# Patient Record
Sex: FEMALE | Race: WHITE | NOT HISPANIC OR LATINO | Employment: UNEMPLOYED | URBAN - NONMETROPOLITAN AREA
[De-identification: names, ages, dates, MRNs, and addresses within clinical notes are randomized per-mention and may not be internally consistent; named-entity substitution may affect disease eponyms.]

---
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
## 2019-01-02 ENCOUNTER — READMISSION MANAGEMENT (OUTPATIENT)
Dept: CALL CENTER | Facility: HOSPITAL | Age: 19
End: 2019-01-02

## 2019-01-02 NOTE — OUTREACH NOTE
Medical Week 2 Survey      Responses   Facility patient discharged from?  [facility]   Does the patient have one of the following disease processes/diagnoses(primary or secondary)?  Other   Week 2 attempt successful?  No   Unsuccessful attempts  Attempt 1          Annie Sauer RN

## 2019-01-04 ENCOUNTER — READMISSION MANAGEMENT (OUTPATIENT)
Dept: CALL CENTER | Facility: HOSPITAL | Age: 19
End: 2019-01-04

## 2019-01-10 ENCOUNTER — TELEPHONE (OUTPATIENT)
Dept: FAMILY MEDICINE CLINIC | Facility: CLINIC | Age: 19
End: 2019-01-10

## 2019-01-10 RX ORDER — ARIPIPRAZOLE 5 MG/1
TABLET ORAL
Qty: 30 TABLET | Refills: 0 | OUTPATIENT
Start: 2019-01-10

## 2019-01-10 NOTE — TELEPHONE ENCOUNTER
ANDREA IWLKINSONHILARY IS NEEDING A FORM 504 ABOUT HER MEDICAL CONDITION OF DIABETES FOR HER WORK..PLEASE CALL HER BACK  [phone]

## 2019-01-10 NOTE — TELEPHONE ENCOUNTER
ANDREA WILKINSONHLIARY IS NEEDING A FORM 504 ABOUT HER MEDICAL CONDITION OF DIABETES FOR HER WORK..PLEASE CALL HER BACK  [phone]

## 2019-01-11 NOTE — TELEPHONE ENCOUNTER
ANDREA ZELAYA IS NEEDING A FORM 504 ABOUT HER MEDICAL CONDITION OF DIABETES FOR HER WORK..PLEASE CALL HER BACK  [phone]    THEY ARE WANTING A LETTER STATING HER MEDICAL CONDITION .

## 2019-02-06 ENCOUNTER — HOSPITAL ENCOUNTER (INPATIENT)
Facility: HOSPITAL | Age: 19
LOS: 1 days | Discharge: HOME | End: 2019-02-07
Attending: FAMILY MEDICINE | Admitting: INTERNAL MEDICINE

## 2019-02-06 ENCOUNTER — APPOINTMENT (OUTPATIENT)
Dept: GENERAL RADIOLOGY | Facility: HOSPITAL | Age: 19
End: 2019-02-06

## 2019-02-06 DIAGNOSIS — E10.10 DIABETIC KETOACIDOSIS WITHOUT COMA ASSOCIATED WITH TYPE 1 DIABETES MELLITUS (HCC): Primary | ICD-10-CM

## 2019-02-06 PROBLEM — E11.10 DKA (DIABETIC KETOACIDOSES): Status: ACTIVE | Noted: 2019-02-06

## 2019-02-06 LAB
ACETONE BLD QL: ABNORMAL
ALBUMIN SERPL-MCNC: 5.1 G/DL (ref 3.4–4.8)
ALBUMIN/GLOB SERPL: 1.6 G/DL (ref 1.1–1.8)
ALP SERPL-CCNC: 102 U/L (ref 50–130)
ALT SERPL W P-5'-P-CCNC: 17 U/L (ref 9–52)
AMPHET+METHAMPHET UR QL: NEGATIVE
ANION GAP SERPL CALCULATED.3IONS-SCNC: 11 MMOL/L (ref 5–15)
ANION GAP SERPL CALCULATED.3IONS-SCNC: 27 MMOL/L (ref 5–15)
ARTERIAL PATENCY WRIST A: ABNORMAL
AST SERPL-CCNC: 23 U/L (ref 14–36)
ATMOSPHERIC PRESS: 746 MMHG
B-HCG UR QL: NEGATIVE
BACTERIA UR QL AUTO: ABNORMAL /HPF
BARBITURATES UR QL SCN: NEGATIVE
BASE EXCESS BLDA CALC-SCNC: -11.7 MMOL/L (ref 0–2)
BASOPHILS # BLD AUTO: 0.08 10*3/MM3 (ref 0–0.2)
BASOPHILS NFR BLD AUTO: 0.5 % (ref 0–2)
BDY SITE: ABNORMAL
BENZODIAZ UR QL SCN: NEGATIVE
BILIRUB SERPL-MCNC: 0.7 MG/DL (ref 0.2–1.3)
BILIRUB UR QL STRIP: NEGATIVE
BUN BLD-MCNC: 13 MG/DL (ref 7–21)
BUN BLD-MCNC: 14 MG/DL (ref 7–21)
BUN/CREAT SERPL: 16.7 (ref 7–25)
BUN/CREAT SERPL: 20.9 (ref 7–25)
CA-I BLD-MCNC: 4.57 MG/DL (ref 4.6–5.6)
CALCIUM SPEC-SCNC: 10 MG/DL (ref 8.4–10.2)
CALCIUM SPEC-SCNC: 8.6 MG/DL (ref 8.4–10.2)
CANNABINOIDS SERPL QL: POSITIVE
CHLORIDE SERPL-SCNC: 106 MMOL/L (ref 95–110)
CHLORIDE SERPL-SCNC: 92 MMOL/L (ref 95–110)
CK SERPL-CCNC: 49 U/L (ref 30–135)
CLARITY UR: CLEAR
CO2 SERPL-SCNC: 15 MMOL/L (ref 22–31)
CO2 SERPL-SCNC: 18 MMOL/L (ref 22–31)
COCAINE UR QL: NEGATIVE
COLOR UR: YELLOW
CREAT BLD-MCNC: 0.67 MG/DL (ref 0.5–1)
CREAT BLD-MCNC: 0.78 MG/DL (ref 0.5–1)
D-LACTATE SERPL-SCNC: 0.7 MMOL/L (ref 0.5–2)
D-LACTATE SERPL-SCNC: 3.4 MMOL/L (ref 0.5–2)
DEPRECATED RDW RBC AUTO: 42.1 FL (ref 36.4–46.3)
EOSINOPHIL # BLD AUTO: 0.1 10*3/MM3 (ref 0–0.7)
EOSINOPHIL NFR BLD AUTO: 0.6 % (ref 0–7)
ERYTHROCYTE [DISTWIDTH] IN BLOOD BY AUTOMATED COUNT: 13.2 % (ref 11.5–14.5)
GFR SERPL CREATININE-BSD FRML MDRD: 113 ML/MIN/1.73 (ref 71–165)
GFR SERPL CREATININE-BSD FRML MDRD: 95 ML/MIN/1.73 (ref 71–165)
GLOBULIN UR ELPH-MCNC: 3.2 GM/DL (ref 2.3–3.5)
GLUCOSE BLD-MCNC: 302 MG/DL (ref 60–100)
GLUCOSE BLD-MCNC: 614 MG/DL (ref 60–100)
GLUCOSE BLDC GLUCOMTR-MCNC: 114 MG/DL (ref 70–130)
GLUCOSE BLDC GLUCOMTR-MCNC: 200 MG/DL (ref 70–130)
GLUCOSE BLDC GLUCOMTR-MCNC: 254 MG/DL (ref 70–130)
GLUCOSE BLDC GLUCOMTR-MCNC: 372 MG/DL (ref 70–130)
GLUCOSE BLDC GLUCOMTR-MCNC: 60 MG/DL (ref 70–130)
GLUCOSE BLDC GLUCOMTR-MCNC: 73 MG/DL (ref 70–130)
GLUCOSE UR STRIP-MCNC: ABNORMAL MG/DL
HBA1C MFR BLD: 12.2 % (ref 4–5.6)
HCG SERPL QL: NEGATIVE
HCO3 BLDA-SCNC: 13.2 MMOL/L (ref 20–26)
HCT VFR BLD AUTO: 40.6 % (ref 35–45)
HGB BLD-MCNC: 14.3 G/DL (ref 12–15.5)
HGB UR QL STRIP.AUTO: ABNORMAL
HOLD SPECIMEN: NORMAL
HYALINE CASTS UR QL AUTO: ABNORMAL /LPF
IMM GRANULOCYTES # BLD AUTO: 0.23 10*3/MM3 (ref 0–0.02)
IMM GRANULOCYTES NFR BLD AUTO: 1.5 % (ref 0–0.5)
KETONES UR QL STRIP: ABNORMAL
LEUKOCYTE ESTERASE UR QL STRIP.AUTO: ABNORMAL
LIPASE SERPL-CCNC: 36 U/L (ref 25–110)
LYMPHOCYTES # BLD AUTO: 2.31 10*3/MM3 (ref 0.6–4.2)
LYMPHOCYTES NFR BLD AUTO: 15 % (ref 10–50)
Lab: ABNORMAL
MAGNESIUM SERPL-MCNC: 1.7 MG/DL (ref 1.6–2.3)
MAGNESIUM SERPL-MCNC: 1.7 MG/DL (ref 1.6–2.3)
MCH RBC QN AUTO: 30.8 PG (ref 26.5–34)
MCHC RBC AUTO-ENTMCNC: 35.2 G/DL (ref 31.4–36)
MCV RBC AUTO: 87.5 FL (ref 80–98)
METHADONE UR QL SCN: NEGATIVE
MODALITY: ABNORMAL
MONOCYTES # BLD AUTO: 0.46 10*3/MM3 (ref 0–0.9)
MONOCYTES NFR BLD AUTO: 3 % (ref 0–12)
NEUTROPHILS # BLD AUTO: 12.21 10*3/MM3 (ref 2–8.6)
NEUTROPHILS NFR BLD AUTO: 79.4 % (ref 37–80)
NITRITE UR QL STRIP: NEGATIVE
NRBC BLD AUTO-RTO: 0 /100 WBC (ref 0–0)
OPIATES UR QL: NEGATIVE
OXYCODONE UR QL SCN: NEGATIVE
PCO2 BLDA: 27.5 MM HG (ref 35–45)
PH BLDA: 7.29 PH UNITS (ref 7.35–7.45)
PH UR STRIP.AUTO: 5.5 [PH] (ref 5–9)
PHOSPHATE SERPL-MCNC: 3.7 MG/DL (ref 2.7–4.7)
PLATELET # BLD AUTO: 428 10*3/MM3 (ref 150–450)
PMV BLD AUTO: 10.6 FL (ref 8–12)
PO2 BLDA: 114 MM HG (ref 83–108)
POTASSIUM BLD-SCNC: 4 MMOL/L (ref 3.5–5.1)
POTASSIUM BLD-SCNC: 4.3 MMOL/L (ref 3.5–5.1)
PROT SERPL-MCNC: 8.3 G/DL (ref 6.3–8.6)
PROT UR QL STRIP: NEGATIVE
RBC # BLD AUTO: 4.64 10*6/MM3 (ref 3.77–5.16)
RBC # UR: ABNORMAL /HPF
REF LAB TEST METHOD: ABNORMAL
SAO2 % BLDCOA: 98.8 % (ref 94–99)
SODIUM BLD-SCNC: 134 MMOL/L (ref 137–145)
SODIUM BLD-SCNC: 135 MMOL/L (ref 137–145)
SP GR UR STRIP: 1.01 (ref 1–1.03)
SQUAMOUS #/AREA URNS HPF: ABNORMAL /HPF
UROBILINOGEN UR QL STRIP: ABNORMAL
VENTILATOR MODE: ABNORMAL
WBC NRBC COR # BLD: 15.39 10*3/MM3 (ref 3.2–9.8)
WBC UR QL AUTO: ABNORMAL /HPF
WHOLE BLOOD HOLD SPECIMEN: NORMAL
WHOLE BLOOD HOLD SPECIMEN: NORMAL

## 2019-02-06 PROCEDURE — P9047 ALBUMIN (HUMAN), 25%, 50ML: HCPCS | Performed by: INTERNAL MEDICINE

## 2019-02-06 PROCEDURE — 71045 X-RAY EXAM CHEST 1 VIEW: CPT

## 2019-02-06 PROCEDURE — 80307 DRUG TEST PRSMV CHEM ANLYZR: CPT | Performed by: FAMILY MEDICINE

## 2019-02-06 PROCEDURE — 84703 CHORIONIC GONADOTROPIN ASSAY: CPT | Performed by: INTERNAL MEDICINE

## 2019-02-06 PROCEDURE — 82803 BLOOD GASES ANY COMBINATION: CPT

## 2019-02-06 PROCEDURE — 84100 ASSAY OF PHOSPHORUS: CPT | Performed by: INTERNAL MEDICINE

## 2019-02-06 PROCEDURE — 82009 KETONE BODYS QUAL: CPT | Performed by: PHYSICIAN ASSISTANT

## 2019-02-06 PROCEDURE — 80053 COMPREHEN METABOLIC PANEL: CPT | Performed by: PHYSICIAN ASSISTANT

## 2019-02-06 PROCEDURE — 36415 COLL VENOUS BLD VENIPUNCTURE: CPT | Performed by: INTERNAL MEDICINE

## 2019-02-06 PROCEDURE — 83605 ASSAY OF LACTIC ACID: CPT | Performed by: PHYSICIAN ASSISTANT

## 2019-02-06 PROCEDURE — 63710000001 INSULIN REGULAR HUMAN PER 5 UNITS: Performed by: INTERNAL MEDICINE

## 2019-02-06 PROCEDURE — 83735 ASSAY OF MAGNESIUM: CPT | Performed by: INTERNAL MEDICINE

## 2019-02-06 PROCEDURE — 82962 GLUCOSE BLOOD TEST: CPT

## 2019-02-06 PROCEDURE — 83690 ASSAY OF LIPASE: CPT | Performed by: PHYSICIAN ASSISTANT

## 2019-02-06 PROCEDURE — 81025 URINE PREGNANCY TEST: CPT | Performed by: PHYSICIAN ASSISTANT

## 2019-02-06 PROCEDURE — 85025 COMPLETE CBC W/AUTO DIFF WBC: CPT | Performed by: PHYSICIAN ASSISTANT

## 2019-02-06 PROCEDURE — 83735 ASSAY OF MAGNESIUM: CPT | Performed by: PHYSICIAN ASSISTANT

## 2019-02-06 PROCEDURE — 36600 WITHDRAWAL OF ARTERIAL BLOOD: CPT

## 2019-02-06 PROCEDURE — 80048 BASIC METABOLIC PNL TOTAL CA: CPT | Performed by: INTERNAL MEDICINE

## 2019-02-06 PROCEDURE — 87040 BLOOD CULTURE FOR BACTERIA: CPT | Performed by: INTERNAL MEDICINE

## 2019-02-06 PROCEDURE — 99285 EMERGENCY DEPT VISIT HI MDM: CPT

## 2019-02-06 PROCEDURE — 25010000002 MORPHINE PER 10 MG: Performed by: FAMILY MEDICINE

## 2019-02-06 PROCEDURE — 81001 URINALYSIS AUTO W/SCOPE: CPT | Performed by: PHYSICIAN ASSISTANT

## 2019-02-06 PROCEDURE — 83036 HEMOGLOBIN GLYCOSYLATED A1C: CPT | Performed by: INTERNAL MEDICINE

## 2019-02-06 PROCEDURE — 25010000002 ONDANSETRON PER 1 MG: Performed by: FAMILY MEDICINE

## 2019-02-06 PROCEDURE — 82550 ASSAY OF CK (CPK): CPT | Performed by: PHYSICIAN ASSISTANT

## 2019-02-06 PROCEDURE — 25010000002 ALBUMIN HUMAN 25% PER 50 ML: Performed by: INTERNAL MEDICINE

## 2019-02-06 PROCEDURE — 82330 ASSAY OF CALCIUM: CPT | Performed by: INTERNAL MEDICINE

## 2019-02-06 RX ORDER — POTASSIUM CHLORIDE 750 MG/1
20 CAPSULE, EXTENDED RELEASE ORAL AS NEEDED
Status: DISCONTINUED | OUTPATIENT
Start: 2019-02-06 | End: 2019-02-07 | Stop reason: HOSPADM

## 2019-02-06 RX ORDER — ONDANSETRON 2 MG/ML
4 INJECTION INTRAMUSCULAR; INTRAVENOUS EVERY 6 HOURS PRN
Status: DISCONTINUED | OUTPATIENT
Start: 2019-02-06 | End: 2019-02-07 | Stop reason: HOSPADM

## 2019-02-06 RX ORDER — POTASSIUM CHLORIDE 1.5 G/1.77G
20 POWDER, FOR SOLUTION ORAL AS NEEDED
Status: DISCONTINUED | OUTPATIENT
Start: 2019-02-06 | End: 2019-02-07 | Stop reason: HOSPADM

## 2019-02-06 RX ORDER — LORAZEPAM 2 MG/ML
1 INJECTION INTRAMUSCULAR EVERY 6 HOURS PRN
Status: DISCONTINUED | OUTPATIENT
Start: 2019-02-06 | End: 2019-02-06

## 2019-02-06 RX ORDER — SODIUM CHLORIDE AND POTASSIUM CHLORIDE 150; 450 MG/100ML; MG/100ML
250 INJECTION, SOLUTION INTRAVENOUS CONTINUOUS PRN
Status: DISCONTINUED | OUTPATIENT
Start: 2019-02-06 | End: 2019-02-07 | Stop reason: HOSPADM

## 2019-02-06 RX ORDER — SODIUM CHLORIDE 9 MG/ML
INJECTION, SOLUTION INTRAVENOUS
Status: COMPLETED
Start: 2019-02-06 | End: 2019-02-06

## 2019-02-06 RX ORDER — SODIUM CHLORIDE 0.9 % (FLUSH) 0.9 %
10 SYRINGE (ML) INJECTION AS NEEDED
Status: DISCONTINUED | OUTPATIENT
Start: 2019-02-06 | End: 2019-02-07 | Stop reason: HOSPADM

## 2019-02-06 RX ORDER — DEXTROSE MONOHYDRATE 25 G/50ML
12.5 INJECTION, SOLUTION INTRAVENOUS
Status: DISCONTINUED | OUTPATIENT
Start: 2019-02-06 | End: 2019-02-07 | Stop reason: HOSPADM

## 2019-02-06 RX ORDER — SODIUM CHLORIDE 0.9 % (FLUSH) 0.9 %
3 SYRINGE (ML) INJECTION EVERY 12 HOURS SCHEDULED
Status: DISCONTINUED | OUTPATIENT
Start: 2019-02-06 | End: 2019-02-07

## 2019-02-06 RX ORDER — ONDANSETRON 2 MG/ML
4 INJECTION INTRAMUSCULAR; INTRAVENOUS ONCE
Status: COMPLETED | OUTPATIENT
Start: 2019-02-06 | End: 2019-02-06

## 2019-02-06 RX ORDER — ARIPIPRAZOLE 5 MG/1
5 TABLET ORAL DAILY
Status: DISCONTINUED | OUTPATIENT
Start: 2019-02-07 | End: 2019-02-07 | Stop reason: HOSPADM

## 2019-02-06 RX ORDER — IPRATROPIUM BROMIDE AND ALBUTEROL SULFATE 2.5; .5 MG/3ML; MG/3ML
3 SOLUTION RESPIRATORY (INHALATION) EVERY 6 HOURS PRN
Status: DISCONTINUED | OUTPATIENT
Start: 2019-02-06 | End: 2019-02-07 | Stop reason: HOSPADM

## 2019-02-06 RX ORDER — POTASSIUM CHLORIDE 7.46 G/1000ML
10 INJECTION, SOLUTION INTRAVENOUS AS NEEDED
Status: DISCONTINUED | OUTPATIENT
Start: 2019-02-06 | End: 2019-02-07 | Stop reason: HOSPADM

## 2019-02-06 RX ORDER — ALBUMIN (HUMAN) 12.5 G/50ML
12.5 SOLUTION INTRAVENOUS ONCE
Status: COMPLETED | OUTPATIENT
Start: 2019-02-06 | End: 2019-02-06

## 2019-02-06 RX ORDER — LAMOTRIGINE 100 MG/1
100 TABLET ORAL DAILY
Status: DISCONTINUED | OUTPATIENT
Start: 2019-02-07 | End: 2019-02-07 | Stop reason: HOSPADM

## 2019-02-06 RX ORDER — SODIUM CHLORIDE 0.9 % (FLUSH) 0.9 %
3-10 SYRINGE (ML) INJECTION AS NEEDED
Status: DISCONTINUED | OUTPATIENT
Start: 2019-02-06 | End: 2019-02-07

## 2019-02-06 RX ORDER — SODIUM CHLORIDE 9 MG/ML
150 INJECTION, SOLUTION INTRAVENOUS CONTINUOUS
Status: DISCONTINUED | OUTPATIENT
Start: 2019-02-06 | End: 2019-02-06

## 2019-02-06 RX ORDER — ACETAMINOPHEN 325 MG/1
650 TABLET ORAL EVERY 6 HOURS PRN
Status: DISCONTINUED | OUTPATIENT
Start: 2019-02-06 | End: 2019-02-07 | Stop reason: HOSPADM

## 2019-02-06 RX ORDER — DEXTROSE, SODIUM CHLORIDE, AND POTASSIUM CHLORIDE 5; .45; .15 G/100ML; G/100ML; G/100ML
150 INJECTION INTRAVENOUS CONTINUOUS PRN
Status: DISCONTINUED | OUTPATIENT
Start: 2019-02-06 | End: 2019-02-07 | Stop reason: HOSPADM

## 2019-02-06 RX ORDER — SERTRALINE HYDROCHLORIDE 25 MG/1
25 TABLET, FILM COATED ORAL DAILY
Status: DISCONTINUED | OUTPATIENT
Start: 2019-02-07 | End: 2019-02-07 | Stop reason: HOSPADM

## 2019-02-06 RX ORDER — SODIUM CHLORIDE 9 MG/ML
125 INJECTION, SOLUTION INTRAVENOUS CONTINUOUS
Status: DISCONTINUED | OUTPATIENT
Start: 2019-02-06 | End: 2019-02-07 | Stop reason: HOSPADM

## 2019-02-06 RX ORDER — DEXTROSE AND SODIUM CHLORIDE 5; .45 G/100ML; G/100ML
150 INJECTION, SOLUTION INTRAVENOUS CONTINUOUS PRN
Status: DISCONTINUED | OUTPATIENT
Start: 2019-02-06 | End: 2019-02-07 | Stop reason: HOSPADM

## 2019-02-06 RX ORDER — CLONAZEPAM 0.5 MG/1
1 TABLET ORAL 2 TIMES DAILY PRN
Status: DISCONTINUED | OUTPATIENT
Start: 2019-02-06 | End: 2019-02-07 | Stop reason: HOSPADM

## 2019-02-06 RX ORDER — MORPHINE SULFATE 2 MG/ML
2 INJECTION, SOLUTION INTRAMUSCULAR; INTRAVENOUS ONCE
Status: COMPLETED | OUTPATIENT
Start: 2019-02-06 | End: 2019-02-06

## 2019-02-06 RX ORDER — FAMOTIDINE 10 MG/ML
20 INJECTION, SOLUTION INTRAVENOUS EVERY 12 HOURS SCHEDULED
Status: DISCONTINUED | OUTPATIENT
Start: 2019-02-06 | End: 2019-02-07 | Stop reason: HOSPADM

## 2019-02-06 RX ORDER — DEXTROSE MONOHYDRATE 25 G/50ML
25-50 INJECTION, SOLUTION INTRAVENOUS
Status: DISCONTINUED | OUTPATIENT
Start: 2019-02-06 | End: 2019-02-07 | Stop reason: HOSPADM

## 2019-02-06 RX ADMIN — FAMOTIDINE 20 MG: 10 INJECTION INTRAVENOUS at 21:59

## 2019-02-06 RX ADMIN — SODIUM CHLORIDE 150 ML/HR: 900 INJECTION, SOLUTION INTRAVENOUS at 13:35

## 2019-02-06 RX ADMIN — Medication: at 21:55

## 2019-02-06 RX ADMIN — CLONAZEPAM 1 MG: 0.5 TABLET ORAL at 22:00

## 2019-02-06 RX ADMIN — MORPHINE SULFATE 2 MG: 2 INJECTION, SOLUTION INTRAMUSCULAR; INTRAVENOUS at 11:35

## 2019-02-06 RX ADMIN — ALBUMIN HUMAN 12.5 G: 0.25 SOLUTION INTRAVENOUS at 14:35

## 2019-02-06 RX ADMIN — ONDANSETRON 4 MG: 2 INJECTION INTRAMUSCULAR; INTRAVENOUS at 11:32

## 2019-02-06 RX ADMIN — HUMAN INSULIN 7 UNITS: 100 INJECTION, SOLUTION SUBCUTANEOUS at 13:01

## 2019-02-06 RX ADMIN — SODIUM CHLORIDE 1000 ML: 9 INJECTION, SOLUTION INTRAVENOUS at 11:36

## 2019-02-06 RX ADMIN — POTASSIUM CHLORIDE AND SODIUM CHLORIDE 250 ML/HR: 450; 150 INJECTION, SOLUTION INTRAVENOUS at 15:58

## 2019-02-06 RX ADMIN — SODIUM CHLORIDE 125 ML/HR: 9 INJECTION, SOLUTION INTRAVENOUS at 18:59

## 2019-02-06 RX ADMIN — SODIUM CHLORIDE 150 ML/HR: 9 INJECTION, SOLUTION INTRAVENOUS at 13:35

## 2019-02-06 RX ADMIN — SODIUM CHLORIDE 4 UNITS/HR: 9 INJECTION, SOLUTION INTRAVENOUS at 14:47

## 2019-02-06 RX ADMIN — POTASSIUM CHLORIDE, DEXTROSE MONOHYDRATE AND SODIUM CHLORIDE 150 ML/HR: 150; 5; 450 INJECTION, SOLUTION INTRAVENOUS at 17:09

## 2019-02-06 NOTE — PLAN OF CARE
Problem: Patient Care Overview  Goal: Plan of Care Review  Outcome: Ongoing (interventions implemented as appropriate)   02/06/19 9480   Coping/Psychosocial   Plan of Care Reviewed With patient   Plan of Care Review   Progress improving   OTHER   Outcome Summary New ER admission     Goal: Individualization and Mutuality  Outcome: Ongoing (interventions implemented as appropriate)    Goal: Discharge Needs Assessment  Outcome: Ongoing (interventions implemented as appropriate)    Goal: Interprofessional Rounds/Family Conf  Outcome: Ongoing (interventions implemented as appropriate)      Problem: Diabetes, Type 1 (Adult)  Goal: Signs and Symptoms of Listed Potential Problems Will be Absent, Minimized or Managed (Diabetes, Type 1)  Outcome: Ongoing (interventions implemented as appropriate)
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
## 2019-02-06 NOTE — ED NOTES
Provider made aware of patients drop in blood pressure.  Continue with IV fluid bolus.     Charo Navarrete RN  02/06/19 3216

## 2019-02-07 ENCOUNTER — APPOINTMENT (OUTPATIENT)
Dept: ULTRASOUND IMAGING | Facility: HOSPITAL | Age: 19
End: 2019-02-07

## 2019-02-07 VITALS
HEIGHT: 67 IN | TEMPERATURE: 97.6 F | OXYGEN SATURATION: 98 % | WEIGHT: 107.2 LBS | BODY MASS INDEX: 16.83 KG/M2 | DIASTOLIC BLOOD PRESSURE: 63 MMHG | HEART RATE: 64 BPM | SYSTOLIC BLOOD PRESSURE: 108 MMHG | RESPIRATION RATE: 18 BRPM

## 2019-02-07 LAB
ANION GAP SERPL CALCULATED.3IONS-SCNC: 9 MMOL/L (ref 5–15)
BACTERIA UR QL AUTO: ABNORMAL /HPF
BASOPHILS # BLD AUTO: 0.04 10*3/MM3 (ref 0–0.2)
BASOPHILS NFR BLD AUTO: 0.5 % (ref 0–2)
BILIRUB UR QL STRIP: NEGATIVE
BUN BLD-MCNC: 14 MG/DL (ref 7–21)
BUN/CREAT SERPL: 20 (ref 7–25)
CALCIUM SPEC-SCNC: 8.6 MG/DL (ref 8.4–10.2)
CHLORIDE SERPL-SCNC: 106 MMOL/L (ref 95–110)
CLARITY UR: CLEAR
CO2 SERPL-SCNC: 20 MMOL/L (ref 22–31)
COLOR UR: YELLOW
CREAT BLD-MCNC: 0.7 MG/DL (ref 0.5–1)
DEPRECATED RDW RBC AUTO: 42.7 FL (ref 36.4–46.3)
EOSINOPHIL # BLD AUTO: 0.16 10*3/MM3 (ref 0–0.7)
EOSINOPHIL NFR BLD AUTO: 1.9 % (ref 0–7)
ERYTHROCYTE [DISTWIDTH] IN BLOOD BY AUTOMATED COUNT: 13.2 % (ref 11.5–14.5)
GFR SERPL CREATININE-BSD FRML MDRD: 108 ML/MIN/1.73 (ref 71–165)
GLUCOSE BLD-MCNC: 237 MG/DL (ref 60–100)
GLUCOSE BLDC GLUCOMTR-MCNC: 247 MG/DL (ref 70–130)
GLUCOSE BLDC GLUCOMTR-MCNC: 256 MG/DL (ref 70–130)
GLUCOSE BLDC GLUCOMTR-MCNC: 280 MG/DL (ref 70–130)
GLUCOSE BLDC GLUCOMTR-MCNC: 284 MG/DL (ref 70–130)
GLUCOSE BLDC GLUCOMTR-MCNC: 381 MG/DL (ref 70–130)
GLUCOSE UR STRIP-MCNC: ABNORMAL MG/DL
HCT VFR BLD AUTO: 34.1 % (ref 35–45)
HGB BLD-MCNC: 12 G/DL (ref 12–15.5)
HGB UR QL STRIP.AUTO: ABNORMAL
HOLD SPECIMEN: NORMAL
HYALINE CASTS UR QL AUTO: ABNORMAL /LPF
IMM GRANULOCYTES # BLD AUTO: 0.07 10*3/MM3 (ref 0–0.02)
IMM GRANULOCYTES NFR BLD AUTO: 0.8 % (ref 0–0.5)
KETONES UR QL STRIP: ABNORMAL
LEUKOCYTE ESTERASE UR QL STRIP.AUTO: ABNORMAL
LYMPHOCYTES # BLD AUTO: 2.81 10*3/MM3 (ref 0.6–4.2)
LYMPHOCYTES NFR BLD AUTO: 33.8 % (ref 10–50)
MCH RBC QN AUTO: 30.7 PG (ref 26.5–34)
MCHC RBC AUTO-ENTMCNC: 35.2 G/DL (ref 31.4–36)
MCV RBC AUTO: 87.2 FL (ref 80–98)
MONOCYTES # BLD AUTO: 0.33 10*3/MM3 (ref 0–0.9)
MONOCYTES NFR BLD AUTO: 4 % (ref 0–12)
NEUTROPHILS # BLD AUTO: 4.9 10*3/MM3 (ref 2–8.6)
NEUTROPHILS NFR BLD AUTO: 59 % (ref 37–80)
NITRITE UR QL STRIP: NEGATIVE
PH UR STRIP.AUTO: 5.5 [PH] (ref 5–9)
PLATELET # BLD AUTO: 257 10*3/MM3 (ref 150–450)
PMV BLD AUTO: 10.1 FL (ref 8–12)
POTASSIUM BLD-SCNC: 3.9 MMOL/L (ref 3.5–5.1)
PROT UR QL STRIP: NEGATIVE
RBC # BLD AUTO: 3.91 10*6/MM3 (ref 3.77–5.16)
RBC # UR: ABNORMAL /HPF
REF LAB TEST METHOD: ABNORMAL
SODIUM BLD-SCNC: 135 MMOL/L (ref 137–145)
SP GR UR STRIP: 1.01 (ref 1–1.03)
SQUAMOUS #/AREA URNS HPF: ABNORMAL /HPF
UROBILINOGEN UR QL STRIP: ABNORMAL
WBC NRBC COR # BLD: 8.31 10*3/MM3 (ref 3.2–9.8)
WBC UR QL AUTO: ABNORMAL /HPF

## 2019-02-07 PROCEDURE — 76775 US EXAM ABDO BACK WALL LIM: CPT

## 2019-02-07 PROCEDURE — 81001 URINALYSIS AUTO W/SCOPE: CPT | Performed by: INTERNAL MEDICINE

## 2019-02-07 PROCEDURE — 85025 COMPLETE CBC W/AUTO DIFF WBC: CPT | Performed by: INTERNAL MEDICINE

## 2019-02-07 PROCEDURE — 80048 BASIC METABOLIC PNL TOTAL CA: CPT | Performed by: INTERNAL MEDICINE

## 2019-02-07 PROCEDURE — 82962 GLUCOSE BLOOD TEST: CPT

## 2019-02-07 RX ORDER — SODIUM CHLORIDE 0.9 % (FLUSH) 0.9 %
10 SYRINGE (ML) INJECTION EVERY 12 HOURS SCHEDULED
Status: DISCONTINUED | OUTPATIENT
Start: 2019-02-07 | End: 2019-02-07 | Stop reason: HOSPADM

## 2019-02-07 RX ADMIN — SODIUM CHLORIDE, PRESERVATIVE FREE 10 ML: 5 INJECTION INTRAVENOUS at 08:56

## 2019-02-07 RX ADMIN — CLONAZEPAM 1 MG: 0.5 TABLET ORAL at 06:15

## 2019-02-07 RX ADMIN — SERTRALINE HYDROCHLORIDE 25 MG: 25 TABLET ORAL at 08:54

## 2019-02-07 RX ADMIN — SODIUM CHLORIDE 125 ML/HR: 9 INJECTION, SOLUTION INTRAVENOUS at 03:01

## 2019-02-07 RX ADMIN — ARIPIPRAZOLE 5 MG: 5 TABLET ORAL at 08:54

## 2019-02-07 RX ADMIN — FAMOTIDINE 20 MG: 10 INJECTION INTRAVENOUS at 08:55

## 2019-02-07 RX ADMIN — LAMOTRIGINE 100 MG: 100 TABLET ORAL at 08:54

## 2019-02-07 NOTE — PAYOR COMM NOTE
"Fernanda Patterson (18 y.o. Female)     Date of Birth Social Security Number Address Home Phone MRN    2000  PO   [facility] [phone] [id number]    Oriental orthodox Marital Status          Pentecostal Single       Admission Date Admission Type Admitting Provider Attending Provider Department, Room/Bed    2/6/19 Emergency Echendu, Eliud UNDERWOOD MD  [facility] STEPDOWN UNIT, 320/1    Discharge Date Discharge Disposition Discharge Destination        2/7/2019 Home or Self Care              Attending Provider:  (none)   Allergies:  Pineapple, Benzoyl Peroxide    Isolation:  None   Infection:  None   Code Status:  Prior    Ht:  170.2 cm (67\")   Wt:  48.6 kg (107 lb 3.2 oz)    Admission Cmt:  None   Principal Problem:  None                Active Insurance as of 2/6/2019     Primary Coverage     Payor Plan Insurance Group Employer/Plan Group    [employer]HEM BLUE CROSS AN[employer]M BLUE CROSS BLUE SHIELD PPO [id number]     Payor Plan Address Payor Plan Phone Number Payor Plan Fax Number Effective Dates    [address] [phone]  2/19/2018 - None Entered    [facility] [id number]       Subscriber Name Subscriber Birth Date Member ID       DEBORAH SALOMON [birth year]1971 [id number]                 Emergency Contacts      (Rel.) Home Phone Work Phone Mobile Phone    Juana Salomon (Mother) [phone] -- [phone]            ICU Vital Signs     Row Name 02/07/19 0851 02/07/19 0829 02/07/19 0610 02/07/19 0420 02/07/19 0113       Height and Weight    Weight  --  --  48.6 kg (107 lb 3.2 oz)  --  --       Vitals    Temp  97.6 °F (36.4 °C)  --  98.4 °F (36.9 °C)  --  --    Temp src  Oral  --  Oral  --  --    Pulse  64  58  66  55  64    Heart Rate Source  Monitor  Monitor  Monitor  Monitor  Monitor    Resp  18  --  18  --  --    Resp Rate Source  Visual  --  Visual  --  --    BP  108/63  --  107/75  --  --    Noninvasive MAP (mmHg)  79  --  86  --  --    BP Location  Left arm  --  " Left arm  --  --    BP Method  Automatic  --  Automatic  --  --    Patient Position  Lying  --  Lying  --  --       Oxygen Therapy    SpO2  98 %  --  98 %  --  --    Pulse Oximetry Type  Intermittent  --  Intermittent  --  --    Device (Oxygen Therapy)  room air  --  room air  --  --    Row Name 02/06/19 2300 02/06/19 1926 02/06/19 1754 02/06/19 1629 02/06/19 1556       Vitals    Temp  98.6 °F (37 °C)  98.5 °F (36.9 °C)  98.5 °F (36.9 °C)  --  --    Temp src  Oral  Oral  Oral  --  --    Pulse  74  71  72  69  --    Heart Rate Source  Monitor  Monitor  Monitor  Monitor  --    Resp  18  20  20  --  --    Resp Rate Source  Visual  Visual  Visual  --  --    BP  96/54  91/44  90/60  --  84/62  (Abnormal)     Noninvasive MAP (mmHg)  70  63  --  --  --    BP Location  Left arm  Left arm  Left arm  --  Left arm    BP Method  Automatic  Automatic  Manual  --  Manual    Patient Position  Lying  Lying  Lying  --  Lying       Oxygen Therapy    SpO2  98 %  98 %  98 %  --  --    Pulse Oximetry Type  Intermittent  Intermittent  Intermittent  --  --    Device (Oxygen Therapy)  room air  room air  room air  --  --    Row Name 02/06/19 1454                   Vitals    Pulse  65        Heart Rate Source  Monitor            Hospital Medications (active)       Dose Frequency Start End    acetaminophen (TYLENOL) tablet 650 mg (Discontinued) 650 mg Every 6 Hours PRN 2/6/2019 2/7/2019    Sig - Route: Take 2 tablets by mouth Every 6 (Six) Hours As Needed for Mild Pain . - Oral    Reason for Discontinue: Patient Discharge    ARIPiprazole (ABILIFY) tablet 5 mg (Discontinued) 5 mg Daily 2/7/2019 2/7/2019    Sig - Route: Take 1 tablet by mouth Daily. - Oral    Reason for Discontinue: Patient Discharge    clonazePAM (KlonoPIN) tablet 1 mg (Discontinued) 1 mg 2 Times Daily PRN 2/6/2019 2/7/2019    Sig - Route: Take 2 tablets by mouth 2 (Two) Times a Day As Needed for Seizures. - Oral    Reason for Discontinue: Patient Discharge    dextrose (D50W)  25 g/ 50mL Intravenous Solution 12.5 g (Discontinued) 12.5 g Every 15 Minutes PRN 2/6/2019 2/7/2019    Sig - Route: Infuse 25 mL into a venous catheter Every 15 (Fifteen) Minutes As Needed for Low Blood Sugar (for blood glucose < 100 mg/dL). - Intravenous    Reason for Discontinue: Patient Discharge    dextrose (D50W) 25 g/ 50mL Intravenous Solution 25-50 mL (Discontinued) 25-50 mL Every 30 Minutes PRN 2/6/2019 2/7/2019    Sig - Route: Infuse 25-50 mL into a venous catheter Every 30 (Thirty) Minutes As Needed for Low Blood Sugar (Blood Glucose <70 mg/dL; Per Insulin Infusion Protocol). - Intravenous    Reason for Discontinue: Patient Discharge    dextrose 5 % and sodium chloride 0.45 % infusion (Discontinued) 150 mL/hr Continuous PRN 2/6/2019 2/7/2019    Sig - Route: Infuse 150 mL/hr into a venous catheter Continuous As Needed (Once Glucose Less Than or Equal to 200 mg/dL and Serum Potassium Greater Than 5). - Intravenous    Reason for Discontinue: Patient Discharge    dextrose 5 % and sodium chloride 0.45 % with KCl 20 mEq/L infusion (Discontinued) 150 mL/hr Continuous PRN 2/6/2019 2/7/2019    Sig - Route: Infuse 150 mL/hr into a venous catheter Continuous As Needed (Once Glucose Less Than or Equal to 200 mg/dL and Serum Potassium Less Than or Equal to 5). - Intravenous    Reason for Discontinue: Patient Discharge    famotidine (PEPCID) injection 20 mg (Discontinued) 20 mg Every 12 Hours Scheduled 2/6/2019 2/7/2019    Sig - Route: Infuse 2 mL into a venous catheter Every 12 (Twelve) Hours. - Intravenous    Reason for Discontinue: Patient Discharge    insulin patient supplied pump (Discontinued)  Continuous 2/6/2019 2/6/2019    Sig - Route: Inject  under the skin into the appropriate area as directed Continuous. - Subcutaneous    insulin patient supplied pump (Discontinued)  Continuous 2/6/2019 2/7/2019    Sig - Route: Inject  under the skin into the appropriate area as directed Continuous. - Subcutaneous    Notes  "to Pharmacy: Basal rate different based on patient censor    Reason for Discontinue: Patient Discharge    insulin regular (HumuLIN R,NovoLIN R) 100 Units in sodium chloride 0.9 % 100 mL (1 Units/mL) infusion (Discontinued) 5 Units/hr Titrated 2/6/2019 2/6/2019    Sig - Route: Infuse 5 Units/hr into a venous catheter Dose Adjusted By Provider As Needed. - Intravenous    ipratropium-albuterol (DUO-NEB) nebulizer solution 3 mL (Discontinued) 3 mL Every 6 Hours PRN 2/6/2019 2/7/2019    Sig - Route: Take 3 mL by nebulization Every 6 (Six) Hours As Needed for Shortness of Air. - Nebulization    Reason for Discontinue: Patient Discharge    lamoTRIgine (LaMICtal) tablet 100 mg (Discontinued) 100 mg Daily 2/7/2019 2/7/2019    Sig - Route: Take 1 tablet by mouth Daily. - Oral    Reason for Discontinue: Patient Discharge    LORazepam (ATIVAN) injection 1 mg (Discontinued) 1 mg Every 6 Hours PRN 2/6/2019 2/6/2019    Sig - Route: Infuse 0.5 mL into a venous catheter Every 6 (Six) Hours As Needed for Anxiety. - Intravenous    ondansetron (ZOFRAN) injection 4 mg (Discontinued) 4 mg Every 6 Hours PRN 2/6/2019 2/7/2019    Sig - Route: Infuse 2 mL into a venous catheter Every 6 (Six) Hours As Needed for Nausea or Vomiting. - Intravenous    Reason for Discontinue: Patient Discharge    potassium chloride (KLOR-CON) packet 20 mEq (Discontinued) 20 mEq As Needed 2/6/2019 2/7/2019    Sig - Route: Take 20 mEq by mouth As Needed (Potassium replacement per admin instructions). - Oral    Reason for Discontinue: Patient Discharge    Linked Group 1:  \"Or\" Linked Group Details        potassium chloride (MICRO-K) CR capsule 20 mEq (Discontinued) 20 mEq As Needed 2/6/2019 2/7/2019    Sig - Route: Take 2 capsules by mouth As Needed (Potassium replacement per admin instructions). - Oral    Reason for Discontinue: Patient Discharge    Linked Group 1:  \"Or\" Linked Group Details        potassium chloride 10 mEq in 100 mL IVPB (Discontinued) 10 mEq As " "Needed 2/6/2019 2/7/2019    Sig - Route: Infuse 100 mL into a venous catheter As Needed (Potassium replacement per admin instructions). - Intravenous    Reason for Discontinue: Patient Discharge    Linked Group 1:  \"Or\" Linked Group Details        sertraline (ZOLOFT) tablet 25 mg (Discontinued) 25 mg Daily 2/7/2019 2/7/2019    Sig - Route: Take 1 tablet by mouth Daily. - Oral    Reason for Discontinue: Patient Discharge    sodium chloride 0.45 % with KCl 20 mEq/L infusion (Discontinued) 250 mL/hr Continuous PRN 2/6/2019 2/7/2019    Sig - Route: Infuse 250 mL/hr into a venous catheter Continuous As Needed (After Initial 2 Hours - If Potassium Level is Less Than or Equal to 5 (If Greater Than 5 use 0.45%)). - Intravenous    Reason for Discontinue: Patient Discharge    sodium chloride 0.9 % flush 10 mL (Discontinued) 10 mL As Needed 2/6/2019 2/7/2019    Sig - Route: Infuse 10 mL into a venous catheter As Needed for Line Care. - Intravenous    Reason for Discontinue: Patient Discharge    Cosign for Ordering: Accepted by Enrike Hu MD on 2/6/2019 12:04 PM    Linked Group 2:  \"And\" Linked Group Details        sodium chloride 0.9 % flush 10 mL (Discontinued) 10 mL Every 12 Hours Scheduled 2/7/2019 2/7/2019    Sig - Route: Infuse 10 mL into a venous catheter Every 12 (Twelve) Hours. - Intravenous    Reason for Discontinue: Patient Discharge    sodium chloride 0.9 % flush 3 mL (Discontinued) 3 mL Every 12 Hours Scheduled 2/6/2019 2/7/2019    Sig - Route: Infuse 3 mL into a venous catheter Every 12 (Twelve) Hours. - Intravenous    sodium chloride 0.9 % flush 3-10 mL (Discontinued) 3-10 mL As Needed 2/6/2019 2/7/2019    Sig - Route: Infuse 3-10 mL into a venous catheter As Needed for Line Care. - Intravenous    sodium chloride 0.9 % infusion 150 mL (Discontinued) 150 mL Continuous 2/6/2019 2/6/2019    Sig - Route: Infuse 150 mL into a venous catheter Continuous. - Intravenous    sodium chloride 0.9 % infusion " (Discontinued) 125 mL/hr Continuous 2/6/2019 2/7/2019    Sig - Route: Infuse 125 mL/hr into a venous catheter Continuous. - Intravenous    Reason for Discontinue: Patient Discharge          Lab Results (last 24 hours)     Procedure Component Value Units Date/Time    Urinalysis, Microscopic Only - Urine, Clean Catch [[id number]]  (Abnormal) Collected:  02/07/19 0843    Specimen:  Urine, Clean Catch Updated:  02/07/19 1106     RBC, UA 3-5 /HPF      WBC, UA 3-5 /HPF      Bacteria, UA Trace /HPF      Squamous Epithelial Cells, UA 3-5 /HPF      Hyaline Casts, UA None Seen /LPF      Methodology Automated Microscopy    Urinalysis With Culture If Indicated - Urine, Clean Catch [[id number]]  (Abnormal) Collected:  02/07/19 0843    Specimen:  Urine, Clean Catch Updated:  02/07/19 0858     Color, UA Yellow     Appearance, UA Clear     pH, UA 5.5     Specific Gravity, UA 1.010     Glucose, UA >=1000 mg/dL (3+)     Ketones, UA 15 mg/dL (1+)     Bilirubin, UA Negative     Blood, UA Moderate (2+)     Protein, UA Negative     Leuk Esterase, UA Trace     Nitrite, UA Negative     Urobilinogen, UA 0.2 E.U./dL    Basic Metabolic Panel [[id number]]  (Abnormal) Collected:  02/07/19 0657    Specimen:  Blood Updated:  02/07/19 0801     Glucose 237 mg/dL      BUN 14 mg/dL      Creatinine 0.70 mg/dL      Sodium 135 mmol/L      Potassium 3.9 mmol/L      Chloride 106 mmol/L      CO2 20.0 mmol/L      Calcium 8.6 mg/dL      eGFR Non African Amer 108 mL/min/1.73      BUN/Creatinine Ratio 20.0     Anion Gap 9.0 mmol/L     Extra Tubes [[id number]] Collected:  02/07/19 0657    Specimen:  Blood, Venous Line Updated:  02/07/19 0801    Narrative:       The following orders were created for panel order Extra Tubes.  Procedure                               Abnormality         Status                     ---------                               -----------         ------                     Green Top (Gel)[[id number]]                                  Final  result                 Please view results for these tests on the individual orders.    Green Top (Gel) [[id number]] Collected:  02/07/19 0657    Specimen:  Blood Updated:  02/07/19 0801     Extra Tube Hold for add-ons.     Comment: Auto resulted.       CBC & Differential [[id number]] Collected:  02/07/19 0635    Specimen:  Blood Updated:  02/07/19 0705    Narrative:       The following orders were created for panel order CBC & Differential.  Procedure                               Abnormality         Status                     ---------                               -----------         ------                     CBC Auto Differential[[id number]]        Abnormal            Final result                 Please view results for these tests on the individual orders.    CBC Auto Differential [[id number]]  (Abnormal) Collected:  02/07/19 0635    Specimen:  Blood Updated:  02/07/19 0705     WBC 8.31 10*3/mm3      RBC 3.91 10*6/mm3      Hemoglobin 12.0 g/dL      Hematocrit 34.1 %      MCV 87.2 fL      MCH 30.7 pg      MCHC 35.2 g/dL      RDW 13.2 %      RDW-SD 42.7 fl      MPV 10.1 fL      Platelets 257 10*3/mm3      Neutrophil % 59.0 %      Lymphocyte % 33.8 %      Monocyte % 4.0 %      Eosinophil % 1.9 %      Basophil % 0.5 %      Immature Grans % 0.8 %      Neutrophils, Absolute 4.90 10*3/mm3      Lymphocytes, Absolute 2.81 10*3/mm3      Monocytes, Absolute 0.33 10*3/mm3      Eosinophils, Absolute 0.16 10*3/mm3      Basophils, Absolute 0.04 10*3/mm3      Immature Grans, Absolute 0.07 10*3/mm3     POC Glucose Once [[id number]]  (Abnormal) Collected:  02/07/19 0617    Specimen:  Blood Updated:  02/07/19 0656     Glucose 247 mg/dL      Comment: INSULIN PUMPOperator: [id number] LI AMYMeter ID: [id number]       Blood Culture - Blood, Hand, Left [[id number]] Collected:  02/06/19 1511    Specimen:  Blood from Hand, Left Updated:  02/07/19 0346     Blood Culture No growth at less than 24 hours    Blood Culture - Blood, Hand, Right  [[id number]] Collected:  02/06/19 1511    Specimen:  Blood from Hand, Right Updated:  02/07/19 0346     Blood Culture No growth at less than 24 hours    POC Glucose Once [[id number]]  (Abnormal) Collected:  02/07/19 0300    Specimen:  Blood Updated:  02/07/19 0322     Glucose 256 mg/dL      Comment: INSULIN PUMPOperator: [id number] [employer] AMYMeter ID: [id number]       POC Glucose Once [[id number]]  (Abnormal) Collected:  02/07/19 0138    Specimen:  Blood Updated:  02/07/19 0322     Glucose 284 mg/dL      Comment: INSULIN PUMPOperator: [id number] COOK AMYMeter ID: [id number]       POC Glucose Once [[id number]]  (Abnormal) Collected:  02/07/19 0016    Specimen:  Blood Updated:  02/07/19 0322     Glucose 280 mg/dL      Comment: INSULIN PUMPOperator: [id number] [employer] AMYMeter ID: [id number]       POC Glucose Once [[id number]]  (Abnormal) Collected:  02/06/19 2304    Specimen:  Blood Updated:  02/07/19 0322     Glucose 381 mg/dL      Comment: INSULIN PUMPOperator: [id number] [employer] AMYMeter ID: [id number]       POC Glucose Once [[id number]]  (Abnormal) Collected:  02/06/19 1850    Specimen:  Blood Updated:  02/06/19 1942     Glucose 200 mg/dL      Comment: : [id number] [employer] MicroStructuresLEYMeter ID: [id number]       POC Glucose Once [[id number]]  (Abnormal) Collected:  02/06/19 1821    Specimen:  Blood Updated:  02/06/19 1941     Glucose 60 mg/dL      Comment: : [id number] Granicus ASHLEYMeter ID: [id number]       POC Glucose Once [[id number]]  (Normal) Collected:  02/06/19 1750    Specimen:  Blood Updated:  02/06/19 1809     Glucose 73 mg/dL      Comment: : [id number] Granicus ASHLEYMeter ID: [id number]       POC Glucose Once [[id number]]  (Normal) Collected:  02/06/19 1706    Specimen:  Blood Updated:  02/06/19 1809     Glucose 114 mg/dL      Comment: : [id number] [facility] Jackson StreetMELVINA PETERSENKaiser Foundation HospitalMeter ID: [id number]       hCG, Serum, Qualitative [[id number]]  (Normal) Collected:  02/06/19 1511    Specimen:   Blood Updated:  02/06/19 1724     HCG Qualitative Negative    Basic Metabolic Panel [[id number]]  (Abnormal) Collected:  02/06/19 1511    Specimen:  Blood Updated:  02/06/19 1625     Glucose 302 mg/dL      BUN 14 mg/dL      Creatinine 0.67 mg/dL      Sodium 135 mmol/L      Potassium 4.3 mmol/L      Chloride 106 mmol/L      CO2 18.0 mmol/L      Calcium 8.6 mg/dL      eGFR Non African Amer 113 mL/min/1.73      BUN/Creatinine Ratio 20.9     Anion Gap 11.0 mmol/L     POC Glucose Once [[id number]]  (Abnormal) Collected:  02/06/19 1553    Specimen:  Blood Updated:  02/06/19 1608     Glucose 254 mg/dL      Comment: : [id number] No ChainsMeter ID: [id number]       POC Glucose Once [[id number]]  (Abnormal) Collected:  02/06/19 1417    Specimen:  Blood Updated:  02/06/19 1608     Glucose 372 mg/dL      Comment: : [id number] No ChainsMeter ID: [id number]       Lactic Acid, Reflex [[id number]]  (Normal) Collected:  02/06/19 1511    Specimen:  Blood Updated:  02/06/19 1607     Lactate 0.7 mmol/L     Phosphorus [[id number]]  (Normal) Collected:  02/06/19 1511    Specimen:  Blood Updated:  02/06/19 1602     Phosphorus 3.7 mg/dL     Magnesium [[id number]]  (Normal) Collected:  02/06/19 1511    Specimen:  Blood Updated:  02/06/19 1602     Magnesium 1.7 mg/dL     Calcium, Ionized [[id number]]  (Abnormal) Collected:  02/06/19 1511    Specimen:  Blood Updated:  02/06/19 1549     Ionized Calcium 4.57 mg/dL     Lactic Acid, Reflex Timer (This will reflex a repeat order 3-3:15 hours after ordered.) [[id number]] Collected:  02/06/19 1128    Specimen:  Blood Updated:  02/06/19 1500     Extra Tube Hold for add-ons.     Comment: Auto resulted.       Hemoglobin A1c [[id number]]  (Abnormal) Collected:  02/06/19 1128    Specimen:  Blood Updated:  02/06/19 1425     Hemoglobin A1C 12.2 %     Comprehensive Metabolic Panel [[id number]]  (Abnormal) Collected:  02/06/19 1128    Specimen:  Blood Updated:  02/06/19 1234     Glucose  614 mg/dL      BUN 13 mg/dL      Creatinine 0.78 mg/dL      Sodium 134 mmol/L      Potassium 4.0 mmol/L      Chloride 92 mmol/L      CO2 15.0 mmol/L      Calcium 10.0 mg/dL      Total Protein 8.3 g/dL      Albumin 5.10 g/dL      ALT (SGPT) 17 U/L      AST (SGOT) 23 U/L      Alkaline Phosphatase 102 U/L      Total Bilirubin 0.7 mg/dL      eGFR Non African Amer 95 mL/min/1.73      Globulin 3.2 gm/dL      A/G Ratio 1.6 g/dL      BUN/Creatinine Ratio 16.7     Anion Gap 27.0 mmol/L     [place] Draw [[id number]] Collected:  02/06/19 1128    Specimen:  Blood Updated:  02/06/19 1231    Narrative:       The following orders were created for panel order [place] Draw.  Procedure                               Abnormality         Status                     ---------                               -----------         ------                     Light Blue Top[[id number]]                                   Final result               Green Top (Gel)[[id number]]                                  Final result               Lavender Top[[id number]]                                     Final result               Gold Top - SST[[id number]]                                   Final result                 Please view results for these tests on the individual orders.    Green Top (Gel) [[id number]] Collected:  02/06/19 1128    Specimen:  Blood Updated:  02/06/19 1231     Extra Tube Hold for add-ons.     Comment: Auto resulted.       Light Blue Top [[id number]] Collected:  02/06/19 1128    Specimen:  Blood Updated:  02/06/19 1231     Extra Tube hold for add-on     Comment: Auto resulted       Lavender Top [[id number]] Collected:  02/06/19 1128    Specimen:  Blood Updated:  02/06/19 1231     Extra Tube hold for add-on     Comment: Auto resulted       Gold Top - SST [[id number]] Collected:  02/06/19 1128    Specimen:  Blood Updated:  02/06/19 1231     Extra Tube Hold for add-ons.     Comment: Auto resulted.       Urine Drug Screen - Urine, Clean Catch  [[id number]]  (Abnormal) Collected:  02/06/19 1129    Specimen:  Urine, Clean Catch Updated:  02/06/19 1230     Amphetamine Screen, Urine Negative     Barbiturates Screen, Urine Negative     Benzodiazepine Screen, Urine Negative     Cocaine Screen, Urine Negative     Methadone Screen, Urine Negative     Opiate Screen Negative     Oxycodone Screen, Urine Negative     THC, Screen, Urine Positive    Narrative:       Negative Thresholds For Drugs Screened in Urine:     Amphetamines          500 ng/ml  Barbiturates          200 ng/ml  Benzodiazepines       200 ng/ml  Cocaine               150 ng/ml  Methadone             300 ng/mL  Opiates               300 ng/mL  Oxycodone             100 ng/mL  THC                   20 ng/mL    The normal value for all drugs tested is negative. This report includes final unconfirmed screening results.  A positive result by this assay can be, at your request, sent to the Reference Lab for confirmation by gas chromatography. Unconfirmed results must not be used for non-medical purposes, such as employment or legal testing. Clinical consideration should be applied to any drug of abuse test result, particularly when unconfirmed results are used.    Urinalysis, Microscopic Only - Urine, Clean Catch [[id number]]  (Abnormal) Collected:  02/06/19 1129    Specimen:  Urine, Clean Catch Updated:  02/06/19 1212     RBC, UA 3-5 /HPF      WBC, UA 21-30 /HPF      Bacteria, UA 1+ /HPF      Squamous Epithelial Cells, UA 6-12 /HPF      Hyaline Casts, UA 3-6 /LPF      Methodology Automated Microscopy    CBC & Differential [[id number]] Collected:  02/06/19 1128    Specimen:  Blood Updated:  02/06/19 1212    Narrative:       The following orders were created for panel order CBC & Differential.  Procedure                               Abnormality         Status                     ---------                               -----------         ------                     CBC Auto Differential[[id number]]         Abnormal            Final result                 Please view results for these tests on the individual orders.    CBC Auto Differential [[id number]]  (Abnormal) Collected:  02/06/19 1128    Specimen:  Blood Updated:  02/06/19 1212     WBC 15.39 10*3/mm3      RBC 4.64 10*6/mm3      Hemoglobin 14.3 g/dL      Hematocrit 40.6 %      MCV 87.5 fL      MCH 30.8 pg      MCHC 35.2 g/dL      RDW 13.2 %      RDW-SD 42.1 fl      MPV 10.6 fL      Platelets 428 10*3/mm3      Neutrophil % 79.4 %      Lymphocyte % 15.0 %      Monocyte % 3.0 %      Eosinophil % 0.6 %      Basophil % 0.5 %      Immature Grans % 1.5 %      Neutrophils, Absolute 12.21 10*3/mm3      Lymphocytes, Absolute 2.31 10*3/mm3      Monocytes, Absolute 0.46 10*3/mm3      Eosinophils, Absolute 0.10 10*3/mm3      Basophils, Absolute 0.08 10*3/mm3      Immature Grans, Absolute 0.23 10*3/mm3      nRBC 0.0 /100 WBC     Magnesium [[id number]]  (Normal) Collected:  02/06/19 1128    Specimen:  Blood Updated:  02/06/19 1209     Magnesium 1.7 mg/dL     CK [[id number]]  (Normal) Collected:  02/06/19 1128    Specimen:  Blood Updated:  02/06/19 1209     Creatine Kinase 49 U/L     Lipase [[id number]]  (Normal) Collected:  02/06/19 1128    Specimen:  Blood Updated:  02/06/19 1209     Lipase 36 U/L     Urinalysis With Culture If Indicated - Urine, Clean Catch [[id number]]  (Abnormal) Collected:  02/06/19 1129    Specimen:  Urine, Clean Catch Updated:  02/06/19 1202     Color, UA Yellow     Appearance, UA Clear     pH, UA 5.5     Specific Gravity, UA 1.010     Glucose, UA >=1000 mg/dL (3+)     Ketones, UA 80 mg/dL (3+)     Bilirubin, UA Negative     Blood, UA Large (3+)     Protein, UA Negative     Leuk Esterase, UA Trace     Nitrite, UA Negative     Urobilinogen, UA 0.2 E.U./dL    Lactic Acid, Plasma [[id number]]  (Abnormal) Collected:  02/06/19 1128    Specimen:  Blood Updated:  02/06/19 1159     Lactate 3.4 mmol/L     Acetone [[id number]]  (Abnormal) Collected:  02/06/19 1128     Specimen:  Blood Updated:  02/06/19 1155     Acetone Moderate    Pregnancy, Urine - Urine, Clean Catch [[id number]]  (Normal) Collected:  02/06/19 1129    Specimen:  Urine, Clean Catch Updated:  02/06/19 1154     HCG, Urine QL Negative    Blood Gas, Arterial [[id number]]  (Abnormal) Collected:  02/06/19 1127    Specimen:  Arterial Blood Updated:  02/06/19 1141     Site Left Radial     Drew's Test N/A     pH, Arterial 7.290 pH units      Comment: 84 Value below reference range        pCO2, Arterial 27.5 mm Hg      Comment: 84 Value below reference range        pO2, Arterial 114.0 mm Hg      Comment: 83 Value above reference range        HCO3, Arterial 13.2 mmol/L      Comment: 84 Value below reference range        Base Excess, Arterial -11.7 mmol/L      Comment: 84 Value below reference range        O2 Saturation, Arterial 98.8 %      Barometric Pressure for Blood Gas 746 mmHg      Modality Room Air     Ventilator Mode NA     Collected by vc     Comment: Meter: [id number]     :  [id number]           Imaging Results (last 24 hours)     Procedure Component Value Units Date/Time    XR Chest 1 View [[id number]] Collected:  02/06/19 1210     Updated:  02/06/19 1236    Narrative:         PORTABLE CHEST    HISTORY: DKA. Patient has a new admission.    Portable AP upright film of the chest was obtained at 12:11 PM  with the lower abdomen and pelvis shielded.  COMPARISON: December 19, 2018    EKG leads.  The lungs are clear of an acute process.  The heart is not enlarged.  The pulmonary vasculature is not increased.  No pleural effusion.  No pneumothorax.  No acute osseous abnormality.      Impression:       CONCLUSION:  No Acute Disease    [id number]    Electronically signed by:  Elie Presley MD  2/6/2019 12:35 PM CST  Workstation: [email or web address]        Orders (last 24 hrs)     Start     Ordered    02/07/19 1052  Discharge patient  Once      02/07/19 1052    02/07/19 0900  ARIPiprazole (ABILIFY) tablet 5 mg  Daily,    Status:  Discontinued      02/06/19 1800    02/07/19 0900  lamoTRIgine (LaMICtal) tablet 100 mg  Daily,   Status:  Discontinued      02/06/19 1800    02/07/19 0900  sertraline (ZOLOFT) tablet 25 mg  Daily,   Status:  Discontinued      02/06/19 1800    02/07/19 0900  sodium chloride 0.9 % flush 10 mL  Every 12 Hours Scheduled,   Status:  Discontinued      02/07/19 0755    02/07/19 0855  Urinalysis, Microscopic Only - Urine, Clean Catch  Once      02/07/19 0855    02/07/19 0848  US Renal Bilateral  1 Time Imaging      02/07/19 0818    02/07/19 0819  Urinalysis With Culture If Indicated - Urine, Clean Catch  Once      02/07/19 0818    02/07/19 0819  US Renal Limited  1 Time Imaging,   Status:  Canceled      02/07/19 0818    02/07/19 0743  Basic Metabolic Panel  STAT      02/07/19 0742    02/07/19 0657  POC Glucose Once  Once      02/07/19 0617    02/07/19 0657  Extra Tubes  Once      02/07/19 0657    02/07/19 0657  Green Top (Gel)  PROCEDURE ONCE      02/07/19 0657    02/07/19 0600  CBC & Differential  Daily,   Status:  Canceled      02/06/19 1412    02/07/19 0600  CBC Auto Differential  PROCEDURE ONCE      02/07/19 0001    02/07/19 0323  POC Glucose Once  Once      02/06/19 2304    02/07/19 0323  POC Glucose Once  Once      02/07/19 0016    02/07/19 0323  POC Glucose Once  Once      02/07/19 0138    02/07/19 0323  POC Glucose Once  Once      02/07/19 0300    02/06/19 2200  POC Glucose Finger 4x Daily AC & at Bedtime  4 Times Daily Before Meals & at Bedtime,   Status:  Canceled      02/06/19 1847    02/06/19 2100  sodium chloride 0.9 % flush 3 mL  Every 12 Hours Scheduled,   Status:  Discontinued      02/06/19 1412    02/06/19 2100  famotidine (PEPCID) injection 20 mg  Every 12 Hours Scheduled,   Status:  Discontinued      02/06/19 1412    02/06/19 2015  insulin patient supplied pump  Continuous,   Status:  Discontinued     Comments:  Basal rate different based on patient censor    02/06/19 1924 02/06/19 1945   insulin patient supplied pump  Continuous,   Status:  Discontinued      02/06/19 1846    02/06/19 1943  POC Glucose Once  Once      02/06/19 1850    02/06/19 1942  POC Glucose Once  Once      02/06/19 1821    02/06/19 1940  acetaminophen (TYLENOL) tablet 650 mg  Every 6 Hours PRN,   Status:  Discontinued      02/06/19 1940    02/06/19 1845  sodium chloride 0.9 % infusion  Continuous,   Status:  Discontinued      02/06/19 1759    02/06/19 1810  POC Glucose Once  Once      02/06/19 1706    02/06/19 1810  POC Glucose Once  Once      02/06/19 1750    02/06/19 1802  Diet Regular; Consistent Carbohydrate  Diet Effective Now,   Status:  Canceled      02/06/19 1801    02/06/19 1759  clonazePAM (KlonoPIN) tablet 1 mg  2 Times Daily PRN,   Status:  Discontinued      02/06/19 1800    02/06/19 1707  hCG, Serum, Qualitative  Once      02/06/19 1706    02/06/19 1626  HCG, B-subunit, Quantitative  Once,   Status:  Canceled      02/06/19 1625    02/06/19 1609  POC Glucose Once  Once      02/06/19 1417    02/06/19 1609  POC Glucose Once  Once      02/06/19 1553    02/06/19 1600  Strict Intake & Output  Every 4 Hours     Comments:  While on insulin infusion.    02/06/19 1237    02/06/19 1600  Phosphorus  Every 4 Hours,   Status:  Canceled     Comments:  After 24 hours, change frequency to q 6 hours until DKA resolved.       02/06/19 1237    02/06/19 1600  Basic Metabolic Panel  Every 4 Hours,   Status:  Canceled     Comments:  After 24 hours, change frequency to q 6 hours until DKA resolved. If Potassium is greater than 5mEq/L, obtain BMP every 2 hours until less than 5 mEq/L.      02/06/19 1238    02/06/19 1600  Magnesium  Every 4 Hours,   Status:  Canceled     Comments:  After 24 hours, change frequency to q 6 hours until DKA resolved.       02/06/19 1238    02/06/19 1600  Calcium, Ionized  Every 4 Hours,   Status:  Canceled     Comments:  After 24 hours, change frequency to q 6 hours until DKA resolved.      02/06/19 1238     02/06/19 1600  Vital Signs  Every 4 Hours,   Status:  Canceled      02/06/19 1412    02/06/19 1600  Lactic Acid, Plasma  Every 8 Hours,   Status:  Canceled      02/06/19 1412    02/06/19 1501  Lactic Acid, Reflex  STAT      02/06/19 1500    02/06/19 1500  Strict Intake & Output  Every Hour,   Status:  Canceled      02/06/19 1412    02/06/19 1500  insulin regular (HumuLIN R,NovoLIN R) 100 Units in sodium chloride 0.9 % 100 mL (1 Units/mL) infusion  Titrated,   Status:  Discontinued      02/06/19 1412    02/06/19 1500  albumin human 25 % IV SOLN 12.5 g  Once      02/06/19 1414    02/06/19 1412  LORazepam (ATIVAN) injection 1 mg  Every 6 Hours PRN,   Status:  Discontinued      02/06/19 1412    02/06/19 1412  ipratropium-albuterol (DUO-NEB) nebulizer solution 3 mL  Every 6 Hours PRN,   Status:  Discontinued      02/06/19 1412    02/06/19 1412  sodium chloride 0.9 % flush 3-10 mL  As Needed,   Status:  Discontinued      02/06/19 1412    02/06/19 1412  ondansetron (ZOFRAN) injection 4 mg  Every 6 Hours PRN,   Status:  Discontinued      02/06/19 1412    02/06/19 1412  dextrose (D50W) 25 g/ 50mL Intravenous Solution 25-50 mL  Every 30 Minutes PRN,   Status:  Discontinued      02/06/19 1412    02/06/19 1311  sodium chloride 0.9 % infusion 150 mL  Continuous,   Status:  Discontinued      02/06/19 1309    02/06/19 1239  POC Glucose Q1H  Every Hour,   Status:  Canceled      02/06/19 1237    02/06/19 1234  dextrose (D50W) 25 g/ 50mL Intravenous Solution 12.5 g  Every 15 Minutes PRN,   Status:  Discontinued      02/06/19 1238    02/06/19 1234  sodium chloride 0.45 % with KCl 20 mEq/L infusion  Continuous PRN,   Status:  Discontinued      02/06/19 1238    02/06/19 1234  dextrose 5 % and sodium chloride 0.45 % infusion  Continuous PRN,   Status:  Discontinued      02/06/19 1238    02/06/19 1234  dextrose 5 % and sodium chloride 0.45 % with KCl 20 mEq/L infusion  Continuous PRN,   Status:  Discontinued      02/06/19 1238    02/06/19  1234  potassium chloride (MICRO-K) CR capsule 20 mEq  As Needed,   Status:  Discontinued      02/06/19 1238    02/06/19 1234  potassium chloride (KLOR-CON) packet 20 mEq  As Needed,   Status:  Discontinued      02/06/19 1238    02/06/19 1234  potassium chloride 10 mEq in 100 mL IVPB  As Needed,   Status:  Discontinued      02/06/19 1238    02/06/19 1104  sodium chloride 0.9 % flush 10 mL  As Needed,   Status:  Discontinued      02/06/19 1105    --  SCANNED - TELEMETRY        02/06/19 0000    --  SCANNED - TELEMETRY        02/06/19 0000    --  SCANNED - TELEMETRY        02/06/19 0000    --  SCANNED - TELEMETRY        02/06/19 0000

## 2019-02-07 NOTE — DISCHARGE SUMMARY
[facility] Medicine Services  DISCHARGE SUMMARY       Date of Admission: 2/6/2019  Date of Discharge:  2/7/2019  Primary Care Physician: Rufus Marshall APRN    Presenting Problem/History of Present Illness:  DKA (diabetic ketoacidoses) (CMS/Formerly [facility]) [E13.10]   Patient is a 18 y.o. female with history of type 1 diabetes mellitus complicated by diabetic neuropathy and gastroparesis (on insulin pump), depressive disorder, migraine headaches, nicotine dependence presents with 1 day history of abdominal discomfort, nausea, vomiting and decreased oral intake.  She denies fever, chills, hematemesis, melena, hematochezia, headaches, blurry vision, palpitation, syncope or presyncope.     On triage her blood pressure was 80/45 with a heart rate ranging from .  She was given IV bolus of normal saline with some improvement in her blood pressure.  She had blood work and noted to have glucose of 614, anion gap of 27, low CO2 of 15, moderate acetone and lactic acid of 3.4.  ABG showed pH of 7.290, PCO2 27, PO2 114 and saturation of 98% on room air.  Chest x-ray was clear and CBC showed a leukocytosis of 15.39.  Urine drug screen was positive for THC and pregnancy test was negative     Patient was started on IV hydration and insulin drip and referred for patient.           Final Discharge Diagnoses:  Active Hospital Problems    Diagnosis   • DKA (diabetic ketoacidoses) (CMS/Formerly [facility])       Consults:   Consults     Date and Time Order Name Status Description    2/6/2019 1247 Hospitalist (on-call MD unless specified) Completed           Procedures Performed: None.                Pertinent Test Results:   Lab Results (last 72 hours)     Procedure Component Value Units Date/Time    Urinalysis With Culture If Indicated - Urine, Clean Catch [[id number]]  (Abnormal) Collected:  02/07/19 0843    Specimen:  Urine, Clean Catch Updated:  02/07/19 0858     Color, UA Yellow     Appearance, UA Clear     pH,  UA 5.5     Specific Gravity, UA 1.010     Glucose, UA >=1000 mg/dL (3+)     Ketones, UA 15 mg/dL (1+)     Bilirubin, UA Negative     Blood, UA Moderate (2+)     Protein, UA Negative     Leuk Esterase, UA Trace     Nitrite, UA Negative     Urobilinogen, UA 0.2 E.U./dL    Urinalysis, Microscopic Only - Urine, Clean Catch [[id number]] Collected:  02/07/19 0843    Specimen:  Urine, Clean Catch Updated:  02/07/19 0855    Basic Metabolic Panel [[id number]]  (Abnormal) Collected:  02/07/19 0657    Specimen:  Blood Updated:  02/07/19 0801     Glucose 237 mg/dL      BUN 14 mg/dL      Creatinine 0.70 mg/dL      Sodium 135 mmol/L      Potassium 3.9 mmol/L      Chloride 106 mmol/L      CO2 20.0 mmol/L      Calcium 8.6 mg/dL      eGFR Non African Amer 108 mL/min/1.73      BUN/Creatinine Ratio 20.0     Anion Gap 9.0 mmol/L     Extra Tubes [[id number]] Collected:  02/07/19 0657    Specimen:  Blood, Venous Line Updated:  02/07/19 0801    Narrative:       The following orders were created for panel order Extra Tubes.  Procedure                               Abnormality         Status                     ---------                               -----------         ------                     Green Top (Gel)[[id number]]                                  Final result                 Please view results for these tests on the individual orders.    Green Top (Gel) [[id number]] Collected:  02/07/19 0657    Specimen:  Blood Updated:  02/07/19 0801     Extra Tube Hold for add-ons.     Comment: Auto resulted.       CBC & Differential [[id number]] Collected:  02/07/19 0635    Specimen:  Blood Updated:  02/07/19 0705    Narrative:       The following orders were created for panel order CBC & Differential.  Procedure                               Abnormality         Status                     ---------                               -----------         ------                     CBC Auto Differential[[id number]]        Abnormal            Final result                  Please view results for these tests on the individual orders.    CBC Auto Differential [[id number]]  (Abnormal) Collected:  02/07/19 0635    Specimen:  Blood Updated:  02/07/19 0705     WBC 8.31 10*3/mm3      RBC 3.91 10*6/mm3      Hemoglobin 12.0 g/dL      Hematocrit 34.1 %      MCV 87.2 fL      MCH 30.7 pg      MCHC 35.2 g/dL      RDW 13.2 %      RDW-SD 42.7 fl      MPV 10.1 fL      Platelets 257 10*3/mm3      Neutrophil % 59.0 %      Lymphocyte % 33.8 %      Monocyte % 4.0 %      Eosinophil % 1.9 %      Basophil % 0.5 %      Immature Grans % 0.8 %      Neutrophils, Absolute 4.90 10*3/mm3      Lymphocytes, Absolute 2.81 10*3/mm3      Monocytes, Absolute 0.33 10*3/mm3      Eosinophils, Absolute 0.16 10*3/mm3      Basophils, Absolute 0.04 10*3/mm3      Immature Grans, Absolute 0.07 10*3/mm3     POC Glucose Once [[id number]]  (Abnormal) Collected:  02/07/19 0617    Specimen:  Blood Updated:  02/07/19 0656     Glucose 247 mg/dL      Comment: INSULIN PUMPOperator: [id number] [employer] AMYMeter ID: [id number]       Blood Culture - Blood, Hand, Left [[id number]] Collected:  02/06/19 1511    Specimen:  Blood from Hand, Left Updated:  02/07/19 0346     Blood Culture No growth at less than 24 hours    Blood Culture - Blood, Hand, Right [[id number]] Collected:  02/06/19 1511    Specimen:  Blood from Hand, Right Updated:  02/07/19 0346     Blood Culture No growth at less than 24 hours    POC Glucose Once [[id number]]  (Abnormal) Collected:  02/07/19 0300    Specimen:  Blood Updated:  02/07/19 0322     Glucose 256 mg/dL      Comment: INSULIN PUMPOperator: [id number] [employer] AMYMeter ID: [id number]       POC Glucose Once [[id number]]  (Abnormal) Collected:  02/07/19 0138    Specimen:  Blood Updated:  02/07/19 0322     Glucose 284 mg/dL      Comment: INSULIN PUMPOperator: [id number] [employer] AMYMeter ID: [id number]       POC Glucose Once [[id number]]  (Abnormal) Collected:  02/07/19 0016    Specimen:  Blood Updated:  02/07/19  0322     Glucose 280 mg/dL      Comment: INSULIN PUMPOperator: [id number] [employer] AMYMeter ID: [id number]       POC Glucose Once [[id number]]  (Abnormal) Collected:  02/06/19 2304    Specimen:  Blood Updated:  02/07/19 0322     Glucose 381 mg/dL      Comment: INSULIN PUMPOperator: [id number] [employer] AMYMeter ID: [id number]       POC Glucose Once [[id number]]  (Abnormal) Collected:  02/06/19 1850    Specimen:  Blood Updated:  02/06/19 1942     Glucose 200 mg/dL      Comment: : [id number] [employer] ASHLEYMeter ID: [id number]       POC Glucose Once [[id number]]  (Abnormal) Collected:  02/06/19 1821    Specimen:  Blood Updated:  02/06/19 1941     Glucose 60 mg/dL      Comment: : [id number] [employer] ASHLEY[employer]eter ID: [id number]       POC Glucose Once [[id number]]  (Normal) Collected:  02/06/19 1750    Specimen:  Blood Updated:  02/06/19 1809     Glucose 73 mg/dL      Comment: : [id number] [employer] ASHLEY[employer]eter ID: [id number]       POC Glucose Once [[id number]]  (Normal) Collected:  02/06/19 1706    Specimen:  Blood Updated:  02/06/19 1809     Glucose 114 mg/dL      Comment: : [id number] [employer] ID: [id number]       hCG, Serum, Qualitative [[id number]]  (Normal) Collected:  02/06/19 1511    Specimen:  Blood Updated:  02/06/19 1724     HCG Qualitative Negative    Basic Metabolic Panel [[id number]]  (Abnormal) Collected:  02/06/19 1511    Specimen:  Blood Updated:  02/06/19 1625     Glucose 302 mg/dL      BUN 14 mg/dL      Creatinine 0.67 mg/dL      Sodium 135 mmol/L      Potassium 4.3 mmol/L      Chloride 106 mmol/L      CO2 18.0 mmol/L      Calcium 8.6 mg/dL      eGFR Non African Amer 113 mL/min/1.73      BUN/Creatinine Ratio 20.9     Anion Gap 11.0 mmol/L     POC Glucose Once [[id number]]  (Abnormal) Collected:  02/06/19 1553    Specimen:  Blood Updated:  02/06/19 1608     Glucose 254 mg/dL      Comment: : [id number] [facility]: [id number]       POC Glucose  Once [[id number]]  (Abnormal) Collected:  02/06/19 1417    Specimen:  Blood Updated:  02/06/19 1608     Glucose 372 mg/dL      Comment: : [id number] DOMINIK Russeller ID: [id number]       Lactic Acid, Reflex [[id number]]  (Normal) Collected:  02/06/19 1511    Specimen:  Blood Updated:  02/06/19 1607     Lactate 0.7 mmol/L     Phosphorus [[id number]]  (Normal) Collected:  02/06/19 1511    Specimen:  Blood Updated:  02/06/19 1602     Phosphorus 3.7 mg/dL     Magnesium [[id number]]  (Normal) Collected:  02/06/19 1511    Specimen:  Blood Updated:  02/06/19 1602     Magnesium 1.7 mg/dL     Calcium, Ionized [[id number]]  (Abnormal) Collected:  02/06/19 1511    Specimen:  Blood Updated:  02/06/19 1549     Ionized Calcium 4.57 mg/dL     Lactic Acid, Reflex Timer (This will reflex a repeat order 3-3:15 hours after ordered.) [[id number]] Collected:  02/06/19 1128    Specimen:  Blood Updated:  02/06/19 1500     Extra Tube Hold for add-ons.     Comment: Auto resulted.       Hemoglobin A1c [[id number]]  (Abnormal) Collected:  02/06/19 1128    Specimen:  Blood Updated:  02/06/19 1425     Hemoglobin A1C 12.2 %     Comprehensive Metabolic Panel [[id number]]  (Abnormal) Collected:  02/06/19 1128    Specimen:  Blood Updated:  02/06/19 1234     Glucose 614 mg/dL      BUN 13 mg/dL      Creatinine 0.78 mg/dL      Sodium 134 mmol/L      Potassium 4.0 mmol/L      Chloride 92 mmol/L      CO2 15.0 mmol/L      Calcium 10.0 mg/dL      Total Protein 8.3 g/dL      Albumin 5.10 g/dL      ALT (SGPT) 17 U/L      AST (SGOT) 23 U/L      Alkaline Phosphatase 102 U/L      Total Bilirubin 0.7 mg/dL      eGFR Non African Amer 95 mL/min/1.73      Globulin 3.2 gm/dL      A/G Ratio 1.6 g/dL      BUN/Creatinine Ratio 16.7     Anion Gap 27.0 mmol/L     [place] Draw [[id number]] Collected:  02/06/19 1128    Specimen:  Blood Updated:  02/06/19 1231    Narrative:       The following orders were created for panel order [place] Draw.  Procedure                                Abnormality         Status                     ---------                               -----------         ------                     Light Blue Top[[id number]]                                   Final result               Green Top (Gel)[[id number]]                                  Final result               Lavender Top[[id number]]                                     Final result               Gold Top - SST[[id number]]                                   Final result                 Please view results for these tests on the individual orders.    Green Top (Gel) [[id number]] Collected:  02/06/19 1128    Specimen:  Blood Updated:  02/06/19 1231     Extra Tube Hold for add-ons.     Comment: Auto resulted.       Light Blue Top [[id number]] Collected:  02/06/19 1128    Specimen:  Blood Updated:  02/06/19 1231     Extra Tube hold for add-on     Comment: Auto resulted       Lavender Top [[id number]] Collected:  02/06/19 1128    Specimen:  Blood Updated:  02/06/19 1231     Extra Tube hold for add-on     Comment: Auto resulted       Gold Top - SST [[id number]] Collected:  02/06/19 1128    Specimen:  Blood Updated:  02/06/19 1231     Extra Tube Hold for add-ons.     Comment: Auto resulted.       Urine Drug Screen - Urine, Clean Catch [[id number]]  (Abnormal) Collected:  02/06/19 1129    Specimen:  Urine, Clean Catch Updated:  02/06/19 1230     Amphetamine Screen, Urine Negative     Barbiturates Screen, Urine Negative     Benzodiazepine Screen, Urine Negative     Cocaine Screen, Urine Negative     Methadone Screen, Urine Negative     Opiate Screen Negative     Oxycodone Screen, Urine Negative     THC, Screen, Urine Positive    Narrative:       Negative Thresholds For Drugs Screened in Urine:     Amphetamines          500 ng/ml  Barbiturates          200 ng/ml  Benzodiazepines       200 ng/ml  Cocaine               150 ng/ml  Methadone             300 ng/mL  Opiates               300 ng/mL  Oxycodone             100  ng/mL  THC                   20 ng/mL    The normal value for all drugs tested is negative. This report includes final unconfirmed screening results.  A positive result by this assay can be, at your request, sent to the Reference Lab for confirmation by gas chromatography. Unconfirmed results must not be used for non-medical purposes, such as employment or legal testing. Clinical consideration should be applied to any drug of abuse test result, particularly when unconfirmed results are used.    Urinalysis, Microscopic Only - Urine, Clean Catch [[id number]]  (Abnormal) Collected:  02/06/19 1129    Specimen:  Urine, Clean Catch Updated:  02/06/19 1212     RBC, UA 3-5 /HPF      WBC, UA 21-30 /HPF      Bacteria, UA 1+ /HPF      Squamous Epithelial Cells, UA 6-12 /HPF      Hyaline Casts, UA 3-6 /LPF      Methodology Automated Microscopy    CBC & Differential [[id number]] Collected:  02/06/19 1128    Specimen:  Blood Updated:  02/06/19 1212    Narrative:       The following orders were created for panel order CBC & Differential.  Procedure                               Abnormality         Status                     ---------                               -----------         ------                     CBC Auto Differential[[id number]]        Abnormal            Final result                 Please view results for these tests on the individual orders.    CBC Auto Differential [[id number]]  (Abnormal) Collected:  02/06/19 1128    Specimen:  Blood Updated:  02/06/19 1212     WBC 15.39 10*3/mm3      RBC 4.64 10*6/mm3      Hemoglobin 14.3 g/dL      Hematocrit 40.6 %      MCV 87.5 fL      MCH 30.8 pg      MCHC 35.2 g/dL      RDW 13.2 %      RDW-SD 42.1 fl      MPV 10.6 fL      Platelets 428 10*3/mm3      Neutrophil % 79.4 %      Lymphocyte % 15.0 %      Monocyte % 3.0 %      Eosinophil % 0.6 %      Basophil % 0.5 %      Immature Grans % 1.5 %      Neutrophils, Absolute 12.21 10*3/mm3      Lymphocytes, Absolute 2.31 10*3/mm3       Monocytes, Absolute 0.46 10*3/mm3      Eosinophils, Absolute 0.10 10*3/mm3      Basophils, Absolute 0.08 10*3/mm3      Immature Grans, Absolute 0.23 10*3/mm3      nRBC 0.0 /100 WBC     Magnesium [[id number]]  (Normal) Collected:  02/06/19 1128    Specimen:  Blood Updated:  02/06/19 1209     Magnesium 1.7 mg/dL     CK [[id number]]  (Normal) Collected:  02/06/19 1128    Specimen:  Blood Updated:  02/06/19 1209     Creatine Kinase 49 U/L     Lipase [[id number]]  (Normal) Collected:  02/06/19 1128    Specimen:  Blood Updated:  02/06/19 1209     Lipase 36 U/L     Urinalysis With Culture If Indicated - Urine, Clean Catch [[id number]]  (Abnormal) Collected:  02/06/19 1129    Specimen:  Urine, Clean Catch Updated:  02/06/19 1202     Color, UA Yellow     Appearance, UA Clear     pH, UA 5.5     Specific Gravity, UA 1.010     Glucose, UA >=1000 mg/dL (3+)     Ketones, UA 80 mg/dL (3+)     Bilirubin, UA Negative     Blood, UA Large (3+)     Protein, UA Negative     Leuk Esterase, UA Trace     Nitrite, UA Negative     Urobilinogen, UA 0.2 E.U./dL    Lactic Acid, Plasma [[id number]]  (Abnormal) Collected:  02/06/19 1128    Specimen:  Blood Updated:  02/06/19 1159     Lactate 3.4 mmol/L     Acetone [[id number]]  (Abnormal) Collected:  02/06/19 1128    Specimen:  Blood Updated:  02/06/19 1155     Acetone Moderate    Pregnancy, Urine - Urine, Clean Catch [[id number]]  (Normal) Collected:  02/06/19 1129    Specimen:  Urine, Clean Catch Updated:  02/06/19 1154     HCG, Urine QL Negative    Blood Gas, Arterial [[id number]]  (Abnormal) Collected:  02/06/19 1127    Specimen:  Arterial Blood Updated:  02/06/19 1141     Site Left Radial     Drew's Test N/A     pH, Arterial 7.290 pH units      Comment: 84 Value below reference range        pCO2, Arterial 27.5 mm Hg      Comment: 84 Value below reference range        pO2, Arterial 114.0 mm Hg      Comment: 83 Value above reference range        HCO3, Arterial 13.2 mmol/L      Comment: 84 Value below  reference range        Base Excess, Arterial -11.7 mmol/L      Comment: 84 Value below reference range        O2 Saturation, Arterial 98.8 %      Barometric Pressure for Blood Gas 746 mmHg      Modality Room Air     Ventilator Mode NA     Collected by vc     Comment: Meter: [id number]     :  [id number]           Imaging Results (last 72 hours)     Procedure Component Value Units Date/Time    US Renal Bilateral [[id number]] Collected:  02/07/19 0947     Updated:  02/07/19 1044    Narrative:       PROCEDURE: US RENAL BILATERAL    Clinical History: gross hematuria, E10.10 Type 1 diabetes  mellitus with ketoacidosis without coma    Indication: Same as above    Comparison: CT of the abdomen and pelvis done on 5/25/2017 .    Technique:     Grayscale and color Doppler  evaluation of the bilateral kidneys  and the urinary bladder was done    Findings:     The right kidney measures 12.8 x 5.0 x 5.5  centimeters  and the  left kidney measures 11.7 x 4.7 x 5.7  centimeters.     The bilateral kidneys do not  show any evidence of hydronephrosis  or nephrolithiasis.    Survey evaluation of the urinary bladder does not show any gross  abnormalities.      Impression:       Impression:     Unremarkable ultrasound of the bilateral kidneys and urinary  bladder     Electronically signed by:  Durga Butler MD  2/7/2019 10:43 AM CST  Workstation: [id number] Chest 1 View [[id number]] Collected:  02/06/19 1210     Updated:  02/06/19 1236    Narrative:         PORTABLE CHEST    HISTORY: DKA. Patient has a new admission.    Portable AP upright film of the chest was obtained at 12:11 PM  with the lower abdomen and pelvis shielded.  COMPARISON: December 19, 2018    EKG leads.  The lungs are clear of an acute process.  The heart is not enlarged.  The pulmonary vasculature is not increased.  No pleural effusion.  No pneumothorax.  No acute osseous abnormality.      Impression:       CONCLUSION:  No Acute  "Disease    [id number]    Electronically signed by:  Elie Presley MD  2/6/2019 12:35 PM CST  Workstation: [email or web address]            Chief Complaint on Day of Discharge: None.    Hospital Course:  The patient was admitted to stepdown unit for DKA and started on treatment protocol accordingly.  Anion gap closed within a few hours, insulin infusion was discontinued and she was transitioned to her insulin pump.  She was also started on diet as tolerated, became less symptomatic and glycemic control remained adequate until discharge.  She had reactive leukocytosis which resolved on discharge blood culture showed no growth.    -She had moderate to large hematuria on urinalysis.  Renal ultrasound was done and was unremarkable.  -She was continued on her outpatient medications for depressive disorder prior to discharge.    Condition on Discharge: Stable and Improved.    Physical Exam on Discharge:  /63 (BP Location: Left arm, Patient Position: Lying)   Pulse 64   Temp 97.6 °F (36.4 °C) (Oral)   Resp 18   Ht 170.2 cm (67\")   Wt 48.6 kg (107 lb 3.2 oz)   SpO2 98%   BMI 16.79 kg/m²   Physical Exam   Constitutional: She is oriented to person, place, and time. She appears well-developed and well-nourished. She is cooperative. No distress.   HENT:   Head: Normocephalic and atraumatic.   Right Ear: External ear normal.   Left Ear: External ear normal.   Nose: Nose normal.   Mouth/Throat: Oropharynx is clear and moist.   Eyes: Conjunctivae and EOM are normal. Pupils are equal, round, and reactive to light.   Neck: Normal range of motion. Neck supple. No JVD present. No thyromegaly present.   Cardiovascular: Normal rate, regular rhythm, normal heart sounds and intact distal pulses. Exam reveals no gallop and no friction rub.   No murmur heard.  Pulmonary/Chest: Effort normal and breath sounds normal. No stridor. No respiratory distress. She has no wheezes. She has no rales. She exhibits no tenderness.   Abdominal: Soft. " Bowel sounds are normal. She exhibits no distension and no mass. There is no tenderness. There is no rebound and no guarding. No hernia.   Musculoskeletal: Normal range of motion. She exhibits no edema, tenderness or deformity.   Neurological: She is alert and oriented to person, place, and time. She has normal reflexes. No cranial nerve deficit or sensory deficit. She exhibits normal muscle tone. Coordination normal.   Skin: Skin is warm and dry. No rash noted. She is not diaphoretic. No erythema. No pallor.   Psychiatric: She has a normal mood and affect. Her behavior is normal. Judgment and thought content normal.   Nursing note and vitals reviewed.        Discharge Disposition:  Home or Self Care    Discharge Medications:     Discharge Medications      Continue These Medications      Instructions Start Date   albuterol sulfate  (90 Base) MCG/ACT inhaler  Commonly known as:  PROVENTIL HFA;VENTOLIN HFA;PROAIR HFA   2 puffs, Inhalation, Every 4 Hours PRN      ARIPiprazole 5 MG tablet  Commonly known as:  ABILIFY   5 mg, Oral, Daily      clonazePAM 1 MG tablet  Commonly known as:  KlonoPIN   1 mg, Oral, 2 Times Daily PRN      glucagon 1 MG injection  Commonly known as:  GLUCAGEN   1 mg, Subcutaneous, See Admin Instructions, Follow package directions for low blood sugar.      insulin aspart 100 UNIT/ML injection  Commonly known as:  novoLOG   70 units daily through insulin pump      lamoTRIgine 100 MG tablet  Commonly known as:  LaMICtal   100 mg, Oral, Daily      sertraline 25 MG tablet  Commonly known as:  ZOLOFT   25 mg, Oral, Daily             Discharge Diet: Diabetic    Activity at Discharge: As tolerated     Discharge Care Plan/Instructions: Patient has been advised to take her medications as prescribed and to return to the emergency room in the event of any worsening symptoms    Follow-up Appointments:   No future appointments.  She is to follow-up with her primary care provider in 1 week and with her  endocrinologist as outpatient.  Test Results Pending at Discharge:    Order Current Status    Urinalysis, Microscopic Only - Urine, Clean Catch In process    Blood Culture - Blood, Hand, Left Preliminary result    Blood Culture - Blood, Hand, Right Preliminary result          Eliud Martinez MD  02/07/19  10:52 AM    Time: 35 minutes

## 2019-02-08 ENCOUNTER — READMISSION MANAGEMENT (OUTPATIENT)
Dept: CALL CENTER | Facility: HOSPITAL | Age: 19
End: 2019-02-08

## 2019-02-08 LAB — GLUCOSE BLDC GLUCOMTR-MCNC: 476 MG/DL (ref 70–130)

## 2019-02-08 NOTE — PAYOR COMM NOTE
"Fernanda Patterson (18 y.o. Female)     Date of Birth Social Security Number Address Home Phone MRN    2000  [address]   [facility] [address] [phone] [id number]    Gnosticism Marital Status          [place] Single       Admission Date Admission Type Admitting Provider Attending Provider Department, Room/Bed    2/6/19 Emergency AldenuEliud MD  [facility] STEPDOWN UNIT, 320/1    Discharge Date Discharge Disposition Discharge Destination        2/7/2019 Home or Self Care              Attending Provider:  (none)   Allergies:  Pineapple, Benzoyl Peroxide    Isolation:  None   Infection:  None   Code Status:  Prior    Ht:  170.2 cm (67\")   Wt:  48.6 kg (107 lb 3.2 oz)    Admission Cmt:  None   Principal Problem:  None                Active Insurance as of 2/6/2019     Primary Coverage     Payor Plan Insurance Group Employer/Plan Group    [employer]HE[employer] BLUE CROSS AN[employer]M BLUE CROSS BLUE SHIELD PPO [id number]     Payor Plan Address Payor Plan Phone Number Payor Plan Fax Number Effective Dates    [address] [phone]  2/19/2018 - None Entered    [facility] [id number]       Subscriber Name Subscriber Birth Date Member ID       DEBORAH DON 11/7/1971 [id number]                 Emergency Contacts      (Rel.) Home Phone Work Phone Mobile Phone    Juana Don (Mother) [phone] -- [phone]               Discharge Summary      Eliud Martinez MD at 2/7/2019 10:52 AM              [facility]  DISCHARGE SUMMARY       Date of Admission: 2/6/2019  Date of Discharge:  2/7/2019  Primary Care Physician: Rufus Marshall APRN    Presenting Problem/History of Present Illness:  DKA (diabetic ketoacidoses) (CMS/HCC) [E13.10]   Patient is a 18 y.o. female with history of type 1 diabetes mellitus complicated by diabetic neuropathy and gastroparesis (on insulin pump), depressive disorder, migraine headaches, " nicotine dependence presents with 1 day history of abdominal discomfort, nausea, vomiting and decreased oral intake.  She denies fever, chills, hematemesis, melena, hematochezia, headaches, blurry vision, palpitation, syncope or presyncope.     On triage her blood pressure was 80/45 with a heart rate ranging from .  She was given IV bolus of normal saline with some improvement in her blood pressure.  She had blood work and noted to have glucose of 614, anion gap of 27, low CO2 of 15, moderate acetone and lactic acid of 3.4.  ABG showed pH of 7.290, PCO2 27, PO2 114 and saturation of 98% on room air.  Chest x-ray was clear and CBC showed a leukocytosis of 15.39.  Urine drug screen was positive for THC and pregnancy test was negative     Patient was started on IV hydration and insulin drip and referred for patient.           Final Discharge Diagnoses:  Active Hospital Problems    Diagnosis   • DKA (diabetic ketoacidoses) (CMS/[facility])       Consults:   Consults     Date and Time Order Name Status Description    2/6/2019 1247 Hospitalist (on-call MD unless specified) Completed           Procedures Performed: None.                Pertinent Test Results:   Lab Results (last 72 hours)     Procedure Component Value Units Date/Time    Urinalysis With Culture If Indicated - Urine, Clean Catch [[id number]]  (Abnormal) Collected:  02/07/19 0843    Specimen:  Urine, Clean Catch Updated:  02/07/19 0858     Color, UA Yellow     Appearance, UA Clear     pH, UA 5.5     Specific Gravity, UA 1.010     Glucose, UA >=1000 mg/dL (3+)     Ketones, UA 15 mg/dL (1+)     Bilirubin, UA Negative     Blood, UA Moderate (2+)     Protein, UA Negative     Leuk Esterase, UA Trace     Nitrite, UA Negative     Urobilinogen, UA 0.2 E.U./dL    Urinalysis, Microscopic Only - Urine, Clean Catch [[id number]] Collected:  02/07/19 0843    Specimen:  Urine, Clean Catch Updated:  02/07/19 0855    Basic Metabolic Panel [[id number]]  (Abnormal) Collected:   02/07/19 0657    Specimen:  Blood Updated:  02/07/19 0801     Glucose 237 mg/dL      BUN 14 mg/dL      Creatinine 0.70 mg/dL      Sodium 135 mmol/L      Potassium 3.9 mmol/L      Chloride 106 mmol/L      CO2 20.0 mmol/L      Calcium 8.6 mg/dL      eGFR Non African Amer 108 mL/min/1.73      BUN/Creatinine Ratio 20.0     Anion Gap 9.0 mmol/L     Extra Tubes [[id number]] Collected:  02/07/19 0657    Specimen:  Blood, Venous Line Updated:  02/07/19 0801    Narrative:       The following orders were created for panel order Extra Tubes.  Procedure                               Abnormality         Status                     ---------                               -----------         ------                     Green Top (Gel)[[id number]]                                  Final result                 Please view results for these tests on the individual orders.    Green Top (Gel) [[id number]] Collected:  02/07/19 0657    Specimen:  Blood Updated:  02/07/19 0801     Extra Tube Hold for add-ons.     Comment: Auto resulted.       CBC & Differential [[id number]] Collected:  02/07/19 0635    Specimen:  Blood Updated:  02/07/19 0705    Narrative:       The following orders were created for panel order CBC & Differential.  Procedure                               Abnormality         Status                     ---------                               -----------         ------                     CBC Auto Differential[[id number]]        Abnormal            Final result                 Please view results for these tests on the individual orders.    CBC Auto Differential [[id number]]  (Abnormal) Collected:  02/07/19 0635    Specimen:  Blood Updated:  02/07/19 0705     WBC 8.31 10*3/mm3      RBC 3.91 10*6/mm3      Hemoglobin 12.0 g/dL      Hematocrit 34.1 %      MCV 87.2 fL      MCH 30.7 pg      MCHC 35.2 g/dL      RDW 13.2 %      RDW-SD 42.7 fl      MPV 10.1 fL      Platelets 257 10*3/mm3      Neutrophil % 59.0 %      Lymphocyte % 33.8 %       Monocyte % 4.0 %      Eosinophil % 1.9 %      Basophil % 0.5 %      Immature Grans % 0.8 %      Neutrophils, Absolute 4.90 10*3/mm3      Lymphocytes, Absolute 2.81 10*3/mm3      Monocytes, Absolute 0.33 10*3/mm3      Eosinophils, Absolute 0.16 10*3/mm3      Basophils, Absolute 0.04 10*3/mm3      Immature Grans, Absolute 0.07 10*3/mm3     POC Glucose Once [[id number]]  (Abnormal) Collected:  02/07/19 0617    Specimen:  Blood Updated:  02/07/19 0656     Glucose 247 mg/dL      Comment: INSULIN PUMPOperator: [id number] [employer] AMYMeter ID: [id number]       Blood Culture - Blood, Hand, Left [[id number]] Collected:  02/06/19 1511    Specimen:  Blood from Hand, Left Updated:  02/07/19 0346     Blood Culture No growth at less than 24 hours    Blood Culture - Blood, Hand, Right [[id number]] Collected:  02/06/19 1511    Specimen:  Blood from Hand, Right Updated:  02/07/19 0346     Blood Culture No growth at less than 24 hours    POC Glucose Once [[id number]]  (Abnormal) Collected:  02/07/19 0300    Specimen:  Blood Updated:  02/07/19 0322     Glucose 256 mg/dL      Comment: INSULIN PUMPOperator: [id number] COOK AMYMeter ID: [id number]       POC Glucose Once [[id number]]  (Abnormal) Collected:  02/07/19 0138    Specimen:  Blood Updated:  02/07/19 0322     Glucose 284 mg/dL      Comment: INSULIN PUMPOperator: [id number] COOK AMYMeter ID: [id number]       POC Glucose Once [[id number]]  (Abnormal) Collected:  02/07/19 0016    Specimen:  Blood Updated:  02/07/19 0322     Glucose 280 mg/dL      Comment: INSULIN PUMPOperator: [id number] COOK AMYMeter ID: [id number]       POC Glucose Once [[id number]]  (Abnormal) Collected:  02/06/19 2304    Specimen:  Blood Updated:  02/07/19 0322     Glucose 381 mg/dL      Comment: INSULIN PUMPOperator: [id number] COOK AMYMeter ID: [id number]       POC Glucose Once [[id number]]  (Abnormal) Collected:  02/06/19 1850    Specimen:  Blood Updated:  02/06/19 1942     Glucose 200 mg/dL      Comment:  : [id number] QuidMeter ID: [id number]       POC Glucose Once [[id number]]  (Abnormal) Collected:  02/06/19 1821    Specimen:  Blood Updated:  02/06/19 1941     Glucose 60 mg/dL      Comment: : [id number] Ep[employer]ell ASHLEYMeter ID: [id number]       POC Glucose Once [[id number]]  (Normal) Collected:  02/06/19 1750    Specimen:  Blood Updated:  02/06/19 1809     Glucose 73 mg/dL      Comment: : [id number] SmartFocusLEYMeter ID: [id number]       POC Glucose Once [[id number]]  (Normal) Collected:  02/06/19 1706    Specimen:  Blood Updated:  02/06/19 1809     Glucose 114 mg/dL      Comment: : [id number] QuidMeter ID: [id number]       hCG, Serum, Qualitative [[id number]]  (Normal) Collected:  02/06/19 1511    Specimen:  Blood Updated:  02/06/19 1724     HCG Qualitative Negative    Basic Metabolic Panel [[id number]]  (Abnormal) Collected:  02/06/19 1511    Specimen:  Blood Updated:  02/06/19 1625     Glucose 302 mg/dL      BUN 14 mg/dL      Creatinine 0.67 mg/dL      Sodium 135 mmol/L      Potassium 4.3 mmol/L      Chloride 106 mmol/L      CO2 18.0 mmol/L      Calcium 8.6 mg/dL      eGFR Non African Amer 113 mL/min/1.73      BUN/Creatinine Ratio 20.9     Anion Gap 11.0 mmol/L     POC Glucose Once [[id number]]  (Abnormal) Collected:  02/06/19 1553    Specimen:  Blood Updated:  02/06/19 1608     Glucose 254 mg/dL      Comment: : [id number] SmartFocusLEYMeter ID: [id number]       POC Glucose Once [[id number]]  (Abnormal) Collected:  02/06/19 1417    Specimen:  Blood Updated:  02/06/19 1608     Glucose 372 mg/dL      Comment: : [id number] SmartFocusLEYMeter ID: [id number]       Lactic Acid, Reflex [[id number]]  (Normal) Collected:  02/06/19 1511    Specimen:  Blood Updated:  02/06/19 1607     Lactate 0.7 mmol/L     Phosphorus [[id number]]  (Normal) Collected:  02/06/19 1511    Specimen:  Blood Updated:  02/06/19 1602     Phosphorus 3.7 mg/dL      Magnesium [[id number]]  (Normal) Collected:  02/06/19 1511    Specimen:  Blood Updated:  02/06/19 1602     Magnesium 1.7 mg/dL     Calcium, Ionized [[id number]]  (Abnormal) Collected:  02/06/19 1511    Specimen:  Blood Updated:  02/06/19 1549     Ionized Calcium 4.57 mg/dL     Lactic Acid, Reflex Timer (This will reflex a repeat order 3-3:15 hours after ordered.) [[id number]] Collected:  02/06/19 1128    Specimen:  Blood Updated:  02/06/19 1500     Extra Tube Hold for add-ons.     Comment: Auto resulted.       Hemoglobin A1c [[id number]]  (Abnormal) Collected:  02/06/19 1128    Specimen:  Blood Updated:  02/06/19 1425     Hemoglobin A1C 12.2 %     Comprehensive Metabolic Panel [[id number]]  (Abnormal) Collected:  02/06/19 1128    Specimen:  Blood Updated:  02/06/19 1234     Glucose 614 mg/dL      BUN 13 mg/dL      Creatinine 0.78 mg/dL      Sodium 134 mmol/L      Potassium 4.0 mmol/L      Chloride 92 mmol/L      CO2 15.0 mmol/L      Calcium 10.0 mg/dL      Total Protein 8.3 g/dL      Albumin 5.10 g/dL      ALT (SGPT) 17 U/L      AST (SGOT) 23 U/L      Alkaline Phosphatase 102 U/L      Total Bilirubin 0.7 mg/dL      eGFR Non African Amer 95 mL/min/1.73      Globulin 3.2 gm/dL      A/G Ratio 1.6 g/dL      BUN/Creatinine Ratio 16.7     Anion Gap 27.0 mmol/L     [place] Draw [[id number]] Collected:  02/06/19 1128    Specimen:  Blood Updated:  02/06/19 1231    Narrative:       The following orders were created for panel order [place] Draw.  Procedure                               Abnormality         Status                     ---------                               -----------         ------                     Light Blue Top[[id number]]                                   Final result               Green Top (Gel)[[id number]]                                  Final result               Lavender Top[[id number]]                                     Final result               Gold Top - SST[[id number]]                                    Final result                 Please view results for these tests on the individual orders.    Green Top (Gel) [[id number]] Collected:  02/06/19 1128    Specimen:  Blood Updated:  02/06/19 1231     Extra Tube Hold for add-ons.     Comment: Auto resulted.       Light Blue Top [[id number]] Collected:  02/06/19 1128    Specimen:  Blood Updated:  02/06/19 1231     Extra Tube hold for add-on     Comment: Auto resulted       Lavender Top [[id number]] Collected:  02/06/19 1128    Specimen:  Blood Updated:  02/06/19 1231     Extra Tube hold for add-on     Comment: Auto resulted       Gold Top - SST [[id number]] Collected:  02/06/19 1128    Specimen:  Blood Updated:  02/06/19 1231     Extra Tube Hold for add-ons.     Comment: Auto resulted.       Urine Drug Screen - Urine, Clean Catch [[id number]]  (Abnormal) Collected:  02/06/19 1129    Specimen:  Urine, Clean Catch Updated:  02/06/19 1230     Amphetamine Screen, Urine Negative     Barbiturates Screen, Urine Negative     Benzodiazepine Screen, Urine Negative     Cocaine Screen, Urine Negative     Methadone Screen, Urine Negative     Opiate Screen Negative     Oxycodone Screen, Urine Negative     THC, Screen, Urine Positive    Narrative:       Negative Thresholds For Drugs Screened in Urine:     Amphetamines          500 ng/ml  Barbiturates          200 ng/ml  Benzodiazepines       200 ng/ml  Cocaine               150 ng/ml  Methadone             300 ng/mL  Opiates               300 ng/mL  Oxycodone             100 ng/mL  THC                   20 ng/mL    The normal value for all drugs tested is negative. This report includes final unconfirmed screening results.  A positive result by this assay can be, at your request, sent to the Reference Lab for confirmation by gas chromatography. Unconfirmed results must not be used for non-medical purposes, such as employment or legal testing. Clinical consideration should be applied to any drug of abuse test result, particularly when  unconfirmed results are used.    Urinalysis, Microscopic Only - Urine, Clean Catch [[id number]]  (Abnormal) Collected:  02/06/19 1129    Specimen:  Urine, Clean Catch Updated:  02/06/19 1212     RBC, UA 3-5 /HPF      WBC, UA 21-30 /HPF      Bacteria, UA 1+ /HPF      Squamous Epithelial Cells, UA 6-12 /HPF      Hyaline Casts, UA 3-6 /LPF      Methodology Automated Microscopy    CBC & Differential [[id number]] Collected:  02/06/19 1128    Specimen:  Blood Updated:  02/06/19 1212    Narrative:       The following orders were created for panel order CBC & Differential.  Procedure                               Abnormality         Status                     ---------                               -----------         ------                     CBC Auto Differential[[id number]]        Abnormal            Final result                 Please view results for these tests on the individual orders.    CBC Auto Differential [[id number]]  (Abnormal) Collected:  02/06/19 1128    Specimen:  Blood Updated:  02/06/19 1212     WBC 15.39 10*3/mm3      RBC 4.64 10*6/mm3      Hemoglobin 14.3 g/dL      Hematocrit 40.6 %      MCV 87.5 fL      MCH 30.8 pg      MCHC 35.2 g/dL      RDW 13.2 %      RDW-SD 42.1 fl      MPV 10.6 fL      Platelets 428 10*3/mm3      Neutrophil % 79.4 %      Lymphocyte % 15.0 %      Monocyte % 3.0 %      Eosinophil % 0.6 %      Basophil % 0.5 %      Immature Grans % 1.5 %      Neutrophils, Absolute 12.21 10*3/mm3      Lymphocytes, Absolute 2.31 10*3/mm3      Monocytes, Absolute 0.46 10*3/mm3      Eosinophils, Absolute 0.10 10*3/mm3      Basophils, Absolute 0.08 10*3/mm3      Immature Grans, Absolute 0.23 10*3/mm3      nRBC 0.0 /100 WBC     Magnesium [[id number]]  (Normal) Collected:  02/06/19 1128    Specimen:  Blood Updated:  02/06/19 1209     Magnesium 1.7 mg/dL     CK [[id number]]  (Normal) Collected:  02/06/19 1128    Specimen:  Blood Updated:  02/06/19 1209     Creatine Kinase 49 U/L     Lipase [[id number]]  (Normal)  Collected:  02/06/19 1128    Specimen:  Blood Updated:  02/06/19 1209     Lipase 36 U/L     Urinalysis With Culture If Indicated - Urine, Clean Catch [[id number]]  (Abnormal) Collected:  02/06/19 1129    Specimen:  Urine, Clean Catch Updated:  02/06/19 1202     Color, UA Yellow     Appearance, UA Clear     pH, UA 5.5     Specific Gravity, UA 1.010     Glucose, UA >=1000 mg/dL (3+)     Ketones, UA 80 mg/dL (3+)     Bilirubin, UA Negative     Blood, UA Large (3+)     Protein, UA Negative     Leuk Esterase, UA Trace     Nitrite, UA Negative     Urobilinogen, UA 0.2 E.U./dL    Lactic Acid, Plasma [[id number]]  (Abnormal) Collected:  02/06/19 1128    Specimen:  Blood Updated:  02/06/19 1159     Lactate 3.4 mmol/L     Acetone [[id number]]  (Abnormal) Collected:  02/06/19 1128    Specimen:  Blood Updated:  02/06/19 1155     Acetone Moderate    Pregnancy, Urine - Urine, Clean Catch [[id number]]  (Normal) Collected:  02/06/19 1129    Specimen:  Urine, Clean Catch Updated:  02/06/19 1154     HCG, Urine QL Negative    Blood Gas, Arterial [[id number]]  (Abnormal) Collected:  02/06/19 1127    Specimen:  Arterial Blood Updated:  02/06/19 1141     Site Left Radial     Drew's Test N/A     pH, Arterial 7.290 pH units      Comment: 84 Value below reference range        pCO2, Arterial 27.5 mm Hg      Comment: 84 Value below reference range        pO2, Arterial 114.0 mm Hg      Comment: 83 Value above reference range        HCO3, Arterial 13.2 mmol/L      Comment: 84 Value below reference range        Base Excess, Arterial -11.7 mmol/L      Comment: 84 Value below reference range        O2 Saturation, Arterial 98.8 %      Barometric Pressure for Blood Gas 746 mmHg      Modality Room Air     Ventilator Mode NA     Collected by vc     Comment: Meter: [id number]     :  [id number]           Imaging Results (last 72 hours)     Procedure Component Value Units Date/Time    US Renal Bilateral [[id number]] Collected:  02/07/19 0985      Updated:  02/07/19 1044    Narrative:       PROCEDURE: US RENAL BILATERAL    Clinical History: gross hematuria, E10.10 Type 1 diabetes  mellitus with ketoacidosis without coma    Indication: Same as above    Comparison: CT of the abdomen and pelvis done on 5/25/2017 .    Technique:     Grayscale and color Doppler  evaluation of the bilateral kidneys  and the urinary bladder was done    Findings:     The right kidney measures 12.8 x 5.0 x 5.5  centimeters  and the  left kidney measures 11.7 x 4.7 x 5.7  centimeters.     The bilateral kidneys do not  show any evidence of hydronephrosis  or nephrolithiasis.    Survey evaluation of the urinary bladder does not show any gross  abnormalities.      Impression:       Impression:     Unremarkable ultrasound of the bilateral kidneys and urinary  bladder     Electronically signed by:  Durga Butler MD  2/7/2019 10:43 AM CST  Workstation: [employer] Chest 1 View [[id number]] Collected:  02/06/19 1210     Updated:  02/06/19 1236    Narrative:         PORTABLE CHEST    HISTORY: DKA. Patient has a new admission.    Portable AP upright film of the chest was obtained at 12:11 PM  with the lower abdomen and pelvis shielded.  COMPARISON: December 19, 2018    EKG leads.  The lungs are clear of an acute process.  The heart is not enlarged.  The pulmonary vasculature is not increased.  No pleural effusion.  No pneumothorax.  No acute osseous abnormality.      Impression:       CONCLUSION:  No Acute Disease    [id number]    Electronically signed by:  Elie Presley MD  2/6/2019 12:35 PM CST  Workstation: [email or web address]            Chief Complaint on Day of Discharge: None.    Hospital Course:  The patient was admitted to stepdown unit for DKA and started on treatment protocol accordingly.  Anion gap closed within a few hours, insulin infusion was discontinued and she was transitioned to her insulin pump.  She was also started on diet as tolerated, became less symptomatic and glycemic  "control remained adequate until discharge.  She had reactive leukocytosis which resolved on discharge blood culture showed no growth.    -She had moderate to large hematuria on urinalysis.  Renal ultrasound was done and was unremarkable.  -She was continued on her outpatient medications for depressive disorder prior to discharge.    Condition on Discharge: Stable and Improved.    Physical Exam on Discharge:  /63 (BP Location: Left arm, Patient Position: Lying)   Pulse 64   Temp 97.6 °F (36.4 °C) (Oral)   Resp 18   Ht 170.2 cm (67\")   Wt 48.6 kg (107 lb 3.2 oz)   SpO2 98%   BMI 16.79 kg/m²    Physical Exam   Constitutional: She is oriented to person, place, and time. She appears well-developed and well-nourished. She is cooperative. No distress.   HENT:   Head: Normocephalic and atraumatic.   Right Ear: External ear normal.   Left Ear: External ear normal.   Nose: Nose normal.   Mouth/Throat: Oropharynx is clear and moist.   Eyes: Conjunctivae and EOM are normal. Pupils are equal, round, and reactive to light.   Neck: Normal range of motion. Neck supple. No JVD present. No thyromegaly present.   Cardiovascular: Normal rate, regular rhythm, normal heart sounds and intact distal pulses. Exam reveals no gallop and no friction rub.   No murmur heard.  Pulmonary/Chest: Effort normal and breath sounds normal. No stridor. No respiratory distress. She has no wheezes. She has no rales. She exhibits no tenderness.   Abdominal: Soft. Bowel sounds are normal. She exhibits no distension and no mass. There is no tenderness. There is no rebound and no guarding. No hernia.   Musculoskeletal: Normal range of motion. She exhibits no edema, tenderness or deformity.   Neurological: She is alert and oriented to person, place, and time. She has normal reflexes. No cranial nerve deficit or sensory deficit. She exhibits normal muscle tone. Coordination normal.   Skin: Skin is warm and dry. No rash noted. She is not diaphoretic. " No erythema. No pallor.   Psychiatric: She has a normal mood and affect. Her behavior is normal. Judgment and thought content normal.   Nursing note and vitals reviewed.        Discharge Disposition:  Home or Self Care    Discharge Medications:     Discharge Medications      Continue These Medications      Instructions Start Date   albuterol sulfate  (90 Base) MCG/ACT inhaler  Commonly known as:  PROVENTIL HFA;VENTOLIN HFA;PROAIR HFA   2 puffs, Inhalation, Every 4 Hours PRN      ARIPiprazole 5 MG tablet  Commonly known as:  ABILIFY   5 mg, Oral, Daily      clonazePAM 1 MG tablet  Commonly known as:  KlonoPIN   1 mg, Oral, 2 Times Daily PRN      glucagon 1 MG injection  Commonly known as:  GLUCAGEN   1 mg, Subcutaneous, See Admin Instructions, Follow package directions for low blood sugar.      insulin aspart 100 UNIT/ML injection  Commonly known as:  novoLOG   70 units daily through insulin pump      lamoTRIgine 100 MG tablet  Commonly known as:  LaMICtal   100 mg, Oral, Daily      sertraline 25 MG tablet  Commonly known as:  ZOLOFT   25 mg, Oral, Daily             Discharge Diet: Diabetic    Activity at Discharge: As tolerated     Discharge Care Plan/Instructions: Patient has been advised to take her medications as prescribed and to return to the emergency room in the event of any worsening symptoms    Follow-up Appointments:   No future appointments.  She is to follow-up with her primary care provider in 1 week and with her endocrinologist as outpatient.  Test Results Pending at Discharge:    Order Current Status    Urinalysis, Microscopic Only - Urine, Clean Catch In process    Blood Culture - Blood, Hand, Left Preliminary result    Blood Culture - Blood, Hand, Right Preliminary result          Eliud Martinez MD  02/07/19  10:52 AM    Time: 35 minutes                Electronically signed by Eliud Martinez MD at 2/7/2019 10:59 AM

## 2019-02-08 NOTE — OUTREACH NOTE
Prep Survey      Responses   Facility patient discharged from?  [place]   Is patient eligible?  Yes   Discharge diagnosis  DKA,    Does the patient have one of the following disease processes/diagnoses(primary or secondary)?  Other   Does the patient have Home health ordered?  No   Is there a DME ordered?  No   Comments regarding appointments  See AVS   Prep survey completed?  Yes          Emma Cooney RN

## 2019-02-11 ENCOUNTER — READMISSION MANAGEMENT (OUTPATIENT)
Dept: CALL CENTER | Facility: HOSPITAL | Age: 19
End: 2019-02-11

## 2019-02-11 LAB
BACTERIA SPEC AEROBE CULT: NORMAL
BACTERIA SPEC AEROBE CULT: NORMAL

## 2019-02-11 NOTE — OUTREACH NOTE
Medical Week 1 Survey      Responses   Facility patient discharged from?  [facility]   Does the patient have one of the following disease processes/diagnoses(primary or secondary)?  Other   Is there a successful TCM telephone encounter documented?  No   Week 1 attempt successful?  Yes   Call start time  1845   Call end time  1849   Discharge diagnosis  DKA,    Meds reviewed with patient/caregiver?  Yes   Is the patient having any side effects they believe may be caused by any medication additions or changes?  No   Does the patient have all medications ordered at discharge?  N/A   Is the patient taking all medications as directed (includes completed medication regime)?  Yes   Does the patient have a primary care provider?   Yes   Does the patient have an appointment with their PCP within 7 days of discharge?  Yes   Has the patient kept scheduled appointments due by today?  N/A   Comments  reveiwed upcoming appt   Psychosocial issues?  No   Comments  Pt states she is feeling much better.  BG readings 80 -192    Did the patient receive a copy of their discharge instructions?  Yes   Nursing interventions  Reviewed instructions with patient   What is the patient's perception of their health status since discharge?  Improving   Is the patient/caregiver able to teach back signs and symptoms related to disease process for when to call PCP?  Yes   Is the patient/caregiver able to teach back signs and symptoms related to disease process for when to call 911?  Yes   Is the patient/caregiver able to teach back the hierarchy of who to call/visit for symptoms/problems? PCP, Specialist, Home health nurse, Urgent Care, ED, 911  Yes   Week 1 call completed?  Yes          Jaimee Barajas RN

## 2019-02-19 ENCOUNTER — READMISSION MANAGEMENT (OUTPATIENT)
Dept: CALL CENTER | Facility: HOSPITAL | Age: 19
End: 2019-02-19

## 2019-02-19 NOTE — OUTREACH NOTE
Medical Week 2 Survey      Responses   Facility patient discharged from?  [facility]   Does the patient have one of the following disease processes/diagnoses(primary or secondary)?  Other   Week 2 attempt successful?  Yes [ The patient hung up the phone.]   Call start time  0918   Revoke  Decline to participate [The patient hung up the phone. The patient was disenrolled..]   Call end time  0919          Sarah Lyons RN

## 2019-03-06 ENCOUNTER — HOSPITAL ENCOUNTER (OUTPATIENT)
Facility: HOSPITAL | Age: 19
Setting detail: OBSERVATION
Discharge: HOME | End: 2019-03-09
Attending: EMERGENCY MEDICINE | Admitting: PHYSICIAN ASSISTANT

## 2019-03-06 DIAGNOSIS — R73.9 HYPERGLYCEMIA: Primary | ICD-10-CM

## 2019-03-06 PROBLEM — G40.909 SEIZURE DISORDER (HCC): Status: ACTIVE | Noted: 2019-03-06

## 2019-03-06 LAB
ACETONE BLD QL: ABNORMAL
ALBUMIN SERPL-MCNC: 4.3 G/DL (ref 3.4–4.8)
ALBUMIN/GLOB SERPL: 1.4 G/DL (ref 1.1–1.8)
ALP SERPL-CCNC: 74 U/L (ref 50–130)
ALT SERPL W P-5'-P-CCNC: 18 U/L (ref 9–52)
ANION GAP SERPL CALCULATED.3IONS-SCNC: 14 MMOL/L (ref 5–15)
ANION GAP SERPL CALCULATED.3IONS-SCNC: 8 MMOL/L (ref 5–15)
ARTERIAL PATENCY WRIST A: POSITIVE
AST SERPL-CCNC: 21 U/L (ref 14–36)
ATMOSPHERIC PRESS: 758 MMHG
BASE EXCESS BLDA CALC-SCNC: -4.6 MMOL/L (ref 0–2)
BASOPHILS # BLD AUTO: 0.04 10*3/MM3 (ref 0–0.2)
BASOPHILS NFR BLD AUTO: 1 % (ref 0–1.5)
BDY SITE: ABNORMAL
BILIRUB SERPL-MCNC: 0.3 MG/DL (ref 0.2–1.3)
BILIRUB UR QL STRIP: NEGATIVE
BUN BLD-MCNC: 13 MG/DL (ref 7–21)
BUN BLD-MCNC: 13 MG/DL (ref 7–21)
BUN/CREAT SERPL: 20.6 (ref 7–25)
BUN/CREAT SERPL: 21.3 (ref 7–25)
CA-I BLD-MCNC: 4.14 MG/DL (ref 4.6–5.6)
CALCIUM SPEC-SCNC: 7.9 MG/DL (ref 8.4–10.2)
CALCIUM SPEC-SCNC: 8.7 MG/DL (ref 8.4–10.2)
CHLORIDE SERPL-SCNC: 88 MMOL/L (ref 95–110)
CHLORIDE SERPL-SCNC: 95 MMOL/L (ref 95–110)
CLARITY UR: CLEAR
CO2 SERPL-SCNC: 22 MMOL/L (ref 22–31)
CO2 SERPL-SCNC: 23 MMOL/L (ref 22–31)
COLOR UR: YELLOW
CREAT BLD-MCNC: 0.61 MG/DL (ref 0.5–1)
CREAT BLD-MCNC: 0.63 MG/DL (ref 0.5–1)
DEPRECATED RDW RBC AUTO: 42.2 FL (ref 37–54)
EOSINOPHIL # BLD AUTO: 0.01 10*3/MM3 (ref 0–0.4)
EOSINOPHIL NFR BLD AUTO: 0.2 % (ref 0.3–6.2)
ERYTHROCYTE [DISTWIDTH] IN BLOOD BY AUTOMATED COUNT: 13.2 % (ref 12.3–15.4)
FLUAV AG NPH QL: NEGATIVE
FLUBV AG NPH QL IA: NEGATIVE
GFR SERPL CREATININE-BSD FRML MDRD: 122 ML/MIN/1.73 (ref 71–165)
GFR SERPL CREATININE-BSD FRML MDRD: 126 ML/MIN/1.73 (ref 71–165)
GLOBULIN UR ELPH-MCNC: 3 GM/DL (ref 2.3–3.5)
GLUCOSE BLD-MCNC: 557 MG/DL (ref 60–100)
GLUCOSE BLD-MCNC: 719 MG/DL (ref 60–100)
GLUCOSE BLDC GLUCOMTR-MCNC: 310 MG/DL (ref 70–130)
GLUCOSE UR STRIP-MCNC: ABNORMAL MG/DL
HCO3 BLDA-SCNC: 20 MMOL/L (ref 20–26)
HCT VFR BLD AUTO: 38.4 % (ref 34–46.6)
HGB BLD-MCNC: 13.1 G/DL (ref 12–15.9)
HGB UR QL STRIP.AUTO: NEGATIVE
HOLD SPECIMEN: NORMAL
IMM GRANULOCYTES # BLD AUTO: 0.09 10*3/MM3 (ref 0–0.05)
IMM GRANULOCYTES NFR BLD AUTO: 2.2 % (ref 0–0.5)
KETONES UR QL STRIP: ABNORMAL
LEUKOCYTE ESTERASE UR QL STRIP.AUTO: NEGATIVE
LIPASE SERPL-CCNC: 67 U/L (ref 25–110)
LYMPHOCYTES # BLD AUTO: 1.3 10*3/MM3 (ref 0.7–3.1)
LYMPHOCYTES NFR BLD AUTO: 32.2 % (ref 19.6–45.3)
Lab: ABNORMAL
MAGNESIUM SERPL-MCNC: 1.7 MG/DL (ref 1.6–2.3)
MCH RBC QN AUTO: 30 PG (ref 26.6–33)
MCHC RBC AUTO-ENTMCNC: 34.1 G/DL (ref 31.5–35.7)
MCV RBC AUTO: 87.9 FL (ref 79–97)
MODALITY: ABNORMAL
MONOCYTES # BLD AUTO: 0.29 10*3/MM3 (ref 0.1–0.9)
MONOCYTES NFR BLD AUTO: 7.2 % (ref 5–12)
NEUTROPHILS # BLD AUTO: 2.31 10*3/MM3 (ref 1.4–7)
NEUTROPHILS NFR BLD AUTO: 57.2 % (ref 42.7–76)
NITRITE UR QL STRIP: NEGATIVE
NRBC BLD AUTO-RTO: 0 /100 WBC (ref 0–0)
OSMOLALITY SERPL: 319 MOSM/KG (ref 280–290)
PCO2 BLDA: 34.3 MM HG (ref 35–45)
PH BLDA: 7.37 PH UNITS (ref 7.35–7.45)
PH UR STRIP.AUTO: 5.5 [PH] (ref 5–9)
PHOSPHATE SERPL-MCNC: 3.2 MG/DL (ref 2.7–4.7)
PLATELET # BLD AUTO: 244 10*3/MM3 (ref 140–450)
PMV BLD AUTO: 10.3 FL (ref 6–12)
PO2 BLDA: 132 MM HG (ref 83–108)
POTASSIUM BLD-SCNC: 3.5 MMOL/L (ref 3.5–5.1)
POTASSIUM BLD-SCNC: 4.1 MMOL/L (ref 3.5–5.1)
PROT SERPL-MCNC: 7.3 G/DL (ref 6.3–8.6)
PROT UR QL STRIP: NEGATIVE
RBC # BLD AUTO: 4.37 10*6/MM3 (ref 3.77–5.28)
S PYO AG THROAT QL: NEGATIVE
SAO2 % BLDCOA: 99.8 % (ref 94–99)
SODIUM BLD-SCNC: 125 MMOL/L (ref 137–145)
SODIUM BLD-SCNC: 125 MMOL/L (ref 137–145)
SP GR UR STRIP: 1.03 (ref 1–1.03)
UROBILINOGEN UR QL STRIP: ABNORMAL
VENTILATOR MODE: ABNORMAL
WBC NRBC COR # BLD: 4.04 10*3/MM3 (ref 3.4–10.8)
WHOLE BLOOD HOLD SPECIMEN: NORMAL

## 2019-03-06 PROCEDURE — 83690 ASSAY OF LIPASE: CPT | Performed by: PHYSICIAN ASSISTANT

## 2019-03-06 PROCEDURE — 96366 THER/PROPH/DIAG IV INF ADDON: CPT

## 2019-03-06 PROCEDURE — 94760 N-INVAS EAR/PLS OXIMETRY 1: CPT

## 2019-03-06 PROCEDURE — 96361 HYDRATE IV INFUSION ADD-ON: CPT

## 2019-03-06 PROCEDURE — 99284 EMERGENCY DEPT VISIT MOD MDM: CPT

## 2019-03-06 PROCEDURE — 80053 COMPREHEN METABOLIC PANEL: CPT | Performed by: PHYSICIAN ASSISTANT

## 2019-03-06 PROCEDURE — 87581 M.PNEUMON DNA AMP PROBE: CPT | Performed by: STUDENT IN AN ORGANIZED HEALTH CARE EDUCATION/TRAINING PROGRAM

## 2019-03-06 PROCEDURE — 82330 ASSAY OF CALCIUM: CPT | Performed by: STUDENT IN AN ORGANIZED HEALTH CARE EDUCATION/TRAINING PROGRAM

## 2019-03-06 PROCEDURE — 84100 ASSAY OF PHOSPHORUS: CPT | Performed by: STUDENT IN AN ORGANIZED HEALTH CARE EDUCATION/TRAINING PROGRAM

## 2019-03-06 PROCEDURE — G0378 HOSPITAL OBSERVATION PER HR: HCPCS

## 2019-03-06 PROCEDURE — 87804 INFLUENZA ASSAY W/OPTIC: CPT | Performed by: PHYSICIAN ASSISTANT

## 2019-03-06 PROCEDURE — 36600 WITHDRAWAL OF ARTERIAL BLOOD: CPT

## 2019-03-06 PROCEDURE — 83735 ASSAY OF MAGNESIUM: CPT | Performed by: STUDENT IN AN ORGANIZED HEALTH CARE EDUCATION/TRAINING PROGRAM

## 2019-03-06 PROCEDURE — 87798 DETECT AGENT NOS DNA AMP: CPT | Performed by: STUDENT IN AN ORGANIZED HEALTH CARE EDUCATION/TRAINING PROGRAM

## 2019-03-06 PROCEDURE — 81003 URINALYSIS AUTO W/O SCOPE: CPT | Performed by: EMERGENCY MEDICINE

## 2019-03-06 PROCEDURE — 87081 CULTURE SCREEN ONLY: CPT | Performed by: PHYSICIAN ASSISTANT

## 2019-03-06 PROCEDURE — 96375 TX/PRO/DX INJ NEW DRUG ADDON: CPT

## 2019-03-06 PROCEDURE — 82962 GLUCOSE BLOOD TEST: CPT

## 2019-03-06 PROCEDURE — 85025 COMPLETE CBC W/AUTO DIFF WBC: CPT | Performed by: PHYSICIAN ASSISTANT

## 2019-03-06 PROCEDURE — 87631 RESP VIRUS 3-5 TARGETS: CPT | Performed by: STUDENT IN AN ORGANIZED HEALTH CARE EDUCATION/TRAINING PROGRAM

## 2019-03-06 PROCEDURE — 63710000001 INSULIN ASPART PER 5 UNITS: Performed by: STUDENT IN AN ORGANIZED HEALTH CARE EDUCATION/TRAINING PROGRAM

## 2019-03-06 PROCEDURE — 25810000003 SODIUM CHLORIDE 0.9 % WITH KCL 20 MEQ 20-0.9 MEQ/L-% SOLUTION: Performed by: STUDENT IN AN ORGANIZED HEALTH CARE EDUCATION/TRAINING PROGRAM

## 2019-03-06 PROCEDURE — 83930 ASSAY OF BLOOD OSMOLALITY: CPT | Performed by: PHYSICIAN ASSISTANT

## 2019-03-06 PROCEDURE — 96374 THER/PROPH/DIAG INJ IV PUSH: CPT

## 2019-03-06 PROCEDURE — 94799 UNLISTED PULMONARY SVC/PX: CPT

## 2019-03-06 PROCEDURE — 80048 BASIC METABOLIC PNL TOTAL CA: CPT | Performed by: STUDENT IN AN ORGANIZED HEALTH CARE EDUCATION/TRAINING PROGRAM

## 2019-03-06 PROCEDURE — 87880 STREP A ASSAY W/OPTIC: CPT | Performed by: PHYSICIAN ASSISTANT

## 2019-03-06 PROCEDURE — 63710000001 INSULIN REGULAR HUMAN PER 5 UNITS: Performed by: PHYSICIAN ASSISTANT

## 2019-03-06 PROCEDURE — 87486 CHLMYD PNEUM DNA AMP PROBE: CPT | Performed by: STUDENT IN AN ORGANIZED HEALTH CARE EDUCATION/TRAINING PROGRAM

## 2019-03-06 PROCEDURE — 82009 KETONE BODYS QUAL: CPT | Performed by: EMERGENCY MEDICINE

## 2019-03-06 PROCEDURE — 82803 BLOOD GASES ANY COMBINATION: CPT

## 2019-03-06 PROCEDURE — 25010000002 KETOROLAC TROMETHAMINE PER 15 MG: Performed by: PHYSICIAN ASSISTANT

## 2019-03-06 PROCEDURE — 96365 THER/PROPH/DIAG IV INF INIT: CPT

## 2019-03-06 RX ORDER — POTASSIUM CHLORIDE 750 MG/1
40 CAPSULE, EXTENDED RELEASE ORAL AS NEEDED
Status: DISCONTINUED | OUTPATIENT
Start: 2019-03-06 | End: 2019-03-07

## 2019-03-06 RX ORDER — POTASSIUM CHLORIDE 1.5 G/1.77G
10 POWDER, FOR SOLUTION ORAL AS NEEDED
Status: DISCONTINUED | OUTPATIENT
Start: 2019-03-06 | End: 2019-03-07

## 2019-03-06 RX ORDER — LIDOCAINE 50 MG/G
1 PATCH TOPICAL EVERY 24 HOURS
Status: DISCONTINUED | OUTPATIENT
Start: 2019-03-06 | End: 2019-03-09 | Stop reason: HOSPADM

## 2019-03-06 RX ORDER — DEXTROSE MONOHYDRATE 25 G/50ML
25-50 INJECTION, SOLUTION INTRAVENOUS
Status: DISCONTINUED | OUTPATIENT
Start: 2019-03-06 | End: 2019-03-09 | Stop reason: HOSPADM

## 2019-03-06 RX ORDER — DEXTROSE MONOHYDRATE 25 G/50ML
25 INJECTION, SOLUTION INTRAVENOUS
Status: DISCONTINUED | OUTPATIENT
Start: 2019-03-06 | End: 2019-03-09 | Stop reason: HOSPADM

## 2019-03-06 RX ORDER — LAMOTRIGINE 100 MG/1
100 TABLET ORAL DAILY
Status: DISCONTINUED | OUTPATIENT
Start: 2019-03-06 | End: 2019-03-09

## 2019-03-06 RX ORDER — SODIUM CHLORIDE 450 MG/100ML
250 INJECTION, SOLUTION INTRAVENOUS CONTINUOUS
Status: DISCONTINUED | OUTPATIENT
Start: 2019-03-06 | End: 2019-03-07

## 2019-03-06 RX ORDER — POTASSIUM CHLORIDE 750 MG/1
20 CAPSULE, EXTENDED RELEASE ORAL AS NEEDED
Status: DISCONTINUED | OUTPATIENT
Start: 2019-03-06 | End: 2019-03-07

## 2019-03-06 RX ORDER — DEXTROSE MONOHYDRATE 25 G/50ML
12.5 INJECTION, SOLUTION INTRAVENOUS
Status: DISCONTINUED | OUTPATIENT
Start: 2019-03-06 | End: 2019-03-09 | Stop reason: HOSPADM

## 2019-03-06 RX ORDER — SODIUM CHLORIDE AND POTASSIUM CHLORIDE 150; 900 MG/100ML; MG/100ML
100 INJECTION, SOLUTION INTRAVENOUS CONTINUOUS
Status: DISCONTINUED | OUTPATIENT
Start: 2019-03-06 | End: 2019-03-07

## 2019-03-06 RX ORDER — KETOROLAC TROMETHAMINE 15 MG/ML
15 INJECTION, SOLUTION INTRAMUSCULAR; INTRAVENOUS ONCE
Status: COMPLETED | OUTPATIENT
Start: 2019-03-06 | End: 2019-03-06

## 2019-03-06 RX ORDER — POTASSIUM CHLORIDE 7.46 G/1000ML
10 INJECTION, SOLUTION INTRAVENOUS AS NEEDED
Status: DISCONTINUED | OUTPATIENT
Start: 2019-03-06 | End: 2019-03-07

## 2019-03-06 RX ORDER — DEXTROSE AND SODIUM CHLORIDE 5; .45 G/100ML; G/100ML
150 INJECTION, SOLUTION INTRAVENOUS CONTINUOUS PRN
Status: DISCONTINUED | OUTPATIENT
Start: 2019-03-06 | End: 2019-03-07

## 2019-03-06 RX ORDER — CLONAZEPAM 0.5 MG/1
1 TABLET ORAL 2 TIMES DAILY PRN
Status: DISCONTINUED | OUTPATIENT
Start: 2019-03-06 | End: 2019-03-09 | Stop reason: HOSPADM

## 2019-03-06 RX ORDER — ALBUTEROL SULFATE 2.5 MG/3ML
2.5 SOLUTION RESPIRATORY (INHALATION) EVERY 4 HOURS PRN
Status: DISCONTINUED | OUTPATIENT
Start: 2019-03-06 | End: 2019-03-07 | Stop reason: SDUPTHER

## 2019-03-06 RX ORDER — NICOTINE POLACRILEX 4 MG
15 LOZENGE BUCCAL
Status: DISCONTINUED | OUTPATIENT
Start: 2019-03-06 | End: 2019-03-09 | Stop reason: HOSPADM

## 2019-03-06 RX ORDER — ALBUTEROL SULFATE 90 UG/1
2 AEROSOL, METERED RESPIRATORY (INHALATION) EVERY 4 HOURS PRN
Status: DISCONTINUED | OUTPATIENT
Start: 2019-03-06 | End: 2019-03-06 | Stop reason: CLARIF

## 2019-03-06 RX ORDER — SERTRALINE HYDROCHLORIDE 25 MG/1
25 TABLET, FILM COATED ORAL DAILY
Status: DISCONTINUED | OUTPATIENT
Start: 2019-03-06 | End: 2019-03-09

## 2019-03-06 RX ORDER — DEXTROSE MONOHYDRATE 25 G/50ML
12.5 INJECTION, SOLUTION INTRAVENOUS
Status: DISCONTINUED | OUTPATIENT
Start: 2019-03-06 | End: 2019-03-07

## 2019-03-06 RX ORDER — POTASSIUM CHLORIDE 750 MG/1
10 CAPSULE, EXTENDED RELEASE ORAL AS NEEDED
Status: DISCONTINUED | OUTPATIENT
Start: 2019-03-06 | End: 2019-03-07

## 2019-03-06 RX ORDER — SODIUM CHLORIDE 9 MG/ML
INJECTION, SOLUTION INTRAVENOUS
Status: DISCONTINUED
Start: 2019-03-06 | End: 2019-03-09 | Stop reason: HOSPADM

## 2019-03-06 RX ORDER — ARIPIPRAZOLE 5 MG/1
5 TABLET ORAL DAILY
Status: DISCONTINUED | OUTPATIENT
Start: 2019-03-06 | End: 2019-03-09

## 2019-03-06 RX ORDER — POTASSIUM CHLORIDE 1.5 G/1.77G
20 POWDER, FOR SOLUTION ORAL AS NEEDED
Status: DISCONTINUED | OUTPATIENT
Start: 2019-03-06 | End: 2019-03-07

## 2019-03-06 RX ORDER — POTASSIUM CHLORIDE 1.5 G/1.77G
40 POWDER, FOR SOLUTION ORAL AS NEEDED
Status: DISCONTINUED | OUTPATIENT
Start: 2019-03-06 | End: 2019-03-07

## 2019-03-06 RX ADMIN — SODIUM CHLORIDE 10.3 UNITS/HR: 9 INJECTION, SOLUTION INTRAVENOUS at 17:44

## 2019-03-06 RX ADMIN — ARIPIPRAZOLE 5 MG: 5 TABLET ORAL at 20:11

## 2019-03-06 RX ADMIN — SERTRALINE HYDROCHLORIDE 25 MG: 25 TABLET ORAL at 20:11

## 2019-03-06 RX ADMIN — SODIUM CHLORIDE 500 ML: 9 INJECTION, SOLUTION INTRAVENOUS at 15:16

## 2019-03-06 RX ADMIN — CLONAZEPAM 1 MG: 0.5 TABLET ORAL at 20:16

## 2019-03-06 RX ADMIN — SODIUM CHLORIDE 1000 ML: 900 INJECTION, SOLUTION INTRAVENOUS at 13:07

## 2019-03-06 RX ADMIN — SODIUM CHLORIDE 1000 ML: 9 INJECTION, SOLUTION INTRAVENOUS at 18:17

## 2019-03-06 RX ADMIN — POTASSIUM CHLORIDE AND SODIUM CHLORIDE 100 ML/HR: 900; 150 INJECTION, SOLUTION INTRAVENOUS at 19:29

## 2019-03-06 RX ADMIN — HUMAN INSULIN 5 UNITS: 100 INJECTION, SOLUTION SUBCUTANEOUS at 13:39

## 2019-03-06 RX ADMIN — LAMOTRIGINE 100 MG: 100 TABLET ORAL at 20:12

## 2019-03-06 RX ADMIN — HUMAN INSULIN 4 UNITS: 100 INJECTION, SOLUTION SUBCUTANEOUS at 15:06

## 2019-03-06 RX ADMIN — SODIUM CHLORIDE 0.1 UNITS/KG/HR: 9 INJECTION, SOLUTION INTRAVENOUS at 16:45

## 2019-03-06 RX ADMIN — LIDOCAINE 1 PATCH: 50 PATCH CUTANEOUS at 23:12

## 2019-03-06 RX ADMIN — KETOROLAC TROMETHAMINE 15 MG: 15 INJECTION, SOLUTION INTRAMUSCULAR; INTRAVENOUS at 13:08

## 2019-03-06 RX ADMIN — INSULIN ASPART 6 UNITS: 100 INJECTION, SOLUTION INTRAVENOUS; SUBCUTANEOUS at 19:26

## 2019-03-06 NOTE — ED NOTES
"FSBS at this time read \"hi\". Patient informed this RN that she had previously eaten a chicken sandwich from [employer] even though patient could only have water to drink per MD's orders.      Maine Palm RN  03/06/19 8229    Provider informed of patient eating.      Maine Palm RN  03/06/19 1602    "
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
## 2019-03-06 NOTE — ED NOTES
This RN spoke with Lab, [facility] stated they could run labs off of blood already in lab.     Maine Palm, RN  03/06/19 2242
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
## 2019-03-06 NOTE — ED NOTES
Provider notified of patient's elevated blood glucose.      Maine Palm, TIMOTHY  03/06/19 3179       Maine Palm, TIMOTHY  03/06/19 6857

## 2019-03-07 LAB
ALBUMIN SERPL-MCNC: 2.8 G/DL (ref 3.4–4.8)
ALBUMIN/GLOB SERPL: 1.1 G/DL (ref 1.1–1.8)
ALP SERPL-CCNC: 55 U/L (ref 50–130)
ALT SERPL W P-5'-P-CCNC: 16 U/L (ref 9–52)
ANION GAP SERPL CALCULATED.3IONS-SCNC: 5 MMOL/L (ref 5–15)
ANION GAP SERPL CALCULATED.3IONS-SCNC: 7 MMOL/L (ref 5–15)
ANION GAP SERPL CALCULATED.3IONS-SCNC: 7 MMOL/L (ref 5–15)
AST SERPL-CCNC: 18 U/L (ref 14–36)
B PERT DNA SPEC QL NAA+PROBE: NOT DETECTED
BASOPHILS # BLD AUTO: 0.04 10*3/MM3 (ref 0–0.2)
BASOPHILS NFR BLD AUTO: 0.7 % (ref 0–1.5)
BILIRUB SERPL-MCNC: <0.1 MG/DL (ref 0.2–1.3)
BUN BLD-MCNC: 11 MG/DL (ref 7–21)
BUN BLD-MCNC: 12 MG/DL (ref 7–21)
BUN BLD-MCNC: 12 MG/DL (ref 7–21)
BUN/CREAT SERPL: 23.4 (ref 7–25)
BUN/CREAT SERPL: 26.1 (ref 7–25)
BUN/CREAT SERPL: 26.1 (ref 7–25)
C PNEUM DNA NPH QL NAA+NON-PROBE: NOT DETECTED
CA-I BLD-MCNC: 4.29 MG/DL (ref 4.6–5.6)
CA-I BLD-MCNC: 4.33 MG/DL (ref 4.6–5.6)
CA-I BLD-MCNC: 4.35 MG/DL (ref 4.6–5.6)
CA-I BLD-MCNC: 4.58 MG/DL (ref 4.6–5.6)
CALCIUM SPEC-SCNC: 7.4 MG/DL (ref 8.4–10.2)
CALCIUM SPEC-SCNC: 7.5 MG/DL (ref 8.4–10.2)
CALCIUM SPEC-SCNC: 7.8 MG/DL (ref 8.4–10.2)
CHLORIDE SERPL-SCNC: 105 MMOL/L (ref 95–110)
CHLORIDE SERPL-SCNC: 106 MMOL/L (ref 95–110)
CHLORIDE SERPL-SCNC: 107 MMOL/L (ref 95–110)
CO2 SERPL-SCNC: 21 MMOL/L (ref 22–31)
CO2 SERPL-SCNC: 22 MMOL/L (ref 22–31)
CO2 SERPL-SCNC: 23 MMOL/L (ref 22–31)
CREAT BLD-MCNC: 0.46 MG/DL (ref 0.5–1)
CREAT BLD-MCNC: 0.46 MG/DL (ref 0.5–1)
CREAT BLD-MCNC: 0.47 MG/DL (ref 0.5–1)
DEPRECATED RDW RBC AUTO: 42.1 FL (ref 37–54)
EOSINOPHIL # BLD AUTO: 0.14 10*3/MM3 (ref 0–0.4)
EOSINOPHIL NFR BLD AUTO: 2.5 % (ref 0.3–6.2)
ERYTHROCYTE [DISTWIDTH] IN BLOOD BY AUTOMATED COUNT: 13.3 % (ref 12.3–15.4)
FLUAV H1 2009 PAND RNA NPH QL NAA+PROBE: NOT DETECTED
FLUAV H1 HA GENE NPH QL NAA+PROBE: NOT DETECTED
FLUAV H3 RNA NPH QL NAA+PROBE: NOT DETECTED
FLUAV SUBTYP SPEC NAA+PROBE: NOT DETECTED
FLUBV RNA ISLT QL NAA+PROBE: NOT DETECTED
GFR SERPL CREATININE-BSD FRML MDRD: 171 ML/MIN/1.73 (ref 71–165)
GFR SERPL CREATININE-BSD FRML MDRD: 175 ML/MIN/1.73 (ref 71–165)
GFR SERPL CREATININE-BSD FRML MDRD: 175 ML/MIN/1.73 (ref 71–165)
GLOBULIN UR ELPH-MCNC: 2.6 GM/DL (ref 2.3–3.5)
GLUCOSE BLD-MCNC: 146 MG/DL (ref 60–100)
GLUCOSE BLD-MCNC: 148 MG/DL (ref 60–100)
GLUCOSE BLD-MCNC: 164 MG/DL (ref 60–100)
GLUCOSE BLDC GLUCOMTR-MCNC: 107 MG/DL (ref 70–130)
GLUCOSE BLDC GLUCOMTR-MCNC: 121 MG/DL (ref 70–130)
GLUCOSE BLDC GLUCOMTR-MCNC: 133 MG/DL (ref 70–130)
GLUCOSE BLDC GLUCOMTR-MCNC: 141 MG/DL (ref 70–130)
GLUCOSE BLDC GLUCOMTR-MCNC: 145 MG/DL (ref 70–130)
GLUCOSE BLDC GLUCOMTR-MCNC: 160 MG/DL (ref 70–130)
GLUCOSE BLDC GLUCOMTR-MCNC: 189 MG/DL (ref 70–130)
GLUCOSE BLDC GLUCOMTR-MCNC: 205 MG/DL (ref 70–130)
GLUCOSE BLDC GLUCOMTR-MCNC: 208 MG/DL (ref 70–130)
GLUCOSE BLDC GLUCOMTR-MCNC: 214 MG/DL (ref 70–130)
GLUCOSE BLDC GLUCOMTR-MCNC: 244 MG/DL (ref 70–130)
GLUCOSE BLDC GLUCOMTR-MCNC: 280 MG/DL (ref 70–130)
GLUCOSE BLDC GLUCOMTR-MCNC: 283 MG/DL (ref 70–130)
GLUCOSE BLDC GLUCOMTR-MCNC: 343 MG/DL (ref 70–130)
GLUCOSE BLDC GLUCOMTR-MCNC: 507 MG/DL (ref 70–130)
GLUCOSE BLDC GLUCOMTR-MCNC: 586 MG/DL (ref 70–130)
GLUCOSE BLDC GLUCOMTR-MCNC: 594 MG/DL (ref 70–130)
GLUCOSE BLDC GLUCOMTR-MCNC: 71 MG/DL (ref 70–130)
GLUCOSE BLDC GLUCOMTR-MCNC: 84 MG/DL (ref 70–130)
GLUCOSE BLDC GLUCOMTR-MCNC: 86 MG/DL (ref 70–130)
HADV DNA SPEC NAA+PROBE: NOT DETECTED
HCOV 229E RNA SPEC QL NAA+PROBE: NOT DETECTED
HCOV HKU1 RNA SPEC QL NAA+PROBE: NOT DETECTED
HCOV NL63 RNA SPEC QL NAA+PROBE: NOT DETECTED
HCOV OC43 RNA SPEC QL NAA+PROBE: NOT DETECTED
HCT VFR BLD AUTO: 31.5 % (ref 34–46.6)
HGB BLD-MCNC: 11.1 G/DL (ref 12–15.9)
HMPV RNA NPH QL NAA+NON-PROBE: NOT DETECTED
HPIV1 RNA SPEC QL NAA+PROBE: NOT DETECTED
HPIV2 RNA SPEC QL NAA+PROBE: NOT DETECTED
HPIV3 RNA NPH QL NAA+PROBE: NOT DETECTED
HPIV4 P GENE NPH QL NAA+PROBE: NOT DETECTED
IMM GRANULOCYTES # BLD AUTO: 0.07 10*3/MM3 (ref 0–0.05)
IMM GRANULOCYTES NFR BLD AUTO: 1.3 % (ref 0–0.5)
LYMPHOCYTES # BLD AUTO: 1.99 10*3/MM3 (ref 0.7–3.1)
LYMPHOCYTES NFR BLD AUTO: 35.9 % (ref 19.6–45.3)
M PNEUMO IGG SER IA-ACNC: NOT DETECTED
MAGNESIUM SERPL-MCNC: 1.5 MG/DL (ref 1.6–2.3)
MAGNESIUM SERPL-MCNC: 1.6 MG/DL (ref 1.6–2.3)
MCH RBC QN AUTO: 30.8 PG (ref 26.6–33)
MCHC RBC AUTO-ENTMCNC: 35.2 G/DL (ref 31.5–35.7)
MCV RBC AUTO: 87.5 FL (ref 79–97)
MONOCYTES # BLD AUTO: 0.32 10*3/MM3 (ref 0.1–0.9)
MONOCYTES NFR BLD AUTO: 5.8 % (ref 5–12)
NEUTROPHILS # BLD AUTO: 2.99 10*3/MM3 (ref 1.4–7)
NEUTROPHILS NFR BLD AUTO: 53.8 % (ref 42.7–76)
NRBC BLD AUTO-RTO: 0 /100 WBC (ref 0–0)
PHOSPHATE SERPL-MCNC: 3 MG/DL (ref 2.7–4.7)
PHOSPHATE SERPL-MCNC: 3.2 MG/DL (ref 2.7–4.7)
PLATELET # BLD AUTO: 222 10*3/MM3 (ref 140–450)
PMV BLD AUTO: 10.1 FL (ref 6–12)
POTASSIUM BLD-SCNC: 3.5 MMOL/L (ref 3.5–5.1)
POTASSIUM BLD-SCNC: 3.5 MMOL/L (ref 3.5–5.1)
POTASSIUM BLD-SCNC: 3.7 MMOL/L (ref 3.5–5.1)
POTASSIUM BLD-SCNC: 4.3 MMOL/L (ref 3.5–5.1)
PROT SERPL-MCNC: 5.4 G/DL (ref 6.3–8.6)
RBC # BLD AUTO: 3.6 10*6/MM3 (ref 3.77–5.28)
RHINOVIRUS RNA SPEC NAA+PROBE: NOT DETECTED
RSV RNA NPH QL NAA+NON-PROBE: NOT DETECTED
SODIUM BLD-SCNC: 134 MMOL/L (ref 137–145)
SODIUM BLD-SCNC: 134 MMOL/L (ref 137–145)
SODIUM BLD-SCNC: 135 MMOL/L (ref 137–145)
WBC NRBC COR # BLD: 5.55 10*3/MM3 (ref 3.4–10.8)

## 2019-03-07 PROCEDURE — G0378 HOSPITAL OBSERVATION PER HR: HCPCS

## 2019-03-07 PROCEDURE — 84132 ASSAY OF SERUM POTASSIUM: CPT | Performed by: STUDENT IN AN ORGANIZED HEALTH CARE EDUCATION/TRAINING PROGRAM

## 2019-03-07 PROCEDURE — 96368 THER/DIAG CONCURRENT INF: CPT

## 2019-03-07 PROCEDURE — 96361 HYDRATE IV INFUSION ADD-ON: CPT

## 2019-03-07 PROCEDURE — 94760 N-INVAS EAR/PLS OXIMETRY 1: CPT

## 2019-03-07 PROCEDURE — 82962 GLUCOSE BLOOD TEST: CPT

## 2019-03-07 PROCEDURE — 82330 ASSAY OF CALCIUM: CPT | Performed by: STUDENT IN AN ORGANIZED HEALTH CARE EDUCATION/TRAINING PROGRAM

## 2019-03-07 PROCEDURE — 99219 PR INITIAL OBSERVATION CARE/DAY 50 MINUTES: CPT | Performed by: STUDENT IN AN ORGANIZED HEALTH CARE EDUCATION/TRAINING PROGRAM

## 2019-03-07 PROCEDURE — 25010000002 CALCIUM GLUCONATE PER 10 ML: Performed by: STUDENT IN AN ORGANIZED HEALTH CARE EDUCATION/TRAINING PROGRAM

## 2019-03-07 PROCEDURE — 94640 AIRWAY INHALATION TREATMENT: CPT

## 2019-03-07 PROCEDURE — 80048 BASIC METABOLIC PNL TOTAL CA: CPT | Performed by: STUDENT IN AN ORGANIZED HEALTH CARE EDUCATION/TRAINING PROGRAM

## 2019-03-07 PROCEDURE — 80053 COMPREHEN METABOLIC PANEL: CPT | Performed by: STUDENT IN AN ORGANIZED HEALTH CARE EDUCATION/TRAINING PROGRAM

## 2019-03-07 PROCEDURE — 84100 ASSAY OF PHOSPHORUS: CPT | Performed by: STUDENT IN AN ORGANIZED HEALTH CARE EDUCATION/TRAINING PROGRAM

## 2019-03-07 PROCEDURE — 99225 PR SBSQ OBSERVATION CARE/DAY 25 MINUTES: CPT | Performed by: PSYCHIATRY & NEUROLOGY

## 2019-03-07 PROCEDURE — 85025 COMPLETE CBC W/AUTO DIFF WBC: CPT | Performed by: STUDENT IN AN ORGANIZED HEALTH CARE EDUCATION/TRAINING PROGRAM

## 2019-03-07 PROCEDURE — 96366 THER/PROPH/DIAG IV INF ADDON: CPT

## 2019-03-07 PROCEDURE — 96375 TX/PRO/DX INJ NEW DRUG ADDON: CPT

## 2019-03-07 PROCEDURE — 25010000002 MAGNESIUM SULFATE 2 GM/50ML SOLUTION: Performed by: STUDENT IN AN ORGANIZED HEALTH CARE EDUCATION/TRAINING PROGRAM

## 2019-03-07 PROCEDURE — 94799 UNLISTED PULMONARY SVC/PX: CPT

## 2019-03-07 PROCEDURE — 83735 ASSAY OF MAGNESIUM: CPT | Performed by: STUDENT IN AN ORGANIZED HEALTH CARE EDUCATION/TRAINING PROGRAM

## 2019-03-07 PROCEDURE — 63710000001 INSULIN ASPART PER 5 UNITS: Performed by: STUDENT IN AN ORGANIZED HEALTH CARE EDUCATION/TRAINING PROGRAM

## 2019-03-07 RX ORDER — AZITHROMYCIN 250 MG/1
250 TABLET, FILM COATED ORAL DAILY
Status: DISCONTINUED | OUTPATIENT
Start: 2019-03-08 | End: 2019-03-07

## 2019-03-07 RX ORDER — ALBUTEROL SULFATE 2.5 MG/3ML
2.5 SOLUTION RESPIRATORY (INHALATION) EVERY 6 HOURS PRN
Status: DISCONTINUED | OUTPATIENT
Start: 2019-03-07 | End: 2019-03-09 | Stop reason: HOSPADM

## 2019-03-07 RX ORDER — TRAMADOL HYDROCHLORIDE 50 MG/1
50 TABLET ORAL ONCE
Status: COMPLETED | OUTPATIENT
Start: 2019-03-07 | End: 2019-03-07

## 2019-03-07 RX ORDER — ACETAMINOPHEN 325 MG/1
650 TABLET ORAL EVERY 6 HOURS PRN
Status: DISCONTINUED | OUTPATIENT
Start: 2019-03-07 | End: 2019-03-09 | Stop reason: HOSPADM

## 2019-03-07 RX ORDER — MAGNESIUM SULFATE HEPTAHYDRATE 40 MG/ML
2 INJECTION, SOLUTION INTRAVENOUS ONCE
Status: COMPLETED | OUTPATIENT
Start: 2019-03-07 | End: 2019-03-07

## 2019-03-07 RX ORDER — SODIUM CHLORIDE 9 MG/ML
INJECTION, SOLUTION INTRAVENOUS
Status: DISPENSED
Start: 2019-03-07 | End: 2019-03-07

## 2019-03-07 RX ORDER — AZITHROMYCIN 250 MG/1
500 TABLET, FILM COATED ORAL
Status: COMPLETED | OUTPATIENT
Start: 2019-03-08 | End: 2019-03-09

## 2019-03-07 RX ORDER — SODIUM CHLORIDE 9 MG/ML
100 INJECTION, SOLUTION INTRAVENOUS CONTINUOUS
Status: DISCONTINUED | OUTPATIENT
Start: 2019-03-07 | End: 2019-03-09 | Stop reason: HOSPADM

## 2019-03-07 RX ORDER — AZITHROMYCIN 250 MG/1
500 TABLET, FILM COATED ORAL DAILY
Status: COMPLETED | OUTPATIENT
Start: 2019-03-07 | End: 2019-03-07

## 2019-03-07 RX ADMIN — CLONAZEPAM 1 MG: 0.5 TABLET ORAL at 23:01

## 2019-03-07 RX ADMIN — AZITHROMYCIN 500 MG: 250 TABLET, FILM COATED ORAL at 11:32

## 2019-03-07 RX ADMIN — TRAMADOL HYDROCHLORIDE 50 MG: 50 TABLET, FILM COATED ORAL at 20:23

## 2019-03-07 RX ADMIN — DEXTROSE MONOHYDRATE 12.5 G: 25 INJECTION, SOLUTION INTRAVENOUS at 09:10

## 2019-03-07 RX ADMIN — MAGNESIUM SULFATE HEPTAHYDRATE 2 G: 40 INJECTION, SOLUTION INTRAVENOUS at 06:17

## 2019-03-07 RX ADMIN — INSULIN ASPART 8.86 UNITS: 100 INJECTION, SOLUTION INTRAVENOUS; SUBCUTANEOUS at 18:36

## 2019-03-07 RX ADMIN — SODIUM CHLORIDE 100 ML/HR: 900 INJECTION, SOLUTION INTRAVENOUS at 13:19

## 2019-03-07 RX ADMIN — DEXTROSE AND SODIUM CHLORIDE 150 ML/HR: 5; 450 INJECTION, SOLUTION INTRAVENOUS at 11:10

## 2019-03-07 RX ADMIN — INSULIN ASPART 3 UNITS: 100 INJECTION, SOLUTION INTRAVENOUS; SUBCUTANEOUS at 10:17

## 2019-03-07 RX ADMIN — SERTRALINE HYDROCHLORIDE 25 MG: 25 TABLET ORAL at 21:37

## 2019-03-07 RX ADMIN — ALBUTEROL SULFATE 2.5 MG: 2.5 SOLUTION RESPIRATORY (INHALATION) at 21:52

## 2019-03-07 RX ADMIN — INSULIN ASPART 3 UNITS: 100 INJECTION, SOLUTION INTRAVENOUS; SUBCUTANEOUS at 12:11

## 2019-03-07 RX ADMIN — DEXTROSE AND SODIUM CHLORIDE 150 ML/HR: 5; 450 INJECTION, SOLUTION INTRAVENOUS at 02:15

## 2019-03-07 RX ADMIN — SODIUM CHLORIDE 100 ML/HR: 900 INJECTION, SOLUTION INTRAVENOUS at 21:37

## 2019-03-07 RX ADMIN — ARIPIPRAZOLE 5 MG: 5 TABLET ORAL at 21:37

## 2019-03-07 RX ADMIN — SODIUM CHLORIDE 0.1 UNITS/KG/HR: 9 INJECTION, SOLUTION INTRAVENOUS at 02:16

## 2019-03-07 RX ADMIN — LIDOCAINE 1 PATCH: 50 PATCH CUTANEOUS at 23:01

## 2019-03-07 RX ADMIN — TRAMADOL HYDROCHLORIDE 50 MG: 50 TABLET, FILM COATED ORAL at 12:14

## 2019-03-07 RX ADMIN — ALBUTEROL SULFATE 2.5 MG: 2.5 SOLUTION RESPIRATORY (INHALATION) at 08:48

## 2019-03-07 RX ADMIN — CALCIUM GLUCONATE 1 G: 98 INJECTION, SOLUTION INTRAVENOUS at 10:08

## 2019-03-07 RX ADMIN — ACETAMINOPHEN 650 MG: 325 TABLET, FILM COATED ORAL at 09:29

## 2019-03-07 RX ADMIN — LAMOTRIGINE 100 MG: 100 TABLET ORAL at 21:37

## 2019-03-07 RX ADMIN — CLONAZEPAM 1 MG: 0.5 TABLET ORAL at 08:30

## 2019-03-07 RX ADMIN — POTASSIUM CHLORIDE 20 MEQ: 750 CAPSULE, EXTENDED RELEASE ORAL at 07:10

## 2019-03-07 NOTE — NURSING NOTE
11:15:Dr. Quiroz paged about patients back pain. Lidocaine patch and tylenol has not helped. Awaiting further orders at this time.     11:22 Dr. Quiroz paged about insulin gtt and IV fluids. Orders to restart pump and change fluids to NS.

## 2019-03-07 NOTE — CONSULTS
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  What is  seen of the upper cervical vertebral bodies have normal height and  alignment.  Atlanto-axial and atlanto-occipital relationships are  within normal limits. Visualized cervical spinal cord has normal  morphology and signal characteristics.     Major flow voids from the [place] are present.     There is no restricted diffusion. The nasopharynx has a normal  appearance. What is seen of both parotid glands are within normal  limits. Internal auditory canals and cerebellopontine angles are  within normal limits. Both orbits have a normal MR appearance.      There is no obvious mass or mass effect. There is normal gray-white  differentiation. There are no abnormal intra-axial or extra-axial  fluid collections. The ventricles have normal size and position.  The  basal ganglia, thalami, brainstem and cerebellum have a normal MR  appearance.     Imaged paranasal sinuses have a normal MR appearance.     There is no evidence for abnormal enhancement after intravenous  administration of contrast.  There  is normal enhancement of major  venous structures.     CONCLUSION-     1. No MR evidence for acute infarct.     2. Normal MRI of the brain.     Electronically Signed By- Trish Presley MD On: 2013-11-12 12:01:33    Assessment/Plan     --> Diagnostic  Impression:       Assessment:    Hyperglycemia    Severe depressed bipolar II disorder without psychotic features (CMS/HCC)    Seizure disorder (CMS/HCC)    --Bipolar II D/O, per chart  --Concern for unspecified personality pathology      Recommendations:  --Had a detailed discussion with pt re: possibility of inpt BH admission.  She declines at this time.  This was revisited at several points in the interview.  Benefits discussed.  She continues to decline.     --Family session with Mother & grandmother also occurred.  Family discord noted w/ difficult communication dynamics.  Have recommended outpt family therapy.      --Will f/u with pt in AM re: decision for [facility].       All questions answered for the patient, her mother and grandmother.     Impression and recommendations discussed with nursing staff and primary team, Dr Poole.    Psychiatry will continue to follow.      Thank you for this consult.  Please contact me with any further questions or concerns.           This document has been electronically signed by Jerod Lopez MD on March 7, 2019 1:55 PM    Dictated using Dragon.      I saw and examined the patient, as well as being present during the key portions of the E/M service.      I agree with the history and exam as documented by Dr Lopez:   --Pt denies SI/HI/AVH  --Significant family stress w/ family at home; sub optimal family communication and dynamics  --Difficulty with decision making for pt, external locus of control      I agree with the above exam: Denies SI/HI; TP coherent; theme of external locus of control, irritability; not expansive; not pressured nor rapid    I agree with assessment and plan as outlined above:    --Cont current outpt BH meds  --Encourage BH admission, currently decline  --If declines, outpt BH f/u for individual and family therapy  --Encourage internal lcus of control  --80 minutes spent with pt & family, discussing and processing discord, relationship dynamics,  affective symptoms, tx and d/c planning.      Psychiatry will continue to follow.Thank you for this consult.  Please contact me with any questions or concerns.        Kalia Bowers II, MD  Psychiatry & Behavioral Sciences  03/07/19  @ 11:29 PM

## 2019-03-07 NOTE — PROGRESS NOTES
FAMILY MEDICINE DAILY PROGRESS NOTE  NAME: Fernanda Patterson  : 2000  MRN: [id number]     LOS: 0 days     PROVIDER OF SERVICE: Padmini Quiroz MD    Chief Complaint: Hyperglycemia    Subjective:     Interval History:  History taken from: patient chart  Pt is a 18 y.o female who presented to the ED yesterday due to elevated glucose level of >700 diagnosed at an urgent care center. She originally went to urgent care to rule out flu due to symptoms of body aches, and fatigue. Pt placed on DKA protocol with insulin drip and fluid replacement last night. Overnight pt doing better, glucose is now 148. She does complain of some difficulty breathing overnigth and back pain that has not been alleviated with a lidocaine patch. She states improvement in her abdominal pain. She does not endorse any chest pain, palpitations. No other concerns at this time.     Review of Systems:   Review of Systems   Constitutional: Negative for activity change, appetite change, chills, diaphoresis and fever.   HENT: Negative for dental problem, drooling, ear discharge, ear pain, facial swelling, rhinorrhea, sinus pressure, sinus pain, sneezing and sore throat.    Eyes: Negative for discharge and redness.   Respiratory: Positive for shortness of breath. Negative for apnea, cough, chest tightness, wheezing and stridor.    Cardiovascular: Negative for chest pain, palpitations and leg swelling.   Gastrointestinal: Positive for abdominal pain. Negative for abdominal distention, constipation, diarrhea, nausea and vomiting.   Genitourinary: Negative for dysuria, frequency and urgency.   Musculoskeletal: Negative for arthralgias and back pain.   Skin: Negative for color change, pallor, rash and wound.   Neurological: Negative for dizziness, facial asymmetry, speech difficulty, weakness, light-headedness and headaches.   Psychiatric/Behavioral: Negative for agitation and decreased concentration. The patient is not nervous/anxious.         Objective:     Vital Signs  Temp:  [96.9 °F (36.1 °C)-98.2 °F (36.8 °C)] 97.4 °F (36.3 °C)  Heart Rate:  [] 92  Resp:  [16-20] 16  BP: (101-136)/(51-89) 108/68    Physical Exam  Physical Exam   Constitutional: She is oriented to person, place, and time. She appears well-developed and well-nourished. No distress.   HENT:   Head: Normocephalic and atraumatic.   Right Ear: External ear normal.   Left Ear: External ear normal.   Eyes: Conjunctivae and EOM are normal. Right eye exhibits no discharge. Left eye exhibits no discharge. No scleral icterus.   Neck: Normal range of motion. Neck supple. No JVD present. No thyromegaly present.   Cardiovascular: Normal rate, regular rhythm, normal heart sounds and intact distal pulses. Exam reveals no gallop and no friction rub.   No murmur heard.  Pulmonary/Chest: Effort normal and breath sounds normal. No stridor. No respiratory distress. She has no wheezes. She has no rales.   Abdominal: Soft. Bowel sounds are normal. She exhibits no distension. There is tenderness. There is no guarding.   Musculoskeletal: Normal range of motion. She exhibits no edema, tenderness or deformity.   Neurological: She is alert and oriented to person, place, and time. No sensory deficit. She exhibits normal muscle tone.   Skin: Skin is warm and dry. Capillary refill takes less than 2 seconds. No rash noted. She is not diaphoretic. No erythema. No pallor.   Psychiatric: She has a normal mood and affect. Her behavior is normal.       Medication Review    Current Facility-Administered Medications:   •  albuterol (PROVENTIL) nebulizer solution 0.083% 2.5 mg/3mL, 2.5 mg, Nebulization, Q4H PRN, Saige Casper MD  •  albuterol (PROVENTIL) nebulizer solution 0.083% 2.5 mg/3mL, 2.5 mg, Nebulization, Q6H PRN, Padmini Quiroz MD  •  ARIPiprazole (ABILIFY) tablet 5 mg, 5 mg, Oral, Daily, Padmini Quiroz MD, 5 mg at 03/06/19 2011  •  clonazePAM (KlonoPIN) tablet 1 mg, 1 mg, Oral, BID PRN,  Padmini Quiroz MD, 1 mg at 03/06/19 2016  •  dextrose (D50W) 25 g/ 50mL Intravenous Solution 12.5 g, 12.5 g, Intravenous, Q15 Min PRN, Padmini Quiroz MD  •  dextrose (D50W) 25 g/ 50mL Intravenous Solution 12.5 g, 12.5 g, Intravenous, Q15 Min PRN, Maritza Glez MD  •  dextrose (D50W) 25 g/ 50mL Intravenous Solution 25 g, 25 g, Intravenous, Q15 Min PRN, Alesha Hughes MD  •  dextrose (D50W) 25 g/ 50mL Intravenous Solution 25-50 mL, 25-50 mL, Intravenous, Q30 Min PRN, Evelyne Mcnamara PA-C  •  dextrose (GLUTOSE) oral gel 15 g, 15 g, Oral, Q15 Min PRN, Alseha Hughes MD  •  dextrose 5 % and sodium chloride 0.45 % infusion, 150 mL/hr, Intravenous, Continuous PRN, Maritza Glez MD, Last Rate: 150 mL/hr at 03/07/19 0638, 150 mL/hr at 03/07/19 0638  •  glucagon (human recombinant) (GLUCAGEN DIAGNOSTIC) injection 1 mg, 1 mg, Subcutaneous, PRN, Alesha Hughes MD  •  insulin aspart (novoLOG) injection 1-20 Units, 1-20 Units, Subcutaneous, TID With Meals, Alesha Hughes MD, 6 Units at 03/06/19 1926  •  insulin bolus from bag 4.7 Units, 0.1 Units/kg, Intravenous, Once PRN, Maritza Glez MD  •  insulin bolus from bag 5 Units, 5 Units, Intravenous, Once PRN, Maritza Glez MD  •  insulin regular (HumuLIN R,NovoLIN R) 100 Units in sodium chloride 0.9 % 100 mL (1 Units/mL) infusion, 0.1 Units/kg/hr (Ideal), Intravenous, Titrated, Evelyne Mcnamara PA-C, Last Rate: 6.7 mL/hr at 03/07/19 0711, 6.7 Units/hr at 03/07/19 0711  •  lamoTRIgine (LaMICtal) tablet 100 mg, 100 mg, Oral, Daily, Padmini Quiroz MD, 100 mg at 03/06/19 2012  •  lidocaine (LIDODERM) 5 % 1 patch, 1 patch, Transdermal, Q24H, Alesha Hughes MD, 1 patch at 03/06/19 2312  •  potassium chloride (MICRO-K) CR capsule 10 mEq, 10 mEq, Oral, PRN **OR** potassium chloride (KLOR-CON) packet 10 mEq, 10 mEq, Oral, PRN **OR** potassium chloride 10 mEq in 100 mL IVPB, 10 mEq, Intravenous, PRN, Padmini Quiroz MD  •   potassium chloride (MICRO-K) CR capsule 20 mEq, 20 mEq, Oral, PRN, 20 mEq at 03/07/19 0710 **OR** potassium chloride (KLOR-CON) packet 20 mEq, 20 mEq, Oral, PRN **OR** potassium chloride 10 mEq in 100 mL IVPB, 10 mEq, Intravenous, PRN, Padmini Quiroz MD  •  potassium chloride (MICRO-K) CR capsule 40 mEq, 40 mEq, Oral, PRN **OR** potassium chloride (KLOR-CON) packet 40 mEq, 40 mEq, Oral, PRN **OR** potassium chloride 10 mEq in 100 mL IVPB, 10 mEq, Intravenous, PRN, Padmini Quiroz MD  •  sertraline (ZOLOFT) tablet 25 mg, 25 mg, Oral, Daily, Padmini Quiroz MD, 25 mg at 03/06/19 2011  •  sodium chloride 0.45 % infusion, 250 mL/hr, Intravenous, Continuous, Maritza Glez MD     Diagnostic Data    Lab Results (last 24 hours)     Procedure Component Value Units Date/Time    POC Glucose Once [[id number]]  (Abnormal) Collected:  03/07/19 0709    Specimen:  Blood Updated:  03/07/19 0737     Glucose 214 mg/dL      Comment: : [id number] [employer] ID: [id number]       POC Glucose Once [[id number]]  (Abnormal) Collected:  03/06/19 1901    Specimen:  Blood Updated:  03/07/19 0710     Glucose 507 mg/dL      Comment: Sliding Scale AdminOperator: [id number] [employer] ID: [id number]       POC Glucose Once [[id number]]  (Abnormal) Collected:  03/06/19 1742    Specimen:  Blood Updated:  03/07/19 0710     Glucose 594 mg/dL      Comment: Sliding Scale AdminOperator: [id number] [employer] ID: [id number]       POC Glucose Once [[id number]]  (Abnormal) Collected:  03/06/19 1438    Specimen:  Blood Updated:  03/07/19 0709     Glucose 586 mg/dL      Comment: Notify DoctorOperator: [id number] AIDEE RUELASMeter ID: [id number]       Beta Strep Culture, Throat - Swab, Throat [[id number]]  (Normal) Collected:  03/06/19 1242    Specimen:  Swab from Throat Updated:  03/07/19 0708     Throat Culture, Beta Strep Culture in progress    POC Glucose Once [[id number]]  (Abnormal)  Collected:  03/07/19 0615    Specimen:  Blood Updated:  03/07/19 0645     Glucose 208 mg/dL      Comment: RN NotifiedOperator: [id number] [facility] APRILMeter ID: [id number]       POC Glucose Once [[id number]]  (Abnormal) Collected:  03/07/19 0503    Specimen:  Blood Updated:  03/07/19 0558     Glucose 141 mg/dL      Comment: Result Not ConfirmedOperator: [id number] [facility] APRILMeter ID: [id number]       Basic Metabolic Panel [[id number]]  (Abnormal) Collected:  03/07/19 0442    Specimen:  Blood Updated:  03/07/19 0550     Glucose 148 mg/dL      BUN 12 mg/dL      Creatinine 0.46 mg/dL      Sodium 134 mmol/L      Potassium 3.5 mmol/L      Chloride 106 mmol/L      CO2 21.0 mmol/L      Calcium 7.4 mg/dL      eGFR Non African Amer 175 mL/min/1.73      BUN/Creatinine Ratio 26.1     Anion Gap 7.0 mmol/L     CBC & Differential [[id number]] Collected:  03/07/19 0442    Specimen:  Blood Updated:  03/07/19 0529    Narrative:       The following orders were created for panel order CBC & Differential.  Procedure                               Abnormality         Status                     ---------                               -----------         ------                     CBC Auto Differential[[id number]]        Abnormal            Final result                 Please view results for these tests on the individual orders.    CBC Auto Differential [[id number]]  (Abnormal) Collected:  03/07/19 0442    Specimen:  Blood Updated:  03/07/19 0529     WBC 5.55 10*3/mm3      RBC 3.60 10*6/mm3      Hemoglobin 11.1 g/dL      Hematocrit 31.5 %      MCV 87.5 fL      MCH 30.8 pg      MCHC 35.2 g/dL      RDW 13.3 %      RDW-SD 42.1 fl      MPV 10.1 fL      Platelets 222 10*3/mm3      Neutrophil % 53.8 %      Lymphocyte % 35.9 %      Monocyte % 5.8 %      Eosinophil % 2.5 %      Basophil % 0.7 %      Immature Grans % 1.3 %      Neutrophils, Absolute 2.99 10*3/mm3      Lymphocytes, Absolute 1.99 10*3/mm3      Monocytes, Absolute 0.32 10*3/mm3       Eosinophils, Absolute 0.14 10*3/mm3      Basophils, Absolute 0.04 10*3/mm3      Immature Grans, Absolute 0.07 10*3/mm3      nRBC 0.0 /100 WBC     Comprehensive Metabolic Panel [[id number]]  (Abnormal) Collected:  03/07/19 0442    Specimen:  Blood Updated:  03/07/19 0513     Glucose 146 mg/dL      BUN 12 mg/dL      Creatinine 0.46 mg/dL      Sodium 134 mmol/L      Potassium 3.5 mmol/L      Chloride 107 mmol/L      CO2 22.0 mmol/L      Calcium 7.5 mg/dL      Total Protein 5.4 g/dL      Albumin 2.80 g/dL      ALT (SGPT) 16 U/L      AST (SGOT) 18 U/L      Alkaline Phosphatase 55 U/L      Total Bilirubin <0.1 mg/dL      eGFR Non African Amer 175 mL/min/1.73      Globulin 2.6 gm/dL      A/G Ratio 1.1 g/dL      BUN/Creatinine Ratio 26.1     Anion Gap 5.0 mmol/L     Phosphorus [[id number]]  (Normal) Collected:  03/07/19 0442    Specimen:  Blood Updated:  03/07/19 0512     Phosphorus 3.2 mg/dL     Magnesium [[id number]]  (Abnormal) Collected:  03/07/19 0442    Specimen:  Blood Updated:  03/07/19 0512     Magnesium 1.5 mg/dL     Calcium, Ionized [[id number]]  (Abnormal) Collected:  03/07/19 0442    Specimen:  Blood Updated:  03/07/19 0503     Ionized Calcium 4.33 mg/dL     POC Glucose Once [[id number]]  (Abnormal) Collected:  03/07/19 0405    Specimen:  Blood Updated:  03/07/19 0431     Glucose 160 mg/dL      Comment: RN NotifiedOperator: [id number] [employer] APRILMeter ID: [id number]       POC Glucose Once [[id number]]  (Normal) Collected:  03/07/19 0303    Specimen:  Blood Updated:  03/07/19 0431     Glucose 86 mg/dL      Comment: : [id number] [employer] APRILM[employer] ID: [id number]       POC Glucose Once [[id number]]  (Normal) Collected:  03/07/19 0218    Specimen:  Blood Updated:  03/07/19 0431     Glucose 84 mg/dL      Comment: : [id number] [employer] APRILMeter ID: [id number]       Respiratory Panel, PCR - Swab, Nasopharynx [[id number]]  (Normal) Collected:  03/06/19 2216    Specimen:  Swab from Nasopharynx Updated:   03/07/19 0137     ADENOVIRUS, PCR Not Detected     Coronavirus 229E Not Detected     Coronavirus HKU1 Not Detected     Coronavirus NL63 Not Detected     Coronavirus OC43 Not Detected     Human Metapneumovirus Not Detected     Human Rhinovirus/Enterovirus Not Detected     Influenza B PCR Not Detected     Parainfluenza Virus 1 Not Detected     Parainfluenza Virus 2 Not Detected     Parainfluenza Virus 3 Not Detected     Parainfluenza Virus 4 Not Detected     Bordetella pertussis pcr Not Detected     Influenza A H1 2009 PCR Not Detected     Chlamydophila pneumoniae PCR Not Detected     Mycoplasma pneumo by PCR Not Detected     Influenza A PCR Not Detected     Influenza A H3 Not Detected     Influenza A H1 Not Detected     RSV, PCR Not Detected    POC Glucose Once [[id number]]  (Normal) Collected:  03/07/19 0112    Specimen:  Blood Updated:  03/07/19 0130     Glucose 121 mg/dL      Comment: RN NotifiedOperator: [id number] [employer] ID: [id number]       POC Glucose Once [[id number]]  (Abnormal) Collected:  03/07/19 0017    Specimen:  Blood Updated:  03/07/19 0130     Glucose 145 mg/dL      Comment: RN NotifiedOperator: [id number] [employer] ID: [id number]       POC Glucose Once [[id number]]  (Abnormal) Collected:  03/06/19 2309    Specimen:  Blood Updated:  03/07/19 0129     Glucose 189 mg/dL      Comment: Result Not ConfirmedOperator: [id number] [employer] ID: [id number]       POC Glucose Once [[id number]]  (Abnormal) Collected:  03/06/19 2215    Specimen:  Blood Updated:  03/07/19 0129     Glucose 283 mg/dL      Comment: RN NotifiedOperator: [id number] [employer] ID: [id number]       POC Glucose Once [[id number]]  (Abnormal) Collected:  03/06/19 2120    Specimen:  Blood Updated:  03/07/19 0129     Glucose 244 mg/dL      Comment: Result Not ConfirmedOperator: [id number] [employer] ID: [id number]       Basic Metabolic Panel [[id number]]  (Abnormal) Collected:  03/06/19 2345    Specimen:   Blood Updated:  03/07/19 0007     Glucose 164 mg/dL      BUN 11 mg/dL      Creatinine 0.47 mg/dL      Sodium 135 mmol/L      Potassium 3.7 mmol/L      Chloride 105 mmol/L      CO2 23.0 mmol/L      Calcium 7.8 mg/dL      eGFR Non African Amer 171 mL/min/1.73      BUN/Creatinine Ratio 23.4     Anion Gap 7.0 mmol/L     Phosphorus [[id number]]  (Normal) Collected:  03/06/19 2345    Specimen:  Blood Updated:  03/07/19 0004     Phosphorus 3.0 mg/dL     Magnesium [[id number]]  (Normal) Collected:  03/06/19 2345    Specimen:  Blood Updated:  03/07/19 0004     Magnesium 1.6 mg/dL     Calcium, Ionized [[id number]]  (Abnormal) Collected:  03/06/19 2345    Specimen:  Blood Updated:  03/07/19 0000     Ionized Calcium 4.29 mg/dL     POC Glucose Once [[id number]]  (Abnormal) Collected:  03/06/19 2008    Specimen:  Blood Updated:  03/06/19 2042     Glucose 310 mg/dL      Comment: Result Not ConfirmedOperator: [id number] [facility] ID: [id number]       Basic Metabolic Panel [[id number]]  (Abnormal) Collected:  03/06/19 1824    Specimen:  Blood Updated:  03/06/19 1920     Glucose 557 mg/dL      BUN 13 mg/dL      Creatinine 0.63 mg/dL      Sodium 125 mmol/L      Potassium 3.5 mmol/L      Chloride 95 mmol/L      CO2 22.0 mmol/L      Calcium 7.9 mg/dL      eGFR Non African Amer 122 mL/min/1.73      BUN/Creatinine Ratio 20.6     Anion Gap 8.0 mmol/L     Phosphorus [[id number]]  (Normal) Collected:  03/06/19 1824    Specimen:  Blood Updated:  03/06/19 1905     Phosphorus 3.2 mg/dL     Magnesium [[id number]]  (Normal) Collected:  03/06/19 1824    Specimen:  Blood Updated:  03/06/19 1905     Magnesium 1.7 mg/dL     Calcium, Ionized [[id number]]  (Abnormal) Collected:  03/06/19 1832    Specimen:  Blood Updated:  03/06/19 1850     Ionized Calcium 4.14 mg/dL     Osmolality, Serum [[id number]]  (Abnormal) Collected:  03/06/19 1239    Specimen:  Blood Updated:  03/06/19 1601     Osmolality 319 mOsm/kg     Acetone [[id number]]  (Abnormal)  Collected:  03/06/19 1239    Specimen:  Blood Updated:  03/06/19 1504     Acetone Moderate    Blood Gas, Arterial [[id number]]  (Abnormal) Collected:  03/06/19 1415    Specimen:  Arterial Blood Updated:  03/06/19 1424     Site Left Brachial     Drew's Test Positive     pH, Arterial 7.373 pH units      pCO2, Arterial 34.3 mm Hg      Comment: 84 Value below reference range        pO2, Arterial 132.0 mm Hg      Comment: 83 Value above reference range        HCO3, Arterial 20.0 mmol/L      Comment: 84 Value below reference range        Base Excess, Arterial -4.6 mmol/L      Comment: 84 Value below reference range        O2 Saturation, Arterial 99.8 %      Comment: 83 Value above reference range        Barometric Pressure for Blood Gas 758 mmHg      Modality Room Air     Ventilator Mode NA     Collected by KIERA RODRIGUEZ RRT     Comment: Meter: [id number]     :  [id number]       Influenza Antigen, Rapid - Swab, Nasopharynx [[id number]]  (Normal) Collected:  03/06/19 1242    Specimen:  Swab from Nasopharynx Updated:  03/06/19 1359     Influenza A Ag, EIA Negative     Influenza B Ag, EIA Negative    Extra Tubes [[id number]] Collected:  03/06/19 1239    Specimen:  Blood, Venous Line Updated:  03/06/19 1345    Narrative:       The following orders were created for panel order Extra Tubes.  Procedure                               Abnormality         Status                     ---------                               -----------         ------                     Light Blue Top[[id number]]                                   Final result               Gold Top - SST[[id number]]                                   Final result                 Please view results for these tests on the individual orders.    Light Blue Top [[id number]] Collected:  03/06/19 1239    Specimen:  Blood Updated:  03/06/19 1345     Extra Tube hold for add-on     Comment: Auto resulted       Gold Top - SST [[id number]] Collected:  03/06/19 1239     Specimen:  Blood Updated:  03/06/19 1345     Extra Tube Hold for add-ons.     Comment: Auto resulted.       Lipase [[id number]]  (Normal) Collected:  03/06/19 1239    Specimen:  Blood Updated:  03/06/19 1319     Lipase 67 U/L     Comprehensive Metabolic Panel [[id number]]  (Abnormal) Collected:  03/06/19 1239    Specimen:  Blood Updated:  03/06/19 1319     Glucose 719 mg/dL      BUN 13 mg/dL      Creatinine 0.61 mg/dL      Sodium 125 mmol/L      Potassium 4.1 mmol/L      Chloride 88 mmol/L      CO2 23.0 mmol/L      Calcium 8.7 mg/dL      Total Protein 7.3 g/dL      Albumin 4.30 g/dL      ALT (SGPT) 18 U/L      AST (SGOT) 21 U/L      Alkaline Phosphatase 74 U/L      Total Bilirubin 0.3 mg/dL      eGFR Non African Amer 126 mL/min/1.73      Globulin 3.0 gm/dL      A/G Ratio 1.4 g/dL      BUN/Creatinine Ratio 21.3     Anion Gap 14.0 mmol/L     Urinalysis With Culture If Indicated - Urine, Clean Catch [[id number]]  (Abnormal) Collected:  03/06/19 1243    Specimen:  Urine, Clean Catch Updated:  03/06/19 1306     Color, UA Yellow     Appearance, UA Clear     pH, UA 5.5     Specific Gravity, UA 1.034     Comment: Result obtained by Refractometer        Glucose, UA >=1000 mg/dL (3+)     Ketones, UA 80 mg/dL (3+)     Bilirubin, UA Negative     Blood, UA Negative     Protein, UA Negative     Leuk Esterase, UA Negative     Nitrite, UA Negative     Urobilinogen, UA 0.2 E.U./dL    Narrative:       Urine microscopic not indicated.    Rapid Strep A Screen - Swab, Throat [[id number]]  (Normal) Collected:  03/06/19 1242    Specimen:  Swab from Throat Updated:  03/06/19 1259     Strep A Ag Negative    CBC & Differential [[id number]] Collected:  03/06/19 1239    Specimen:  Blood Updated:  03/06/19 1249    Narrative:       The following orders were created for panel order CBC & Differential.  Procedure                               Abnormality         Status                     ---------                               -----------          ------                     CBC Auto Differential[[id number]]        Abnormal            Final result                 Please view results for these tests on the individual orders.    CBC Auto Differential [[id number]]  (Abnormal) Collected:  03/06/19 1239    Specimen:  Blood Updated:  03/06/19 1249     WBC 4.04 10*3/mm3      RBC 4.37 10*6/mm3      Hemoglobin 13.1 g/dL      Hematocrit 38.4 %      MCV 87.9 fL      MCH 30.0 pg      MCHC 34.1 g/dL      RDW 13.2 %      RDW-SD 42.2 fl      MPV 10.3 fL      Platelets 244 10*3/mm3      Neutrophil % 57.2 %      Lymphocyte % 32.2 %      Monocyte % 7.2 %      Eosinophil % 0.2 %      Basophil % 1.0 %      Immature Grans % 2.2 %      Neutrophils, Absolute 2.31 10*3/mm3      Lymphocytes, Absolute 1.30 10*3/mm3      Monocytes, Absolute 0.29 10*3/mm3      Eosinophils, Absolute 0.01 10*3/mm3      Basophils, Absolute 0.04 10*3/mm3      Immature Grans, Absolute 0.09 10*3/mm3      nRBC 0.0 /100 WBC            Imaging Results (last 24 hours)     ** No results found for the last 24 hours. **          I reviewed the patient's new clinical results.    Assessment/Plan:     Active Hospital Problems    Diagnosis   • **Hyperglycemia     -Regular Insulin drip 100 units in NaCl 100 mL infusion  -Glucose checks  -Per DKA  protocol  -Monitor Anion gap      • Seizure disorder (CMS/HCC)     -Continue home medication Lamictal and Klonopin     • Severe depressed bipolar II disorder without psychotic features (CMS/[facility])     -Continue home medication Abilify           DVT prophylaxis: NO DVT prophylaxis, low PADUA score  Code Status and Medical Interventions:   Ordered at: 03/07/19 0755     Level Of Support Discussed With:    Patient     Code Status:    No CPR     Medical Interventions (Level of Support Prior to Arrest):    Comfort Measures       Plan for disposition:Where: home and When:  1-2days      Time: 30 minutes        This document has been electronically signed by Padmini Quiroz MD on March 7,  2019 7:56 AM

## 2019-03-07 NOTE — SIGNIFICANT NOTE
"Pt was seen and evaluated this AM with myself, Dr. Glez, and Dr. Casper. Pt asked us about our plan for her going forward. She was informed that we will transition her off her insulin drip and back onto her pump that she uses at home. We would monitor her through the day for any complications that may arise, and with an eye out on her labs with special attention paid to her glucose levels and anion gap. She was informed that if everything progressed well and without complications she would be cleared and could return home later this evening. PT became very upset upon hearing this and stated that \"no one listens to her\" and that she has had admissions for similar symptoms before and that she wants to be \"stable\" before discharge. Pt was once again informed that we had informed her of our plans for her if everything were to progress as expected, and that if additional time is needed for stabilization she would be able to stay until this stabilization occurred. Pt was also informed that her endocrinologist, Dr Elizabeth, who she asked for upon admission is not in the hospital today but if she requires another stay overnight we would contact him and have him see her tomorrow morning. Pt eventually expressed agreement with this.  "

## 2019-03-07 NOTE — CONSULTS
"Adult Nutrition  Assessment    Patient Name:  Fernanda Patterson  YOB: 2000  MRN: [id number]  Admit Date:  3/6/2019    Assessment Date:  3/7/2019    Comments:  Pt was admitted for hyperglycemia.  She was referred by provider for nutrition assessment. Pt said she never has seen a dietitian before. \"I\"m a picky eater so they don't usually ask a dietitian to see me\" she has seen Maine Young RDN, LD re her insulin pump but not a dietitian. Pt said she is interested in gaining weight. \"how can I eat if I am not hungry?\" pt said she is depressed\" I had a lot going on this past year and i'm just not hungry. Performed a nutrition focused physical assessment on patient due to bmi is 16.82.  Pt denied weight loss but according to previous weight records pt has lost weight since last admit. Provided information on ways to increase calories and protein using foods that are not carbohydrates, such as adding extra butter, adding cheese or peanut butter to foods. Encouraged pt to drink milk(lactose intolerance-offered lactose free milk or soy or almond milk) or supplements but pt said \"all I drink is water!!  Pt seemed to appreciate the info since she wants to gain weight. Pt was encouraged to fuel her body at least 3 times per day(more is good as well) to be physically, emotionally and mentally fit. Pt has this RDN's phone number if questions arise. RDN staff will continue to monitor.  Reason for Assessment     Row Name 03/07/19 1423          Reason for Assessment    Reason For Assessment  physician consult     Diagnosis  endocrine conditions     Identified At Risk by Screening Criteria  need for education;reduced oral intake over the last month         Nutrition/Diet History     Row Name 03/07/19 1428          Nutrition/Diet History    Typical Food/Fluid Intake  nursing said pt has a good understanding of carb counting. pt gives herself 1 unit of insulin for every 10 grams of carbohydrate. pt said she would like " "to gain weight. anything above 112lb.     Food Preferences  pt said she is a picky eater. \"how can i eat when i am not hungry?\" pt was interested in info on how to gain weight.           Labs/Tests/Procedures/Meds     Row Name 03/07/19 1429          Labs/Procedures/Meds    Lab Results Reviewed  reviewed        Medications    Pertinent Medications Reviewed  reviewed         Physical Findings     Row Name 03/07/19 1429          Physical Findings    Overall Physical Appearance  underweight         Estimated/Assessed Needs     Row Name 03/07/19 1429          Calculation Measurements    Weight Used For Calculations  61.2 kg (135 lb)        Estimated/Assessed Needs    Additional Documentation  Calorie Requirements (Group);Fluid Requirements (Group);Gibson-St. Jeor Equation (Group);Protein Requirements (Group)        Calorie Requirements    Weight Used For Calorie Calculations  61.2 kg (135 lb)     Estimated Calorie Requirement (kcal/day)  1850        KCAL/KG    14 Kcal/Kg (kcal)  857.3     15 Kcal/Kg (kcal)  918.54     18 Kcal/Kg (kcal)  1102.25     20 Kcal/Kg (kcal)  1224.72     25 Kcal/Kg (kcal)  1530.9     30 Kcal/Kg (kcal)  1837.08     35 Kcal/Kg (kcal)  2143.26     40 Kcal/Kg (kcal)  2449.44     45 Kcal/Kg (kcal)  2755.62     50 Kcal/Kg (kcal)  3061.8        Gibson-St. Jeor Equation    RMR (Gibson-St. Jeor Equation)  1419.99        Protein Requirements    Weight Used For Protein Calculations  61.2 kg (135 lb)     Est Protein Requirement Amount (gms/kg)  1.0 gm protein     Estimated Protein Requirements (gms/day)  61.24        Fluid Requirements    Estimated Fluid Requirements (mL/day)  1850     RDA Method (mL)  1850     Lavalette-Segar Method (over 20 kg)  2724.72         Nutrition Prescription Ordered     Row Name 03/07/19 1431          Nutrition Prescription PO    Current PO Diet  Regular         Evaluation of Received Nutrient/Fluid Intake     Row Name 03/07/19 1432 03/07/19 1429       Calculation Measurements "    Weight Used For Calculations  --  61.2 kg (135 lb)       PO Evaluation    Number of Meals  4  --    % PO Intake  75  --        Evaluation of Prescribed Nutrient/Fluid Intake     Row Name 03/07/19 1429          Calculation Measurements    Weight Used For Calculations  61.2 kg (135 lb)             Electronically signed by:  Vania Pearl RD  03/07/19 2:33 PM

## 2019-03-07 NOTE — PROGRESS NOTES
Malnutrition Severity Assessment    Patient Name:  Fernanda Patterson  YOB: 2000  MRN: [id number]  Admit Date:  3/6/2019    Patient meets criteria for : Moderate malnutrition    Comments:  Moderate malnutrition as related to poor intake and poor appetite/depression with resultant weight loss as evidenced by significant muscle and fat loss in the temple area, acromion process, and scapular regions.    Malnutrition Type: Social/Environmental Circumstance Malnutrition     Malnutrition Type (last 8 hours)      Malnutrition Severity Assessment     Row Name 03/07/19 1447       Malnutrition Severity Assessment    Malnutrition Type  Social/Environmental Circumstance Malnutrition    Row Name 03/07/19 1447       Physical Signs of Malnutrition (Social/Environmental)    Muscle Wasting  Mild    Fat Loss  Mild    Row Name 03/07/19 1447       Weight Status (Social/Environmental)    BMI  Severe (<16) using admission weight of 98lb(bmi of 15.4 )    %IBW  Mod (<80%)    Weight Loss  Severe (>5% / 1 mo)    Row Name 03/07/19 1447       Energy Intake Status (Social/Environmental)    Energy Intake  Mod (<75% / > or equal to 3 mo)    Row Name 03/07/19 1447       Criteria Met (Must meet criteria for severity in at least 2 of these categories: M Wasting, Fat Loss, Fluid, Secondary Signs, Wt. Status, Intake)    Patient meets criteria for   Moderate malnutrition          Electronically signed by:  Vania Pearl RD  03/07/19 2:55 PM

## 2019-03-07 NOTE — PLAN OF CARE
Problem: Diabetes, Type 1 (Adult)  Intervention: Optimize Glycemic Control   03/07/19 1501   Nutrition Interventions   Glycemic Management (education on weight gain while counting carbs.)

## 2019-03-07 NOTE — PLAN OF CARE
Problem: Patient Care Overview  Goal: Plan of Care Review  Outcome: Ongoing (interventions implemented as appropriate)   03/06/19 2000 03/07/19 0337   Coping/Psychosocial   Plan of Care Reviewed With patient --    Plan of Care Review   Progress --  improving   OTHER   Outcome Summary --  Dr. Lu consulted.      Goal: Individualization and Mutuality  Outcome: Ongoing (interventions implemented as appropriate)    Goal: Discharge Needs Assessment  Outcome: Ongoing (interventions implemented as appropriate)      Problem: Diabetes, Type 1 (Adult)  Goal: Signs and Symptoms of Listed Potential Problems Will be Absent, Minimized or Managed (Diabetes, Type 1)  Outcome: Ongoing (interventions implemented as appropriate)

## 2019-03-07 NOTE — PLAN OF CARE
Problem: Patient Care Overview  Goal: Plan of Care Review  Outcome: Ongoing (interventions implemented as appropriate)   03/06/19 6930   Coping/Psychosocial   Plan of Care Reviewed With patient   Plan of Care Review   Progress no change   OTHER   Outcome Summary New ER admit     Goal: Individualization and Mutuality  Outcome: Ongoing (interventions implemented as appropriate)    Goal: Discharge Needs Assessment  Outcome: Ongoing (interventions implemented as appropriate)    Goal: Interprofessional Rounds/Family Conf  Outcome: Ongoing (interventions implemented as appropriate)      Problem: Diabetes, Type 1 (Adult)  Goal: Signs and Symptoms of Listed Potential Problems Will be Absent, Minimized or Managed (Diabetes, Type 1)  Outcome: Ongoing (interventions implemented as appropriate)

## 2019-03-08 LAB
ALBUMIN SERPL-MCNC: 3.4 G/DL (ref 3.4–4.8)
ALBUMIN/GLOB SERPL: 1.2 G/DL (ref 1.1–1.8)
ALP SERPL-CCNC: 50 U/L (ref 50–130)
ALT SERPL W P-5'-P-CCNC: 11 U/L (ref 9–52)
ANION GAP SERPL CALCULATED.3IONS-SCNC: 8 MMOL/L (ref 5–15)
AST SERPL-CCNC: 22 U/L (ref 14–36)
BACTERIA SPEC AEROBE CULT: NORMAL
BASOPHILS # BLD AUTO: 0.02 10*3/MM3 (ref 0–0.2)
BASOPHILS NFR BLD AUTO: 0.4 % (ref 0–1.5)
BILIRUB SERPL-MCNC: <0.1 MG/DL (ref 0.2–1.3)
BUN BLD-MCNC: 8 MG/DL (ref 7–21)
BUN/CREAT SERPL: 14.5 (ref 7–25)
CALCIUM SPEC-SCNC: 8.4 MG/DL (ref 8.4–10.2)
CHLORIDE SERPL-SCNC: 100 MMOL/L (ref 95–110)
CO2 SERPL-SCNC: 25 MMOL/L (ref 22–31)
CREAT BLD-MCNC: 0.55 MG/DL (ref 0.5–1)
DEPRECATED RDW RBC AUTO: 44 FL (ref 37–54)
EOSINOPHIL # BLD AUTO: 0.04 10*3/MM3 (ref 0–0.4)
EOSINOPHIL NFR BLD AUTO: 0.9 % (ref 0.3–6.2)
ERYTHROCYTE [DISTWIDTH] IN BLOOD BY AUTOMATED COUNT: 13.5 % (ref 12.3–15.4)
GFR SERPL CREATININE-BSD FRML MDRD: 142 ML/MIN/1.73 (ref 71–165)
GLOBULIN UR ELPH-MCNC: 2.9 GM/DL (ref 2.3–3.5)
GLUCOSE BLD-MCNC: 254 MG/DL (ref 60–100)
GLUCOSE BLD-MCNC: 358 MG/DL (ref 60–100)
GLUCOSE BLDC GLUCOMTR-MCNC: 248 MG/DL (ref 70–130)
GLUCOSE BLDC GLUCOMTR-MCNC: 253 MG/DL (ref 70–130)
GLUCOSE BLDC GLUCOMTR-MCNC: 254 MG/DL (ref 70–130)
GLUCOSE BLDC GLUCOMTR-MCNC: 282 MG/DL (ref 70–130)
GLUCOSE BLDC GLUCOMTR-MCNC: 289 MG/DL (ref 70–130)
GLUCOSE BLDC GLUCOMTR-MCNC: 305 MG/DL (ref 70–130)
GLUCOSE BLDC GLUCOMTR-MCNC: 360 MG/DL (ref 70–130)
GLUCOSE BLDC GLUCOMTR-MCNC: 408 MG/DL (ref 70–130)
GLUCOSE BLDC GLUCOMTR-MCNC: 413 MG/DL (ref 70–130)
GLUCOSE BLDC GLUCOMTR-MCNC: 450 MG/DL (ref 70–130)
GLUCOSE BLDC GLUCOMTR-MCNC: 512 MG/DL (ref 70–130)
GLUCOSE BLDC GLUCOMTR-MCNC: 568 MG/DL (ref 70–130)
HCT VFR BLD AUTO: 36.2 % (ref 34–46.6)
HGB BLD-MCNC: 12 G/DL (ref 12–15.9)
HOLD SPECIMEN: NORMAL
IMM GRANULOCYTES # BLD AUTO: 0.07 10*3/MM3 (ref 0–0.05)
IMM GRANULOCYTES NFR BLD AUTO: 1.6 % (ref 0–0.5)
LYMPHOCYTES # BLD AUTO: 1.7 10*3/MM3 (ref 0.7–3.1)
LYMPHOCYTES NFR BLD AUTO: 37.7 % (ref 19.6–45.3)
MCH RBC QN AUTO: 29.5 PG (ref 26.6–33)
MCHC RBC AUTO-ENTMCNC: 33.1 G/DL (ref 31.5–35.7)
MCV RBC AUTO: 88.9 FL (ref 79–97)
MONOCYTES # BLD AUTO: 0.25 10*3/MM3 (ref 0.1–0.9)
MONOCYTES NFR BLD AUTO: 5.5 % (ref 5–12)
NEUTROPHILS # BLD AUTO: 2.43 10*3/MM3 (ref 1.4–7)
NEUTROPHILS NFR BLD AUTO: 53.9 % (ref 42.7–76)
NRBC BLD AUTO-RTO: 0 /100 WBC (ref 0–0)
PLATELET # BLD AUTO: 221 10*3/MM3 (ref 140–450)
PMV BLD AUTO: 10.1 FL (ref 6–12)
POTASSIUM BLD-SCNC: 4.2 MMOL/L (ref 3.5–5.1)
PROT SERPL-MCNC: 6.3 G/DL (ref 6.3–8.6)
RBC # BLD AUTO: 4.07 10*6/MM3 (ref 3.77–5.28)
SODIUM BLD-SCNC: 133 MMOL/L (ref 137–145)
WBC NRBC COR # BLD: 4.51 10*3/MM3 (ref 3.4–10.8)

## 2019-03-08 PROCEDURE — 96361 HYDRATE IV INFUSION ADD-ON: CPT

## 2019-03-08 PROCEDURE — 94799 UNLISTED PULMONARY SVC/PX: CPT

## 2019-03-08 PROCEDURE — 63710000001 INSULIN ASPART PER 5 UNITS: Performed by: INTERNAL MEDICINE

## 2019-03-08 PROCEDURE — G0378 HOSPITAL OBSERVATION PER HR: HCPCS

## 2019-03-08 PROCEDURE — 82962 GLUCOSE BLOOD TEST: CPT

## 2019-03-08 PROCEDURE — 82947 ASSAY GLUCOSE BLOOD QUANT: CPT | Performed by: HOSPITALIST

## 2019-03-08 PROCEDURE — 25010000002 DIPHENHYDRAMINE PER 50 MG: Performed by: INTERNAL MEDICINE

## 2019-03-08 PROCEDURE — 80053 COMPREHEN METABOLIC PANEL: CPT | Performed by: STUDENT IN AN ORGANIZED HEALTH CARE EDUCATION/TRAINING PROGRAM

## 2019-03-08 PROCEDURE — 94760 N-INVAS EAR/PLS OXIMETRY 1: CPT

## 2019-03-08 PROCEDURE — 99244 OFF/OP CNSLTJ NEW/EST MOD 40: CPT | Performed by: INTERNAL MEDICINE

## 2019-03-08 PROCEDURE — 96375 TX/PRO/DX INJ NEW DRUG ADDON: CPT

## 2019-03-08 PROCEDURE — 63710000001 INSULIN ASPART PER 5 UNITS: Performed by: STUDENT IN AN ORGANIZED HEALTH CARE EDUCATION/TRAINING PROGRAM

## 2019-03-08 PROCEDURE — 85025 COMPLETE CBC W/AUTO DIFF WBC: CPT | Performed by: STUDENT IN AN ORGANIZED HEALTH CARE EDUCATION/TRAINING PROGRAM

## 2019-03-08 PROCEDURE — 99225 PR SBSQ OBSERVATION CARE/DAY 25 MINUTES: CPT | Performed by: PSYCHIATRY & NEUROLOGY

## 2019-03-08 RX ORDER — HYDROXYZINE PAMOATE 25 MG/1
50 CAPSULE ORAL ONCE
Status: COMPLETED | OUTPATIENT
Start: 2019-03-08 | End: 2019-03-08

## 2019-03-08 RX ORDER — HYDROXYZINE PAMOATE 25 MG/1
25 CAPSULE ORAL 3 TIMES DAILY PRN
Status: DISCONTINUED | OUTPATIENT
Start: 2019-03-08 | End: 2019-03-09 | Stop reason: HOSPADM

## 2019-03-08 RX ORDER — LORAZEPAM 0.5 MG/1
0.5 TABLET ORAL ONCE
Status: COMPLETED | OUTPATIENT
Start: 2019-03-08 | End: 2019-03-08

## 2019-03-08 RX ORDER — DIPHENHYDRAMINE HYDROCHLORIDE 50 MG/ML
25 INJECTION INTRAMUSCULAR; INTRAVENOUS ONCE
Status: COMPLETED | OUTPATIENT
Start: 2019-03-08 | End: 2019-03-08

## 2019-03-08 RX ADMIN — AZITHROMYCIN 500 MG: 250 TABLET, FILM COATED ORAL at 09:09

## 2019-03-08 RX ADMIN — SODIUM CHLORIDE 100 ML/HR: 900 INJECTION, SOLUTION INTRAVENOUS at 09:12

## 2019-03-08 RX ADMIN — CLONAZEPAM 1 MG: 0.5 TABLET ORAL at 21:09

## 2019-03-08 RX ADMIN — HYDROXYZINE PAMOATE 50 MG: 25 CAPSULE ORAL at 18:24

## 2019-03-08 RX ADMIN — INSULIN ASPART 5 UNITS: 100 INJECTION, SOLUTION INTRAVENOUS; SUBCUTANEOUS at 02:09

## 2019-03-08 RX ADMIN — CLONAZEPAM 1 MG: 0.5 TABLET ORAL at 09:09

## 2019-03-08 RX ADMIN — HYDROXYZINE PAMOATE 25 MG: 25 CAPSULE ORAL at 23:00

## 2019-03-08 RX ADMIN — LORAZEPAM 0.5 MG: 0.5 TABLET ORAL at 11:46

## 2019-03-08 RX ADMIN — LAMOTRIGINE 100 MG: 100 TABLET ORAL at 21:10

## 2019-03-08 RX ADMIN — SODIUM CHLORIDE 100 ML/HR: 900 INJECTION, SOLUTION INTRAVENOUS at 19:41

## 2019-03-08 RX ADMIN — LIDOCAINE 1 PATCH: 50 PATCH CUTANEOUS at 22:26

## 2019-03-08 RX ADMIN — ALBUTEROL SULFATE 2.5 MG: 2.5 SOLUTION RESPIRATORY (INHALATION) at 09:43

## 2019-03-08 RX ADMIN — INSULIN ASPART 6 UNITS: 100 INJECTION, SOLUTION INTRAVENOUS; SUBCUTANEOUS at 09:07

## 2019-03-08 RX ADMIN — DIPHENHYDRAMINE HYDROCHLORIDE 25 MG: 50 INJECTION INTRAMUSCULAR; INTRAVENOUS at 07:47

## 2019-03-08 RX ADMIN — ARIPIPRAZOLE 5 MG: 5 TABLET ORAL at 21:10

## 2019-03-08 RX ADMIN — SERTRALINE HYDROCHLORIDE 25 MG: 25 TABLET ORAL at 21:10

## 2019-03-08 RX ADMIN — INSULIN ASPART 6 UNITS: 100 INJECTION, SOLUTION INTRAVENOUS; SUBCUTANEOUS at 15:52

## 2019-03-08 RX ADMIN — INSULIN ASPART 7 UNITS: 100 INJECTION, SOLUTION INTRAVENOUS; SUBCUTANEOUS at 12:04

## 2019-03-08 RX ADMIN — ALBUTEROL SULFATE 2.5 MG: 2.5 SOLUTION RESPIRATORY (INHALATION) at 20:26

## 2019-03-08 RX ADMIN — ALBUTEROL SULFATE 2.5 MG: 2.5 SOLUTION RESPIRATORY (INHALATION) at 04:04

## 2019-03-08 RX ADMIN — INSULIN ASPART 3 UNITS: 100 INJECTION, SOLUTION INTRAVENOUS; SUBCUTANEOUS at 18:25

## 2019-03-08 NOTE — NURSING NOTE
Patient came to desk and told me that the needle came out from her insulin pump that she was going to try to get her mother or grandmother to bring her one. Patient ask me to check her blood sugar and to see if I could get an order to giver her a 1 time dose of her home sliding scale to cover her to till someone could bring her the supplies.  Blood sugar was 360. MD notified of situation and order received to give patient a 1 time dose of home sliding scale.

## 2019-03-08 NOTE — PROGRESS NOTES
Made appointment with [facility] in [place], [place] for therapy with Maria L on March 27.2019 @ 12:45    If unable to keep this time, please call them and reschedule a more suitable time.    Phone # [phone]

## 2019-03-08 NOTE — NURSING NOTE
Went to patient room to review patients insulin with her to determine if there was any suggestions that could be make to help with her with her medications, she once again became tearful and tried to convince me that no one was trying to help her with any of her care.  At this time I explained that she was an adult and she would have to make decisions on her own and stick to them when she made them, the mother was in agreement with me at this time

## 2019-03-08 NOTE — PROGRESS NOTES
[facility] Medicine Services  INPATIENT PROGRESS NOTE    Length of Stay: 0  Date of Admission: 3/6/2019  Primary Care Physician: Rufus Marshall APRN    Subjective   Chief Complaint: Hyperglycemia  HPI: Patient today complains of abdominal and back pain.  She also complained of being confused and not doing water course of care is.  She states that she wants her blood sugar to be better controlled and she does not feel like she is being helped to achieve this.    Review of Systems   Constitutional: Positive for activity change and fatigue. Negative for appetite change, chills, fever and unexpected weight change.   Respiratory: Negative for cough, choking, chest tightness, shortness of breath and wheezing.    Cardiovascular: Negative for chest pain, palpitations and leg swelling.   Gastrointestinal: Positive for abdominal pain. Negative for blood in stool, constipation, diarrhea, nausea and vomiting.   Endocrine:        Hyperglycemia   Genitourinary: Negative for dysuria, flank pain and hematuria.   Musculoskeletal: Positive for back pain.   Neurological: Positive for weakness. Negative for dizziness, seizures, syncope, speech difficulty, light-headedness, numbness and headaches.   Hematological: Does not bruise/bleed easily.   Psychiatric/Behavioral: The patient is nervous/anxious.         All pertinent negatives and positives are as above. All other systems have been reviewed and are negative unless otherwise stated.     Objective    Temp:  [97.2 °F (36.2 °C)-98.1 °F (36.7 °C)] 98.1 °F (36.7 °C)  Heart Rate:  [68-90] 71  Resp:  [16-18] 16  BP: (119-124)/(64-88) 123/79    Physical Exam   Constitutional: She appears well-developed and well-nourished.   HENT:   Head: Normocephalic and atraumatic.   Eyes: EOM are normal. Pupils are equal, round, and reactive to light.   Neck: Normal range of motion. Neck supple.   Cardiovascular: Normal rate, regular rhythm and normal heart  sounds. Exam reveals no gallop and no friction rub.   No murmur heard.  Pulmonary/Chest: Effort normal and breath sounds normal. No respiratory distress. She has no wheezes. She has no rales. She exhibits no tenderness.   Abdominal: Soft. Bowel sounds are normal. She exhibits no distension. There is no tenderness. There is no guarding.   Musculoskeletal: She exhibits no edema.   Patient has mild muscular tenderness to palpation along her paraspinous muscles and her abdominal muscles.   Skin: Skin is warm and dry.   Psychiatric: Her speech is normal and behavior is normal. Thought content normal. Her mood appears anxious.   Vitals reviewed.          Results Review:  I have reviewed the labs, radiology results, and diagnostic studies.    Laboratory Data:   Results from last 7 days   Lab Units 03/08/19  1353 03/08/19  0632 03/07/19  0832 03/07/19  0442 03/06/19  2345  03/06/19  1239   SODIUM mmol/L  --  133*  --  134*  134* 135*   < > 125*   POTASSIUM mmol/L  --  4.2 4.3 3.5  3.5 3.7   < > 4.1   CHLORIDE mmol/L  --  100  --  106  107 105   < > 88*   CO2 mmol/L  --  25.0  --  21.0*  22.0 23.0   < > 23.0   BUN mg/dL  --  8  --  12  12 11   < > 13   CREATININE mg/dL  --  0.55  --  0.46*  0.46* 0.47*   < > 0.61   GLUCOSE mg/dL 358* 254*  --  148*  146* 164*   < > 719*   CALCIUM mg/dL  --  8.4  --  7.4*  7.5* 7.8*   < > 8.7   BILIRUBIN mg/dL  --  <0.1*  --  <0.1*  --   --  0.3   ALK PHOS U/L  --  50  --  55  --   --  74   ALT (SGPT) U/L  --  11  --  16  --   --  18   AST (SGOT) U/L  --  22  --  18  --   --  21   ANION GAP mmol/L  --  8.0  --  7.0  5.0 7.0   < > 14.0    < > = values in this interval not displayed.     Estimated Creatinine Clearance: 131.9 mL/min (by C-G formula based on SCr of 0.55 mg/dL).  Results from last 7 days   Lab Units 03/07/19  0442 03/06/19  2345 03/06/19  1824   MAGNESIUM mg/dL 1.5* 1.6 1.7   PHOSPHORUS mg/dL 3.2 3.0 3.2         Results from last 7 days   Lab Units 03/08/19  0632  03/07/19  0442 03/06/19  1239   WBC 10*3/mm3 4.51 5.55 4.04   HEMOGLOBIN g/dL 12.0 11.1* 13.1   HEMATOCRIT % 36.2 31.5* 38.4   PLATELETS 10*3/mm3 221 222 244           Culture Data:   No results found for: BLOODCX  No results found for: URINECX  No results found for: RESPCX  No results found for: WOUNDCX  No results found for: STOOLCX  No components found for: BODYFLD    Radiology Data:   Imaging Results (last 24 hours)     ** No results found for the last 24 hours. **          I have reviewed the patient's current medications.     Assessment/Plan     Active Hospital Problems    Diagnosis   • **Hyperglycemia     -Regular Insulin drip 100 units in NaCl 100 mL infusion  -Glucose checks  -Per DKA  protocol  -Monitor Anion gap      • Seizure disorder (CMS/HCC)     -Continue home medication Lamictal and Klonopin     • Severe depressed bipolar II disorder without psychotic features (CMS/HCC)     -Continue home medication Abilify         Plan:    1.  Hyperglycemia: Patient is back on her insulin pump.  Dr. Ellington increase the basal rate today.  We will monitor overnight per his recommendations.  Patient compliance with her pump and her diet will be crucial in achieving any semblance of appropriate glucose control.  2.  Seizure disorder: Continue medications.  3.  Bipolar disorder: Patient is stated that she does not want to go to a psychiatric unit.  She states that she wants to go home.  She is being followed by psychiatrist while she is here.  Continue home Abilify.  4.  Muscular skeletal back and abdominal pain: Likely secondary to coughing.  Symptomatic care only.              Discharge Planning: I expect patient to be discharged to home in 1 days.        This document has been electronically signed by Michael Poole MD on March 8, 2019 3:53 PM

## 2019-03-08 NOTE — PROGRESS NOTES
"                                  Psychiatry & Behavioral Health Inpatient Consult Progress Note                                      3/8/2019    CC & Reason for Consultation: Lability    Subjective --  Ms. Fernanda Patterson is a 18 y.o. female who was seen on the 4W unit.    Seen w/ NP Mookie. Pt's mother and boyfriend present by her request.      She report mood has improved.  Denies any SI/HI, intent or plan.  Denies any nihilism.      She declines admission to [facility].  We attempted to encourage pt to reconsider but she declines.      She is agreeable to outpt  f/u.  This has been scheduled w/ [facility] in [place], [place] for therapy with Maria L on March 27.2019 @ 12:45.      Our contact provided was provided.  Encouraged pt and family to call w/ any questions or concerns.  Reiterated the 24/7 care available here in ED.      Pt and family had no questions.      She reports she is going to return home to live with mom.  They are interested in family therapy & will f/u with [facility].      Objective   Objective --      Vital Signs:  Temp:  [97.2 °F (36.2 °C)-98.1 °F (36.7 °C)] 98.1 °F (36.7 °C)  Heart Rate:  [68-90] 71  Resp:  [16-18] 16  BP: (119-124)/(64-88) 123/79    Physical Exam:   -General Appearance:  alert, appears stated age and cooperative  -Hygiene:  fair   -Gait & Station:  Blank multiple: Deferred, in bed  -Musculoskeletal:  No tremors or abnormal involuntary movements and No atrophy noted  -Pulm: unlaboured     Mental Status Exam:   --Cooperation:  Minimally cooperative  --Eye Contact:  Fair  --Psychomotor Behavior:  Appropriate  --Mood:  \"OK\"  --Affect:  constricted  --Speech:  Normal r/r/v  --Thought Process:  Coherent  --Associations: Goal Directed  --Themes:  Hope for Future  --Thought Content:     --Mood congurent   --Suicidal:  Denies    --Homicidal:  Denies   --Hallucinations:  Denies   --Delusion:  None noted/overt  --Cognitive Functioning:   --Consciousness: awake and alert and Fund of " Knowledge:  Average based on interaction  --Reliability:  W/out gross impairment  --Insight:  Without Gross Impairment  --Judgment:  Without Gross Impairment  --Impulse Control:  Without Gross Impairment      Diagnostic Data --  Lab Results (last 24 hours)     Procedure Component Value Units Date/Time    Extra Tubes [[id number]] Collected:  03/08/19 1423    Specimen:  Blood, Venous Line Updated:  03/08/19 1530    Narrative:       The following orders were created for panel order Extra Tubes.  Procedure                               Abnormality         Status                     ---------                               -----------         ------                     Green Top (No Gel)[[id number]]                               Final result                 Please view results for these tests on the individual orders.    Green Top (No Gel) [[id number]] Collected:  03/08/19 1423    Specimen:  Blood Updated:  03/08/19 1530     Extra Tube Hold for add-ons.     Comment: Auto resulted.       Glucose, Random [[id number]]  (Abnormal) Collected:  03/08/19 1353    Specimen:  Blood Updated:  03/08/19 1444     Glucose 358 mg/dL     Beta Strep Culture, Throat - Swab, Throat [[id number]]  (Normal) Collected:  03/06/19 1242    Specimen:  Swab from Throat Updated:  03/08/19 1318     Throat Culture, Beta Strep No Beta Hemolytic Streptococcus Isolated at 2 days    POC Glucose Once [[id number]]  (Abnormal) Collected:  03/07/19 1631    Specimen:  Blood Updated:  03/08/19 1309     Glucose 408 mg/dL      Comment: RN NotifiedOperator: [id number] Advaxis CRYSTALMeter ID: [id number]       POC Glucose Once [[id number]]  (Abnormal) Collected:  03/07/19 1835    Specimen:  Blood Updated:  03/08/19 1308     Glucose 413 mg/dL      Comment: RN NotifiedOperator: [id number] Advaxis CRYSTALMeter ID: [id number]       POC Glucose Once [[id number]]  (Abnormal) Collected:  03/08/19 1246    Specimen:  Blood Updated:  03/08/19 1304     Glucose 450 mg/dL      Comment:  RN NotifiedOperator: [id number] PHELAN CHRISTYMeter ID: [id number]       POC Glucose Once [[id number]]  (Abnormal) Collected:  03/08/19 1056    Specimen:  Blood Updated:  03/08/19 1259     Glucose 512 mg/dL      Comment: RN NotifiedOperator: [id number] PHLEAN CHRISTYMeter ID: [id number]       POC Glucose Once [[id number]]  (Abnormal) Collected:  03/08/19 1057    Specimen:  Blood Updated:  03/08/19 1258     Glucose 568 mg/dL      Comment: RN NotifiedOperator: [id number] PHELAN CHRISTYMeter ID: [id number]       POC Glucose Once [[id number]]  (Abnormal) Collected:  03/08/19 0726    Specimen:  Blood Updated:  03/08/19 0805     Glucose 253 mg/dL      Comment: RN NotifiedOperator: [id number] PHELAN CHRISTYMeter ID: [id number]       Comprehensive Metabolic Panel [[id number]]  (Abnormal) Collected:  03/08/19 0632    Specimen:  Blood Updated:  03/08/19 0708     Glucose 254 mg/dL      BUN 8 mg/dL      Creatinine 0.55 mg/dL      Sodium 133 mmol/L      Potassium 4.2 mmol/L      Chloride 100 mmol/L      CO2 25.0 mmol/L      Calcium 8.4 mg/dL      Total Protein 6.3 g/dL      Albumin 3.40 g/dL      ALT (SGPT) 11 U/L      AST (SGOT) 22 U/L      Alkaline Phosphatase 50 U/L      Total Bilirubin <0.1 mg/dL      eGFR Non African Amer 142 mL/min/1.73      Globulin 2.9 gm/dL      A/G Ratio 1.2 g/dL      BUN/Creatinine Ratio 14.5     Anion Gap 8.0 mmol/L     CBC & Differential [[id number]] Collected:  03/08/19 0632    Specimen:  Blood Updated:  03/08/19 0703    Narrative:       The following orders were created for panel order CBC & Differential.  Procedure                               Abnormality         Status                     ---------                               -----------         ------                     CBC Auto Differential[[id number]]        Abnormal            Final result                 Please view results for these tests on the individual orders.    CBC Auto Differential [[id number]]  (Abnormal) Collected:  03/08/19 0632     Specimen:  Blood Updated:  03/08/19 0703     WBC 4.51 10*3/mm3      RBC 4.07 10*6/mm3      Hemoglobin 12.0 g/dL      Hematocrit 36.2 %      MCV 88.9 fL      MCH 29.5 pg      MCHC 33.1 g/dL      RDW 13.5 %      RDW-SD 44.0 fl      MPV 10.1 fL      Platelets 221 10*3/mm3      Neutrophil % 53.9 %      Lymphocyte % 37.7 %      Monocyte % 5.5 %      Eosinophil % 0.9 %      Basophil % 0.4 %      Immature Grans % 1.6 %      Neutrophils, Absolute 2.43 10*3/mm3      Lymphocytes, Absolute 1.70 10*3/mm3      Monocytes, Absolute 0.25 10*3/mm3      Eosinophils, Absolute 0.04 10*3/mm3      Basophils, Absolute 0.02 10*3/mm3      Immature Grans, Absolute 0.07 10*3/mm3      nRBC 0.0 /100 WBC     POC Glucose Once [[id number]]  (Abnormal) Collected:  03/08/19 0535    Specimen:  Blood Updated:  03/08/19 0547     Glucose 254 mg/dL      Comment: RN NotifiedOperator: [id number] SazneoMeter ID: [id number]       POC Glucose Once [[id number]]  (Abnormal) Collected:  03/08/19 0143    Specimen:  Blood Updated:  03/08/19 0221     Glucose 360 mg/dL      Comment: : [id number] RANDI KATRINAMeter ID: [id number]       POC Glucose Once [[id number]]  (Abnormal) Collected:  03/07/19 2306    Specimen:  Blood Updated:  03/08/19 0000     Glucose 248 mg/dL      Comment: RN NotifiedOperator: [id number] SazneoMeter ID: [id number]       POC Glucose Once [[id number]]  (Abnormal) Collected:  03/07/19 2142    Specimen:  Blood Updated:  03/07/19 2153     Glucose 280 mg/dL      Comment: RN NotifiedOperator: [id number] SazneoMeter ID: [id number]       POC Glucose Once [[id number]]  (Abnormal) Collected:  03/07/19 1939    Specimen:  Blood Updated:  03/07/19 2001     Glucose 343 mg/dL      Comment: RN NotifiedOperator: [id number] [facility] ID: [id number]             Imaging Results (last 24 hours)     ** No results found for the last 24 hours. **            Medications:   Scheduled Meds:  ARIPiprazole 5 mg  Oral Daily   azithromycin 500 mg Oral Q24H   insulin aspart 1-20 Units Subcutaneous TID With Meals   lamoTRIgine 100 mg Oral Daily   lidocaine 1 patch Transdermal Q24H   sertraline 25 mg Oral Daily     Continuous Infusions:  insulin     sodium chloride 100 mL/hr Last Rate: 100 mL/hr (03/08/19 1400)     PRN Meds:.•  acetaminophen  •  albuterol  •  clonazePAM  •  dextrose  •  dextrose  •  dextrose  •  dextrose  •  glucagon (human recombinant)  •  insulin  •  insulin      Assessment:     Hyperglycemia    Severe depressed bipolar II disorder without psychotic features (CMS/HCC)    Seizure disorder (CMS/HCC)    --Bipolar II D/O, per chart  --Concern for unspecified personality pathology         Treatment Plan & Recommendations:  --Again, had a detailed discussion with pt re: possibility of inpt  admission.  She declines at this time.  This was revisited at several points in the interview.  Benefits discussed.  She continues to decline.     --At time of interview, patient adamantly denies any thoughts of death or dying.  The patient also adamantly denies any suicidal ideations, intentions or plans.  Denies any homicidal ideations, intentions or plans.  Denies any auditory visual hallucinations.  Denies paranoia.  Not grossly psychotic.  The patient does not constitute an imminent risk of harm to self or others, at time of the interview.  Therefore, patient does not meet commitment criteria at this time.      -->All questions answered for the patient and her family.    --> Impression and recommendations discussed with nursing staff.    Psychiatry will sign off.  Thank you for this consult.  Please contact me with any further questions or concerns.     Kalia Bowers II, MD  Psychiatry & Behavioral Sciences  03/08/19 @ 4:03 PM  Dictated using Dragon.

## 2019-03-08 NOTE — NURSING NOTE
Patients showed me how she figures her home sliding scale.  - 150 (that's her goal rate) = 210. Patient states she gets 1 unit for every 40 she is over her goal rate. Based on that she would take 5 units.

## 2019-03-08 NOTE — NURSING NOTE
Informed Dr. Herrera of patient's sugar being 413. He stated to let her give herself and bolus through her pump and cover her supper with her pump, then recheck her around 8:00-8:30 and see if it has come down with just using her insulin pump and not giving additional. Also informed him of patient having back pain. He stated he would put in another dose of Tramadol.

## 2019-03-08 NOTE — NURSING NOTE
Rounding with Dr. Poole, Patient stated that she was confused and that she did not know what was going on with their care.  When asked what she didn't understand she stated that she had had so many Doctors she was confused and very anxious because there was so many people with him.  Patient then started to cry, and try to shake.  Dr. Poole then began to explain her plan of care once again and gave her the options of what she could do when she was discharged.

## 2019-03-08 NOTE — PLAN OF CARE
Problem: Patient Care Overview  Goal: Plan of Care Review  Outcome: Ongoing (interventions implemented as appropriate)   03/07/19 0084   Coping/Psychosocial   Plan of Care Reviewed With patient   Plan of Care Review   Progress improving     Goal: Individualization and Mutuality  Outcome: Ongoing (interventions implemented as appropriate)    Goal: Discharge Needs Assessment  Outcome: Ongoing (interventions implemented as appropriate)    Goal: Interprofessional Rounds/Family Conf  Outcome: Ongoing (interventions implemented as appropriate)      Problem: Diabetes, Type 1 (Adult)  Goal: Signs and Symptoms of Listed Potential Problems Will be Absent, Minimized or Managed (Diabetes, Type 1)  Outcome: Ongoing (interventions implemented as appropriate)

## 2019-03-09 VITALS
HEART RATE: 82 BPM | HEIGHT: 67 IN | RESPIRATION RATE: 18 BRPM | OXYGEN SATURATION: 97 % | WEIGHT: 105.8 LBS | TEMPERATURE: 97.5 F | DIASTOLIC BLOOD PRESSURE: 89 MMHG | SYSTOLIC BLOOD PRESSURE: 122 MMHG | BODY MASS INDEX: 16.61 KG/M2

## 2019-03-09 LAB
ALBUMIN SERPL-MCNC: 3.1 G/DL (ref 3.4–4.8)
ALBUMIN/GLOB SERPL: 1.1 G/DL (ref 1.1–1.8)
ALP SERPL-CCNC: 53 U/L (ref 38–126)
ALT SERPL W P-5'-P-CCNC: 18 U/L (ref 9–52)
ANION GAP SERPL CALCULATED.3IONS-SCNC: 6 MMOL/L (ref 5–15)
AST SERPL-CCNC: 17 U/L (ref 14–36)
BASOPHILS # BLD AUTO: 0.03 10*3/MM3 (ref 0–0.2)
BASOPHILS NFR BLD AUTO: 0.7 % (ref 0–1.5)
BILIRUB SERPL-MCNC: 0.1 MG/DL (ref 0.2–1.3)
BUN BLD-MCNC: 11 MG/DL (ref 7–21)
BUN/CREAT SERPL: 18 (ref 7–25)
CALCIUM SPEC-SCNC: 8.6 MG/DL (ref 8.4–10.2)
CHLORIDE SERPL-SCNC: 102 MMOL/L (ref 95–110)
CO2 SERPL-SCNC: 26 MMOL/L (ref 22–31)
CREAT BLD-MCNC: 0.61 MG/DL (ref 0.5–1)
DEPRECATED RDW RBC AUTO: 41.8 FL (ref 37–54)
EOSINOPHIL # BLD AUTO: 0.04 10*3/MM3 (ref 0–0.4)
EOSINOPHIL NFR BLD AUTO: 0.9 % (ref 0.3–6.2)
ERYTHROCYTE [DISTWIDTH] IN BLOOD BY AUTOMATED COUNT: 13.2 % (ref 12.3–15.4)
GFR SERPL CREATININE-BSD FRML MDRD: 125 ML/MIN/1.73 (ref 71–165)
GLOBULIN UR ELPH-MCNC: 2.7 GM/DL (ref 2.3–3.5)
GLUCOSE BLD-MCNC: 184 MG/DL (ref 60–100)
GLUCOSE BLDC GLUCOMTR-MCNC: 220 MG/DL (ref 70–130)
GLUCOSE BLDC GLUCOMTR-MCNC: 274 MG/DL (ref 70–130)
GLUCOSE BLDC GLUCOMTR-MCNC: 318 MG/DL (ref 70–130)
HCT VFR BLD AUTO: 33.2 % (ref 34–46.6)
HGB BLD-MCNC: 11.2 G/DL (ref 12–15.9)
IMM GRANULOCYTES # BLD AUTO: 0.06 10*3/MM3 (ref 0–0.05)
IMM GRANULOCYTES NFR BLD AUTO: 1.4 % (ref 0–0.5)
LYMPHOCYTES # BLD AUTO: 1.81 10*3/MM3 (ref 0.7–3.1)
LYMPHOCYTES NFR BLD AUTO: 40.9 % (ref 19.6–45.3)
MCH RBC QN AUTO: 29.4 PG (ref 26.6–33)
MCHC RBC AUTO-ENTMCNC: 33.7 G/DL (ref 31.5–35.7)
MCV RBC AUTO: 87.1 FL (ref 79–97)
MONOCYTES # BLD AUTO: 0.29 10*3/MM3 (ref 0.1–0.9)
MONOCYTES NFR BLD AUTO: 6.5 % (ref 5–12)
NEUTROPHILS # BLD AUTO: 2.2 10*3/MM3 (ref 1.4–7)
NEUTROPHILS NFR BLD AUTO: 49.6 % (ref 42.7–76)
NRBC BLD AUTO-RTO: 0 /100 WBC (ref 0–0)
PLATELET # BLD AUTO: 208 10*3/MM3 (ref 140–450)
PMV BLD AUTO: 10.7 FL (ref 6–12)
POTASSIUM BLD-SCNC: 4.1 MMOL/L (ref 3.5–5.1)
PROT SERPL-MCNC: 5.8 G/DL (ref 6.3–8.6)
RBC # BLD AUTO: 3.81 10*6/MM3 (ref 3.77–5.28)
SODIUM BLD-SCNC: 134 MMOL/L (ref 137–145)
WBC NRBC COR # BLD: 4.43 10*3/MM3 (ref 3.4–10.8)

## 2019-03-09 PROCEDURE — 96361 HYDRATE IV INFUSION ADD-ON: CPT

## 2019-03-09 PROCEDURE — 85025 COMPLETE CBC W/AUTO DIFF WBC: CPT | Performed by: STUDENT IN AN ORGANIZED HEALTH CARE EDUCATION/TRAINING PROGRAM

## 2019-03-09 PROCEDURE — 25010000002 LORAZEPAM PER 2 MG: Performed by: INTERNAL MEDICINE

## 2019-03-09 PROCEDURE — 63710000001 INSULIN ASPART PER 5 UNITS: Performed by: STUDENT IN AN ORGANIZED HEALTH CARE EDUCATION/TRAINING PROGRAM

## 2019-03-09 PROCEDURE — 80053 COMPREHEN METABOLIC PANEL: CPT | Performed by: STUDENT IN AN ORGANIZED HEALTH CARE EDUCATION/TRAINING PROGRAM

## 2019-03-09 PROCEDURE — 82962 GLUCOSE BLOOD TEST: CPT

## 2019-03-09 PROCEDURE — G0378 HOSPITAL OBSERVATION PER HR: HCPCS

## 2019-03-09 PROCEDURE — 96375 TX/PRO/DX INJ NEW DRUG ADDON: CPT

## 2019-03-09 RX ORDER — SERTRALINE HYDROCHLORIDE 25 MG/1
25 TABLET, FILM COATED ORAL DAILY
Status: DISCONTINUED | OUTPATIENT
Start: 2019-03-09 | End: 2019-03-09 | Stop reason: HOSPADM

## 2019-03-09 RX ORDER — ARIPIPRAZOLE 5 MG/1
5 TABLET ORAL DAILY
Status: DISCONTINUED | OUTPATIENT
Start: 2019-03-09 | End: 2019-03-09 | Stop reason: HOSPADM

## 2019-03-09 RX ORDER — LAMOTRIGINE 100 MG/1
100 TABLET ORAL DAILY
Status: DISCONTINUED | OUTPATIENT
Start: 2019-03-09 | End: 2019-03-09 | Stop reason: HOSPADM

## 2019-03-09 RX ORDER — LORAZEPAM 2 MG/ML
1 INJECTION INTRAMUSCULAR ONCE
Status: COMPLETED | OUTPATIENT
Start: 2019-03-09 | End: 2019-03-09

## 2019-03-09 RX ADMIN — INSULIN ASPART 6 UNITS: 100 INJECTION, SOLUTION INTRAVENOUS; SUBCUTANEOUS at 08:35

## 2019-03-09 RX ADMIN — SODIUM CHLORIDE 100 ML/HR: 900 INJECTION, SOLUTION INTRAVENOUS at 08:36

## 2019-03-09 RX ADMIN — CLONAZEPAM 1 MG: 0.5 TABLET ORAL at 10:48

## 2019-03-09 RX ADMIN — LORAZEPAM 1 MG: 2 INJECTION, SOLUTION INTRAMUSCULAR; INTRAVENOUS at 04:12

## 2019-03-09 RX ADMIN — LAMOTRIGINE 100 MG: 100 TABLET ORAL at 08:34

## 2019-03-09 RX ADMIN — SERTRALINE HYDROCHLORIDE 25 MG: 25 TABLET ORAL at 08:34

## 2019-03-09 RX ADMIN — AZITHROMYCIN 500 MG: 250 TABLET, FILM COATED ORAL at 08:34

## 2019-03-09 RX ADMIN — HYDROXYZINE PAMOATE 25 MG: 25 CAPSULE ORAL at 08:34

## 2019-03-09 RX ADMIN — ARIPIPRAZOLE 5 MG: 5 TABLET ORAL at 08:34

## 2019-03-09 NOTE — DISCHARGE SUMMARY
[facility] Medicine Services  DISCHARGE SUMMARY       Date of Admission: 3/6/2019  Date of Discharge:  3/9/2019  Primary Care Physician: Rufus Marshall APRN    Presenting Problem/History of Present Illness:  Hyperglycemia [R73.9]       Final Discharge Diagnoses:  Active Hospital Problems    Diagnosis   • **Hyperglycemia     -Regular Insulin drip 100 units in NaCl 100 mL infusion  -Glucose checks  -Per DKA  protocol  -Monitor Anion gap      • Seizure disorder (CMS/HCC)     -Continue home medication Lamictal and Klonopin     • Severe depressed bipolar II disorder without psychotic features (CMS/HCC)     -Continue home medication Abilify     • Type 1 diabetes mellitus with hyperglycemia (CMS/[facility])       Consults:   Consults     Date and Time Order Name Status Description    3/8/2019 2131 Inpatient Endocrinology Consult Completed     3/7/2019 1312 Inpatient Psychiatrist Consult Completed     2/6/2019 1247 Hospitalist (on-call MD unless specified) Completed               Chief Complaint on Day of Discharge: None    Hospital Course:  The patient is a 19 y.o. female who presented to [facility] with significant for diabetes mellitus type 1, history of diabetic ketoacidosis, diabetic neuropathy, gastroparesis, migraine, history of asthma, depression presented to the ER after she was seen in the urgent care for not feeling well.  She was found to have high blood sugars.  Since the patient has type 1 diabetes mellitus and history of DKA in the past diabetic ketoacidosis was suspected and hence the patient was sent to the ER.  Patient reports that she has not been feeling well for the past 2 days.   Patient's blood glucose was stabilized and she was placed back on her insulin pump.  Her settings were adjusted by her endocrinologist in the hospital as well.  Her basal rate was increased and her blood glucose was around 200 when she was being discharged.  Patient was asked  "to follow-up with her PCP and endocrinology for further management of her diabetes.  She will also follow-up with behavioral health for further treatment of her bipolar disorder.  She was also evaluated by psychiatrist and offered inpatient behavioral health admission but she refused and she denied any suicidal ideations.  Psychiatry cleared her to go home.        Condition on Discharge: Stable    Physical Exam on Discharge:  /89 (BP Location: Right arm, Patient Position: Lying)   Pulse 82   Temp 97.5 °F (36.4 °C) (Oral)   Resp 18   Ht 170.2 cm (67\")   Wt 48 kg (105 lb 12.8 oz)   SpO2 97%   BMI 16.57 kg/m²   Physical Exam   Constitutional: She appears well-developed and well-nourished. No distress.   HENT:   Head: Normocephalic and atraumatic.   Cardiovascular: Normal rate.   Pulmonary/Chest: Effort normal. No respiratory distress. She has no wheezes.   Abdominal: Soft. She exhibits no distension.   Musculoskeletal: Normal range of motion. She exhibits no edema.   Neurological: She is alert. No cranial nerve deficit.   Skin: Skin is warm and dry. She is not diaphoretic.   Psychiatric: She has a normal mood and affect. Her behavior is normal. Judgment and thought content normal.   Vitals reviewed.        Discharge Disposition:  Home or Self Care    Discharge Medications:     Discharge Medications      Continue These Medications      Instructions Start Date   albuterol sulfate  (90 Base) MCG/ACT inhaler  Commonly known as:  PROVENTIL HFA;VENTOLIN HFA;PROAIR HFA   2 puffs, Inhalation, Every 4 Hours PRN      ARIPiprazole 5 MG tablet  Commonly known as:  ABILIFY   5 mg, Oral, Daily      clonazePAM 1 MG tablet  Commonly known as:  KlonoPIN   1 mg, Oral, 2 Times Daily PRN      glucagon 1 MG injection  Commonly known as:  GLUCAGEN   1 mg, Subcutaneous, See Admin Instructions, Follow package directions for low blood sugar.      insulin aspart 100 UNIT/ML injection  Commonly known as:  novoLOG   70 units " daily through insulin pump      lamoTRIgine 100 MG tablet  Commonly known as:  LaMICtal   100 mg, Oral, Daily      sertraline 25 MG tablet  Commonly known as:  ZOLOFT   25 mg, Oral, Daily             Discharge Diet:   Diet Instructions     Diet: Consistent Carbohydrate      Discharge Diet:  Consistent Carbohydrate          Activity at Discharge:   Activity Instructions     Activity as Tolerated            Discharge Care Plan/Instructions: Continue with current insulin pump settings.  Follow-up with PCP and endocrinology.  Return to ER if blood glucose levels are not stable again.  Will also follow-up with behavioral health for her bipolar disorder  Appointment request for PCP and endocrinology have been ordered    Follow-up Appointments:   No future appointments.    Ebenezer Anders MD  03/09/19  10:41 AM

## 2019-03-09 NOTE — PLAN OF CARE
Problem: Patient Care Overview  Goal: Plan of Care Review  Outcome: Ongoing (interventions implemented as appropriate)   03/08/19 8005   Coping/Psychosocial   Plan of Care Reviewed With patient   Plan of Care Review   Progress no change   OTHER   Outcome Summary Pt very hyperglycemic today with FSBS of 568, MD notified; v/s stable, c/o anxiety, PRN medication administered and ineffective, MD notified and orders received, will continue to monitor      Goal: Individualization and Mutuality  Outcome: Ongoing (interventions implemented as appropriate)    Goal: Discharge Needs Assessment  Outcome: Ongoing (interventions implemented as appropriate)    Goal: Interprofessional Rounds/Family Conf  Outcome: Ongoing (interventions implemented as appropriate)      Problem: Diabetes, Type 1 (Adult)  Goal: Signs and Symptoms of Listed Potential Problems Will be Absent, Minimized or Managed (Diabetes, Type 1)  Outcome: Ongoing (interventions implemented as appropriate)
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
## 2019-03-09 NOTE — PLAN OF CARE
Problem: Patient Care Overview  Goal: Plan of Care Review  Outcome: Ongoing (interventions implemented as appropriate)   03/09/19 0124   Coping/Psychosocial   Plan of Care Reviewed With patient   Plan of Care Review   Progress no change     Goal: Individualization and Mutuality  Outcome: Ongoing (interventions implemented as appropriate)    Goal: Discharge Needs Assessment  Outcome: Ongoing (interventions implemented as appropriate)    Goal: Interprofessional Rounds/Family Conf  Outcome: Ongoing (interventions implemented as appropriate)      Problem: Diabetes, Type 1 (Adult)  Goal: Signs and Symptoms of Listed Potential Problems Will be Absent, Minimized or Managed (Diabetes, Type 1)  Outcome: Ongoing (interventions implemented as appropriate)

## 2019-03-10 ENCOUNTER — READMISSION MANAGEMENT (OUTPATIENT)
Dept: CALL CENTER | Facility: HOSPITAL | Age: 19
End: 2019-03-10

## 2019-03-10 NOTE — OUTREACH NOTE
Prep Survey      Responses   Facility patient discharged from?  [place]   Is patient eligible?  Yes   Discharge diagnosis  Hyperglycemia, seizure disorder, severe depressed bipolar II disorder w/o psychotic features, DM I with hyperglycemia   Does the patient have one of the following disease processes/diagnoses(primary or secondary)?  Other   Does the patient have Home health ordered?  No   Is there a DME ordered?  No   Comments regarding appointments  Office to call.    General alerts for this patient  Pt. to follow up with [facility].   Prep survey completed?  Yes          Emma Cooney RN

## 2019-03-11 ENCOUNTER — READMISSION MANAGEMENT (OUTPATIENT)
Dept: CALL CENTER | Facility: HOSPITAL | Age: 19
End: 2019-03-11

## 2019-03-11 LAB
GLUCOSE BLDC GLUCOMTR-MCNC: 416 MG/DL (ref 70–130)
GLUCOSE BLDC GLUCOMTR-MCNC: 448 MG/DL (ref 70–130)

## 2019-03-11 NOTE — OUTREACH NOTE
Medical Week 1 Survey      Responses   Facility patient discharged from?  [facility]   Does the patient have one of the following disease processes/diagnoses(primary or secondary)?  Other   Is there a successful TCM telephone encounter documented?  No   Week 1 attempt successful?  Yes   Call start time  1342   Revoke  Decline to participate [Person who answered patient's phone states this was not her number-call placed to mother of pt. who declines to participate, and states that pt cell phone number is correct.]   Call end time  1343          Katelin Goodman RN

## 2019-03-14 ENCOUNTER — OFFICE VISIT (OUTPATIENT)
Dept: ENDOCRINOLOGY | Facility: CLINIC | Age: 19
End: 2019-03-14

## 2019-03-14 VITALS
DIASTOLIC BLOOD PRESSURE: 72 MMHG | HEIGHT: 67 IN | OXYGEN SATURATION: 98 % | BODY MASS INDEX: 16.48 KG/M2 | HEART RATE: 81 BPM | SYSTOLIC BLOOD PRESSURE: 112 MMHG | WEIGHT: 105 LBS

## 2019-03-14 DIAGNOSIS — E10.65 TYPE 1 DIABETES MELLITUS WITH HYPERGLYCEMIA (HCC): Primary | ICD-10-CM

## 2019-03-14 DIAGNOSIS — K31.84 GASTROPARESIS: ICD-10-CM

## 2019-03-14 PROCEDURE — 99214 OFFICE O/P EST MOD 30 MIN: CPT | Performed by: INTERNAL MEDICINE

## 2019-03-14 RX ORDER — GLUCOSAMINE HCL/CHONDROITIN SU 500-400 MG
CAPSULE ORAL
Qty: 120 EACH | Refills: 11 | Status: SHIPPED | OUTPATIENT
Start: 2019-03-14 | End: 2020-06-06 | Stop reason: HOSPADM

## 2019-03-14 RX ORDER — LANCING DEVICE
EACH MISCELLANEOUS
Qty: 1 EACH | Refills: 1 | Status: SHIPPED | OUTPATIENT
Start: 2019-03-14 | End: 2022-06-27

## 2019-03-14 RX ORDER — PROMETHAZINE HYDROCHLORIDE 25 MG/1
25 TABLET ORAL EVERY 6 HOURS PRN
Qty: 120 TABLET | Refills: 5 | Status: SHIPPED | OUTPATIENT
Start: 2019-03-14 | End: 2019-11-01 | Stop reason: HOSPADM
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
## 2019-04-08 ENCOUNTER — HOSPITAL ENCOUNTER (EMERGENCY)
Facility: HOSPITAL | Age: 19
Discharge: HOME | End: 2019-04-08
Attending: FAMILY MEDICINE | Admitting: FAMILY MEDICINE

## 2019-04-08 VITALS
BODY MASS INDEX: 19.82 KG/M2 | HEART RATE: 64 BPM | WEIGHT: 126.3 LBS | TEMPERATURE: 98 F | DIASTOLIC BLOOD PRESSURE: 53 MMHG | OXYGEN SATURATION: 96 % | RESPIRATION RATE: 18 BRPM | HEIGHT: 67 IN | SYSTOLIC BLOOD PRESSURE: 103 MMHG

## 2019-04-08 DIAGNOSIS — H60.02 ABSCESS, EARLOBE, LEFT: Primary | ICD-10-CM

## 2019-04-08 PROCEDURE — 25010000002 LORAZEPAM PER 2 MG: Performed by: FAMILY MEDICINE

## 2019-04-08 PROCEDURE — 96374 THER/PROPH/DIAG INJ IV PUSH: CPT

## 2019-04-08 PROCEDURE — 25010000002 HYDROMORPHONE PER 4 MG: Performed by: FAMILY MEDICINE

## 2019-04-08 PROCEDURE — 87186 SC STD MICRODIL/AGAR DIL: CPT | Performed by: FAMILY MEDICINE

## 2019-04-08 PROCEDURE — 99283 EMERGENCY DEPT VISIT LOW MDM: CPT

## 2019-04-08 PROCEDURE — 87205 SMEAR GRAM STAIN: CPT | Performed by: FAMILY MEDICINE

## 2019-04-08 PROCEDURE — 87070 CULTURE OTHR SPECIMN AEROBIC: CPT | Performed by: FAMILY MEDICINE

## 2019-04-08 PROCEDURE — 96375 TX/PRO/DX INJ NEW DRUG ADDON: CPT

## 2019-04-08 RX ORDER — SODIUM CHLORIDE 0.9 % (FLUSH) 0.9 %
10 SYRINGE (ML) INJECTION AS NEEDED
Status: DISCONTINUED | OUTPATIENT
Start: 2019-04-08 | End: 2019-04-08 | Stop reason: HOSPADM

## 2019-04-08 RX ORDER — LIDOCAINE HYDROCHLORIDE AND EPINEPHRINE 10; 10 MG/ML; UG/ML
10 INJECTION, SOLUTION INFILTRATION; PERINEURAL ONCE
Status: COMPLETED | OUTPATIENT
Start: 2019-04-08 | End: 2019-04-08

## 2019-04-08 RX ORDER — LORAZEPAM 2 MG/ML
1 INJECTION INTRAMUSCULAR ONCE
Status: COMPLETED | OUTPATIENT
Start: 2019-04-08 | End: 2019-04-08

## 2019-04-08 RX ORDER — HYDROMORPHONE HCL 110MG/55ML
1 PATIENT CONTROLLED ANALGESIA SYRINGE INTRAVENOUS ONCE
Status: COMPLETED | OUTPATIENT
Start: 2019-04-08 | End: 2019-04-08

## 2019-04-08 RX ADMIN — LIDOCAINE HYDROCHLORIDE,EPINEPHRINE BITARTRATE 10 ML: 10; .01 INJECTION, SOLUTION INFILTRATION; PERINEURAL at 04:40

## 2019-04-08 RX ADMIN — LORAZEPAM 1 MG: 2 INJECTION, SOLUTION INTRAMUSCULAR; INTRAVENOUS at 05:36

## 2019-04-08 RX ADMIN — HYDROMORPHONE HYDROCHLORIDE 1 MG: 2 INJECTION INTRAMUSCULAR; INTRAVENOUS; SUBCUTANEOUS at 05:34

## 2019-04-09 ENCOUNTER — HOSPITAL ENCOUNTER (EMERGENCY)
Facility: HOSPITAL | Age: 19
Discharge: HOME | End: 2019-04-09
Attending: EMERGENCY MEDICINE | Admitting: EMERGENCY MEDICINE

## 2019-04-09 VITALS
RESPIRATION RATE: 18 BRPM | TEMPERATURE: 98.1 F | BODY MASS INDEX: 17.53 KG/M2 | DIASTOLIC BLOOD PRESSURE: 72 MMHG | OXYGEN SATURATION: 98 % | SYSTOLIC BLOOD PRESSURE: 106 MMHG | HEART RATE: 101 BPM | WEIGHT: 111.7 LBS | HEIGHT: 67 IN

## 2019-04-09 DIAGNOSIS — L02.91 ABSCESS: Primary | ICD-10-CM

## 2019-04-09 PROCEDURE — 25010000002 HYDROMORPHONE PER 4 MG: Performed by: STUDENT IN AN ORGANIZED HEALTH CARE EDUCATION/TRAINING PROGRAM

## 2019-04-09 PROCEDURE — 99283 EMERGENCY DEPT VISIT LOW MDM: CPT

## 2019-04-09 PROCEDURE — 25010000002 PROMETHAZINE PER 50 MG: Performed by: STUDENT IN AN ORGANIZED HEALTH CARE EDUCATION/TRAINING PROGRAM

## 2019-04-09 PROCEDURE — 96372 THER/PROPH/DIAG INJ SC/IM: CPT

## 2019-04-09 RX ORDER — HYDROMORPHONE HCL 110MG/55ML
1 PATIENT CONTROLLED ANALGESIA SYRINGE INTRAVENOUS ONCE
Status: COMPLETED | OUTPATIENT
Start: 2019-04-09 | End: 2019-04-09

## 2019-04-09 RX ORDER — LIDOCAINE HYDROCHLORIDE AND EPINEPHRINE 10; 10 MG/ML; UG/ML
20 INJECTION, SOLUTION INFILTRATION; PERINEURAL ONCE
Status: COMPLETED | OUTPATIENT
Start: 2019-04-09 | End: 2019-04-09

## 2019-04-09 RX ORDER — PROMETHAZINE HYDROCHLORIDE 25 MG/ML
25 INJECTION, SOLUTION INTRAMUSCULAR; INTRAVENOUS ONCE
Status: COMPLETED | OUTPATIENT
Start: 2019-04-09 | End: 2019-04-09

## 2019-04-09 RX ADMIN — HYDROMORPHONE HYDROCHLORIDE 1 MG: 2 INJECTION INTRAMUSCULAR; INTRAVENOUS; SUBCUTANEOUS at 08:36

## 2019-04-09 RX ADMIN — PROMETHAZINE HYDROCHLORIDE 25 MG: 25 INJECTION INTRAMUSCULAR; INTRAVENOUS at 08:45

## 2019-04-09 RX ADMIN — LIDOCAINE HYDROCHLORIDE,EPINEPHRINE BITARTRATE 20 ML: 10; .01 INJECTION, SOLUTION INFILTRATION; PERINEURAL at 09:35
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
## 2019-04-10 ENCOUNTER — TELEPHONE (OUTPATIENT)
Dept: EMERGENCY DEPT | Facility: HOSPITAL | Age: 19
End: 2019-04-10

## 2019-04-10 LAB
BACTERIA SPEC AEROBE CULT: ABNORMAL
GRAM STN SPEC: ABNORMAL
GRAM STN SPEC: ABNORMAL

## 2019-04-10 RX ORDER — DOXYCYCLINE 100 MG/1
100 CAPSULE ORAL 2 TIMES DAILY
Qty: 14 CAPSULE | Refills: 0 | Status: SHIPPED | OUTPATIENT
Start: 2019-04-10 | End: 2019-04-17

## 2019-04-29 ENCOUNTER — HOSPITAL ENCOUNTER (OUTPATIENT)
Facility: HOSPITAL | Age: 19
Setting detail: OBSERVATION
Discharge: HOME | End: 2019-05-01
Attending: EMERGENCY MEDICINE | Admitting: EMERGENCY MEDICINE

## 2019-04-29 ENCOUNTER — APPOINTMENT (OUTPATIENT)
Dept: GENERAL RADIOLOGY | Facility: HOSPITAL | Age: 19
End: 2019-04-29

## 2019-04-29 DIAGNOSIS — E08.10 DIABETIC KETOACIDOSIS WITHOUT COMA ASSOCIATED WITH DIABETES MELLITUS DUE TO UNDERLYING CONDITION (HCC): Primary | ICD-10-CM

## 2019-04-29 LAB
ACETONE BLD QL: ABNORMAL
ALBUMIN SERPL-MCNC: 4.7 G/DL (ref 3.5–5.2)
ALBUMIN/GLOB SERPL: 1.1 G/DL
ALP SERPL-CCNC: 94 U/L (ref 39–117)
ALT SERPL W P-5'-P-CCNC: 11 U/L (ref 1–33)
ANION GAP SERPL CALCULATED.3IONS-SCNC: 17 MMOL/L
ANION GAP SERPL CALCULATED.3IONS-SCNC: 26 MMOL/L
AST SERPL-CCNC: 11 U/L (ref 1–32)
BACTERIA UR QL AUTO: NORMAL /HPF
BASOPHILS # BLD AUTO: 0.12 10*3/MM3 (ref 0–0.2)
BASOPHILS NFR BLD AUTO: 0.8 % (ref 0–1.5)
BILIRUB SERPL-MCNC: 0.3 MG/DL (ref 0.2–1.2)
BILIRUB UR QL STRIP: NEGATIVE
BUN BLD-MCNC: 14 MG/DL (ref 6–20)
BUN BLD-MCNC: 18 MG/DL (ref 6–20)
BUN/CREAT SERPL: 17.8 (ref 7–25)
BUN/CREAT SERPL: 18.2 (ref 7–25)
CALCIUM SPEC-SCNC: 10.2 MG/DL (ref 8.6–10.5)
CALCIUM SPEC-SCNC: 8.6 MG/DL (ref 8.6–10.5)
CHLORIDE SERPL-SCNC: 104 MMOL/L (ref 98–107)
CHLORIDE SERPL-SCNC: 94 MMOL/L (ref 98–107)
CLARITY UR: CLEAR
CO2 SERPL-SCNC: 11 MMOL/L (ref 22–29)
CO2 SERPL-SCNC: 14 MMOL/L (ref 22–29)
COLOR UR: YELLOW
CREAT BLD-MCNC: 0.77 MG/DL (ref 0.57–1)
CREAT BLD-MCNC: 1.01 MG/DL (ref 0.57–1)
D-DIMER, QUANTITATIVE (MAD,POW, STR): <270 NG/ML (FEU) (ref 0–470)
DEPRECATED RDW RBC AUTO: 41.1 FL (ref 37–54)
EOSINOPHIL # BLD AUTO: 0.03 10*3/MM3 (ref 0–0.4)
EOSINOPHIL NFR BLD AUTO: 0.2 % (ref 0.3–6.2)
ERYTHROCYTE [DISTWIDTH] IN BLOOD BY AUTOMATED COUNT: 13 % (ref 12.3–15.4)
FLUAV AG NPH QL: NEGATIVE
FLUBV AG NPH QL IA: NEGATIVE
GFR SERPL CREATININE-BSD FRML MDRD: 70 ML/MIN/1.73
GFR SERPL CREATININE-BSD FRML MDRD: 96 ML/MIN/1.73
GLOBULIN UR ELPH-MCNC: 4.4 GM/DL
GLUCOSE BLD-MCNC: 196 MG/DL (ref 65–99)
GLUCOSE BLD-MCNC: 345 MG/DL (ref 65–99)
GLUCOSE BLDC GLUCOMTR-MCNC: 182 MG/DL (ref 70–130)
GLUCOSE BLDC GLUCOMTR-MCNC: 217 MG/DL (ref 70–130)
GLUCOSE BLDC GLUCOMTR-MCNC: 265 MG/DL (ref 70–130)
GLUCOSE BLDC GLUCOMTR-MCNC: 332 MG/DL (ref 70–130)
GLUCOSE BLDC GLUCOMTR-MCNC: 376 MG/DL (ref 70–130)
GLUCOSE UR STRIP-MCNC: ABNORMAL MG/DL
HCG SERPL QL: NEGATIVE
HCT VFR BLD AUTO: 45.6 % (ref 34–46.6)
HGB BLD-MCNC: 15.4 G/DL (ref 12–15.9)
HGB UR QL STRIP.AUTO: NEGATIVE
HOLD SPECIMEN: NORMAL
HOLD SPECIMEN: NORMAL
HYALINE CASTS UR QL AUTO: NORMAL /LPF
IMM GRANULOCYTES # BLD AUTO: 0.19 10*3/MM3 (ref 0–0.05)
IMM GRANULOCYTES NFR BLD AUTO: 1.3 % (ref 0–0.5)
KETONES UR QL STRIP: ABNORMAL
LEUKOCYTE ESTERASE UR QL STRIP.AUTO: NEGATIVE
LYMPHOCYTES # BLD AUTO: 1.67 10*3/MM3 (ref 0.7–3.1)
LYMPHOCYTES NFR BLD AUTO: 11.5 % (ref 19.6–45.3)
MAGNESIUM SERPL-MCNC: 1.4 MG/DL (ref 1.7–2.2)
MCH RBC QN AUTO: 29.3 PG (ref 26.6–33)
MCHC RBC AUTO-ENTMCNC: 33.8 G/DL (ref 31.5–35.7)
MCV RBC AUTO: 86.7 FL (ref 79–97)
MONOCYTES # BLD AUTO: 0.64 10*3/MM3 (ref 0.1–0.9)
MONOCYTES NFR BLD AUTO: 4.4 % (ref 5–12)
NEUTROPHILS # BLD AUTO: 11.83 10*3/MM3 (ref 1.7–7)
NEUTROPHILS NFR BLD AUTO: 81.8 % (ref 42.7–76)
NITRITE UR QL STRIP: NEGATIVE
NRBC BLD AUTO-RTO: 0 /100 WBC (ref 0–0.2)
PH UR STRIP.AUTO: 5.5 [PH] (ref 5–9)
PHOSPHATE SERPL-MCNC: 2.5 MG/DL (ref 2.5–4.5)
PLATELET # BLD AUTO: 411 10*3/MM3 (ref 140–450)
PMV BLD AUTO: 9.8 FL (ref 6–12)
POTASSIUM BLD-SCNC: 4.4 MMOL/L (ref 3.5–5.2)
POTASSIUM BLD-SCNC: 4.5 MMOL/L (ref 3.5–5.2)
PROT SERPL-MCNC: 9.1 G/DL (ref 6–8.5)
PROT UR QL STRIP: ABNORMAL
RBC # BLD AUTO: 5.26 10*6/MM3 (ref 3.77–5.28)
RBC # UR: NORMAL /HPF
REF LAB TEST METHOD: NORMAL
SODIUM BLD-SCNC: 131 MMOL/L (ref 136–145)
SODIUM BLD-SCNC: 135 MMOL/L (ref 136–145)
SP GR UR STRIP: 1.03 (ref 1–1.03)
SQUAMOUS #/AREA URNS HPF: NORMAL /HPF
TROPONIN T SERPL-MCNC: <0.01 NG/ML (ref 0–0.03)
UROBILINOGEN UR QL STRIP: ABNORMAL
WBC NRBC COR # BLD: 14.48 10*3/MM3 (ref 3.4–10.8)
WBC UR QL AUTO: NORMAL /HPF
WHOLE BLOOD HOLD SPECIMEN: NORMAL
WHOLE BLOOD HOLD SPECIMEN: NORMAL

## 2019-04-29 PROCEDURE — G0378 HOSPITAL OBSERVATION PER HR: HCPCS

## 2019-04-29 PROCEDURE — 99285 EMERGENCY DEPT VISIT HI MDM: CPT

## 2019-04-29 PROCEDURE — 25010000003 INSULIN REGULAR HUMAN PER 5 UNITS: Performed by: EMERGENCY MEDICINE

## 2019-04-29 PROCEDURE — 96375 TX/PRO/DX INJ NEW DRUG ADDON: CPT

## 2019-04-29 PROCEDURE — 25010000002 MORPHINE PER 10 MG: Performed by: EMERGENCY MEDICINE

## 2019-04-29 PROCEDURE — 93010 ELECTROCARDIOGRAM REPORT: CPT | Performed by: INTERNAL MEDICINE

## 2019-04-29 PROCEDURE — 84703 CHORIONIC GONADOTROPIN ASSAY: CPT | Performed by: EMERGENCY MEDICINE

## 2019-04-29 PROCEDURE — 84100 ASSAY OF PHOSPHORUS: CPT | Performed by: INTERNAL MEDICINE

## 2019-04-29 PROCEDURE — 36415 COLL VENOUS BLD VENIPUNCTURE: CPT | Performed by: INTERNAL MEDICINE

## 2019-04-29 PROCEDURE — 87804 INFLUENZA ASSAY W/OPTIC: CPT | Performed by: EMERGENCY MEDICINE

## 2019-04-29 PROCEDURE — 82009 KETONE BODYS QUAL: CPT | Performed by: EMERGENCY MEDICINE

## 2019-04-29 PROCEDURE — 96365 THER/PROPH/DIAG IV INF INIT: CPT

## 2019-04-29 PROCEDURE — 80048 BASIC METABOLIC PNL TOTAL CA: CPT | Performed by: INTERNAL MEDICINE

## 2019-04-29 PROCEDURE — 71045 X-RAY EXAM CHEST 1 VIEW: CPT

## 2019-04-29 PROCEDURE — 85025 COMPLETE CBC W/AUTO DIFF WBC: CPT | Performed by: EMERGENCY MEDICINE

## 2019-04-29 PROCEDURE — 84484 ASSAY OF TROPONIN QUANT: CPT | Performed by: EMERGENCY MEDICINE

## 2019-04-29 PROCEDURE — 25010000002 ONDANSETRON PER 1 MG: Performed by: EMERGENCY MEDICINE

## 2019-04-29 PROCEDURE — 96361 HYDRATE IV INFUSION ADD-ON: CPT

## 2019-04-29 PROCEDURE — 96367 TX/PROPH/DG ADDL SEQ IV INF: CPT

## 2019-04-29 PROCEDURE — 82962 GLUCOSE BLOOD TEST: CPT

## 2019-04-29 PROCEDURE — 83735 ASSAY OF MAGNESIUM: CPT | Performed by: INTERNAL MEDICINE

## 2019-04-29 PROCEDURE — 81001 URINALYSIS AUTO W/SCOPE: CPT | Performed by: EMERGENCY MEDICINE

## 2019-04-29 PROCEDURE — 25010000002 LORAZEPAM PER 2 MG: Performed by: INTERNAL MEDICINE

## 2019-04-29 PROCEDURE — 96366 THER/PROPH/DIAG IV INF ADDON: CPT

## 2019-04-29 PROCEDURE — 25010000003 POTASSIUM CHLORIDE PER 2 MEQ: Performed by: INTERNAL MEDICINE

## 2019-04-29 PROCEDURE — 80053 COMPREHEN METABOLIC PANEL: CPT | Performed by: EMERGENCY MEDICINE

## 2019-04-29 PROCEDURE — 93005 ELECTROCARDIOGRAM TRACING: CPT

## 2019-04-29 PROCEDURE — 93005 ELECTROCARDIOGRAM TRACING: CPT | Performed by: EMERGENCY MEDICINE

## 2019-04-29 PROCEDURE — 85379 FIBRIN DEGRADATION QUANT: CPT | Performed by: EMERGENCY MEDICINE

## 2019-04-29 RX ORDER — POTASSIUM CHLORIDE 7.45 MG/ML
10 INJECTION INTRAVENOUS AS NEEDED
Status: DISCONTINUED | OUTPATIENT
Start: 2019-04-29 | End: 2019-05-01 | Stop reason: HOSPADM

## 2019-04-29 RX ORDER — ONDANSETRON 2 MG/ML
4 INJECTION INTRAMUSCULAR; INTRAVENOUS ONCE
Status: COMPLETED | OUTPATIENT
Start: 2019-04-29 | End: 2019-04-29

## 2019-04-29 RX ORDER — LORAZEPAM 2 MG/ML
0.25 INJECTION INTRAMUSCULAR EVERY 6 HOURS PRN
Status: DISCONTINUED | OUTPATIENT
Start: 2019-04-29 | End: 2019-05-01 | Stop reason: HOSPADM

## 2019-04-29 RX ORDER — FAMOTIDINE 10 MG/ML
20 INJECTION, SOLUTION INTRAVENOUS EVERY 12 HOURS SCHEDULED
Status: DISCONTINUED | OUTPATIENT
Start: 2019-04-29 | End: 2019-05-01 | Stop reason: HOSPADM

## 2019-04-29 RX ORDER — MAGNESIUM SULFATE HEPTAHYDRATE 40 MG/ML
2 INJECTION, SOLUTION INTRAVENOUS AS NEEDED
Status: DISCONTINUED | OUTPATIENT
Start: 2019-04-29 | End: 2019-05-01 | Stop reason: HOSPADM

## 2019-04-29 RX ORDER — DEXTROSE MONOHYDRATE 25 G/50ML
12.5 INJECTION, SOLUTION INTRAVENOUS
Status: DISCONTINUED | OUTPATIENT
Start: 2019-04-29 | End: 2019-05-01 | Stop reason: HOSPADM

## 2019-04-29 RX ORDER — DEXTROSE AND SODIUM CHLORIDE 5; .45 G/100ML; G/100ML
150 INJECTION, SOLUTION INTRAVENOUS CONTINUOUS PRN
Status: DISCONTINUED | OUTPATIENT
Start: 2019-04-29 | End: 2019-05-01

## 2019-04-29 RX ORDER — POTASSIUM CHLORIDE 1.5 G/1.77G
40 POWDER, FOR SOLUTION ORAL AS NEEDED
Status: DISCONTINUED | OUTPATIENT
Start: 2019-04-29 | End: 2019-05-01 | Stop reason: HOSPADM

## 2019-04-29 RX ORDER — MAGNESIUM SULFATE HEPTAHYDRATE 40 MG/ML
4 INJECTION, SOLUTION INTRAVENOUS AS NEEDED
Status: DISCONTINUED | OUTPATIENT
Start: 2019-04-29 | End: 2019-05-01 | Stop reason: HOSPADM

## 2019-04-29 RX ORDER — POTASSIUM CHLORIDE 1.5 G/1.77G
10 POWDER, FOR SOLUTION ORAL AS NEEDED
Status: DISCONTINUED | OUTPATIENT
Start: 2019-04-29 | End: 2019-05-01 | Stop reason: HOSPADM

## 2019-04-29 RX ORDER — POTASSIUM CHLORIDE 750 MG/1
10 CAPSULE, EXTENDED RELEASE ORAL AS NEEDED
Status: DISCONTINUED | OUTPATIENT
Start: 2019-04-29 | End: 2019-05-01 | Stop reason: HOSPADM

## 2019-04-29 RX ORDER — POTASSIUM CHLORIDE 1.5 G/1.77G
20 POWDER, FOR SOLUTION ORAL AS NEEDED
Status: DISCONTINUED | OUTPATIENT
Start: 2019-04-29 | End: 2019-05-01 | Stop reason: HOSPADM

## 2019-04-29 RX ORDER — SODIUM CHLORIDE 450 MG/100ML
250 INJECTION, SOLUTION INTRAVENOUS CONTINUOUS
Status: DISCONTINUED | OUTPATIENT
Start: 2019-04-29 | End: 2019-05-01

## 2019-04-29 RX ORDER — DEXTROSE, SODIUM CHLORIDE, AND POTASSIUM CHLORIDE 5; .45; .15 G/100ML; G/100ML; G/100ML
150 INJECTION INTRAVENOUS CONTINUOUS PRN
Status: DISCONTINUED | OUTPATIENT
Start: 2019-04-29 | End: 2019-05-01

## 2019-04-29 RX ORDER — POTASSIUM CHLORIDE 750 MG/1
40 CAPSULE, EXTENDED RELEASE ORAL AS NEEDED
Status: DISCONTINUED | OUTPATIENT
Start: 2019-04-29 | End: 2019-05-01 | Stop reason: HOSPADM

## 2019-04-29 RX ORDER — ONDANSETRON 2 MG/ML
4 INJECTION INTRAMUSCULAR; INTRAVENOUS EVERY 6 HOURS PRN
Status: DISCONTINUED | OUTPATIENT
Start: 2019-04-29 | End: 2019-05-01 | Stop reason: HOSPADM

## 2019-04-29 RX ORDER — POTASSIUM CHLORIDE 750 MG/1
20 CAPSULE, EXTENDED RELEASE ORAL AS NEEDED
Status: DISCONTINUED | OUTPATIENT
Start: 2019-04-29 | End: 2019-05-01 | Stop reason: HOSPADM

## 2019-04-29 RX ORDER — SODIUM CHLORIDE AND POTASSIUM CHLORIDE 150; 450 MG/100ML; MG/100ML
250 INJECTION, SOLUTION INTRAVENOUS CONTINUOUS PRN
Status: DISCONTINUED | OUTPATIENT
Start: 2019-04-29 | End: 2019-05-01

## 2019-04-29 RX ADMIN — ONDANSETRON 4 MG: 2 INJECTION INTRAMUSCULAR; INTRAVENOUS at 19:34

## 2019-04-29 RX ADMIN — POTASSIUM CHLORIDE AND SODIUM CHLORIDE 250 ML/HR: 450; 150 INJECTION, SOLUTION INTRAVENOUS at 22:35

## 2019-04-29 RX ADMIN — LORAZEPAM 0.25 MG: 2 INJECTION, SOLUTION INTRAMUSCULAR; INTRAVENOUS at 21:27

## 2019-04-29 RX ADMIN — SODIUM CHLORIDE 4 UNITS/HR: 9 INJECTION, SOLUTION INTRAVENOUS at 21:00

## 2019-04-29 RX ADMIN — SODIUM CHLORIDE 1000 ML: 900 INJECTION, SOLUTION INTRAVENOUS at 21:45

## 2019-04-29 RX ADMIN — POTASSIUM CHLORIDE, DEXTROSE MONOHYDRATE AND SODIUM CHLORIDE 150 ML/HR: 150; 5; 450 INJECTION, SOLUTION INTRAVENOUS at 23:33

## 2019-04-29 RX ADMIN — SODIUM CHLORIDE 1000 ML: 900 INJECTION, SOLUTION INTRAVENOUS at 19:33

## 2019-04-29 RX ADMIN — MORPHINE SULFATE 4 MG: 4 INJECTION INTRAVENOUS at 19:34

## 2019-04-29 RX ADMIN — SODIUM CHLORIDE 2000 ML: 900 INJECTION, SOLUTION INTRAVENOUS at 20:14

## 2019-04-29 RX ADMIN — FAMOTIDINE 20 MG: 10 INJECTION INTRAVENOUS at 21:24

## 2019-04-30 LAB
ANION GAP SERPL CALCULATED.3IONS-SCNC: 10 MMOL/L
ANION GAP SERPL CALCULATED.3IONS-SCNC: 12 MMOL/L
ANION GAP SERPL CALCULATED.3IONS-SCNC: 13 MMOL/L
ANION GAP SERPL CALCULATED.3IONS-SCNC: 15 MMOL/L
ANION GAP SERPL CALCULATED.3IONS-SCNC: 18 MMOL/L
BUN BLD-MCNC: 10 MG/DL (ref 6–20)
BUN BLD-MCNC: 11 MG/DL (ref 6–20)
BUN BLD-MCNC: 14 MG/DL (ref 6–20)
BUN BLD-MCNC: 14 MG/DL (ref 6–20)
BUN BLD-MCNC: 9 MG/DL (ref 6–20)
BUN/CREAT SERPL: 13 (ref 7–25)
BUN/CREAT SERPL: 14.3 (ref 7–25)
BUN/CREAT SERPL: 15.9 (ref 7–25)
BUN/CREAT SERPL: 16.2 (ref 7–25)
BUN/CREAT SERPL: 20 (ref 7–25)
CALCIUM SPEC-SCNC: 8.1 MG/DL (ref 8.6–10.5)
CALCIUM SPEC-SCNC: 8.3 MG/DL (ref 8.6–10.5)
CALCIUM SPEC-SCNC: 8.3 MG/DL (ref 8.6–10.5)
CALCIUM SPEC-SCNC: 8.4 MG/DL (ref 8.6–10.5)
CALCIUM SPEC-SCNC: 8.5 MG/DL (ref 8.6–10.5)
CHLORIDE SERPL-SCNC: 101 MMOL/L (ref 98–107)
CHLORIDE SERPL-SCNC: 102 MMOL/L (ref 98–107)
CHLORIDE SERPL-SCNC: 102 MMOL/L (ref 98–107)
CHLORIDE SERPL-SCNC: 104 MMOL/L (ref 98–107)
CHLORIDE SERPL-SCNC: 99 MMOL/L (ref 98–107)
CO2 SERPL-SCNC: 12 MMOL/L (ref 22–29)
CO2 SERPL-SCNC: 16 MMOL/L (ref 22–29)
CO2 SERPL-SCNC: 18 MMOL/L (ref 22–29)
CO2 SERPL-SCNC: 18 MMOL/L (ref 22–29)
CO2 SERPL-SCNC: 19 MMOL/L (ref 22–29)
CREAT BLD-MCNC: 0.63 MG/DL (ref 0.57–1)
CREAT BLD-MCNC: 0.68 MG/DL (ref 0.57–1)
CREAT BLD-MCNC: 0.7 MG/DL (ref 0.57–1)
CREAT BLD-MCNC: 0.77 MG/DL (ref 0.57–1)
CREAT BLD-MCNC: 0.88 MG/DL (ref 0.57–1)
GFR SERPL CREATININE-BSD FRML MDRD: 107 ML/MIN/1.73
GFR SERPL CREATININE-BSD FRML MDRD: 110 ML/MIN/1.73
GFR SERPL CREATININE-BSD FRML MDRD: 120 ML/MIN/1.73
GFR SERPL CREATININE-BSD FRML MDRD: 82 ML/MIN/1.73
GFR SERPL CREATININE-BSD FRML MDRD: 96 ML/MIN/1.73
GLUCOSE BLD-MCNC: 100 MG/DL (ref 65–99)
GLUCOSE BLD-MCNC: 129 MG/DL (ref 65–99)
GLUCOSE BLD-MCNC: 156 MG/DL (ref 65–99)
GLUCOSE BLD-MCNC: 300 MG/DL (ref 65–99)
GLUCOSE BLD-MCNC: 417 MG/DL (ref 65–99)
GLUCOSE BLDC GLUCOMTR-MCNC: 135 MG/DL (ref 70–130)
GLUCOSE BLDC GLUCOMTR-MCNC: 136 MG/DL (ref 70–130)
GLUCOSE BLDC GLUCOMTR-MCNC: 144 MG/DL (ref 70–130)
GLUCOSE BLDC GLUCOMTR-MCNC: 145 MG/DL (ref 70–130)
GLUCOSE BLDC GLUCOMTR-MCNC: 177 MG/DL (ref 70–130)
GLUCOSE BLDC GLUCOMTR-MCNC: 208 MG/DL (ref 70–130)
GLUCOSE BLDC GLUCOMTR-MCNC: 289 MG/DL (ref 70–130)
GLUCOSE BLDC GLUCOMTR-MCNC: 292 MG/DL (ref 70–130)
GLUCOSE BLDC GLUCOMTR-MCNC: 309 MG/DL (ref 70–130)
GLUCOSE BLDC GLUCOMTR-MCNC: 312 MG/DL (ref 70–130)
GLUCOSE BLDC GLUCOMTR-MCNC: 346 MG/DL (ref 70–130)
GLUCOSE BLDC GLUCOMTR-MCNC: 370 MG/DL (ref 70–130)
GLUCOSE BLDC GLUCOMTR-MCNC: 377 MG/DL (ref 70–130)
GLUCOSE BLDC GLUCOMTR-MCNC: 387 MG/DL (ref 70–130)
HOLD SPECIMEN: NORMAL
HOLD SPECIMEN: NORMAL
MAGNESIUM SERPL-MCNC: 1.8 MG/DL (ref 1.7–2.2)
MAGNESIUM SERPL-MCNC: 2.1 MG/DL (ref 1.7–2.2)
MAGNESIUM SERPL-MCNC: 2.4 MG/DL (ref 1.7–2.2)
MAGNESIUM SERPL-MCNC: 3.7 MG/DL (ref 1.7–2.2)
PHOSPHATE SERPL-MCNC: 1.7 MG/DL (ref 2.5–4.5)
PHOSPHATE SERPL-MCNC: 2.2 MG/DL (ref 2.5–4.5)
PHOSPHATE SERPL-MCNC: 2.2 MG/DL (ref 2.5–4.5)
PHOSPHATE SERPL-MCNC: 2.7 MG/DL (ref 2.5–4.5)
POTASSIUM BLD-SCNC: 3.4 MMOL/L (ref 3.5–5.2)
POTASSIUM BLD-SCNC: 3.7 MMOL/L (ref 3.5–5.2)
POTASSIUM BLD-SCNC: 3.8 MMOL/L (ref 3.5–5.2)
POTASSIUM BLD-SCNC: 4.6 MMOL/L (ref 3.5–5.2)
POTASSIUM BLD-SCNC: 4.9 MMOL/L (ref 3.5–5.2)
SODIUM BLD-SCNC: 129 MMOL/L (ref 136–145)
SODIUM BLD-SCNC: 130 MMOL/L (ref 136–145)
SODIUM BLD-SCNC: 130 MMOL/L (ref 136–145)
SODIUM BLD-SCNC: 134 MMOL/L (ref 136–145)
SODIUM BLD-SCNC: 136 MMOL/L (ref 136–145)

## 2019-04-30 PROCEDURE — 25010000002 LORAZEPAM PER 2 MG: Performed by: INTERNAL MEDICINE

## 2019-04-30 PROCEDURE — 25010000003 POTASSIUM CHLORIDE PER 2 MEQ: Performed by: INTERNAL MEDICINE

## 2019-04-30 PROCEDURE — 82962 GLUCOSE BLOOD TEST: CPT

## 2019-04-30 PROCEDURE — 83735 ASSAY OF MAGNESIUM: CPT | Performed by: INTERNAL MEDICINE

## 2019-04-30 PROCEDURE — 84100 ASSAY OF PHOSPHORUS: CPT | Performed by: INTERNAL MEDICINE

## 2019-04-30 PROCEDURE — 80048 BASIC METABOLIC PNL TOTAL CA: CPT | Performed by: INTERNAL MEDICINE

## 2019-04-30 PROCEDURE — 25010000002 ONDANSETRON PER 1 MG: Performed by: INTERNAL MEDICINE

## 2019-04-30 PROCEDURE — G0378 HOSPITAL OBSERVATION PER HR: HCPCS

## 2019-04-30 PROCEDURE — 80048 BASIC METABOLIC PNL TOTAL CA: CPT | Performed by: HOSPITALIST

## 2019-04-30 PROCEDURE — 96366 THER/PROPH/DIAG IV INF ADDON: CPT

## 2019-04-30 PROCEDURE — 25010000003 MAGNESIUM SULFATE 4 GM/100ML SOLUTION: Performed by: INTERNAL MEDICINE

## 2019-04-30 PROCEDURE — 96376 TX/PRO/DX INJ SAME DRUG ADON: CPT

## 2019-04-30 PROCEDURE — 25010000002 MAGNESIUM SULFATE 2 GM/50ML SOLUTION: Performed by: INTERNAL MEDICINE

## 2019-04-30 PROCEDURE — 25010000003 INSULIN REGULAR HUMAN PER 5 UNITS: Performed by: HOSPITALIST

## 2019-04-30 PROCEDURE — 96368 THER/DIAG CONCURRENT INF: CPT

## 2019-04-30 RX ORDER — SODIUM CHLORIDE 9 MG/ML
100 INJECTION, SOLUTION INTRAVENOUS CONTINUOUS
Status: DISCONTINUED | OUTPATIENT
Start: 2019-04-30 | End: 2019-05-01 | Stop reason: HOSPADM

## 2019-04-30 RX ORDER — METOCLOPRAMIDE 10 MG/1
10 TABLET ORAL
Status: DISCONTINUED | OUTPATIENT
Start: 2019-04-30 | End: 2019-05-01 | Stop reason: HOSPADM

## 2019-04-30 RX ORDER — LAMOTRIGINE 100 MG/1
100 TABLET ORAL DAILY
Status: DISCONTINUED | OUTPATIENT
Start: 2019-04-30 | End: 2019-05-01 | Stop reason: HOSPADM

## 2019-04-30 RX ORDER — SERTRALINE HYDROCHLORIDE 25 MG/1
25 TABLET, FILM COATED ORAL DAILY
Status: DISCONTINUED | OUTPATIENT
Start: 2019-04-30 | End: 2019-05-01 | Stop reason: HOSPADM

## 2019-04-30 RX ORDER — CLONAZEPAM 0.5 MG/1
1 TABLET ORAL 2 TIMES DAILY PRN
Status: DISCONTINUED | OUTPATIENT
Start: 2019-04-30 | End: 2019-05-01 | Stop reason: HOSPADM

## 2019-04-30 RX ORDER — ARIPIPRAZOLE 5 MG/1
5 TABLET ORAL DAILY
Status: DISCONTINUED | OUTPATIENT
Start: 2019-04-30 | End: 2019-05-01 | Stop reason: HOSPADM

## 2019-04-30 RX ORDER — DEXTROSE MONOHYDRATE 25 G/50ML
25-50 INJECTION, SOLUTION INTRAVENOUS
Status: DISCONTINUED | OUTPATIENT
Start: 2019-04-30 | End: 2019-05-01 | Stop reason: HOSPADM

## 2019-04-30 RX ADMIN — MAGNESIUM SULFATE HEPTAHYDRATE 4 G: 40 INJECTION, SOLUTION INTRAVENOUS at 21:52

## 2019-04-30 RX ADMIN — POTASSIUM PHOSPHATE, MONOBASIC AND POTASSIUM PHOSPHATE, DIBASIC 15 MMOL: 224; 236 INJECTION, SOLUTION, CONCENTRATE INTRAVENOUS at 10:37

## 2019-04-30 RX ADMIN — SODIUM CHLORIDE 100 ML/HR: 900 INJECTION, SOLUTION INTRAVENOUS at 14:58

## 2019-04-30 RX ADMIN — POTASSIUM PHOSPHATE, MONOBASIC AND POTASSIUM PHOSPHATE, DIBASIC 30 MMOL: 224; 236 INJECTION, SOLUTION, CONCENTRATE INTRAVENOUS at 22:24

## 2019-04-30 RX ADMIN — POTASSIUM CHLORIDE 20 MEQ: 750 CAPSULE, EXTENDED RELEASE ORAL at 22:16

## 2019-04-30 RX ADMIN — SODIUM CHLORIDE 16 UNITS/HR: 9 INJECTION, SOLUTION INTRAVENOUS at 19:17

## 2019-04-30 RX ADMIN — MAGNESIUM SULFATE HEPTAHYDRATE 2 G: 40 INJECTION, SOLUTION INTRAVENOUS at 01:43

## 2019-04-30 RX ADMIN — SODIUM CHLORIDE 100 ML/HR: 900 INJECTION, SOLUTION INTRAVENOUS at 06:15

## 2019-04-30 RX ADMIN — FAMOTIDINE 20 MG: 10 INJECTION INTRAVENOUS at 21:05

## 2019-04-30 RX ADMIN — LAMOTRIGINE 100 MG: 100 TABLET ORAL at 11:20

## 2019-04-30 RX ADMIN — POTASSIUM CHLORIDE AND SODIUM CHLORIDE 250 ML/HR: 450; 150 INJECTION, SOLUTION INTRAVENOUS at 20:17

## 2019-04-30 RX ADMIN — FAMOTIDINE 20 MG: 10 INJECTION INTRAVENOUS at 10:00

## 2019-04-30 RX ADMIN — POTASSIUM CHLORIDE AND SODIUM CHLORIDE 250 ML/HR: 450; 150 INJECTION, SOLUTION INTRAVENOUS at 01:43

## 2019-04-30 RX ADMIN — POTASSIUM CHLORIDE, DEXTROSE MONOHYDRATE AND SODIUM CHLORIDE 150 ML/HR: 150; 5; 450 INJECTION, SOLUTION INTRAVENOUS at 22:21

## 2019-04-30 RX ADMIN — LORAZEPAM 0.25 MG: 2 INJECTION, SOLUTION INTRAMUSCULAR; INTRAVENOUS at 12:56

## 2019-04-30 RX ADMIN — SODIUM CHLORIDE 100 ML/HR: 900 INJECTION, SOLUTION INTRAVENOUS at 13:58

## 2019-04-30 RX ADMIN — MAGNESIUM SULFATE HEPTAHYDRATE 2 G: 40 INJECTION, SOLUTION INTRAVENOUS at 02:41

## 2019-04-30 RX ADMIN — Medication: at 12:15

## 2019-04-30 RX ADMIN — ONDANSETRON 4 MG: 2 INJECTION INTRAMUSCULAR; INTRAVENOUS at 08:12

## 2019-04-30 RX ADMIN — METOCLOPRAMIDE HYDROCHLORIDE 10 MG: 10 TABLET ORAL at 17:25

## 2019-04-30 RX ADMIN — LORAZEPAM 0.25 MG: 2 INJECTION, SOLUTION INTRAMUSCULAR; INTRAVENOUS at 18:34

## 2019-04-30 RX ADMIN — ARIPIPRAZOLE 5 MG: 5 TABLET ORAL at 11:20

## 2019-04-30 RX ADMIN — MAGNESIUM SULFATE HEPTAHYDRATE 2 G: 40 INJECTION, SOLUTION INTRAVENOUS at 00:41

## 2019-04-30 RX ADMIN — SERTRALINE HYDROCHLORIDE 25 MG: 25 TABLET ORAL at 11:20

## 2019-04-30 RX ADMIN — LORAZEPAM 0.25 MG: 2 INJECTION, SOLUTION INTRAMUSCULAR; INTRAVENOUS at 03:54

## 2019-04-30 RX ADMIN — METOCLOPRAMIDE HYDROCHLORIDE 10 MG: 10 TABLET ORAL at 10:37

## 2019-05-01 VITALS
RESPIRATION RATE: 16 BRPM | SYSTOLIC BLOOD PRESSURE: 120 MMHG | HEART RATE: 86 BPM | TEMPERATURE: 98.1 F | HEIGHT: 67 IN | OXYGEN SATURATION: 100 % | BODY MASS INDEX: 17.11 KG/M2 | WEIGHT: 109 LBS | DIASTOLIC BLOOD PRESSURE: 77 MMHG

## 2019-05-01 LAB
ANION GAP SERPL CALCULATED.3IONS-SCNC: 12 MMOL/L
BUN BLD-MCNC: 12 MG/DL (ref 6–20)
BUN/CREAT SERPL: 19 (ref 7–25)
CALCIUM SPEC-SCNC: 7.8 MG/DL (ref 8.6–10.5)
CHLORIDE SERPL-SCNC: 102 MMOL/L (ref 98–107)
CO2 SERPL-SCNC: 18 MMOL/L (ref 22–29)
CREAT BLD-MCNC: 0.63 MG/DL (ref 0.57–1)
GFR SERPL CREATININE-BSD FRML MDRD: 120 ML/MIN/1.73
GLUCOSE BLD-MCNC: 354 MG/DL (ref 65–99)
GLUCOSE BLDC GLUCOMTR-MCNC: 139 MG/DL (ref 70–130)
GLUCOSE BLDC GLUCOMTR-MCNC: 181 MG/DL (ref 70–130)
GLUCOSE BLDC GLUCOMTR-MCNC: 207 MG/DL (ref 70–130)
GLUCOSE BLDC GLUCOMTR-MCNC: 243 MG/DL (ref 70–130)
GLUCOSE BLDC GLUCOMTR-MCNC: 269 MG/DL (ref 70–130)
GLUCOSE BLDC GLUCOMTR-MCNC: 291 MG/DL (ref 70–130)
GLUCOSE BLDC GLUCOMTR-MCNC: 296 MG/DL (ref 70–130)
GLUCOSE BLDC GLUCOMTR-MCNC: 350 MG/DL (ref 70–130)
GLUCOSE BLDC GLUCOMTR-MCNC: 360 MG/DL (ref 70–130)
GLUCOSE BLDC GLUCOMTR-MCNC: 376 MG/DL (ref 70–130)
HOLD SPECIMEN: NORMAL
MAGNESIUM SERPL-MCNC: 2.3 MG/DL (ref 1.7–2.2)
PHOSPHATE SERPL-MCNC: 2.5 MG/DL (ref 2.5–4.5)
POTASSIUM BLD-SCNC: 4.5 MMOL/L (ref 3.5–5.2)
SODIUM BLD-SCNC: 132 MMOL/L (ref 136–145)

## 2019-05-01 PROCEDURE — 84100 ASSAY OF PHOSPHORUS: CPT | Performed by: INTERNAL MEDICINE

## 2019-05-01 PROCEDURE — 82962 GLUCOSE BLOOD TEST: CPT

## 2019-05-01 PROCEDURE — G0378 HOSPITAL OBSERVATION PER HR: HCPCS

## 2019-05-01 PROCEDURE — 25010000002 LORAZEPAM PER 2 MG: Performed by: INTERNAL MEDICINE

## 2019-05-01 PROCEDURE — 83735 ASSAY OF MAGNESIUM: CPT | Performed by: INTERNAL MEDICINE

## 2019-05-01 PROCEDURE — 96376 TX/PRO/DX INJ SAME DRUG ADON: CPT

## 2019-05-01 PROCEDURE — 80048 BASIC METABOLIC PNL TOTAL CA: CPT | Performed by: HOSPITALIST

## 2019-05-01 PROCEDURE — 96366 THER/PROPH/DIAG IV INF ADDON: CPT

## 2019-05-01 RX ORDER — NICOTINE POLACRILEX 4 MG
15 LOZENGE BUCCAL
Status: DISCONTINUED | OUTPATIENT
Start: 2019-05-01 | End: 2019-05-01 | Stop reason: HOSPADM

## 2019-05-01 RX ORDER — DEXTROSE MONOHYDRATE 25 G/50ML
25 INJECTION, SOLUTION INTRAVENOUS
Status: DISCONTINUED | OUTPATIENT
Start: 2019-05-01 | End: 2019-05-01 | Stop reason: HOSPADM

## 2019-05-01 RX ADMIN — METOCLOPRAMIDE HYDROCHLORIDE 10 MG: 10 TABLET ORAL at 11:46

## 2019-05-01 RX ADMIN — SODIUM CHLORIDE 100 ML/HR: 900 INJECTION, SOLUTION INTRAVENOUS at 11:20

## 2019-05-01 RX ADMIN — LORAZEPAM 0.25 MG: 2 INJECTION, SOLUTION INTRAMUSCULAR; INTRAVENOUS at 02:04

## 2019-05-01 RX ADMIN — METOCLOPRAMIDE HYDROCHLORIDE 10 MG: 10 TABLET ORAL at 08:34

## 2019-05-01 RX ADMIN — SERTRALINE HYDROCHLORIDE 25 MG: 25 TABLET ORAL at 08:34

## 2019-05-01 RX ADMIN — ARIPIPRAZOLE 5 MG: 5 TABLET ORAL at 08:34

## 2019-05-01 RX ADMIN — FAMOTIDINE 20 MG: 10 INJECTION INTRAVENOUS at 08:33

## 2019-05-01 RX ADMIN — LORAZEPAM 0.25 MG: 2 INJECTION, SOLUTION INTRAMUSCULAR; INTRAVENOUS at 08:29

## 2019-05-01 RX ADMIN — LAMOTRIGINE 100 MG: 100 TABLET ORAL at 08:34

## 2019-05-01 RX ADMIN — POTASSIUM CHLORIDE, DEXTROSE MONOHYDRATE AND SODIUM CHLORIDE 150 ML/HR: 150; 5; 450 INJECTION, SOLUTION INTRAVENOUS at 06:01

## 2019-05-01 NOTE — NURSING NOTE
Patient and boyfriend getting very loud arguing in room.  As soon as staff knocked on pt's door, the boyfriend came out of the room and left the unit.  Patient upset, crying.

## 2019-05-01 NOTE — NURSING NOTE
After speaking with patient, patient would like to know what the plan is now that her gap is closed and she has all the supplies to start her home insulin pump back. Blood sugar was checked and is 207. Dr. Poole paged. After speaking with Dr. Poole, informing him she now has supplies for her pump, and going over labs orders to turn the insulin drip off and starting her home pump was received.

## 2019-05-01 NOTE — PLAN OF CARE
Problem: Patient Care Overview  Goal: Plan of Care Review   05/01/19 0322   Coping/Psychosocial   Plan of Care Reviewed With patient   Plan of Care Review   Progress improving   OTHER   Outcome Summary remains on insulin drip, titrating to fsbs per protocol. vss. no c/o nausea so far this shift. next bmp 6am

## 2019-05-01 NOTE — NURSING NOTE
"Patient's last blood sugar is 504 after discharge orders have been put in. Dr. Michael Poole was paged and he stated that his medical advice is to continue to keep the patient inpatient. After speaking with the patient and discussing Dr. Poole advice, she quoted that she \"will not be staying another night here\". The patient did a 10 unit bolus at this time.  "

## 2019-05-01 NOTE — CONSULTS
Adult Nutrition  Assessment    Patient Name:  Fernanda Patterson  YOB: 2000  MRN: [id number]  Admit Date:  4/29/2019    Assessment Date:  5/1/2019    Comments:  Pt didn't want to talk.  RN indicated pt has had nausea/vomiting.  Pepcid, Reglan, PRN Zofran prescribed.  Intake 100% - 2x, 0% -1x plus 100% 2x for snacks.  Blood glucose - usually elevated.  Sliding scale novolog prescribed with pt also using an insulin pump to manage her blood sugars.  Pt has been discharged.    Reason for Assessment     Row Name 05/01/19 1423          Reason for Assessment    Reason For Assessment  per organizational policy underweight     Identified At Risk by Screening Criteria  BMI             Labs/Tests/Procedures/Meds     Row Name 05/01/19 1424          Labs/Procedures/Meds    Lab Results Reviewed  reviewed, pertinent        Medications    Pertinent Medications Reviewed  reviewed, pertinent         Physical Findings     Row Name 05/01/19 1425 05/01/19 1424       Physical Findings    Overall Physical Appearance  underweight;generalized wasting  --    Skin  pallor  --          Nutrition Prescription Ordered     Row Name 05/01/19 1425          Nutrition Prescription PO    Current PO Diet  Regular     Common Modifiers  Consistent Carbohydrate         Evaluation of Received Nutrient/Fluid Intake     Row Name 05/01/19 1425          PO Evaluation    Number of Meals  3 plus 2 snacks     % PO Intake  100% - 2x, 0% - 1x plus 100% for 2 snacks               Electronically signed by:  Johanna Pisano RD  05/01/19 2:27 PM

## 2019-05-01 NOTE — NURSING NOTE
Informed dr schafer of pt's request with risks explained to her, she verbalized understanding.  No new orders received

## 2019-05-01 NOTE — NURSING NOTE
Pt states she wants ama form.  Informed pt of risks, pt states she is aware of risks and still wants to go ama.

## 2019-05-01 NOTE — NURSING NOTE
Pt wants ama form, again instructed on risks.  Verbalized understanding.  States she wants to leave and go to another hospital, one that will listen to her.  Pt is demanding to take a shower, discussed reasons she would be unable to take a shower at this time.  Notified house supervisor, will notify md

## 2019-05-01 NOTE — NURSING NOTE
Pt now states she wants to stay when given the ama form and iv's were disconnected.  States she just wants to take a shower.  Again, instructed on treatment, condition, risks.  Pt grunts, rolls over and pulls blanket over her head.  Agrees to allow treatment at this time.  Dr schafer notified

## 2019-05-01 NOTE — NURSING NOTE
Dr isabel phoned.  Informed of pt request to go ama, encouraged her to stay until lab results checked.  No orders at this time

## 2019-05-01 NOTE — DISCHARGE SUMMARY
[facility] Medicine Services  DISCHARGE SUMMARY       Date of Admission: 4/29/2019  Date of Discharge:  5/1/2019  Primary Care Physician: Tavon Avila MD    Presenting Problem/History of Present Illness:  Diabetic ketoacidosis without coma associated with diabetes mellitus due to underlying condition (CMS/[facility]) [E08.10]  Diabetic ketoacidosis without coma associated with diabetes mellitus due to underlying condition (CMS/[facility]) [E08.10]       Final Discharge Diagnoses:  Active Hospital Problems    Diagnosis   • Diabetic ketoacidosis without coma associated with diabetes mellitus due to underlying condition (CMS/[facility])       Consults:   Consults     No orders found from 3/31/2019 to 4/30/2019.          Pertinent Test Results:   Lab Results (most recent)     Procedure Component Value Units Date/Time    Extra Tubes [[id number]] Collected:  05/01/19 1258    Specimen:  Blood, Venous Line Updated:  05/01/19 1401    Narrative:       The following orders were created for panel order Extra Tubes.  Procedure                               Abnormality         Status                     ---------                               -----------         ------                     Gold Top - [facility][[id number]]                                   Final result                 Please view results for these tests on the individual orders.    Gold Top - SST [[id number]] Collected:  05/01/19 1258    Specimen:  Blood Updated:  05/01/19 1401     Extra Tube Hold for add-ons.     Comment: Auto resulted.       Phosphorus [[id number]]  (Normal) Collected:  05/01/19 1229    Specimen:  Blood Updated:  05/01/19 1321     Phosphorus 2.5 mg/dL     POC Glucose Once [[id number]]  (Abnormal) Collected:  05/01/19 0921    Specimen:  Blood Updated:  05/01/19 1041     Glucose 291 mg/dL      Comment: : [id number] MARCOS ARIZAMeter ID: [id number]       POC Glucose Once [[id number]]  (Abnormal) Collected:   05/01/19 0813    Specimen:  Blood Updated:  05/01/19 0900     Glucose 207 mg/dL      Comment: : [id number] MARCOS Barry ID: [id number]       Magnesium [[id number]]  (Abnormal) Collected:  05/01/19 0444    Specimen:  Blood Updated:  05/01/19 0622     Magnesium 2.3 mg/dL     Basic Metabolic Panel [[id number]]  (Abnormal) Collected:  05/01/19 0444    Specimen:  Blood Updated:  05/01/19 0621     Glucose 354 mg/dL      BUN 12 mg/dL      Creatinine 0.63 mg/dL      Sodium 132 mmol/L      Potassium 4.5 mmol/L      Chloride 102 mmol/L      CO2 18.0 mmol/L      Calcium 7.8 mg/dL      eGFR Non African Amer 120 mL/min/1.73      BUN/Creatinine Ratio 19.0     Anion Gap 12.0 mmol/L     Narrative:       GFR Normal >60  Chronic Kidney Disease <60  Kidney Failure <15    Basic Metabolic Panel [[id number]]  (Abnormal) Collected:  04/30/19 2306    Specimen:  Blood Updated:  04/30/19 2355     Glucose 156 mg/dL      BUN 14 mg/dL      Creatinine 0.70 mg/dL      Sodium 136 mmol/L      Potassium 4.6 mmol/L      Chloride 104 mmol/L      CO2 19.0 mmol/L      Calcium 8.3 mg/dL      eGFR Non African Amer 107 mL/min/1.73      BUN/Creatinine Ratio 20.0     Anion Gap 13.0 mmol/L     Narrative:       GFR Normal >60  Chronic Kidney Disease <60  Kidney Failure <15    Phosphorus [[id number]]  (Abnormal) Collected:  04/30/19 1907    Specimen:  Blood Updated:  04/30/19 1955     Phosphorus 1.7 mg/dL     Magnesium [[id number]]  (Normal) Collected:  04/30/19 1907    Specimen:  Blood Updated:  04/30/19 1946     Magnesium 1.8 mg/dL     Extra Tubes [[id number]] Collected:  04/30/19 0758    Specimen:  Blood, Venous Line Updated:  04/30/19 0901    Narrative:       The following orders were created for panel order Extra Tubes.  Procedure                               Abnormality         Status                     ---------                               -----------         ------                     Gold Top - [facility][[id number]]                                    Final result               Green Top (Gel)[[id number]]                                  Final result                 Please view results for these tests on the individual orders.    Gold Top - [facility] [[id number]] Collected:  04/30/19 0758    Specimen:  Blood Updated:  04/30/19 0901     Extra Tube Hold for add-ons.     Comment: Auto resulted.       Green Top (Gel) [[id number]] Collected:  04/30/19 0758    Specimen:  Blood Updated:  04/30/19 0901     Extra Tube Hold for add-ons.     Comment: Auto resulted.       [place] Draw [[id number]] Collected:  04/29/19 1933    Specimen:  Blood Updated:  04/29/19 2045    Narrative:       The following orders were created for panel order [place] Draw.  Procedure                               Abnormality         Status                     ---------                               -----------         ------                     Light Blue Top[[id number]]                                   Final result               Green Top (Gel)[[id number]]                                  Final result               Lavender Top[[id number]]                                     Final result               Gold Top - [facility][[id number]]                                   Final result                 Please view results for these tests on the individual orders.    Light Blue Top [[id number]] Collected:  04/29/19 1933    Specimen:  Blood Updated:  04/29/19 2045     Extra Tube hold for add-on     Comment: Auto resulted       Green Top (Gel) [[id number]] Collected:  04/29/19 1933    Specimen:  Blood Updated:  04/29/19 2045     Extra Tube Hold for add-ons.     Comment: Auto resulted.       Lavender Top [[id number]] Collected:  04/29/19 1933    Specimen:  Blood Updated:  04/29/19 2045     Extra Tube hold for add-on     Comment: Auto resulted       Comprehensive Metabolic Panel [[id number]]  (Abnormal) Collected:  04/29/19 1933    Specimen:  Blood Updated:  04/29/19 2012     Glucose 345 mg/dL      BUN 18 mg/dL      Creatinine  1.01 mg/dL      Sodium 131 mmol/L      Potassium 4.5 mmol/L      Chloride 94 mmol/L      CO2 11.0 mmol/L      Calcium 10.2 mg/dL      Total Protein 9.1 g/dL      Albumin 4.70 g/dL      ALT (SGPT) 11 U/L      AST (SGOT) 11 U/L      Alkaline Phosphatase 94 U/L      Total Bilirubin 0.3 mg/dL      eGFR Non African Amer 70 mL/min/1.73      Globulin 4.4 gm/dL      A/G Ratio 1.1 g/dL      BUN/Creatinine Ratio 17.8     Anion Gap 26.0 mmol/L     Narrative:       GFR Normal >60  Chronic Kidney Disease <60  Kidney Failure <15    Troponin [[id number]]  (Normal) Collected:  04/29/19 1933    Specimen:  Blood Updated:  04/29/19 2011     Troponin T <0.010 ng/mL     Narrative:       Troponin T Reference Range:  <= 0.03 ng/mL-   Negative for AMI  >0.03 ng/mL-     Abnormal for myocardial necrosis.  Clinicians would have to utilize clinical acumen, EKG, Troponin and serial changes to determine if it is an Acute Myocardial Infarction or myocardial injury due to an underlying chronic condition.     D-dimer, Quantitative [[id number]]  (Normal) Collected:  04/29/19 1933    Specimen:  Blood Updated:  04/29/19 2009     D-Dimer, Quantitative <270 ng/mL (FEU)     Narrative:       Dimer values <500 ng/ml FEU are FDA approved as aid in diagnosis of deep venous thrombosis and pulmonary embolism.  This test should not be used in an exclusion strategy with pretest probability alone.    A recent guideline regarding diagnosis for pulmonary thromboembolism recommends an adjusted exclusion criterion of age x 10 ng/ml FEU for patients >50 years of age ([employer] Intern Med 2015; 163: 701-711).    hCG, Serum, Qualitative [[id number]]  (Normal) Collected:  04/29/19 1933    Specimen:  Blood Updated:  04/29/19 1956     HCG Qualitative Negative    Ketone Bodies, Serum (Not performed at [place]) [[id number]] Collected:  04/29/19 1933    Specimen:  Blood Updated:  04/29/19 1955    Narrative:       The following orders were created for panel order Ketone Bodies, Serum  (Not performed at [place]).  Procedure                               Abnormality         Status                     ---------                               -----------         ------                     Acetone[[id number]]                      Abnormal            Final result                 Please view results for these tests on the individual orders.    Acetone [[id number]]  (Abnormal) Collected:  04/29/19 1933    Specimen:  Blood Updated:  04/29/19 1955     Acetone Moderate    Urinalysis With Microscopic If Indicated (No Culture) - Urine, Clean Catch [[id number]]  (Abnormal) Collected:  04/29/19 1922    Specimen:  Urine, Clean Catch Updated:  04/29/19 1955     Color, UA Yellow     Appearance, UA Clear     pH, UA 5.5     Specific [place], UA 1.030     Comment: Result obtained by Refractometer        Glucose, UA >=1000 mg/dL (3+)     Ketones, UA 80 mg/dL (3+)     Bilirubin, UA Negative     Blood, UA Negative     Protein, UA 30 mg/dL (1+)     Leuk Esterase, UA Negative     Nitrite, UA Negative     Urobilinogen, UA 0.2 E.U./dL    Urinalysis, Microscopic Only - Urine, Clean Catch [[id number]] Collected:  04/29/19 1922    Specimen:  Urine, Clean Catch Updated:  04/29/19 1955     RBC, UA None Seen /HPF      WBC, UA 3-5 /HPF      Bacteria, UA None Seen /HPF      Squamous Epithelial Cells, UA None Seen /HPF      Hyaline Casts, UA None Seen /LPF      Methodology Automated Microscopy    Influenza Antigen, Rapid - Swab, Nasopharynx [[id number]]  (Normal) Collected:  04/29/19 1923    Specimen:  Swab from Nasopharynx Updated:  04/29/19 1953     Influenza A Ag, EIA Negative     Influenza B Ag, EIA Negative    CBC & Differential [[id number]] Collected:  04/29/19 1933    Specimen:  Blood Updated:  04/29/19 1943    Narrative:       The following orders were created for panel order CBC & Differential.  Procedure                               Abnormality         Status                     ---------                                -----------         ------                     CBC Auto Differential[[id number]]        Abnormal            Final result                 Please view results for these tests on the individual orders.    CBC Auto Differential [[id number]]  (Abnormal) Collected:  04/29/19 1933    Specimen:  Blood Updated:  04/29/19 1943     WBC 14.48 10*3/mm3      RBC 5.26 10*6/mm3      Hemoglobin 15.4 g/dL      Hematocrit 45.6 %      MCV 86.7 fL      MCH 29.3 pg      MCHC 33.8 g/dL      RDW 13.0 %      RDW-SD 41.1 fl      MPV 9.8 fL      Platelets 411 10*3/mm3      Neutrophil % 81.8 %      Lymphocyte % 11.5 %      Monocyte % 4.4 %      Eosinophil % 0.2 %      Basophil % 0.8 %      Immature Grans % 1.3 %      Neutrophils, Absolute 11.83 10*3/mm3      Lymphocytes, Absolute 1.67 10*3/mm3      Monocytes, Absolute 0.64 10*3/mm3      Eosinophils, Absolute 0.03 10*3/mm3      Basophils, Absolute 0.12 10*3/mm3      Immature Grans, Absolute 0.19 10*3/mm3      nRBC 0.0 /100 WBC         Imaging Results (most recent)     Procedure Component Value Units Date/Time    XR Chest 1 View [[id number]] Collected:  04/29/19 2048     Updated:  04/29/19 2105    Narrative:       PROCEDURE: XR CHEST 1 VW    VIEWS:Single    INDICATION: Chest pain, ketoacidosis    COMPARISON: CXR: 2/6/2019    FINDINGS:       - lines/tubes: None    - cardiac: Size within normal limits.    - mediastinum: Contour within normal limits.     - lungs: No evidence of a focal air space process, pulmonary  interstitial edema, nodule(s)/mass.     - pleura: No evidence of  fluid.      - osseous: Unremarkable for age.        Impression:       No acute cardiopulmonary process identified      Electronically signed by:  Cha Cooney MD  4/29/2019 9:04 PM CDT  Workstation: [id number]          Chief Complaint on Day of Discharge: Hyperglycemia    Hospital Course:  The patient is a 19 y.o. female who presented to [facility] with diabetic ketoacidosis.  She was started on insulin drip  "and IV fluids.  Her diabetic ketoacidosis resolved.  She was transitioned from insulin drip to her home insulin pump.  Unfortunately she did not have any supplies for her insulin pump with her at the hospital, so supplies were provided by the diabetic educator.  The patient's blood glucose went up to over 400, and she was restarted on the insulin drip.  She is transferred from the intensive care unit to the stepdown unit.  Patient's boyfriend brought her supplies from home.  Patient was then started back on her insulin pump.  For lunch on day of discharge she had a cheeseburger and French fries with ketchup.  Her glucose was greater than 500 after lunch, and she bolused herself 10 units through her pump.  I encouraged her to stay so that we could monitor her glucose.  She is saying that she was not going to stay.  She was encouraged to monitor her glucose closely and adhere to diabetic diet.    Condition on Discharge: Hyperglycemic but stable.  Anion gap was closed.    Physical Exam on Discharge:  /77 (BP Location: Right arm, Patient Position: Lying)   Pulse 86   Temp 98.1 °F (36.7 °C) (Temporal)   Resp 16   Ht 170.2 cm (67\")   Wt 49.4 kg (109 lb)   LMP 04/08/2019 (Exact Date)   SpO2 100%   BMI 17.07 kg/m²      Physical Exam   Constitutional: She appears well-developed and well-nourished.   HENT:   Head: Normocephalic and atraumatic.   Eyes: EOM are normal. Pupils are equal, round, and reactive to light.   Neck: Normal range of motion. Neck supple.   Cardiovascular: Normal rate, regular rhythm and normal heart sounds. Exam reveals no gallop and no friction rub.   No murmur heard.  Pulmonary/Chest: Effort normal and breath sounds normal. No respiratory distress. She has no wheezes. She has no rales. She exhibits no tenderness.   Abdominal: Soft. Bowel sounds are normal. She exhibits no distension. There is no tenderness. There is no guarding.   Musculoskeletal: She exhibits no edema.   Skin: Skin is " warm and dry.   Psychiatric: She has a normal mood and affect. Her behavior is normal. Thought content normal.   Vitals reviewed.        Discharge Disposition:  Home or Self Care    Discharge Medications:     Discharge Medications      Continue These Medications      Instructions Start Date   albuterol sulfate  (90 Base) MCG/ACT inhaler  Commonly known as:  PROVENTIL HFA;VENTOLIN HFA;PROAIR HFA   2 puffs, Inhalation, Every 4 Hours PRN      ARIPiprazole 5 MG tablet  Commonly known as:  ABILIFY   5 mg, Oral, Daily      Blood Glucose Monitoring Suppl w/Device kit   USE AS INDICATED, ANY MONITOR , ICD10 code is E11.9      Blood Glucose Test strip   Use 4 x daily use any brand covered by insurance or same brand as before ICD10 code is E11.9      clonazePAM 1 MG tablet  Commonly known as:  KlonoPIN   1 mg, Oral, 2 Times Daily PRN      insulin aspart 100 UNIT/ML injection  Commonly known as:  novoLOG   70 units daily through insulin pump      lamoTRIgine 100 MG tablet  Commonly known as:  LaMICtal   100 mg, Oral, Daily      Lancets 30G misc   USE 4 X DAILY      Lancing Device misc   USE AS INDICATED TO CORRELATE WITH STRIPS AND METER      promethazine 25 MG tablet  Commonly known as:  PHENERGAN   25 mg, Oral, Every 6 Hours PRN      sertraline 25 MG tablet  Commonly known as:  ZOLOFT   25 mg, Oral, Daily      VYVANSE 50 MG capsule  Generic drug:  lisdexamfetamine   50 mg, Oral, Every Morning             Discharge Diet:   Diet Instructions     Advance Diet As Tolerated            Activity at Discharge:   Activity Instructions     Activity as Tolerated              Follow-up Appointments:   Future Appointments   Date Time Provider Department Center   5/8/2019  9:30 AM Tavon Avila MD [facility] None           This document has been electronically signed by Michael Poole MD on May 1, 2019 2:18 PM      Time: 30 min

## 2019-05-01 NOTE — NURSING NOTE
Pt on adult iv insulin infusion protocol for target bg 111-180, fsbs labile during shift.  Drip held at 2 for fs 139 per protocol (dropped more than 100), 296 at 3am with initiation of 1/2 dose (4un/hr), now fsbs 376 with protocol instructions to increase to 8un/hr and call md.  Dr schafer notified of above, no new orders at this time

## 2019-05-01 NOTE — PROGRESS NOTES
[facility] Medicine Services  INPATIENT PROGRESS NOTE    Length of Stay: 1  Date of Admission: 4/29/2019  Primary Care Physician: Tavon Avila MD    Subjective   Chief Complaint: Diabetic ketoacidosis  HPI: Patient states that she is doing much better today.  She is still nauseated.  She does state that Dr. Lu has been working her up for gastroparesis.     Review of Systems   Constitutional: Positive for appetite change. Negative for chills, fatigue, fever and unexpected weight change.   Respiratory: Negative for cough, choking, chest tightness, shortness of breath and wheezing.    Cardiovascular: Negative for chest pain, palpitations and leg swelling.   Gastrointestinal: Positive for abdominal pain and nausea. Negative for blood in stool, constipation, diarrhea and vomiting.   Genitourinary: Negative for dysuria, flank pain and hematuria.   Neurological: Negative for dizziness, seizures, syncope, speech difficulty, weakness, light-headedness, numbness and headaches.   Hematological: Does not bruise/bleed easily.        All pertinent negatives and positives are as above. All other systems have been reviewed and are negative unless otherwise stated.     Objective    Temp:  [97.9 °F (36.6 °C)-98.9 °F (37.2 °C)] 98.9 °F (37.2 °C)  Heart Rate:  [] 85  Resp:  [16-19] 16  BP: ()/(51-75) 118/74    Physical Exam   Constitutional: She appears well-developed and well-nourished.   HENT:   Head: Normocephalic and atraumatic.   Eyes: EOM are normal. Pupils are equal, round, and reactive to light.   Neck: Normal range of motion. Neck supple.   Cardiovascular: Normal rate, regular rhythm and normal heart sounds. Exam reveals no gallop and no friction rub.   No murmur heard.  Pulmonary/Chest: Effort normal and breath sounds normal. No respiratory distress. She has no wheezes. She has no rales. She exhibits no tenderness.   Abdominal: Soft. Bowel sounds are  normal. She exhibits no distension. There is no tenderness. There is no guarding.   Musculoskeletal: She exhibits no edema.   Skin: Skin is warm and dry.   Psychiatric: She has a normal mood and affect. Her behavior is normal. Thought content normal.   Vitals reviewed.          Results Review:  I have reviewed the labs, radiology results, and diagnostic studies.    Laboratory Data:   Results from last 7 days   Lab Units 04/30/19 1907 04/30/19 1325 04/30/19 0754 04/29/19 1933   SODIUM mmol/L 134* 129* 130*   < > 131*   POTASSIUM mmol/L 3.4* 4.9 3.8   < > 4.5   CHLORIDE mmol/L 101 99 102   < > 94*   CO2 mmol/L 18.0* 12.0* 16.0*   < > 11.0*   BUN mg/dL 14 10 9   < > 18   CREATININE mg/dL 0.88 0.77 0.63   < > 1.01*   GLUCOSE mg/dL 300* 417* 100*   < > 345*   CALCIUM mg/dL 8.4* 8.1* 8.3*   < > 10.2   BILIRUBIN mg/dL  --   --   --   --  0.3   ALK PHOS U/L  --   --   --   --  94   ALT (SGPT) U/L  --   --   --   --  11   AST (SGOT) U/L  --   --   --   --  11   ANION GAP mmol/L 15.0 18.0 12.0   < > 26.0    < > = values in this interval not displayed.     Estimated Creatinine Clearance: 78.1 mL/min (by C-G formula based on SCr of 0.88 mg/dL).  Results from last 7 days   Lab Units 04/30/19 1907 04/30/19 1325 04/30/19 0754   MAGNESIUM mg/dL 1.8 2.1 2.4*   PHOSPHORUS mg/dL 1.7* 2.7 2.2*         Results from last 7 days   Lab Units 04/29/19 1933   WBC 10*3/mm3 14.48*   HEMOGLOBIN g/dL 15.4   HEMATOCRIT % 45.6   PLATELETS 10*3/mm3 411           Culture Data:   No results found for: BLOODCX  No results found for: URINECX  No results found for: RESPCX  No results found for: WOUNDCX  No results found for: STOOLCX  No components found for: BODYFLD    Radiology Data:   Imaging Results (last 24 hours)     Procedure Component Value Units Date/Time    XR Chest 1 View [[id number]] Collected:  04/29/19 2048     Updated:  04/29/19 2105    Narrative:       PROCEDURE: XR CHEST 1 VW    VIEWS:Single    INDICATION: Chest pain,  ketoacidosis    COMPARISON: CXR: 2/6/2019    FINDINGS:       - lines/tubes: None    - cardiac: Size within normal limits.    - mediastinum: Contour within normal limits.     - lungs: No evidence of a focal air space process, pulmonary  interstitial edema, nodule(s)/mass.     - pleura: No evidence of  fluid.      - osseous: Unremarkable for age.        Impression:       No acute cardiopulmonary process identified      Electronically signed by:  Cha Cooney MD  4/29/2019 9:04 PM CDT  Workstation: [id number]          I have reviewed the patient's current medications.     Assessment/Plan     Active Hospital Problems    Diagnosis   • Diabetic ketoacidosis without coma associated with diabetes mellitus due to underlying condition (CMS/[facility])       Plan:    1.  DKA:  Resolved.  While attempting to transition her from insulin drip to her insulin pump, it was discovered the patient did have any of her supplies with her and had no one who could bring them to her.  The diabetic educator was consulted, and supplies were brought to the patient.  She was started on her insulin pump in the IV infusion was discontinued.  Unfortunately the supplies that were brought to her by the diabetes educator were not compatible with her pump, and the insulin was not delivered correctly from her understand.  Her glucose went back up to over 400 and she was required to be started back on the drip.  She stated that her boyfriend could bring her supplies from home after he got off work.  That would be after 11 PM.  Decision was made to keep her on the insulin drip, and transfer her to the stepdown unit until that could be arranged.  2.  Tobacco abuse: Patient declined nicotine patch.  3.  History of depression: Continue home medications.          Discharge Planning: I expect patient to be discharged to home in 1-2 days.        This document has been electronically signed by Michael Poole MD on April 30, 2019 8:27 PM

## 2019-05-02 ENCOUNTER — READMISSION MANAGEMENT (OUTPATIENT)
Dept: CALL CENTER | Facility: HOSPITAL | Age: 19
End: 2019-05-02

## 2019-05-02 LAB
GLUCOSE BLDC GLUCOMTR-MCNC: 407 MG/DL (ref 70–130)
GLUCOSE BLDC GLUCOMTR-MCNC: 504 MG/DL (ref 70–130)

## 2019-05-02 NOTE — OUTREACH NOTE
Prep Survey      Responses   Facility patient discharged from?  [place]   Is patient eligible?  Yes   Discharge diagnosis  Diabetic ketoacidosis    Does the patient have one of the following disease processes/diagnoses(primary or secondary)?  Other   Does the patient have Home health ordered?  No   Is there a DME ordered?  No   General alerts for this patient   home insulin pump   Prep survey completed?  Yes          Maryann Smith RN

## 2019-05-03 ENCOUNTER — READMISSION MANAGEMENT (OUTPATIENT)
Dept: CALL CENTER | Facility: HOSPITAL | Age: 19
End: 2019-05-03

## 2019-05-03 NOTE — OUTREACH NOTE
Medical Week 1 Survey      Responses   Facility patient discharged from?  [facility]   Does the patient have one of the following disease processes/diagnoses(primary or secondary)?  Other   Is there a successful TCM telephone encounter documented?  No   Week 1 attempt successful?  Yes   Call start time  1141   Rescheduled  Rescheduled-pt requested   Discharge diagnosis  Diabetic ketoacidosis           April Johns RN

## 2019-05-06 ENCOUNTER — READMISSION MANAGEMENT (OUTPATIENT)
Dept: CALL CENTER | Facility: HOSPITAL | Age: 19
End: 2019-05-06

## 2019-05-06 NOTE — OUTREACH NOTE
Medical Week 1 Survey      Responses   Facility patient discharged from?  [facility]   Does the patient have one of the following disease processes/diagnoses(primary or secondary)?  Other   Is there a successful TCM telephone encounter documented?  No   Week 1 attempt successful?  No   Unsuccessful attempts  Attempt 1   Rescheduled  Revoked          Yaneth Miles RN

## 2019-06-08 ENCOUNTER — HOSPITAL ENCOUNTER (OUTPATIENT)
Facility: HOSPITAL | Age: 19
Setting detail: OBSERVATION
Discharge: HOME | End: 2019-06-10
Attending: FAMILY MEDICINE | Admitting: FAMILY MEDICINE

## 2019-06-08 DIAGNOSIS — E08.10 DIABETIC KETOACIDOSIS WITHOUT COMA ASSOCIATED WITH DIABETES MELLITUS DUE TO UNDERLYING CONDITION (HCC): Primary | ICD-10-CM

## 2019-06-08 PROBLEM — G40.909 SEIZURE DISORDER (HCC): Chronic | Status: ACTIVE | Noted: 2019-03-06

## 2019-06-08 PROBLEM — IMO0002 UNCONTROLLED DIABETES MELLITUS: Chronic | Status: ACTIVE | Noted: 2019-06-08

## 2019-06-08 LAB
ACETONE BLD QL: ABNORMAL
ALBUMIN SERPL-MCNC: 4.4 G/DL (ref 3.5–5.2)
ALBUMIN/GLOB SERPL: 1.4 G/DL
ALP SERPL-CCNC: 74 U/L (ref 39–117)
ALT SERPL W P-5'-P-CCNC: 14 U/L (ref 1–33)
ANION GAP SERPL CALCULATED.3IONS-SCNC: 13 MMOL/L
ANION GAP SERPL CALCULATED.3IONS-SCNC: 17 MMOL/L
ANION GAP SERPL CALCULATED.3IONS-SCNC: 27 MMOL/L
ANION GAP SERPL CALCULATED.3IONS-SCNC: 27 MMOL/L
ANION GAP SERPL CALCULATED.3IONS-SCNC: 8 MMOL/L
AST SERPL-CCNC: 13 U/L (ref 1–32)
BACTERIA UR QL AUTO: ABNORMAL /HPF
BASOPHILS # BLD AUTO: 0.06 10*3/MM3 (ref 0–0.2)
BASOPHILS NFR BLD AUTO: 1 % (ref 0–1.5)
BILIRUB SERPL-MCNC: 0.2 MG/DL (ref 0.2–1.2)
BILIRUB UR QL STRIP: NEGATIVE
BUN BLD-MCNC: 11 MG/DL (ref 6–20)
BUN BLD-MCNC: 12 MG/DL (ref 6–20)
BUN BLD-MCNC: 7 MG/DL (ref 6–20)
BUN BLD-MCNC: 8 MG/DL (ref 6–20)
BUN BLD-MCNC: 9 MG/DL (ref 6–20)
BUN/CREAT SERPL: 11 (ref 7–25)
BUN/CREAT SERPL: 11.9 (ref 7–25)
BUN/CREAT SERPL: 11.9 (ref 7–25)
BUN/CREAT SERPL: 13.4 (ref 7–25)
BUN/CREAT SERPL: 9.6 (ref 7–25)
CALCIUM SPEC-SCNC: 7.9 MG/DL (ref 8.6–10.5)
CALCIUM SPEC-SCNC: 8.4 MG/DL (ref 8.6–10.5)
CALCIUM SPEC-SCNC: 8.4 MG/DL (ref 8.6–10.5)
CALCIUM SPEC-SCNC: 9.2 MG/DL (ref 8.6–10.5)
CALCIUM SPEC-SCNC: 9.2 MG/DL (ref 8.6–10.5)
CHLORIDE SERPL-SCNC: 102 MMOL/L (ref 98–107)
CHLORIDE SERPL-SCNC: 102 MMOL/L (ref 98–107)
CHLORIDE SERPL-SCNC: 103 MMOL/L (ref 98–107)
CHLORIDE SERPL-SCNC: 96 MMOL/L (ref 98–107)
CHLORIDE SERPL-SCNC: 99 MMOL/L (ref 98–107)
CLARITY UR: CLEAR
CO2 SERPL-SCNC: 10 MMOL/L (ref 22–29)
CO2 SERPL-SCNC: 13 MMOL/L (ref 22–29)
CO2 SERPL-SCNC: 18 MMOL/L (ref 22–29)
CO2 SERPL-SCNC: 20 MMOL/L (ref 22–29)
CO2 SERPL-SCNC: 25 MMOL/L (ref 22–29)
COLOR UR: YELLOW
CREAT BLD-MCNC: 0.67 MG/DL (ref 0.57–1)
CREAT BLD-MCNC: 0.67 MG/DL (ref 0.57–1)
CREAT BLD-MCNC: 0.73 MG/DL (ref 0.57–1)
CREAT BLD-MCNC: 1 MG/DL (ref 0.57–1)
CREAT BLD-MCNC: 1.01 MG/DL (ref 0.57–1)
DEPRECATED RDW RBC AUTO: 42.8 FL (ref 37–54)
EOSINOPHIL # BLD AUTO: 0.11 10*3/MM3 (ref 0–0.4)
EOSINOPHIL NFR BLD AUTO: 1.9 % (ref 0.3–6.2)
ERYTHROCYTE [DISTWIDTH] IN BLOOD BY AUTOMATED COUNT: 13.8 % (ref 12.3–15.4)
GFR SERPL CREATININE-BSD FRML MDRD: 102 ML/MIN/1.73
GFR SERPL CREATININE-BSD FRML MDRD: 112 ML/MIN/1.73
GFR SERPL CREATININE-BSD FRML MDRD: 112 ML/MIN/1.73
GFR SERPL CREATININE-BSD FRML MDRD: 70 ML/MIN/1.73
GFR SERPL CREATININE-BSD FRML MDRD: 71 ML/MIN/1.73
GLOBULIN UR ELPH-MCNC: 3.1 GM/DL
GLUCOSE BLD-MCNC: 156 MG/DL (ref 65–99)
GLUCOSE BLD-MCNC: 244 MG/DL (ref 65–99)
GLUCOSE BLD-MCNC: 293 MG/DL (ref 65–99)
GLUCOSE BLD-MCNC: 440 MG/DL (ref 65–99)
GLUCOSE BLD-MCNC: 549 MG/DL (ref 65–99)
GLUCOSE BLDC GLUCOMTR-MCNC: 146 MG/DL (ref 70–130)
GLUCOSE BLDC GLUCOMTR-MCNC: 170 MG/DL (ref 70–130)
GLUCOSE BLDC GLUCOMTR-MCNC: 194 MG/DL (ref 70–130)
GLUCOSE BLDC GLUCOMTR-MCNC: 202 MG/DL (ref 70–130)
GLUCOSE BLDC GLUCOMTR-MCNC: 234 MG/DL (ref 70–130)
GLUCOSE BLDC GLUCOMTR-MCNC: 249 MG/DL (ref 70–130)
GLUCOSE BLDC GLUCOMTR-MCNC: 262 MG/DL (ref 70–130)
GLUCOSE UR STRIP-MCNC: ABNORMAL MG/DL
HBA1C MFR BLD: 11.1 % (ref 4.8–5.6)
HCT VFR BLD AUTO: 38.2 % (ref 34–46.6)
HGB BLD-MCNC: 13.1 G/DL (ref 12–15.9)
HGB UR QL STRIP.AUTO: ABNORMAL
HOLD SPECIMEN: NORMAL
HYALINE CASTS UR QL AUTO: ABNORMAL /LPF
IMM GRANULOCYTES # BLD AUTO: 0.06 10*3/MM3 (ref 0–0.05)
IMM GRANULOCYTES NFR BLD AUTO: 1 % (ref 0–0.5)
KETONES UR QL STRIP: ABNORMAL
LEUKOCYTE ESTERASE UR QL STRIP.AUTO: NEGATIVE
LYMPHOCYTES # BLD AUTO: 1.92 10*3/MM3 (ref 0.7–3.1)
LYMPHOCYTES NFR BLD AUTO: 33.3 % (ref 19.6–45.3)
MAGNESIUM SERPL-MCNC: 1.5 MG/DL (ref 1.7–2.2)
MAGNESIUM SERPL-MCNC: 1.5 MG/DL (ref 1.7–2.2)
MAGNESIUM SERPL-MCNC: 1.6 MG/DL (ref 1.7–2.2)
MAGNESIUM SERPL-MCNC: 1.7 MG/DL (ref 1.7–2.2)
MCH RBC QN AUTO: 29.4 PG (ref 26.6–33)
MCHC RBC AUTO-ENTMCNC: 34.3 G/DL (ref 31.5–35.7)
MCV RBC AUTO: 85.7 FL (ref 79–97)
MONOCYTES # BLD AUTO: 0.28 10*3/MM3 (ref 0.1–0.9)
MONOCYTES NFR BLD AUTO: 4.9 % (ref 5–12)
NEUTROPHILS # BLD AUTO: 3.34 10*3/MM3 (ref 1.7–7)
NEUTROPHILS NFR BLD AUTO: 57.9 % (ref 42.7–76)
NITRITE UR QL STRIP: NEGATIVE
NRBC BLD AUTO-RTO: 0 /100 WBC (ref 0–0.2)
PH UR STRIP.AUTO: 5.5 [PH] (ref 5–9)
PHOSPHATE SERPL-MCNC: 2.3 MG/DL (ref 2.5–4.5)
PHOSPHATE SERPL-MCNC: 2.8 MG/DL (ref 2.5–4.5)
PLATELET # BLD AUTO: 268 10*3/MM3 (ref 140–450)
PMV BLD AUTO: 10.5 FL (ref 6–12)
POTASSIUM BLD-SCNC: 3.1 MMOL/L (ref 3.5–5.2)
POTASSIUM BLD-SCNC: 3.5 MMOL/L (ref 3.5–5.2)
POTASSIUM BLD-SCNC: 3.5 MMOL/L (ref 3.5–5.2)
POTASSIUM BLD-SCNC: 3.7 MMOL/L (ref 3.5–5.2)
POTASSIUM BLD-SCNC: 3.9 MMOL/L (ref 3.5–5.2)
PROT SERPL-MCNC: 7.5 G/DL (ref 6–8.5)
PROT UR QL STRIP: NEGATIVE
RBC # BLD AUTO: 4.46 10*6/MM3 (ref 3.77–5.28)
RBC # UR: ABNORMAL /HPF
REF LAB TEST METHOD: ABNORMAL
SODIUM BLD-SCNC: 135 MMOL/L (ref 136–145)
SODIUM BLD-SCNC: 136 MMOL/L (ref 136–145)
SODIUM BLD-SCNC: 137 MMOL/L (ref 136–145)
SP GR UR STRIP: 1.03 (ref 1–1.03)
SQUAMOUS #/AREA URNS HPF: ABNORMAL /HPF
UROBILINOGEN UR QL STRIP: ABNORMAL
WBC NRBC COR # BLD: 5.77 10*3/MM3 (ref 3.4–10.8)
WBC UR QL AUTO: ABNORMAL /HPF

## 2019-06-08 PROCEDURE — 83036 HEMOGLOBIN GLYCOSYLATED A1C: CPT | Performed by: INTERNAL MEDICINE

## 2019-06-08 PROCEDURE — 82962 GLUCOSE BLOOD TEST: CPT

## 2019-06-08 PROCEDURE — 84100 ASSAY OF PHOSPHORUS: CPT | Performed by: INTERNAL MEDICINE

## 2019-06-08 PROCEDURE — G0378 HOSPITAL OBSERVATION PER HR: HCPCS

## 2019-06-08 PROCEDURE — 25010000002 MORPHINE PER 10 MG: Performed by: INTERNAL MEDICINE

## 2019-06-08 PROCEDURE — 96375 TX/PRO/DX INJ NEW DRUG ADDON: CPT

## 2019-06-08 PROCEDURE — 82009 KETONE BODYS QUAL: CPT | Performed by: FAMILY MEDICINE

## 2019-06-08 PROCEDURE — 96366 THER/PROPH/DIAG IV INF ADDON: CPT

## 2019-06-08 PROCEDURE — 83735 ASSAY OF MAGNESIUM: CPT | Performed by: INTERNAL MEDICINE

## 2019-06-08 PROCEDURE — 81001 URINALYSIS AUTO W/SCOPE: CPT | Performed by: FAMILY MEDICINE

## 2019-06-08 PROCEDURE — 63710000001 INSULIN REGULAR HUMAN PER 5 UNITS: Performed by: FAMILY MEDICINE

## 2019-06-08 PROCEDURE — 96361 HYDRATE IV INFUSION ADD-ON: CPT

## 2019-06-08 PROCEDURE — 85025 COMPLETE CBC W/AUTO DIFF WBC: CPT | Performed by: FAMILY MEDICINE

## 2019-06-08 PROCEDURE — 80048 BASIC METABOLIC PNL TOTAL CA: CPT | Performed by: INTERNAL MEDICINE

## 2019-06-08 PROCEDURE — 25010000002 ONDANSETRON PER 1 MG: Performed by: FAMILY MEDICINE

## 2019-06-08 PROCEDURE — 63710000001 INSULIN REGULAR HUMAN PER 5 UNITS: Performed by: INTERNAL MEDICINE

## 2019-06-08 PROCEDURE — 96365 THER/PROPH/DIAG IV INF INIT: CPT

## 2019-06-08 PROCEDURE — 80053 COMPREHEN METABOLIC PANEL: CPT | Performed by: FAMILY MEDICINE

## 2019-06-08 PROCEDURE — 25010000002 ONDANSETRON PER 1 MG: Performed by: INTERNAL MEDICINE

## 2019-06-08 PROCEDURE — 99284 EMERGENCY DEPT VISIT MOD MDM: CPT

## 2019-06-08 PROCEDURE — 96376 TX/PRO/DX INJ SAME DRUG ADON: CPT

## 2019-06-08 RX ORDER — DEXTROSE MONOHYDRATE 25 G/50ML
12.5 INJECTION, SOLUTION INTRAVENOUS
Status: DISCONTINUED | OUTPATIENT
Start: 2019-06-08 | End: 2019-06-10 | Stop reason: HOSPADM

## 2019-06-08 RX ORDER — DEXTROSE AND SODIUM CHLORIDE 5; .45 G/100ML; G/100ML
150 INJECTION, SOLUTION INTRAVENOUS CONTINUOUS PRN
Status: DISCONTINUED | OUTPATIENT
Start: 2019-06-08 | End: 2019-06-08

## 2019-06-08 RX ORDER — SODIUM CHLORIDE 0.9 % (FLUSH) 0.9 %
10 SYRINGE (ML) INJECTION AS NEEDED
Status: DISCONTINUED | OUTPATIENT
Start: 2019-06-08 | End: 2019-06-10 | Stop reason: HOSPADM

## 2019-06-08 RX ORDER — CEPHALEXIN 500 MG/1
500 CAPSULE ORAL EVERY 8 HOURS SCHEDULED
Status: DISCONTINUED | OUTPATIENT
Start: 2019-06-08 | End: 2019-06-09

## 2019-06-08 RX ORDER — ARIPIPRAZOLE 5 MG/1
5 TABLET ORAL DAILY
Status: DISCONTINUED | OUTPATIENT
Start: 2019-06-08 | End: 2019-06-10 | Stop reason: HOSPADM

## 2019-06-08 RX ORDER — DEXTROSE, SODIUM CHLORIDE, AND POTASSIUM CHLORIDE 5; .45; .15 G/100ML; G/100ML; G/100ML
150 INJECTION INTRAVENOUS CONTINUOUS PRN
Status: DISCONTINUED | OUTPATIENT
Start: 2019-06-08 | End: 2019-06-08

## 2019-06-08 RX ORDER — LAMOTRIGINE 100 MG/1
100 TABLET ORAL DAILY
Status: DISCONTINUED | OUTPATIENT
Start: 2019-06-08 | End: 2019-06-10 | Stop reason: HOSPADM

## 2019-06-08 RX ORDER — MORPHINE SULFATE 2 MG/ML
1 INJECTION, SOLUTION INTRAMUSCULAR; INTRAVENOUS EVERY 4 HOURS PRN
Status: DISCONTINUED | OUTPATIENT
Start: 2019-06-08 | End: 2019-06-10

## 2019-06-08 RX ORDER — POTASSIUM CHLORIDE 7.45 MG/ML
10 INJECTION INTRAVENOUS AS NEEDED
Status: DISCONTINUED | OUTPATIENT
Start: 2019-06-08 | End: 2019-06-10 | Stop reason: HOSPADM

## 2019-06-08 RX ORDER — POTASSIUM CHLORIDE 750 MG/1
20 CAPSULE, EXTENDED RELEASE ORAL AS NEEDED
Status: DISCONTINUED | OUTPATIENT
Start: 2019-06-08 | End: 2019-06-10 | Stop reason: HOSPADM

## 2019-06-08 RX ORDER — ACETAMINOPHEN 500 MG
1000 TABLET ORAL ONCE
Status: COMPLETED | OUTPATIENT
Start: 2019-06-08 | End: 2019-06-08

## 2019-06-08 RX ORDER — SODIUM CHLORIDE AND POTASSIUM CHLORIDE 150; 450 MG/100ML; MG/100ML
250 INJECTION, SOLUTION INTRAVENOUS CONTINUOUS PRN
Status: DISCONTINUED | OUTPATIENT
Start: 2019-06-08 | End: 2019-06-10 | Stop reason: HOSPADM

## 2019-06-08 RX ORDER — ONDANSETRON 2 MG/ML
4 INJECTION INTRAMUSCULAR; INTRAVENOUS ONCE
Status: COMPLETED | OUTPATIENT
Start: 2019-06-08 | End: 2019-06-08

## 2019-06-08 RX ORDER — ALBUTEROL SULFATE 2.5 MG/3ML
2.5 SOLUTION RESPIRATORY (INHALATION) EVERY 4 HOURS PRN
Status: DISCONTINUED | OUTPATIENT
Start: 2019-06-08 | End: 2019-06-10 | Stop reason: HOSPADM

## 2019-06-08 RX ORDER — ONDANSETRON 2 MG/ML
4 INJECTION INTRAMUSCULAR; INTRAVENOUS EVERY 6 HOURS PRN
Status: DISCONTINUED | OUTPATIENT
Start: 2019-06-08 | End: 2019-06-10 | Stop reason: HOSPADM

## 2019-06-08 RX ORDER — POTASSIUM CHLORIDE 1.5 G/1.77G
10 POWDER, FOR SOLUTION ORAL AS NEEDED
Status: DISCONTINUED | OUTPATIENT
Start: 2019-06-08 | End: 2019-06-10 | Stop reason: HOSPADM

## 2019-06-08 RX ORDER — POTASSIUM CHLORIDE 1.5 G/1.77G
20 POWDER, FOR SOLUTION ORAL AS NEEDED
Status: DISCONTINUED | OUTPATIENT
Start: 2019-06-08 | End: 2019-06-10 | Stop reason: HOSPADM

## 2019-06-08 RX ORDER — NALOXONE HCL 0.4 MG/ML
0.4 VIAL (ML) INJECTION
Status: DISCONTINUED | OUTPATIENT
Start: 2019-06-08 | End: 2019-06-10 | Stop reason: HOSPADM

## 2019-06-08 RX ORDER — SODIUM CHLORIDE 450 MG/100ML
250 INJECTION, SOLUTION INTRAVENOUS CONTINUOUS
Status: DISCONTINUED | OUTPATIENT
Start: 2019-06-08 | End: 2019-06-08

## 2019-06-08 RX ORDER — CLONAZEPAM 0.5 MG/1
1 TABLET ORAL 2 TIMES DAILY PRN
Status: DISCONTINUED | OUTPATIENT
Start: 2019-06-08 | End: 2019-06-10 | Stop reason: HOSPADM

## 2019-06-08 RX ORDER — POTASSIUM CHLORIDE 7.45 MG/ML
10 INJECTION INTRAVENOUS
Status: DISCONTINUED | OUTPATIENT
Start: 2019-06-08 | End: 2019-06-10 | Stop reason: HOSPADM

## 2019-06-08 RX ORDER — POTASSIUM CHLORIDE 750 MG/1
40 CAPSULE, EXTENDED RELEASE ORAL AS NEEDED
Status: DISCONTINUED | OUTPATIENT
Start: 2019-06-08 | End: 2019-06-10 | Stop reason: HOSPADM

## 2019-06-08 RX ORDER — SODIUM CHLORIDE 0.9 % (FLUSH) 0.9 %
3 SYRINGE (ML) INJECTION EVERY 12 HOURS SCHEDULED
Status: DISCONTINUED | OUTPATIENT
Start: 2019-06-08 | End: 2019-06-10 | Stop reason: HOSPADM

## 2019-06-08 RX ORDER — SODIUM CHLORIDE 0.9 % (FLUSH) 0.9 %
3-10 SYRINGE (ML) INJECTION AS NEEDED
Status: DISCONTINUED | OUTPATIENT
Start: 2019-06-08 | End: 2019-06-10 | Stop reason: HOSPADM

## 2019-06-08 RX ORDER — POTASSIUM CHLORIDE 750 MG/1
10 CAPSULE, EXTENDED RELEASE ORAL AS NEEDED
Status: DISCONTINUED | OUTPATIENT
Start: 2019-06-08 | End: 2019-06-10 | Stop reason: HOSPADM

## 2019-06-08 RX ORDER — ACETAMINOPHEN 500 MG
500 TABLET ORAL 4 TIMES DAILY PRN
Status: DISCONTINUED | OUTPATIENT
Start: 2019-06-08 | End: 2019-06-10 | Stop reason: HOSPADM

## 2019-06-08 RX ORDER — SERTRALINE HYDROCHLORIDE 25 MG/1
25 TABLET, FILM COATED ORAL DAILY
Status: DISCONTINUED | OUTPATIENT
Start: 2019-06-08 | End: 2019-06-10 | Stop reason: HOSPADM

## 2019-06-08 RX ORDER — POTASSIUM CHLORIDE 1.5 G/1.77G
40 POWDER, FOR SOLUTION ORAL AS NEEDED
Status: DISCONTINUED | OUTPATIENT
Start: 2019-06-08 | End: 2019-06-10 | Stop reason: HOSPADM

## 2019-06-08 RX ADMIN — POTASSIUM CHLORIDE AND SODIUM CHLORIDE 250 ML/HR: 450; 150 INJECTION, SOLUTION INTRAVENOUS at 16:26

## 2019-06-08 RX ADMIN — CLONAZEPAM 1 MG: 0.5 TABLET ORAL at 18:35

## 2019-06-08 RX ADMIN — ACETAMINOPHEN 1000 MG: 500 TABLET ORAL at 06:20

## 2019-06-08 RX ADMIN — SODIUM CHLORIDE, PRESERVATIVE FREE 10 ML: 5 INJECTION INTRAVENOUS at 06:45

## 2019-06-08 RX ADMIN — POTASSIUM CHLORIDE 20 MEQ: 750 CAPSULE, EXTENDED RELEASE ORAL at 13:09

## 2019-06-08 RX ADMIN — SERTRALINE HYDROCHLORIDE 25 MG: 25 TABLET ORAL at 08:21

## 2019-06-08 RX ADMIN — LAMOTRIGINE 100 MG: 100 TABLET ORAL at 08:21

## 2019-06-08 RX ADMIN — CEPHALEXIN 500 MG: 500 CAPSULE ORAL at 20:11

## 2019-06-08 RX ADMIN — MORPHINE SULFATE 1 MG: 2 INJECTION, SOLUTION INTRAMUSCULAR; INTRAVENOUS at 17:22

## 2019-06-08 RX ADMIN — SODIUM BICARBONATE 75 ML/HR: 84 INJECTION, SOLUTION INTRAVENOUS at 11:57

## 2019-06-08 RX ADMIN — SODIUM CHLORIDE 0.1 UNITS/KG/HR: 9 INJECTION, SOLUTION INTRAVENOUS at 08:06

## 2019-06-08 RX ADMIN — HUMAN INSULIN 10 UNITS: 100 INJECTION, SOLUTION SUBCUTANEOUS at 06:32

## 2019-06-08 RX ADMIN — MORPHINE SULFATE 1 MG: 2 INJECTION, SOLUTION INTRAMUSCULAR; INTRAVENOUS at 20:56

## 2019-06-08 RX ADMIN — POTASSIUM CHLORIDE AND SODIUM CHLORIDE 250 ML/HR: 450; 150 INJECTION, SOLUTION INTRAVENOUS at 08:06

## 2019-06-08 RX ADMIN — MORPHINE SULFATE 1 MG: 2 INJECTION, SOLUTION INTRAMUSCULAR; INTRAVENOUS at 08:20

## 2019-06-08 RX ADMIN — SODIUM CHLORIDE, PRESERVATIVE FREE 10 ML: 5 INJECTION INTRAVENOUS at 06:32

## 2019-06-08 RX ADMIN — POTASSIUM CHLORIDE, DEXTROSE MONOHYDRATE AND SODIUM CHLORIDE 150 ML/HR: 150; 5; 450 INJECTION, SOLUTION INTRAVENOUS at 11:57

## 2019-06-08 RX ADMIN — Medication 1.2 UNITS: at 23:13

## 2019-06-08 RX ADMIN — CEPHALEXIN 500 MG: 500 CAPSULE ORAL at 13:09

## 2019-06-08 RX ADMIN — ONDANSETRON 4 MG: 2 INJECTION INTRAMUSCULAR; INTRAVENOUS at 14:54

## 2019-06-08 RX ADMIN — MORPHINE SULFATE 1 MG: 2 INJECTION, SOLUTION INTRAMUSCULAR; INTRAVENOUS at 12:16

## 2019-06-08 RX ADMIN — ARIPIPRAZOLE 5 MG: 5 TABLET ORAL at 08:21

## 2019-06-08 RX ADMIN — MUPIROCIN: 20 OINTMENT TOPICAL at 11:57

## 2019-06-08 RX ADMIN — SODIUM CHLORIDE, PRESERVATIVE FREE 10 ML: 5 INJECTION INTRAVENOUS at 05:49

## 2019-06-08 RX ADMIN — MUPIROCIN: 20 OINTMENT TOPICAL at 20:11

## 2019-06-08 RX ADMIN — POTASSIUM CHLORIDE AND SODIUM CHLORIDE 250 ML/HR: 450; 150 INJECTION, SOLUTION INTRAVENOUS at 20:47

## 2019-06-08 RX ADMIN — SODIUM CHLORIDE, PRESERVATIVE FREE 3 ML: 5 INJECTION INTRAVENOUS at 08:21

## 2019-06-08 RX ADMIN — SODIUM CHLORIDE, PRESERVATIVE FREE 3 ML: 5 INJECTION INTRAVENOUS at 20:11

## 2019-06-08 RX ADMIN — ONDANSETRON 4 MG: 2 INJECTION INTRAMUSCULAR; INTRAVENOUS at 06:00

## 2019-06-08 RX ADMIN — SODIUM CHLORIDE, PRESERVATIVE FREE 10 ML: 5 INJECTION INTRAVENOUS at 06:00
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
## 2019-06-08 NOTE — H&P
History and Physical  Edmar Hill MD  Hospitalist    Date of admission: 2019    Patient Care Team:  Tavon Avila MD as PCP - General (Endocrinology)    Chief complaint   Chief Complaint   Patient presents with   • Generalized weakness / generalized pain      Subjective     Patient is a 19 y.o. female admitted for otherwise weakness, generalized pain, nausea, poor oral intake and out-of-control glucose values.  She claims that the insulin pump has stopped working a day or so ago.  She has had several ER visits and admissions for DKA over the last 6 months.    History  Past Medical History:   Diagnosis Date   • Diabetes mellitus type 1 (CMS/HCC)    • Diabetic gastroparesis (CMS/HCC)    • Diabetic ketoacidosis (CMS/HCC)    • Diabetic neuropathy (CMS/HCC)    • Post traumatic stress disorder (PTSD)    • Recurrent UTI    • Seizure disorder (CMS/HCC)    • Severe depressed bipolar II disorder without psychotic features (CMS/HCC)      Past Surgical History:   Procedure Laterality Date   •  SECTION N/A 2018     Family History   Problem Relation Age of Onset   • Drug abuse Father    • Suicide Attempts Brother    • Dementia Maternal Grandfather    • Hypertension Paternal Grandmother      Social History     Tobacco Use   • Smoking status: Current Every Day Smoker     Packs/day: 0.50     Years: 1.00     Pack years: 0.50   • Smokeless tobacco: Never Used   Substance Use Topics   • Alcohol use: No   • Drug use: Yes     Types: Marijuana     Medications Prior to Admission   Medication Sig Dispense Refill Last Dose   • albuterol 108 (90 Base) MCG/ACT inhaler Inhale 2 puffs Every 4 (Four) Hours As Needed for Wheezing.   Unknown at Unknown time   • ARIPiprazole (ABILIFY) 5 MG tablet Take 1 tablet by mouth Daily. 30 tablet 0 2019 at Unknown time   • Blood Glucose Monitoring Suppl w/Device kit USE AS INDICATED, ANY MONITOR , ICD10 code is E11.9 1 each 1    • clonazePAM (KlonoPIN) 1 MG tablet Take 1 mg  by mouth 2 (Two) Times a Day As Needed for Seizures.   Unknown at Unknown time   • Glucose Blood (BLOOD GLUCOSE TEST) strip Use 4 x daily use any brand covered by insurance or same brand as before ICD10 code is E11.9 120 each 11    • insulin aspart (novoLOG) 100 UNIT/ML injection 70 units daily through insulin pump 30 mL 11 Taking   • lamoTRIgine (LaMICtal) 100 MG tablet Take 100 mg by mouth Daily.   4/28/2019 at Unknown time   • Lancet Devices (LANCING DEVICE) misc USE AS INDICATED TO CORRELATE WITH STRIPS AND METER 1 each 1    • Lancets 30G misc USE 4 X DAILY 120 each 11    • lisdexamfetamine (VYVANSE) 50 MG capsule Take 50 mg by mouth Every Morning   4/28/2019 at Unknown time   • promethazine (PHENERGAN) 25 MG tablet Take 1 tablet by mouth Every 6 (Six) Hours As Needed for Nausea or Vomiting. 120 tablet 5 Unknown at Unknown time   • sertraline (ZOLOFT) 25 MG tablet Take 25 mg by mouth Daily.   4/28/2019 at Unknown time     Allergies:  Pineapple and Benzoyl peroxide    Review of Systems  Review of Systems   Constitutional: Positive for fatigue. Negative for fever.   Respiratory: Negative for cough, choking, shortness of breath and wheezing.    Cardiovascular: Negative for chest pain, palpitations and leg swelling.   Gastrointestinal: Positive for abdominal pain, nausea and vomiting. Negative for abdominal distention, constipation and diarrhea.   Genitourinary: Negative for dyspareunia, dysuria, frequency, hematuria, urgency, vaginal bleeding and vaginal pain.   Skin: Positive for pallor.   Neurological: Positive for weakness. Negative for seizures, syncope, light-headedness and headaches.   Psychiatric/Behavioral: Negative for agitation, behavioral problems and confusion.   All other systems reviewed and are negative.      Objective     Vital Signs  Temp:  [98.9 °F (37.2 °C)] 98.9 °F (37.2 °C)  Heart Rate:  [86-93] 93  Resp:  [18-20] 20  BP: (107-124)/(56-86) 112/62    Physical Exam:  Physical Exam    Constitutional: She is oriented to person, place, and time. She appears cachectic. She appears ill. No distress.   HENT:   Head: Normocephalic and atraumatic.   Eyes: EOM are normal. Pupils are equal, round, and reactive to light.   Neck: Normal range of motion. Neck supple.   Cardiovascular: Normal rate and regular rhythm.   Pulmonary/Chest: Effort normal and breath sounds normal. No stridor. No respiratory distress. She has no wheezes.   Abdominal: Soft. Bowel sounds are normal. She exhibits no distension. There is tenderness (minimal). There is no guarding.   Musculoskeletal: Normal range of motion. She exhibits no edema or tenderness.   Neurological: She is alert and oriented to person, place, and time. No cranial nerve deficit. Coordination normal.   Skin: Skin is warm and dry. No rash noted. No erythema. There is pallor.   Small excoriation present over her L thigh   Psychiatric: She has a normal mood and affect. Her behavior is normal.   Vitals reviewed.      Results Review:   Lab Results (last 24 hours)     Procedure Component Value Units Date/Time    Ketone Bodies, Serum (Not performed at [facility]) [[id number]] Collected:  06/08/19 0545    Specimen:  Blood Updated:  06/08/19 0656    Narrative:       The following orders were created for panel order Ketone Bodies, Serum (Not performed at [facility]).  Procedure                               Abnormality         Status                     ---------                               -----------         ------                     Acetone[[id number]]                      Abnormal            Final result                 Please view results for these tests on the individual orders.    Acetone [[id number]]  (Abnormal) Collected:  06/08/19 0545    Specimen:  Blood Updated:  06/08/19 0656     Acetone Moderate    Comprehensive Metabolic Panel [[id number]]  (Abnormal) Collected:  06/08/19 0545    Specimen:  Blood Updated:  06/08/19 0620     Glucose 549 mg/dL      BUN 12 mg/dL       Creatinine 1.01 mg/dL      Sodium 136 mmol/L      Potassium 3.5 mmol/L      Chloride 96 mmol/L      CO2 13.0 mmol/L      Calcium 9.2 mg/dL      Total Protein 7.5 g/dL      Albumin 4.40 g/dL      ALT (SGPT) 14 U/L      AST (SGOT) 13 U/L      Alkaline Phosphatase 74 U/L      Total Bilirubin 0.2 mg/dL      eGFR Non African Amer 70 mL/min/1.73      Globulin 3.1 gm/dL      A/G Ratio 1.4 g/dL      BUN/Creatinine Ratio 11.9     Anion Gap 27.0 mmol/L     Narrative:       GFR Normal >60  Chronic Kidney Disease <60  Kidney Failure <15    Urinalysis, Microscopic Only - Urine, Clean Catch [[id number]]  (Abnormal) Collected:  06/08/19 0555    Specimen:  Urine, Clean Catch Updated:  06/08/19 0604     RBC, UA 13-20 /HPF      WBC, UA 0-2 /HPF      Bacteria, UA None Seen /HPF      Squamous Epithelial Cells, UA None Seen /HPF      Hyaline Casts, UA 0-2 /LPF      Methodology Automated Microscopy    Urinalysis With Microscopic If Indicated (No Culture) - Urine, Clean Catch [[id number]]  (Abnormal) Collected:  06/08/19 0555    Specimen:  Urine, Clean Catch Updated:  06/08/19 0604     Color, UA Yellow     Appearance, UA Clear     pH, UA 5.5     Specific Gravity, UA 1.029     Comment: Result obtained by Refractometer        Glucose, UA >=1000 mg/dL (3+)     Ketones, UA 80 mg/dL (3+)     Bilirubin, UA Negative     Blood, UA Large (3+)     Protein, UA Negative     Leuk Esterase, UA Negative     Nitrite, UA Negative     Urobilinogen, UA 0.2 E.U./dL    CBC & Differential [[id number]] Collected:  06/08/19 0545    Specimen:  Blood Updated:  06/08/19 0551    Narrative:       The following orders were created for panel order CBC & Differential.  Procedure                               Abnormality         Status                     ---------                               -----------         ------                     CBC Auto Differential[[id number]]        Abnormal            Final result                 Please view results for these tests on the  individual orders.    CBC Auto Differential [[id number]]  (Abnormal) Collected:  06/08/19 0545    Specimen:  Blood Updated:  06/08/19 0551     WBC 5.77 10*3/mm3      RBC 4.46 10*6/mm3      Hemoglobin 13.1 g/dL      Hematocrit 38.2 %      MCV 85.7 fL      MCH 29.4 pg      MCHC 34.3 g/dL      RDW 13.8 %      RDW-SD 42.8 fl      MPV 10.5 fL      Platelets 268 10*3/mm3      Neutrophil % 57.9 %      Lymphocyte % 33.3 %      Monocyte % 4.9 %      Eosinophil % 1.9 %      Basophil % 1.0 %      Immature Grans % 1.0 %      Neutrophils, Absolute 3.34 10*3/mm3      Lymphocytes, Absolute 1.92 10*3/mm3      Monocytes, Absolute 0.28 10*3/mm3      Eosinophils, Absolute 0.11 10*3/mm3      Basophils, Absolute 0.06 10*3/mm3      Immature Grans, Absolute 0.06 10*3/mm3      nRBC 0.0 /100 WBC           Imaging Results (last 24 hours)     ** No results found for the last 24 hours. **          Assessment/Plan       Diabetic ketoacidosis (CMS/HCC)    Uncontrolled diabetes mellitus (CMS/HCC)    Diabetic neuropathy (CMS/HCC)    Aggressive IV hydration, IV insulin, continue to monitor her electrolytes, provide her with symptomatic treatment.    Edmar Hill MD  06/08/19  7:09 AM

## 2019-06-08 NOTE — CONSULTS
"Adult Nutrition  Assessment    Patient Name:  Fernanda Patterson  YOB: 2000  MRN: [id number]  Admit Date:  6/8/2019    Assessment Date:  6/8/2019    Comments:  Pt admitted with DKA.  Blood sugar trending down with Insulin drip.  She is known to our services from previous admits.  She has had several ER visits and Admissions for DKA over the past 6 months.  She has a BMI of 15.43 which indicated undernutrition, malnutrition.  Most likely poor wt gain contributed to uncontrolled DM.  RD discussed/reviewed Carb counting with pt.  Also reviewed with pt that keeping her blood sugars undercontrol would help with wt gain and her overall nutritional status.  Diet copies and contact number provided.    Reason for Assessment     Row Name 06/08/19 1237          Reason for Assessment    Reason For Assessment  per organizational policy     Diagnosis  diabetes diagnosis/complications     Identified At Risk by Screening Criteria  need for education;BMI         Nutrition/Diet History     Row Name 06/08/19 1237          Nutrition/Diet History    Typical Food/Fluid Intake  Pt seems in good spirits.  She said that her pump came off when she was asleep.  She could use a review on carb counting.  She said that she has tried to gain wt but she just can't.  She asked if there was something she could do to gain wt.          Anthropometrics     Row Name 06/08/19 0712 06/08/19 0454       Anthropometrics    Height  170.2 cm (67\")  170.2 cm (67\")    Weight  44.7 kg (98 lb 8 oz)  45.8 kg (100 lb 14.4 oz)       Ideal Body Weight (IBW)    Ideal Body Weight (IBW) (kg)  61.86  61.86    % Ideal Body Weight  72.22  73.98       Body Mass Index (BMI)    BMI (kg/m2)  15.46  15.84       IBW Adjustment, Para/Tetraplegia    5% Adjustment, Para (IBW)  58.77  58.77    10% Adjustment, Para (IBW)  55.67  55.67    10% Adjustment, Tetra (IBW)  55.67  55.67    15% Adjustment, Tetra (IBW)  52.58  52.58        Labs/Tests/Procedures/Meds     Row Name " "06/08/19 1238          Labs/Procedures/Meds    Lab Results Reviewed  reviewed, pertinent     Lab Results Comments  Glucose 440-549; K+ 3.1        Diagnostic Tests/Procedures    Diagnostic Test/Procedure Reviewed  reviewed, pertinent     Diagnostic Test/Procedures Comments  Insulin drip; IVF        Medications    Pertinent Medications Reviewed  reviewed, pertinent     Pertinent Medications Comments  IVF; Insulin drip           Estimated/Assessed Needs     Row Name 06/08/19 1241 06/08/19 0712       Calculation Measurements    Weight Used For Calculations  61.2 kg (135 lb)  --    Height  --  170.2 cm (67\")       Estimated/Assessed Needs    Additional Documentation  Additional Requirements (Group);Fluid Requirements (Group);[place]-St. Jeor Equation (Group);Protein Requirements (Group);Calorie Requirements (Group);KCAL/KG (Group)  --       Calorie Requirements    Estimated Calorie Requirement (kcal/day)  1825  --    Estimated Calorie Need Method  [place]-St Jeor  --       [place]-St. Jeor Equation    RMR ([place]-St. Jeor Equation)  1414.985  --       Protein Requirements    Est Protein Requirement Amount (gms/kg)  1.2 gm protein  --    Estimated Protein Requirements (gms/day)  73.48  --       Fluid Requirements    Estimated Fluid Requirements (mL/day)  1825  --    Estimated Fluid Requirement Method  RDA Method  --    RDA Method (mL)  1825  --    Jane-Bre Method (over 20 kg)  2724.72  --    Row Name 06/08/19 0454          Calculation Measurements    Height  170.2 cm (67\")         Nutrition Prescription Ordered     Row Name 06/08/19 1242          Nutrition Prescription PO    Current PO Diet  Clear Liquid     Fluid Consistency  Thin         Evaluation of Received Nutrient/Fluid Intake     Row Name 06/08/19 1241 06/08/19 0712       Calculation Measurements    Weight Used For Calculations  61.2 kg (135 lb)  --    Height  --  170.2 cm (67\")    Row Name 06/08/19 0454          Calculation Measurements    Height  170.2 cm " "(67\")         Evaluation of Prescribed Nutrient/Fluid Intake     Row Name 06/08/19 1241 06/08/19 0712       Calculation Measurements    Weight Used For Calculations  61.2 kg (135 lb)  --    Height  --  170.2 cm (67\")    Row Name 06/08/19 0454          Calculation Measurements    Height  170.2 cm (67\")             Electronically signed by:  Sadia Fried RD  06/08/19 12:49 PM  "
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
## 2019-06-08 NOTE — PROGRESS NOTES
[facility] Medicine Services  INPATIENT PROGRESS NOTE    Length of Stay: 0  Date of Admission: 6/8/2019  Primary Care Physician: Tavon Avila MD    Subjective   Chief Complaint: DKA  HPI:    This is a 19-year-old female who came in with a lesion on her left thigh with drainage and was found to have DKA with elevated blood glucose levels and was admitted.  Her anion gap currently is 27 and is currently on insulin drip.  Patient reports doing fine and reports no complaints and wanting to eat.    Review of Systems   Constitutional: Negative for appetite change.   Musculoskeletal: Positive for myalgias.        All pertinent negatives and positives are as above. All other systems have been reviewed and are negative unless otherwise stated.     Objective    Temp:  [97.3 °F (36.3 °C)-98.9 °F (37.2 °C)] 97.3 °F (36.3 °C)  Heart Rate:  [67-93] 67  Resp:  [18-20] 20  BP: ()/(56-86) 95/58  Physical Exam   Constitutional: She appears well-developed and well-nourished. No distress.   HENT:   Head: Normocephalic and atraumatic.   Cardiovascular: Normal rate.   Pulmonary/Chest: Effort normal. No respiratory distress. She has no wheezes.   Abdominal: Soft. She exhibits no distension.   Musculoskeletal: Normal range of motion. She exhibits no edema.   Neurological: She is alert. No cranial nerve deficit.   Skin: Skin is warm and dry. She is not diaphoretic.   Left thigh wound/erythema present   Psychiatric: She has a normal mood and affect. Her behavior is normal. Judgment and thought content normal.   Vitals reviewed.          Results Review:  I have reviewed the labs, radiology results, and diagnostic studies.    Laboratory Data:   Lab Results (last 24 hours)     Procedure Component Value Units Date/Time    Hemoglobin A1c [[id number]]  (Abnormal) Collected:  06/08/19 0545    Specimen:  Blood Updated:  06/08/19 1002     Hemoglobin A1C 11.10 %     Narrative:        Hemoglobin A1C Ranges:    Increased Risk for Diabetes  5.7% to 6.4%  Diabetes                     >= 6.5%  Diabetic Goal                < 7.0%    Basic Metabolic Panel [[id number]]  (Abnormal) Collected:  06/08/19 0730    Specimen:  Blood Updated:  06/08/19 0820     Glucose 440 mg/dL      BUN 11 mg/dL      Creatinine 1.00 mg/dL      Sodium 136 mmol/L      Potassium 3.1 mmol/L      Chloride 99 mmol/L      CO2 10.0 mmol/L      Calcium 9.2 mg/dL      eGFR Non African Amer 71 mL/min/1.73      BUN/Creatinine Ratio 11.0     Anion Gap 27.0 mmol/L     Narrative:       GFR Normal >60  Chronic Kidney Disease <60  Kidney Failure <15    Phosphorus [[id number]]  (Normal) Collected:  06/08/19 0730    Specimen:  Blood Updated:  06/08/19 0818     Phosphorus 2.8 mg/dL     Magnesium [[id number]]  (Normal) Collected:  06/08/19 0730    Specimen:  Blood Updated:  06/08/19 0818     Magnesium 1.7 mg/dL     Ketone Bodies, Serum (Not performed at [place]) [[id number]] Collected:  06/08/19 0545    Specimen:  Blood Updated:  06/08/19 0656    Narrative:       The following orders were created for panel order Ketone Bodies, Serum (Not performed at [place]).  Procedure                               Abnormality         Status                     ---------                               -----------         ------                     Acetone[[id number]]                      Abnormal            Final result                 Please view results for these tests on the individual orders.    Acetone [[id number]]  (Abnormal) Collected:  06/08/19 0545    Specimen:  Blood Updated:  06/08/19 0656     Acetone Moderate    Comprehensive Metabolic Panel [[id number]]  (Abnormal) Collected:  06/08/19 0545    Specimen:  Blood Updated:  06/08/19 0620     Glucose 549 mg/dL      BUN 12 mg/dL      Creatinine 1.01 mg/dL      Sodium 136 mmol/L      Potassium 3.5 mmol/L      Chloride 96 mmol/L      CO2 13.0 mmol/L      Calcium 9.2 mg/dL      Total Protein 7.5 g/dL      Albumin  4.40 g/dL      ALT (SGPT) 14 U/L      AST (SGOT) 13 U/L      Alkaline Phosphatase 74 U/L      Total Bilirubin 0.2 mg/dL      eGFR Non African Amer 70 mL/min/1.73      Globulin 3.1 gm/dL      A/G Ratio 1.4 g/dL      BUN/Creatinine Ratio 11.9     Anion Gap 27.0 mmol/L     Narrative:       GFR Normal >60  Chronic Kidney Disease <60  Kidney Failure <15    Urinalysis, Microscopic Only - Urine, Clean Catch [[id number]]  (Abnormal) Collected:  06/08/19 0555    Specimen:  Urine, Clean Catch Updated:  06/08/19 0604     RBC, UA 13-20 /HPF      WBC, UA 0-2 /HPF      Bacteria, UA None Seen /HPF      Squamous Epithelial Cells, UA None Seen /HPF      Hyaline Casts, UA 0-2 /LPF      Methodology Automated Microscopy    Urinalysis With Microscopic If Indicated (No Culture) - Urine, Clean Catch [[id number]]  (Abnormal) Collected:  06/08/19 0555    Specimen:  Urine, Clean Catch Updated:  06/08/19 0604     Color, UA Yellow     Appearance, UA Clear     pH, UA 5.5     Specific Gravity, UA 1.029     Comment: Result obtained by Refractometer        Glucose, UA >=1000 mg/dL (3+)     Ketones, UA 80 mg/dL (3+)     Bilirubin, UA Negative     Blood, UA Large (3+)     Protein, UA Negative     Leuk Esterase, UA Negative     Nitrite, UA Negative     Urobilinogen, UA 0.2 E.U./dL    CBC & Differential [[id number]] Collected:  06/08/19 0545    Specimen:  Blood Updated:  06/08/19 0551    Narrative:       The following orders were created for panel order CBC & Differential.  Procedure                               Abnormality         Status                     ---------                               -----------         ------                     CBC Auto Differential[[id number]]        Abnormal            Final result                 Please view results for these tests on the individual orders.    CBC Auto Differential [[id number]]  (Abnormal) Collected:  06/08/19 0545    Specimen:  Blood Updated:  06/08/19 0551     WBC 5.77 10*3/mm3      RBC 4.46 10*6/mm3       Hemoglobin 13.1 g/dL      Hematocrit 38.2 %      MCV 85.7 fL      MCH 29.4 pg      MCHC 34.3 g/dL      RDW 13.8 %      RDW-SD 42.8 fl      MPV 10.5 fL      Platelets 268 10*3/mm3      Neutrophil % 57.9 %      Lymphocyte % 33.3 %      Monocyte % 4.9 %      Eosinophil % 1.9 %      Basophil % 1.0 %      Immature Grans % 1.0 %      Neutrophils, Absolute 3.34 10*3/mm3      Lymphocytes, Absolute 1.92 10*3/mm3      Monocytes, Absolute 0.28 10*3/mm3      Eosinophils, Absolute 0.11 10*3/mm3      Basophils, Absolute 0.06 10*3/mm3      Immature Grans, Absolute 0.06 10*3/mm3      nRBC 0.0 /100 WBC           Culture Data:   No results found for: BLOODCX  No results found for: URINECX  No results found for: RESPCX  No results found for: WOUNDCX  No results found for: STOOLCX  No components found for: BODYFLD    Radiology Data:   Imaging Results (last 24 hours)     ** No results found for the last 24 hours. **          I have reviewed the patient's current medications.     Assessment/Plan     Active Hospital Problems    Diagnosis   • **Diabetic ketoacidosis (CMS/HCC)   • Uncontrolled diabetes mellitus (CMS/HCC)   • Diabetic neuropathy (CMS/HCC)     DKA-continue with insulin drip and IV fluids.  Will add bicarbonate as well    Uncontrolled diabetes-patient is on insulin pump at home    Left thigh wound/cellulitis- we will start on antibiotic    DVT prophylaxis-SCD        Ebenezer Anders MD   06/08/19   10:25 AM

## 2019-06-08 NOTE — PLAN OF CARE
Problem: Patient Care Overview  Goal: Plan of Care Review  Outcome: Ongoing (interventions implemented as appropriate)   06/08/19 6710   Coping/Psychosocial   Plan of Care Reviewed With caregiver;patient   Plan of Care Review   Progress no change   OTHER   Outcome Summary New Assessment: Pt admitted with DKA. She has a BMI of 15.43 which indicates undernutrition. Education provided.

## 2019-06-09 LAB
ACETONE BLD QL: ABNORMAL
ALBUMIN SERPL-MCNC: 3.4 G/DL (ref 3.5–5.2)
ALBUMIN/GLOB SERPL: 1.2 G/DL
ALP SERPL-CCNC: 52 U/L (ref 39–117)
ALT SERPL W P-5'-P-CCNC: 11 U/L (ref 1–33)
ANION GAP SERPL CALCULATED.3IONS-SCNC: 12 MMOL/L
ANION GAP SERPL CALCULATED.3IONS-SCNC: 13 MMOL/L
AST SERPL-CCNC: 14 U/L (ref 1–32)
BILIRUB SERPL-MCNC: 0.2 MG/DL (ref 0.2–1.2)
BUN BLD-MCNC: 8 MG/DL (ref 6–20)
BUN BLD-MCNC: 9 MG/DL (ref 6–20)
BUN/CREAT SERPL: 12.7 (ref 7–25)
BUN/CREAT SERPL: 12.9 (ref 7–25)
CALCIUM SPEC-SCNC: 8.9 MG/DL (ref 8.6–10.5)
CALCIUM SPEC-SCNC: 8.9 MG/DL (ref 8.6–10.5)
CHLORIDE SERPL-SCNC: 100 MMOL/L (ref 98–107)
CHLORIDE SERPL-SCNC: 96 MMOL/L (ref 98–107)
CO2 SERPL-SCNC: 24 MMOL/L (ref 22–29)
CO2 SERPL-SCNC: 26 MMOL/L (ref 22–29)
CREAT BLD-MCNC: 0.62 MG/DL (ref 0.57–1)
CREAT BLD-MCNC: 0.71 MG/DL (ref 0.57–1)
DEPRECATED RDW RBC AUTO: 41.3 FL (ref 37–54)
ERYTHROCYTE [DISTWIDTH] IN BLOOD BY AUTOMATED COUNT: 13.4 % (ref 12.3–15.4)
GFR SERPL CREATININE-BSD FRML MDRD: 105 ML/MIN/1.73
GFR SERPL CREATININE-BSD FRML MDRD: 123 ML/MIN/1.73
GLOBULIN UR ELPH-MCNC: 2.8 GM/DL
GLUCOSE BLD-MCNC: 307 MG/DL (ref 65–99)
GLUCOSE BLD-MCNC: 421 MG/DL (ref 65–99)
GLUCOSE BLDC GLUCOMTR-MCNC: 233 MG/DL (ref 70–130)
GLUCOSE BLDC GLUCOMTR-MCNC: 235 MG/DL (ref 70–130)
GLUCOSE BLDC GLUCOMTR-MCNC: 238 MG/DL (ref 70–130)
GLUCOSE BLDC GLUCOMTR-MCNC: 261 MG/DL (ref 70–130)
GLUCOSE BLDC GLUCOMTR-MCNC: 281 MG/DL (ref 70–130)
GLUCOSE BLDC GLUCOMTR-MCNC: 305 MG/DL (ref 70–130)
GLUCOSE BLDC GLUCOMTR-MCNC: 312 MG/DL (ref 70–130)
GLUCOSE BLDC GLUCOMTR-MCNC: 319 MG/DL (ref 70–130)
GLUCOSE BLDC GLUCOMTR-MCNC: 364 MG/DL (ref 70–130)
HCT VFR BLD AUTO: 34.2 % (ref 34–46.6)
HGB BLD-MCNC: 11.8 G/DL (ref 12–15.9)
MAGNESIUM SERPL-MCNC: 1.7 MG/DL (ref 1.7–2.2)
MCH RBC QN AUTO: 29.1 PG (ref 26.6–33)
MCHC RBC AUTO-ENTMCNC: 34.5 G/DL (ref 31.5–35.7)
MCV RBC AUTO: 84.2 FL (ref 79–97)
PLATELET # BLD AUTO: 242 10*3/MM3 (ref 140–450)
PMV BLD AUTO: 10.7 FL (ref 6–12)
POTASSIUM BLD-SCNC: 3.9 MMOL/L (ref 3.5–5.2)
POTASSIUM BLD-SCNC: 4.3 MMOL/L (ref 3.5–5.2)
PROT SERPL-MCNC: 6.2 G/DL (ref 6–8.5)
RBC # BLD AUTO: 4.06 10*6/MM3 (ref 3.77–5.28)
SODIUM BLD-SCNC: 134 MMOL/L (ref 136–145)
SODIUM BLD-SCNC: 137 MMOL/L (ref 136–145)
WBC NRBC COR # BLD: 5.15 10*3/MM3 (ref 3.4–10.8)

## 2019-06-09 PROCEDURE — 63710000001 INSULIN ASPART PER 5 UNITS: Performed by: NURSE PRACTITIONER

## 2019-06-09 PROCEDURE — 25010000002 MORPHINE PER 10 MG: Performed by: INTERNAL MEDICINE

## 2019-06-09 PROCEDURE — 86753 PROTOZOA ANTIBODY NOS: CPT | Performed by: NURSE PRACTITIONER

## 2019-06-09 PROCEDURE — 25010000002 ONDANSETRON PER 1 MG: Performed by: INTERNAL MEDICINE

## 2019-06-09 PROCEDURE — 96376 TX/PRO/DX INJ SAME DRUG ADON: CPT

## 2019-06-09 PROCEDURE — 82962 GLUCOSE BLOOD TEST: CPT

## 2019-06-09 PROCEDURE — 80053 COMPREHEN METABOLIC PANEL: CPT | Performed by: INTERNAL MEDICINE

## 2019-06-09 PROCEDURE — 86618 LYME DISEASE ANTIBODY: CPT | Performed by: NURSE PRACTITIONER

## 2019-06-09 PROCEDURE — 83735 ASSAY OF MAGNESIUM: CPT | Performed by: NURSE PRACTITIONER

## 2019-06-09 PROCEDURE — 86666 EHRLICHIA ANTIBODY: CPT | Performed by: NURSE PRACTITIONER

## 2019-06-09 PROCEDURE — G0378 HOSPITAL OBSERVATION PER HR: HCPCS

## 2019-06-09 PROCEDURE — 63710000001 INSULIN DETEMIR PER 5 UNITS: Performed by: NURSE PRACTITIONER

## 2019-06-09 PROCEDURE — 96361 HYDRATE IV INFUSION ADD-ON: CPT

## 2019-06-09 PROCEDURE — 80048 BASIC METABOLIC PNL TOTAL CA: CPT | Performed by: NURSE PRACTITIONER

## 2019-06-09 PROCEDURE — 85027 COMPLETE CBC AUTOMATED: CPT | Performed by: NURSE PRACTITIONER

## 2019-06-09 PROCEDURE — 86757 RICKETTSIA ANTIBODY: CPT | Performed by: NURSE PRACTITIONER

## 2019-06-09 PROCEDURE — 82009 KETONE BODYS QUAL: CPT | Performed by: NURSE PRACTITIONER

## 2019-06-09 RX ORDER — NICOTINE POLACRILEX 4 MG
15 LOZENGE BUCCAL
Status: DISCONTINUED | OUTPATIENT
Start: 2019-06-09 | End: 2019-06-10 | Stop reason: HOSPADM

## 2019-06-09 RX ORDER — DOXYCYCLINE 100 MG/1
100 CAPSULE ORAL EVERY 12 HOURS SCHEDULED
Status: DISCONTINUED | OUTPATIENT
Start: 2019-06-09 | End: 2019-06-10 | Stop reason: HOSPADM

## 2019-06-09 RX ORDER — DEXTROSE MONOHYDRATE 25 G/50ML
25 INJECTION, SOLUTION INTRAVENOUS
Status: DISCONTINUED | OUTPATIENT
Start: 2019-06-09 | End: 2019-06-10 | Stop reason: HOSPADM

## 2019-06-09 RX ORDER — SODIUM CHLORIDE 9 MG/ML
125 INJECTION, SOLUTION INTRAVENOUS CONTINUOUS
Status: DISCONTINUED | OUTPATIENT
Start: 2019-06-09 | End: 2019-06-10 | Stop reason: HOSPADM

## 2019-06-09 RX ADMIN — SERTRALINE HYDROCHLORIDE 25 MG: 25 TABLET ORAL at 07:56

## 2019-06-09 RX ADMIN — SODIUM CHLORIDE, PRESERVATIVE FREE 3 ML: 5 INJECTION INTRAVENOUS at 07:57

## 2019-06-09 RX ADMIN — MORPHINE SULFATE 1 MG: 2 INJECTION, SOLUTION INTRAMUSCULAR; INTRAVENOUS at 21:56

## 2019-06-09 RX ADMIN — MORPHINE SULFATE 1 MG: 2 INJECTION, SOLUTION INTRAMUSCULAR; INTRAVENOUS at 17:40

## 2019-06-09 RX ADMIN — INSULIN ASPART 14 UNITS: 100 INJECTION, SOLUTION INTRAVENOUS; SUBCUTANEOUS at 17:21

## 2019-06-09 RX ADMIN — DOXYCYCLINE 100 MG: 100 CAPSULE ORAL at 20:58

## 2019-06-09 RX ADMIN — SODIUM CHLORIDE, PRESERVATIVE FREE 3 ML: 5 INJECTION INTRAVENOUS at 20:58

## 2019-06-09 RX ADMIN — INSULIN DETEMIR 15 UNITS: 100 INJECTION, SOLUTION SUBCUTANEOUS at 18:30

## 2019-06-09 RX ADMIN — MORPHINE SULFATE 1 MG: 2 INJECTION, SOLUTION INTRAMUSCULAR; INTRAVENOUS at 01:16

## 2019-06-09 RX ADMIN — CEPHALEXIN 500 MG: 500 CAPSULE ORAL at 05:17

## 2019-06-09 RX ADMIN — CLONAZEPAM 1 MG: 0.5 TABLET ORAL at 07:55

## 2019-06-09 RX ADMIN — MUPIROCIN: 20 OINTMENT TOPICAL at 07:58

## 2019-06-09 RX ADMIN — MORPHINE SULFATE 1 MG: 2 INJECTION, SOLUTION INTRAMUSCULAR; INTRAVENOUS at 04:44

## 2019-06-09 RX ADMIN — MORPHINE SULFATE 1 MG: 2 INJECTION, SOLUTION INTRAMUSCULAR; INTRAVENOUS at 09:38

## 2019-06-09 RX ADMIN — MORPHINE SULFATE 1 MG: 2 INJECTION, SOLUTION INTRAMUSCULAR; INTRAVENOUS at 13:23

## 2019-06-09 RX ADMIN — INSULIN ASPART 12 UNITS: 100 INJECTION, SOLUTION INTRAVENOUS; SUBCUTANEOUS at 20:58

## 2019-06-09 RX ADMIN — ONDANSETRON 4 MG: 2 INJECTION INTRAMUSCULAR; INTRAVENOUS at 19:30

## 2019-06-09 RX ADMIN — SODIUM CHLORIDE 125 ML/HR: 9 INJECTION, SOLUTION INTRAVENOUS at 17:21

## 2019-06-09 RX ADMIN — MUPIROCIN: 20 OINTMENT TOPICAL at 20:58

## 2019-06-09 RX ADMIN — CEPHALEXIN 500 MG: 500 CAPSULE ORAL at 13:23

## 2019-06-09 RX ADMIN — CLONAZEPAM 1 MG: 0.5 TABLET ORAL at 16:07

## 2019-06-09 RX ADMIN — ARIPIPRAZOLE 5 MG: 5 TABLET ORAL at 07:57

## 2019-06-09 RX ADMIN — LAMOTRIGINE 100 MG: 100 TABLET ORAL at 07:56

## 2019-06-09 RX ADMIN — ONDANSETRON 4 MG: 2 INJECTION INTRAMUSCULAR; INTRAVENOUS at 05:20

## 2019-06-09 NOTE — PROGRESS NOTES
[facility] Medicine Services  INPATIENT PROGRESS NOTE    Length of Stay: 0  Date of Admission: 6/8/2019  Primary Care Physician: Tavon Avila MD    Subjective   Chief Complaint: weakness, nausea, hyperglycemia  HPI: 19-year-old  female with past medical history of type 1 diabetes with insulin pump, posttraumatic stress disorder, seizure disorder, bipolar disorder who presented on 6/8/2019 with complaints of weakness, nausea, hypoglycemia.  She claimed on admission that insulin pump stopped working a day prior to admission.  The patient has had repeated ER visits and admissions for similar complaints.  She was admitted for DKA and initially treated on stepdown with aggressive IV hydration, IV insulin drip.  Overnight, the patient was transferred off of stepdown unit.  During today's visit, the patient complains of left leg pain.  The patient has approximately 1 cm scabbed area to her left thigh that she reports is some sort of insect bite.  She reports having chills, nausea, malaise since noticing the bite.  She is concerned that it may be spider bite versus tick bite.    Review of Systems   Constitutional: Positive for appetite change and chills. Negative for fatigue and fever.   Respiratory: Negative for cough, shortness of breath and wheezing.    Cardiovascular: Negative for chest pain and palpitations.   Gastrointestinal: Positive for nausea. Negative for abdominal pain and vomiting.   Musculoskeletal: Negative for back pain and neck pain.   Neurological: Negative for dizziness and weakness.   Psychiatric/Behavioral: Negative for confusion. The patient is not nervous/anxious.         All pertinent negatives and positives are as above. All other systems have been reviewed and are negative unless otherwise stated.     Objective    Temp:  [96 °F (35.6 °C)-98.2 °F (36.8 °C)] 97.2 °F (36.2 °C)  Heart Rate:  [49-69] 68  Resp:  [16] 16  BP: ()/(60-79)  104/68    Physical Exam   Constitutional: She is oriented to person, place, and time. She appears well-developed and well-nourished.   HENT:   Head: Normocephalic and atraumatic.   Eyes: Conjunctivae and lids are normal.   Neck: Normal range of motion. Neck supple.   Cardiovascular: Normal rate, normal heart sounds and intact distal pulses.   Pulmonary/Chest: Effort normal and breath sounds normal.   Abdominal: Soft. Bowel sounds are normal.   Musculoskeletal: Normal range of motion. She exhibits no edema.   Neurological: She is alert and oriented to person, place, and time.   Skin: Skin is warm and dry. No rash noted.   1 cm scabbed area to upper left thigh.  No rashes noted.   Psychiatric: She has a normal mood and affect. Her behavior is normal.   Nursing note and vitals reviewed.      Results Review:  I have reviewed the labs, radiology results, and diagnostic studies.    Laboratory Data:   Results from last 7 days   Lab Units 06/09/19 0551 06/08/19 2008 06/08/19 1541 06/08/19  0545   SODIUM mmol/L 137 136 135*   < > 136   POTASSIUM mmol/L 3.9 3.9 3.7   < > 3.5   CHLORIDE mmol/L 100 103 102   < > 96*   CO2 mmol/L 24.0 25.0 20.0*   < > 13.0*   BUN mg/dL 8 7 8   < > 12   CREATININE mg/dL 0.62 0.73 0.67   < > 1.01*   GLUCOSE mg/dL 307* 244* 293*   < > 549*   CALCIUM mg/dL 8.9 8.4* 7.9*   < > 9.2   BILIRUBIN mg/dL 0.2  --   --   --  0.2   ALK PHOS U/L 52  --   --   --  74   ALT (SGPT) U/L 11  --   --   --  14   AST (SGOT) U/L 14  --   --   --  13   ANION GAP mmol/L 13.0 8.0 13.0   < > 27.0    < > = values in this interval not displayed.     Estimated Creatinine Clearance: 105.8 mL/min (by C-G formula based on SCr of 0.62 mg/dL).  Results from last 7 days   Lab Units 06/09/19 0551 06/08/19 2008 06/08/19 1541 06/08/19  1222   MAGNESIUM mg/dL 1.7 1.5* 1.5* 1.6*   PHOSPHORUS mg/dL  --  2.3* 2.8 2.8         Results from last 7 days   Lab Units 06/09/19  1035 06/08/19  0545   WBC 10*3/mm3 5.15 5.77   HEMOGLOBIN  g/dL 11.8* 13.1   HEMATOCRIT % 34.2 38.2   PLATELETS 10*3/mm3 242 268           Culture Data:   No results found for: BLOODCX  No results found for: URINECX  No results found for: RESPCX  No results found for: WOUNDCX  No results found for: STOOLCX  No components found for: BODYFLD    Radiology Data:   Imaging Results (last 24 hours)     ** No results found for the last 24 hours. **          I have reviewed the patient's current medications.     Assessment/Plan     Active Hospital Problems    Diagnosis   • **Diabetic ketoacidosis (CMS/HCC)   • Uncontrolled diabetes mellitus (CMS/HCC)   • Seizure disorder (CMS/HCC)   • Severe depressed bipolar II disorder without psychotic features (CMS/HCC)   • Post traumatic stress disorder (PTSD)   • Diabetic neuropathy (CMS/HCC)       Plan:    #1 DKA: Resolved.  2.  Poorly controlled type 1 diabetes: Patient suspects malfunction of insulin pump.  Hold insulin pump at this time.  Levemir twice daily.  Fingerstick blood sugars before meals and at bedtime with sliding scale insulin coverage.  Plan for consult with endocrinology in a.m.  3.  Seizure disorder: Continue home dose of Lamictal.  4.  Bipolar disorder with depression: Continue Zoloft and Abilify.  5.  Possible tickborne illness: Tick titers collected and pending.  Continue doxycycline.          This document has been electronically signed by ARMOND Cisneros on June 9, 2019 3:34 PM

## 2019-06-09 NOTE — PLAN OF CARE
Problem: Patient Care Overview  Goal: Plan of Care Review  Outcome: Ongoing (interventions implemented as appropriate)   06/09/19 0331   Coping/Psychosocial   Plan of Care Reviewed With patient   Plan of Care Review   Progress improving   OTHER   Outcome Summary pt gap 8, D/C insulin drip around 2000 and restarted home insulin pump. pt ambulated around floor. c/o pain in left thigh at times. vss.       Problem: Hyperglycemia, Persistent (Adult)  Goal: Signs and Symptoms of Listed Potential Problems Will be Absent, Minimized or Managed (Hyperglycemia, Persistent)  Outcome: Ongoing (interventions implemented as appropriate)

## 2019-06-09 NOTE — NURSING NOTE
Informed Dr olguin of Labs. Orders to D/C insulin drip and to resume with home insulin pump. MD aware home insulin pump is charging and will continue with insulin drip until the home pump is charge. Will continue to monitor.

## 2019-06-10 ENCOUNTER — TELEPHONE (OUTPATIENT)
Dept: ENDOCRINOLOGY | Facility: CLINIC | Age: 19
End: 2019-06-10

## 2019-06-10 VITALS
HEIGHT: 67 IN | BODY MASS INDEX: 16.06 KG/M2 | DIASTOLIC BLOOD PRESSURE: 82 MMHG | HEART RATE: 65 BPM | OXYGEN SATURATION: 98 % | RESPIRATION RATE: 16 BRPM | TEMPERATURE: 97.3 F | SYSTOLIC BLOOD PRESSURE: 126 MMHG | WEIGHT: 102.3 LBS

## 2019-06-10 PROBLEM — N39.0 URINARY TRACT INFECTION WITHOUT HEMATURIA: Status: ACTIVE | Noted: 2017-06-01

## 2019-06-10 LAB
ANION GAP SERPL CALCULATED.3IONS-SCNC: 12 MMOL/L
B BURGDOR IGG+IGM SER-ACNC: <0.91 ISR (ref 0–0.9)
B BURGDOR IGG+IGM SER-ACNC: <0.91 ISR (ref 0–0.9)
B BURGDOR IGM SER-ACNC: <0.8 INDEX (ref 0–0.79)
BUN BLD-MCNC: 10 MG/DL (ref 6–20)
BUN/CREAT SERPL: 17.2 (ref 7–25)
CALCIUM SPEC-SCNC: 8.5 MG/DL (ref 8.6–10.5)
CHLORIDE SERPL-SCNC: 103 MMOL/L (ref 98–107)
CO2 SERPL-SCNC: 27 MMOL/L (ref 22–29)
CREAT BLD-MCNC: 0.58 MG/DL (ref 0.57–1)
DEPRECATED RDW RBC AUTO: 42.3 FL (ref 37–54)
ERYTHROCYTE [DISTWIDTH] IN BLOOD BY AUTOMATED COUNT: 13.7 % (ref 12.3–15.4)
GFR SERPL CREATININE-BSD FRML MDRD: 133 ML/MIN/1.73
GLUCOSE BLD-MCNC: 81 MG/DL (ref 65–99)
GLUCOSE BLDC GLUCOMTR-MCNC: 217 MG/DL (ref 70–130)
GLUCOSE BLDC GLUCOMTR-MCNC: 237 MG/DL (ref 70–130)
GLUCOSE BLDC GLUCOMTR-MCNC: 429 MG/DL (ref 70–130)
GLUCOSE BLDC GLUCOMTR-MCNC: 482 MG/DL (ref 70–130)
GLUCOSE BLDC GLUCOMTR-MCNC: 496 MG/DL (ref 70–130)
GLUCOSE BLDC GLUCOMTR-MCNC: 78 MG/DL (ref 70–130)
HCT VFR BLD AUTO: 32.7 % (ref 34–46.6)
HGB BLD-MCNC: 11.2 G/DL (ref 12–15.9)
MCH RBC QN AUTO: 29.1 PG (ref 26.6–33)
MCHC RBC AUTO-ENTMCNC: 34.3 G/DL (ref 31.5–35.7)
MCV RBC AUTO: 84.9 FL (ref 79–97)
PLATELET # BLD AUTO: 256 10*3/MM3 (ref 140–450)
PMV BLD AUTO: 10.7 FL (ref 6–12)
POTASSIUM BLD-SCNC: 3.4 MMOL/L (ref 3.5–5.2)
RBC # BLD AUTO: 3.85 10*6/MM3 (ref 3.77–5.28)
SODIUM BLD-SCNC: 142 MMOL/L (ref 136–145)
WBC NRBC COR # BLD: 4.92 10*3/MM3 (ref 3.4–10.8)

## 2019-06-10 PROCEDURE — 25010000002 ONDANSETRON PER 1 MG: Performed by: INTERNAL MEDICINE

## 2019-06-10 PROCEDURE — 25010000002 MORPHINE PER 10 MG: Performed by: INTERNAL MEDICINE

## 2019-06-10 PROCEDURE — 85027 COMPLETE CBC AUTOMATED: CPT | Performed by: NURSE PRACTITIONER

## 2019-06-10 PROCEDURE — 96376 TX/PRO/DX INJ SAME DRUG ADON: CPT

## 2019-06-10 PROCEDURE — 63710000001 INSULIN DETEMIR PER 5 UNITS: Performed by: NURSE PRACTITIONER

## 2019-06-10 PROCEDURE — 96361 HYDRATE IV INFUSION ADD-ON: CPT

## 2019-06-10 PROCEDURE — G0378 HOSPITAL OBSERVATION PER HR: HCPCS

## 2019-06-10 PROCEDURE — 80048 BASIC METABOLIC PNL TOTAL CA: CPT | Performed by: NURSE PRACTITIONER

## 2019-06-10 PROCEDURE — 82962 GLUCOSE BLOOD TEST: CPT

## 2019-06-10 RX ORDER — POTASSIUM CHLORIDE 750 MG/1
40 CAPSULE, EXTENDED RELEASE ORAL ONCE
Status: COMPLETED | OUTPATIENT
Start: 2019-06-10 | End: 2019-06-10

## 2019-06-10 RX ORDER — DOXYCYCLINE 100 MG/1
100 CAPSULE ORAL EVERY 12 HOURS SCHEDULED
Qty: 16 CAPSULE | Refills: 0 | Status: SHIPPED | OUTPATIENT
Start: 2019-06-10 | End: 2019-06-18

## 2019-06-10 RX ADMIN — SERTRALINE HYDROCHLORIDE 25 MG: 25 TABLET ORAL at 08:49

## 2019-06-10 RX ADMIN — MORPHINE SULFATE 1 MG: 2 INJECTION, SOLUTION INTRAMUSCULAR; INTRAVENOUS at 01:59

## 2019-06-10 RX ADMIN — POTASSIUM CHLORIDE 40 MEQ: 750 CAPSULE, EXTENDED RELEASE ORAL at 08:59

## 2019-06-10 RX ADMIN — MORPHINE SULFATE 1 MG: 2 INJECTION, SOLUTION INTRAMUSCULAR; INTRAVENOUS at 05:47

## 2019-06-10 RX ADMIN — CLONAZEPAM 1 MG: 0.5 TABLET ORAL at 05:47

## 2019-06-10 RX ADMIN — DOXYCYCLINE 100 MG: 100 CAPSULE ORAL at 08:49

## 2019-06-10 RX ADMIN — SODIUM CHLORIDE, PRESERVATIVE FREE 10 ML: 5 INJECTION INTRAVENOUS at 08:49

## 2019-06-10 RX ADMIN — INSULIN DETEMIR 15 UNITS: 100 INJECTION, SOLUTION SUBCUTANEOUS at 08:49

## 2019-06-10 RX ADMIN — ONDANSETRON 4 MG: 2 INJECTION INTRAMUSCULAR; INTRAVENOUS at 08:59

## 2019-06-10 RX ADMIN — LAMOTRIGINE 100 MG: 100 TABLET ORAL at 08:49

## 2019-06-10 RX ADMIN — ARIPIPRAZOLE 5 MG: 5 TABLET ORAL at 08:49

## 2019-06-10 RX ADMIN — MUPIROCIN: 20 OINTMENT TOPICAL at 09:00

## 2019-06-10 NOTE — TELEPHONE ENCOUNTER
Pt is in Hospital and will be dismissed today and Dr. Casper said he needs Severo to see her today . Dr Casper would like Severo to have him paged at the hospital so he can talk to her about the Pt.

## 2019-06-10 NOTE — PLAN OF CARE
Problem: Patient Care Overview  Goal: Plan of Care Review  Outcome: Ongoing (interventions implemented as appropriate)   06/10/19 1204   Coping/Psychosocial   Plan of Care Reviewed With patient   Plan of Care Review   Progress improving   OTHER   Outcome Summary Pt is requesting to be discharged. Pt hooked herself up to her insulin pump for noon insulin dose. Dr. SHARON Casper made aware.      Goal: Individualization and Mutuality  Outcome: Ongoing (interventions implemented as appropriate)      Problem: Hyperglycemia, Persistent (Adult)  Goal: Signs and Symptoms of Listed Potential Problems Will be Absent, Minimized or Managed (Hyperglycemia, Persistent)  Outcome: Ongoing (interventions implemented as appropriate)

## 2019-06-10 NOTE — NURSING NOTE
"Pt states \"I am going to leave AMA if I don't get some answers.\" Informed Dr. SHARON Casper of pt's statement. Dr SHARON Casper, myself, Ariana, RN/CL, and Dee,  in room. MD discussed with pt along with pt's mother who was on speaker phone pt's condition along with events for today. Severo Presley's office has been contacted twice in regards to pt because Dr. Aly HERRERA Is out of office. Messages have been left with  to contact Dr. SHARON Casper.  "

## 2019-06-10 NOTE — DISCHARGE SUMMARY
[address] [address]  T - [phone]     DISCHARGE SUMMARY         PATIENT  DEMOGRAPHICS   PATIENT NAME: Fernanda Patterson                      PHYSICIAN: Rudy Casper MD  : 2000  MRN: [id number]    ADMISSION/DISCHARGE INFO   ADMISSION DATE: 2019   DISCHARGE DATE: 6/10/19    ADMISSION DIAGNOSES: Diabetic ketoacidosis without coma associated with diabetes mellitus due to underlying condition (CMS/[facility]) [E08.10]  DKA (diabetic ketoacidosis) (CMS/[facility]) [E13.10]  DISCHARGE DIAGNOSES:       Diabetic ketoacidosis (CMS/[facility])    Diabetic neuropathy (CMS/[facility])    Severe depressed bipolar II disorder without psychotic features (CMS/[facility])    Post traumatic stress disorder (PTSD)    Seizure disorder (CMS/[facility])    Uncontrolled diabetes mellitus (CMS/[facility])      SERVICE:  Medicine       CONSULTS   Consult Orders (all) (From admission, onward)    Start     Ordered    19 0712  Inpatient Nutrition Consult  Once     Provider:  (Not yet assigned)    19 0711    19 0712  Inpatient Diabetes Educator Consult  Once     Provider:  (Not yet assigned)    19 0711    19 0630  Hospitalist (on-call MD unless specified)  Once     Specialty:  Hospitalist  Provider:  Edmar Hill MD    19 0630          PROCEDURES     Imaging Results (last 24 hours)     ** No results found for the last 24 hours. **          No results found.    HISTORY OF PRESENT ILLNESS   HPI from H and P     Patient is a 19 y.o. female admitted for otherwise weakness, generalized pain, nausea, poor oral intake and out-of-control glucose values.  She claims that the insulin pump has stopped working a day or so ago.  She has had several ER visits and admissions for DKA over the last 6 months    DIAGNOSTIC DATA   Labs  Images    HOSPITAL COURSE   Patient was found to be in DKA therefore was admitted to the hospital.  DKA protocol was followed through.  Patient DKA has resolved.  She  has been transitioned to her insulin pump.  Patient was found to have a small monica on her left thigh which appeared to be a insect bite.  At presumptive diagnosis patient was started on doxycycline.  Tick titers are sent for further evaluation.  Patient will be sent home to complete the course of antibiotics.  Patient sees Dr. Lu for her diabetes and wished to see only see him I have attempted to contact Dr. Lu office but he is out of office for today and tomorrow.  I was unable to get a hold of his nurse practitioner.  I have got her a follow-up appointment within the next 2 days.  Currently has blood sugar is very well controlled.  Patient will be sent home on diabetic diet and to finish the antibiotic.  She has started back on her insulin pump.  At time of discharge patient is stable, denies any chest pain, shortness of air, abdominal pain. Her BG on insulin pump is 237.     She is recommended to establish with PCP and follow-up on her labs that are pending.    Physical Exam   Constitutional: She is oriented to person, place, and time. She appears well-developed and well-nourished.   HENT:   Head: Normocephalic and atraumatic.   Eyes: EOM are normal. Pupils are equal, round, and reactive to light.   Neck: Normal range of motion.   Cardiovascular: Normal rate, regular rhythm and normal heart sounds.   Pulmonary/Chest: Effort normal and breath sounds normal.   Abdominal: Soft. Bowel sounds are normal.   Musculoskeletal: Normal range of motion.   Neurological: She is alert and oriented to person, place, and time.   Skin: Skin is warm. Lesion noted.        Psychiatric: Her mood appears anxious. She is agitated.   Wished to be discharge right away    Vitals reviewed.         DISCHARGE CONDITION   Stable    DISPOSITION   To Home      DISCHARGE MEDICATIONS        Discharge Medications      New Medications      Instructions Start Date   doxycycline 100 MG capsule  Commonly known as:  MONODOX   100 mg, Oral, Every  12 Hours Scheduled         Continue These Medications      Instructions Start Date   albuterol sulfate  (90 Base) MCG/ACT inhaler  Commonly known as:  PROVENTIL HFA;VENTOLIN HFA;PROAIR HFA   2 puffs, Inhalation, Every 4 Hours PRN      ARIPiprazole 5 MG tablet  Commonly known as:  ABILIFY   5 mg, Oral, Daily      Blood Glucose Monitoring Suppl w/Device kit   USE AS INDICATED, ANY MONITOR , ICD10 code is E11.9      Blood Glucose Test strip   Use 4 x daily use any brand covered by insurance or same brand as before ICD10 code is E11.9      clonazePAM 1 MG tablet  Commonly known as:  KlonoPIN   1 mg, Oral, 2 Times Daily PRN      insulin aspart 100 UNIT/ML injection  Commonly known as:  novoLOG   70 units daily through insulin pump      lamoTRIgine 100 MG tablet  Commonly known as:  LaMICtal   100 mg, Oral, Daily      Lancets 30G misc   USE 4 X DAILY      Lancing Device misc   USE AS INDICATED TO CORRELATE WITH STRIPS AND METER      promethazine 25 MG tablet  Commonly known as:  PHENERGAN   25 mg, Oral, Every 6 Hours PRN      sertraline 25 MG tablet  Commonly known as:  ZOLOFT   25 mg, Oral, Daily      VYVANSE 50 MG capsule  Generic drug:  lisdexamfetamine   50 mg, Oral, Every Morning               INSTRUCTIONS   Activity:   Activity Instructions     Discharge Activity      As tolerated          Diet:   Diet Instructions     Diet: Consistent Carbohydrate      Discharge Diet:  Consistent Carbohydrate          Special Instructions: Patient instructed to call M.D. or return to ED with worsening shortness of breath, chest pain, fever greater than 100.4°F or any other medical concerns.    FOLLOW UP   Follow-up Information     Tavon Avila MD Follow up on 6/12/2019.    Specialty:  Endocrinology  Why:  Hospital follow up appointment Tuesday 6/12/19 at 9:45am.  Contact information:  [address]  [facility] [address] [phone]                  PENDING TEST RESULTS AT DISCHARGE    Order Current  Status    B. Burgdorferi Antibodies, WB Reflex In process    Babesia Microti Antibody Panel In process    Ehrlichia Antibody Panel In process    Lyme, IgM, Early Test / Reflex In process    Lyme, Total Antibody Test / Reflex In process    [facility] Spotted Fever, IgM In process    [facility] Spotted Fever, IgG In process         TIME   Time: 45 minutes were spent planning this discharge.                      This document has been electronically signed by Rudy Casper MD on Eva 10, 2019 11:41 AM

## 2019-06-10 NOTE — PLAN OF CARE
Problem: Patient Care Overview  Goal: Plan of Care Review  Outcome: Ongoing (interventions implemented as appropriate)   06/10/19 0104   Coping/Psychosocial   Plan of Care Reviewed With patient   OTHER   Outcome Summary all needs met at this time     Goal: Individualization and Mutuality  Outcome: Ongoing (interventions implemented as appropriate)    Goal: Discharge Needs Assessment  Outcome: Ongoing (interventions implemented as appropriate)    Goal: Interprofessional Rounds/Family Conf  Outcome: Ongoing (interventions implemented as appropriate)      Problem: Hyperglycemia, Persistent (Adult)  Goal: Signs and Symptoms of Listed Potential Problems Will be Absent, Minimized or Managed (Hyperglycemia, Persistent)  Outcome: Ongoing (interventions implemented as appropriate)

## 2019-06-11 ENCOUNTER — READMISSION MANAGEMENT (OUTPATIENT)
Dept: CALL CENTER | Facility: HOSPITAL | Age: 19
End: 2019-06-11

## 2019-06-11 LAB
B MICROTI IGG TITR SER: NORMAL {TITER}
B MICROTI IGM TITR SER: NORMAL {TITER}
R RICKETTSI IGG SER QL IA: NEGATIVE

## 2019-06-11 NOTE — OUTREACH NOTE
Prep Survey      Responses   Facility patient discharged from?  [facility]   Is patient eligible?  Yes   Discharge diagnosis  Diabetic ketoacidosis   Does the patient have one of the following disease processes/diagnoses(primary or secondary)?  Other   Does the patient have Home health ordered?  No   Is there a DME ordered?  No   General alerts for this patient  Possible Tick Bite   Prep survey completed?  Yes          Rosalina Matson RN

## 2019-06-12 ENCOUNTER — READMISSION MANAGEMENT (OUTPATIENT)
Dept: CALL CENTER | Facility: HOSPITAL | Age: 19
End: 2019-06-12

## 2019-06-12 LAB
A PHAGOCYTOPH IGM TITR SER IF: NEGATIVE {TITER}
CONV HGE IGG TITER: NEGATIVE
E CHAFFEENSIS IGG TITR SER IF: NEGATIVE {TITER}
E. CHAFFEENSIS (HME) IGM TITER: NEGATIVE
R RICKETTSI IGM TITR SER: 0.2 INDEX (ref 0–0.89)

## 2019-06-12 NOTE — OUTREACH NOTE
Medical Week 1 Survey      Responses   Facility patient discharged from?  [facility]   Does the patient have one of the following disease processes/diagnoses(primary or secondary)?  Other   Is there a successful TCM telephone encounter documented?  No   Week 1 attempt successful?  Yes   Revoke  Decline to participate [Hung up on the nurse. Disenrolled. ]   Call end time  1012   General alerts for this patient  Possible Tick Bite   Discharge diagnosis  Diabetic ketoacidosis   Week 1 call completed?  Yes          Sarah Lyons RN

## 2019-08-11 ENCOUNTER — HOSPITAL ENCOUNTER (INPATIENT)
Facility: HOSPITAL | Age: 19
LOS: 1 days | Discharge: HOME | End: 2019-08-12
Attending: FAMILY MEDICINE | Admitting: FAMILY MEDICINE

## 2019-08-11 DIAGNOSIS — E10.10 DIABETIC KETOACIDOSIS WITHOUT COMA ASSOCIATED WITH TYPE 1 DIABETES MELLITUS (HCC): Primary | ICD-10-CM

## 2019-08-11 LAB
ACETONE BLD QL: ABNORMAL
ALBUMIN SERPL-MCNC: 4.5 G/DL (ref 3.5–5.2)
ALBUMIN/GLOB SERPL: 1.5 G/DL
ALP SERPL-CCNC: 55 U/L (ref 39–117)
ALT SERPL W P-5'-P-CCNC: 11 U/L (ref 1–33)
AMPHET+METHAMPHET UR QL: NEGATIVE
AMPHETAMINES UR QL: NEGATIVE
ANION GAP SERPL CALCULATED.3IONS-SCNC: 22 MMOL/L (ref 5–15)
ANION GAP SERPL CALCULATED.3IONS-SCNC: 23 MMOL/L (ref 5–15)
ARTERIAL PATENCY WRIST A: ABNORMAL
AST SERPL-CCNC: 10 U/L (ref 1–32)
ATMOSPHERIC PRESS: 747 MMHG
B-HCG UR QL: NEGATIVE
BARBITURATES UR QL SCN: NEGATIVE
BASE EXCESS BLDA CALC-SCNC: -5.9 MMOL/L (ref 0–2)
BASOPHILS # BLD AUTO: 0.06 10*3/MM3 (ref 0–0.2)
BASOPHILS NFR BLD AUTO: 0.9 % (ref 0–1.5)
BDY SITE: ABNORMAL
BENZODIAZ UR QL SCN: NEGATIVE
BILIRUB SERPL-MCNC: 0.4 MG/DL (ref 0.2–1.2)
BILIRUB UR QL STRIP: NEGATIVE
BUN BLD-MCNC: 10 MG/DL (ref 6–20)
BUN BLD-MCNC: 9 MG/DL (ref 6–20)
BUN/CREAT SERPL: 11.6 (ref 7–25)
BUN/CREAT SERPL: 12.7 (ref 7–25)
BUPRENORPHINE SERPL-MCNC: NEGATIVE NG/ML
CALCIUM SPEC-SCNC: 8.4 MG/DL (ref 8.6–10.5)
CALCIUM SPEC-SCNC: 9.4 MG/DL (ref 8.6–10.5)
CANNABINOIDS SERPL QL: POSITIVE
CHLORIDE SERPL-SCNC: 101 MMOL/L (ref 98–107)
CHLORIDE SERPL-SCNC: 96 MMOL/L (ref 98–107)
CK SERPL-CCNC: 35 U/L (ref 20–180)
CLARITY UR: CLEAR
CO2 SERPL-SCNC: 14 MMOL/L (ref 22–29)
CO2 SERPL-SCNC: 17 MMOL/L (ref 22–29)
COCAINE UR QL: NEGATIVE
COLOR UR: YELLOW
CREAT BLD-MCNC: 0.71 MG/DL (ref 0.57–1)
CREAT BLD-MCNC: 0.86 MG/DL (ref 0.57–1)
DEPRECATED RDW RBC AUTO: 40.9 FL (ref 37–54)
EOSINOPHIL # BLD AUTO: 0.06 10*3/MM3 (ref 0–0.4)
EOSINOPHIL NFR BLD AUTO: 0.9 % (ref 0.3–6.2)
ERYTHROCYTE [DISTWIDTH] IN BLOOD BY AUTOMATED COUNT: 13.2 % (ref 12.3–15.4)
GFR SERPL CREATININE-BSD FRML MDRD: 105 ML/MIN/1.73
GFR SERPL CREATININE-BSD FRML MDRD: 84 ML/MIN/1.73
GLOBULIN UR ELPH-MCNC: 3.1 GM/DL
GLUCOSE BLD-MCNC: 285 MG/DL (ref 65–99)
GLUCOSE BLD-MCNC: 345 MG/DL (ref 65–99)
GLUCOSE BLDC GLUCOMTR-MCNC: 276 MG/DL (ref 70–130)
GLUCOSE BLDC GLUCOMTR-MCNC: 292 MG/DL (ref 70–130)
GLUCOSE UR STRIP-MCNC: ABNORMAL MG/DL
HCG SERPL QL: NEGATIVE
HCO3 BLDA-SCNC: 17.7 MMOL/L (ref 20–26)
HCT VFR BLD AUTO: 40.8 % (ref 34–46.6)
HGB BLD-MCNC: 13.9 G/DL (ref 12–15.9)
HGB UR QL STRIP.AUTO: NEGATIVE
HOLD SPECIMEN: NORMAL
IMM GRANULOCYTES # BLD AUTO: 0.07 10*3/MM3 (ref 0–0.05)
IMM GRANULOCYTES NFR BLD AUTO: 1 % (ref 0–0.5)
KETONES UR QL STRIP: ABNORMAL
LEUKOCYTE ESTERASE UR QL STRIP.AUTO: NEGATIVE
LYMPHOCYTES # BLD AUTO: 2.07 10*3/MM3 (ref 0.7–3.1)
LYMPHOCYTES NFR BLD AUTO: 30.5 % (ref 19.6–45.3)
Lab: ABNORMAL
MAGNESIUM SERPL-MCNC: 1.5 MG/DL (ref 1.7–2.2)
MAGNESIUM SERPL-MCNC: 2.2 MG/DL (ref 1.7–2.2)
MCH RBC QN AUTO: 29 PG (ref 26.6–33)
MCHC RBC AUTO-ENTMCNC: 34.1 G/DL (ref 31.5–35.7)
MCV RBC AUTO: 85 FL (ref 79–97)
METHADONE UR QL SCN: NEGATIVE
MODALITY: ABNORMAL
MONOCYTES # BLD AUTO: 0.38 10*3/MM3 (ref 0.1–0.9)
MONOCYTES NFR BLD AUTO: 5.6 % (ref 5–12)
NEUTROPHILS # BLD AUTO: 4.14 10*3/MM3 (ref 1.7–7)
NEUTROPHILS NFR BLD AUTO: 61.1 % (ref 42.7–76)
NITRITE UR QL STRIP: NEGATIVE
NRBC BLD AUTO-RTO: 0 /100 WBC (ref 0–0.2)
OPIATES UR QL: POSITIVE
OXYCODONE UR QL SCN: NEGATIVE
PCO2 BLDA: 28.7 MM HG (ref 35–45)
PCP UR QL SCN: NEGATIVE
PH BLDA: 7.4 PH UNITS (ref 7.35–7.45)
PH UR STRIP.AUTO: 5.5 [PH] (ref 5–9)
PHOSPHATE SERPL-MCNC: 2.8 MG/DL (ref 2.5–4.5)
PLATELET # BLD AUTO: 333 10*3/MM3 (ref 140–450)
PMV BLD AUTO: 10 FL (ref 6–12)
PO2 BLDA: 116 MM HG (ref 83–108)
POTASSIUM BLD-SCNC: 4.1 MMOL/L (ref 3.5–5.2)
POTASSIUM BLD-SCNC: 4.1 MMOL/L (ref 3.5–5.2)
PROPOXYPH UR QL: NEGATIVE
PROT SERPL-MCNC: 7.6 G/DL (ref 6–8.5)
PROT UR QL STRIP: NEGATIVE
RBC # BLD AUTO: 4.8 10*6/MM3 (ref 3.77–5.28)
SAO2 % BLDCOA: 98.9 % (ref 94–99)
SODIUM BLD-SCNC: 136 MMOL/L (ref 136–145)
SODIUM BLD-SCNC: 137 MMOL/L (ref 136–145)
SP GR UR STRIP: 1.03 (ref 1–1.03)
TRICYCLICS UR QL SCN: NEGATIVE
UROBILINOGEN UR QL STRIP: ABNORMAL
VENTILATOR MODE: ABNORMAL
WBC NRBC COR # BLD: 6.78 10*3/MM3 (ref 3.4–10.8)
WHOLE BLOOD HOLD SPECIMEN: NORMAL
WHOLE BLOOD HOLD SPECIMEN: NORMAL

## 2019-08-11 PROCEDURE — 80048 BASIC METABOLIC PNL TOTAL CA: CPT | Performed by: STUDENT IN AN ORGANIZED HEALTH CARE EDUCATION/TRAINING PROGRAM

## 2019-08-11 PROCEDURE — 25010000002 METOCLOPRAMIDE PER 10 MG: Performed by: STUDENT IN AN ORGANIZED HEALTH CARE EDUCATION/TRAINING PROGRAM

## 2019-08-11 PROCEDURE — 25010000002 DIPHENHYDRAMINE PER 50 MG: Performed by: STUDENT IN AN ORGANIZED HEALTH CARE EDUCATION/TRAINING PROGRAM

## 2019-08-11 PROCEDURE — 81003 URINALYSIS AUTO W/O SCOPE: CPT | Performed by: FAMILY MEDICINE

## 2019-08-11 PROCEDURE — 99285 EMERGENCY DEPT VISIT HI MDM: CPT

## 2019-08-11 PROCEDURE — 93010 ELECTROCARDIOGRAM REPORT: CPT | Performed by: INTERNAL MEDICINE

## 2019-08-11 PROCEDURE — 80053 COMPREHEN METABOLIC PANEL: CPT | Performed by: FAMILY MEDICINE

## 2019-08-11 PROCEDURE — 81025 URINE PREGNANCY TEST: CPT | Performed by: FAMILY MEDICINE

## 2019-08-11 PROCEDURE — 93005 ELECTROCARDIOGRAM TRACING: CPT | Performed by: FAMILY MEDICINE

## 2019-08-11 PROCEDURE — 83036 HEMOGLOBIN GLYCOSYLATED A1C: CPT | Performed by: STUDENT IN AN ORGANIZED HEALTH CARE EDUCATION/TRAINING PROGRAM

## 2019-08-11 PROCEDURE — 25010000002 ONDANSETRON PER 1 MG: Performed by: FAMILY MEDICINE

## 2019-08-11 PROCEDURE — 83735 ASSAY OF MAGNESIUM: CPT | Performed by: FAMILY MEDICINE

## 2019-08-11 PROCEDURE — 25010000002 MORPHINE PER 10 MG: Performed by: FAMILY MEDICINE

## 2019-08-11 PROCEDURE — 82962 GLUCOSE BLOOD TEST: CPT

## 2019-08-11 PROCEDURE — 84703 CHORIONIC GONADOTROPIN ASSAY: CPT | Performed by: FAMILY MEDICINE

## 2019-08-11 PROCEDURE — 93005 ELECTROCARDIOGRAM TRACING: CPT

## 2019-08-11 PROCEDURE — 36600 WITHDRAWAL OF ARTERIAL BLOOD: CPT

## 2019-08-11 PROCEDURE — G0378 HOSPITAL OBSERVATION PER HR: HCPCS

## 2019-08-11 PROCEDURE — 82803 BLOOD GASES ANY COMBINATION: CPT

## 2019-08-11 PROCEDURE — 83735 ASSAY OF MAGNESIUM: CPT | Performed by: STUDENT IN AN ORGANIZED HEALTH CARE EDUCATION/TRAINING PROGRAM

## 2019-08-11 PROCEDURE — 80306 DRUG TEST PRSMV INSTRMNT: CPT | Performed by: STUDENT IN AN ORGANIZED HEALTH CARE EDUCATION/TRAINING PROGRAM

## 2019-08-11 PROCEDURE — 85025 COMPLETE CBC W/AUTO DIFF WBC: CPT | Performed by: FAMILY MEDICINE

## 2019-08-11 PROCEDURE — 82009 KETONE BODYS QUAL: CPT | Performed by: FAMILY MEDICINE

## 2019-08-11 PROCEDURE — 84100 ASSAY OF PHOSPHORUS: CPT | Performed by: STUDENT IN AN ORGANIZED HEALTH CARE EDUCATION/TRAINING PROGRAM

## 2019-08-11 PROCEDURE — 94760 N-INVAS EAR/PLS OXIMETRY 1: CPT

## 2019-08-11 PROCEDURE — 82550 ASSAY OF CK (CPK): CPT | Performed by: FAMILY MEDICINE

## 2019-08-11 PROCEDURE — 94640 AIRWAY INHALATION TREATMENT: CPT

## 2019-08-11 PROCEDURE — 99222 1ST HOSP IP/OBS MODERATE 55: CPT | Performed by: STUDENT IN AN ORGANIZED HEALTH CARE EDUCATION/TRAINING PROGRAM

## 2019-08-11 PROCEDURE — 25010000002 MAGNESIUM SULFATE 2 GM/50ML SOLUTION: Performed by: FAMILY MEDICINE

## 2019-08-11 RX ORDER — ACETAMINOPHEN 325 MG/1
650 TABLET ORAL EVERY 6 HOURS PRN
Status: DISCONTINUED | OUTPATIENT
Start: 2019-08-11 | End: 2019-08-12 | Stop reason: HOSPADM

## 2019-08-11 RX ORDER — ALBUTEROL SULFATE 2.5 MG/3ML
2.5 SOLUTION RESPIRATORY (INHALATION) ONCE
Status: COMPLETED | OUTPATIENT
Start: 2019-08-11 | End: 2019-08-11

## 2019-08-11 RX ORDER — CLONAZEPAM 0.5 MG/1
1 TABLET ORAL 2 TIMES DAILY PRN
Status: DISCONTINUED | OUTPATIENT
Start: 2019-08-11 | End: 2019-08-12 | Stop reason: HOSPADM

## 2019-08-11 RX ORDER — DEXTROSE AND SODIUM CHLORIDE 5; .45 G/100ML; G/100ML
250 INJECTION, SOLUTION INTRAVENOUS CONTINUOUS PRN
Status: DISCONTINUED | OUTPATIENT
Start: 2019-08-11 | End: 2019-08-12

## 2019-08-11 RX ORDER — DEXTROSE MONOHYDRATE 25 G/50ML
12.5 INJECTION, SOLUTION INTRAVENOUS AS NEEDED
Status: DISCONTINUED | OUTPATIENT
Start: 2019-08-11 | End: 2019-08-12 | Stop reason: HOSPADM

## 2019-08-11 RX ORDER — SODIUM CHLORIDE 0.9 % (FLUSH) 0.9 %
3-10 SYRINGE (ML) INJECTION AS NEEDED
Status: DISCONTINUED | OUTPATIENT
Start: 2019-08-11 | End: 2019-08-12 | Stop reason: HOSPADM

## 2019-08-11 RX ORDER — SODIUM CHLORIDE 0.9 % (FLUSH) 0.9 %
30 SYRINGE (ML) INJECTION ONCE AS NEEDED
Status: DISCONTINUED | OUTPATIENT
Start: 2019-08-11 | End: 2019-08-12 | Stop reason: HOSPADM

## 2019-08-11 RX ORDER — SODIUM CHLORIDE 9 MG/ML
125 INJECTION, SOLUTION INTRAVENOUS CONTINUOUS
Status: DISCONTINUED | OUTPATIENT
Start: 2019-08-11 | End: 2019-08-11

## 2019-08-11 RX ORDER — SODIUM CHLORIDE 0.9 % (FLUSH) 0.9 %
3 SYRINGE (ML) INJECTION EVERY 12 HOURS SCHEDULED
Status: DISCONTINUED | OUTPATIENT
Start: 2019-08-11 | End: 2019-08-12 | Stop reason: HOSPADM

## 2019-08-11 RX ORDER — ALBUTEROL SULFATE 2.5 MG/3ML
2.5 SOLUTION RESPIRATORY (INHALATION) EVERY 6 HOURS PRN
Status: DISCONTINUED | OUTPATIENT
Start: 2019-08-11 | End: 2019-08-12 | Stop reason: HOSPADM

## 2019-08-11 RX ORDER — MAGNESIUM SULFATE HEPTAHYDRATE 40 MG/ML
2 INJECTION, SOLUTION INTRAVENOUS ONCE
Status: COMPLETED | OUTPATIENT
Start: 2019-08-11 | End: 2019-08-11

## 2019-08-11 RX ORDER — SODIUM CHLORIDE 0.9 % (FLUSH) 0.9 %
10 SYRINGE (ML) INJECTION AS NEEDED
Status: DISCONTINUED | OUTPATIENT
Start: 2019-08-11 | End: 2019-08-12 | Stop reason: HOSPADM

## 2019-08-11 RX ORDER — SERTRALINE HYDROCHLORIDE 25 MG/1
25 TABLET, FILM COATED ORAL DAILY
Status: DISCONTINUED | OUTPATIENT
Start: 2019-08-11 | End: 2019-08-12

## 2019-08-11 RX ORDER — DIPHENHYDRAMINE HYDROCHLORIDE 50 MG/ML
12.5 INJECTION INTRAMUSCULAR; INTRAVENOUS ONCE
Status: COMPLETED | OUTPATIENT
Start: 2019-08-11 | End: 2019-08-11

## 2019-08-11 RX ORDER — SODIUM CHLORIDE 9 MG/ML
200 INJECTION, SOLUTION INTRAVENOUS CONTINUOUS
Status: DISCONTINUED | OUTPATIENT
Start: 2019-08-11 | End: 2019-08-12 | Stop reason: HOSPADM

## 2019-08-11 RX ORDER — ONDANSETRON 2 MG/ML
4 INJECTION INTRAMUSCULAR; INTRAVENOUS ONCE
Status: COMPLETED | OUTPATIENT
Start: 2019-08-11 | End: 2019-08-11

## 2019-08-11 RX ORDER — METOCLOPRAMIDE HYDROCHLORIDE 5 MG/ML
10 INJECTION INTRAMUSCULAR; INTRAVENOUS 3 TIMES DAILY
Status: DISCONTINUED | OUTPATIENT
Start: 2019-08-11 | End: 2019-08-12 | Stop reason: HOSPADM

## 2019-08-11 RX ORDER — SODIUM CHLORIDE 9 MG/ML
30 INJECTION, SOLUTION INTRAVENOUS CONTINUOUS PRN
Status: DISCONTINUED | OUTPATIENT
Start: 2019-08-11 | End: 2019-08-12 | Stop reason: HOSPADM

## 2019-08-11 RX ADMIN — ONDANSETRON 4 MG: 2 INJECTION INTRAMUSCULAR; INTRAVENOUS at 15:39

## 2019-08-11 RX ADMIN — SODIUM CHLORIDE 200 ML/HR: 9 INJECTION, SOLUTION INTRAVENOUS at 19:29

## 2019-08-11 RX ADMIN — CLONAZEPAM 1 MG: 0.5 TABLET ORAL at 22:52

## 2019-08-11 RX ADMIN — SODIUM CHLORIDE 1000 ML: 9 INJECTION, SOLUTION INTRAVENOUS at 19:28

## 2019-08-11 RX ADMIN — DIPHENHYDRAMINE HYDROCHLORIDE 12.5 MG: 50 INJECTION INTRAMUSCULAR; INTRAVENOUS at 23:27

## 2019-08-11 RX ADMIN — METOCLOPRAMIDE 10 MG: 5 INJECTION, SOLUTION INTRAMUSCULAR; INTRAVENOUS at 19:28

## 2019-08-11 RX ADMIN — SODIUM CHLORIDE 1000 ML: 9 INJECTION, SOLUTION INTRAVENOUS at 15:39

## 2019-08-11 RX ADMIN — ALBUTEROL SULFATE 2.5 MG: 2.5 SOLUTION RESPIRATORY (INHALATION) at 20:00

## 2019-08-11 RX ADMIN — ACETAMINOPHEN 650 MG: 325 TABLET, FILM COATED ORAL at 22:52

## 2019-08-11 RX ADMIN — SODIUM CHLORIDE 125 ML/HR: 900 INJECTION, SOLUTION INTRAVENOUS at 16:08

## 2019-08-11 RX ADMIN — MORPHINE SULFATE 4 MG: 4 INJECTION INTRAVENOUS at 15:52

## 2019-08-11 RX ADMIN — MAGNESIUM SULFATE HEPTAHYDRATE 2 G: 40 INJECTION, SOLUTION INTRAVENOUS at 16:08

## 2019-08-11 NOTE — ED PROVIDER NOTES
Subjective   Patient believes that she may be in diabetic ketoacidosis and states that she has been roughly 12 times already this year.  She has an insulin pump that is managed by Dr. Elizabeth.        Chest Pain   Pain location:  Substernal area  Pain quality: aching    Pain severity:  Mild  Onset quality:  Unable to specify  Timing:  Intermittent  Progression:  Waxing and waning  Chronicity:  Recurrent  Associated symptoms: fatigue, nausea, vomiting and weakness    Associated symptoms: no abdominal pain, no cough, no diaphoresis, no dizziness, no dysphagia, no fever, no headache and no shortness of breath    Risk factors: diabetes mellitus    Hyperglycemia   Severity:  Moderate  Onset quality:  Unable to specify  Duration:  2 days  Progression:  Worsening  Chronicity:  Recurrent  Diabetes status:  Controlled with insulin  Associated symptoms: fatigue, nausea, vomiting and weakness    Associated symptoms: no abdominal pain, no chest pain, no confusion, no diaphoresis, no dizziness, no dysuria, no fever, no polyuria and no shortness of breath    Risk factors: no obesity        Review of Systems   Constitutional: Positive for activity change, appetite change and fatigue. Negative for chills, diaphoresis and fever.   HENT: Negative for congestion, ear discharge, ear pain, nosebleeds, rhinorrhea, sinus pressure, sore throat and trouble swallowing.    Eyes: Negative for discharge and redness.   Respiratory: Negative for apnea, cough, chest tightness, shortness of breath and wheezing.    Cardiovascular: Negative for chest pain.   Gastrointestinal: Positive for nausea and vomiting. Negative for abdominal pain and diarrhea.   Endocrine: Negative for polyuria.   Genitourinary: Negative for dysuria, frequency and urgency.   Musculoskeletal: Negative for myalgias and neck pain.   Skin: Negative for color change and rash.   Allergic/Immunologic: Negative for immunocompromised state.   Neurological: Positive for weakness.  Negative for dizziness, seizures, syncope, light-headedness and headaches.   Hematological: Negative for adenopathy. Does not bruise/bleed easily.   Psychiatric/Behavioral: Negative for behavioral problems and confusion.   All other systems reviewed and are negative.      Past Medical History:   Diagnosis Date   • Diabetes mellitus type 1 (CMS/HCC)    • Diabetic gastroparesis (CMS/HCC)    • Diabetic ketoacidosis (CMS/HCC)    • Diabetic neuropathy (CMS/HCC)    • Post traumatic stress disorder (PTSD)    • Recurrent UTI    • Seizure disorder (CMS/HCC)    • Severe depressed bipolar II disorder without psychotic features (CMS/HCC)        Allergies   Allergen Reactions   • Pineapple Anaphylaxis   • Benzoyl Peroxide Swelling       Past Surgical History:   Procedure Laterality Date   •  SECTION N/A 2018       Family History   Problem Relation Age of Onset   • Drug abuse Father    • Suicide Attempts Brother    • Dementia Maternal Grandfather    • Hypertension Paternal Grandmother        Social History     Socioeconomic History   • Marital status: Single     Spouse name: Not on file   • Number of children: Not on file   • Years of education: Not on file   • Highest education level: Not on file   Tobacco Use   • Smoking status: Current Every Day Smoker     Packs/day: 0.50     Years: 1.00     Pack years: 0.50   • Smokeless tobacco: Never Used   Substance and Sexual Activity   • Alcohol use: No   • Drug use: Yes     Types: Marijuana   • Sexual activity: Yes     Partners: Male   Social History Narrative    Substance Abuse: Pt drinks EtOH occasionally, stating once every 6 months. Pt smokes marijuana, but denied any other illicit drugs. Pt smokes 0.5-1 ppd of cigarettes x 1 year. Pt denies caffeine consumption, states she drinks only water.         Marriages: none    Current Relationships: Recent breakup with her boyfriend    Children: one child that  2wks ago at 2 months old.        Occupation:  Pt is a   and loves her job, but hasn't been able to work since baby was born via C/S    Living Situation: was living with her boyfriend but they are  over the death of their child.  She plans to live with mom after discharge.           Objective   Physical Exam   Constitutional: She is oriented to person, place, and time. She appears well-developed and well-nourished.   HENT:   Head: Normocephalic and atraumatic.   Nose: Nose normal.   Mouth/Throat: Oropharynx is clear and moist.   Eyes: Conjunctivae and EOM are normal. Pupils are equal, round, and reactive to light. Right eye exhibits no discharge. Left eye exhibits no discharge. No scleral icterus.   Neck: Normal range of motion. Neck supple. No tracheal deviation present.   Cardiovascular: Normal rate, regular rhythm and normal heart sounds.   No murmur heard.  Pulmonary/Chest: Effort normal and breath sounds normal. No stridor. No respiratory distress. She has no wheezes. She has no rales.   Abdominal: Soft. Bowel sounds are normal. She exhibits no distension and no mass. There is generalized tenderness. There is no rebound and no guarding.   Musculoskeletal: She exhibits no edema.   Neurological: She is alert and oriented to person, place, and time. Coordination normal.   Skin: Skin is warm and dry. No rash noted. No erythema.   Psychiatric: She has a normal mood and affect. Her behavior is normal. Thought content normal.   Nursing note and vitals reviewed.      ECG 12 Lead    Date/Time: 8/11/2019 5:20 PM  Performed by: Enrike Hu MD  Authorized by: Enrike Hu MD   Interpreted by physician  Rhythm: sinus tachycardia  Rate: tachycardic  BPM: 116  ST Segments: ST segments normal                   ED Course          Labs Reviewed   COMPREHENSIVE METABOLIC PANEL - Abnormal; Notable for the following components:       Result Value    Glucose 345 (*)     Chloride 96 (*)     CO2 17.0 (*)     Anion Gap 23.0 (*)     All other components within  normal limits    Narrative:     GFR Normal >60  Chronic Kidney Disease <60  Kidney Failure <15   CBC WITH AUTO DIFFERENTIAL - Abnormal; Notable for the following components:    Immature Grans % 1.0 (*)     Immature Grans, Absolute 0.07 (*)     All other components within normal limits   ACETONE - Abnormal; Notable for the following components:    Acetone Large (*)     All other components within normal limits   MAGNESIUM - Abnormal; Notable for the following components:    Magnesium 1.5 (*)     All other components within normal limits   BLOOD GAS, ARTERIAL - Abnormal; Notable for the following components:    pCO2, Arterial 28.7 (*)     pO2, Arterial 116.0 (*)     HCO3, Arterial 17.7 (*)     Base Excess, Arterial -5.9 (*)     All other components within normal limits   HCG, SERUM, QUALITATIVE - Normal   CK - Normal   RAINBOW DRAW    Narrative:     The following orders were created for panel order [facility] Draw.  Procedure                               Abnormality         Status                     ---------                               -----------         ------                     Light Blue Top[[id number]]                                   Final result               Green Top (Gel)[[id number]]                                  Final result               Lavender Top[[id number]]                                     Final result               Gold Top - SST[[id number]]                                                                Please view results for these tests on the individual orders.   BLOOD GAS, ARTERIAL   URINALYSIS W/ MICROSCOPIC IF INDICATED (NO CULTURE)   PREGNANCY, URINE   POCT GLUCOSE FINGERSTICK   POCT GLUCOSE FINGERSTICK   CBC AND DIFFERENTIAL    Narrative:     The following orders were created for panel order CBC & Differential.  Procedure                               Abnormality         Status                     ---------                               -----------         ------                     CBC  Auto Differential[[id number]]        Abnormal            Final result                 Please view results for these tests on the individual orders.   KETONE BODIES SERUM    Narrative:     The following orders were created for panel order Ketone Bodies, Serum (Not performed at [facility]).  Procedure                               Abnormality         Status                     ---------                               -----------         ------                     Acetone[[id number]]                      Abnormal            Final result                 Please view results for these tests on the individual orders.   LIGHT BLUE TOP   GREEN TOP   LAVENDER TOP       No orders to display                 MDM      Final diagnoses:   Diabetic ketoacidosis without coma associated with type 1 diabetes mellitus (CMS/[facility])            Enrike Hu MD  08/11/19 0500

## 2019-08-12 VITALS
HEIGHT: 67 IN | DIASTOLIC BLOOD PRESSURE: 66 MMHG | WEIGHT: 104.2 LBS | RESPIRATION RATE: 18 BRPM | TEMPERATURE: 97.5 F | HEART RATE: 96 BPM | BODY MASS INDEX: 16.35 KG/M2 | OXYGEN SATURATION: 99 % | SYSTOLIC BLOOD PRESSURE: 100 MMHG

## 2019-08-12 PROBLEM — F90.9 ADHD: Status: ACTIVE | Noted: 2019-08-12

## 2019-08-12 PROBLEM — F17.210 CIGARETTE NICOTINE DEPENDENCE WITHOUT COMPLICATION: Status: ACTIVE | Noted: 2019-08-12

## 2019-08-12 PROBLEM — R11.2 CANNABINOID HYPEREMESIS SYNDROME: Status: ACTIVE | Noted: 2019-08-12

## 2019-08-12 PROBLEM — D64.9 ANEMIA: Status: ACTIVE | Noted: 2019-08-12

## 2019-08-12 PROBLEM — G47.01 INSOMNIA DUE TO MEDICAL CONDITION: Status: ACTIVE | Noted: 2019-08-12

## 2019-08-12 PROBLEM — F12.90 CANNABINOID HYPEREMESIS SYNDROME: Status: ACTIVE | Noted: 2019-08-12

## 2019-08-12 PROBLEM — F31.9 BIPOLAR 1 DISORDER: Status: ACTIVE | Noted: 2019-08-12

## 2019-08-12 PROBLEM — F60.3 BORDERLINE PERSONALITY DISORDER: Status: ACTIVE | Noted: 2019-08-12

## 2019-08-12 LAB
ALBUMIN SERPL-MCNC: 3 G/DL (ref 3.5–5.2)
ALBUMIN/GLOB SERPL: 1.3 G/DL
ALP SERPL-CCNC: 38 U/L (ref 39–117)
ALT SERPL W P-5'-P-CCNC: 8 U/L (ref 1–33)
ANION GAP SERPL CALCULATED.3IONS-SCNC: 10 MMOL/L (ref 5–15)
ANION GAP SERPL CALCULATED.3IONS-SCNC: 10 MMOL/L (ref 5–15)
ANION GAP SERPL CALCULATED.3IONS-SCNC: 11 MMOL/L (ref 5–15)
ANION GAP SERPL CALCULATED.3IONS-SCNC: 16 MMOL/L (ref 5–15)
ANION GAP SERPL CALCULATED.3IONS-SCNC: 23 MMOL/L (ref 5–15)
ANION GAP SERPL CALCULATED.3IONS-SCNC: 9 MMOL/L (ref 5–15)
ARTERIAL PATENCY WRIST A: ABNORMAL
AST SERPL-CCNC: 8 U/L (ref 1–32)
ATMOSPHERIC PRESS: 747 MMHG
BASE EXCESS BLDA CALC-SCNC: -6.1 MMOL/L (ref 0–2)
BASOPHILS # BLD AUTO: 0.04 10*3/MM3 (ref 0–0.2)
BASOPHILS NFR BLD AUTO: 0.5 % (ref 0–1.5)
BDY SITE: ABNORMAL
BILIRUB SERPL-MCNC: 0.2 MG/DL (ref 0.2–1.2)
BUN BLD-MCNC: 5 MG/DL (ref 6–20)
BUN BLD-MCNC: 5 MG/DL (ref 6–20)
BUN BLD-MCNC: 6 MG/DL (ref 6–20)
BUN BLD-MCNC: 6 MG/DL (ref 6–20)
BUN BLD-MCNC: 7 MG/DL (ref 6–20)
BUN BLD-MCNC: 8 MG/DL (ref 6–20)
BUN/CREAT SERPL: 10.6 (ref 7–25)
BUN/CREAT SERPL: 11.1 (ref 7–25)
BUN/CREAT SERPL: 8.1 (ref 7–25)
BUN/CREAT SERPL: 8.5 (ref 7–25)
BUN/CREAT SERPL: 9.2 (ref 7–25)
BUN/CREAT SERPL: 9.7 (ref 7–25)
CALCIUM SPEC-SCNC: 7.4 MG/DL (ref 8.6–10.5)
CALCIUM SPEC-SCNC: 7.4 MG/DL (ref 8.6–10.5)
CALCIUM SPEC-SCNC: 7.9 MG/DL (ref 8.6–10.5)
CALCIUM SPEC-SCNC: 8.3 MG/DL (ref 8.6–10.5)
CHLORIDE SERPL-SCNC: 104 MMOL/L (ref 98–107)
CHLORIDE SERPL-SCNC: 107 MMOL/L (ref 98–107)
CHLORIDE SERPL-SCNC: 108 MMOL/L (ref 98–107)
CHLORIDE SERPL-SCNC: 109 MMOL/L (ref 98–107)
CO2 SERPL-SCNC: 10 MMOL/L (ref 22–29)
CO2 SERPL-SCNC: 13 MMOL/L (ref 22–29)
CO2 SERPL-SCNC: 18 MMOL/L (ref 22–29)
CO2 SERPL-SCNC: 20 MMOL/L (ref 22–29)
CO2 SERPL-SCNC: 20 MMOL/L (ref 22–29)
CO2 SERPL-SCNC: 22 MMOL/L (ref 22–29)
CREAT BLD-MCNC: 0.59 MG/DL (ref 0.57–1)
CREAT BLD-MCNC: 0.62 MG/DL (ref 0.57–1)
CREAT BLD-MCNC: 0.62 MG/DL (ref 0.57–1)
CREAT BLD-MCNC: 0.65 MG/DL (ref 0.57–1)
CREAT BLD-MCNC: 0.66 MG/DL (ref 0.57–1)
CREAT BLD-MCNC: 0.72 MG/DL (ref 0.57–1)
DEPRECATED RDW RBC AUTO: 42.2 FL (ref 37–54)
EOSINOPHIL # BLD AUTO: 0.06 10*3/MM3 (ref 0–0.4)
EOSINOPHIL NFR BLD AUTO: 0.7 % (ref 0.3–6.2)
ERYTHROCYTE [DISTWIDTH] IN BLOOD BY AUTOMATED COUNT: 13.2 % (ref 12.3–15.4)
GFR SERPL CREATININE-BSD FRML MDRD: 103 ML/MIN/1.73
GFR SERPL CREATININE-BSD FRML MDRD: 114 ML/MIN/1.73
GFR SERPL CREATININE-BSD FRML MDRD: 116 ML/MIN/1.73
GFR SERPL CREATININE-BSD FRML MDRD: 123 ML/MIN/1.73
GFR SERPL CREATININE-BSD FRML MDRD: 123 ML/MIN/1.73
GFR SERPL CREATININE-BSD FRML MDRD: 130 ML/MIN/1.73
GLOBULIN UR ELPH-MCNC: 2.3 GM/DL
GLUCOSE BLD-MCNC: 195 MG/DL (ref 65–99)
GLUCOSE BLD-MCNC: 197 MG/DL (ref 65–99)
GLUCOSE BLD-MCNC: 203 MG/DL (ref 65–99)
GLUCOSE BLD-MCNC: 211 MG/DL (ref 65–99)
GLUCOSE BLD-MCNC: 234 MG/DL (ref 65–99)
GLUCOSE BLD-MCNC: 327 MG/DL (ref 65–99)
GLUCOSE BLDC GLUCOMTR-MCNC: 158 MG/DL (ref 70–130)
GLUCOSE BLDC GLUCOMTR-MCNC: 178 MG/DL (ref 70–130)
GLUCOSE BLDC GLUCOMTR-MCNC: 179 MG/DL (ref 70–130)
GLUCOSE BLDC GLUCOMTR-MCNC: 181 MG/DL (ref 70–130)
GLUCOSE BLDC GLUCOMTR-MCNC: 185 MG/DL (ref 70–130)
GLUCOSE BLDC GLUCOMTR-MCNC: 198 MG/DL (ref 70–130)
GLUCOSE BLDC GLUCOMTR-MCNC: 209 MG/DL (ref 70–130)
GLUCOSE BLDC GLUCOMTR-MCNC: 210 MG/DL (ref 70–130)
GLUCOSE BLDC GLUCOMTR-MCNC: 211 MG/DL (ref 70–130)
GLUCOSE BLDC GLUCOMTR-MCNC: 212 MG/DL (ref 70–130)
GLUCOSE BLDC GLUCOMTR-MCNC: 221 MG/DL (ref 70–130)
GLUCOSE BLDC GLUCOMTR-MCNC: 243 MG/DL (ref 70–130)
GLUCOSE BLDC GLUCOMTR-MCNC: 278 MG/DL (ref 70–130)
HBA1C MFR BLD: 12.8 % (ref 4.8–5.6)
HCO3 BLDA-SCNC: 16.3 MMOL/L (ref 20–26)
HCT VFR BLD AUTO: 32.9 % (ref 34–46.6)
HGB BLD-MCNC: 11.2 G/DL (ref 12–15.9)
HOLD SPECIMEN: NORMAL
HOROWITZ INDEX BLD+IHG-RTO: 21 %
IMM GRANULOCYTES # BLD AUTO: 0.09 10*3/MM3 (ref 0–0.05)
IMM GRANULOCYTES NFR BLD AUTO: 1.1 % (ref 0–0.5)
IRON 24H UR-MRATE: 81 MCG/DL (ref 37–145)
IRON SATN MFR SERPL: 32 % (ref 20–50)
LYMPHOCYTES # BLD AUTO: 2.98 10*3/MM3 (ref 0.7–3.1)
LYMPHOCYTES NFR BLD AUTO: 35.1 % (ref 19.6–45.3)
Lab: ABNORMAL
MAGNESIUM SERPL-MCNC: 1.6 MG/DL (ref 1.7–2.2)
MAGNESIUM SERPL-MCNC: 1.7 MG/DL (ref 1.7–2.2)
MCH RBC QN AUTO: 29.6 PG (ref 26.6–33)
MCHC RBC AUTO-ENTMCNC: 34 G/DL (ref 31.5–35.7)
MCV RBC AUTO: 86.8 FL (ref 79–97)
MODALITY: ABNORMAL
MONOCYTES # BLD AUTO: 0.38 10*3/MM3 (ref 0.1–0.9)
MONOCYTES NFR BLD AUTO: 4.5 % (ref 5–12)
NEUTROPHILS # BLD AUTO: 4.95 10*3/MM3 (ref 1.7–7)
NEUTROPHILS NFR BLD AUTO: 58.1 % (ref 42.7–76)
NRBC BLD AUTO-RTO: 0 /100 WBC (ref 0–0.2)
PCO2 BLDA: 23.5 MM HG (ref 35–45)
PH BLDA: 7.45 PH UNITS (ref 7.35–7.45)
PHOSPHATE SERPL-MCNC: 2.1 MG/DL (ref 2.5–4.5)
PHOSPHATE SERPL-MCNC: 2.6 MG/DL (ref 2.5–4.5)
PHOSPHATE SERPL-MCNC: 2.9 MG/DL (ref 2.5–4.5)
PHOSPHATE SERPL-MCNC: 3 MG/DL (ref 2.5–4.5)
PLATELET # BLD AUTO: 226 10*3/MM3 (ref 140–450)
PMV BLD AUTO: 10.2 FL (ref 6–12)
PO2 BLDA: 147 MM HG (ref 83–108)
POTASSIUM BLD-SCNC: 3.5 MMOL/L (ref 3.5–5.2)
POTASSIUM BLD-SCNC: 3.5 MMOL/L (ref 3.5–5.2)
POTASSIUM BLD-SCNC: 3.7 MMOL/L (ref 3.5–5.2)
POTASSIUM BLD-SCNC: 3.8 MMOL/L (ref 3.5–5.2)
POTASSIUM BLD-SCNC: 3.9 MMOL/L (ref 3.5–5.2)
POTASSIUM BLD-SCNC: 4.1 MMOL/L (ref 3.5–5.2)
PROT SERPL-MCNC: 5.3 G/DL (ref 6–8.5)
RBC # BLD AUTO: 3.79 10*6/MM3 (ref 3.77–5.28)
SAO2 % BLDCOA: 99.9 % (ref 94–99)
SODIUM BLD-SCNC: 136 MMOL/L (ref 136–145)
SODIUM BLD-SCNC: 136 MMOL/L (ref 136–145)
SODIUM BLD-SCNC: 137 MMOL/L (ref 136–145)
SODIUM BLD-SCNC: 137 MMOL/L (ref 136–145)
SODIUM BLD-SCNC: 138 MMOL/L (ref 136–145)
SODIUM BLD-SCNC: 140 MMOL/L (ref 136–145)
TIBC SERPL-MCNC: 250 MCG/DL (ref 298–536)
TRANSFERRIN SERPL-MCNC: 168 MG/DL (ref 200–360)
VENTILATOR MODE: ABNORMAL
WBC NRBC COR # BLD: 8.5 10*3/MM3 (ref 3.4–10.8)
WHOLE BLOOD HOLD SPECIMEN: NORMAL

## 2019-08-12 PROCEDURE — 84100 ASSAY OF PHOSPHORUS: CPT | Performed by: STUDENT IN AN ORGANIZED HEALTH CARE EDUCATION/TRAINING PROGRAM

## 2019-08-12 PROCEDURE — 80048 BASIC METABOLIC PNL TOTAL CA: CPT | Performed by: STUDENT IN AN ORGANIZED HEALTH CARE EDUCATION/TRAINING PROGRAM

## 2019-08-12 PROCEDURE — 82962 GLUCOSE BLOOD TEST: CPT

## 2019-08-12 PROCEDURE — 80053 COMPREHEN METABOLIC PANEL: CPT | Performed by: STUDENT IN AN ORGANIZED HEALTH CARE EDUCATION/TRAINING PROGRAM

## 2019-08-12 PROCEDURE — 99254 IP/OBS CNSLTJ NEW/EST MOD 60: CPT | Performed by: NURSE PRACTITIONER

## 2019-08-12 PROCEDURE — 99239 HOSP IP/OBS DSCHRG MGMT >30: CPT | Performed by: STUDENT IN AN ORGANIZED HEALTH CARE EDUCATION/TRAINING PROGRAM

## 2019-08-12 PROCEDURE — 84466 ASSAY OF TRANSFERRIN: CPT | Performed by: STUDENT IN AN ORGANIZED HEALTH CARE EDUCATION/TRAINING PROGRAM

## 2019-08-12 PROCEDURE — 36600 WITHDRAWAL OF ARTERIAL BLOOD: CPT

## 2019-08-12 PROCEDURE — 85025 COMPLETE CBC W/AUTO DIFF WBC: CPT | Performed by: STUDENT IN AN ORGANIZED HEALTH CARE EDUCATION/TRAINING PROGRAM

## 2019-08-12 PROCEDURE — 25010000002 METOCLOPRAMIDE PER 10 MG: Performed by: STUDENT IN AN ORGANIZED HEALTH CARE EDUCATION/TRAINING PROGRAM

## 2019-08-12 PROCEDURE — 83735 ASSAY OF MAGNESIUM: CPT | Performed by: STUDENT IN AN ORGANIZED HEALTH CARE EDUCATION/TRAINING PROGRAM

## 2019-08-12 PROCEDURE — 83540 ASSAY OF IRON: CPT | Performed by: STUDENT IN AN ORGANIZED HEALTH CARE EDUCATION/TRAINING PROGRAM

## 2019-08-12 PROCEDURE — 82803 BLOOD GASES ANY COMBINATION: CPT

## 2019-08-12 RX ORDER — FLUOXETINE 10 MG/1
10 CAPSULE ORAL DAILY
Qty: 30 CAPSULE | Refills: 0 | Status: SHIPPED | OUTPATIENT
Start: 2019-08-12 | End: 2019-09-29

## 2019-08-12 RX ORDER — FLUOXETINE 10 MG/1
10 CAPSULE ORAL DAILY
Status: DISCONTINUED | OUTPATIENT
Start: 2019-08-12 | End: 2019-08-12 | Stop reason: HOSPADM

## 2019-08-12 RX ADMIN — SERTRALINE HYDROCHLORIDE 25 MG: 25 TABLET ORAL at 08:01

## 2019-08-12 RX ADMIN — DEXTROSE AND SODIUM CHLORIDE 250 ML/HR: 5; 450 INJECTION, SOLUTION INTRAVENOUS at 03:51

## 2019-08-12 RX ADMIN — CLONAZEPAM 1 MG: 0.5 TABLET ORAL at 11:52

## 2019-08-12 RX ADMIN — SODIUM CHLORIDE 4 UNITS/HR: 9 INJECTION, SOLUTION INTRAVENOUS at 00:26

## 2019-08-12 RX ADMIN — POTASSIUM PHOSPHATE, MONOBASIC AND POTASSIUM PHOSPHATE, DIBASIC 10 MMOL: 224; 236 INJECTION, SOLUTION, CONCENTRATE INTRAVENOUS at 01:42

## 2019-08-12 RX ADMIN — FLUOXETINE 10 MG: 10 CAPSULE ORAL at 15:12

## 2019-08-12 RX ADMIN — SODIUM CHLORIDE 200 ML/HR: 9 INJECTION, SOLUTION INTRAVENOUS at 01:42

## 2019-08-12 RX ADMIN — SODIUM CHLORIDE, PRESERVATIVE FREE 3 ML: 5 INJECTION INTRAVENOUS at 08:01

## 2019-08-12 RX ADMIN — METOCLOPRAMIDE 10 MG: 5 INJECTION, SOLUTION INTRAMUSCULAR; INTRAVENOUS at 08:01

## 2019-08-12 RX ADMIN — SODIUM CHLORIDE 200 ML/HR: 9 INJECTION, SOLUTION INTRAVENOUS at 05:08

## 2019-08-12 NOTE — NURSING NOTE
Results for ANDREA ZELAYA (MRN [id number]) as of 8/11/2019 20:25   Ref. Range 8/11/2019 19:58   Glucose Latest Ref Range: 65 - 99 mg/dL 285 (H)   Sodium Latest Ref Range: 136 - 145 mmol/L 137   Potassium Latest Ref Range: 3.5 - 5.2 mmol/L 4.1   CO2 Latest Ref Range: 22.0 - 29.0 mmol/L 14.0 (L)   Chloride Latest Ref Range: 98 - 107 mmol/L 101   Anion Gap Latest Ref Range: 5.0 - 15.0 mmol/L 22.0 (H)   Creatinine Latest Ref Range: 0.57 - 1.00 mg/dL 0.71   BUN Latest Ref Range: 6 - 20 mg/dL 9   BUN/Creatinine Ratio Latest Ref Range: 7.0 - 25.0  12.7   Calcium Latest Ref Range: 8.6 - 10.5 mg/dL 8.4 (L)   eGFR Non  Am Latest Ref Range: >60 mL/min/1.73 105   Phosphorus Latest Ref Range: 2.5 - 4.5 mg/dL 2.8   Notified Dr Palacios of Pt current BMP, Ordered to continue Pt on Insulin pump, No further orders received

## 2019-08-12 NOTE — H&P
HISTORY AND PHYSICAL  NAME: Fernanda Patterson  : 2000  MRN: [id number]    DATE OF ADMISSION: 19    DATE & TIME SEEN: 19 5:45 PM    PCP: Rufus Marshall APRN    CODE STATUS:   Code Status and Medical Interventions:   Ordered at: 19 1820     Code Status:    CPR     Medical Interventions (Level of Support Prior to Arrest):    Full       CHIEF COMPLAINT: nausea    HPI:  Fernanda Patterson is a 19 y.o. female with a concurrent medical history of type 1 diabetes, question of gastroparesis, bipolar depression, ADHD, postpartum depression, and anxiety, who presents with DKA.  The patient reports nausea and headache for the last 3 days.  She states that she has been in DKA every month this year.  She checked her blood glucose and found it to be 137.  She has also noted abdominal pain that is worse with food.  Her appetite has been decreased, and she has been trying to increase her appetite by smoking marijuana.  She smokes marijuana once or twice a day.  She has infrequent bowel movements; her last one was a few days ago and was loose.  She is followed by Dr. Aly Elizabeth (endocrinology) outpatient but has not seen him in a while.  She states that Dr. Aly Elizabeth had recommended referring her to gastroenterology (Dr. Medina) to evaluate for gastroparesis.  The patient decided to come to the ER today, because she did not feel well, and she thought she was in DKA. The patient is followed at a mental health clinic in [place], [place].  She states that she was on Abilify and Lamictal but was taken off these medications 2 weeks ago, per her request, as she felt that the counselors and the medications were not helpful for her.  The patient also states that she has only slept 4 hours over the last week.  She has no thoughts of suicide or self-harm.    In the ER, the patient's vitals were stable.  Her labs were notable for pH 7.397, Glc 345, Bicarb 17, Anion Gap 23, large serum acetone, 3+ urine  ketones, Mg 1.5.  She received a total of 2 L of normal saline as well as 2 g of magnesium sulfate. The patient is being admitted for further management of her DKA.    CONCURRENT MEDICAL HISTORY:  Past Medical History:   Diagnosis Date   • Diabetes mellitus type 1 (CMS/HCC)    • Diabetic gastroparesis (CMS/HCC)    • Diabetic ketoacidosis (CMS/HCC)    • Diabetic neuropathy (CMS/HCC)    • Post traumatic stress disorder (PTSD)    • Recurrent UTI    • Seizure disorder (CMS/HCC)    • Severe depressed bipolar II disorder without psychotic features (CMS/HCC)        PAST SURGICAL HISTORY:  Past Surgical History:   Procedure Laterality Date   •  SECTION N/A 2018       FAMILY HISTORY:  Family History   Problem Relation Age of Onset   • Drug abuse Father    • Suicide Attempts Brother    • Dementia Maternal Grandfather    • Hypertension Paternal Grandmother         SOCIAL HISTORY:  Social History     Socioeconomic History   • Marital status: Single     Spouse name: Not on file   • Number of children: Not on file   • Years of education: Not on file   • Highest education level: Not on file   Tobacco Use   • Smoking status: Current Every Day Smoker     Packs/day: 0.50     Years: 1.00     Pack years: 0.50   • Smokeless tobacco: Never Used   Substance and Sexual Activity   • Alcohol use: No   • Drug use: Yes     Types: Marijuana   • Sexual activity: Yes     Partners: Male   Social History Narrative    Substance Abuse: Pt drinks EtOH occasionally, stating once every 6 months. Pt smokes marijuana, but denied any other illicit drugs. Pt smokes 0.5-1 ppd of cigarettes x 1 year. Pt denies caffeine consumption, states she drinks only water.         Marriages: none    Current Relationships: Recent breakup with her boyfriend    Children: one child that  2wks ago at 2 months old.        Occupation:  Pt is a  and loves her job, but hasn't been able to work since baby was born via C/S    Living Situation: was living with  her boyfriend but they are  over the death of their child.  She plans to live with mom after discharge.       HOME MEDICATIONS:  Medications Prior to Admission   Medication Sig Dispense Refill Last Dose   • ARIPiprazole (ABILIFY) 5 MG tablet Take 1 tablet by mouth Daily. 30 tablet 0 Past Month at Unknown time   • Blood Glucose Monitoring Suppl w/Device kit USE AS INDICATED, ANY MONITOR , ICD10 code is E11.9 1 each 1 8/11/2019 at Unknown time   • clonazePAM (KlonoPIN) 1 MG tablet Take 1 mg by mouth 2 (Two) Times a Day As Needed for Seizures.   Past Week at Unknown time   • Glucose Blood (BLOOD GLUCOSE TEST) strip Use 4 x daily use any brand covered by insurance or same brand as before ICD10 code is E11.9 120 each 11 8/11/2019 at Unknown time   • insulin aspart (novoLOG) 100 UNIT/ML injection 70 units daily through insulin pump 30 mL 11 8/11/2019 at Unknown time   • lamoTRIgine (LaMICtal) 100 MG tablet Take 100 mg by mouth Daily.   Past Month at Unknown time   • Lancet Devices (LANCING DEVICE) misc USE AS INDICATED TO CORRELATE WITH STRIPS AND METER 1 each 1 8/11/2019 at Unknown time   • Lancets 30G misc USE 4 X DAILY 120 each 11 8/11/2019 at Unknown time   • lisdexamfetamine (VYVANSE) 50 MG capsule Take 50 mg by mouth Every Morning   Past Month at Unknown time   • sertraline (ZOLOFT) 25 MG tablet Take 25 mg by mouth Daily.   8/10/2019 at Unknown time   • albuterol 108 (90 Base) MCG/ACT inhaler Inhale 2 puffs Every 4 (Four) Hours As Needed for Wheezing.   More than a month at Unknown time   • promethazine (PHENERGAN) 25 MG tablet Take 1 tablet by mouth Every 6 (Six) Hours As Needed for Nausea or Vomiting. 120 tablet 5 More than a month at Unknown time       ALLERGIES:  Pineapple and Benzoyl peroxide    REVIEW OF SYSTEMS  Review of Systems   Constitutional: Positive for appetite change ( decreased). Negative for activity change, chills and fever.   HENT: Negative for rhinorrhea, sneezing and sore throat.     Eyes: Negative for discharge and visual disturbance.   Respiratory: Positive for shortness of breath. Negative for cough and wheezing.    Cardiovascular: Positive for chest pain. Negative for palpitations and leg swelling.   Gastrointestinal: Positive for abdominal pain, diarrhea ( loose stool a few days ago) and nausea. Negative for constipation and vomiting.   Genitourinary: Positive for dysuria. Negative for difficulty urinating.   Musculoskeletal: Negative for joint swelling and myalgias.   Skin: Negative for rash and wound.   Neurological: Positive for headaches. Negative for dizziness and syncope.   Psychiatric/Behavioral: Negative for confusion and sleep disturbance.     PHYSICAL EXAM:  Temp:  [96.9 °F (36.1 °C)-98.4 °F (36.9 °C)] 96.9 °F (36.1 °C)  Heart Rate:  [] 78  Resp:  [18] 18  BP: ()/(53-86) 94/53  Body mass index is 15.19 kg/m².    Physical Exam   Constitutional: She is oriented to person, place, and time. She appears well-developed and well-nourished. She appears distressed ( mild).   HENT:   Head: Normocephalic and atraumatic.   Right Ear: Tympanic membrane and external ear normal. No middle ear effusion.   Left Ear: Tympanic membrane and external ear normal.  No middle ear effusion.   Nose: Nose normal.   Mouth/Throat: Mucous membranes are dry. No oropharyngeal exudate.   Eyes: Conjunctivae and EOM are normal. Pupils are equal, round, and reactive to light. Right eye exhibits no discharge. Left eye exhibits no discharge. No scleral icterus.   Bilateral upper eyelids erythematous and slightly swollen.   Neck: Normal range of motion. Neck supple.   Cardiovascular: Normal rate, regular rhythm, normal heart sounds and intact distal pulses.   No murmur heard.  Pulmonary/Chest: Effort normal and breath sounds normal. No respiratory distress. She has no wheezes. She has no rales.   Abdominal: Soft. She exhibits no distension. Bowel sounds are decreased. There is tenderness ( generalized).    Musculoskeletal: She exhibits no edema or deformity.   Lymphadenopathy:     She has no cervical adenopathy.   Neurological: She is alert and oriented to person, place, and time.   Skin: Skin is warm. No rash noted. She is not diaphoretic. No erythema.   Psychiatric: She has a normal mood and affect. Her behavior is normal.   Vitals reviewed.    DIAGNOSTIC DATA:   Lab Results (last 24 hours)     Procedure Component Value Units Date/Time    Hemoglobin A1c [[id number]]  (Abnormal) Collected:  08/11/19 1452    Specimen:  Blood Updated:  08/12/19 0120     Hemoglobin A1C 12.80 %     Narrative:       Hemoglobin A1C Ranges:    Increased Risk for Diabetes  5.7% to 6.4%  Diabetes                     >= 6.5%  Diabetic Goal                < 7.0%    POC Glucose Once [[id number]]  (Abnormal) Collected:  08/12/19 0027    Specimen:  Blood Updated:  08/12/19 0042     Glucose 278 mg/dL      Comment: Result Not ConfirmedOperator: [id number] [facility] ID: [id number]       Phosphorus [[id number]]  (Abnormal) Collected:  08/11/19 2353    Specimen:  Blood Updated:  08/12/19 0025     Phosphorus 2.1 mg/dL     Basic Metabolic Panel [[id number]]  (Abnormal) Collected:  08/11/19 2353    Specimen:  Blood Updated:  08/12/19 0025     Glucose 327 mg/dL      BUN 8 mg/dL      Creatinine 0.72 mg/dL      Sodium 137 mmol/L      Potassium 4.1 mmol/L      Chloride 104 mmol/L      CO2 10.0 mmol/L      Calcium 7.9 mg/dL      eGFR Non African Amer 103 mL/min/1.73      BUN/Creatinine Ratio 11.1     Anion Gap 23.0 mmol/L     Narrative:       GFR Normal >60  Chronic Kidney Disease <60  Kidney Failure <15    Magnesium [[id number]]  (Normal) Collected:  08/11/19 2353    Specimen:  Blood Updated:  08/12/19 0024     Magnesium 1.7 mg/dL     POC Glucose Once [[id number]]  (Abnormal) Collected:  08/11/19 2159    Specimen:  Blood Updated:  08/11/19 2215     Glucose 292 mg/dL      Comment: RN NotifiedOperator: [id number] [employer] ID: [id number]        Urine Drug Screen - Urine, Clean Catch [[id number]]  (Abnormal) Collected:  08/11/19 1945    Specimen:  Urine, Clean Catch Updated:  08/11/19 2053     THC, Screen, Urine Positive     Phencyclidine (PCP), Urine Negative     Cocaine Screen, Urine Negative     Methamphetamine, Ur Negative     Opiate Screen Positive     Amphetamine Screen, Urine Negative     Benzodiazepine Screen, Urine Negative     Tricyclic Antidepressants Screen Negative     Methadone Screen, Urine Negative     Barbiturates Screen, Urine Negative     Oxycodone Screen, Urine Negative     Propoxyphene Screen Negative     Buprenorphine, Screen, Urine Negative    Narrative:       Cutoff For Drugs Screened:    Amphetamines               500 ng/ml  Barbiturates               200 ng/ml  Benzodiazepines            150 ng/ml  Cocaine                    150 ng/ml  Methadone                  200 ng/ml  Opiates                    100 ng/ml  Phencyclidine               25 ng/ml  THC                            50 ng/ml  Methamphetamine            500 ng/ml  Tricyclic Antidepressants  300 ng/ml  Oxycodone                  100 ng/ml  Propoxyphene               300 ng/ml  Buprenorphine               10 ng/ml    The normal value for all drugs tested is negative. This report includes unconfirmed screening results, with the cutoff values listed, to be used for medical treatment purposes only.  Unconfirmed results must not be used for non-medical purposes such as employment or legal testing.  Clinical consideration should be applied to any drug of abuse test, particularly when unconfirmed results are used.      Magnesium [[id number]]  (Normal) Collected:  08/11/19 1958    Specimen:  Blood Updated:  08/11/19 2026     Magnesium 2.2 mg/dL     Phosphorus [[id number]]  (Normal) Collected:  08/11/19 1958    Specimen:  Blood Updated:  08/11/19 2023     Phosphorus 2.8 mg/dL     Basic Metabolic Panel [[id number]]  (Abnormal) Collected:  08/11/19 1958    Specimen:  Blood Updated:   08/11/19 2023     Glucose 285 mg/dL      BUN 9 mg/dL      Creatinine 0.71 mg/dL      Sodium 137 mmol/L      Potassium 4.1 mmol/L      Chloride 101 mmol/L      CO2 14.0 mmol/L      Calcium 8.4 mg/dL      eGFR Non African Amer 105 mL/min/1.73      BUN/Creatinine Ratio 12.7     Anion Gap 22.0 mmol/L     Narrative:       GFR Normal >60  Chronic Kidney Disease <60  Kidney Failure <15    Pregnancy, Urine - Urine, Clean Catch [[id number]]  (Normal) Collected:  08/11/19 1945    Specimen:  Urine, Clean Catch Updated:  08/11/19 2008     HCG, Urine QL Negative    Urinalysis With Microscopic If Indicated (No Culture) - Urine, Clean Catch [[id number]]  (Abnormal) Collected:  08/11/19 1945    Specimen:  Urine, Clean Catch Updated:  08/11/19 2007     Color, UA Yellow     Appearance, UA Clear     pH, UA 5.5     Specific Gravity, UA 1.029     Glucose, UA >=1000 mg/dL (3+)     Ketones, UA 80 mg/dL (3+)     Bilirubin, UA Negative     Blood, UA Negative     Protein, UA Negative     Leuk Esterase, UA Negative     Nitrite, UA Negative     Urobilinogen, UA 0.2 E.U./dL    Narrative:       Urine microscopic not indicated.    POC Glucose Once [[id number]]  (Abnormal) Collected:  08/11/19 1940    Specimen:  Blood Updated:  08/11/19 1957     Glucose 276 mg/dL      Comment: Result Not ConfirmedOperator: [id number] [facility] ID: [id number]       Blood Gas, Arterial [[id number]]  (Abnormal) Collected:  08/11/19 1600    Specimen:  Arterial Blood Updated:  08/11/19 1607     Site Right Radial     Drew's Test N/A     pH, Arterial 7.397 pH units      pCO2, Arterial 28.7 mm Hg      Comment: 84 Value below reference range        pO2, Arterial 116.0 mm Hg      Comment: 83 Value above reference range        HCO3, Arterial 17.7 mmol/L      Comment: 84 Value below reference range        Base Excess, Arterial -5.9 mmol/L      Comment: 84 Value below reference range        O2 Saturation, Arterial 98.9 %      Barometric Pressure for Blood Gas 747 mmHg       Modality Room Air     Ventilator Mode NA     Collected by KW     Comment: Meter: [id number]     :  [id number]       [place] Draw [[id number]] Collected:  08/11/19 1452    Specimen:  Blood Updated:  08/11/19 1601    Narrative:       The following orders were created for panel order [place] Draw.  Procedure                               Abnormality         Status                     ---------                               -----------         ------                     Light Blue Top[[id number]]                                   Final result               Green Top (Gel)[[id number]]                                  Final result               Lavender Top[[id number]]                                     Final result               Gold Top - SST[[id number]]                                                                Please view results for these tests on the individual orders.    Light Blue Top [[id number]] Collected:  08/11/19 1453    Specimen:  Blood Updated:  08/11/19 1601     Extra Tube hold for add-on     Comment: Auto resulted       Green Top (Gel) [[id number]] Collected:  08/11/19 1452    Specimen:  Blood Updated:  08/11/19 1601     Extra Tube Hold for add-ons.     Comment: Auto resulted.       Lavender Top [[id number]] Collected:  08/11/19 1452    Specimen:  Blood Updated:  08/11/19 1601     Extra Tube hold for add-on     Comment: Auto resulted       CK [[id number]]  (Normal) Collected:  08/11/19 1452    Specimen:  Blood Updated:  08/11/19 1538     Creatine Kinase 35 U/L     Magnesium [[id number]]  (Abnormal) Collected:  08/11/19 1452    Specimen:  Blood Updated:  08/11/19 1538     Magnesium 1.5 mg/dL     Comprehensive Metabolic Panel [[id number]]  (Abnormal) Collected:  08/11/19 1452    Specimen:  Blood Updated:  08/11/19 1515     Glucose 345 mg/dL      BUN 10 mg/dL      Creatinine 0.86 mg/dL      Sodium 136 mmol/L      Potassium 4.1 mmol/L      Chloride 96 mmol/L      CO2 17.0 mmol/L      Calcium 9.4  mg/dL      Total Protein 7.6 g/dL      Albumin 4.50 g/dL      ALT (SGPT) 11 U/L      AST (SGOT) 10 U/L      Alkaline Phosphatase 55 U/L      Total Bilirubin 0.4 mg/dL      eGFR Non African Amer 84 mL/min/1.73      Globulin 3.1 gm/dL      A/G Ratio 1.5 g/dL      BUN/Creatinine Ratio 11.6     Anion Gap 23.0 mmol/L     Narrative:       GFR Normal >60  Chronic Kidney Disease <60  Kidney Failure <15    hCG, Serum, Qualitative [[id number]]  (Normal) Collected:  08/11/19 1452    Specimen:  Blood Updated:  08/11/19 1504     HCG Qualitative Negative    Ketone Bodies, Serum (Not performed at [facility]) [[id number]] Collected:  08/11/19 1459    Specimen:  Blood Updated:  08/11/19 1503    Narrative:       The following orders were created for panel order Ketone Bodies, Serum (Not performed at [facility]).  Procedure                               Abnormality         Status                     ---------                               -----------         ------                     Acetone[[id number]]                      Abnormal            Final result                 Please view results for these tests on the individual orders.    Acetone [[id number]]  (Abnormal) Collected:  08/11/19 1459    Specimen:  Blood Updated:  08/11/19 1503     Acetone Large    CBC & Differential [[id number]] Collected:  08/11/19 1452    Specimen:  Blood Updated:  08/11/19 1455    Narrative:       The following orders were created for panel order CBC & Differential.  Procedure                               Abnormality         Status                     ---------                               -----------         ------                     CBC Auto Differential[[id number]]        Abnormal            Final result                 Please view results for these tests on the individual orders.    CBC Auto Differential [[id number]]  (Abnormal) Collected:  08/11/19 1452    Specimen:  Blood Updated:  08/11/19 1455     WBC 6.78 10*3/mm3      RBC 4.80 10*6/mm3      Hemoglobin  13.9 g/dL      Hematocrit 40.8 %      MCV 85.0 fL      MCH 29.0 pg      MCHC 34.1 g/dL      RDW 13.2 %      RDW-SD 40.9 fl      MPV 10.0 fL      Platelets 333 10*3/mm3      Neutrophil % 61.1 %      Lymphocyte % 30.5 %      Monocyte % 5.6 %      Eosinophil % 0.9 %      Basophil % 0.9 %      Immature Grans % 1.0 %      Neutrophils, Absolute 4.14 10*3/mm3      Lymphocytes, Absolute 2.07 10*3/mm3      Monocytes, Absolute 0.38 10*3/mm3      Eosinophils, Absolute 0.06 10*3/mm3      Basophils, Absolute 0.06 10*3/mm3      Immature Grans, Absolute 0.07 10*3/mm3      nRBC 0.0 /100 WBC            Imaging Results (last 24 hours)     ** No results found for the last 24 hours. **          I reviewed the patient's new clinical results.    ASSESSMENT AND PLAN: This is a 19 y.o. female with:    Active Hospital Problems    Diagnosis POA   • **Diabetic ketoacidosis (CMS/[facility]) [E13.10] Yes     - ABG on admission pH 7.397, Glc 345, Bicarb 17, Anion Gap 23, large serum acetone, 3+ urine ketones.  - Urinalysis not concerning for infection.  UDS positive for THC and opiates.  - S/P 2L total NS bolus in ER.  - Patient initially provided fluids, but due to slow correction of anion gap and concerns of her not getting enough insulin through her pump, the patient is started on an insulin drip.  - Will transition to adding D5 in her fluids once her glucose is below 200.  - Will continue to monitor BMP, magnesium, and phosphorus every 4 hours.  - Patient did receive 2 g of magnesium in the ER.  Her magnesium was appropriately replaced but is now dropping.  Will monitor and replace as necessary.  - Glucose checks every 1 hour until blood glucose less than 200.  At that time, will transition to glucose checks every 2 hours.  -Continue NPO status (except water in moderation) until anion gap closes.     • Insomnia due to medical condition [G47.01] Yes     - Patient reports that she has bipolar disorder.  She reports that she is not been on her Abilify  and Lamictal for 2 weeks, as this medication was discontinued per her request by her therapists in [place].  - Inpatient psychiatry consult placed.  Dr. Bowers states that from his recollection of the patient, her pathology is borderline personality versus bipolar II disorder, likely the former.   * He recommends continuing her home Zoloft.   * Also recommends using trazodone as needed and Vistaril as needed for her insomnia.   * He also recommends strong personal boundaries with nurses and staff.   * He states that either he or the nurse practitioner will evaluate the patient tomorrow.     • Cannabinoid hyperemesis syndrome (CMS/HCC) [F12.988] Yes     - This is likely contributing to her symptoms.  Discussed extensively with the patient about the pathophysiology of this syndrome.  Encouraged patient to quit.  - Patient would benefit from appetite stimulant instead of using THC.     • Cigarette nicotine dependence without complication [F17.210] Yes     - Patient declines nicotine replacement therapy.  - Tobacco cessation counseling.     • ADHD [F90.9] Yes     - Patient takes Vyvanse outpatient but has not taken this medication for several weeks, as she has not been at work.     • Uncontrolled diabetes mellitus (CMS/HCC) [E11.65] Yes     - Presently in DKA.  Will treat as described above.  - Will obtain repeat hemoglobin A1c, which was 11 at the beginning of June.  - Patient has insulin pump.  She will need close outpatient follow-up with endocrinology.     • Post traumatic stress disorder (PTSD) [F43.10] Yes     - Continue home Zoloft.     • Asthma [J45.909] Yes     - Albuterol nebs as needed         Plan for disposition:Where: home and When:  1-2days    DVT prophylaxis: SCDs  Code status is   Code Status and Medical Interventions:   Ordered at: 08/11/19 0051     Code Status:    CPR     Medical Interventions (Level of Support Prior to Arrest):    Full      [facility] # [id number], reviewed and consistent with patient  reported medications.      I discussed the patients findings and my recommendations with patient and primary care team.     Dr. Tessa Trevino is the attending on record at time of admission, she is aware of the patient's status and agrees with the above history and physical.      Lynsey Palacios M.D. PGY3  [facility]  [address] [place] [address]  Office: [phone]      This document has been electronically signed by Lynsey Palacios MD on August 12, 2019 1:30 AM

## 2019-08-12 NOTE — NURSING NOTE
Pt has insulin pump from home et now has insulin to refill pump with. Pt is unable to start insulin pump at this time d/t pt not having pump supplies with her. States her mom will bring supplies.

## 2019-08-12 NOTE — NURSING NOTE
Dr Palacios  called to verify that Pt Mother had brought in Insulin for Pt pump. Pt denies that anyone bringing insulin for pump, but explains that she has 24 units of Insulin in pump, and that should be enough to las until tomorrow. Dr Palacios recommend Pt call family to bring in more insulin, Pt becoming tearful and increasingly agitated.

## 2019-08-12 NOTE — PROGRESS NOTES
FAMILY MEDICINE DAILY PROGRESS NOTE  NAME: Fernanda Patterson  : 2000  MRN: [id number]     LOS: 1 day     PROVIDER OF SERVICE: Anahy Fitzpatrick MD    Chief Complaint: Diabetic ketoacidosis (CMS/HCC)    Subjective:     Interval History:  History taken from: patient  Patient is complaining of headache, back pain, abdominal pain(improved) and anxiety. Had difficulty sleeping last night. Denies any nausea or vomiting. States that she is hungry and would like to eat    Review of Systems:   Review of Systems   Constitutional: Negative for activity change, chills and fever.   HENT: Negative for rhinorrhea, sneezing and sore throat.    Eyes: Negative for discharge and visual disturbance.   Respiratory: Negative for shortness of breath. Negative for cough and wheezing.    Cardiovascular: Negative for chest pain. Negative for palpitations and leg swelling.   Gastrointestinal: Positive for abdominal pain. Negative for constipation and vomiting.   Genitourinary:  Negative for difficulty urinating.   Musculoskeletal: Negative for joint swelling and myalgias.   Skin: Negative for rash and wound.   Neurological: Positive for headaches. Negative for dizziness and syncope.   Psychiatric/Behavioral: Negative for confusion and sleep disturbance.         Objective:     Vital Signs  Temp:  [96.8 °F (36 °C)-98.4 °F (36.9 °C)] 96.8 °F (36 °C)  Heart Rate:  [] 67  Resp:  [16-18] 16  BP: ()/(50-86) 94/50    Physical Exam  Physical Exam   Constitutional: She is oriented to person, place, and time. She appears well-developed and well-nourished.   HENT:   Head: Normocephalic and atraumatic.   Eyes: Conjunctivae and EOM are normal. Pupils are equal, round, and reactive to light.   Neck: Normal range of motion. Neck supple.   Cardiovascular: Normal rate and normal heart sounds.   bradycardic   Pulmonary/Chest: Effort normal and breath sounds normal.   Abdominal: Soft. Bowel sounds are normal.   Musculoskeletal: Normal range of  motion.   Neurological: She is alert and oriented to person, place, and time.   Skin: Skin is warm and dry.   Psychiatric: She has a normal mood and affect. Her behavior is normal. Judgment and thought content normal.       Medication Review    Current Facility-Administered Medications:   •  acetaminophen (TYLENOL) tablet 650 mg, 650 mg, Oral, Q6H PRN, Lynsey Palacios MD, 650 mg at 08/11/19 2252  •  albuterol (PROVENTIL) nebulizer solution 0.083% 2.5 mg/3mL, 2.5 mg, Nebulization, Q6H PRN, Lynsey Palacios MD  •  clonazePAM (KlonoPIN) tablet 1 mg, 1 mg, Oral, BID PRN, Lynsey Palacios MD, 1 mg at 08/11/19 2252  •  dextrose (D50W) 25 g/ 50mL Intravenous Solution 12.5 g, 12.5 g, Intravenous, PRN, Lynsey Palacios MD  •  dextrose 5 % and sodium chloride 0.45 % infusion, 250 mL/hr, Intravenous, Continuous PRN, Lynsey Palacios MD, Last Rate: 250 mL/hr at 08/12/19 0610, 250 mL/hr at 08/12/19 0610  •  insulin regular (HumuLIN R,NovoLIN R) 100 Units in sodium chloride 0.9 % 100 mL (1 Units/mL) infusion, 4 Units/hr, Intravenous, Titrated, Lynsey Palacios MD, Last Rate: 2.5 mL/hr at 08/12/19 0725, 2.5 Units/hr at 08/12/19 0725  •  metoclopramide (REGLAN) injection 10 mg, 10 mg, Intravenous, TID, Lynsey Palacios MD, 10 mg at 08/12/19 0801  •  sertraline (ZOLOFT) tablet 25 mg, 25 mg, Oral, Daily, Lynsey Palacios MD, 25 mg at 08/12/19 0801  •  sodium chloride 0.9 % flush 10 mL, 10 mL, Intravenous, PRN, Enrike Hu MD  •  sodium chloride 0.9 % flush 3 mL, 3 mL, Intravenous, Q12H, Lynsey Palacios MD, 3 mL at 08/12/19 0801  •  sodium chloride 0.9 % flush 3-10 mL, 3-10 mL, Intravenous, PRN, Lynsey Palacios MD  •  sodium chloride 0.9 % flush 30 mL, 30 mL, Intravenous, Once PRN, Enrike Hu MD  •  sodium chloride 0.9 % infusion, 30 mL/hr, Intravenous, Continuous PRN, Enrike Hu MD  •  sodium chloride 0.9 %  infusion, 200 mL/hr, Intravenous, Continuous, Lynsey Palacios MD, Stopped at 08/12/19 0610     Diagnostic Data    Lab Results (last 24 hours)     Procedure Component Value Units Date/Time    Blood Gas, Arterial [[id number]]  (Abnormal) Collected:  08/12/19 0731    Specimen:  Arterial Blood Updated:  08/12/19 0740     Site Right Radial     Drew's Test N/A     pH, Arterial 7.450 pH units      pCO2, Arterial 23.5 mm Hg      Comment: 84 Value below reference range        pO2, Arterial 147.0 mm Hg      Comment: 83 Value above reference range        HCO3, Arterial 16.3 mmol/L      Comment: 84 Value below reference range        Base Excess, Arterial -6.1 mmol/L      Comment: 84 Value below reference range        O2 Saturation, Arterial 99.9 %      Comment: 83 Value above reference range        Barometric Pressure for Blood Gas 747 mmHg      Modality Room Air     FIO2 21 %      Ventilator Mode NA     Collected by      Comment: Meter: [id number]     :  [id number]       POC Glucose Once [[id number]]  (Abnormal) Collected:  08/12/19 0724    Specimen:  Blood Updated:  08/12/19 0737     Glucose 181 mg/dL      Comment: Sliding Scale AdminOperator: [id number] [facility] Center[facility] ID: [id number]       POC Glucose Once [[id number]]  (Abnormal) Collected:  08/12/19 0608    Specimen:  Blood Updated:  08/12/19 0623     Glucose 178 mg/dL      Comment: Result Not ConfirmedOperator: [id number] EMILY BARRYMeter ID: [id number]       POC Glucose Once [[id number]]  (Abnormal) Collected:  08/12/19 0248    Specimen:  Blood Updated:  08/12/19 0542     Glucose 211 mg/dL      Comment: Result Not ConfirmedOperator: [id number] EMILY BARRYMeter ID: [id number]       POC Glucose Once [[id number]]  (Abnormal) Collected:  08/12/19 0503    Specimen:  Blood Updated:  08/12/19 0516     Glucose 210 mg/dL      Comment: Result Not ConfirmedOperator: [id number] EMILY BARRYMeter ID: [id number]       Comprehensive Metabolic Panel  [[id number]]  (Abnormal) Collected:  08/12/19 0333    Specimen:  Blood Updated:  08/12/19 0426     Glucose 203 mg/dL      BUN 7 mg/dL      Creatinine 0.66 mg/dL      Sodium 136 mmol/L      Potassium 3.7 mmol/L      Chloride 107 mmol/L      CO2 13.0 mmol/L      Calcium 7.4 mg/dL      Total Protein 5.3 g/dL      Albumin 3.00 g/dL      ALT (SGPT) 8 U/L      AST (SGOT) 8 U/L      Alkaline Phosphatase 38 U/L      Total Bilirubin 0.2 mg/dL      eGFR Non African Amer 114 mL/min/1.73      Globulin 2.3 gm/dL      A/G Ratio 1.3 g/dL      BUN/Creatinine Ratio 10.6     Anion Gap 16.0 mmol/L     Narrative:       GFR Normal >60  Chronic Kidney Disease <60  Kidney Failure <15    Phosphorus [[id number]]  (Normal) Collected:  08/12/19 0333    Specimen:  Blood Updated:  08/12/19 0426     Phosphorus 2.9 mg/dL     Magnesium [[id number]]  (Abnormal) Collected:  08/12/19 0333    Specimen:  Blood Updated:  08/12/19 0424     Magnesium 1.6 mg/dL     CBC & Differential [[id number]] Collected:  08/12/19 0333    Specimen:  Blood Updated:  08/12/19 0405    Narrative:       The following orders were created for panel order CBC & Differential.  Procedure                               Abnormality         Status                     ---------                               -----------         ------                     CBC Auto Differential[[id number]]        Abnormal            Final result                 Please view results for these tests on the individual orders.    CBC Auto Differential [[id number]]  (Abnormal) Collected:  08/12/19 0333    Specimen:  Blood Updated:  08/12/19 0405     WBC 8.50 10*3/mm3      RBC 3.79 10*6/mm3      Hemoglobin 11.2 g/dL      Hematocrit 32.9 %      MCV 86.8 fL      MCH 29.6 pg      MCHC 34.0 g/dL      RDW 13.2 %      RDW-SD 42.2 fl      MPV 10.2 fL      Platelets 226 10*3/mm3      Neutrophil % 58.1 %      Lymphocyte % 35.1 %      Monocyte % 4.5 %      Eosinophil % 0.7 %      Basophil % 0.5 %      Immature Grans % 1.1 %       Neutrophils, Absolute 4.95 10*3/mm3      Lymphocytes, Absolute 2.98 10*3/mm3      Monocytes, Absolute 0.38 10*3/mm3      Eosinophils, Absolute 0.06 10*3/mm3      Basophils, Absolute 0.04 10*3/mm3      Immature Grans, Absolute 0.09 10*3/mm3      nRBC 0.0 /100 WBC     POC Glucose Once [[id number]]  (Abnormal) Collected:  08/12/19 0347    Specimen:  Blood Updated:  08/12/19 0404     Glucose 185 mg/dL      Comment: Result Not ConfirmedOperator: [id number] travelfoxeter ID: [id number]       POC Glucose Once [[id number]]  (Abnormal) Collected:  08/12/19 0144    Specimen:  Blood Updated:  08/12/19 0200     Glucose 243 mg/dL      Comment: Result Not ConfirmedOperator: [id number] [employer] ID: [id number]       Hemoglobin A1c [[id number]]  (Abnormal) Collected:  08/11/19 1452    Specimen:  Blood Updated:  08/12/19 0120     Hemoglobin A1C 12.80 %     Narrative:       Hemoglobin A1C Ranges:    Increased Risk for Diabetes  5.7% to 6.4%  Diabetes                     >= 6.5%  Diabetic Goal                < 7.0%    POC Glucose Once [[id number]]  (Abnormal) Collected:  08/12/19 0027    Specimen:  Blood Updated:  08/12/19 0042     Glucose 278 mg/dL      Comment: Result Not ConfirmedOperator: [id number] travelfoxeter ID: [id number]       Phosphorus [[id number]]  (Abnormal) Collected:  08/11/19 2353    Specimen:  Blood Updated:  08/12/19 0025     Phosphorus 2.1 mg/dL     Basic Metabolic Panel [[id number]]  (Abnormal) Collected:  08/11/19 2353    Specimen:  Blood Updated:  08/12/19 0025     Glucose 327 mg/dL      BUN 8 mg/dL      Creatinine 0.72 mg/dL      Sodium 137 mmol/L      Potassium 4.1 mmol/L      Chloride 104 mmol/L      CO2 10.0 mmol/L      Calcium 7.9 mg/dL      eGFR Non African Amer 103 mL/min/1.73      BUN/Creatinine Ratio 11.1     Anion Gap 23.0 mmol/L     Narrative:       GFR Normal >60  Chronic Kidney Disease <60  Kidney Failure <15    Magnesium [[id number]]  (Normal) Collected:  08/11/19 4934     Specimen:  Blood Updated:  08/12/19 0024     Magnesium 1.7 mg/dL     POC Glucose Once [[id number]]  (Abnormal) Collected:  08/11/19 2159    Specimen:  Blood Updated:  08/11/19 2215     Glucose 292 mg/dL      Comment: RN NotifiedOperator: [id number] EMILY Elliott ID: [id number]       Urine Drug Screen - Urine, Clean Catch [[id number]]  (Abnormal) Collected:  08/11/19 1945    Specimen:  Urine, Clean Catch Updated:  08/11/19 2053     THC, Screen, Urine Positive     Phencyclidine (PCP), Urine Negative     Cocaine Screen, Urine Negative     Methamphetamine, Ur Negative     Opiate Screen Positive     Amphetamine Screen, Urine Negative     Benzodiazepine Screen, Urine Negative     Tricyclic Antidepressants Screen Negative     Methadone Screen, Urine Negative     Barbiturates Screen, Urine Negative     Oxycodone Screen, Urine Negative     Propoxyphene Screen Negative     Buprenorphine, Screen, Urine Negative    Narrative:       Cutoff For Drugs Screened:    Amphetamines               500 ng/ml  Barbiturates               200 ng/ml  Benzodiazepines            150 ng/ml  Cocaine                    150 ng/ml  Methadone                  200 ng/ml  Opiates                    100 ng/ml  Phencyclidine               25 ng/ml  THC                            50 ng/ml  Methamphetamine            500 ng/ml  Tricyclic Antidepressants  300 ng/ml  Oxycodone                  100 ng/ml  Propoxyphene               300 ng/ml  Buprenorphine               10 ng/ml    The normal value for all drugs tested is negative. This report includes unconfirmed screening results, with the cutoff values listed, to be used for medical treatment purposes only.  Unconfirmed results must not be used for non-medical purposes such as employment or legal testing.  Clinical consideration should be applied to any drug of abuse test, particularly when unconfirmed results are used.      Magnesium [[id number]]  (Normal) Collected:  08/11/19 1958    Specimen:  Blood  Updated:  08/11/19 2026     Magnesium 2.2 mg/dL     Phosphorus [[id number]]  (Normal) Collected:  08/11/19 1958    Specimen:  Blood Updated:  08/11/19 2023     Phosphorus 2.8 mg/dL     Basic Metabolic Panel [[id number]]  (Abnormal) Collected:  08/11/19 1958    Specimen:  Blood Updated:  08/11/19 2023     Glucose 285 mg/dL      BUN 9 mg/dL      Creatinine 0.71 mg/dL      Sodium 137 mmol/L      Potassium 4.1 mmol/L      Chloride 101 mmol/L      CO2 14.0 mmol/L      Calcium 8.4 mg/dL      eGFR Non African Amer 105 mL/min/1.73      BUN/Creatinine Ratio 12.7     Anion Gap 22.0 mmol/L     Narrative:       GFR Normal >60  Chronic Kidney Disease <60  Kidney Failure <15    Pregnancy, Urine - Urine, Clean Catch [[id number]]  (Normal) Collected:  08/11/19 1945    Specimen:  Urine, Clean Catch Updated:  08/11/19 2008     HCG, Urine QL Negative    Urinalysis With Microscopic If Indicated (No Culture) - Urine, Clean Catch [[id number]]  (Abnormal) Collected:  08/11/19 1945    Specimen:  Urine, Clean Catch Updated:  08/11/19 2007     Color, UA Yellow     Appearance, UA Clear     pH, UA 5.5     Specific Gravity, UA 1.029     Glucose, UA >=1000 mg/dL (3+)     Ketones, UA 80 mg/dL (3+)     Bilirubin, UA Negative     Blood, UA Negative     Protein, UA Negative     Leuk Esterase, UA Negative     Nitrite, UA Negative     Urobilinogen, UA 0.2 E.U./dL    Narrative:       Urine microscopic not indicated.    POC Glucose Once [[id number]]  (Abnormal) Collected:  08/11/19 1940    Specimen:  Blood Updated:  08/11/19 1957     Glucose 276 mg/dL      Comment: Result Not ConfirmedOperator: [id number] EMILY ANTHONYOhio[facility] ID: [id number]       Blood Gas, Arterial [[id number]]  (Abnormal) Collected:  08/11/19 1600    Specimen:  Arterial Blood Updated:  08/11/19 1607     Site Right Radial     Drew's Test N/A     pH, Arterial 7.397 pH units      pCO2, Arterial 28.7 mm Hg      Comment: 84 Value below reference range        pO2, Arterial 116.0 mm Hg       Comment: 83 Value above reference range        HCO3, Arterial 17.7 mmol/L      Comment: 84 Value below reference range        Base Excess, Arterial -5.9 mmol/L      Comment: 84 Value below reference range        O2 Saturation, Arterial 98.9 %      Barometric Pressure for Blood Gas 747 mmHg      Modality Room Air     Ventilator Mode NA     Collected by KW     Comment: Meter: [id number]     :  [id number]       [place] Draw [[id number]] Collected:  08/11/19 1452    Specimen:  Blood Updated:  08/11/19 1601    Narrative:       The following orders were created for panel order [place] Draw.  Procedure                               Abnormality         Status                     ---------                               -----------         ------                     Light Blue Top[[id number]]                                   Final result               Green Top (Gel)[[id number]]                                  Final result               Lavender Top[[id number]]                                     Final result               Gold Top - SST[[id number]]                                                                Please view results for these tests on the individual orders.    Light Blue Top [[id number]] Collected:  08/11/19 1453    Specimen:  Blood Updated:  08/11/19 1601     Extra Tube hold for add-on     Comment: Auto resulted       Green Top (Gel) [[id number]] Collected:  08/11/19 1452    Specimen:  Blood Updated:  08/11/19 1601     Extra Tube Hold for add-ons.     Comment: Auto resulted.       Lavender Top [[id number]] Collected:  08/11/19 1452    Specimen:  Blood Updated:  08/11/19 1601     Extra Tube hold for add-on     Comment: Auto resulted       CK [[id number]]  (Normal) Collected:  08/11/19 1452    Specimen:  Blood Updated:  08/11/19 1538     Creatine Kinase 35 U/L     Magnesium [[id number]]  (Abnormal) Collected:  08/11/19 1452    Specimen:  Blood Updated:  08/11/19 1538     Magnesium 1.5 mg/dL     Comprehensive  Metabolic Panel [[id number]]  (Abnormal) Collected:  08/11/19 1452    Specimen:  Blood Updated:  08/11/19 1515     Glucose 345 mg/dL      BUN 10 mg/dL      Creatinine 0.86 mg/dL      Sodium 136 mmol/L      Potassium 4.1 mmol/L      Chloride 96 mmol/L      CO2 17.0 mmol/L      Calcium 9.4 mg/dL      Total Protein 7.6 g/dL      Albumin 4.50 g/dL      ALT (SGPT) 11 U/L      AST (SGOT) 10 U/L      Alkaline Phosphatase 55 U/L      Total Bilirubin 0.4 mg/dL      eGFR Non African Amer 84 mL/min/1.73      Globulin 3.1 gm/dL      A/G Ratio 1.5 g/dL      BUN/Creatinine Ratio 11.6     Anion Gap 23.0 mmol/L     Narrative:       GFR Normal >60  Chronic Kidney Disease <60  Kidney Failure <15    hCG, Serum, Qualitative [[id number]]  (Normal) Collected:  08/11/19 1452    Specimen:  Blood Updated:  08/11/19 1504     HCG Qualitative Negative    Ketone Bodies, Serum (Not performed at [facility]) [[id number]] Collected:  08/11/19 1459    Specimen:  Blood Updated:  08/11/19 1503    Narrative:       The following orders were created for panel order Ketone Bodies, Serum (Not performed at [facility]).  Procedure                               Abnormality         Status                     ---------                               -----------         ------                     Acetone[[id number]]                      Abnormal            Final result                 Please view results for these tests on the individual orders.    Acetone [[id number]]  (Abnormal) Collected:  08/11/19 1459    Specimen:  Blood Updated:  08/11/19 1503     Acetone Large    CBC & Differential [[id number]] Collected:  08/11/19 1452    Specimen:  Blood Updated:  08/11/19 1455    Narrative:       The following orders were created for panel order CBC & Differential.  Procedure                               Abnormality         Status                     ---------                               -----------         ------                     CBC Auto Differential[[id number]]         Abnormal            Final result                 Please view results for these tests on the individual orders.    CBC Auto Differential [[id number]]  (Abnormal) Collected:  08/11/19 1452    Specimen:  Blood Updated:  08/11/19 1455     WBC 6.78 10*3/mm3      RBC 4.80 10*6/mm3      Hemoglobin 13.9 g/dL      Hematocrit 40.8 %      MCV 85.0 fL      MCH 29.0 pg      MCHC 34.1 g/dL      RDW 13.2 %      RDW-SD 40.9 fl      MPV 10.0 fL      Platelets 333 10*3/mm3      Neutrophil % 61.1 %      Lymphocyte % 30.5 %      Monocyte % 5.6 %      Eosinophil % 0.9 %      Basophil % 0.9 %      Immature Grans % 1.0 %      Neutrophils, Absolute 4.14 10*3/mm3      Lymphocytes, Absolute 2.07 10*3/mm3      Monocytes, Absolute 0.38 10*3/mm3      Eosinophils, Absolute 0.06 10*3/mm3      Basophils, Absolute 0.06 10*3/mm3      Immature Grans, Absolute 0.07 10*3/mm3      nRBC 0.0 /100 WBC            Imaging Results (last 24 hours)     ** No results found for the last 24 hours. **          I reviewed the patient's new clinical results.    Assessment/Plan:     Active Hospital Problems    Diagnosis   • **Diabetic ketoacidosis (CMS/HCC)     - ABG on admission pH 7.397, Glc 345, Bicarb 17, Anion Gap 23, large serum acetone, 3+ urine ketones.  - Urinalysis not concerning for infection.  UDS positive for THC and opiates.  - S/P 2L total NS bolus in ER.  - Patient initially provided fluids, but due to slow correction of anion gap and concerns of her not getting enough insulin through her pump, the patient is started on an insulin drip.  - Will transition to adding D5 in her fluids once her glucose is below 200.   - Will continue to monitor BMP, magnesium, and phosphorus every 4 hours.  - Patient did receive 2 g of magnesium in the ER.  Her magnesium was appropriately replaced but is now dropping.  Will monitor and replace as necessary.  - Glucose checks every 1 hour until blood glucose less than 200.  At that time, will transition to glucose checks  every 2 hours.  -Continue NPO status (except water in moderation) until anion gap closes.    -Current glucose at 181  -Bicarb at 13  -Anion gap currently at 16  -Magnesium at 1.6  -PH 7.4  -PCO2 23.5  -PO2 147  -Base excess -6.1  -O2 sat 99     • Insomnia due to medical condition     - Patient reports that she has bipolar disorder.  She reports that she is not been on her Abilify and Lamictal for 2 weeks, as this medication was discontinued per her request by her therapists in [place].  - Inpatient psychiatry consult placed.  Dr. Bowers states that from his recollection of the patient, her pathology is borderline personality versus bipolar II disorder, likely the former.   * He recommends continuing her home Zoloft.   * Also recommends using trazodone as needed and Vistaril as needed for her insomnia.   * He also recommends strong personal boundaries with nurses and staff.   * He states that either he or the nurse practitioner will evaluate the patient tomorrow.     • Cannabinoid hyperemesis syndrome (CMS/HCC)     - This is likely contributing to her symptoms.  Discussed extensively with the patient about the pathophysiology of this syndrome.  Encouraged patient to quit.  - Patient would benefit from appetite stimulant instead of using THC.     • Cigarette nicotine dependence without complication     - Patient declines nicotine replacement therapy.  - Tobacco cessation counseling.     • ADHD     - Patient takes Vyvanse outpatient but has not taken this medication for several weeks, as she has not been at work.     • Anemia     Hg at 11.2  Hct at 32.9    Ordered iron studies     • Uncontrolled diabetes mellitus (CMS/HCC)     - Presently in DKA.  Will treat as described above.  - Will obtain repeat hemoglobin A1c, which was 11 at the beginning of June.  - Patient has insulin pump.  She will need close outpatient follow-up with endocrinology.     • Post traumatic stress disorder (PTSD)     - Continue home Zoloft.      • Asthma     - Albuterol nebs as needed           DVT prophylaxis: SCDs/TEDs  Code Status and Medical Interventions:   Ordered at: 08/11/19 1820     Code Status:    CPR     Medical Interventions (Level of Support Prior to Arrest):    Full       Plan for disposition:Continue to monitor anion gap. Will discharge when labs are controlled.      Time: 15 min       This document has been electronically signed by Anahy Fitzpatrick MD on August 12, 2019 8:03 AM

## 2019-08-12 NOTE — PLAN OF CARE
Problem: Patient Care Overview  Goal: Plan of Care Review  Outcome: Ongoing (interventions implemented as appropriate)   08/12/19 1500   Coping/Psychosocial   Plan of Care Reviewed With patient   Plan of Care Review   Progress improving   OTHER   Outcome Summary Pt has been d/c'd from insulin gtt this shift et switched back to home insulin pump for coverage. Pt is no longer in DKA, gap is 10. Plan is to discharge home today after lab results.     Goal: Individualization and Mutuality  Outcome: Ongoing (interventions implemented as appropriate)    Goal: Discharge Needs Assessment  Outcome: Ongoing (interventions implemented as appropriate)    Goal: Interprofessional Rounds/Family Conf  Outcome: Ongoing (interventions implemented as appropriate)      Problem: Diabetes, Type 1 (Adult)  Goal: Signs and Symptoms of Listed Potential Problems Will be Absent, Minimized or Managed (Diabetes, Type 1)  Outcome: Ongoing (interventions implemented as appropriate)

## 2019-08-12 NOTE — CONSULTS
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         Imaging Results (last 72 hours)     ** No results found for the last 72 hours. **          Assessment/Plan       Diabetic ketoacidosis (CMS/[facility])    Bipolar 1 disorder (CMS/[facility])    Asthma    Post traumatic stress disorder (PTSD)    Uncontrolled diabetes mellitus (CMS/[facility])    Insomnia due to medical condition    Cannabinoid hyperemesis syndrome (CMS/HCC)    Cigarette nicotine dependence without complication    ADHD    Anemia      Plan: To start medication and continue follow up daily, work together with treatment team to provide quality treatment.     Recommendations:    Due to Ms. Patterson reporting symptoms of some arlene, depression, and anxiety and requesting medication management it is thought that she should be started on medication while on the medical.  Will start prozac 10mg daily. Discontinue to zoloft due to ineffectiveness  At discharge follow up with Boris in [place], [place]    Arlette Damico, ARMOND  08/12/19  1:30 PM

## 2019-08-12 NOTE — CONSULTS
Adult Nutrition  Assessment    Patient Name:  Fernanda Patterson  YOB: 2000  MRN: [id number]  Admit Date:  8/11/2019    Assessment Date:  8/12/2019    Comments:  Pt with dx DKA.  Reports poor appetite.  Voiced no food preferences.  Reports 7 lb wt loss the past week.  Consistent Carb diet ordered this date.  Pt previously NPO.  BMI 16 with pt at 77% IBW.  Blood glucose, HgbA1C are elevated.  Pt receiving insulin drip.  Pt indicated she had received diet ed previously and denied need for diet ed or info.  Will add Boost Glucoe Control to breakfast, monitor labs, and make further recommendations as appropriate.    Reason for Assessment     Row Name 08/12/19 1038          Reason for Assessment    Reason For Assessment  per organizational policy     Diagnosis  other (see comments) underweight     Identified At Risk by Screening Criteria  BMI           Anthropometrics     Row Name 08/12/19 0532          Anthropometrics    Weight  47.3 kg (104 lb 3.2 oz)         Labs/Tests/Procedures/Meds     Row Name 08/12/19 1038          Labs/Procedures/Meds    Lab Results Reviewed  reviewed, pertinent        Medications    Pertinent Medications Reviewed  reviewed, pertinent           Estimated/Assessed Needs     Row Name 08/12/19 1039          Calculation Measurements    Weight Used For Calculations  61.3 kg (135 lb 2.3 oz)        Estimated/Assessed Needs    Additional Documentation  Calorie Requirements (Group);Fluid Requirements (Group);[place]-St. Jeor Equation (Group);Protein Requirements (Group)        Calorie Requirements    Estimated Calorie Need Method  [place]-St Rickyor     Measured Resting Energy Expenditure (MREE)  1840 Kcal/day        [place]-St. Jeor Equation    RMR ([place]-St. Jeor Equation)  1415.625        Protein Requirements    Est Protein Requirement Amount (gms/kg)  1.0 gm protein     Estimated Protein Requirements (gms/day)  61.3        Fluid Requirements    Estimated Fluid Requirements (mL/day)   1840     RDA Method (mL)  1840     Jane-Bre Method (over 20 kg)  2726         Nutrition Prescription Ordered     Row Name 08/12/19 1040          Nutrition Prescription PO    Current PO Diet  Regular     Common Modifiers  Consistent Carbohydrate         Evaluation of Received Nutrient/Fluid Intake     Row Name 08/12/19 1041 08/12/19 1039       Calculation Measurements    Weight Used For Calculations  --  61.3 kg (135 lb 2.3 oz)       PO Evaluation    Number of Days PO Intake Evaluated  Insufficient Data  --        Evaluation of Prescribed Nutrient/Fluid Intake     Row Name 08/12/19 1039          Calculation Measurements    Weight Used For Calculations  61.3 kg (135 lb 2.3 oz)             Electronically signed by:  Johanna Pisano RD  08/12/19 10:41 AM

## 2019-08-12 NOTE — NURSING NOTE
Dr Palacios  came to floor to evaluate Pt, Ordered to start insulin gtt, MD reviewed and selected parts of DKA protocol

## 2019-08-12 NOTE — PLAN OF CARE
Problem: Patient Care Overview  Goal: Plan of Care Review  Outcome: Ongoing (interventions implemented as appropriate)   08/12/19 6023   Coping/Psychosocial   Plan of Care Reviewed With patient   Plan of Care Review   Progress improving   OTHER   Outcome Summary VSS, Insulin gtt continues, Gap trending down       Problem: Diabetes, Type 1 (Adult)  Goal: Signs and Symptoms of Listed Potential Problems Will be Absent, Minimized or Managed (Diabetes, Type 1)  Outcome: Ongoing (interventions implemented as appropriate)

## 2019-08-12 NOTE — DISCHARGE SUMMARY
DISCHARGE SUMMARY    PATIENT NAME: Fernanda Patterson       PHYSICIAN: Jeffry Esparza MD  : 2000  MRN: [id number]    ADMITTED: 2019     DISCHARGED: 19    ADMISSION DIAGNOSES:  Active Hospital Problems    Diagnosis  POA   • Insomnia due to medical condition [G47.01]  Yes   • Cannabinoid hyperemesis syndrome (CMS/HCC) [F12.988]  Yes   • Cigarette nicotine dependence without complication [F17.210]  Yes   • ADHD [F90.9]  Yes   • Anemia [D64.9]  Yes   • Borderline personality disorder (CMS/HCC) [F60.3]  Yes   • Uncontrolled diabetes mellitus (CMS/HCC) [E11.65]  Yes   • Post traumatic stress disorder (PTSD) [F43.10]  Yes   • Asthma [J45.909]  Yes      Resolved Hospital Problems    Diagnosis Date Resolved POA   • **Diabetic ketoacidosis (CMS/HCC) [E13.10] 2019 Yes     DISCHARGE DIAGNOSES:   Active Hospital Problems    Diagnosis  POA   • Insomnia due to medical condition [G47.01]  Yes   • Cannabinoid hyperemesis syndrome (CMS/HCC) [F12.988]  Yes   • Cigarette nicotine dependence without complication [F17.210]  Yes   • ADHD [F90.9]  Yes   • Anemia [D64.9]  Yes   • Borderline personality disorder (CMS/HCC) [F60.3]  Yes   • Uncontrolled diabetes mellitus (CMS/HCC) [E11.65]  Yes   • Post traumatic stress disorder (PTSD) [F43.10]  Yes   • Asthma [J45.909]  Yes      Resolved Hospital Problems    Diagnosis Date Resolved POA   • **Diabetic ketoacidosis (CMS/HCC) [E13.10] 2019 Yes       SERVICE: Family Medicine.  Attending: Dr. Trevino   Resident: Jeffry Esparza MD    CONSULTS:   Consult Orders (all) (From admission, onward)    Start     Ordered    19  Inpatient Psychiatrist Consult  Once     Specialty:  Psychiatry  Provider:  Kalia Bowers II, MD    19  Family Practice - Resident (on-call MD unless specified)  Once     Specialty:  Family Medicine  Provider:  Lynsey Palacios MD    19 741            HISTORY OF PRESENT ILLNESS:   Fernanda Patterson  is a 19 y.o. female with a concurrent medical history of type 1 diabetes, question of gastroparesis, bipolar depression, ADHD, postpartum depression, and anxiety, who presents with DKA.  The patient reports nausea and headache for the last 3 days.  She states that she has been in DKA every month this year.  She checked her blood glucose and found it to be 137.  She has also noted abdominal pain that is worse with food.  Her appetite has been decreased, and she has been trying to increase her appetite by smoking marijuana.  She smokes marijuana once or twice a day.  She has infrequent bowel movements; her last one was a few days ago and was loose.  She is followed by Dr. Aly Elizabeth (endocrinology) outpatient but has not seen him in a while.  She states that Dr. Aly Elizabeth had recommended referring her to gastroenterology (Dr. Medina) to evaluate for gastroparesis.  The patient decided to come to the ER today, because she did not feel well, and she thought she was in DKA. The patient is followed at a mental health clinic in [place], [place].  She states that she was on Abilify and Lamictal but was taken off these medications 2 weeks ago, per her request, as she felt that the counselors and the medications were not helpful for her.  The patient also states that she has only slept 4 hours over the last week.  She has no thoughts of suicide or self-harm.     In the ER, the patient's vitals were stable.  Her labs were notable for pH 7.397, Glc 345, Bicarb 17, Anion Gap 23, large serum acetone, 3+ urine ketones, Mg 1.5.  She received a total of 2 L of normal saline as well as 2 g of magnesium sulfate. The patient is being admitted for further management of her DKA.    H&P shared by Dr. Palacios     [facility] COURSE:  Patient was admitted for DKA and treated in the ICU. Patient was kept on insulin drip until her pH resolved, anion gap closed, bicarb/glucose normalized. She was able tolerate a oral diet and we had  transitioned patient to her insulin pump. Patient did not have her supplies for her insulin pump and her boyfriend/mother were not able to drop them off. The supplies were provided by the endocrine team, Dr. Elizabeth. Appreciate his assistance. We received a repeat BMP after 2 hours of patient being on her insulin pump and results were normal. Patient was deemed safe for discharge on a diabetic diet and follow up with endocrine and psychology. Psychology did see patient during her stay and recommended she start prozac 10 mg and discontinue her abilify, lamictal, and zoloft as she had not been taking them for 2-3 weeks prior to admission. Patient stated she will follow up with yareli prasad.     DISCHARGE CONDITION:   stable    DISPOSITION:  Home or Self Care    DISCHARGE MEDICATIONS     Discharge Medications      New Medications      Instructions Start Date   FLUoxetine 10 MG capsule  Commonly known as:  PROzac   10 mg, Oral, Daily         Continue These Medications      Instructions Start Date   albuterol sulfate  (90 Base) MCG/ACT inhaler  Commonly known as:  PROVENTIL HFA;VENTOLIN HFA;PROAIR HFA   2 puffs, Inhalation, Every 4 Hours PRN      Blood Glucose Monitoring Suppl w/Device kit   USE AS INDICATED, ANY MONITOR , ICD10 code is E11.9      Blood Glucose Test strip   Use 4 x daily use any brand covered by insurance or same brand as before ICD10 code is E11.9      clonazePAM 1 MG tablet  Commonly known as:  KlonoPIN   1 mg, Oral, 2 Times Daily PRN      insulin aspart 100 UNIT/ML injection  Commonly known as:  novoLOG   70 units daily through insulin pump      Lancets 30G misc   USE 4 X DAILY      Lancing Device misc   USE AS INDICATED TO CORRELATE WITH STRIPS AND METER      promethazine 25 MG tablet  Commonly known as:  PHENERGAN   25 mg, Oral, Every 6 Hours PRN      VYVANSE 50 MG capsule  Generic drug:  lisdexamfetamine   50 mg, Oral, Every Morning         Stop These Medications    ARIPiprazole 5  MG tablet  Commonly known as:  ABILIFY     lamoTRIgine 100 MG tablet  Commonly known as:  LaMICtal     sertraline 25 MG tablet  Commonly known as:  ZOLOFT            INSTRUCTIONS:  Activity:   Activity Instructions     Activity as Tolerated          Diet:   Diet Instructions     Diet: Consistent Carbohydrate      Discharge Diet:  Consistent Carbohydrate        Special instructions: Patient instructed to call MD or return to ED with worsening shortness of breath, chest pain, fever greater than 100.4 degrees F or any other medical concerns..    FOLLOW UP:   Additional Instructions for the Follow-ups that You Need to Schedule     Discharge Follow-up with PCP   As directed       Currently Documented PCP:    Rufus Marshall APRN    PCP Phone Number:    [phone]     Follow Up Details:  within 1 week         Discharge Follow-up with Specified Provider: shanice downs   As directed      To:  shanice downs    Follow Up Details:  within 1 week           Follow-up Information     Rufus Marshall APRN Follow up.    Specialty:  Family Medicine  Why:  August 19th, 2019 @ 1PM   Contact information:  [address] [facility] [address]  [phone]             [facility] Follow up in 1 week(s).    Why:  Open accesss Monday-Friday 8:30-4   Contact information:  [address] [facility] [address]  [phone]                 PENDING TEST RESULTS AT DISCHARGE      Time: Discharge >30 min    Dr. Trevino  is the attending at time of discharge, She is aware of the patient's status and agrees with the above discharge summary.          This document has been electronically signed by Jeffry Esparza MD on August 13, 2019 4:52 PM

## 2019-08-12 NOTE — PLAN OF CARE
Problem: Patient Care Overview  Goal: Plan of Care Review  Outcome: Ongoing (interventions implemented as appropriate)  Maintain pt on prescribed diet.  Encourage intake of meals and supplement.   08/12/19 1051   Coping/Psychosocial   Plan of Care Reviewed With patient   Plan of Care Review   Progress no change   OTHER   Outcome Summary initial assessment

## 2019-08-13 ENCOUNTER — READMISSION MANAGEMENT (OUTPATIENT)
Dept: CALL CENTER | Facility: HOSPITAL | Age: 19
End: 2019-08-13

## 2019-08-13 LAB
GLUCOSE BLDC GLUCOMTR-MCNC: 230 MG/DL (ref 70–130)
GLUCOSE BLDC GLUCOMTR-MCNC: 360 MG/DL (ref 70–130)

## 2019-08-13 NOTE — OUTREACH NOTE
" Hospital Medicine History and Physical    Date of Service  10/4/2017    Chief Complaint  Chief Complaint   Patient presents with   • Head Ache     to occiput x 2 days,  denies any injury or trauma to head.     • N/V     x 2 days   • Visual Problems     \"floaters and spots\" in (R) eye since this am.        History of Presenting Illness  26 y.o. female who presented 10/4/2017 with nonspecific complaints, but mainly she is concerned with R sided paresthesias and visual dots. She also has a headache and associated nausea and vomiting. She tells me she has a history of migraine and multiuple sclerosis and is on Copaxone, but has not have the luxury of ofllowing neurologist as she moved from [place] and her neurologist here has \"left\". She says she normally has R sided weakness. She now complains of a 2 day history of R sided paresthesias on her lower extremities and visul problems. She however described floating dots, more consistent with floaters. She is worried about her MS acting up and wants an MRI hence she came to the ED. She also has a history of chronic pain and is on [place] at home.  At the ED, she is afebrile, hemodynamically stable. CT head showed no acute process. She was to be discharged but she continued to complain concern about her paresthesias and ED physician requested admission.  When I saw her at ED, she is in no acute distress. Her complains are out of proportion to exam. Neurologically she seems grossly nonfocal.     Primary Care Physician  Pcp Pt States None    Consultants      Code Status  FULL    Review of Systems  Review of Systems   Constitutional: Negative for chills and fever.   HENT: Negative for congestion, hearing loss and nosebleeds.    Eyes: Positive for blurred vision. Negative for pain and redness.   Respiratory: Negative for cough, hemoptysis, shortness of breath and wheezing.    Cardiovascular: Negative for chest pain and palpitations.   Gastrointestinal: Positive for nausea and " Prep Survey      Responses   Facility patient discharged from?  [place]   Is patient eligible?  Yes   Discharge diagnosis  DKA   Does the patient have one of the following disease processes/diagnoses(primary or secondary)?  Other   Does the patient have Home health ordered?  No   Is there a DME ordered?  No   Comments regarding appointments  see AVS   Medication alerts for this patient  prozac   Prep survey completed?  Yes          Reshma Barajas RN         vomiting. Negative for abdominal pain, blood in stool, constipation and diarrhea.   Genitourinary: Negative for dysuria, frequency and hematuria.   Musculoskeletal: Positive for myalgias. Negative for falls and joint pain.   Skin: Negative for rash.   Neurological: Positive for tingling, sensory change and headaches. Negative for dizziness, tremors, focal weakness, seizures, loss of consciousness and weakness.   Psychiatric/Behavioral: The patient is not nervous/anxious and does not have insomnia.    All other systems reviewed and are negative.       Past Medical History  Past Medical History:   Diagnosis Date   • MS (multiple sclerosis) (CMS-[facility])        Surgical History  No past surgical history on file.    Medications  No current facility-administered medications on file prior to encounter.      No current outpatient prescriptions on file prior to encounter.       Family History  History reviewed. No pertinent family history.    Social History  Social History   Substance Use Topics   • Smoking status: Never Smoker   • Smokeless tobacco: Not on file   • Alcohol use Yes       Allergies  Allergies   Allergen Reactions   • Pcn [Penicillins]      Hives          Physical Exam  Laboratory   Hemodynamics  Temp (24hrs), Av °C (98.6 °F), Min:36.6 °C (97.8 °F), Max:37.4 °C (99.3 °F)   Temperature: 36.6 °C (97.8 °F)  Pulse  Av.4  Min: 68  Max: 88    Blood Pressure: 112/72, NIBP: (!) 97/72      Respiratory      Respiration: 16, Pulse Oximetry: 96 %             Physical Exam   Constitutional: She appears well-developed and well-nourished.   HENT:   Head: Normocephalic and atraumatic.   Eyes: Conjunctivae and EOM are normal. No scleral icterus.   Neck: Normal range of motion. Neck supple.   Cardiovascular: Normal rate and regular rhythm.  Exam reveals no gallop and no friction rub.    No murmur heard.  Pulmonary/Chest: Effort normal and breath sounds normal. No respiratory distress. She has no wheezes. She has no rales.    Abdominal: Soft. Bowel sounds are normal. She exhibits no distension. There is no tenderness. There is no rebound and no guarding.   Musculoskeletal: She exhibits no edema or tenderness.   Neurological: She is alert.   Skin: Skin is warm.   Psychiatric: She has a normal mood and affect. Her behavior is normal.       Recent Labs      10/04/17   1719   WBC  8.1   RBC  4.33   HEMOGLOBIN  13.7   HEMATOCRIT  41.4   MCV  95.6   MCH  31.6   MCHC  33.1*   RDW  45.5   PLATELETCT  236   MPV  9.4     Recent Labs      10/04/17   1719   SODIUM  137   POTASSIUM  4.1   CHLORIDE  107   CO2  22   GLUCOSE  98   BUN  12   CREATININE  0.73   CALCIUM  9.4     Recent Labs      10/04/17   1719   ALTSGPT  8   ASTSGOT  15   ALKPHOSPHAT  53   TBILIRUBIN  0.3   GLUCOSE  98     Recent Labs      10/04/17   1719   APTT  24.6*   INR  1.09             Lab Results   Component Value Date    TROPONINI <0.01 10/04/2017     Urinalysis:    Lab Results  Component Value Date/Time   SPECGRAVITY 1.024 10/04/2017 2220   GLUCOSEUR Negative 10/04/2017 2220   KETONES Negative 10/04/2017 2220   NITRITE Negative 10/04/2017 2220        Imaging  Ct-head W/o    Result Date: 10/4/2017  10/4/2017 5:29 PM HISTORY/REASON FOR EXAM:  Headache Nausea, vomiting. TECHNIQUE/EXAM DESCRIPTION AND NUMBER OF VIEWS: CT of the head without contrast. The study was performed on a helical multidetector CT scanner. Contiguous 2.5 mm axial sections were obtained from the skull base through the vertex. Up to date radiation dose reduction adjustments have been utilized to meet ALARA standards for radiation dose reduction. COMPARISON:  None available FINDINGS: There is no evidence of acute intracranial hemorrhage, mass, mass-effect or shift of midline structures. Gray-white matter differentiation is grossly preserved. Ventricle size and brain parenchymal volume are appropriate for this patient's stated age. The basal cisterns are patent. There are no abnormal extra-axial fluid  collections. No depressed or widely  calvarial fracture is seen. The visualized paranasal sinuses and temporal bone structures are aerated. ___________________________________     1. No acute intracranial abnormality. No evidence of acute intracranial hemorrhage or mass lesion.     Dx-chest-portable (1 View)    Result Date: 10/4/2017  10/4/2017 4:50 PM HISTORY/REASON FOR EXAM:  Shortness of Breath Shortness of breath TECHNIQUE/EXAM DESCRIPTION AND NUMBER OF VIEWS: Single portable view of the chest. COMPARISON: None FINDINGS: No pulmonary infiltrates or consolidations are noted. No pleural effusion. No pneumothorax. Normal cardiopericardial silhouette.     1. No acute cardiopulmonary abnormalities are identified.     Assessment/Plan     I anticipate this patient is appropriate for observation status at this time.    * Multiple sclerosis exacerbation (CMS-[facility])   Assessment & Plan    Presents with R lower extremity paresthesia x 2 days and visual disturbances (that are likely floaters)   Possible MS exacerbation  Has history of MS on copaxone  Followed neurologist here once but she mentioned her neurologist left.  She wants workup for possible TIA. Low risk factors and not a tPA candidate given timing  Neurology consultation in the morning.          Chronic pain syndrome   Assessment & Plan    Nonspecific pain on narcotics at home            VTE prophylaxis: SCD, pharmacologic.    I spent 72 minutes, reviewing the chart, notes, vitals, labs, imaging, ordering labs, evaluating Nanci Mccormack for assessment, enacting the plan above. 50% of the time was spent in counseling Nanci Mccormack and answering questions. I spoke extensively with ED physician as well. Time was devoted to counseling and coordinating care including review of records, pertinent lab data and studies, as well as discussing diagnostic evaluation and work up, planned therapeutic interventions and future disposition of care. Where indicated, the  assessment and plan reflect discussion of patient with consultants, other healthcare providers, family members, and additional research needed to obtain further information in formulating the plan of care for Nanci Mccormack.

## 2019-08-13 NOTE — PAYOR COMM NOTE
"      Fernanda Patterson (19 y.o. Female)     Date of Birth Social Security Number Address Home Phone MRN    2000  [address]  [address]   [facility] [address] [phone] [id number]    Hindu Marital Status          None Single       Admission Date Admission Type Admitting Provider Attending Provider Department, Room/Bed    8/11/19 Emergency Nims, Tessa Sanchez MD  [facility] STEPDOWN UNIT, 320/1    Discharge Date Discharge Disposition Discharge Destination        8/12/2019 Home or Self Care              Attending Provider:  (none)   Allergies:  Pineapple, Benzoyl Peroxide    Isolation:  None   Infection:  None   Code Status:  Prior    Ht:  170.2 cm (67\")   Wt:  47.3 kg (104 lb 3.2 oz)    Admission Cmt:  None   Principal Problem:  Diabetic ketoacidosis (CMS/HCC) [E13.10] More...                 Active Insurance as of 8/11/2019     Primary Coverage     Payor Plan Insurance Group Employer/Plan Group    [facility] [id number]     Payor Plan Address Payor Plan Phone Number Payor Plan Fax Number Effective Dates    [address] [address] [phone]  4/29/2019 - None Entered    [facility] [id number]       Subscriber Name Subscriber Birth Date Member ID       DEBORAH SALOMON [birth year]1971 [id number]                 Emergency Contacts      (Rel.) Home Phone Work Phone Mobile Phone    Juana Salomon (Mother) [phone] -- [phone]            Hospital Medications (all)       Dose Frequency Start End    albuterol (PROVENTIL) nebulizer solution 0.083% 2.5 mg/3mL 2.5 mg Once 8/11/2019 8/11/2019    Sig - Route: Take 2.5 mg by nebulization 1 (One) Time. - Nebulization    diphenhydrAMINE (BENADRYL) injection 12.5 mg 12.5 mg Once 8/11/2019 8/11/2019    Sig - Route: Infuse 0.25 mL into a venous catheter 1 (One) Time. - Intravenous    magnesium sulfate 2g/50 mL (PREMIX) infusion 2 g Once 8/11/2019 8/11/2019    Sig - Route: Infuse 50 mL into a venous catheter 1 (One) Time. - Intravenous    " morphine injection 4 mg 4 mg Once 8/11/2019 8/11/2019    Sig - Route: Infuse 1 mL into a venous catheter 1 (One) Time. - Intravenous    ondansetron (ZOFRAN) injection 4 mg 4 mg Once 8/11/2019 8/11/2019    Sig - Route: Infuse 2 mL into a venous catheter 1 (One) Time. - Intravenous    Potassium Phosphates 10 mmol in sodium chloride 0.9 % 100 mL infusion 10 mmol Once 8/12/2019 8/12/2019    Sig - Route: Infuse 10 mmol into a venous catheter 1 (One) Time. - Intravenous    sodium chloride 0.9 % bolus 1,000 mL 1,000 mL Once 8/11/2019 8/11/2019    Sig - Route: Infuse 1,000 mL into a venous catheter 1 (One) Time. - Intravenous    sodium chloride 0.9 % bolus 1,000 mL 1,000 mL Once 8/11/2019 8/11/2019    Sig - Route: Infuse 1,000 mL into a venous catheter 1 (One) Time. - Intravenous    acetaminophen (TYLENOL) tablet 650 mg (Discontinued) 650 mg Every 6 Hours PRN 8/11/2019 8/12/2019    Sig - Route: Take 2 tablets by mouth Every 6 (Six) Hours As Needed for Mild Pain . - Oral    Reason for Discontinue: Patient Discharge    albuterol (PROVENTIL) nebulizer solution 0.083% 2.5 mg/3mL (Discontinued) 2.5 mg Every 6 Hours PRN 8/11/2019 8/12/2019    Sig - Route: Take 2.5 mg by nebulization Every 6 (Six) Hours As Needed for Shortness of Air. - Nebulization    Reason for Discontinue: Patient Discharge    clonazePAM (KlonoPIN) tablet 1 mg (Discontinued) 1 mg 2 Times Daily PRN 8/11/2019 8/12/2019    Sig - Route: Take 2 tablets by mouth 2 (Two) Times a Day As Needed for Anxiety. - Oral    Reason for Discontinue: Patient Discharge    dextrose (D50W) 25 g/ 50mL Intravenous Solution 12.5 g (Discontinued) 12.5 g As Needed 8/11/2019 8/12/2019    Sig - Route: Infuse 25 mL into a venous catheter As Needed for Low Blood Sugar (for blood glucose < 100 mg/dL). - Intravenous    Reason for Discontinue: Patient Discharge    dextrose 5 % and sodium chloride 0.45 % infusion (Discontinued) 250 mL/hr Continuous PRN 8/11/2019 8/12/2019    Sig - Route: Infuse  250 mL/hr into a venous catheter Continuous As Needed (For Sodium >/= 135 and K > 5.5 and Glucose </= 200 for DKA  or Glucose </= 300 for HHS). - Intravenous    FLUoxetine (PROzac) capsule 10 mg (Discontinued) 10 mg Daily 8/12/2019 8/12/2019    Sig - Route: Take 1 capsule by mouth Daily. - Oral    Reason for Discontinue: Patient Discharge    insulin regular (HumuLIN R,NovoLIN R) 100 Units in sodium chloride 0.9 % 100 mL (1 Units/mL) infusion (Discontinued) 4 Units/hr Titrated 8/11/2019 8/11/2019    Sig - Route: Infuse 4 Units/hr into a venous catheter Dose Adjusted By Provider As Needed. - Intravenous    insulin regular (HumuLIN R,NovoLIN R) 100 Units in sodium chloride 0.9 % 100 mL (1 Units/mL) infusion (Discontinued) 4 Units/hr Titrated 8/12/2019 8/12/2019    Sig - Route: Infuse 4 Units/hr into a venous catheter Dose Adjusted By Provider As Needed. - Intravenous    Reason for Discontinue: Patient Discharge    metoclopramide (REGLAN) injection 10 mg (Discontinued) 10 mg 3 Times Daily 8/11/2019 8/12/2019    Sig - Route: Infuse 2 mL into a venous catheter 3 (Three) Times a Day. - Intravenous    Reason for Discontinue: Patient Discharge    sertraline (ZOLOFT) tablet 25 mg (Discontinued) 25 mg Daily 8/11/2019 8/12/2019    Sig - Route: Take 1 tablet by mouth Daily. - Oral    sodium chloride 0.9 % bolus 2,000 mL (Discontinued) 2,000 mL Once 8/12/2019 8/11/2019    Sig - Route: Infuse 2,000 mL into a venous catheter 1 (One) Time. - Intravenous    sodium chloride 0.9 % flush 10 mL (Discontinued) 10 mL As Needed 8/11/2019 8/12/2019    Sig - Route: Infuse 10 mL into a venous catheter As Needed for Line Care. - Intravenous    Reason for Discontinue: Patient Discharge    Cosign for Ordering: Accepted by Enrike Hu MD on 8/11/2019  3:16 PM    sodium chloride 0.9 % flush 3 mL (Discontinued) 3 mL Every 12 Hours Scheduled 8/11/2019 8/12/2019    Sig - Route: Infuse 3 mL into a venous catheter Every 12 (Twelve) Hours. -  Intravenous    Reason for Discontinue: Patient Discharge    sodium chloride 0.9 % flush 3-10 mL (Discontinued) 3-10 mL As Needed 8/11/2019 8/12/2019    Sig - Route: Infuse 3-10 mL into a venous catheter As Needed for Line Care. - Intravenous    Reason for Discontinue: Patient Discharge    sodium chloride 0.9 % flush 30 mL (Discontinued) 30 mL Once As Needed 8/11/2019 8/12/2019    Sig - Route: Infuse 30 mL into a venous catheter 1 (One) Time As Needed for Line Care. - Intravenous    Reason for Discontinue: Patient Discharge    sodium chloride 0.9 % infusion (Discontinued) 125 mL/hr Continuous 8/11/2019 8/11/2019    Sig - Route: Infuse 125 mL/hr into a venous catheter Continuous. - Intravenous    sodium chloride 0.9 % infusion (Discontinued) 30 mL/hr Continuous PRN 8/11/2019 8/12/2019    Sig - Route: Infuse 30 mL/hr into a venous catheter Continuous As Needed (if insulin infusion rate is insufficient to maintain patency.). - Intravenous    Reason for Discontinue: Patient Discharge    sodium chloride 0.9 % infusion (Discontinued) 200 mL/hr Continuous 8/11/2019 8/12/2019    Sig - Route: Infuse 200 mL/hr into a venous catheter Continuous. - Intravenous    Reason for Discontinue: Patient Discharge          Lab Results (last 48 hours)     Procedure Component Value Units Date/Time    Basic Metabolic Panel [[id number]]  (Abnormal) Collected:  08/12/19 1516    Specimen:  Blood Updated:  08/12/19 1607     Glucose 234 mg/dL      BUN 6 mg/dL      Creatinine 0.65 mg/dL      Sodium 140 mmol/L      Potassium 3.9 mmol/L      Chloride 108 mmol/L      CO2 22.0 mmol/L      Calcium 8.3 mg/dL      eGFR Non African Amer 116 mL/min/1.73      BUN/Creatinine Ratio 9.2     Anion Gap 10.0 mmol/L     Narrative:       GFR Normal >60  Chronic Kidney Disease <60  Kidney Failure <15    POC Glucose Once [[id number]]  (Abnormal) Collected:  08/12/19 1342    Specimen:  Blood Updated:  08/12/19 1354     Glucose 198 mg/dL      Comment: Result Not  ConfirmedOperator: [id number] DANDRE Barry ID: [id number]       Basic Metabolic Panel [[id number]]  (Abnormal) Collected:  08/12/19 1241    Specimen:  Blood Updated:  08/12/19 1352     Glucose 195 mg/dL      BUN 5 mg/dL      Creatinine 0.62 mg/dL      Sodium 138 mmol/L      Potassium 3.8 mmol/L      Chloride 107 mmol/L      CO2 20.0 mmol/L      Calcium 8.3 mg/dL      eGFR Non African Amer 123 mL/min/1.73      BUN/Creatinine Ratio 8.1     Anion Gap 11.0 mmol/L     Narrative:       GFR Normal >60  Chronic Kidney Disease <60  Kidney Failure <15    Extra Tubes [[id number]] Collected:  08/12/19 1241    Specimen:  Blood, Venous Line Updated:  08/12/19 1346    Narrative:       The following orders were created for panel order Extra Tubes.  Procedure                               Abnormality         Status                     ---------                               -----------         ------                     Gold Top - SST[[id number]]                                   Final result                 Please view results for these tests on the individual orders.    Gold Top - SST [[id number]] Collected:  08/12/19 1241    Specimen:  Blood Updated:  08/12/19 1346     Extra Tube Hold for add-ons.     Comment: Auto resulted.       Magnesium [[id number]]  (Abnormal) Collected:  08/12/19 1227    Specimen:  Blood Updated:  08/12/19 1311     Magnesium 1.6 mg/dL     Basic Metabolic Panel [[id number]]  (Abnormal) Collected:  08/12/19 1227    Specimen:  Blood Updated:  08/12/19 1311     Glucose 197 mg/dL      BUN 5 mg/dL      Creatinine 0.59 mg/dL      Sodium 137 mmol/L      Potassium 3.5 mmol/L      Chloride 107 mmol/L      CO2 20.0 mmol/L      Calcium 8.3 mg/dL      eGFR Non African Amer 130 mL/min/1.73      BUN/Creatinine Ratio 8.5     Anion Gap 10.0 mmol/L     Narrative:       GFR Normal >60  Chronic Kidney Disease <60  Kidney Failure <15    Phosphorus [[id number]]  (Normal) Collected:  08/12/19 1227    Specimen:  Blood Updated:   08/12/19 1311     Phosphorus 3.0 mg/dL     POC Glucose Once [[id number]]  (Abnormal) Collected:  08/12/19 1245    Specimen:  Blood Updated:  08/12/19 1308     Glucose 179 mg/dL      Comment: RN NotifiedOperator: [id number] JIM Cuevas ID: [id number]       POC Glucose Once [[id number]]  (Abnormal) Collected:  08/12/19 1145    Specimen:  Blood Updated:  08/12/19 1202     Glucose 212 mg/dL      Comment: Result Not ConfirmedOperator: [id number] DANDRE ARIZAMeter ID: [id number]       POC Glucose Once [[id number]]  (Abnormal) Collected:  08/12/19 1048    Specimen:  Blood Updated:  08/12/19 1113     Glucose 221 mg/dL      Comment: Sliding Scale AdminRN NotifiedOperator: [id number] DANDRE ARIZAMeter ID: [id number]       POC Glucose Once [[id number]]  (Abnormal) Collected:  08/12/19 0950    Specimen:  Blood Updated:  08/12/19 1006     Glucose 158 mg/dL      Comment: : [id number] DANDRE ARIZAMeter ID: [id number]       Extra Tubes [[id number]] Collected:  08/12/19 0826    Specimen:  Blood, Venous Line Updated:  08/12/19 0932    Narrative:       The following orders were created for panel order Extra Tubes.  Procedure                               Abnormality         Status                     ---------                               -----------         ------                     Lavender Top[[id number]]                                     Final result                 Please view results for these tests on the individual orders.    Lavender Top [[id number]] Collected:  08/12/19 0826    Specimen:  Blood Updated:  08/12/19 0932     Extra Tube hold for add-on     Comment: Auto resulted       Basic Metabolic Panel [[id number]]  (Abnormal) Collected:  08/12/19 0816    Specimen:  Blood Updated:  08/12/19 0856     Glucose 211 mg/dL      BUN 6 mg/dL      Creatinine 0.62 mg/dL      Sodium 136 mmol/L      Potassium 3.5 mmol/L      Chloride 109 mmol/L      CO2 18.0 mmol/L      Calcium 7.4 mg/dL      eGFR Non African Amer  123 mL/min/1.73      BUN/Creatinine Ratio 9.7     Anion Gap 9.0 mmol/L     Narrative:       GFR Normal >60  Chronic Kidney Disease <60  Kidney Failure <15    Iron Profile [[id number]]  (Abnormal) Collected:  08/12/19 0816    Specimen:  Blood Updated:  08/12/19 0855     Iron 81 mcg/dL      Iron Saturation 32 %      Transferrin 168 mg/dL      TIBC 250 mcg/dL     Magnesium [[id number]]  (Abnormal) Collected:  08/12/19 0816    Specimen:  Blood Updated:  08/12/19 0855     Magnesium 1.6 mg/dL     Phosphorus [[id number]]  (Normal) Collected:  08/12/19 0816    Specimen:  Blood Updated:  08/12/19 0855     Phosphorus 2.6 mg/dL     POC Glucose Once [[id number]]  (Abnormal) Collected:  08/12/19 0839    Specimen:  Blood Updated:  08/12/19 0851     Glucose 209 mg/dL      Comment: : [id number] [facility] ASHLEYMeter ID: [id number]       Blood Gas, Arterial [[id number]]  (Abnormal) Collected:  08/12/19 0731    Specimen:  Arterial Blood Updated:  08/12/19 0740     Site Right Radial     Drew's Test N/A     pH, Arterial 7.450 pH units      pCO2, Arterial 23.5 mm Hg      Comment: 84 Value below reference range        pO2, Arterial 147.0 mm Hg      Comment: 83 Value above reference range        HCO3, Arterial 16.3 mmol/L      Comment: 84 Value below reference range        Base Excess, Arterial -6.1 mmol/L      Comment: 84 Value below reference range        O2 Saturation, Arterial 99.9 %      Comment: 83 Value above reference range        Barometric Pressure for Blood Gas 747 mmHg      Modality Room Air     FIO2 21 %      Ventilator Mode NA     Collected by      Comment: Meter: [id number]     :  [id number]       POC Glucose Once [[id number]]  (Abnormal) Collected:  08/12/19 0724    Specimen:  Blood Updated:  08/12/19 0737     Glucose 181 mg/dL      Comment: Sliding Scale AdminOperator: [id number] [facility] ASHLEYMeter ID: [id number]       POC Glucose Once [[id number]]  (Abnormal) Collected:  08/12/19 0608    Specimen:  Blood  Updated:  08/12/19 0623     Glucose 178 mg/dL      Comment: Result Not ConfirmedOperator: [id number] EMILY MORGANYMeter ID: [id number]       POC Glucose Once [[id number]]  (Abnormal) Collected:  08/12/19 0248    Specimen:  Blood Updated:  08/12/19 0542     Glucose 211 mg/dL      Comment: Result Not ConfirmedOperator: [id number] EMILY MORGANYMeter ID: [id number]       POC Glucose Once [[id number]]  (Abnormal) Collected:  08/12/19 0503    Specimen:  Blood Updated:  08/12/19 0516     Glucose 210 mg/dL      Comment: Result Not ConfirmedOperator: [id number] EMILY MORGANYMeter ID: [id number]       Comprehensive Metabolic Panel [[id number]]  (Abnormal) Collected:  08/12/19 0333    Specimen:  Blood Updated:  08/12/19 0426     Glucose 203 mg/dL      BUN 7 mg/dL      Creatinine 0.66 mg/dL      Sodium 136 mmol/L      Potassium 3.7 mmol/L      Chloride 107 mmol/L      CO2 13.0 mmol/L      Calcium 7.4 mg/dL      Total Protein 5.3 g/dL      Albumin 3.00 g/dL      ALT (SGPT) 8 U/L      AST (SGOT) 8 U/L      Alkaline Phosphatase 38 U/L      Total Bilirubin 0.2 mg/dL      eGFR Non African Amer 114 mL/min/1.73      Globulin 2.3 gm/dL      A/G Ratio 1.3 g/dL      BUN/Creatinine Ratio 10.6     Anion Gap 16.0 mmol/L     Narrative:       GFR Normal >60  Chronic Kidney Disease <60  Kidney Failure <15    Phosphorus [[id number]]  (Normal) Collected:  08/12/19 0333    Specimen:  Blood Updated:  08/12/19 0426     Phosphorus 2.9 mg/dL     Magnesium [[id number]]  (Abnormal) Collected:  08/12/19 0333    Specimen:  Blood Updated:  08/12/19 0424     Magnesium 1.6 mg/dL     CBC & Differential [[id number]] Collected:  08/12/19 0333    Specimen:  Blood Updated:  08/12/19 0405    Narrative:       The following orders were created for panel order CBC & Differential.  Procedure                               Abnormality         Status                     ---------                               -----------         ------                     CBC Auto  Differential[[id number]]        Abnormal            Final result                 Please view results for these tests on the individual orders.    CBC Auto Differential [[id number]]  (Abnormal) Collected:  08/12/19 0333    Specimen:  Blood Updated:  08/12/19 0405     WBC 8.50 10*3/mm3      RBC 3.79 10*6/mm3      Hemoglobin 11.2 g/dL      Hematocrit 32.9 %      MCV 86.8 fL      MCH 29.6 pg      MCHC 34.0 g/dL      RDW 13.2 %      RDW-SD 42.2 fl      MPV 10.2 fL      Platelets 226 10*3/mm3      Neutrophil % 58.1 %      Lymphocyte % 35.1 %      Monocyte % 4.5 %      Eosinophil % 0.7 %      Basophil % 0.5 %      Immature Grans % 1.1 %      Neutrophils, Absolute 4.95 10*3/mm3      Lymphocytes, Absolute 2.98 10*3/mm3      Monocytes, Absolute 0.38 10*3/mm3      Eosinophils, Absolute 0.06 10*3/mm3      Basophils, Absolute 0.04 10*3/mm3      Immature Grans, Absolute 0.09 10*3/mm3      nRBC 0.0 /100 WBC     POC Glucose Once [[id number]]  (Abnormal) Collected:  08/12/19 0347    Specimen:  Blood Updated:  08/12/19 0404     Glucose 185 mg/dL      Comment: Result Not ConfirmedOperator: [id number] Poikos ID: [id number]       POC Glucose Once [[id number]]  (Abnormal) Collected:  08/12/19 0144    Specimen:  Blood Updated:  08/12/19 0200     Glucose 243 mg/dL      Comment: Result Not ConfirmedOperator: [id number] Poikos ID: [id number]       Hemoglobin A1c [[id number]]  (Abnormal) Collected:  08/11/19 1452    Specimen:  Blood Updated:  08/12/19 0120     Hemoglobin A1C 12.80 %     Narrative:       Hemoglobin A1C Ranges:    Increased Risk for Diabetes  5.7% to 6.4%  Diabetes                     >= 6.5%  Diabetic Goal                < 7.0%    POC Glucose Once [[id number]]  (Abnormal) Collected:  08/12/19 0027    Specimen:  Blood Updated:  08/12/19 0042     Glucose 278 mg/dL      Comment: Result Not ConfirmedOperator: [id number] Poikos ID: [id number]       Phosphorus [[id number]]  (Abnormal) Collected:   08/11/19 2353    Specimen:  Blood Updated:  08/12/19 0025     Phosphorus 2.1 mg/dL     Basic Metabolic Panel [[id number]]  (Abnormal) Collected:  08/11/19 2353    Specimen:  Blood Updated:  08/12/19 0025     Glucose 327 mg/dL      BUN 8 mg/dL      Creatinine 0.72 mg/dL      Sodium 137 mmol/L      Potassium 4.1 mmol/L      Chloride 104 mmol/L      CO2 10.0 mmol/L      Calcium 7.9 mg/dL      eGFR Non African Amer 103 mL/min/1.73      BUN/Creatinine Ratio 11.1     Anion Gap 23.0 mmol/L     Narrative:       GFR Normal >60  Chronic Kidney Disease <60  Kidney Failure <15    Magnesium [[id number]]  (Normal) Collected:  08/11/19 2353    Specimen:  Blood Updated:  08/12/19 0024     Magnesium 1.7 mg/dL     POC Glucose Once [[id number]]  (Abnormal) Collected:  08/11/19 2159    Specimen:  Blood Updated:  08/11/19 2215     Glucose 292 mg/dL      Comment: RN NotifiedOperator: [id number] EMILY ANTH[facility] ID: [id number]       Urine Drug Screen - Urine, Clean Catch [[id number]]  (Abnormal) Collected:  08/11/19 1945    Specimen:  Urine, Clean Catch Updated:  08/11/19 2053     THC, Screen, Urine Positive     Phencyclidine (PCP), Urine Negative     Cocaine Screen, Urine Negative     Methamphetamine, Ur Negative     Opiate Screen Positive     Amphetamine Screen, Urine Negative     Benzodiazepine Screen, Urine Negative     Tricyclic Antidepressants Screen Negative     Methadone Screen, Urine Negative     Barbiturates Screen, Urine Negative     Oxycodone Screen, Urine Negative     Propoxyphene Screen Negative     Buprenorphine, Screen, Urine Negative    Narrative:       Cutoff For Drugs Screened:    Amphetamines               500 ng/ml  Barbiturates               200 ng/ml  Benzodiazepines            150 ng/ml  Cocaine                    150 ng/ml  Methadone                  200 ng/ml  Opiates                    100 ng/ml  Phencyclidine               25 ng/ml  THC                            50 ng/ml  Methamphetamine            500  ng/ml  Tricyclic Antidepressants  300 ng/ml  Oxycodone                  100 ng/ml  Propoxyphene               300 ng/ml  Buprenorphine               10 ng/ml    The normal value for all drugs tested is negative. This report includes unconfirmed screening results, with the cutoff values listed, to be used for medical treatment purposes only.  Unconfirmed results must not be used for non-medical purposes such as employment or legal testing.  Clinical consideration should be applied to any drug of abuse test, particularly when unconfirmed results are used.      Magnesium [[id number]]  (Normal) Collected:  08/11/19 1958    Specimen:  Blood Updated:  08/11/19 2026     Magnesium 2.2 mg/dL     Phosphorus [[id number]]  (Normal) Collected:  08/11/19 1958    Specimen:  Blood Updated:  08/11/19 2023     Phosphorus 2.8 mg/dL     Basic Metabolic Panel [[id number]]  (Abnormal) Collected:  08/11/19 1958    Specimen:  Blood Updated:  08/11/19 2023     Glucose 285 mg/dL      BUN 9 mg/dL      Creatinine 0.71 mg/dL      Sodium 137 mmol/L      Potassium 4.1 mmol/L      Chloride 101 mmol/L      CO2 14.0 mmol/L      Calcium 8.4 mg/dL      eGFR Non African Amer 105 mL/min/1.73      BUN/Creatinine Ratio 12.7     Anion Gap 22.0 mmol/L     Narrative:       GFR Normal >60  Chronic Kidney Disease <60  Kidney Failure <15    Pregnancy, Urine - Urine, Clean Catch [[id number]]  (Normal) Collected:  08/11/19 1945    Specimen:  Urine, Clean Catch Updated:  08/11/19 2008     HCG, Urine QL Negative    Urinalysis With Microscopic If Indicated (No Culture) - Urine, Clean Catch [[id number]]  (Abnormal) Collected:  08/11/19 1945    Specimen:  Urine, Clean Catch Updated:  08/11/19 2007     Color, UA Yellow     Appearance, UA Clear     pH, UA 5.5     Specific Gravity, UA 1.029     Glucose, UA >=1000 mg/dL (3+)     Ketones, UA 80 mg/dL (3+)     Bilirubin, UA Negative     Blood, UA Negative     Protein, UA Negative     Leuk Esterase, UA Negative     Nitrite, UA  Negative     Urobilinogen, UA 0.2 E.U./dL    Narrative:       Urine microscopic not indicated.    POC Glucose Once [[id number]]  (Abnormal) Collected:  08/11/19 1940    Specimen:  Blood Updated:  08/11/19 1957     Glucose 276 mg/dL      Comment: Result Not ConfirmedOperator: [id number] EMILY Elliott ID: [id number]       Blood Gas, Arterial [[id number]]  (Abnormal) Collected:  08/11/19 1600    Specimen:  Arterial Blood Updated:  08/11/19 1607     Site Right Radial     Drew's Test N/A     pH, Arterial 7.397 pH units      pCO2, Arterial 28.7 mm Hg      Comment: 84 Value below reference range        pO2, Arterial 116.0 mm Hg      Comment: 83 Value above reference range        HCO3, Arterial 17.7 mmol/L      Comment: 84 Value below reference range        Base Excess, Arterial -5.9 mmol/L      Comment: 84 Value below reference range        O2 Saturation, Arterial 98.9 %      Barometric Pressure for Blood Gas 747 mmHg      Modality Room Air     Ventilator Mode NA     Collected by KW     Comment: Meter: [id number]     :  [id number]       Glendo Draw [[id number]] Collected:  08/11/19 1452    Specimen:  Blood Updated:  08/11/19 1601    Narrative:       The following orders were created for panel order Glendo Draw.  Procedure                               Abnormality         Status                     ---------                               -----------         ------                     Light Blue Top[[id number]]                                   Final result               Green Top (Gel)[[id number]]                                  Final result               Lavender Top[[id number]]                                     Final result               Gold Top - SST[[id number]]                                                                Please view results for these tests on the individual orders.    Light Blue Top [[id number]] Collected:  08/11/19 1453    Specimen:  Blood Updated:  08/11/19 1601     Extra Tube hold  for add-on     Comment: Auto resulted       Green Top (Gel) [[id number]] Collected:  08/11/19 1452    Specimen:  Blood Updated:  08/11/19 1601     Extra Tube Hold for add-ons.     Comment: Auto resulted.       Lavender Top [[id number]] Collected:  08/11/19 1452    Specimen:  Blood Updated:  08/11/19 1601     Extra Tube hold for add-on     Comment: Auto resulted       CK [[id number]]  (Normal) Collected:  08/11/19 1452    Specimen:  Blood Updated:  08/11/19 1538     Creatine Kinase 35 U/L     Magnesium [[id number]]  (Abnormal) Collected:  08/11/19 1452    Specimen:  Blood Updated:  08/11/19 1538     Magnesium 1.5 mg/dL     Comprehensive Metabolic Panel [[id number]]  (Abnormal) Collected:  08/11/19 1452    Specimen:  Blood Updated:  08/11/19 1515     Glucose 345 mg/dL      BUN 10 mg/dL      Creatinine 0.86 mg/dL      Sodium 136 mmol/L      Potassium 4.1 mmol/L      Chloride 96 mmol/L      CO2 17.0 mmol/L      Calcium 9.4 mg/dL      Total Protein 7.6 g/dL      Albumin 4.50 g/dL      ALT (SGPT) 11 U/L      AST (SGOT) 10 U/L      Alkaline Phosphatase 55 U/L      Total Bilirubin 0.4 mg/dL      eGFR Non African Amer 84 mL/min/1.73      Globulin 3.1 gm/dL      A/G Ratio 1.5 g/dL      BUN/Creatinine Ratio 11.6     Anion Gap 23.0 mmol/L     Narrative:       GFR Normal >60  Chronic Kidney Disease <60  Kidney Failure <15    hCG, Serum, Qualitative [[id number]]  (Normal) Collected:  08/11/19 1452    Specimen:  Blood Updated:  08/11/19 1504     HCG Qualitative Negative    Ketone Bodies, Serum (Not performed at [facility]) [[id number]] Collected:  08/11/19 1459    Specimen:  Blood Updated:  08/11/19 1503    Narrative:       The following orders were created for panel order Ketone Bodies, Serum (Not performed at [place]).  Procedure                               Abnormality         Status                     ---------                               -----------         ------                     Acetone[[id number]]                       Abnormal            Final result                 Please view results for these tests on the individual orders.    Acetone [[id number]]  (Abnormal) Collected:  08/11/19 1459    Specimen:  Blood Updated:  08/11/19 1503     Acetone Large    CBC & Differential [[id number]] Collected:  08/11/19 1452    Specimen:  Blood Updated:  08/11/19 1455    Narrative:       The following orders were created for panel order CBC & Differential.  Procedure                               Abnormality         Status                     ---------                               -----------         ------                     CBC Auto Differential[[id number]]        Abnormal            Final result                 Please view results for these tests on the individual orders.    CBC Auto Differential [[id number]]  (Abnormal) Collected:  08/11/19 1452    Specimen:  Blood Updated:  08/11/19 1455     WBC 6.78 10*3/mm3      RBC 4.80 10*6/mm3      Hemoglobin 13.9 g/dL      Hematocrit 40.8 %      MCV 85.0 fL      MCH 29.0 pg      MCHC 34.1 g/dL      RDW 13.2 %      RDW-SD 40.9 fl      MPV 10.0 fL      Platelets 333 10*3/mm3      Neutrophil % 61.1 %      Lymphocyte % 30.5 %      Monocyte % 5.6 %      Eosinophil % 0.9 %      Basophil % 0.9 %      Immature Grans % 1.0 %      Neutrophils, Absolute 4.14 10*3/mm3      Lymphocytes, Absolute 2.07 10*3/mm3      Monocytes, Absolute 0.38 10*3/mm3      Eosinophils, Absolute 0.06 10*3/mm3      Basophils, Absolute 0.06 10*3/mm3      Immature Grans, Absolute 0.07 10*3/mm3      nRBC 0.0 /100 WBC           Lab Results (last 48 hours)     Procedure Component Value Units Date/Time    Basic Metabolic Panel [[id number]]  (Abnormal) Collected:  08/12/19 1516    Specimen:  Blood Updated:  08/12/19 1607     Glucose 234 mg/dL      BUN 6 mg/dL      Creatinine 0.65 mg/dL      Sodium 140 mmol/L      Potassium 3.9 mmol/L      Chloride 108 mmol/L      CO2 22.0 mmol/L      Calcium 8.3 mg/dL      eGFR Non African Amer 116 mL/min/1.73       BUN/Creatinine Ratio 9.2     Anion Gap 10.0 mmol/L     Narrative:       GFR Normal >60  Chronic Kidney Disease <60  Kidney Failure <15    POC Glucose Once [[id number]]  (Abnormal) Collected:  08/12/19 1342    Specimen:  Blood Updated:  08/12/19 1354     Glucose 198 mg/dL      Comment: Result Not ConfirmedOperator: [id number] DANDRE Barry ID: [id number]       Basic Metabolic Panel [[id number]]  (Abnormal) Collected:  08/12/19 1241    Specimen:  Blood Updated:  08/12/19 1352     Glucose 195 mg/dL      BUN 5 mg/dL      Creatinine 0.62 mg/dL      Sodium 138 mmol/L      Potassium 3.8 mmol/L      Chloride 107 mmol/L      CO2 20.0 mmol/L      Calcium 8.3 mg/dL      eGFR Non African Amer 123 mL/min/1.73      BUN/Creatinine Ratio 8.1     Anion Gap 11.0 mmol/L     Narrative:       GFR Normal >60  Chronic Kidney Disease <60  Kidney Failure <15    Extra Tubes [[id number]] Collected:  08/12/19 1241    Specimen:  Blood, Venous Line Updated:  08/12/19 1346    Narrative:       The following orders were created for panel order Extra Tubes.  Procedure                               Abnormality         Status                     ---------                               -----------         ------                     Gold Top - SST[[id number]]                                   Final result                 Please view results for these tests on the individual orders.    Gold Top - SST [[id number]] Collected:  08/12/19 1241    Specimen:  Blood Updated:  08/12/19 1346     Extra Tube Hold for add-ons.     Comment: Auto resulted.       Magnesium [[id number]]  (Abnormal) Collected:  08/12/19 1227    Specimen:  Blood Updated:  08/12/19 1311     Magnesium 1.6 mg/dL     Basic Metabolic Panel [[id number]]  (Abnormal) Collected:  08/12/19 1227    Specimen:  Blood Updated:  08/12/19 1311     Glucose 197 mg/dL      BUN 5 mg/dL      Creatinine 0.59 mg/dL      Sodium 137 mmol/L      Potassium 3.5 mmol/L      Chloride 107 mmol/L      CO2 20.0 mmol/L       Calcium 8.3 mg/dL      eGFR Non African Amer 130 mL/min/1.73      BUN/Creatinine Ratio 8.5     Anion Gap 10.0 mmol/L     Narrative:       GFR Normal >60  Chronic Kidney Disease <60  Kidney Failure <15    Phosphorus [[id number]]  (Normal) Collected:  08/12/19 1227    Specimen:  Blood Updated:  08/12/19 1311     Phosphorus 3.0 mg/dL     POC Glucose Once [[id number]]  (Abnormal) Collected:  08/12/19 1245    Specimen:  Blood Updated:  08/12/19 1308     Glucose 179 mg/dL      Comment: RN NotifiedOperator: [id number] FERNANDEZ BINAEMeter ID: [id number]       POC Glucose Once [[id number]]  (Abnormal) Collected:  08/12/19 1145    Specimen:  Blood Updated:  08/12/19 1202     Glucose 212 mg/dL      Comment: Result Not ConfirmedOperator: [id number] DANDRE PETERSENLEYMeter ID: [id number]       POC Glucose Once [[id number]]  (Abnormal) Collected:  08/12/19 1048    Specimen:  Blood Updated:  08/12/19 1113     Glucose 221 mg/dL      Comment: Sliding Scale AdminRN NotifiedOperator: [id number] DANDRE ASHLEYMeter ID: [id number]       POC Glucose Once [[id number]]  (Abnormal) Collected:  08/12/19 0950    Specimen:  Blood Updated:  08/12/19 1006     Glucose 158 mg/dL      Comment: : [id number] DANDRE PETERSENLEYMeter ID: [id number]       Extra Tubes [[id number]] Collected:  08/12/19 0826    Specimen:  Blood, Venous Line Updated:  08/12/19 0932    Narrative:       The following orders were created for panel order Extra Tubes.  Procedure                               Abnormality         Status                     ---------                               -----------         ------                     Lavender Top[[id number]]                                     Final result                 Please view results for these tests on the individual orders.    Lavender Top [[id number]] Collected:  08/12/19 0826    Specimen:  Blood Updated:  08/12/19 0932     Extra Tube hold for add-on     Comment: Auto resulted       Basic Metabolic Panel [[id number]]   (Abnormal) Collected:  08/12/19 0816    Specimen:  Blood Updated:  08/12/19 0856     Glucose 211 mg/dL      BUN 6 mg/dL      Creatinine 0.62 mg/dL      Sodium 136 mmol/L      Potassium 3.5 mmol/L      Chloride 109 mmol/L      CO2 18.0 mmol/L      Calcium 7.4 mg/dL      eGFR Non African Amer 123 mL/min/1.73      BUN/Creatinine Ratio 9.7     Anion Gap 9.0 mmol/L     Narrative:       GFR Normal >60  Chronic Kidney Disease <60  Kidney Failure <15    Iron Profile [[id number]]  (Abnormal) Collected:  08/12/19 0816    Specimen:  Blood Updated:  08/12/19 0855     Iron 81 mcg/dL      Iron Saturation 32 %      Transferrin 168 mg/dL      TIBC 250 mcg/dL     Magnesium [[id number]]  (Abnormal) Collected:  08/12/19 0816    Specimen:  Blood Updated:  08/12/19 0855     Magnesium 1.6 mg/dL     Phosphorus [[id number]]  (Normal) Collected:  08/12/19 0816    Specimen:  Blood Updated:  08/12/19 0855     Phosphorus 2.6 mg/dL     POC Glucose Once [[id number]]  (Abnormal) Collected:  08/12/19 0839    Specimen:  Blood Updated:  08/12/19 0851     Glucose 209 mg/dL      Comment: : [id number] [facility] Jhonny ID: [id number]       Blood Gas, Arterial [[id number]]  (Abnormal) Collected:  08/12/19 0731    Specimen:  Arterial Blood Updated:  08/12/19 0740     Site Right Radial     Drew's Test N/A     pH, Arterial 7.450 pH units      pCO2, Arterial 23.5 mm Hg      Comment: 84 Value below reference range        pO2, Arterial 147.0 mm Hg      Comment: 83 Value above reference range        HCO3, Arterial 16.3 mmol/L      Comment: 84 Value below reference range        Base Excess, Arterial -6.1 mmol/L      Comment: 84 Value below reference range        O2 Saturation, Arterial 99.9 %      Comment: 83 Value above reference range        Barometric Pressure for Blood Gas 747 mmHg      Modality Room Air     FIO2 21 %      Ventilator Mode NA     Collected by      Comment: Meter: [id number]     :  [id number]       POC Glucose Once  [[id number]]  (Abnormal) Collected:  08/12/19 0724    Specimen:  Blood Updated:  08/12/19 0737     Glucose 181 mg/dL      Comment: Sliding Scale AdminOperator: [id number] DANDRE Barry ID: [id number]       POC Glucose Once [[id number]]  (Abnormal) Collected:  08/12/19 0608    Specimen:  Blood Updated:  08/12/19 0623     Glucose 178 mg/dL      Comment: Result Not ConfirmedOperator: [id number] EMILY MORGANYMeter ID: [id number]       POC Glucose Once [[id number]]  (Abnormal) Collected:  08/12/19 0248    Specimen:  Blood Updated:  08/12/19 0542     Glucose 211 mg/dL      Comment: Result Not ConfirmedOperator: [id number] EMILY MORGANYMeter ID: [id number]       POC Glucose Once [[id number]]  (Abnormal) Collected:  08/12/19 0503    Specimen:  Blood Updated:  08/12/19 0516     Glucose 210 mg/dL      Comment: Result Not ConfirmedOperator: [id number] EMILY MORGANYMeter ID: [id number]       Comprehensive Metabolic Panel [[id number]]  (Abnormal) Collected:  08/12/19 0333    Specimen:  Blood Updated:  08/12/19 0426     Glucose 203 mg/dL      BUN 7 mg/dL      Creatinine 0.66 mg/dL      Sodium 136 mmol/L      Potassium 3.7 mmol/L      Chloride 107 mmol/L      CO2 13.0 mmol/L      Calcium 7.4 mg/dL      Total Protein 5.3 g/dL      Albumin 3.00 g/dL      ALT (SGPT) 8 U/L      AST (SGOT) 8 U/L      Alkaline Phosphatase 38 U/L      Total Bilirubin 0.2 mg/dL      eGFR Non African Amer 114 mL/min/1.73      Globulin 2.3 gm/dL      A/G Ratio 1.3 g/dL      BUN/Creatinine Ratio 10.6     Anion Gap 16.0 mmol/L     Narrative:       GFR Normal >60  Chronic Kidney Disease <60  Kidney Failure <15    Phosphorus [[id number]]  (Normal) Collected:  08/12/19 0333    Specimen:  Blood Updated:  08/12/19 0426     Phosphorus 2.9 mg/dL     Magnesium [[id number]]  (Abnormal) Collected:  08/12/19 0333    Specimen:  Blood Updated:  08/12/19 0424     Magnesium 1.6 mg/dL     CBC & Differential [[id number]] Collected:  08/12/19 0333    Specimen:  Blood Updated:   08/12/19 0405    Narrative:       The following orders were created for panel order CBC & Differential.  Procedure                               Abnormality         Status                     ---------                               -----------         ------                     CBC Auto Differential[[id number]]        Abnormal            Final result                 Please view results for these tests on the individual orders.    CBC Auto Differential [[id number]]  (Abnormal) Collected:  08/12/19 0333    Specimen:  Blood Updated:  08/12/19 0405     WBC 8.50 10*3/mm3      RBC 3.79 10*6/mm3      Hemoglobin 11.2 g/dL      Hematocrit 32.9 %      MCV 86.8 fL      MCH 29.6 pg      MCHC 34.0 g/dL      RDW 13.2 %      RDW-SD 42.2 fl      MPV 10.2 fL      Platelets 226 10*3/mm3      Neutrophil % 58.1 %      Lymphocyte % 35.1 %      Monocyte % 4.5 %      Eosinophil % 0.7 %      Basophil % 0.5 %      Immature Grans % 1.1 %      Neutrophils, Absolute 4.95 10*3/mm3      Lymphocytes, Absolute 2.98 10*3/mm3      Monocytes, Absolute 0.38 10*3/mm3      Eosinophils, Absolute 0.06 10*3/mm3      Basophils, Absolute 0.04 10*3/mm3      Immature Grans, Absolute 0.09 10*3/mm3      nRBC 0.0 /100 WBC     POC Glucose Once [[id number]]  (Abnormal) Collected:  08/12/19 0347    Specimen:  Blood Updated:  08/12/19 0404     Glucose 185 mg/dL      Comment: Result Not ConfirmedOperator: [id number] Varaani Works ID: [id number]       POC Glucose Once [[id number]]  (Abnormal) Collected:  08/12/19 0144    Specimen:  Blood Updated:  08/12/19 0200     Glucose 243 mg/dL      Comment: Result Not ConfirmedOperator: [id number] Varaani Works ID: [id number]       Hemoglobin A1c [[id number]]  (Abnormal) Collected:  08/11/19 1452    Specimen:  Blood Updated:  08/12/19 0120     Hemoglobin A1C 12.80 %     Narrative:       Hemoglobin A1C Ranges:    Increased Risk for Diabetes  5.7% to 6.4%  Diabetes                     >= 6.5%  Diabetic Goal                <  7.0%    POC Glucose Once [[id number]]  (Abnormal) Collected:  08/12/19 0027    Specimen:  Blood Updated:  08/12/19 0042     Glucose 278 mg/dL      Comment: Result Not ConfirmedOperator: [id number] [facility] ID: [id number]       Phosphorus [[id number]]  (Abnormal) Collected:  08/11/19 2353    Specimen:  Blood Updated:  08/12/19 0025     Phosphorus 2.1 mg/dL     Basic Metabolic Panel [[id number]]  (Abnormal) Collected:  08/11/19 2353    Specimen:  Blood Updated:  08/12/19 0025     Glucose 327 mg/dL      BUN 8 mg/dL      Creatinine 0.72 mg/dL      Sodium 137 mmol/L      Potassium 4.1 mmol/L      Chloride 104 mmol/L      CO2 10.0 mmol/L      Calcium 7.9 mg/dL      eGFR Non African Amer 103 mL/min/1.73      BUN/Creatinine Ratio 11.1     Anion Gap 23.0 mmol/L     Narrative:       GFR Normal >60  Chronic Kidney Disease <60  Kidney Failure <15    Magnesium [[id number]]  (Normal) Collected:  08/11/19 2353    Specimen:  Blood Updated:  08/12/19 0024     Magnesium 1.7 mg/dL     POC Glucose Once [[id number]]  (Abnormal) Collected:  08/11/19 2159    Specimen:  Blood Updated:  08/11/19 2215     Glucose 292 mg/dL      Comment: RN NotifiedOperator: [id number] [facility] ID: [id number]       Urine Drug Screen - Urine, Clean Catch [[id number]]  (Abnormal) Collected:  08/11/19 1945    Specimen:  Urine, Clean Catch Updated:  08/11/19 2053     THC, Screen, Urine Positive     Phencyclidine (PCP), Urine Negative     Cocaine Screen, Urine Negative     Methamphetamine, Ur Negative     Opiate Screen Positive     Amphetamine Screen, Urine Negative     Benzodiazepine Screen, Urine Negative     Tricyclic Antidepressants Screen Negative     Methadone Screen, Urine Negative     Barbiturates Screen, Urine Negative     Oxycodone Screen, Urine Negative     Propoxyphene Screen Negative     Buprenorphine, Screen, Urine Negative    Narrative:       Cutoff For Drugs Screened:    Amphetamines               500 ng/ml  Barbiturates                200 ng/ml  Benzodiazepines            150 ng/ml  Cocaine                    150 ng/ml  Methadone                  200 ng/ml  Opiates                    100 ng/ml  Phencyclidine               25 ng/ml  THC                            50 ng/ml  Methamphetamine            500 ng/ml  Tricyclic Antidepressants  300 ng/ml  Oxycodone                  100 ng/ml  Propoxyphene               300 ng/ml  Buprenorphine               10 ng/ml    The normal value for all drugs tested is negative. This report includes unconfirmed screening results, with the cutoff values listed, to be used for medical treatment purposes only.  Unconfirmed results must not be used for non-medical purposes such as employment or legal testing.  Clinical consideration should be applied to any drug of abuse test, particularly when unconfirmed results are used.      Magnesium [[id number]]  (Normal) Collected:  08/11/19 1958    Specimen:  Blood Updated:  08/11/19 2026     Magnesium 2.2 mg/dL     Phosphorus [[id number]]  (Normal) Collected:  08/11/19 1958    Specimen:  Blood Updated:  08/11/19 2023     Phosphorus 2.8 mg/dL     Basic Metabolic Panel [[id number]]  (Abnormal) Collected:  08/11/19 1958    Specimen:  Blood Updated:  08/11/19 2023     Glucose 285 mg/dL      BUN 9 mg/dL      Creatinine 0.71 mg/dL      Sodium 137 mmol/L      Potassium 4.1 mmol/L      Chloride 101 mmol/L      CO2 14.0 mmol/L      Calcium 8.4 mg/dL      eGFR Non African Amer 105 mL/min/1.73      BUN/Creatinine Ratio 12.7     Anion Gap 22.0 mmol/L     Narrative:       GFR Normal >60  Chronic Kidney Disease <60  Kidney Failure <15    Pregnancy, Urine - Urine, Clean Catch [[id number]]  (Normal) Collected:  08/11/19 1945    Specimen:  Urine, Clean Catch Updated:  08/11/19 2008     HCG, Urine QL Negative    Urinalysis With Microscopic If Indicated (No Culture) - Urine, Clean Catch [[id number]]  (Abnormal) Collected:  08/11/19 1945    Specimen:  Urine, Clean Catch Updated:  08/11/19 2007      Color, UA Yellow     Appearance, UA Clear     pH, UA 5.5     Specific Gravity, UA 1.029     Glucose, UA >=1000 mg/dL (3+)     Ketones, UA 80 mg/dL (3+)     Bilirubin, UA Negative     Blood, UA Negative     Protein, UA Negative     Leuk Esterase, UA Negative     Nitrite, UA Negative     Urobilinogen, UA 0.2 E.U./dL    Narrative:       Urine microscopic not indicated.    POC Glucose Once [[id number]]  (Abnormal) Collected:  08/11/19 1940    Specimen:  Blood Updated:  08/11/19 1957     Glucose 276 mg/dL      Comment: Result Not ConfirmedOperator: [id number] EMILY Elliott ID: [id number]       Blood Gas, Arterial [[id number]]  (Abnormal) Collected:  08/11/19 1600    Specimen:  Arterial Blood Updated:  08/11/19 1607     Site Right Radial     Drew's Test N/A     pH, Arterial 7.397 pH units      pCO2, Arterial 28.7 mm Hg      Comment: 84 Value below reference range        pO2, Arterial 116.0 mm Hg      Comment: 83 Value above reference range        HCO3, Arterial 17.7 mmol/L      Comment: 84 Value below reference range        Base Excess, Arterial -5.9 mmol/L      Comment: 84 Value below reference range        O2 Saturation, Arterial 98.9 %      Barometric Pressure for Blood Gas 747 mmHg      Modality Room Air     Ventilator Mode NA     Collected by KW     Comment: Meter: [id number]     :  [id number]       [place] Draw [[id number]] Collected:  08/11/19 1452    Specimen:  Blood Updated:  08/11/19 1601    Narrative:       The following orders were created for panel order [place] Draw.  Procedure                               Abnormality         Status                     ---------                               -----------         ------                     Light Blue Top[[id number]]                                   Final result               Green Top (Gel)[[id number]]                                  Final result               Lavender Top[[id number]]                                     Final result                Gold Top - [facility][[id number]]                                                                Please view results for these tests on the individual orders.    Light Blue Top [[id number]] Collected:  08/11/19 1453    Specimen:  Blood Updated:  08/11/19 1601     Extra Tube hold for add-on     Comment: Auto resulted       Green Top (Gel) [[id number]] Collected:  08/11/19 1452    Specimen:  Blood Updated:  08/11/19 1601     Extra Tube Hold for add-ons.     Comment: Auto resulted.       Lavender Top [[id number]] Collected:  08/11/19 1452    Specimen:  Blood Updated:  08/11/19 1601     Extra Tube hold for add-on     Comment: Auto resulted       CK [[id number]]  (Normal) Collected:  08/11/19 1452    Specimen:  Blood Updated:  08/11/19 1538     Creatine Kinase 35 U/L     Magnesium [[id number]]  (Abnormal) Collected:  08/11/19 1452    Specimen:  Blood Updated:  08/11/19 1538     Magnesium 1.5 mg/dL     Comprehensive Metabolic Panel [[id number]]  (Abnormal) Collected:  08/11/19 1452    Specimen:  Blood Updated:  08/11/19 1515     Glucose 345 mg/dL      BUN 10 mg/dL      Creatinine 0.86 mg/dL      Sodium 136 mmol/L      Potassium 4.1 mmol/L      Chloride 96 mmol/L      CO2 17.0 mmol/L      Calcium 9.4 mg/dL      Total Protein 7.6 g/dL      Albumin 4.50 g/dL      ALT (SGPT) 11 U/L      AST (SGOT) 10 U/L      Alkaline Phosphatase 55 U/L      Total Bilirubin 0.4 mg/dL      eGFR Non African Amer 84 mL/min/1.73      Globulin 3.1 gm/dL      A/G Ratio 1.5 g/dL      BUN/Creatinine Ratio 11.6     Anion Gap 23.0 mmol/L     Narrative:       GFR Normal >60  Chronic Kidney Disease <60  Kidney Failure <15    hCG, Serum, Qualitative [[id number]]  (Normal) Collected:  08/11/19 1452    Specimen:  Blood Updated:  08/11/19 1504     HCG Qualitative Negative    Ketone Bodies, Serum (Not performed at [place]) [[id number]] Collected:  08/11/19 1459    Specimen:  Blood Updated:  08/11/19 1503    Narrative:       The following orders were created for panel  order Ketone Bodies, Serum (Not performed at [place]).  Procedure                               Abnormality         Status                     ---------                               -----------         ------                     Acetone[[id number]]                      Abnormal            Final result                 Please view results for these tests on the individual orders.    Acetone [[id number]]  (Abnormal) Collected:  08/11/19 1459    Specimen:  Blood Updated:  08/11/19 1503     Acetone Large    CBC & Differential [[id number]] Collected:  08/11/19 1452    Specimen:  Blood Updated:  08/11/19 1455    Narrative:       The following orders were created for panel order CBC & Differential.  Procedure                               Abnormality         Status                     ---------                               -----------         ------                     CBC Auto Differential[[id number]]        Abnormal            Final result                 Please view results for these tests on the individual orders.    CBC Auto Differential [[id number]]  (Abnormal) Collected:  08/11/19 1452    Specimen:  Blood Updated:  08/11/19 1455     WBC 6.78 10*3/mm3      RBC 4.80 10*6/mm3      Hemoglobin 13.9 g/dL      Hematocrit 40.8 %      MCV 85.0 fL      MCH 29.0 pg      MCHC 34.1 g/dL      RDW 13.2 %      RDW-SD 40.9 fl      MPV 10.0 fL      Platelets 333 10*3/mm3      Neutrophil % 61.1 %      Lymphocyte % 30.5 %      Monocyte % 5.6 %      Eosinophil % 0.9 %      Basophil % 0.9 %      Immature Grans % 1.0 %      Neutrophils, Absolute 4.14 10*3/mm3      Lymphocytes, Absolute 2.07 10*3/mm3      Monocytes, Absolute 0.38 10*3/mm3      Eosinophils, Absolute 0.06 10*3/mm3      Basophils, Absolute 0.06 10*3/mm3      Immature Grans, Absolute 0.07 10*3/mm3      nRBC 0.0 /100 WBC

## 2019-08-14 ENCOUNTER — READMISSION MANAGEMENT (OUTPATIENT)
Dept: CALL CENTER | Facility: HOSPITAL | Age: 19
End: 2019-08-14

## 2019-08-14 NOTE — OUTREACH NOTE
Medical Week 1 Survey      Responses   Facility patient discharged from?  [facility]   Does the patient have one of the following disease processes/diagnoses(primary or secondary)?  Other   Is there a successful TCM telephone encounter documented?  No   Week 1 attempt successful?  Yes   Revoke  Decline to participate          Fernando Rasheed RN

## 2019-09-29 ENCOUNTER — APPOINTMENT (OUTPATIENT)
Dept: GENERAL RADIOLOGY | Facility: HOSPITAL | Age: 19
End: 2019-09-29

## 2019-09-29 ENCOUNTER — HOSPITAL ENCOUNTER (INPATIENT)
Facility: HOSPITAL | Age: 19
LOS: 3 days | Discharge: HOME | End: 2019-10-02
Attending: FAMILY MEDICINE | Admitting: INTERNAL MEDICINE

## 2019-09-29 DIAGNOSIS — E10.10 DIABETIC KETOACIDOSIS WITHOUT COMA ASSOCIATED WITH TYPE 1 DIABETES MELLITUS (HCC): Primary | ICD-10-CM

## 2019-09-29 DIAGNOSIS — N39.0 ACUTE UTI: ICD-10-CM

## 2019-09-29 PROBLEM — N17.9 AKI (ACUTE KIDNEY INJURY): Status: ACTIVE | Noted: 2019-09-29

## 2019-09-29 PROBLEM — R11.2 NAUSEA AND VOMITING: Chronic | Status: ACTIVE | Noted: 2019-09-29

## 2019-09-29 LAB
ACETONE BLD QL: ABNORMAL
ALBUMIN SERPL-MCNC: 2.6 G/DL (ref 3.5–5.2)
ALBUMIN SERPL-MCNC: 3.1 G/DL (ref 3.5–5.2)
ALBUMIN/GLOB SERPL: 0.6 G/DL
ALBUMIN/GLOB SERPL: 0.7 G/DL
ALP SERPL-CCNC: 128 U/L (ref 39–117)
ALP SERPL-CCNC: 159 U/L (ref 39–117)
ALT SERPL W P-5'-P-CCNC: 10 U/L (ref 1–33)
ALT SERPL W P-5'-P-CCNC: 13 U/L (ref 1–33)
ANION GAP SERPL CALCULATED.3IONS-SCNC: 16 MMOL/L (ref 5–15)
ANION GAP SERPL CALCULATED.3IONS-SCNC: 24 MMOL/L (ref 5–15)
AST SERPL-CCNC: 11 U/L (ref 1–32)
AST SERPL-CCNC: 7 U/L (ref 1–32)
B PERT DNA SPEC QL NAA+PROBE: NOT DETECTED
B-HCG UR QL: NEGATIVE
BACTERIA UR QL AUTO: ABNORMAL /HPF
BASOPHILS # BLD AUTO: 0.1 10*3/MM3 (ref 0–0.2)
BASOPHILS NFR BLD AUTO: 0.5 % (ref 0–1.5)
BILIRUB SERPL-MCNC: 0.2 MG/DL (ref 0.2–1.2)
BILIRUB SERPL-MCNC: 0.4 MG/DL (ref 0.2–1.2)
BILIRUB UR QL STRIP: NEGATIVE
BUN BLD-MCNC: 22 MG/DL (ref 6–20)
BUN BLD-MCNC: 22 MG/DL (ref 6–20)
BUN/CREAT SERPL: 15 (ref 7–25)
BUN/CREAT SERPL: 17.1 (ref 7–25)
BURR CELLS BLD QL SMEAR: NORMAL
C PNEUM DNA NPH QL NAA+NON-PROBE: NOT DETECTED
CALCIUM SPEC-SCNC: 8.2 MG/DL (ref 8.6–10.5)
CALCIUM SPEC-SCNC: 9.2 MG/DL (ref 8.6–10.5)
CHLORIDE SERPL-SCNC: 81 MMOL/L (ref 98–107)
CHLORIDE SERPL-SCNC: 89 MMOL/L (ref 98–107)
CLARITY UR: ABNORMAL
CO2 SERPL-SCNC: 21 MMOL/L (ref 22–29)
CO2 SERPL-SCNC: 22 MMOL/L (ref 22–29)
COLOR UR: YELLOW
CREAT BLD-MCNC: 1.29 MG/DL (ref 0.57–1)
CREAT BLD-MCNC: 1.47 MG/DL (ref 0.57–1)
D-LACTATE SERPL-SCNC: 1.9 MMOL/L (ref 0.5–2)
DEPRECATED RDW RBC AUTO: 44.7 FL (ref 37–54)
EOSINOPHIL # BLD AUTO: 0.04 10*3/MM3 (ref 0–0.4)
EOSINOPHIL NFR BLD AUTO: 0.2 % (ref 0.3–6.2)
ERYTHROCYTE [DISTWIDTH] IN BLOOD BY AUTOMATED COUNT: 14 % (ref 12.3–15.4)
FLUAV H1 2009 PAND RNA NPH QL NAA+PROBE: NOT DETECTED
FLUAV H1 HA GENE NPH QL NAA+PROBE: NOT DETECTED
FLUAV H3 RNA NPH QL NAA+PROBE: NOT DETECTED
FLUAV SUBTYP SPEC NAA+PROBE: NOT DETECTED
FLUBV RNA ISLT QL NAA+PROBE: NOT DETECTED
GFR SERPL CREATININE-BSD FRML MDRD: 45 ML/MIN/1.73
GFR SERPL CREATININE-BSD FRML MDRD: 53 ML/MIN/1.73
GLOBULIN UR ELPH-MCNC: 3.9 GM/DL
GLOBULIN UR ELPH-MCNC: 4.8 GM/DL
GLUCOSE BLD-MCNC: 345 MG/DL (ref 65–99)
GLUCOSE BLD-MCNC: 429 MG/DL (ref 65–99)
GLUCOSE UR STRIP-MCNC: ABNORMAL MG/DL
GRAN CASTS URNS QL MICRO: ABNORMAL /LPF
HADV DNA SPEC NAA+PROBE: NOT DETECTED
HBA1C MFR BLD: 12 % (ref 4.8–5.6)
HCG SERPL QL: NEGATIVE
HCOV 229E RNA SPEC QL NAA+PROBE: NOT DETECTED
HCOV HKU1 RNA SPEC QL NAA+PROBE: NOT DETECTED
HCOV NL63 RNA SPEC QL NAA+PROBE: NOT DETECTED
HCOV OC43 RNA SPEC QL NAA+PROBE: NOT DETECTED
HCT VFR BLD AUTO: 40.1 % (ref 34–46.6)
HGB BLD-MCNC: 13.8 G/DL (ref 12–15.9)
HGB UR QL STRIP.AUTO: ABNORMAL
HMPV RNA NPH QL NAA+NON-PROBE: NOT DETECTED
HOLD SPECIMEN: NORMAL
HOLD SPECIMEN: NORMAL
HPIV1 RNA SPEC QL NAA+PROBE: NOT DETECTED
HPIV2 RNA SPEC QL NAA+PROBE: NOT DETECTED
HPIV3 RNA NPH QL NAA+PROBE: NOT DETECTED
HPIV4 P GENE NPH QL NAA+PROBE: NOT DETECTED
HYALINE CASTS UR QL AUTO: ABNORMAL /LPF
IMM GRANULOCYTES # BLD AUTO: 0.26 10*3/MM3 (ref 0–0.05)
IMM GRANULOCYTES NFR BLD AUTO: 1.2 % (ref 0–0.5)
KETONES UR QL STRIP: ABNORMAL
LARGE PLATELETS: NORMAL
LEUKOCYTE ESTERASE UR QL STRIP.AUTO: ABNORMAL
LIPASE SERPL-CCNC: 6 U/L (ref 13–60)
LYMPHOCYTES # BLD AUTO: 0.57 10*3/MM3 (ref 0.7–3.1)
LYMPHOCYTES NFR BLD AUTO: 2.7 % (ref 19.6–45.3)
M PNEUMO IGG SER IA-ACNC: NOT DETECTED
MAGNESIUM SERPL-MCNC: 1.9 MG/DL (ref 1.7–2.2)
MCH RBC QN AUTO: 29.9 PG (ref 26.6–33)
MCHC RBC AUTO-ENTMCNC: 34.4 G/DL (ref 31.5–35.7)
MCV RBC AUTO: 87 FL (ref 79–97)
MONOCYTES # BLD AUTO: 0.95 10*3/MM3 (ref 0.1–0.9)
MONOCYTES NFR BLD AUTO: 4.4 % (ref 5–12)
NEUTROPHILS # BLD AUTO: 19.48 10*3/MM3 (ref 1.7–7)
NEUTROPHILS NFR BLD AUTO: 91 % (ref 42.7–76)
NITRITE UR QL STRIP: POSITIVE
NRBC BLD AUTO-RTO: 0 /100 WBC (ref 0–0.2)
OSMOLALITY SERPL: 288 MOSM/KG (ref 280–290)
PH BLDV: 7.34 PH UNITS (ref 7.29–7.37)
PH UR STRIP.AUTO: 5.5 [PH] (ref 5–9)
PHOSPHATE SERPL-MCNC: 3 MG/DL (ref 2.5–4.5)
PLATELET # BLD AUTO: 272 10*3/MM3 (ref 140–450)
PMV BLD AUTO: 10.7 FL (ref 6–12)
POTASSIUM BLD-SCNC: 4 MMOL/L (ref 3.5–5.2)
POTASSIUM BLD-SCNC: 4.5 MMOL/L (ref 3.5–5.2)
PROT SERPL-MCNC: 6.5 G/DL (ref 6–8.5)
PROT SERPL-MCNC: 7.9 G/DL (ref 6–8.5)
PROT UR QL STRIP: ABNORMAL
RBC # BLD AUTO: 4.61 10*6/MM3 (ref 3.77–5.28)
RBC # UR: ABNORMAL /HPF
REF LAB TEST METHOD: ABNORMAL
RHINOVIRUS RNA SPEC NAA+PROBE: NOT DETECTED
RSV RNA NPH QL NAA+NON-PROBE: NOT DETECTED
SMALL PLATELETS BLD QL SMEAR: ADEQUATE
SODIUM BLD-SCNC: 126 MMOL/L (ref 136–145)
SODIUM BLD-SCNC: 127 MMOL/L (ref 136–145)
SP GR UR STRIP: 1.02 (ref 1–1.03)
SQUAMOUS #/AREA URNS HPF: ABNORMAL /HPF
UROBILINOGEN UR QL STRIP: ABNORMAL
WBC MORPH BLD: NORMAL
WBC NRBC COR # BLD: 21.4 10*3/MM3 (ref 3.4–10.8)
WBC UR QL AUTO: ABNORMAL /HPF
WHOLE BLOOD HOLD SPECIMEN: NORMAL
WHOLE BLOOD HOLD SPECIMEN: NORMAL

## 2019-09-29 PROCEDURE — 87186 SC STD MICRODIL/AGAR DIL: CPT | Performed by: FAMILY MEDICINE

## 2019-09-29 PROCEDURE — 71046 X-RAY EXAM CHEST 2 VIEWS: CPT

## 2019-09-29 PROCEDURE — 81001 URINALYSIS AUTO W/SCOPE: CPT | Performed by: FAMILY MEDICINE

## 2019-09-29 PROCEDURE — 83735 ASSAY OF MAGNESIUM: CPT | Performed by: INTERNAL MEDICINE

## 2019-09-29 PROCEDURE — 84703 CHORIONIC GONADOTROPIN ASSAY: CPT | Performed by: FAMILY MEDICINE

## 2019-09-29 PROCEDURE — 25010000002 ONDANSETRON PER 1 MG: Performed by: FAMILY MEDICINE

## 2019-09-29 PROCEDURE — 99284 EMERGENCY DEPT VISIT MOD MDM: CPT

## 2019-09-29 PROCEDURE — 82009 KETONE BODYS QUAL: CPT | Performed by: FAMILY MEDICINE

## 2019-09-29 PROCEDURE — 82962 GLUCOSE BLOOD TEST: CPT

## 2019-09-29 PROCEDURE — 80048 BASIC METABOLIC PNL TOTAL CA: CPT | Performed by: INTERNAL MEDICINE

## 2019-09-29 PROCEDURE — 80053 COMPREHEN METABOLIC PANEL: CPT | Performed by: FAMILY MEDICINE

## 2019-09-29 PROCEDURE — 87040 BLOOD CULTURE FOR BACTERIA: CPT | Performed by: FAMILY MEDICINE

## 2019-09-29 PROCEDURE — 85007 BL SMEAR W/DIFF WBC COUNT: CPT | Performed by: FAMILY MEDICINE

## 2019-09-29 PROCEDURE — 81025 URINE PREGNANCY TEST: CPT | Performed by: FAMILY MEDICINE

## 2019-09-29 PROCEDURE — 25010000002 CEFTRIAXONE: Performed by: FAMILY MEDICINE

## 2019-09-29 PROCEDURE — 85025 COMPLETE CBC W/AUTO DIFF WBC: CPT | Performed by: FAMILY MEDICINE

## 2019-09-29 PROCEDURE — 82800 BLOOD PH: CPT | Performed by: FAMILY MEDICINE

## 2019-09-29 PROCEDURE — 93010 ELECTROCARDIOGRAM REPORT: CPT | Performed by: INTERNAL MEDICINE

## 2019-09-29 PROCEDURE — G0378 HOSPITAL OBSERVATION PER HR: HCPCS

## 2019-09-29 PROCEDURE — 83605 ASSAY OF LACTIC ACID: CPT | Performed by: FAMILY MEDICINE

## 2019-09-29 PROCEDURE — 25010000002 ONDANSETRON PER 1 MG: Performed by: INTERNAL MEDICINE

## 2019-09-29 PROCEDURE — 93005 ELECTROCARDIOGRAM TRACING: CPT | Performed by: FAMILY MEDICINE

## 2019-09-29 PROCEDURE — 83690 ASSAY OF LIPASE: CPT | Performed by: FAMILY MEDICINE

## 2019-09-29 PROCEDURE — 87150 DNA/RNA AMPLIFIED PROBE: CPT | Performed by: FAMILY MEDICINE

## 2019-09-29 PROCEDURE — 83036 HEMOGLOBIN GLYCOSYLATED A1C: CPT | Performed by: INTERNAL MEDICINE

## 2019-09-29 PROCEDURE — 0099U HC BIOFIRE FILMARRAY RESP PANEL 1: CPT | Performed by: FAMILY MEDICINE

## 2019-09-29 PROCEDURE — 83930 ASSAY OF BLOOD OSMOLALITY: CPT | Performed by: INTERNAL MEDICINE

## 2019-09-29 PROCEDURE — 80053 COMPREHEN METABOLIC PANEL: CPT | Performed by: INTERNAL MEDICINE

## 2019-09-29 PROCEDURE — 25010000002 MORPHINE PER 10 MG: Performed by: FAMILY MEDICINE

## 2019-09-29 PROCEDURE — 84100 ASSAY OF PHOSPHORUS: CPT | Performed by: INTERNAL MEDICINE

## 2019-09-29 RX ORDER — SODIUM CHLORIDE 9 MG/ML
10 INJECTION, SOLUTION INTRAVENOUS CONTINUOUS PRN
Status: DISCONTINUED | OUTPATIENT
Start: 2019-09-29 | End: 2019-09-30

## 2019-09-29 RX ORDER — FLUOXETINE HYDROCHLORIDE 20 MG/1
40 CAPSULE ORAL DAILY
Status: DISCONTINUED | OUTPATIENT
Start: 2019-09-30 | End: 2019-10-02 | Stop reason: HOSPADM

## 2019-09-29 RX ORDER — HYDROCODONE BITARTRATE AND ACETAMINOPHEN 5; 325 MG/1; MG/1
1 TABLET ORAL EVERY 6 HOURS PRN
Status: DISCONTINUED | OUTPATIENT
Start: 2019-09-29 | End: 2019-10-02 | Stop reason: HOSPADM

## 2019-09-29 RX ORDER — DEXTROSE MONOHYDRATE 25 G/50ML
12.5 INJECTION, SOLUTION INTRAVENOUS AS NEEDED
Status: DISCONTINUED | OUTPATIENT
Start: 2019-09-29 | End: 2019-10-02 | Stop reason: HOSPADM

## 2019-09-29 RX ORDER — SODIUM CHLORIDE 0.9 % (FLUSH) 0.9 %
10 SYRINGE (ML) INJECTION ONCE AS NEEDED
Status: DISCONTINUED | OUTPATIENT
Start: 2019-09-29 | End: 2019-10-02 | Stop reason: HOSPADM

## 2019-09-29 RX ORDER — ONDANSETRON 2 MG/ML
4 INJECTION INTRAMUSCULAR; INTRAVENOUS EVERY 6 HOURS PRN
Status: DISCONTINUED | OUTPATIENT
Start: 2019-09-29 | End: 2019-10-02 | Stop reason: HOSPADM

## 2019-09-29 RX ORDER — SODIUM CHLORIDE AND POTASSIUM CHLORIDE 150; 900 MG/100ML; MG/100ML
250 INJECTION, SOLUTION INTRAVENOUS CONTINUOUS PRN
Status: DISCONTINUED | OUTPATIENT
Start: 2019-09-29 | End: 2019-09-30

## 2019-09-29 RX ORDER — SODIUM CHLORIDE AND POTASSIUM CHLORIDE 150; 450 MG/100ML; MG/100ML
250 INJECTION, SOLUTION INTRAVENOUS CONTINUOUS PRN
Status: DISCONTINUED | OUTPATIENT
Start: 2019-09-29 | End: 2019-09-30

## 2019-09-29 RX ORDER — SODIUM CHLORIDE 0.9 % (FLUSH) 0.9 %
10 SYRINGE (ML) INJECTION EVERY 12 HOURS SCHEDULED
Status: DISCONTINUED | OUTPATIENT
Start: 2019-09-29 | End: 2019-10-02 | Stop reason: HOSPADM

## 2019-09-29 RX ORDER — NORTRIPTYLINE HYDROCHLORIDE 25 MG/1
25 CAPSULE ORAL NIGHTLY PRN
COMMUNITY
End: 2020-07-15

## 2019-09-29 RX ORDER — DEXTROSE, SODIUM CHLORIDE, AND POTASSIUM CHLORIDE 5; .45; .15 G/100ML; G/100ML; G/100ML
250 INJECTION INTRAVENOUS CONTINUOUS PRN
Status: DISCONTINUED | OUTPATIENT
Start: 2019-09-29 | End: 2019-09-30

## 2019-09-29 RX ORDER — SODIUM CHLORIDE 0.9 % (FLUSH) 0.9 %
10 SYRINGE (ML) INJECTION AS NEEDED
Status: DISCONTINUED | OUTPATIENT
Start: 2019-09-29 | End: 2019-09-29

## 2019-09-29 RX ORDER — DEXTROSE AND SODIUM CHLORIDE 5; .45 G/100ML; G/100ML
250 INJECTION, SOLUTION INTRAVENOUS CONTINUOUS PRN
Status: DISCONTINUED | OUTPATIENT
Start: 2019-09-29 | End: 2019-09-30

## 2019-09-29 RX ORDER — SODIUM CHLORIDE 0.9 % (FLUSH) 0.9 %
10 SYRINGE (ML) INJECTION AS NEEDED
Status: DISCONTINUED | OUTPATIENT
Start: 2019-09-29 | End: 2019-09-30

## 2019-09-29 RX ORDER — SODIUM CHLORIDE 450 MG/100ML
250 INJECTION, SOLUTION INTRAVENOUS CONTINUOUS PRN
Status: DISCONTINUED | OUTPATIENT
Start: 2019-09-29 | End: 2019-09-30

## 2019-09-29 RX ORDER — DEXTROSE, SODIUM CHLORIDE, AND POTASSIUM CHLORIDE 5; .45; .3 G/100ML; G/100ML; G/100ML
250 INJECTION INTRAVENOUS CONTINUOUS PRN
Status: DISCONTINUED | OUTPATIENT
Start: 2019-09-29 | End: 2019-09-30

## 2019-09-29 RX ORDER — SODIUM CHLORIDE AND POTASSIUM CHLORIDE 300; 900 MG/100ML; MG/100ML
250 INJECTION, SOLUTION INTRAVENOUS CONTINUOUS PRN
Status: DISCONTINUED | OUTPATIENT
Start: 2019-09-29 | End: 2019-09-30

## 2019-09-29 RX ORDER — ACETAMINOPHEN 500 MG
1000 TABLET ORAL ONCE
Status: COMPLETED | OUTPATIENT
Start: 2019-09-29 | End: 2019-09-29

## 2019-09-29 RX ORDER — FLUOXETINE HYDROCHLORIDE 40 MG/1
40 CAPSULE ORAL DAILY
COMMUNITY
End: 2020-06-06 | Stop reason: HOSPADM

## 2019-09-29 RX ORDER — SODIUM CHLORIDE 9 MG/ML
250 INJECTION, SOLUTION INTRAVENOUS CONTINUOUS PRN
Status: DISCONTINUED | OUTPATIENT
Start: 2019-09-29 | End: 2019-09-30

## 2019-09-29 RX ORDER — MORPHINE SULFATE 2 MG/ML
2 INJECTION, SOLUTION INTRAMUSCULAR; INTRAVENOUS ONCE
Status: COMPLETED | OUTPATIENT
Start: 2019-09-29 | End: 2019-09-29

## 2019-09-29 RX ORDER — CLONAZEPAM 0.5 MG/1
1 TABLET ORAL 2 TIMES DAILY PRN
Status: CANCELLED | OUTPATIENT
Start: 2019-09-29

## 2019-09-29 RX ORDER — ALBUTEROL SULFATE 2.5 MG/3ML
2.5 SOLUTION RESPIRATORY (INHALATION) EVERY 4 HOURS PRN
Status: DISCONTINUED | OUTPATIENT
Start: 2019-09-29 | End: 2019-10-02 | Stop reason: HOSPADM

## 2019-09-29 RX ORDER — ONDANSETRON 2 MG/ML
4 INJECTION INTRAMUSCULAR; INTRAVENOUS ONCE
Status: COMPLETED | OUTPATIENT
Start: 2019-09-29 | End: 2019-09-29

## 2019-09-29 RX ADMIN — SODIUM CHLORIDE 2000 ML: 9 INJECTION, SOLUTION INTRAVENOUS at 19:55

## 2019-09-29 RX ADMIN — HYDROCODONE BITARTRATE AND ACETAMINOPHEN 1 TABLET: 5; 325 TABLET ORAL at 23:31

## 2019-09-29 RX ADMIN — ACETAMINOPHEN 1000 MG: 500 TABLET ORAL at 17:39

## 2019-09-29 RX ADMIN — SODIUM CHLORIDE 1000 ML: 9 INJECTION, SOLUTION INTRAVENOUS at 17:31

## 2019-09-29 RX ADMIN — MORPHINE SULFATE 2 MG: 2 INJECTION, SOLUTION INTRAMUSCULAR; INTRAVENOUS at 17:31

## 2019-09-29 RX ADMIN — CEFTRIAXONE 1 G: 1 INJECTION, POWDER, FOR SOLUTION INTRAMUSCULAR; INTRAVENOUS at 18:30

## 2019-09-29 RX ADMIN — ONDANSETRON 4 MG: 2 INJECTION INTRAMUSCULAR; INTRAVENOUS at 17:31

## 2019-09-29 RX ADMIN — ONDANSETRON 4 MG: 2 INJECTION INTRAMUSCULAR; INTRAVENOUS at 22:55

## 2019-09-29 RX ADMIN — SODIUM CHLORIDE 5 UNITS/HR: 9 INJECTION, SOLUTION INTRAVENOUS at 18:01

## 2019-09-29 RX ADMIN — POTASSIUM CHLORIDE, DEXTROSE MONOHYDRATE AND SODIUM CHLORIDE 250 ML/HR: 150; 5; 450 INJECTION, SOLUTION INTRAVENOUS at 22:48

## 2019-09-30 PROBLEM — E10.10 DIABETIC KETOACIDOSIS WITHOUT COMA ASSOCIATED WITH TYPE 1 DIABETES MELLITUS: Status: RESOLVED | Noted: 2019-09-29 | Resolved: 2019-09-30

## 2019-09-30 PROBLEM — A41.50 SEPSIS DUE TO GRAM NEGATIVE BACTERIA (HCC): Status: ACTIVE | Noted: 2019-09-30

## 2019-09-30 PROBLEM — E10.65 UNCONTROLLED TYPE 1 DIABETES MELLITUS WITH HYPERGLYCEMIA: Status: ACTIVE | Noted: 2019-06-08

## 2019-09-30 PROBLEM — B96.20 E COLI BACTEREMIA: Status: ACTIVE | Noted: 2019-09-30

## 2019-09-30 PROBLEM — R78.81 E COLI BACTEREMIA: Status: ACTIVE | Noted: 2019-09-30

## 2019-09-30 LAB
ANION GAP SERPL CALCULATED.3IONS-SCNC: 16 MMOL/L (ref 5–15)
ANION GAP SERPL CALCULATED.3IONS-SCNC: 9 MMOL/L (ref 5–15)
BACTERIA BLD CULT: ABNORMAL
BUN BLD-MCNC: 20 MG/DL (ref 6–20)
BUN BLD-MCNC: 20 MG/DL (ref 6–20)
BUN/CREAT SERPL: 15.4 (ref 7–25)
BUN/CREAT SERPL: 16.7 (ref 7–25)
CALCIUM SPEC-SCNC: 8 MG/DL (ref 8.6–10.5)
CALCIUM SPEC-SCNC: 8.3 MG/DL (ref 8.6–10.5)
CHLORIDE SERPL-SCNC: 93 MMOL/L (ref 98–107)
CHLORIDE SERPL-SCNC: 95 MMOL/L (ref 98–107)
CO2 SERPL-SCNC: 23 MMOL/L (ref 22–29)
CO2 SERPL-SCNC: 24 MMOL/L (ref 22–29)
CREAT BLD-MCNC: 1.2 MG/DL (ref 0.57–1)
CREAT BLD-MCNC: 1.3 MG/DL (ref 0.57–1)
DEPRECATED RDW RBC AUTO: 45 FL (ref 37–54)
ERYTHROCYTE [DISTWIDTH] IN BLOOD BY AUTOMATED COUNT: 14.1 % (ref 12.3–15.4)
GFR SERPL CREATININE-BSD FRML MDRD: 52 ML/MIN/1.73
GFR SERPL CREATININE-BSD FRML MDRD: 57 ML/MIN/1.73
GLUCOSE BLD-MCNC: 174 MG/DL (ref 65–99)
GLUCOSE BLD-MCNC: 251 MG/DL (ref 65–99)
GLUCOSE BLDC GLUCOMTR-MCNC: 109 MG/DL (ref 70–130)
GLUCOSE BLDC GLUCOMTR-MCNC: 168 MG/DL (ref 70–130)
GLUCOSE BLDC GLUCOMTR-MCNC: 201 MG/DL (ref 70–130)
GLUCOSE BLDC GLUCOMTR-MCNC: 206 MG/DL (ref 70–130)
GLUCOSE BLDC GLUCOMTR-MCNC: 225 MG/DL (ref 70–130)
GLUCOSE BLDC GLUCOMTR-MCNC: 241 MG/DL (ref 70–130)
GLUCOSE BLDC GLUCOMTR-MCNC: 244 MG/DL (ref 70–130)
GLUCOSE BLDC GLUCOMTR-MCNC: 257 MG/DL (ref 70–130)
GLUCOSE BLDC GLUCOMTR-MCNC: 272 MG/DL (ref 70–130)
GLUCOSE BLDC GLUCOMTR-MCNC: 294 MG/DL (ref 70–130)
GLUCOSE BLDC GLUCOMTR-MCNC: 299 MG/DL (ref 70–130)
GLUCOSE BLDC GLUCOMTR-MCNC: 302 MG/DL (ref 70–130)
GLUCOSE BLDC GLUCOMTR-MCNC: 358 MG/DL (ref 70–130)
HCT VFR BLD AUTO: 30.6 % (ref 34–46.6)
HGB BLD-MCNC: 10.5 G/DL (ref 12–15.9)
MAGNESIUM SERPL-MCNC: 1.8 MG/DL (ref 1.7–2.2)
MAGNESIUM SERPL-MCNC: 1.9 MG/DL (ref 1.7–2.2)
MCH RBC QN AUTO: 29.6 PG (ref 26.6–33)
MCHC RBC AUTO-ENTMCNC: 34.3 G/DL (ref 31.5–35.7)
MCV RBC AUTO: 86.2 FL (ref 79–97)
PHOSPHATE SERPL-MCNC: 2.2 MG/DL (ref 2.5–4.5)
PHOSPHATE SERPL-MCNC: 2.5 MG/DL (ref 2.5–4.5)
PHOSPHATE SERPL-MCNC: 2.6 MG/DL (ref 2.5–4.5)
PLATELET # BLD AUTO: 232 10*3/MM3 (ref 140–450)
PMV BLD AUTO: 11.2 FL (ref 6–12)
POTASSIUM BLD-SCNC: 4.2 MMOL/L (ref 3.5–5.2)
POTASSIUM BLD-SCNC: 4.4 MMOL/L (ref 3.5–5.2)
RBC # BLD AUTO: 3.55 10*6/MM3 (ref 3.77–5.28)
SODIUM BLD-SCNC: 127 MMOL/L (ref 136–145)
SODIUM BLD-SCNC: 133 MMOL/L (ref 136–145)
WBC NRBC COR # BLD: 21.71 10*3/MM3 (ref 3.4–10.8)

## 2019-09-30 PROCEDURE — 99254 IP/OBS CNSLTJ NEW/EST MOD 60: CPT | Performed by: INTERNAL MEDICINE

## 2019-09-30 PROCEDURE — 82962 GLUCOSE BLOOD TEST: CPT

## 2019-09-30 PROCEDURE — 80048 BASIC METABOLIC PNL TOTAL CA: CPT | Performed by: INTERNAL MEDICINE

## 2019-09-30 PROCEDURE — 84100 ASSAY OF PHOSPHORUS: CPT | Performed by: INTERNAL MEDICINE

## 2019-09-30 PROCEDURE — 85027 COMPLETE CBC AUTOMATED: CPT | Performed by: INTERNAL MEDICINE

## 2019-09-30 PROCEDURE — 83735 ASSAY OF MAGNESIUM: CPT | Performed by: INTERNAL MEDICINE

## 2019-09-30 PROCEDURE — 25010000002 PROMETHAZINE PER 50 MG: Performed by: INTERNAL MEDICINE

## 2019-09-30 PROCEDURE — 36415 COLL VENOUS BLD VENIPUNCTURE: CPT | Performed by: FAMILY MEDICINE

## 2019-09-30 PROCEDURE — 87040 BLOOD CULTURE FOR BACTERIA: CPT | Performed by: FAMILY MEDICINE

## 2019-09-30 PROCEDURE — 25010000002 ONDANSETRON PER 1 MG: Performed by: INTERNAL MEDICINE

## 2019-09-30 PROCEDURE — 25010000002 CEFTRIAXONE PER 250 MG: Performed by: HOSPITALIST

## 2019-09-30 RX ORDER — SODIUM CHLORIDE 9 MG/ML
75 INJECTION, SOLUTION INTRAVENOUS CONTINUOUS
Status: DISCONTINUED | OUTPATIENT
Start: 2019-09-30 | End: 2019-10-02 | Stop reason: HOSPADM

## 2019-09-30 RX ORDER — PROMETHAZINE HYDROCHLORIDE 25 MG/ML
12.5 INJECTION, SOLUTION INTRAMUSCULAR; INTRAVENOUS EVERY 6 HOURS PRN
Status: DISCONTINUED | OUTPATIENT
Start: 2019-09-30 | End: 2019-10-02 | Stop reason: HOSPADM

## 2019-09-30 RX ORDER — NORTRIPTYLINE HYDROCHLORIDE 25 MG/1
25 CAPSULE ORAL NIGHTLY
Status: DISCONTINUED | OUTPATIENT
Start: 2019-10-01 | End: 2019-10-02 | Stop reason: HOSPADM

## 2019-09-30 RX ORDER — ONDANSETRON 4 MG/1
4 TABLET, ORALLY DISINTEGRATING ORAL EVERY 8 HOURS PRN
Qty: 45 TABLET | Refills: 11 | Status: SHIPPED | OUTPATIENT
Start: 2019-09-30 | End: 2019-10-08 | Stop reason: SDUPTHER

## 2019-09-30 RX ORDER — ACETAMINOPHEN 325 MG/1
650 TABLET ORAL EVERY 6 HOURS PRN
Status: DISCONTINUED | OUTPATIENT
Start: 2019-09-30 | End: 2019-10-02 | Stop reason: HOSPADM

## 2019-09-30 RX ADMIN — POTASSIUM CHLORIDE, DEXTROSE MONOHYDRATE AND SODIUM CHLORIDE 250 ML/HR: 150; 5; 450 INJECTION, SOLUTION INTRAVENOUS at 02:43

## 2019-09-30 RX ADMIN — HYDROCODONE BITARTRATE AND ACETAMINOPHEN 1 TABLET: 5; 325 TABLET ORAL at 07:15

## 2019-09-30 RX ADMIN — SODIUM CHLORIDE 75 ML/HR: 9 INJECTION, SOLUTION INTRAVENOUS at 22:27

## 2019-09-30 RX ADMIN — CEFTRIAXONE SODIUM 1 G: 1 INJECTION, POWDER, FOR SOLUTION INTRAMUSCULAR; INTRAVENOUS at 15:33

## 2019-09-30 RX ADMIN — ONDANSETRON 4 MG: 2 INJECTION INTRAMUSCULAR; INTRAVENOUS at 05:49

## 2019-09-30 RX ADMIN — FLUOXETINE 40 MG: 20 CAPSULE ORAL at 09:14

## 2019-09-30 RX ADMIN — SODIUM CHLORIDE, PRESERVATIVE FREE 10 ML: 5 INJECTION INTRAVENOUS at 19:57

## 2019-09-30 RX ADMIN — SODIUM PHOSPHATE, MONOBASIC, MONOHYDRATE 15 MMOL: 276; 142 INJECTION, SOLUTION INTRAVENOUS at 07:15

## 2019-09-30 RX ADMIN — Medication: at 21:22

## 2019-09-30 RX ADMIN — NORTRIPTYLINE HYDROCHLORIDE 25 MG: 25 CAPSULE ORAL at 23:59

## 2019-09-30 RX ADMIN — ONDANSETRON 4 MG: 2 INJECTION INTRAMUSCULAR; INTRAVENOUS at 15:36

## 2019-09-30 RX ADMIN — SODIUM CHLORIDE 75 ML/HR: 9 INJECTION, SOLUTION INTRAVENOUS at 09:09

## 2019-09-30 RX ADMIN — HYDROCODONE BITARTRATE AND ACETAMINOPHEN 1 TABLET: 5; 325 TABLET ORAL at 15:33

## 2019-09-30 RX ADMIN — POTASSIUM CHLORIDE AND SODIUM CHLORIDE 250 ML/HR: 450; 150 INJECTION, SOLUTION INTRAVENOUS at 02:59

## 2019-09-30 RX ADMIN — PROMETHAZINE HYDROCHLORIDE 12.5 MG: 25 INJECTION INTRAMUSCULAR; INTRAVENOUS at 19:57

## 2019-09-30 RX ADMIN — PROMETHAZINE HYDROCHLORIDE 12.5 MG: 25 INJECTION INTRAMUSCULAR; INTRAVENOUS at 07:51

## 2019-09-30 RX ADMIN — Medication 1 UNITS: at 05:59

## 2019-09-30 RX ADMIN — ACETAMINOPHEN 650 MG: 325 TABLET, FILM COATED ORAL at 11:24

## 2019-09-30 RX ADMIN — SODIUM CHLORIDE, PRESERVATIVE FREE 10 ML: 5 INJECTION INTRAVENOUS at 07:51

## 2019-10-01 LAB
GLUCOSE BLDC GLUCOMTR-MCNC: 242 MG/DL (ref 70–130)
GLUCOSE BLDC GLUCOMTR-MCNC: 261 MG/DL (ref 70–130)
GLUCOSE BLDC GLUCOMTR-MCNC: 272 MG/DL (ref 70–130)
GLUCOSE BLDC GLUCOMTR-MCNC: 290 MG/DL (ref 70–130)
GLUCOSE BLDC GLUCOMTR-MCNC: 355 MG/DL (ref 70–130)

## 2019-10-01 PROCEDURE — 25010000002 PROMETHAZINE PER 50 MG: Performed by: INTERNAL MEDICINE

## 2019-10-01 PROCEDURE — 25010000002 ONDANSETRON PER 1 MG: Performed by: INTERNAL MEDICINE

## 2019-10-01 PROCEDURE — 25010000002 MORPHINE PER 10 MG: Performed by: INTERNAL MEDICINE

## 2019-10-01 PROCEDURE — 82962 GLUCOSE BLOOD TEST: CPT

## 2019-10-01 PROCEDURE — 25010000002 CEFTRIAXONE PER 250 MG: Performed by: HOSPITALIST

## 2019-10-01 RX ORDER — MORPHINE SULFATE 2 MG/ML
1 INJECTION, SOLUTION INTRAMUSCULAR; INTRAVENOUS EVERY 6 HOURS PRN
Status: DISCONTINUED | OUTPATIENT
Start: 2019-10-01 | End: 2019-10-02 | Stop reason: HOSPADM

## 2019-10-01 RX ADMIN — SODIUM CHLORIDE, PRESERVATIVE FREE 10 ML: 5 INJECTION INTRAVENOUS at 19:57

## 2019-10-01 RX ADMIN — MORPHINE SULFATE 1 MG: 2 INJECTION, SOLUTION INTRAMUSCULAR; INTRAVENOUS at 16:29

## 2019-10-01 RX ADMIN — FLUOXETINE 40 MG: 20 CAPSULE ORAL at 10:31

## 2019-10-01 RX ADMIN — PROMETHAZINE HYDROCHLORIDE 12.5 MG: 25 INJECTION INTRAMUSCULAR; INTRAVENOUS at 07:35

## 2019-10-01 RX ADMIN — ONDANSETRON 4 MG: 2 INJECTION INTRAMUSCULAR; INTRAVENOUS at 23:28

## 2019-10-01 RX ADMIN — ONDANSETRON 4 MG: 2 INJECTION INTRAMUSCULAR; INTRAVENOUS at 10:36

## 2019-10-01 RX ADMIN — HYDROCODONE BITARTRATE AND ACETAMINOPHEN 1 TABLET: 5; 325 TABLET ORAL at 10:36

## 2019-10-01 RX ADMIN — ONDANSETRON 4 MG: 2 INJECTION INTRAMUSCULAR; INTRAVENOUS at 00:35

## 2019-10-01 RX ADMIN — NORTRIPTYLINE HYDROCHLORIDE 25 MG: 25 CAPSULE ORAL at 19:57

## 2019-10-01 RX ADMIN — SODIUM CHLORIDE 75 ML/HR: 9 INJECTION, SOLUTION INTRAVENOUS at 10:37

## 2019-10-01 RX ADMIN — CEFTRIAXONE SODIUM 1 G: 1 INJECTION, POWDER, FOR SOLUTION INTRAMUSCULAR; INTRAVENOUS at 13:31

## 2019-10-01 NOTE — PROGRESS NOTES
[facility] Medicine Services  INPATIENT PROGRESS NOTE    Length of Stay: 1  Date of Admission: 9/29/2019  Primary Care Physician: Rufus Marshlal APRN    Subjective   Chief Complaint: Nausea vomiting and abdominal pain    HPI: Patient still with complaints of nausea and generalized abominable pain. denies chills, fevers     Review of Systems     All pertinent negatives and positives are as above. All other systems have been reviewed and are negative unless otherwise stated.     Objective    Temp:  [98 °F (36.7 °C)-98.4 °F (36.9 °C)] 98.3 °F (36.8 °C)  Heart Rate:  [] 94  Resp:  [18] 18  BP: (103-115)/(67-87) 103/67    Physical Exam  Constitutional: She appears well-developed and well-nourished. She appears ill.   HENT:   Head: Normocephalic and atraumatic.   Eyes: EOM are normal. Pupils are equal, round, and reactive to light.   Neck: Normal range of motion. Neck supple.   Cardiovascular: Regular rhythm and normal heart sounds. Tachycardia present. Exam reveals no gallop and no friction rub.   No murmur heard.  Pulmonary/Chest: Effort normal and breath sounds normal. No respiratory distress. She has no wheezes. She has no rales. She exhibits no tenderness.   Abdominal: Soft. Bowel sounds are normal. She exhibits no distension. There is no tenderness. There is no guarding.   Musculoskeletal: She exhibits no edema.   Skin: Skin is warm and dry.   Psychiatric: She has a normal mood and affect. Her behavior is normal. Thought content normal.   Vitals reviewed.         Results Review:  I have reviewed the labs, radiology results, and diagnostic studies.    Laboratory Data:   Results from last 7 days   Lab Units 09/30/19  0432 09/29/19  2356 09/29/19 2002 09/29/19  1703   SODIUM mmol/L 127* 133* 127* 126*   POTASSIUM mmol/L 4.4 4.2 4.0 4.5   CHLORIDE mmol/L 95* 93* 89* 81*   CO2 mmol/L 23.0 24.0 22.0 21.0*   BUN mg/dL 20 20 22* 22*   CREATININE mg/dL 1.20* 1.30* 1.29* 1.47*    GLUCOSE mg/dL 174* 251* 345* 429*   CALCIUM mg/dL 8.0* 8.3* 8.2* 9.2   BILIRUBIN mg/dL  --   --  0.2 0.4   ALK PHOS U/L  --   --  128* 159*   ALT (SGPT) U/L  --   --  10 13   AST (SGOT) U/L  --   --  7 11   ANION GAP mmol/L 9.0 16.0* 16.0* 24.0*     Estimated Creatinine Clearance: 56.2 mL/min (A) (by C-G formula based on SCr of 1.2 mg/dL (H)).  Results from last 7 days   Lab Units 09/30/19  2232 09/30/19  0432 09/29/19  2356 09/29/19 2002   MAGNESIUM mg/dL  --  1.9 1.8 1.9   PHOSPHORUS mg/dL 2.5 2.2* 2.6 3.0         Results from last 7 days   Lab Units 09/30/19  0432 09/29/19  1703   WBC 10*3/mm3 21.71* 21.40*   HEMOGLOBIN g/dL 10.5* 13.8   HEMATOCRIT % 30.6* 40.1   PLATELETS 10*3/mm3 232 272           Culture Data:   Blood Culture   Date Value Ref Range Status   09/30/2019 No growth at 24 hours  Preliminary   09/29/2019 Escherichia coli (A)  Preliminary     No results found for: URINECX  No results found for: RESPCX  No results found for: WOUNDCX  No results found for: STOOLCX  No components found for: BODYFLD    Radiology Data:   Imaging Results (last 24 hours)     ** No results found for the last 24 hours. **          I have reviewed the patient's current medications.     Assessment/Plan     Active Hospital Problems    Diagnosis   • E coli bacteremia   • Sepsis due to Gram negative bacteria (CMS/[facility])   • Nausea and vomiting   • DONI (acute kidney injury) (CMS/[facility])   • Uncontrolled type 1 diabetes mellitus with hyperglycemia (CMS/[facility])   • Recurrent UTI         1.  Diabetic ketoacidosis  -Resolved.    -Patient currently using her insulin pump.    2.  E. coli bacteremia  - Sensitivities pending.    -Continue empiric antibiotics.  Likely from urine source.    3.  Gram-negative sepsis  -Secondary to E. coli.  Urine is the likely source.  Urine culture is pending.  Blood cultures positive.  Continue IV fluids.  Lactate on 929 was 1.9.  Will repeat today.  Continue empiric antibiotics will wait on sensitivities.    4.   Urinary tract infection  - Likely sepsis secondary to E. coli.  Cultures are not back yet.  Continue empiric antibiotics.    5.  Nausea and vomiting  -Secondary to infection and DKA.    -Continue to treat symptomatically.    6.  Bipolar disorder:   -Continue home medication.        Discharge Planning: I expect patient to be discharged to home in 3-4 days.

## 2019-10-01 NOTE — PLAN OF CARE
Problem: Patient Care Overview  Goal: Plan of Care Review  Outcome: Ongoing (interventions implemented as appropriate)   09/30/19 8273   Coping/Psychosocial   Plan of Care Reviewed With patient   Plan of Care Review   Progress no change   OTHER   Outcome Summary pt vs stable and is resting comfortably at this time; pt had complaints of nausea earlier in the shift; prn Phenargen given and was effective; will continue to monitor.     Goal: Individualization and Mutuality  Outcome: Ongoing (interventions implemented as appropriate)    Goal: Discharge Needs Assessment  Outcome: Ongoing (interventions implemented as appropriate)    Goal: Interprofessional Rounds/Family Conf  Outcome: Ongoing (interventions implemented as appropriate)      Problem: Diabetes, Type 1 (Adult)  Goal: Signs and Symptoms of Listed Potential Problems Will be Absent, Minimized or Managed (Diabetes, Type 1)  Outcome: Ongoing (interventions implemented as appropriate)      Problem: Urinary Tract Infection (Adult)  Goal: Signs and Symptoms of Listed Potential Problems Will be Absent, Minimized or Managed (Urinary Tract Infection)  Outcome: Ongoing (interventions implemented as appropriate)      Problem: Pain, Acute (Adult)  Goal: Identify Related Risk Factors and Signs and Symptoms  Outcome: Ongoing (interventions implemented as appropriate)    Goal: Acceptable Pain Control/Comfort Level  Outcome: Ongoing (interventions implemented as appropriate)      Problem: Nausea/Vomiting (Adult)  Goal: Identify Related Risk Factors and Signs and Symptoms  Outcome: Ongoing (interventions implemented as appropriate)    Goal: Symptom Relief  Outcome: Ongoing (interventions implemented as appropriate)    Goal: Adequate Hydration  Outcome: Ongoing (interventions implemented as appropriate)

## 2019-10-01 NOTE — PLAN OF CARE
Problem: Patient Care Overview  Goal: Plan of Care Review  Outcome: Ongoing (interventions implemented as appropriate)   10/01/19 1122   Coping/Psychosocial   Plan of Care Reviewed With patient   Plan of Care Review   Progress improving

## 2019-10-01 NOTE — CONSULTS
CONSULT NOTE     Fernanda Patterson is a 19 y.o. female who I am being consulted for  evaluation of uncontrolled type 1 diabetes      Referring Provider  Michael Schreiber, *      Duration/Timing:  Diabetes mellitus type 1, Age at onset of diabetes: 6 years  constant     not controlled      severity high , frequent admissions for DKA         Severity (Complications/Hospitalizations)  Secondary Microvascular Complications:  Diabetic Neuropathy     Context  Diabetes Regimen:  Insulin     Lab Results   Component Value Date    HGBA1C 12.00 (H) 09/29/2019                Blood Glucose Readings     Elevated , no hypoglycemia      States one coworker had URI/gastroenteritis  that she acquired and that led to uncontrolled glycemia and DKA             Allergies   Allergen Reactions   • Pineapple Anaphylaxis   • Benzoyl Peroxide Swelling       Past Medical History:   Diagnosis Date   • ADHD    • Bipolar 1 disorder (CMS/HCC)    • Borderline personality disorder (CMS/HCC)    • Cannabinoid hyperemesis syndrome (CMS/HCC)    • Diabetes mellitus type 1 (CMS/HCC)    • Diabetic gastroparesis (CMS/[facility])    • Diabetic ketoacidosis (CMS/[facility])    • Diabetic neuropathy (CMS/[facility])    • Post traumatic stress disorder (PTSD)    • Recurrent UTI    • Seizure disorder (CMS/[facility])    • Severe depressed bipolar II disorder without psychotic features (CMS/HCC)    • Uncontrolled diabetes mellitus (CMS/HCC)      Family History   Problem Relation Age of Onset   • Drug abuse Father    • Suicide Attempts Brother    • Dementia Maternal Grandfather    • Hypertension Paternal Grandmother      Social History     Tobacco Use   • Smoking status: Current Every Day Smoker     Packs/day: 0.50     Years: 1.00     Pack years: 0.50   • Smokeless tobacco: Never Used   Substance Use Topics   • Alcohol use: No   • Drug use: Yes     Types: Marijuana         Current Facility-Administered Medications:   •  acetaminophen (TYLENOL) tablet 650 mg, 650 mg, Oral, Q6H PRN,  Michael Poole MD, 650 mg at 09/30/19 1124  •  albuterol (PROVENTIL) nebulizer solution 0.083% 2.5 mg/3mL, 2.5 mg, Nebulization, Q4H PRN, Edmar Hill MD  •  Cariprazine HCl (VRAYLAR) capsule capsule 1.5 mg, 1.5 mg, Oral, Daily, Edmar Hill MD  •  cefTRIAXone (ROCEPHIN) 1 g/100 mL 0.9% NS (MBP), 1 g, Intravenous, Q24H, Michael Poole MD, 1 g at 09/30/19 1533  •  dextrose (D50W) 25 g/ 50mL Intravenous Solution 12.5 g, 12.5 g, Intravenous, PRN, Edmar Hill MD  •  FLUoxetine (PROzac) capsule 40 mg, 40 mg, Oral, Daily, Edmar Hill MD, 40 mg at 09/30/19 0914  •  HYDROcodone-acetaminophen (NORCO) 5-325 MG per tablet 1 tablet, 1 tablet, Oral, Q6H PRN, Neftali Barker MD, 1 tablet at 09/30/19 1533  •  insulin patient supplied pump, , Subcutaneous, Continuous, Neftali Barker MD  •  ondansetron (ZOFRAN) injection 4 mg, 4 mg, Intravenous, Q6H PRN, Edmar Hill MD, 4 mg at 09/30/19 1536  •  potassium phosphate 45 mmol in sodium chloride 0.9 % 500 mL infusion, 45 mmol, Intravenous, PRN **OR** potassium phosphate 30 mmol in sodium chloride 0.9 % 250 mL infusion, 30 mmol, Intravenous, PRN **OR** Potassium Phosphates 15 mmol in sodium chloride 0.9 % 100 mL infusion, 15 mmol, Intravenous, PRN **OR** sodium phosphates 45 mmol in sodium chloride 0.9 % 500 mL IVPB, 45 mmol, Intravenous, PRN **OR** sodium phosphates 30 mmol in sodium chloride 0.9 % 250 mL IVPB, 30 mmol, Intravenous, PRN **OR** sodium phosphates 15 mmol in sodium chloride 0.9 % 250 mL IVPB, 15 mmol, Intravenous, PRN, Neftali Barker MD, Last Rate: 62.5 mL/hr at 09/30/19 0915, 15 mmol at 09/30/19 0915  •  promethazine (PHENERGAN) injection 12.5 mg, 12.5 mg, Intravenous, Q6H PRN, Neftali Barker MD, 12.5 mg at 09/30/19 1957  •  sodium chloride 0.9 % flush 10 mL, 10 mL, Intravenous, Q12H, Edmar Hill MD, 10 mL at 09/30/19 1957  •  sodium chloride 0.9 % flush 10 mL, 10 mL,  Intravenous, Once PRN, Edmar Hill MD  •  sodium chloride 0.9 % infusion, 75 mL/hr, Intravenous, Continuous, Michael Poole MD, Last Rate: 75 mL/hr at 09/30/19 2227, 75 mL/hr at 09/30/19 2227    No current facility-administered medications on file prior to encounter.      Current Outpatient Medications on File Prior to Encounter   Medication Sig Dispense Refill   • Blood Glucose Monitoring Suppl w/Device kit USE AS INDICATED, ANY MONITOR , ICD10 code is E11.9 1 each 1   • Cariprazine HCl (VRAYLAR) 1.5 MG capsule capsule Take 1.5 mg by mouth Daily.     • FLUoxetine (PROzac) 40 MG capsule Take 40 mg by mouth Daily.     • Glucose Blood (BLOOD GLUCOSE TEST) strip Use 4 x daily use any brand covered by insurance or same brand as before ICD10 code is E11.9 120 each 11   • insulin aspart (novoLOG) 100 UNIT/ML injection 70 units daily through insulin pump 30 mL 11   • Lancet Devices (LANCING DEVICE) misc USE AS INDICATED TO CORRELATE WITH STRIPS AND METER 1 each 1   • Lancets 30G misc USE 4 X DAILY 120 each 11   • nortriptyline (PAMELOR) 25 MG capsule Take 25 mg by mouth At Night As Needed for Sleep.     • promethazine (PHENERGAN) 25 MG tablet Take 1 tablet by mouth Every 6 (Six) Hours As Needed for Nausea or Vomiting. 120 tablet 5       Medications Discontinued During This Encounter   Medication Reason   • sodium chloride 0.9 % flush 10 mL    • clonazePAM (KlonoPIN) 1 MG tablet    • albuterol 108 (90 Base) MCG/ACT inhaler    • FLUoxetine (PROzac) 10 MG capsule *Re-Entry   • lisdexamfetamine (VYVANSE) 50 MG capsule    • insulin regular (HumuLIN R,NovoLIN R) 100 Units in sodium chloride 0.9 % 100 mL (1 Units/mL) infusion    • sodium chloride 0.9 % flush 10 mL    • sodium chloride 0.9 % flush 10 mL    • sodium chloride 0.9 % with KCl 20 mEq/L infusion    • sodium chloride 0.9 % with KCl 40 mEq/L infusion    • sodium chloride 0.45 % infusion    • sodium chloride 0.45 % with KCl 20 mEq/L infusion    • sodium chloride  0.45 % 1,000 mL with potassium chloride 40 mEq infusion    • dextrose 5 % and sodium chloride 0.45 % infusion    • dextrose 5 % and sodium chloride 0.45 % with KCl 20 mEq/L infusion    • dextrose 5 % and sodium chloride 0.45 % with KCl 40 mEq/L infusion    • insulin regular (HumuLIN R,NovoLIN R) 100 Units in sodium chloride 0.9 % 100 mL (1 Units/mL) infusion    • sodium chloride 0.9 % infusion    • sodium chloride 0.9 % infusion        Review of Systems    Review of Systems   Constitutional: Positive for fatigue and unexpected weight change. Negative for activity change, appetite change, chills, diaphoresis and fever.   HENT: Negative for congestion, dental problem, drooling, ear discharge, ear pain, facial swelling, mouth sores, postnasal drip, rhinorrhea, sinus pressure, sore throat, tinnitus, trouble swallowing and voice change.    Eyes: Negative for photophobia, pain, discharge, redness, itching and visual disturbance.   Respiratory: Negative for apnea, cough, choking, chest tightness, shortness of breath, wheezing and stridor.    Cardiovascular: Negative for chest pain, palpitations and leg swelling.   Gastrointestinal: Negative for abdominal distention, abdominal pain, constipation, diarrhea, nausea and vomiting.   Endocrine: Negative for cold intolerance, heat intolerance, polydipsia, polyphagia and polyuria.   Genitourinary: Positive for frequency. Negative for decreased urine volume, difficulty urinating, dysuria, flank pain, hematuria and urgency.   Musculoskeletal: Negative for arthralgias, back pain, gait problem, joint swelling, myalgias, neck pain and neck stiffness.   Skin: Negative for color change, pallor, rash and wound.   Allergic/Immunologic: Negative for immunocompromised state.   Neurological: Positive for weakness. Negative for dizziness, tremors, seizures, syncope, facial asymmetry, speech difficulty, light-headedness, numbness and headaches.   Hematological: Negative for adenopathy.  "  Psychiatric/Behavioral: Negative for agitation, behavioral problems, confusion, decreased concentration, dysphoric mood, hallucinations, self-injury, sleep disturbance and suicidal ideas. The patient is not nervous/anxious and is not hyperactive.         Objective:   /85 (BP Location: Left arm, Patient Position: Lying)   Pulse 100   Temp 98.4 °F (36.9 °C) (Oral)   Resp 18   Ht 170.2 cm (67\")   Wt 51.9 kg (114 lb 8 oz)   LMP 09/24/2019   SpO2 100%   BMI 17.93 kg/m²   Body mass index is 17.93 kg/m².    Physical Exam   Constitutional: She is oriented to person, place, and time.   cachectic   HENT:   Head: Normocephalic.   Right Ear: External ear normal.   Left Ear: External ear normal.   Nose: Nose normal.   Eyes: Conjunctivae and EOM are normal. No scleral icterus.   Neck: Normal range of motion. Neck supple. No tracheal deviation present. No thyromegaly present.   Cardiovascular: Normal rate, regular rhythm, normal heart sounds and intact distal pulses. Exam reveals no gallop and no friction rub.   No murmur heard.  Pulmonary/Chest: Effort normal and breath sounds normal. No stridor. No respiratory distress. She has no wheezes. She has no rales. She exhibits no tenderness.   Abdominal: Soft. Bowel sounds are normal. She exhibits no distension and no mass. There is no tenderness. There is no rebound and no guarding.   Musculoskeletal: Normal range of motion. She exhibits no tenderness or deformity.   Lymphadenopathy:     She has no cervical adenopathy.   Neurological: She is alert and oriented to person, place, and time. She displays normal reflexes. She exhibits normal muscle tone. Coordination normal.   Skin: No rash noted. No erythema. No pallor.   Psychiatric: She has a normal mood and affect. Her behavior is normal. Judgment and thought content normal.       Lab Review    Lab Results   Component Value Date    HGBA1C 12.00 (H) 09/29/2019       Lab Results   Component Value Date    GLUCOSE 174 (H) " 09/30/2019    CALCIUM 8.0 (L) 09/30/2019     (L) 09/30/2019    K 4.4 09/30/2019    CO2 23.0 09/30/2019    CL 95 (L) 09/30/2019    BUN 20 09/30/2019    CREATININE 1.20 (H) 09/30/2019    EGFRIFAFRI  02/19/2018      Comment:      Unable to calculate GFR, patient age <=18.    EGFRIFNONA 57 (L) 09/30/2019    BCR 16.7 09/30/2019    ANIONGAP 9.0 09/30/2019     Lab Results   Component Value Date    GLUCOSE 174 (H) 09/30/2019    BUN 20 09/30/2019    CREATININE 1.20 (H) 09/30/2019    EGFRIFNONA 57 (L) 09/30/2019    EGFRIFAFRI  02/19/2018      Comment:      Unable to calculate GFR, patient age <=18.    BCR 16.7 09/30/2019    CO2 23.0 09/30/2019    CALCIUM 8.0 (L) 09/30/2019    ALBUMIN 2.60 (L) 09/29/2019    AST 7 09/29/2019    ALT 10 09/29/2019       Lab Results   Component Value Date    WBC 21.71 (H) 09/30/2019    HGB 10.5 (L) 09/30/2019    HCT 30.6 (L) 09/30/2019    MCV 86.2 09/30/2019     09/30/2019       Lab Results   Component Value Date    TSH 0.60 10/16/2018           Assessment/Plan       Problem   Miguel (Acute Kidney Injury) (Cms/[facility])   Uncontrolled Type 1 Diabetes Mellitus With Hyperglycemia (Cms/[facility])   Diabetic Ketoacidosis Without Coma Associated With Type 1 Diabetes Mellitus ([facility]ms/[facility]) (Resolved)   Diabetic Ketoacidosis (Cms/[facility]) (Resolved)     She has E colic bacteremia  DKA has resolved      I reviewed her tandem pump and settings are appropriate    Basal 0.85 units per hour    Correction 1 unit per 50 mg per dl increase above 150 mg per dl     Insulin to carbohydrate ratio is 1 unit per 15 mg per dl     I suggested to increase basal by 20% and carb ratio by 1:12 but afraid of hypoglycemia      --    Acute renal failure, improving, on NS 75 ml per hour    I offered her new appt with Tandem and Dexcom continuous monitor representative.   Now this technology offers basal and soon will offer control IQ , meaning that the pump will adapt based on sensor readings to try to maintain euglycemia as long as she  boluses for carbs.     I reviewed and summarized records from this admission and I reviewed / ordered labs.       Please see my above opinion and suggestions.     Tavon Elizabeth MD          This document has been electronically signed by Tavon Elizabeth MD on September 30, 2019 10:37 PM

## 2019-10-01 NOTE — PAYOR COMM NOTE
"Allison Freitas  [facility]  (P)[phone]  (F)[phone]      Ref#[id number]          Fernanda Patterson (19 y.o. Female)     Date of Birth Social Security Number Address Home Phone MRN    2000  [id number]  [address]   [facility] [address] [phone] [id number]    Caodaism Marital Status          None Single       Admission Date Admission Type Admitting Provider Attending Provider Department, Room/Bed    9/29/19 Emergency Edmar Hill MD Popescu, Tudor, MD [facility] [address] [place], 419/1    Discharge Date Discharge Disposition Discharge Destination                       Attending Provider:  Edmar Hill MD    Allergies:  Pineapple, Benzoyl Peroxide    Isolation:  None   Infection:  None   Code Status:  CPR    Ht:  170.2 cm (67\")   Wt:  47.6 kg (105 lb)    Admission Cmt:  None   Principal Problem:  Diabetic ketoacidosis (CMS/HCC) [E11.10]                 Active Insurance as of 9/29/2019     Primary Coverage     Payor Plan Insurance Group Employer/Plan Group    [id number]     Payor Plan Address Payor Plan Phone Number Payor Plan Fax Number Effective Dates    [address] [address] [phone]  4/29/2019 - None Entered    [facility] [id number]       Subscriber Name Subscriber Birth Date Member ID       DEBORAH DON 11/7/1971 [id number]                 Emergency Contacts      (Rel.) Home Phone Work Phone Mobile Phone    Juana Don (Mother) [phone] -- [phone]               History & Physical      Edmar Hill MD at 09/29/19 1827          History and Physical  Edmar Hill MD  Hospitalist    Date of admission: 9/29/2019    Patient Care Team:  Rufus Marshall APRN as PCP - General (Family Medicine)    Chief complaint   Chief Complaint   Patient presents with   • Nausea / vomiting   • Abdominal Pain   •    •    •      Subjective     Patient is a 19 y.o. female admitted for worsening nausea, vomiting, abdominal pain, generalized " weakness and out of control glucose values. The patient is a poorly controlled diabetic and has frequent admissions for similar reasons.    She wears an Insulin pump and claims that the pump is functional.    History  Past Medical History:   Diagnosis Date   • ADHD    • Bipolar 1 disorder (CMS/[facility])    • Borderline personality disorder (CMS/[facility])    • Cannabinoid hyperemesis syndrome (CMS/[facility])    • Diabetes mellitus type 1 (CMS/[facility])    • Diabetic gastroparesis (CMS/[facility])    • Diabetic ketoacidosis (CMS/[facility])    • Diabetic neuropathy (CMS/[facility])    • Post traumatic stress disorder (PTSD)    • Recurrent UTI    • Seizure disorder (CMS/[facility])    • Severe depressed bipolar II disorder without psychotic features (CMS/[facility])    • Uncontrolled diabetes mellitus (CMS/[facility])      Past Surgical History:   Procedure Laterality Date   •  SECTION N/A 2018     Family History   Problem Relation Age of Onset   • Drug abuse Father    • Suicide Attempts Brother    • Dementia Maternal Grandfather    • Hypertension Paternal Grandmother      Social History     Tobacco Use   • Smoking status: Current Every Day Smoker     Packs/day: 0.50     Years: 1.00     Pack years: 0.50   • Smokeless tobacco: Never Used   Substance Use Topics   • Alcohol use: No   • Drug use: Yes     Types: Marijuana     Medications Prior to Admission   Medication Sig Dispense Refill Last Dose   • Blood Glucose Monitoring Suppl w/Device kit USE AS INDICATED, ANY MONITOR , ICD10 code is E11.9 1 each 1 2019 at Unknown time   • Cariprazine HCl (VRAYLAR) 1.5 MG capsule capsule Take 1.5 mg by mouth Daily.   2019 at Unknown time   • FLUoxetine (PROzac) 40 MG capsule Take 40 mg by mouth Daily.   Past Week at Unknown time   • Glucose Blood (BLOOD GLUCOSE TEST) strip Use 4 x daily use any brand covered by insurance or same brand as before ICD10 code is E11.9 120 each 2019 at Unknown time   • insulin aspart (novoLOG) 100 UNIT/ML injection 70 units daily  through insulin pump 30 mL 11 9/29/2019 at Unknown time   • Lancet Devices (LANCING DEVICE) misc USE AS INDICATED TO CORRELATE WITH STRIPS AND METER 1 each 1 8/11/2019 at Unknown time   • Lancets 30G misc USE 4 X DAILY 120 each 11 8/11/2019 at Unknown time   • nortriptyline (PAMELOR) 25 MG capsule Take 25 mg by mouth At Night As Needed for Sleep.   Past Week at Unknown time   • promethazine (PHENERGAN) 25 MG tablet Take 1 tablet by mouth Every 6 (Six) Hours As Needed for Nausea or Vomiting. 120 tablet 5 Past Month at Unknown time     Allergies:  Pineapple and Benzoyl peroxide    Review of Systems  Review of Systems   Constitutional: Positive for fatigue. Negative for fever.   Respiratory: Negative for cough, shortness of breath and wheezing.    Cardiovascular: Negative for chest pain and leg swelling.   Gastrointestinal: Positive for abdominal distention, abdominal pain, nausea and vomiting. Negative for anal bleeding, constipation and diarrhea.   Genitourinary: Positive for dysuria. Negative for decreased urine volume, flank pain, frequency, genital sores, pelvic pain and vaginal pain.   Musculoskeletal: Negative for arthralgias, back pain, gait problem and joint swelling.   Skin: Positive for pallor. Negative for color change and rash.   Neurological: Positive for weakness. Negative for seizures, syncope, light-headedness and numbness.   Psychiatric/Behavioral: Negative for agitation, behavioral problems and confusion.   All other systems reviewed and are negative.      Objective     Vital Signs  Temp:  [98.8 °F (37.1 °C)-100.4 °F (38 °C)] 98.8 °F (37.1 °C)  Heart Rate:  [] 103  Resp:  [20] 20  BP: (104-120)/(57-80) 104/57    Physical Exam:  Physical Exam   Constitutional: She is oriented to person, place, and time. She appears cachectic. She appears ill. No distress.   HENT:   Head: Normocephalic and atraumatic.   Eyes: EOM are normal. Pupils are equal, round, and reactive to light. No scleral icterus.    Neck: Normal range of motion. Neck supple.   Cardiovascular: Regular rhythm. Tachycardia present.   Pulmonary/Chest: Effort normal and breath sounds normal. No stridor. No respiratory distress. She has no wheezes.   Abdominal: Soft. Bowel sounds are normal. She exhibits no distension and no mass. There is tenderness (minimal). There is no guarding.   Musculoskeletal: Normal range of motion. She exhibits no edema or tenderness.   Neurological: She is alert and oriented to person, place, and time. She displays normal reflexes. No cranial nerve deficit. Coordination normal.   Skin: Skin is warm and dry. No rash noted. No erythema. There is pallor.   Psychiatric: She has a normal mood and affect. Her behavior is normal.   Vitals reviewed.      Results Review:   Lab Results (last 24 hours)     Procedure Component Value Units Date/Time    Osmolality, Serum [[id number]]  (Normal) Collected:  09/29/19 2007    Specimen:  Blood Updated:  09/29/19 2045     Osmolality 288 mOsm/kg     Comprehensive Metabolic Panel [[id number]]  (Abnormal) Collected:  09/29/19 2002    Specimen:  Blood Updated:  09/29/19 2029     Glucose 345 mg/dL      BUN 22 mg/dL      Creatinine 1.29 mg/dL      Sodium 127 mmol/L      Potassium 4.0 mmol/L      Chloride 89 mmol/L      CO2 22.0 mmol/L      Calcium 8.2 mg/dL      Total Protein 6.5 g/dL      Albumin 2.60 g/dL      ALT (SGPT) 10 U/L      AST (SGOT) 7 U/L      Alkaline Phosphatase 128 U/L      Total Bilirubin 0.2 mg/dL      eGFR Non African Amer 53 mL/min/1.73      Globulin 3.9 gm/dL      A/G Ratio 0.7 g/dL      BUN/Creatinine Ratio 17.1     Anion Gap 16.0 mmol/L     Narrative:       GFR Normal >60  Chronic Kidney Disease <60  Kidney Failure <15    Magnesium [[id number]]  (Normal) Collected:  09/29/19 2002    Specimen:  Blood Updated:  09/29/19 2029     Magnesium 1.9 mg/dL     Phosphorus [[id number]]  (Normal) Collected:  09/29/19 2002    Specimen:  Blood Updated:  09/29/19 2029     Phosphorus 3.0 mg/dL      Hemoglobin A1c [[id number]]  (Abnormal) Collected:  09/29/19 1703    Specimen:  Blood Updated:  09/29/19 2028     Hemoglobin A1C 12.00 %     Narrative:       Hemoglobin A1C Ranges:    Increased Risk for Diabetes  5.7% to 6.4%  Diabetes                     >= 6.5%  Diabetic Goal                < 7.0%    Respiratory Panel, PCR - Swab, Nasopharynx [[id number]]  (Normal) Collected:  09/29/19 1826    Specimen:  Swab from Nasopharynx Updated:  09/29/19 1959     ADENOVIRUS, PCR Not Detected     Coronavirus 229E Not Detected     Coronavirus HKU1 Not Detected     Coronavirus NL63 Not Detected     Coronavirus OC43 Not Detected     Human Metapneumovirus Not Detected     Human Rhinovirus/Enterovirus Not Detected     Influenza B PCR Not Detected     Parainfluenza Virus 1 Not Detected     Parainfluenza Virus 2 Not Detected     Parainfluenza Virus 3 Not Detected     Parainfluenza Virus 4 Not Detected     Bordetella pertussis pcr Not Detected     Influenza A H1 2009 PCR Not Detected     Chlamydophila pneumoniae PCR Not Detected     Mycoplasma pneumo by PCR Not Detected     Influenza A PCR Not Detected     Influenza A H3 Not Detected     Influenza A H1 Not Detected     RSV, PCR Not Detected    Lactic Acid, Plasma [[id number]]  (Normal) Collected:  09/29/19 1827    Specimen:  Blood Updated:  09/29/19 1857     Lactate 1.9 mmol/L     Blood Culture - Blood, Arm, Left [[id number]] Collected:  09/29/19 1805    Specimen:  Blood from Arm, Left Updated:  09/29/19 1836    Lipase [[id number]]  (Abnormal) Collected:  09/29/19 1703    Specimen:  Blood Updated:  09/29/19 1835     Lipase 6 U/L     [place] Draw [[id number]] Collected:  09/29/19 1703    Specimen:  Blood Updated:  09/29/19 1815    Narrative:       The following orders were created for panel order [place] Draw.  Procedure                               Abnormality         Status                     ---------                               -----------         ------                      Light Blue Top[[id number]]                                   Final result               Green Top (Gel)[[id number]]                                  Final result               Lavender Top[[id number]]                                     Final result               Gold Top - SST[[id number]]                                   Final result                 Please view results for these tests on the individual orders.    Light Blue Top [[id number]] Collected:  09/29/19 1704    Specimen:  Blood Updated:  09/29/19 1815     Extra Tube hold for add-on     Comment: Auto resulted       Green Top (Gel) [[id number]] Collected:  09/29/19 1703    Specimen:  Blood Updated:  09/29/19 1815     Extra Tube Hold for add-ons.     Comment: Auto resulted.       Lavender Top [[id number]] Collected:  09/29/19 1703    Specimen:  Blood Updated:  09/29/19 1815     Extra Tube hold for add-on     Comment: Auto resulted       Gold Top - SST [[id number]] Collected:  09/29/19 1703    Specimen:  Blood Updated:  09/29/19 1815     Extra Tube Hold for add-ons.     Comment: Auto resulted.       Urinalysis, Microscopic Only - Urine, Clean Catch [[id number]]  (Abnormal) Collected:  09/29/19 1734    Specimen:  Urine, Clean Catch Updated:  09/29/19 1811     RBC, UA 13-20 /HPF      WBC, UA 13-20 /HPF      Bacteria, UA 2+ /HPF      Squamous Epithelial Cells, UA 0-2 /HPF      Hyaline Casts, UA 3-6 /LPF      Granular Casts, UA 3-6 /LPF      Methodology Manual Light Microscopy    Urinalysis With Microscopic If Indicated (No Culture) - Urine, Clean Catch [[id number]]  (Abnormal) Collected:  09/29/19 1734    Specimen:  Urine, Clean Catch Updated:  09/29/19 1749     Color, UA Yellow     Appearance, UA Turbid     pH, UA 5.5     Specific Gravity, UA 1.022     Glucose, UA >=1000 mg/dL (3+)     Ketones, UA 40 mg/dL (2+)     Bilirubin, UA Negative     Blood, UA Moderate (2+)     Protein,  mg/dL (2+)     Leuk Esterase, UA Moderate (2+)     Nitrite, UA Positive      Urobilinogen, UA 0.2 E.U./dL    Comprehensive Metabolic Panel [[id number]]  (Abnormal) Collected:  09/29/19 1703    Specimen:  Blood Updated:  09/29/19 1747     Glucose 429 mg/dL      BUN 22 mg/dL      Creatinine 1.47 mg/dL      Sodium 126 mmol/L      Potassium 4.5 mmol/L      Chloride 81 mmol/L      CO2 21.0 mmol/L      Calcium 9.2 mg/dL      Total Protein 7.9 g/dL      Albumin 3.10 g/dL      ALT (SGPT) 13 U/L      AST (SGOT) 11 U/L      Alkaline Phosphatase 159 U/L      Total Bilirubin 0.4 mg/dL      eGFR Non African Amer 45 mL/min/1.73      Globulin 4.8 gm/dL      A/G Ratio 0.6 g/dL      BUN/Creatinine Ratio 15.0     Anion Gap 24.0 mmol/L     Narrative:       GFR Normal >60  Chronic Kidney Disease <60  Kidney Failure <15    Pregnancy, Urine - Urine, Clean Catch [[id number]]  (Normal) Collected:  09/29/19 1734    Specimen:  Urine, Clean Catch Updated:  09/29/19 1745     HCG, Urine QL Negative    Scan Slide [[id number]] Collected:  09/29/19 1703    Specimen:  Blood Updated:  09/29/19 1744     [place] Cells Mod/2+     WBC Morphology Normal     Platelet Estimate Adequate     Large Platelets Slight/1+    Ketone Bodies, Serum (Not performed at [place]) [[id number]] Collected:  09/29/19 1704    Specimen:  Blood Updated:  09/29/19 1729    Narrative:       The following orders were created for panel order Ketone Bodies, Serum (Not performed at [place]).  Procedure                               Abnormality         Status                     ---------                               -----------         ------                     Acetone[[id number]]                      Abnormal            Final result                 Please view results for these tests on the individual orders.    Acetone [[id number]]  (Abnormal) Collected:  09/29/19 1704    Specimen:  Blood Updated:  09/29/19 1729     Acetone Small    hCG, Serum, Qualitative [[id number]]  (Normal) Collected:  09/29/19 1703    Specimen:  Blood Updated:  09/29/19 1728     HCG  Qualitative Negative    pH, Venous [[id number]]  (Normal) Collected:  09/29/19 1710    Specimen:  Blood Updated:  09/29/19 1720     pH, Venous 7.342 pH Units     CBC & Differential [[id number]] Collected:  09/29/19 1703    Specimen:  Blood Updated:  09/29/19 1716    Narrative:       The following orders were created for panel order CBC & Differential.  Procedure                               Abnormality         Status                     ---------                               -----------         ------                     CBC Auto Differential[[id number]]        Abnormal            Final result                 Please view results for these tests on the individual orders.    CBC Auto Differential [[id number]]  (Abnormal) Collected:  09/29/19 1703    Specimen:  Blood Updated:  09/29/19 1716     WBC 21.40 10*3/mm3      RBC 4.61 10*6/mm3      Hemoglobin 13.8 g/dL      Hematocrit 40.1 %      MCV 87.0 fL      MCH 29.9 pg      MCHC 34.4 g/dL      RDW 14.0 %      RDW-SD 44.7 fl      MPV 10.7 fL      Platelets 272 10*3/mm3      Neutrophil % 91.0 %      Lymphocyte % 2.7 %      Monocyte % 4.4 %      Eosinophil % 0.2 %      Basophil % 0.5 %      Immature Grans % 1.2 %      Neutrophils, Absolute 19.48 10*3/mm3      Lymphocytes, Absolute 0.57 10*3/mm3      Monocytes, Absolute 0.95 10*3/mm3      Eosinophils, Absolute 0.04 10*3/mm3      Basophils, Absolute 0.10 10*3/mm3      Immature Grans, Absolute 0.26 10*3/mm3      nRBC 0.0 /100 WBC           Imaging Results (last 24 hours)     Procedure Component Value Units Date/Time    XR Chest PA & Lateral [[id number]] Collected:  09/29/19 1755     Updated:  09/29/19 1819    Narrative:       PROCEDURE: Two-view chest    COMPARISON: 4/29/2019    HISTORY: fever    FINDINGS: Frontal and lateral views of the chest are obtained.     Devices: None    Lungs/Pleura: The lungs are clear    Cardiomediastinal structures: Normal         Impression:       CONCLUSION:    No acute cardiopulmonary  disease    Electronically signed by:  Markos Davis MD  2019 6:18 PM CDT  Workstation: [id number]          Assessment/Plan       Diabetic ketoacidosis (CMS/HCC)    Uncontrolled diabetes mellitus (CMS/HCC)    Nausea and vomiting    DONI (acute kidney injury) (CMS/HCC)    Recurrent UTI    Continue with aggressive IV hydration, IV insulin, close monitoring of her glucose values, electrolytes, provide her with symptomatic relief for the nausea / vomiting.    Edmar Hill MD  19  9:53 PM      Electronically signed by Edmar Hill MD at 19 1473          Emergency Department Notes      Michael Schreiber MD at 19 1727          Subjective     History provided by:  Patient   used: No    Vomiting   The primary symptoms include abdominal pain, nausea and vomiting. The illness began today. The onset was gradual.   The abdominal pain began yesterday. The abdominal pain has been unchanged since its onset. The abdominal pain is generalized. The abdominal pain does not radiate. The severity of the abdominal pain is 4/10. The abdominal pain is relieved by vomiting and drinking.   The vomiting began more than 2 days ago. The emesis contains stomach contents.   The illness is also significant for anorexia.       Review of Systems   Gastrointestinal: Positive for abdominal pain, anorexia, nausea and vomiting.   All other systems reviewed and are negative.      Past Medical History:   Diagnosis Date   • Diabetes mellitus type 1 (CMS/HCC)    • Diabetic gastroparesis (CMS/HCC)    • Diabetic ketoacidosis (CMS/HCC)    • Diabetic neuropathy (CMS/HCC)    • Post traumatic stress disorder (PTSD)    • Recurrent UTI    • Seizure disorder (CMS/HCC)    • Severe depressed bipolar II disorder without psychotic features (CMS/HCC)        Allergies   Allergen Reactions   • Pineapple Anaphylaxis   • Benzoyl Peroxide Swelling       Past Surgical History:   Procedure Laterality Date   •  SECTION N/A  "2018       Family History   Problem Relation Age of Onset   • Drug abuse Father    • Suicide Attempts Brother    • Dementia Maternal Grandfather    • Hypertension Paternal Grandmother        Social History     Socioeconomic History   • Marital status: Single     Spouse name: Not on file   • Number of children: Not on file   • Years of education: Not on file   • Highest education level: Not on file   Tobacco Use   • Smoking status: Current Every Day Smoker     Packs/day: 0.50     Years: 1.00     Pack years: 0.50   • Smokeless tobacco: Never Used   Substance and Sexual Activity   • Alcohol use: No   • Drug use: Yes     Types: Marijuana   • Sexual activity: Yes     Partners: Male   Social History Narrative    Substance Abuse: Pt drinks EtOH occasionally, stating once every 6 months. Pt smokes marijuana, but denied any other illicit drugs. Pt smokes 0.5-1 ppd of cigarettes x 1 year. Pt denies caffeine consumption, states she drinks only water.         Marriages: none    Current Relationships: Recent breakup with her boyfriend    Children: one child that  2wks ago at 2 months old.        Occupation:  Pt is a  and loves her job, but hasn't been able to work since baby was born via C/S    Living Situation: was living with her boyfriend but they are  over the death of their child.  She plans to live with mom after discharge.       /74   Pulse 118   Temp 100.4 °F (38 °C) (Axillary)   Resp 20   Ht 170.2 cm (67\")   Wt 44.4 kg (97 lb 14.4 oz)   LMP 2019   SpO2 100%   BMI 15.33 kg/m²      Objective   Physical Exam   Constitutional: She is oriented to person, place, and time. She appears well-developed and well-nourished.   HENT:   Head: Normocephalic and atraumatic.   Eyes: EOM are normal. Pupils are equal, round, and reactive to light.   Neck: Normal range of motion. Neck supple.   Cardiovascular: Normal rate, regular rhythm, intact distal pulses and normal pulses.   Pulmonary/Chest: " Effort normal and breath sounds normal.   Abdominal: Soft. There is tenderness.   Musculoskeletal: Normal range of motion.   Neurological: She is oriented to person, place, and time.   Skin: Skin is warm. Capillary refill takes less than 2 seconds.   Psychiatric: She has a normal mood and affect. Her behavior is normal.   Nursing note and vitals reviewed.      Procedures          ED Course      Labs Reviewed   COMPREHENSIVE METABOLIC PANEL - Abnormal; Notable for the following components:       Result Value    Glucose 429 (*)     BUN 22 (*)     Creatinine 1.47 (*)     Sodium 126 (*)     Chloride 81 (*)     CO2 21.0 (*)     Albumin 3.10 (*)     Alkaline Phosphatase 159 (*)     eGFR Non  Amer 45 (*)     Anion Gap 24.0 (*)     All other components within normal limits    Narrative:     GFR Normal >60  Chronic Kidney Disease <60  Kidney Failure <15   URINALYSIS W/ MICROSCOPIC IF INDICATED (NO CULTURE) - Abnormal; Notable for the following components:    Appearance, UA Turbid (*)     Glucose, UA >=1000 mg/dL (3+) (*)     Ketones, UA 40 mg/dL (2+) (*)     Blood, UA Moderate (2+) (*)     Protein,  mg/dL (2+) (*)     Leuk Esterase, UA Moderate (2+) (*)     Nitrite, UA Positive (*)     All other components within normal limits   CBC WITH AUTO DIFFERENTIAL - Abnormal; Notable for the following components:    WBC 21.40 (*)     Neutrophil % 91.0 (*)     Lymphocyte % 2.7 (*)     Monocyte % 4.4 (*)     Eosinophil % 0.2 (*)     Immature Grans % 1.2 (*)     Neutrophils, Absolute 19.48 (*)     Lymphocytes, Absolute 0.57 (*)     Monocytes, Absolute 0.95 (*)     Immature Grans, Absolute 0.26 (*)     All other components within normal limits   ACETONE - Abnormal; Notable for the following components:    Acetone Small (*)     All other components within normal limits   HCG, SERUM, QUALITATIVE - Normal   PH, VENOUS - Normal   PREGNANCY, URINE - Normal   BLOOD CULTURE   BLOOD CULTURE   RESPIRATORY PANEL, PCR   SCAN SLIDE    RAINBOW DRAW    Narrative:     The following orders were created for panel order [place] Draw.  Procedure                               Abnormality         Status                     ---------                               -----------         ------                     Light Blue Top[[id number]]                                   In process                 Green Top (Gel)[[id number]]                                  In process                 Lavender Top[[id number]]                                     In process                 Gold Top - SST[[id number]]                                   In process                   Please view results for these tests on the individual orders.   LACTIC ACID, PLASMA   LIPASE   URINALYSIS, MICROSCOPIC ONLY   BASIC METABOLIC PANEL   BASIC METABOLIC PANEL   POCT GLUCOSE FINGERSTICK   POCT GLUCOSE FINGERSTICK   POCT GLUCOSE FINGERSTICK   POCT GLUCOSE FINGERSTICK   POCT GLUCOSE FINGERSTICK   POCT GLUCOSE FINGERSTICK   POCT GLUCOSE FINGERSTICK   POCT GLUCOSE FINGERSTICK   POCT GLUCOSE FINGERSTICK   POCT GLUCOSE FINGERSTICK   POCT GLUCOSE FINGERSTICK   POCT GLUCOSE FINGERSTICK   POCT GLUCOSE FINGERSTICK   POCT GLUCOSE FINGERSTICK   POCT GLUCOSE FINGERSTICK   POCT GLUCOSE FINGERSTICK   POCT GLUCOSE FINGERSTICK   POCT GLUCOSE FINGERSTICK   POCT GLUCOSE FINGERSTICK   POCT GLUCOSE FINGERSTICK   POCT GLUCOSE FINGERSTICK   POCT GLUCOSE FINGERSTICK   POCT GLUCOSE FINGERSTICK   POCT GLUCOSE FINGERSTICK   POCT GLUCOSE FINGERSTICK   CBC AND DIFFERENTIAL    Narrative:     The following orders were created for panel order CBC & Differential.  Procedure                               Abnormality         Status                     ---------                               -----------         ------                     CBC Auto Differential[[id number]]        Abnormal            Final result                 Please view results for these tests on the individual orders.   KETONE BODIES SERUM    Narrative:     The  "following orders were created for panel order Ketone Bodies, Serum (Not performed at [facility]).  Procedure                               Abnormality         Status                     ---------                               -----------         ------                     Acetone[[id number]]                      Abnormal            Final result                 Please view results for these tests on the individual orders.   LIGHT BLUE TOP   GREEN TOP   LAVENDER TOP   GOLD TOP - SST       XR Chest PA & Lateral    (Results Pending)     Result was discussed with patient.  Dr. Hill was called who accepted patient.          MDM    Final diagnoses:   Diabetic ketoacidosis without coma associated with type 1 diabetes mellitus (CMS/[facility])   Acute UTI              Michael Schreiber MD  09/29/19 1803      Electronically signed by Michael Schreiber MD at 09/29/19 1803          Physician Progress Notes (last 48 hours) (Notes from 09/29/19 0813 through 10/01/19 0813)      Michael Poole MD at 09/30/19 1205              [facility] Medicine Services  INPATIENT PROGRESS NOTE    Length of Stay: 0  Date of Admission: 9/29/2019  Primary Care Physician: Rufus Marshall APRN    Subjective   Chief Complaint: Nausea vomiting and abdominal pain  HPI: Patient still with complaints of nausea.  She states that she feels \"terrible\".    Review of Systems   Constitutional: Positive for activity change, chills, fatigue and fever. Negative for appetite change and unexpected weight change.   Respiratory: Negative for cough, choking, chest tightness, shortness of breath and wheezing.    Cardiovascular: Negative for chest pain, palpitations and leg swelling.   Gastrointestinal: Positive for abdominal pain, nausea and vomiting. Negative for blood in stool, constipation and diarrhea.   Genitourinary: Positive for dysuria. Negative for flank pain and hematuria.   Neurological: Negative for dizziness, " seizures, syncope, speech difficulty, weakness, light-headedness, numbness and headaches.   Hematological: Does not bruise/bleed easily.        All pertinent negatives and positives are as above. All other systems have been reviewed and are negative unless otherwise stated.     Objective    Temp:  [98.1 °F (36.7 °C)-101.4 °F (38.6 °C)] 99.5 °F (37.5 °C)  Heart Rate:  [] 123  Resp:  [18-20] 18  BP: (102-136)/(57-86) 102/59    Physical Exam   Constitutional: She appears well-developed and well-nourished. She appears ill.   HENT:   Head: Normocephalic and atraumatic.   Eyes: EOM are normal. Pupils are equal, round, and reactive to light.   Neck: Normal range of motion. Neck supple.   Cardiovascular: Regular rhythm and normal heart sounds. Tachycardia present. Exam reveals no gallop and no friction rub.   No murmur heard.  Pulmonary/Chest: Effort normal and breath sounds normal. No respiratory distress. She has no wheezes. She has no rales. She exhibits no tenderness.   Abdominal: Soft. Bowel sounds are normal. She exhibits no distension. There is no tenderness. There is no guarding.   Musculoskeletal: She exhibits no edema.   Skin: Skin is warm and dry.   Psychiatric: She has a normal mood and affect. Her behavior is normal. Thought content normal.   Vitals reviewed.          Results Review:  I have reviewed the labs, radiology results, and diagnostic studies.    Laboratory Data:   Results from last 7 days   Lab Units 09/30/19  0432 09/29/19  2356 09/29/19 2002 09/29/19  1703   SODIUM mmol/L 127* 133* 127* 126*   POTASSIUM mmol/L 4.4 4.2 4.0 4.5   CHLORIDE mmol/L 95* 93* 89* 81*   CO2 mmol/L 23.0 24.0 22.0 21.0*   BUN mg/dL 20 20 22* 22*   CREATININE mg/dL 1.20* 1.30* 1.29* 1.47*   GLUCOSE mg/dL 174* 251* 345* 429*   CALCIUM mg/dL 8.0* 8.3* 8.2* 9.2   BILIRUBIN mg/dL  --   --  0.2 0.4   ALK PHOS U/L  --   --  128* 159*   ALT (SGPT) U/L  --   --  10 13   AST (SGOT) U/L  --   --  7 11   ANION GAP mmol/L 9.0 16.0*  16.0* 24.0*     Estimated Creatinine Clearance: 51.9 mL/min (A) (by C-G formula based on SCr of 1.2 mg/dL (H)).  Results from last 7 days   Lab Units 09/30/19  0432 09/29/19  2356 09/29/19 2002   MAGNESIUM mg/dL 1.9 1.8 1.9   PHOSPHORUS mg/dL 2.2* 2.6 3.0         Results from last 7 days   Lab Units 09/30/19  0432 09/29/19  1703   WBC 10*3/mm3 21.71* 21.40*   HEMOGLOBIN g/dL 10.5* 13.8   HEMATOCRIT % 30.6* 40.1   PLATELETS 10*3/mm3 232 272           Culture Data:   Blood Culture   Date Value Ref Range Status   09/29/2019 Abnormal Stain (A)  Preliminary     No results found for: URINECX  No results found for: RESPCX  No results found for: WOUNDCX  No results found for: STOOLCX  No components found for: BODYFLD    Radiology Data:   Imaging Results (last 24 hours)     Procedure Component Value Units Date/Time    XR Chest PA & Lateral [[id number]] Collected:  09/29/19 1755     Updated:  09/29/19 1819    Narrative:       PROCEDURE: Two-view chest    COMPARISON: 4/29/2019    HISTORY: fever    FINDINGS: Frontal and lateral views of the chest are obtained.     Devices: None    Lungs/Pleura: The lungs are clear    Cardiomediastinal structures: Normal         Impression:       CONCLUSION:    No acute cardiopulmonary disease    Electronically signed by:  Markos Davis MD  9/29/2019 6:18 PM CDT  Workstation: [id number]          I have reviewed the patient's current medications.     Assessment/Plan     Active Hospital Problems    Diagnosis   • **Diabetic ketoacidosis (CMS/HCC)   • E coli bacteremia   • Sepsis due to Gram negative bacteria (CMS/[facility])   • Nausea and vomiting   • DONI (acute kidney injury) (CMS/[facility])   • Diabetic ketoacidosis without coma associated with type 1 diabetes mellitus (CMS/HCC)   • Uncontrolled diabetes mellitus (CMS/HCC)   • Recurrent UTI         Plan:    1.  Diabetic ketoacidosis: Resolved.  Patient currently using her insulin pump.  2.  E. coli bacteremia: Sensitivities pending.  Continue empiric  antibiotics.  Likely from urine source.  3.  Gram-negative sepsis: Secondary to E. coli.  Urine is the likely source.  Urine culture is pending.  Blood cultures positive.  Continue IV fluids.  Lactate on 929 was 1.9.  Will repeat today.  Continue empiric antibiotics will wait on sensitivities.  4.  Urinary tract infection: Likely sepsis secondary to E. coli.  Cultures are not back yet.  Continue empiric antibiotics.  5.  Nausea and vomiting: Secondary to infection and DKA.  Continue to treat symptomatically.  6.  Bipolar disorder: Continue home medication.      Discharge Planning: I expect patient to be discharged to home in 3-4 days.        This document has been electronically signed by Michael Poole MD on September 30, 2019 12:14 PM        Electronically signed by Michael Poole MD at 09/30/19 1216          Consult Notes (last 48 hours) (Notes from 09/29/19 0813 through 10/01/19 0813)      Tavon Avila MD at 09/30/19 2227      Consult Orders    1. Inpatient Endocrinology Consult [[id number]] ordered by Edmar Hill MD at 09/29/19 2148                CONSULT NOTE    Subjective Fernanda Patterson is a 19 y.o. female who I am being consulted for  evaluation of uncontrolled type 1 diabetes      Referring Provider  Michael Schreiber, *      Duration/Timing:  Diabetes mellitus type 1, Age at onset of diabetes: 6 years  constant     not controlled      severity high  , frequent admissions for DKA         Severity (Complications/Hospitalizations)  Secondary Microvascular Complications:  Diabetic Neuropathy     Context  Diabetes Regimen:  Insulin     Lab Results   Component Value Date    HGBA1C 12.00 (H) 09/29/2019                Blood Glucose Readings     Elevated , no hypoglycemia      States one coworker had URI/gastroenteritis  that she acquired and that led to uncontrolled glycemia and DKA             Allergies   Allergen Reactions   • Pineapple Anaphylaxis   • Benzoyl Peroxide Swelling        Past Medical History:   Diagnosis Date   • ADHD    • Bipolar 1 disorder (CMS/HCC)    • Borderline personality disorder (CMS/HCC)    • Cannabinoid hyperemesis syndrome (CMS/HCC)    • Diabetes mellitus type 1 (CMS/HCC)    • Diabetic gastroparesis (CMS/HCC)    • Diabetic ketoacidosis (CMS/HCC)    • Diabetic neuropathy (CMS/HCC)    • Post traumatic stress disorder (PTSD)    • Recurrent UTI    • Seizure disorder (CMS/HCC)    • Severe depressed bipolar II disorder without psychotic features (CMS/HCC)    • Uncontrolled diabetes mellitus (CMS/HCC)      Family History   Problem Relation Age of Onset   • Drug abuse Father    • Suicide Attempts Brother    • Dementia Maternal Grandfather    • Hypertension Paternal Grandmother      Social History     Tobacco Use   • Smoking status: Current Every Day Smoker     Packs/day: 0.50     Years: 1.00     Pack years: 0.50   • Smokeless tobacco: Never Used   Substance Use Topics   • Alcohol use: No   • Drug use: Yes     Types: Marijuana         Current Facility-Administered Medications:   •  acetaminophen (TYLENOL) tablet 650 mg, 650 mg, Oral, Q6H PRN, Michael Poole MD, 650 mg at 09/30/19 1124  •  albuterol (PROVENTIL) nebulizer solution 0.083% 2.5 mg/3mL, 2.5 mg, Nebulization, Q4H PRN, Edmar Hill MD  •  Cariprazine HCl (VRAYLAR) capsule capsule 1.5 mg, 1.5 mg, Oral, Daily, Edmar Hill MD  •  cefTRIAXone (ROCEPHIN) 1 g/100 mL 0.9% NS (MBP), 1 g, Intravenous, Q24H, Michael Poole MD, 1 g at 09/30/19 1533  •  dextrose (D50W) 25 g/ 50mL Intravenous Solution 12.5 g, 12.5 g, Intravenous, PRN, Edmar Hill MD  •  FLUoxetine (PROzac) capsule 40 mg, 40 mg, Oral, Daily, Edmar Hill MD, 40 mg at 09/30/19 0914  •  HYDROcodone-acetaminophen (NORCO) 5-325 MG per tablet 1 tablet, 1 tablet, Oral, Q6H PRN, Neftali Barker MD, 1 tablet at 09/30/19 1533  •  insulin patient supplied pump, , Subcutaneous, Continuous, Neftali Barker MD  •   ondansetron (ZOFRAN) injection 4 mg, 4 mg, Intravenous, Q6H PRN, Edmar Hill MD, 4 mg at 09/30/19 1536  •  potassium phosphate 45 mmol in sodium chloride 0.9 % 500 mL infusion, 45 mmol, Intravenous, PRN **OR** potassium phosphate 30 mmol in sodium chloride 0.9 % 250 mL infusion, 30 mmol, Intravenous, PRN **OR** Potassium Phosphates 15 mmol in sodium chloride 0.9 % 100 mL infusion, 15 mmol, Intravenous, PRN **OR** sodium phosphates 45 mmol in sodium chloride 0.9 % 500 mL IVPB, 45 mmol, Intravenous, PRN **OR** sodium phosphates 30 mmol in sodium chloride 0.9 % 250 mL IVPB, 30 mmol, Intravenous, PRN **OR** sodium phosphates 15 mmol in sodium chloride 0.9 % 250 mL IVPB, 15 mmol, Intravenous, PRN, Neftali Barker MD, Last Rate: 62.5 mL/hr at 09/30/19 0915, 15 mmol at 09/30/19 0915  •  promethazine (PHENERGAN) injection 12.5 mg, 12.5 mg, Intravenous, Q6H PRN, Neftali Barker MD, 12.5 mg at 09/30/19 1957  •  sodium chloride 0.9 % flush 10 mL, 10 mL, Intravenous, Q12H, Edmar Hill MD, 10 mL at 09/30/19 1957  •  sodium chloride 0.9 % flush 10 mL, 10 mL, Intravenous, Once PRN, Edmar Hill MD  •  sodium chloride 0.9 % infusion, 75 mL/hr, Intravenous, Continuous, Michael Poole MD, Last Rate: 75 mL/hr at 09/30/19 2227, 75 mL/hr at 09/30/19 2227    No current facility-administered medications on file prior to encounter.      Current Outpatient Medications on File Prior to Encounter   Medication Sig Dispense Refill   • Blood Glucose Monitoring Suppl w/Device kit USE AS INDICATED, ANY MONITOR , ICD10 code is E11.9 1 each 1   • Cariprazine HCl (VRAYLAR) 1.5 MG capsule capsule Take 1.5 mg by mouth Daily.     • FLUoxetine (PROzac) 40 MG capsule Take 40 mg by mouth Daily.     • Glucose Blood (BLOOD GLUCOSE TEST) strip Use 4 x daily use any brand covered by insurance or same brand as before ICD10 code is E11.9 120 each 11   • insulin aspart (novoLOG) 100 UNIT/ML injection 70 units daily  through insulin pump 30 mL 11   • Lancet Devices (LANCING DEVICE) misc USE AS INDICATED TO CORRELATE WITH STRIPS AND METER 1 each 1   • Lancets 30G misc USE 4 X DAILY 120 each 11   • nortriptyline (PAMELOR) 25 MG capsule Take 25 mg by mouth At Night As Needed for Sleep.     • promethazine (PHENERGAN) 25 MG tablet Take 1 tablet by mouth Every 6 (Six) Hours As Needed for Nausea or Vomiting. 120 tablet 5       Medications Discontinued During This Encounter   Medication Reason   • sodium chloride 0.9 % flush 10 mL    • clonazePAM (KlonoPIN) 1 MG tablet    • albuterol 108 (90 Base) MCG/ACT inhaler    • FLUoxetine (PROzac) 10 MG capsule *Re-Entry   • lisdexamfetamine (VYVANSE) 50 MG capsule    • insulin regular (HumuLIN R,NovoLIN R) 100 Units in sodium chloride 0.9 % 100 mL (1 Units/mL) infusion    • sodium chloride 0.9 % flush 10 mL    • sodium chloride 0.9 % flush 10 mL    • sodium chloride 0.9 % with KCl 20 mEq/L infusion    • sodium chloride 0.9 % with KCl 40 mEq/L infusion    • sodium chloride 0.45 % infusion    • sodium chloride 0.45 % with KCl 20 mEq/L infusion    • sodium chloride 0.45 % 1,000 mL with potassium chloride 40 mEq infusion    • dextrose 5 % and sodium chloride 0.45 % infusion    • dextrose 5 % and sodium chloride 0.45 % with KCl 20 mEq/L infusion    • dextrose 5 % and sodium chloride 0.45 % with KCl 40 mEq/L infusion    • insulin regular (HumuLIN R,NovoLIN R) 100 Units in sodium chloride 0.9 % 100 mL (1 Units/mL) infusion    • sodium chloride 0.9 % infusion    • sodium chloride 0.9 % infusion        Review of Systems    Review of Systems   Constitutional: Positive for fatigue and unexpected weight change. Negative for activity change, appetite change, chills, diaphoresis and fever.   HENT: Negative for congestion, dental problem, drooling, ear discharge, ear pain, facial swelling, mouth sores, postnasal drip, rhinorrhea, sinus pressure, sore throat, tinnitus, trouble swallowing and voice change.   "  Eyes: Negative for photophobia, pain, discharge, redness, itching and visual disturbance.   Respiratory: Negative for apnea, cough, choking, chest tightness, shortness of breath, wheezing and stridor.    Cardiovascular: Negative for chest pain, palpitations and leg swelling.   Gastrointestinal: Negative for abdominal distention, abdominal pain, constipation, diarrhea, nausea and vomiting.   Endocrine: Negative for cold intolerance, heat intolerance, polydipsia, polyphagia and polyuria.   Genitourinary: Positive for frequency. Negative for decreased urine volume, difficulty urinating, dysuria, flank pain, hematuria and urgency.   Musculoskeletal: Negative for arthralgias, back pain, gait problem, joint swelling, myalgias, neck pain and neck stiffness.   Skin: Negative for color change, pallor, rash and wound.   Allergic/Immunologic: Negative for immunocompromised state.   Neurological: Positive for weakness. Negative for dizziness, tremors, seizures, syncope, facial asymmetry, speech difficulty, light-headedness, numbness and headaches.   Hematological: Negative for adenopathy.   Psychiatric/Behavioral: Negative for agitation, behavioral problems, confusion, decreased concentration, dysphoric mood, hallucinations, self-injury, sleep disturbance and suicidal ideas. The patient is not nervous/anxious and is not hyperactive.         Objective:   /85 (BP Location: Left arm, Patient Position: Lying)   Pulse 100   Temp 98.4 °F (36.9 °C) (Oral)   Resp 18   Ht 170.2 cm (67\")   Wt 51.9 kg (114 lb 8 oz)   LMP 09/24/2019   SpO2 100%   BMI 17.93 kg/m²    Body mass index is 17.93 kg/m².    Physical Exam   Constitutional: She is oriented to person, place, and time.   cachectic   HENT:   Head: Normocephalic.   Right Ear: External ear normal.   Left Ear: External ear normal.   Nose: Nose normal.   Eyes: Conjunctivae and EOM are normal. No scleral icterus.   Neck: Normal range of motion. Neck supple. No tracheal " deviation present. No thyromegaly present.   Cardiovascular: Normal rate, regular rhythm, normal heart sounds and intact distal pulses. Exam reveals no gallop and no friction rub.   No murmur heard.  Pulmonary/Chest: Effort normal and breath sounds normal. No stridor. No respiratory distress. She has no wheezes. She has no rales. She exhibits no tenderness.   Abdominal: Soft. Bowel sounds are normal. She exhibits no distension and no mass. There is no tenderness. There is no rebound and no guarding.   Musculoskeletal: Normal range of motion. She exhibits no tenderness or deformity.   Lymphadenopathy:     She has no cervical adenopathy.   Neurological: She is alert and oriented to person, place, and time. She displays normal reflexes. She exhibits normal muscle tone. Coordination normal.   Skin: No rash noted. No erythema. No pallor.   Psychiatric: She has a normal mood and affect. Her behavior is normal. Judgment and thought content normal.       Lab Review    Lab Results   Component Value Date    HGBA1C 12.00 (H) 09/29/2019       Lab Results   Component Value Date    GLUCOSE 174 (H) 09/30/2019    CALCIUM 8.0 (L) 09/30/2019     (L) 09/30/2019    K 4.4 09/30/2019    CO2 23.0 09/30/2019    CL 95 (L) 09/30/2019    BUN 20 09/30/2019    CREATININE 1.20 (H) 09/30/2019    EGFRIFAFRI  02/19/2018      Comment:      Unable to calculate GFR, patient age <=18.    EGFRIFNONA 57 (L) 09/30/2019    BCR 16.7 09/30/2019    ANIONGAP 9.0 09/30/2019     Lab Results   Component Value Date    GLUCOSE 174 (H) 09/30/2019    BUN 20 09/30/2019    CREATININE 1.20 (H) 09/30/2019    EGFRIFNONA 57 (L) 09/30/2019    EGFRIFAFRI  02/19/2018      Comment:      Unable to calculate GFR, patient age <=18.    BCR 16.7 09/30/2019    CO2 23.0 09/30/2019    CALCIUM 8.0 (L) 09/30/2019    ALBUMIN 2.60 (L) 09/29/2019    AST 7 09/29/2019    ALT 10 09/29/2019       Lab Results   Component Value Date    WBC 21.71 (H) 09/30/2019    HGB 10.5 (L) 09/30/2019     HCT 30.6 (L) 09/30/2019    MCV 86.2 09/30/2019     09/30/2019       Lab Results   Component Value Date    TSH 0.60 10/16/2018           Assessment/Plan       Problem   Miguel (Acute Kidney Injury) (Cms/Hcc)   Uncontrolled Type 1 Diabetes Mellitus With Hyperglycemia (Cms/Hcc)   Diabetic Ketoacidosis Without Coma Associated With Type 1 Diabetes Mellitus (Cms/Hcc) (Resolved)   Diabetic Ketoacidosis (Cms/Hcc) (Resolved)     She has E colic bacteremia  DKA has resolved      I reviewed her tandem pump and settings are appropriate    Basal 0.85 units per hour    Correction 1 unit per 50 mg per dl increase above 150 mg per dl     Insulin to carbohydrate ratio is 1 unit per 15 mg per dl     I suggested to increase basal by 20% and carb ratio by 1:12 but afraid of hypoglycemia      --    Acute renal failure, improving, on NS 75 ml per hour    I offered her new appt with Tandem and Dexcom continuous monitor representative.   Now this technology offers basal and soon will offer control IQ , meaning that the pump will adapt based on sensor readings to try to maintain euglycemia as long as she boluses for carbs.     I reviewed and summarized records from this admission and I reviewed / ordered labs.       Please see my above opinion and suggestions.     Tavon Elizabeth MD          This document has been electronically signed by Tavon Elizabeth MD on September 30, 2019 10:37 PM        Electronically signed by Tavon Avila MD at 09/30/19 1705

## 2019-10-02 VITALS
SYSTOLIC BLOOD PRESSURE: 120 MMHG | HEART RATE: 115 BPM | TEMPERATURE: 97.5 F | HEIGHT: 67 IN | WEIGHT: 108.8 LBS | RESPIRATION RATE: 18 BRPM | OXYGEN SATURATION: 98 % | BODY MASS INDEX: 17.08 KG/M2 | DIASTOLIC BLOOD PRESSURE: 80 MMHG

## 2019-10-02 LAB
ANION GAP SERPL CALCULATED.3IONS-SCNC: 13 MMOL/L (ref 5–15)
BACTERIA SPEC AEROBE CULT: ABNORMAL
BASOPHILS # BLD AUTO: 0.1 10*3/MM3 (ref 0–0.2)
BASOPHILS NFR BLD AUTO: 0.7 % (ref 0–1.5)
BUN BLD-MCNC: 16 MG/DL (ref 6–20)
BUN/CREAT SERPL: 17.2 (ref 7–25)
CALCIUM SPEC-SCNC: 8.2 MG/DL (ref 8.6–10.5)
CHLORIDE SERPL-SCNC: 94 MMOL/L (ref 98–107)
CO2 SERPL-SCNC: 22 MMOL/L (ref 22–29)
CREAT BLD-MCNC: 0.93 MG/DL (ref 0.57–1)
DEPRECATED RDW RBC AUTO: 45.6 FL (ref 37–54)
EOSINOPHIL # BLD AUTO: 0.14 10*3/MM3 (ref 0–0.4)
EOSINOPHIL NFR BLD AUTO: 1 % (ref 0.3–6.2)
ERYTHROCYTE [DISTWIDTH] IN BLOOD BY AUTOMATED COUNT: 14.6 % (ref 12.3–15.4)
GFR SERPL CREATININE-BSD FRML MDRD: 77 ML/MIN/1.73
GLUCOSE BLD-MCNC: 348 MG/DL (ref 65–99)
GLUCOSE BLDC GLUCOMTR-MCNC: 348 MG/DL (ref 70–130)
GLUCOSE BLDC GLUCOMTR-MCNC: 352 MG/DL (ref 70–130)
GRAM STN SPEC: ABNORMAL
GRAM STN SPEC: ABNORMAL
HCT VFR BLD AUTO: 31.1 % (ref 34–46.6)
HGB BLD-MCNC: 10.9 G/DL (ref 12–15.9)
IMM GRANULOCYTES # BLD AUTO: 0.36 10*3/MM3 (ref 0–0.05)
IMM GRANULOCYTES NFR BLD AUTO: 2.5 % (ref 0–0.5)
ISOLATED FROM: ABNORMAL
LYMPHOCYTES # BLD AUTO: 0.91 10*3/MM3 (ref 0.7–3.1)
LYMPHOCYTES NFR BLD AUTO: 6.3 % (ref 19.6–45.3)
MAGNESIUM SERPL-MCNC: 1.9 MG/DL (ref 1.7–2.2)
MCH RBC QN AUTO: 29.7 PG (ref 26.6–33)
MCHC RBC AUTO-ENTMCNC: 35 G/DL (ref 31.5–35.7)
MCV RBC AUTO: 84.7 FL (ref 79–97)
MONOCYTES # BLD AUTO: 0.99 10*3/MM3 (ref 0.1–0.9)
MONOCYTES NFR BLD AUTO: 6.8 % (ref 5–12)
NEUTROPHILS # BLD AUTO: 12.01 10*3/MM3 (ref 1.7–7)
NEUTROPHILS NFR BLD AUTO: 82.7 % (ref 42.7–76)
NRBC BLD AUTO-RTO: 0 /100 WBC (ref 0–0.2)
PLATELET # BLD AUTO: 283 10*3/MM3 (ref 140–450)
PMV BLD AUTO: 10.8 FL (ref 6–12)
POTASSIUM BLD-SCNC: 3.6 MMOL/L (ref 3.5–5.2)
RBC # BLD AUTO: 3.67 10*6/MM3 (ref 3.77–5.28)
SODIUM BLD-SCNC: 129 MMOL/L (ref 136–145)
WBC NRBC COR # BLD: 14.51 10*3/MM3 (ref 3.4–10.8)

## 2019-10-02 PROCEDURE — 25010000002 MORPHINE PER 10 MG: Performed by: INTERNAL MEDICINE

## 2019-10-02 PROCEDURE — 85025 COMPLETE CBC W/AUTO DIFF WBC: CPT | Performed by: INTERNAL MEDICINE

## 2019-10-02 PROCEDURE — 82962 GLUCOSE BLOOD TEST: CPT

## 2019-10-02 PROCEDURE — 80048 BASIC METABOLIC PNL TOTAL CA: CPT | Performed by: INTERNAL MEDICINE

## 2019-10-02 PROCEDURE — 83735 ASSAY OF MAGNESIUM: CPT | Performed by: INTERNAL MEDICINE

## 2019-10-02 RX ORDER — SULFAMETHOXAZOLE AND TRIMETHOPRIM 800; 160 MG/1; MG/1
1 TABLET ORAL 2 TIMES DAILY
Qty: 20 TABLET | Refills: 0 | Status: SHIPPED | OUTPATIENT
Start: 2019-10-02 | End: 2019-10-12

## 2019-10-02 RX ORDER — OCTISALATE, AVOBENZONE, HOMOSALATE, AND OCTOCRYLENE 29.4; 29.4; 49; 25.48 MG/ML; MG/ML; MG/ML; MG/ML
1 LOTION TOPICAL DAILY
Qty: 10 CAPSULE | Refills: 0 | Status: SHIPPED | OUTPATIENT
Start: 2019-10-02 | End: 2020-07-15

## 2019-10-02 RX ADMIN — SODIUM CHLORIDE 75 ML/HR: 9 INJECTION, SOLUTION INTRAVENOUS at 02:43

## 2019-10-02 RX ADMIN — HYDROCODONE BITARTRATE AND ACETAMINOPHEN 1 TABLET: 5; 325 TABLET ORAL at 04:43

## 2019-10-02 RX ADMIN — MORPHINE SULFATE 1 MG: 2 INJECTION, SOLUTION INTRAMUSCULAR; INTRAVENOUS at 06:46

## 2019-10-02 RX ADMIN — FLUOXETINE 40 MG: 20 CAPSULE ORAL at 08:15

## 2019-10-02 NOTE — PLAN OF CARE
Problem: Patient Care Overview  Goal: Plan of Care Review  Outcome: Ongoing (interventions implemented as appropriate)   10/01/19 3089   Coping/Psychosocial   Plan of Care Reviewed With patient   Plan of Care Review   Progress improving   OTHER   Outcome Summary pt vs stable and is resting comfortably at this time; no new events throughout the night; will continue to monitor.     Goal: Individualization and Mutuality  Outcome: Ongoing (interventions implemented as appropriate)    Goal: Discharge Needs Assessment  Outcome: Ongoing (interventions implemented as appropriate)    Goal: Interprofessional Rounds/Family Conf  Outcome: Ongoing (interventions implemented as appropriate)      Problem: Diabetes, Type 1 (Adult)  Goal: Signs and Symptoms of Listed Potential Problems Will be Absent, Minimized or Managed (Diabetes, Type 1)  Outcome: Ongoing (interventions implemented as appropriate)      Problem: Urinary Tract Infection (Adult)  Goal: Signs and Symptoms of Listed Potential Problems Will be Absent, Minimized or Managed (Urinary Tract Infection)  Outcome: Ongoing (interventions implemented as appropriate)      Problem: Pain, Acute (Adult)  Goal: Identify Related Risk Factors and Signs and Symptoms  Outcome: Ongoing (interventions implemented as appropriate)    Goal: Acceptable Pain Control/Comfort Level  Outcome: Ongoing (interventions implemented as appropriate)      Problem: Nausea/Vomiting (Adult)  Goal: Identify Related Risk Factors and Signs and Symptoms  Outcome: Ongoing (interventions implemented as appropriate)    Goal: Symptom Relief  Outcome: Ongoing (interventions implemented as appropriate)    Goal: Adequate Hydration  Outcome: Ongoing (interventions implemented as appropriate)

## 2019-10-02 NOTE — DISCHARGE SUMMARY
[facility] Medicine Services  DISCHARGE SUMMARY       Date of Admission: 9/29/2019  Date of Discharge:  10/2/2019  Primary Care Physician: Rufus Marshall APRN    Presenting Problem/History of Present Illness:  Acute UTI [N39.0]  Diabetic ketoacidosis without coma associated with type 1 diabetes mellitus (CMS/[facility]) [E10.10]  Diabetic ketoacidosis without coma associated with type 1 diabetes mellitus (CMS/[facility]) [E10.10]       Final Discharge Diagnoses:  Active Hospital Problems    Diagnosis   • E coli bacteremia   • Sepsis due to Gram negative bacteria (CMS/[facility])   • Nausea and vomiting   • DONI (acute kidney injury) (CMS/[facility])   • Uncontrolled type 1 diabetes mellitus with hyperglycemia (CMS/[facility])   • Recurrent UTI       Consults:   Consults     Date and Time Order Name Status Description    9/30/2019 0030 Inpatient Endocrinology Consult Completed           Procedures Performed:                 Pertinent Test Results:   Lab Results (most recent)     Procedure Component Value Units Date/Time    POC Glucose Once [[id number]]  (Abnormal) Collected:  10/02/19 1111    Specimen:  Blood Updated:  10/02/19 1129     Glucose 348 mg/dL      Comment: RN NotifiedOperator: [id number] [facility] [id number]       Basic Metabolic Panel [[id number]]  (Abnormal) Collected:  10/02/19 0909    Specimen:  Blood Updated:  10/02/19 0953     Glucose 348 mg/dL      BUN 16 mg/dL      Creatinine 0.93 mg/dL      Sodium 129 mmol/L      Potassium 3.6 mmol/L      Chloride 94 mmol/L      CO2 22.0 mmol/L      Calcium 8.2 mg/dL      eGFR Non African Amer 77 mL/min/1.73      BUN/Creatinine Ratio 17.2     Anion Gap 13.0 mmol/L     Narrative:       GFR Normal >60  Chronic Kidney Disease <60  Kidney Failure <15    Magnesium [[id number]]  (Normal) Collected:  10/02/19 0909    Specimen:  Blood Updated:  10/02/19 0951     Magnesium 1.9 mg/dL     CBC & Differential [[id number]] Collected:  10/02/19 0910    Specimen:   Blood Updated:  10/02/19 0932    Narrative:       The following orders were created for panel order CBC & Differential.  Procedure                               Abnormality         Status                     ---------                               -----------         ------                     CBC Auto Differential[[id number]]        Abnormal            Final result                 Please view results for these tests on the individual orders.    CBC Auto Differential [[id number]]  (Abnormal) Collected:  10/02/19 0910    Specimen:  Blood Updated:  10/02/19 0932     WBC 14.51 10*3/mm3      RBC 3.67 10*6/mm3      Hemoglobin 10.9 g/dL      Hematocrit 31.1 %      MCV 84.7 fL      MCH 29.7 pg      MCHC 35.0 g/dL      RDW 14.6 %      RDW-SD 45.6 fl      MPV 10.8 fL      Platelets 283 10*3/mm3      Neutrophil % 82.7 %      Lymphocyte % 6.3 %      Monocyte % 6.8 %      Eosinophil % 1.0 %      Basophil % 0.7 %      Immature Grans % 2.5 %      Neutrophils, Absolute 12.01 10*3/mm3      Lymphocytes, Absolute 0.91 10*3/mm3      Monocytes, Absolute 0.99 10*3/mm3      Eosinophils, Absolute 0.14 10*3/mm3      Basophils, Absolute 0.10 10*3/mm3      Immature Grans, Absolute 0.36 10*3/mm3      nRBC 0.0 /100 WBC     Blood Culture - Blood, Arm, Left [[id number]] Collected:  09/30/19 0913    Specimen:  Blood from Arm, Left Updated:  10/02/19 0931     Blood Culture No growth at 2 days    POC Glucose Once [[id number]]  (Abnormal) Collected:  10/02/19 0758    Specimen:  Blood Updated:  10/02/19 0810     Glucose 352 mg/dL      Comment: RN NotifiedOperator: [id number] HUSK CELESTSharan ID: [id number]       Blood Culture - Blood, Arm, Left [[id number]]  (Abnormal)  (Susceptibility) Collected:  09/29/19 1805    Specimen:  Blood from Arm, Left Updated:  10/02/19 0527     Blood Culture Escherichia coli     Isolated from Aerobic and Anaerobic Bottles     Gram Stain Anaerobic Bottle Gram negative bacilli     Comment: 1 positive for gram negative rods  of 2 drawn         Aerobic Bottle Gram negative bacilli     Comment: 2 positive for gram negative rods of 2 drawn       Susceptibility      Escherichia coli     BRYAN     Ampicillin Susceptible     Ampicillin + Sulbactam Susceptible     Cefazolin Susceptible     Cefepime Susceptible     Ceftazidime Susceptible     Ceftriaxone Susceptible     Gentamicin Susceptible     Levofloxacin Susceptible     Piperacillin + Tazobactam Susceptible     Trimethoprim + Sulfamethoxazole Susceptible                    Phosphorus [[id number]]  (Normal) Collected:  09/30/19 2232    Specimen:  Blood Updated:  09/30/19 2253     Phosphorus 2.5 mg/dL     Blood Culture ID, PCR - Blood, Arm, Left [[id number]]  (Abnormal) Collected:  09/29/19 1805    Specimen:  Blood from Arm, Left Updated:  09/30/19 1042     BCID, PCR Escherichia coli. Identification by BCID PCR.    Narrative:       Sensitivity to Follow    Magnesium [[id number]]  (Normal) Collected:  09/30/19 0432    Specimen:  Blood Updated:  09/30/19 0530     Magnesium 1.9 mg/dL     Basic Metabolic Panel [[id number]]  (Abnormal) Collected:  09/30/19 0432    Specimen:  Blood Updated:  09/30/19 0530     Glucose 174 mg/dL      BUN 20 mg/dL      Creatinine 1.20 mg/dL      Sodium 127 mmol/L      Potassium 4.4 mmol/L      Chloride 95 mmol/L      CO2 23.0 mmol/L      Calcium 8.0 mg/dL      eGFR Non African Amer 57 mL/min/1.73      BUN/Creatinine Ratio 16.7     Anion Gap 9.0 mmol/L     Narrative:       GFR Normal >60  Chronic Kidney Disease <60  Kidney Failure <15    Phosphorus [[id number]]  (Abnormal) Collected:  09/30/19 0432    Specimen:  Blood Updated:  09/30/19 0530     Phosphorus 2.2 mg/dL     CBC (No Diff) [[id number]]  (Abnormal) Collected:  09/30/19 0432    Specimen:  Blood Updated:  09/30/19 0501     WBC 21.71 10*3/mm3      RBC 3.55 10*6/mm3      Hemoglobin 10.5 g/dL      Hematocrit 30.6 %      MCV 86.2 fL      MCH 29.6 pg      MCHC 34.3 g/dL      RDW 14.1 %      RDW-SD 45.0 fl      MPV 11.2 fL       Platelets 232 10*3/mm3     Osmolality, Serum [[id number]]  (Normal) Collected:  09/29/19 2007    Specimen:  Blood Updated:  09/29/19 2045     Osmolality 288 mOsm/kg     Comprehensive Metabolic Panel [[id number]]  (Abnormal) Collected:  09/29/19 2002    Specimen:  Blood Updated:  09/29/19 2029     Glucose 345 mg/dL      BUN 22 mg/dL      Creatinine 1.29 mg/dL      Sodium 127 mmol/L      Potassium 4.0 mmol/L      Chloride 89 mmol/L      CO2 22.0 mmol/L      Calcium 8.2 mg/dL      Total Protein 6.5 g/dL      Albumin 2.60 g/dL      ALT (SGPT) 10 U/L      AST (SGOT) 7 U/L      Alkaline Phosphatase 128 U/L      Total Bilirubin 0.2 mg/dL      eGFR Non African Amer 53 mL/min/1.73      Globulin 3.9 gm/dL      A/G Ratio 0.7 g/dL      BUN/Creatinine Ratio 17.1     Anion Gap 16.0 mmol/L     Narrative:       GFR Normal >60  Chronic Kidney Disease <60  Kidney Failure <15    Hemoglobin A1c [[id number]]  (Abnormal) Collected:  09/29/19 1703    Specimen:  Blood Updated:  09/29/19 2028     Hemoglobin A1C 12.00 %     Narrative:       Hemoglobin A1C Ranges:    Increased Risk for Diabetes  5.7% to 6.4%  Diabetes                     >= 6.5%  Diabetic Goal                < 7.0%    Respiratory Panel, PCR - Swab, Nasopharynx [[id number]]  (Normal) Collected:  09/29/19 1826    Specimen:  Swab from Nasopharynx Updated:  09/29/19 1959     ADENOVIRUS, PCR Not Detected     Coronavirus 229E Not Detected     Coronavirus HKU1 Not Detected     Coronavirus NL63 Not Detected     Coronavirus OC43 Not Detected     Human Metapneumovirus Not Detected     Human Rhinovirus/Enterovirus Not Detected     Influenza B PCR Not Detected     Parainfluenza Virus 1 Not Detected     Parainfluenza Virus 2 Not Detected     Parainfluenza Virus 3 Not Detected     Parainfluenza Virus 4 Not Detected     Bordetella pertussis pcr Not Detected     Influenza A H1 2009 PCR Not Detected     Chlamydophila pneumoniae PCR Not Detected     Mycoplasma pneumo by PCR Not Detected      Influenza A PCR Not Detected     Influenza A H3 Not Detected     Influenza A H1 Not Detected     RSV, PCR Not Detected    Lactic Acid, Plasma [[id number]]  (Normal) Collected:  09/29/19 1827    Specimen:  Blood Updated:  09/29/19 1857     Lactate 1.9 mmol/L     Lipase [[id number]]  (Abnormal) Collected:  09/29/19 1703    Specimen:  Blood Updated:  09/29/19 1835     Lipase 6 U/L     [place] Draw [[id number]] Collected:  09/29/19 1703    Specimen:  Blood Updated:  09/29/19 1815    Narrative:       The following orders were created for panel order [place] Draw.  Procedure                               Abnormality         Status                     ---------                               -----------         ------                     Light Blue Top[[id number]]                                   Final result               Green Top (Gel)[[id number]]                                  Final result               Lavender Top[[id number]]                                     Final result               Gold Top - SST[[id number]]                                   Final result                 Please view results for these tests on the individual orders.    Light Blue Top [[id number]] Collected:  09/29/19 1704    Specimen:  Blood Updated:  09/29/19 1815     Extra Tube hold for add-on     Comment: Auto resulted       Green Top (Gel) [[id number]] Collected:  09/29/19 1703    Specimen:  Blood Updated:  09/29/19 1815     Extra Tube Hold for add-ons.     Comment: Auto resulted.       Lavender Top [[id number]] Collected:  09/29/19 1703    Specimen:  Blood Updated:  09/29/19 1815     Extra Tube hold for add-on     Comment: Auto resulted       Gold Top - SST [[id number]] Collected:  09/29/19 1703    Specimen:  Blood Updated:  09/29/19 1815     Extra Tube Hold for add-ons.     Comment: Auto resulted.       Urinalysis, Microscopic Only - Urine, Clean Catch [[id number]]  (Abnormal) Collected:  09/29/19 1734    Specimen:  Urine, Clean Catch Updated:   09/29/19 1811     RBC, UA 13-20 /HPF      WBC, UA 13-20 /HPF      Bacteria, UA 2+ /HPF      Squamous Epithelial Cells, UA 0-2 /HPF      Hyaline Casts, UA 3-6 /LPF      Granular Casts, UA 3-6 /LPF      Methodology Manual Light Microscopy    Urinalysis With Microscopic If Indicated (No Culture) - Urine, Clean Catch [[id number]]  (Abnormal) Collected:  09/29/19 1734    Specimen:  Urine, Clean Catch Updated:  09/29/19 1749     Color, UA Yellow     Appearance, UA Turbid     pH, UA 5.5     Specific Gravity, UA 1.022     Glucose, UA >=1000 mg/dL (3+)     Ketones, UA 40 mg/dL (2+)     Bilirubin, UA Negative     Blood, UA Moderate (2+)     Protein,  mg/dL (2+)     Leuk Esterase, UA Moderate (2+)     Nitrite, UA Positive     Urobilinogen, UA 0.2 E.U./dL    Comprehensive Metabolic Panel [[id number]]  (Abnormal) Collected:  09/29/19 1703    Specimen:  Blood Updated:  09/29/19 1747     Glucose 429 mg/dL      BUN 22 mg/dL      Creatinine 1.47 mg/dL      Sodium 126 mmol/L      Potassium 4.5 mmol/L      Chloride 81 mmol/L      CO2 21.0 mmol/L      Calcium 9.2 mg/dL      Total Protein 7.9 g/dL      Albumin 3.10 g/dL      ALT (SGPT) 13 U/L      AST (SGOT) 11 U/L      Alkaline Phosphatase 159 U/L      Total Bilirubin 0.4 mg/dL      eGFR Non African Amer 45 mL/min/1.73      Globulin 4.8 gm/dL      A/G Ratio 0.6 g/dL      BUN/Creatinine Ratio 15.0     Anion Gap 24.0 mmol/L     Narrative:       GFR Normal >60  Chronic Kidney Disease <60  Kidney Failure <15    Pregnancy, Urine - Urine, Clean Catch [[id number]]  (Normal) Collected:  09/29/19 1734    Specimen:  Urine, Clean Catch Updated:  09/29/19 1745     HCG, Urine QL Negative    Scan Slide [[id number]] Collected:  09/29/19 1703    Specimen:  Blood Updated:  09/29/19 1744     [place] Cells Mod/2+     WBC Morphology Normal     Platelet Estimate Adequate     Large Platelets Slight/1+    Ketone Bodies, Serum (Not performed at [place]) [[id number]] Collected:  09/29/19 0033    Specimen:   Blood Updated:  09/29/19 1729    Narrative:       The following orders were created for panel order Ketone Bodies, Serum (Not performed at [place]).  Procedure                               Abnormality         Status                     ---------                               -----------         ------                     Acetone[[id number]]                      Abnormal            Final result                 Please view results for these tests on the individual orders.    Acetone [[id number]]  (Abnormal) Collected:  09/29/19 1704    Specimen:  Blood Updated:  09/29/19 1729     Acetone Small    hCG, Serum, Qualitative [[id number]]  (Normal) Collected:  09/29/19 1703    Specimen:  Blood Updated:  09/29/19 1728     HCG Qualitative Negative    pH, Venous [[id number]]  (Normal) Collected:  09/29/19 1710    Specimen:  Blood Updated:  09/29/19 1720     pH, Venous 7.342 pH Units     CBC & Differential [[id number]] Collected:  09/29/19 1703    Specimen:  Blood Updated:  09/29/19 1716    Narrative:       The following orders were created for panel order CBC & Differential.  Procedure                               Abnormality         Status                     ---------                               -----------         ------                     CBC Auto Differential[[id number]]        Abnormal            Final result                 Please view results for these tests on the individual orders.    CBC Auto Differential [[id number]]  (Abnormal) Collected:  09/29/19 1703    Specimen:  Blood Updated:  09/29/19 1716     WBC 21.40 10*3/mm3      RBC 4.61 10*6/mm3      Hemoglobin 13.8 g/dL      Hematocrit 40.1 %      MCV 87.0 fL      MCH 29.9 pg      MCHC 34.4 g/dL      RDW 14.0 %      RDW-SD 44.7 fl      MPV 10.7 fL      Platelets 272 10*3/mm3      Neutrophil % 91.0 %      Lymphocyte % 2.7 %      Monocyte % 4.4 %      Eosinophil % 0.2 %      Basophil % 0.5 %      Immature Grans % 1.2 %      Neutrophils, Absolute 19.48 10*3/mm3       "Lymphocytes, Absolute 0.57 10*3/mm3      Monocytes, Absolute 0.95 10*3/mm3      Eosinophils, Absolute 0.04 10*3/mm3      Basophils, Absolute 0.10 10*3/mm3      Immature Grans, Absolute 0.26 10*3/mm3      nRBC 0.0 /100 WBC         Imaging Results (most recent)     Procedure Component Value Units Date/Time    XR Chest PA & Lateral [[id number]] Collected:  09/29/19 1755     Updated:  09/29/19 1819    Narrative:       PROCEDURE: Two-view chest    COMPARISON: 4/29/2019    HISTORY: fever    FINDINGS: Frontal and lateral views of the chest are obtained.     Devices: None    Lungs/Pleura: The lungs are clear    Cardiomediastinal structures: Normal         Impression:       CONCLUSION:    No acute cardiopulmonary disease    Electronically signed by:  Markos Davis MD  9/29/2019 6:18 PM CDT  Workstation: [id number]          Chief Complaint on Day of Discharge: none    Hospital Course:     19 y.o. female admitted for worsening nausea, vomiting, abdominal pain, generalized weakness and out of control glucose values. The patient is a poorly controlled diabetic and has frequent admissions for similar reasons.     She wears an Insulin pump and claims that the pump is functional.    Pt was found to be in dka and jesús, which resolved after aggressive ivf, insulin gtt.      Pt was found to have bacteremia, e.coli, pansentive, source likely from uti, as UA was indicative of uti. Urine cx was done in lab for some unknown reason.    Pt did clinically improve on rocephin as e.coli was sensitive to rocephin and bactrim    Pt received 4 days of rocephin and will get Bactrim ds for 10 more days to complete course of tx.     Pt to f/u with pcp in 3 days and check cbc, bmp, mg in 3 days with pcp.   Condition on Discharge:  stable    Physical Exam on Discharge:  /80 (BP Location: Left arm, Patient Position: Sitting)   Pulse 115   Temp 97.5 °F (36.4 °C) (Oral)   Resp 18   Ht 170.2 cm (67\")   Wt 49.4 kg (108 lb 12.8 oz)   LMP 09/24/2019  "  SpO2 98%   BMI 17.04 kg/m²   Physical Exam   Constitutional: She is oriented to person, place, and time. She appears well-developed.   HENT:   Head: Normocephalic.   Eyes: Pupils are equal, round, and reactive to light.   Neck: Normal range of motion.   Cardiovascular: Normal rate.   Pulmonary/Chest: Effort normal and breath sounds normal.   Abdominal: Soft. Bowel sounds are normal.   Musculoskeletal: Normal range of motion.   Neurological: She is alert and oriented to person, place, and time.   Skin: Skin is warm and dry.         Discharge Disposition:  home    Discharge Medications:     Discharge Medications      New Medications      Instructions Start Date   ondansetron ODT 4 MG disintegrating tablet  Commonly known as:  ZOFRAN-ODT   4 mg, Oral, Every 8 Hours PRN         Continue These Medications      Instructions Start Date   Blood Glucose Monitoring Suppl w/Device kit   USE AS INDICATED, ANY MONITOR , ICD10 code is E11.9      Lancets 30G misc   USE 4 X DAILY      Lancing Device misc   USE AS INDICATED TO CORRELATE WITH STRIPS AND METER         ASK your doctor about these medications      Instructions Start Date   Blood Glucose Test strip   Use 4 x daily use any brand covered by insurance or same brand as before ICD10 code is E11.9      Cariprazine HCl 1.5 MG capsule capsule  Commonly known as:  VRAYLAR   1.5 mg, Oral, Daily      FLUoxetine 40 MG capsule  Commonly known as:  PROzac   40 mg, Oral, Daily      insulin aspart 100 UNIT/ML injection  Commonly known as:  novoLOG  Ask about: Which instructions should I use?   70 units daily through insulin pump      insulin aspart 100 UNIT/ML injection  Commonly known as:  novoLOG  Ask about: Which instructions should I use?   Up to 100 units daily through pump      nortriptyline 25 MG capsule  Commonly known as:  PAMELOR   25 mg, Oral, Nightly PRN      promethazine 25 MG tablet  Commonly known as:  PHENERGAN   25 mg, Oral, Every 6 Hours PRN             Discharge  Diet:   diabetic  Activity at Discharge:   As tolerated  Discharge Care Plan/Instructions:     F/u with pcp in 3 days and check cbc, bmp, mg in 3 days with pcp    Follow-up Appointments:   No future appointments.    Test Results Pending at Discharge:    Order Current Status    Blood Culture - Blood, Arm, Left Preliminary result          [unfilled]    Time: discharging took over 30 minutes

## 2019-10-02 NOTE — PAYOR COMM NOTE
"Fernanda Patterson (19 y.o. Female)     Date of Birth Social Security Number Address Home Phone MRN    2000  [address]  [address]   [facility] [address] [phone] [id number]    Gnosticist Marital Status          None Single       Admission Date Admission Type Admitting Provider Attending Provider Department, Room/Bed    9/29/19 Emergency Edmar Hill MD Popescu, Tudor, MD [address], 419/1    Discharge Date Discharge Disposition Discharge Destination                       Attending Provider:  Edmar Hill MD    Allergies:  Pineapple, Benzoyl Peroxide    Isolation:  None   Infection:  None   Code Status:  CPR    Ht:  170.2 cm (67\")   Wt:  49.4 kg (108 lb 12.8 oz)    Admission Cmt:  None   Principal Problem:  Diabetic ketoacidosis (CMS/HCC) [E11.10]                 Active Insurance as of 9/29/2019     Primary Coverage     Payor Plan Insurance Group Employer/Plan Group    [facility] [id number]     Payor Plan Address Payor Plan Phone Number Payor Plan Fax Number Effective Dates    [address] [address] [phone]  4/29/2019 - None Entered    [facility] [id number]       Subscriber Name Subscriber Birth Date Member ID       AIMEDEBORAH HOLMAN [birth year]1971 [id number]                 Emergency Contacts      (Rel.) Home Phone Work Phone Mobile Phone    Juana Don (Mother) [phone] -- [phone]          [id number]  Insurance Information                [facility] Phone: [phone]    Subscriber: Deborah Don Subscriber#: [id number]    Group#: [id number] Precert#:           Vital Signs (last day)     Date/Time   Temp   Temp src   Pulse   Resp   BP   Patient Position   SpO2    10/02/19 1107   97.5 (36.4)   Oral   115   18   120/80   Sitting   98    10/02/19 0322   96.5 (35.8)   Oral   95   18   114/80   Lying   98    10/01/19 2324   97.1 (36.2)   Oral   92   18   108/70   Lying   98    10/01/19 1918   98.4 (36.9)   Oral   107   18   124/69   Lying   96    10/01/19 1428  "  98.3 (36.8)   Oral   94   18   103/67   Lying   97    10/01/19 0320   98.4 (36.9)   Oral   97   18   111/87   Lying   100    10/01/19 0019   98 (36.7)   Oral   103   18   104/81   Lying   100              Hospital Medications (active)       Dose Frequency Start End    acetaminophen (TYLENOL) tablet 650 mg 650 mg Every 6 Hours PRN 9/30/2019     Sig - Route: Take 2 tablets by mouth Every 6 (Six) Hours As Needed for Mild Pain . - Oral    albuterol (PROVENTIL) nebulizer solution 0.083% 2.5 mg/3mL 2.5 mg Every 4 Hours PRN 9/29/2019     Sig - Route: Take 2.5 mg by nebulization Every 4 (Four) Hours As Needed for Wheezing. - Nebulization    Cariprazine HCl (VRAYLAR) capsule capsule 1.5 mg 1.5 mg Daily 9/30/2019     Sig - Route: Take 1 capsule by mouth Daily. - Oral    cefTRIAXone (ROCEPHIN) 1 g/100 mL 0.9% NS (MBP) 1 g Every 24 Hours 9/30/2019 10/7/2019    Sig - Route: Infuse 100 mL into a venous catheter Daily. - Intravenous    dextrose (D50W) 25 g/ 50mL Intravenous Solution 12.5 g 12.5 g As Needed 9/29/2019     Sig - Route: Infuse 25 mL into a venous catheter As Needed for Low Blood Sugar (for blood glucose < 100 mg/dL). - Intravenous    Cosign for Ordering: Accepted by Edmar Hill MD on 9/29/2019  9:35 PM    FLUoxetine (PROzac) capsule 40 mg 40 mg Daily 9/30/2019     Sig - Route: Take 2 capsules by mouth Daily. - Oral    HYDROcodone-acetaminophen (NORCO) 5-325 MG per tablet 1 tablet 1 tablet Every 6 Hours PRN 9/29/2019 10/9/2019    Sig - Route: Take 1 tablet by mouth Every 6 (Six) Hours As Needed for Moderate Pain . - Oral    insulin patient supplied pump  Continuous 9/30/2019     Sig - Route: Inject  under the skin into the appropriate area as directed Continuous. - Subcutaneous    morphine injection 1 mg 1 mg Every 6 Hours PRN 10/1/2019 10/11/2019    Sig - Route: Infuse 0.5 mL into a venous catheter Every 6 (Six) Hours As Needed for Severe Pain . - Intravenous    nortriptyline (PAMELOR) capsule 25 mg 25 mg Nightly  "10/1/2019     Sig - Route: Take 1 capsule by mouth Every Night. - Oral    ondansetron (ZOFRAN) injection 4 mg 4 mg Every 6 Hours PRN 9/29/2019     Sig - Route: Infuse 2 mL into a venous catheter Every 6 (Six) Hours As Needed for Nausea or Vomiting. - Intravenous    potassium phosphate 30 mmol in sodium chloride 0.9 % 250 mL infusion 30 mmol As Needed 9/30/2019     Sig - Route: Infuse 30 mmol into a venous catheter As Needed (Peripheral IV - Phosphorus 1.3 - 1.7 mg/dL). - Intravenous    Linked Group 1:  \"Or\" Linked Group Details        potassium phosphate 45 mmol in sodium chloride 0.9 % 500 mL infusion 45 mmol As Needed 9/30/2019     Sig - Route: Infuse 45 mmol into a venous catheter As Needed (Peripheral IV - Phosphorus Less Than 1.3 mg/dL). - Intravenous    Linked Group 1:  \"Or\" Linked Group Details        Potassium Phosphates 15 mmol in sodium chloride 0.9 % 100 mL infusion 15 mmol As Needed 9/30/2019     Sig - Route: Infuse 15 mmol into a venous catheter As Needed (Peripheral IV - Phosphorus 1.8 - 2.5 mg/dL). - Intravenous    Linked Group 1:  \"Or\" Linked Group Details        promethazine (PHENERGAN) injection 12.5 mg 12.5 mg Every 6 Hours PRN 9/30/2019     Sig - Route: Infuse 0.5 mL into a venous catheter Every 6 (Six) Hours As Needed for Nausea or Vomiting. - Intravenous    sodium chloride 0.9 % flush 10 mL 10 mL Every 12 Hours Scheduled 9/29/2019     Sig - Route: Infuse 10 mL into a venous catheter Every 12 (Twelve) Hours. - Intravenous    sodium chloride 0.9 % flush 10 mL 10 mL Once As Needed 9/29/2019     Sig - Route: Infuse 10 mL into a venous catheter 1 (One) Time As Needed for Line Care. - Intravenous    Cosign for Ordering: Accepted by Edmar Hill MD on 9/29/2019  9:35 PM    sodium chloride 0.9 % infusion 75 mL/hr Continuous 9/30/2019     Sig - Route: Infuse 75 mL/hr into a venous catheter Continuous. - Intravenous    sodium phosphates 15 mmol in sodium chloride 0.9 % 250 mL IVPB 15 mmol As Needed " "9/30/2019     Sig - Route: Infuse 15 mmol into a venous catheter As Needed (Peripheral IV - Phosphorus 1.8 - 2.5 mg/dL & Potassium Greater Than 4). - Intravenous    Linked Group 1:  \"Or\" Linked Group Details        sodium phosphates 30 mmol in sodium chloride 0.9 % 250 mL IVPB 30 mmol As Needed 9/30/2019     Sig - Route: Infuse 30 mmol into a venous catheter As Needed (Peripheral IV - Phosphorus 1.3-1.7 mg/dL & Potassium Greater Than 4). - Intravenous    Linked Group 1:  \"Or\" Linked Group Details        sodium phosphates 45 mmol in sodium chloride 0.9 % 500 mL IVPB 45 mmol As Needed 9/30/2019     Sig - Route: Infuse 45 mmol into a venous catheter As Needed (Peripheral IV - Phosphorus Less Than 1.3 mg/dL & Potassium Greater Than 4). - Intravenous    Linked Group 1:  \"Or\" Linked Group Details                 Physician Progress Notes (last 24 hours) (Notes from 10/01/19 1116 through 10/02/19 1116)      Prabhakar Robles DO at 10/01/19 1613              [facility] Medicine Services  INPATIENT PROGRESS NOTE    Length of Stay: 1  Date of Admission: 9/29/2019  Primary Care Physician: Rufus Marshall APRN    Subjective   Chief Complaint: Nausea vomiting and abdominal pain    HPI: Patient still with complaints of nausea and generalized abominable pain. denies chills, fevers     Review of Systems     All pertinent negatives and positives are as above. All other systems have been reviewed and are negative unless otherwise stated.     Objective    Temp:  [98 °F (36.7 °C)-98.4 °F (36.9 °C)] 98.3 °F (36.8 °C)  Heart Rate:  [] 94  Resp:  [18] 18  BP: (103-115)/(67-87) 103/67    Physical Exam  Constitutional: She appears well-developed and well-nourished. She appears ill.   HENT:   Head: Normocephalic and atraumatic.   Eyes: EOM are normal. Pupils are equal, round, and reactive to light.   Neck: Normal range of motion. Neck supple.   Cardiovascular: Regular rhythm and normal heart " sounds. Tachycardia present. Exam reveals no gallop and no friction rub.   No murmur heard.  Pulmonary/Chest: Effort normal and breath sounds normal. No respiratory distress. She has no wheezes. She has no rales. She exhibits no tenderness.   Abdominal: Soft. Bowel sounds are normal. She exhibits no distension. There is no tenderness. There is no guarding.   Musculoskeletal: She exhibits no edema.   Skin: Skin is warm and dry.   Psychiatric: She has a normal mood and affect. Her behavior is normal. Thought content normal.   Vitals reviewed.         Results Review:  I have reviewed the labs, radiology results, and diagnostic studies.    Laboratory Data:   Results from last 7 days   Lab Units 09/30/19 0432 09/29/19 2356 09/29/19 2002 09/29/19  1703   SODIUM mmol/L 127* 133* 127* 126*   POTASSIUM mmol/L 4.4 4.2 4.0 4.5   CHLORIDE mmol/L 95* 93* 89* 81*   CO2 mmol/L 23.0 24.0 22.0 21.0*   BUN mg/dL 20 20 22* 22*   CREATININE mg/dL 1.20* 1.30* 1.29* 1.47*   GLUCOSE mg/dL 174* 251* 345* 429*   CALCIUM mg/dL 8.0* 8.3* 8.2* 9.2   BILIRUBIN mg/dL  --   --  0.2 0.4   ALK PHOS U/L  --   --  128* 159*   ALT (SGPT) U/L  --   --  10 13   AST (SGOT) U/L  --   --  7 11   ANION GAP mmol/L 9.0 16.0* 16.0* 24.0*     Estimated Creatinine Clearance: 56.2 mL/min (A) (by C-G formula based on SCr of 1.2 mg/dL (H)).  Results from last 7 days   Lab Units 09/30/19 2232 09/30/19 0432 09/29/19 2356 09/29/19 2002   MAGNESIUM mg/dL  --  1.9 1.8 1.9   PHOSPHORUS mg/dL 2.5 2.2* 2.6 3.0         Results from last 7 days   Lab Units 09/30/19 0432 09/29/19 1703   WBC 10*3/mm3 21.71* 21.40*   HEMOGLOBIN g/dL 10.5* 13.8   HEMATOCRIT % 30.6* 40.1   PLATELETS 10*3/mm3 232 272           Culture Data:   Blood Culture   Date Value Ref Range Status   09/30/2019 No growth at 24 hours  Preliminary   09/29/2019 Escherichia coli (A)  Preliminary     No results found for: URINECX  No results found for: RESPCX  No results found for: WOUNDCX  No results  found for: STOOLCX  No components found for: BODYFLD    Radiology Data:   Imaging Results (last 24 hours)     ** No results found for the last 24 hours. **          I have reviewed the patient's current medications.     Assessment/Plan     Active Hospital Problems    Diagnosis   • E coli bacteremia   • Sepsis due to Gram negative bacteria (CMS/[facility])   • Nausea and vomiting   • DONI (acute kidney injury) (CMS/[facility])   • Uncontrolled type 1 diabetes mellitus with hyperglycemia (CMS/[facility])   • Recurrent UTI         1.  Diabetic ketoacidosis  -Resolved.    -Patient currently using her insulin pump.    2.  E. coli bacteremia  - Sensitivities pending.    -Continue empiric antibiotics.  Likely from urine source.    3.  Gram-negative sepsis  -Secondary to E. coli.  Urine is the likely source.  Urine culture is pending.  Blood cultures positive.  Continue IV fluids.  Lactate on 929 was 1.9.  Will repeat today.  Continue empiric antibiotics will wait on sensitivities.    4.  Urinary tract infection  - Likely sepsis secondary to E. coli.  Cultures are not back yet.  Continue empiric antibiotics.    5.  Nausea and vomiting  -Secondary to infection and DKA.    -Continue to treat symptomatically.    6.  Bipolar disorder:   -Continue home medication.        Discharge Planning: I expect patient to be discharged to home in 3-4 days.      Electronically signed by Prabhakar Robles DO at 10/01/19 7212

## 2019-10-03 ENCOUNTER — READMISSION MANAGEMENT (OUTPATIENT)
Dept: CALL CENTER | Facility: HOSPITAL | Age: 19
End: 2019-10-03

## 2019-10-03 NOTE — PAYOR COMM NOTE
"Allison Freitas  [facility]  (P)[phone]  (F)[phone]      Auth#[id number]          Fernanda Patterson (19 y.o. Female)     Date of Birth Social Security Number Address Home Phone MRN    2000  [address]  [address]   [facility] [address] [phone] [id number]    [employer] Marital Status          None Single       Admission Date Admission Type Admitting Provider Attending Provider Department, Room/Bed    9/29/19 Emergency Edmar Hill MD  [facility] [address] [place], 419/1    Discharge Date Discharge Disposition Discharge Destination        10/2/2019 Home or Self Care              Attending Provider:  (none)   Allergies:  Pineapple, Benzoyl Peroxide    Isolation:  None   Infection:  None   Code Status:  Prior    Ht:  170.2 cm (67\")   Wt:  49.4 kg (108 lb 12.8 oz)    Admission Cmt:  None   Principal Problem:  Diabetic ketoacidosis (CMS/HCC) [E11.10]                 Active Insurance as of 9/29/2019     Primary Coverage     Payor Plan Insurance Group Employer/Plan Group    [facility] [id number]     Payor Plan Address Payor Plan Phone Number Payor Plan Fax Number Effective Dates    [address] [address] [phone]  4/29/2019 - None Entered    [facility] [id number]       Subscriber Name Subscriber Birth Date Member ID       DEBORAH DON [birth year]/7/1971 [id number]                 Emergency Contacts      (Rel.) Home Phone Work Phone Mobile Phone    Juana Don (Mother) [phone] -- [phone]               Discharge Summary      Prabhakar Rboles DO at 10/02/19 1233              [facility] Medicine Services  DISCHARGE SUMMARY       Date of Admission: 9/29/2019  Date of Discharge:  10/2/2019  Primary Care Physician: Rufus Marshall APRN    Presenting Problem/History of Present Illness:  Acute UTI [N39.0]  Diabetic ketoacidosis without coma associated with type 1 diabetes mellitus (CMS/HCC) [E10.10]  Diabetic ketoacidosis " without coma associated with type 1 diabetes mellitus (CMS/HCC) [E10.10]       Final Discharge Diagnoses:  Active Hospital Problems    Diagnosis   • E coli bacteremia   • Sepsis due to Gram negative bacteria (CMS/HCC)   • Nausea and vomiting   • DONI (acute kidney injury) (CMS/HCC)   • Uncontrolled type 1 diabetes mellitus with hyperglycemia (CMS/HCC)   • Recurrent UTI       Consults:   Consults     Date and Time Order Name Status Description    9/30/2019 0030 Inpatient Endocrinology Consult Completed           Procedures Performed:                 Pertinent Test Results:   Lab Results (most recent)     Procedure Component Value Units Date/Time    POC Glucose Once [[id number]]  (Abnormal) Collected:  10/02/19 1111    Specimen:  Blood Updated:  10/02/19 1129     Glucose 348 mg/dL      Comment: RN NotifiedOperator: [id number] DAYNE Mcgee ID: [id number]       Basic Metabolic Panel [[id number]]  (Abnormal) Collected:  10/02/19 0909    Specimen:  Blood Updated:  10/02/19 0953     Glucose 348 mg/dL      BUN 16 mg/dL      Creatinine 0.93 mg/dL      Sodium 129 mmol/L      Potassium 3.6 mmol/L      Chloride 94 mmol/L      CO2 22.0 mmol/L      Calcium 8.2 mg/dL      eGFR Non African Amer 77 mL/min/1.73      BUN/Creatinine Ratio 17.2     Anion Gap 13.0 mmol/L     Narrative:       GFR Normal >60  Chronic Kidney Disease <60  Kidney Failure <15    Magnesium [[id number]]  (Normal) Collected:  10/02/19 0909    Specimen:  Blood Updated:  10/02/19 0951     Magnesium 1.9 mg/dL     CBC & Differential [[id number]] Collected:  10/02/19 0910    Specimen:  Blood Updated:  10/02/19 0932    Narrative:       The following orders were created for panel order CBC & Differential.  Procedure                               Abnormality         Status                     ---------                               -----------         ------                     CBC Auto Differential[[id number]]        Abnormal            Final result                  Please view results for these tests on the individual orders.    CBC Auto Differential [[id number]]  (Abnormal) Collected:  10/02/19 0910    Specimen:  Blood Updated:  10/02/19 0932     WBC 14.51 10*3/mm3      RBC 3.67 10*6/mm3      Hemoglobin 10.9 g/dL      Hematocrit 31.1 %      MCV 84.7 fL      MCH 29.7 pg      MCHC 35.0 g/dL      RDW 14.6 %      RDW-SD 45.6 fl      MPV 10.8 fL      Platelets 283 10*3/mm3      Neutrophil % 82.7 %      Lymphocyte % 6.3 %      Monocyte % 6.8 %      Eosinophil % 1.0 %      Basophil % 0.7 %      Immature Grans % 2.5 %      Neutrophils, Absolute 12.01 10*3/mm3      Lymphocytes, Absolute 0.91 10*3/mm3      Monocytes, Absolute 0.99 10*3/mm3      Eosinophils, Absolute 0.14 10*3/mm3      Basophils, Absolute 0.10 10*3/mm3      Immature Grans, Absolute 0.36 10*3/mm3      nRBC 0.0 /100 WBC     Blood Culture - Blood, Arm, Left [[id number]] Collected:  09/30/19 0913    Specimen:  Blood from Arm, Left Updated:  10/02/19 0931     Blood Culture No growth at 2 days    POC Glucose Once [[id number]]  (Abnormal) Collected:  10/02/19 0758    Specimen:  Blood Updated:  10/02/19 0810     Glucose 352 mg/dL      Comment: RN NotifiedOperator: [id number] DAYNE Cohenvincenzo ID: [id number]       Blood Culture - Blood, Arm, Left [[id number]]  (Abnormal)  (Susceptibility) Collected:  09/29/19 1805    Specimen:  Blood from Arm, Left Updated:  10/02/19 0527     Blood Culture Escherichia coli     Isolated from Aerobic and Anaerobic Bottles     Gram Stain Anaerobic Bottle Gram negative bacilli     Comment: 1 positive for gram negative rods of 2 drawn         Aerobic Bottle Gram negative bacilli     Comment: 2 positive for gram negative rods of 2 drawn       Susceptibility      Escherichia coli     BRYAN     Ampicillin Susceptible     Ampicillin + Sulbactam Susceptible     Cefazolin Susceptible     Cefepime Susceptible     Ceftazidime Susceptible     Ceftriaxone Susceptible     Gentamicin Susceptible     Levofloxacin  Susceptible     Piperacillin + Tazobactam Susceptible     Trimethoprim + Sulfamethoxazole Susceptible                    Phosphorus [[id number]]  (Normal) Collected:  09/30/19 2232    Specimen:  Blood Updated:  09/30/19 2253     Phosphorus 2.5 mg/dL     Blood Culture ID, PCR - Blood, Arm, Left [[id number]]  (Abnormal) Collected:  09/29/19 1805    Specimen:  Blood from Arm, Left Updated:  09/30/19 1042     BCID, PCR Escherichia coli. Identification by BCID PCR.    Narrative:       Sensitivity to Follow    Magnesium [[id number]]  (Normal) Collected:  09/30/19 0432    Specimen:  Blood Updated:  09/30/19 0530     Magnesium 1.9 mg/dL     Basic Metabolic Panel [[id number]]  (Abnormal) Collected:  09/30/19 0432    Specimen:  Blood Updated:  09/30/19 0530     Glucose 174 mg/dL      BUN 20 mg/dL      Creatinine 1.20 mg/dL      Sodium 127 mmol/L      Potassium 4.4 mmol/L      Chloride 95 mmol/L      CO2 23.0 mmol/L      Calcium 8.0 mg/dL      eGFR Non African Amer 57 mL/min/1.73      BUN/Creatinine Ratio 16.7     Anion Gap 9.0 mmol/L     Narrative:       GFR Normal >60  Chronic Kidney Disease <60  Kidney Failure <15    Phosphorus [[id number]]  (Abnormal) Collected:  09/30/19 0432    Specimen:  Blood Updated:  09/30/19 0530     Phosphorus 2.2 mg/dL     CBC (No Diff) [[id number]]  (Abnormal) Collected:  09/30/19 0432    Specimen:  Blood Updated:  09/30/19 0501     WBC 21.71 10*3/mm3      RBC 3.55 10*6/mm3      Hemoglobin 10.5 g/dL      Hematocrit 30.6 %      MCV 86.2 fL      MCH 29.6 pg      MCHC 34.3 g/dL      RDW 14.1 %      RDW-SD 45.0 fl      MPV 11.2 fL      Platelets 232 10*3/mm3     Osmolality, Serum [[id number]]  (Normal) Collected:  09/29/19 2007    Specimen:  Blood Updated:  09/29/19 2045     Osmolality 288 mOsm/kg     Comprehensive Metabolic Panel [[id number]]  (Abnormal) Collected:  09/29/19 2002    Specimen:  Blood Updated:  09/29/19 2029     Glucose 345 mg/dL      BUN 22 mg/dL      Creatinine 1.29 mg/dL      Sodium 127  mmol/L      Potassium 4.0 mmol/L      Chloride 89 mmol/L      CO2 22.0 mmol/L      Calcium 8.2 mg/dL      Total Protein 6.5 g/dL      Albumin 2.60 g/dL      ALT (SGPT) 10 U/L      AST (SGOT) 7 U/L      Alkaline Phosphatase 128 U/L      Total Bilirubin 0.2 mg/dL      eGFR Non African Amer 53 mL/min/1.73      Globulin 3.9 gm/dL      A/G Ratio 0.7 g/dL      BUN/Creatinine Ratio 17.1     Anion Gap 16.0 mmol/L     Narrative:       GFR Normal >60  Chronic Kidney Disease <60  Kidney Failure <15    Hemoglobin A1c [[id number]]  (Abnormal) Collected:  09/29/19 1703    Specimen:  Blood Updated:  09/29/19 2028     Hemoglobin A1C 12.00 %     Narrative:       Hemoglobin A1C Ranges:    Increased Risk for Diabetes  5.7% to 6.4%  Diabetes                     >= 6.5%  Diabetic Goal                < 7.0%    Respiratory Panel, PCR - Swab, Nasopharynx [[id number]]  (Normal) Collected:  09/29/19 1826    Specimen:  Swab from Nasopharynx Updated:  09/29/19 1959     ADENOVIRUS, PCR Not Detected     Coronavirus 229E Not Detected     Coronavirus HKU1 Not Detected     Coronavirus NL63 Not Detected     Coronavirus OC43 Not Detected     Human Metapneumovirus Not Detected     Human Rhinovirus/Enterovirus Not Detected     Influenza B PCR Not Detected     Parainfluenza Virus 1 Not Detected     Parainfluenza Virus 2 Not Detected     Parainfluenza Virus 3 Not Detected     Parainfluenza Virus 4 Not Detected     Bordetella pertussis pcr Not Detected     Influenza A H1 2009 PCR Not Detected     Chlamydophila pneumoniae PCR Not Detected     Mycoplasma pneumo by PCR Not Detected     Influenza A PCR Not Detected     Influenza A H3 Not Detected     Influenza A H1 Not Detected     RSV, PCR Not Detected    Lactic Acid, Plasma [[id number]]  (Normal) Collected:  09/29/19 1827    Specimen:  Blood Updated:  09/29/19 1857     Lactate 1.9 mmol/L     Lipase [[id number]]  (Abnormal) Collected:  09/29/19 1703    Specimen:  Blood Updated:  09/29/19 1835     Lipase 6 U/L      [place] Draw [[id number]] Collected:  09/29/19 1703    Specimen:  Blood Updated:  09/29/19 1815    Narrative:       The following orders were created for panel order [place] Draw.  Procedure                               Abnormality         Status                     ---------                               -----------         ------                     Light Blue Top[[id number]]                                   Final result               Green Top (Gel)[[id number]]                                  Final result               Lavender Top[[id number]]                                     Final result               Gold Top - SST[[id number]]                                   Final result                 Please view results for these tests on the individual orders.    Light Blue Top [[id number]] Collected:  09/29/19 1704    Specimen:  Blood Updated:  09/29/19 1815     Extra Tube hold for add-on     Comment: Auto resulted       Green Top (Gel) [[id number]] Collected:  09/29/19 1703    Specimen:  Blood Updated:  09/29/19 1815     Extra Tube Hold for add-ons.     Comment: Auto resulted.       Lavender Top [[id number]] Collected:  09/29/19 1703    Specimen:  Blood Updated:  09/29/19 1815     Extra Tube hold for add-on     Comment: Auto resulted       Gold Top - SST [[id number]] Collected:  09/29/19 1703    Specimen:  Blood Updated:  09/29/19 1815     Extra Tube Hold for add-ons.     Comment: Auto resulted.       Urinalysis, Microscopic Only - Urine, Clean Catch [[id number]]  (Abnormal) Collected:  09/29/19 1734    Specimen:  Urine, Clean Catch Updated:  09/29/19 1811     RBC, UA 13-20 /HPF      WBC, UA 13-20 /HPF      Bacteria, UA 2+ /HPF      Squamous Epithelial Cells, UA 0-2 /HPF      Hyaline Casts, UA 3-6 /LPF      Granular Casts, UA 3-6 /LPF      Methodology Manual Light Microscopy    Urinalysis With Microscopic If Indicated (No Culture) - Urine, Clean Catch [[id number]]  (Abnormal) Collected:  09/29/19 1734    Specimen:  Urine,  Clean Catch Updated:  09/29/19 1749     Color, UA Yellow     Appearance, UA Turbid     pH, UA 5.5     Specific Gravity, UA 1.022     Glucose, UA >=1000 mg/dL (3+)     Ketones, UA 40 mg/dL (2+)     Bilirubin, UA Negative     Blood, UA Moderate (2+)     Protein,  mg/dL (2+)     Leuk Esterase, UA Moderate (2+)     Nitrite, UA Positive     Urobilinogen, UA 0.2 E.U./dL    Comprehensive Metabolic Panel [[id number]]  (Abnormal) Collected:  09/29/19 1703    Specimen:  Blood Updated:  09/29/19 1747     Glucose 429 mg/dL      BUN 22 mg/dL      Creatinine 1.47 mg/dL      Sodium 126 mmol/L      Potassium 4.5 mmol/L      Chloride 81 mmol/L      CO2 21.0 mmol/L      Calcium 9.2 mg/dL      Total Protein 7.9 g/dL      Albumin 3.10 g/dL      ALT (SGPT) 13 U/L      AST (SGOT) 11 U/L      Alkaline Phosphatase 159 U/L      Total Bilirubin 0.4 mg/dL      eGFR Non African Amer 45 mL/min/1.73      Globulin 4.8 gm/dL      A/G Ratio 0.6 g/dL      BUN/Creatinine Ratio 15.0     Anion Gap 24.0 mmol/L     Narrative:       GFR Normal >60  Chronic Kidney Disease <60  Kidney Failure <15    Pregnancy, Urine - Urine, Clean Catch [[id number]]  (Normal) Collected:  09/29/19 1734    Specimen:  Urine, Clean Catch Updated:  09/29/19 1745     HCG, Urine QL Negative    Scan Slide [[id number]] Collected:  09/29/19 1703    Specimen:  Blood Updated:  09/29/19 1744     [place] Cells Mod/2+     WBC Morphology Normal     Platelet Estimate Adequate     Large Platelets Slight/1+    Ketone Bodies, Serum (Not performed at [facility]) [[id number]] Collected:  09/29/19 1704    Specimen:  Blood Updated:  09/29/19 1729    Narrative:       The following orders were created for panel order Ketone Bodies, Serum (Not performed at [facility]).  Procedure                               Abnormality         Status                     ---------                               -----------         ------                     Acetone[[id number]]                      Abnormal            Final  result                 Please view results for these tests on the individual orders.    Acetone [[id number]]  (Abnormal) Collected:  09/29/19 1704    Specimen:  Blood Updated:  09/29/19 1729     Acetone Small    hCG, Serum, Qualitative [[id number]]  (Normal) Collected:  09/29/19 1703    Specimen:  Blood Updated:  09/29/19 1728     HCG Qualitative Negative    pH, Venous [[id number]]  (Normal) Collected:  09/29/19 1710    Specimen:  Blood Updated:  09/29/19 1720     pH, Venous 7.342 pH Units     CBC & Differential [[id number]] Collected:  09/29/19 1703    Specimen:  Blood Updated:  09/29/19 1716    Narrative:       The following orders were created for panel order CBC & Differential.  Procedure                               Abnormality         Status                     ---------                               -----------         ------                     CBC Auto Differential[[id number]]        Abnormal            Final result                 Please view results for these tests on the individual orders.    CBC Auto Differential [[id number]]  (Abnormal) Collected:  09/29/19 1703    Specimen:  Blood Updated:  09/29/19 1716     WBC 21.40 10*3/mm3      RBC 4.61 10*6/mm3      Hemoglobin 13.8 g/dL      Hematocrit 40.1 %      MCV 87.0 fL      MCH 29.9 pg      MCHC 34.4 g/dL      RDW 14.0 %      RDW-SD 44.7 fl      MPV 10.7 fL      Platelets 272 10*3/mm3      Neutrophil % 91.0 %      Lymphocyte % 2.7 %      Monocyte % 4.4 %      Eosinophil % 0.2 %      Basophil % 0.5 %      Immature Grans % 1.2 %      Neutrophils, Absolute 19.48 10*3/mm3      Lymphocytes, Absolute 0.57 10*3/mm3      Monocytes, Absolute 0.95 10*3/mm3      Eosinophils, Absolute 0.04 10*3/mm3      Basophils, Absolute 0.10 10*3/mm3      Immature Grans, Absolute 0.26 10*3/mm3      nRBC 0.0 /100 WBC         Imaging Results (most recent)     Procedure Component Value Units Date/Time    XR Chest PA & Lateral [[id number]] Collected:  09/29/19 1755     Updated:  09/29/19  "1819    Narrative:       PROCEDURE: Two-view chest    COMPARISON: 4/29/2019    HISTORY: fever    FINDINGS: Frontal and lateral views of the chest are obtained.     Devices: None    Lungs/Pleura: The lungs are clear    Cardiomediastinal structures: Normal         Impression:       CONCLUSION:    No acute cardiopulmonary disease    Electronically signed by:  Markos Davis MD  9/29/2019 6:18 PM CDT  Workstation: [id number]          Chief Complaint on Day of Discharge: none    Hospital Course:     19 y.o. female admitted for worsening nausea, vomiting, abdominal pain, generalized weakness and out of control glucose values. The patient is a poorly controlled diabetic and has frequent admissions for similar reasons.     She wears an Insulin pump and claims that the pump is functional.    Pt was found to be in dka and jesús, which resolved after aggressive ivf, insulin gtt.      Pt was found to have bacteremia, e.coli, pansentive, source likely from uti, as UA was indicative of uti. Urine cx was done in lab for some unknown reason.    Pt did clinically improve on rocephin as e.coli was sensitive to rocephin and bactrim    Pt received 4 days of rocephin and will get Bactrim ds for 10 more days to complete course of tx.     Pt to f/u with pcp in 3 days and check cbc, bmp, mg in 3 days with pcp.   Condition on Discharge:  stable    Physical Exam on Discharge:  /80 (BP Location: Left arm, Patient Position: Sitting)   Pulse 115   Temp 97.5 °F (36.4 °C) (Oral)   Resp 18   Ht 170.2 cm (67\")   Wt 49.4 kg (108 lb 12.8 oz)   LMP 09/24/2019   SpO2 98%   BMI 17.04 kg/m²    Physical Exam   Constitutional: She is oriented to person, place, and time. She appears well-developed.   HENT:   Head: Normocephalic.   Eyes: Pupils are equal, round, and reactive to light.   Neck: Normal range of motion.   Cardiovascular: Normal rate.   Pulmonary/Chest: Effort normal and breath sounds normal.   Abdominal: Soft. Bowel sounds are normal. "   Musculoskeletal: Normal range of motion.   Neurological: She is alert and oriented to person, place, and time.   Skin: Skin is warm and dry.         Discharge Disposition:  home    Discharge Medications:     Discharge Medications      New Medications      Instructions Start Date   ondansetron ODT 4 MG disintegrating tablet  Commonly known as:  ZOFRAN-ODT   4 mg, Oral, Every 8 Hours PRN         Continue These Medications      Instructions Start Date   Blood Glucose Monitoring Suppl w/Device kit   USE AS INDICATED, ANY MONITOR , ICD10 code is E11.9      Lancets 30G misc   USE 4 X DAILY      Lancing Device misc   USE AS INDICATED TO CORRELATE WITH STRIPS AND METER         ASK your doctor about these medications      Instructions Start Date   Blood Glucose Test strip   Use 4 x daily use any brand covered by insurance or same brand as before ICD10 code is E11.9      Cariprazine HCl 1.5 MG capsule capsule  Commonly known as:  VRAYLAR   1.5 mg, Oral, Daily      FLUoxetine 40 MG capsule  Commonly known as:  PROzac   40 mg, Oral, Daily      insulin aspart 100 UNIT/ML injection  Commonly known as:  novoLOG  Ask about: Which instructions should I use?   70 units daily through insulin pump      insulin aspart 100 UNIT/ML injection  Commonly known as:  novoLOG  Ask about: Which instructions should I use?   Up to 100 units daily through pump      nortriptyline 25 MG capsule  Commonly known as:  PAMELOR   25 mg, Oral, Nightly PRN      promethazine 25 MG tablet  Commonly known as:  PHENERGAN   25 mg, Oral, Every 6 Hours PRN             Discharge Diet:   diabetic  Activity at Discharge:   As tolerated  Discharge Care Plan/Instructions:     F/u with pcp in 3 days and check cbc, bmp, mg in 3 days with pcp    Follow-up Appointments:   No future appointments.    Test Results Pending at Discharge:    Order Current Status    Blood Culture - Blood, Arm, Left Preliminary result          [unfilled]    Time: discharging took over 30  minutes              Electronically signed by Prabhakar Boyer, DO at 10/02/19 1244       Discharge Order (From admission, onward)    Start     Ordered    10/02/19 1225  Discharge patient  Once     Expected Discharge Date:  10/02/19    Discharge Disposition:  Home or Self Care    Physician of Record for Attribution - Please select from Treatment Team:  PRABHAKAR BOYER [[id number]]    Review needed by CMO to determine Physician of Record:  No       Question Answer Comment   Physician of Record for Attribution - Please select from Treatment Team PRABHAKAR BOYER    Review needed by CMO to determine Physician of Record No        10/02/19 1240

## 2019-10-03 NOTE — OUTREACH NOTE
Prep Survey      Responses   Facility patient discharged from?  [facility]   Is patient eligible?  Yes   Discharge diagnosis  DKA, DONI, Sepsis    Does the patient have one of the following disease processes/diagnoses(primary or secondary)?  Sepsis   Does the patient have Home health ordered?  No   Is there a DME ordered?  No   Prep survey completed?  Yes          Mariah Duggan RN

## 2019-10-04 ENCOUNTER — READMISSION MANAGEMENT (OUTPATIENT)
Dept: CALL CENTER | Facility: HOSPITAL | Age: 19
End: 2019-10-04

## 2019-10-04 NOTE — OUTREACH NOTE
Sepsis Week 1 Survey      Responses   Facility patient discharged from?  [facility]   Does the patient have one of the following disease processes/diagnoses(primary or secondary)?  Sepsis   Is there a successful TCM telephone encounter documented?  No   Week 1 attempt successful?  Yes   Call start time  1610   Rescheduled  Rescheduled-pt requested   Revoke  -- [Patrient reports she is en route on way back to ED . She report she is having trouble with blood glucose and edema to bilateral legs. ]   Discharge diagnosis  DKA, DONI, Sepsis           Jani Conteh RN

## 2019-10-05 LAB — BACTERIA SPEC AEROBE CULT: NORMAL

## 2019-10-07 ENCOUNTER — READMISSION MANAGEMENT (OUTPATIENT)
Dept: CALL CENTER | Facility: HOSPITAL | Age: 19
End: 2019-10-07

## 2019-10-07 NOTE — OUTREACH NOTE
Sepsis Week 1 Survey      Responses   Facility patient discharged from?  [facility]   Does the patient have one of the following disease processes/diagnoses(primary or secondary)?  Sepsis   Is there a successful TCM telephone encounter documented?  No   Week 1 attempt successful?  Yes   Call start time  1705   Rescheduled  Revoked   Discharge diagnosis  DKA, DONI, Sepsis    Is patient permission given to speak with other caregiver?  Yes   Person spoke with today (if not patient) and relationship  Mother   Meds reviewed with patient/caregiver?  Yes   Is the patient having any side effects they believe may be caused by any medication additions or changes?  No   Does the patient have all medications related to this admission filled (includes all antibiotics, inhalers, nebulizers,steroids,etc.)  Yes   Is the patient taking all medications as directed (includes completed medication regime)?  Yes          April Johns RN

## 2019-10-08 ENCOUNTER — APPOINTMENT (OUTPATIENT)
Dept: CT IMAGING | Facility: HOSPITAL | Age: 19
End: 2019-10-08

## 2019-10-08 ENCOUNTER — HOSPITAL ENCOUNTER (EMERGENCY)
Facility: HOSPITAL | Age: 19
Discharge: HOME | End: 2019-10-08
Attending: EMERGENCY MEDICINE | Admitting: EMERGENCY MEDICINE

## 2019-10-08 ENCOUNTER — APPOINTMENT (OUTPATIENT)
Dept: GENERAL RADIOLOGY | Facility: HOSPITAL | Age: 19
End: 2019-10-08

## 2019-10-08 VITALS
DIASTOLIC BLOOD PRESSURE: 75 MMHG | BODY MASS INDEX: 14.2 KG/M2 | HEIGHT: 67 IN | WEIGHT: 90.5 LBS | SYSTOLIC BLOOD PRESSURE: 120 MMHG | HEART RATE: 85 BPM | TEMPERATURE: 97.5 F | OXYGEN SATURATION: 100 % | RESPIRATION RATE: 17 BRPM

## 2019-10-08 DIAGNOSIS — R31.9 URINARY TRACT INFECTION WITH HEMATURIA, SITE UNSPECIFIED: Primary | ICD-10-CM

## 2019-10-08 DIAGNOSIS — N39.0 URINARY TRACT INFECTION WITH HEMATURIA, SITE UNSPECIFIED: Primary | ICD-10-CM

## 2019-10-08 DIAGNOSIS — R10.84 GENERALIZED ABDOMINAL PAIN: ICD-10-CM

## 2019-10-08 DIAGNOSIS — K52.9 ENTERITIS: ICD-10-CM

## 2019-10-08 LAB
ALBUMIN SERPL-MCNC: 3.5 G/DL (ref 3.5–5.2)
ALBUMIN/GLOB SERPL: 0.7 G/DL
ALP SERPL-CCNC: 135 U/L (ref 39–117)
ALT SERPL W P-5'-P-CCNC: 6 U/L (ref 1–33)
ANION GAP SERPL CALCULATED.3IONS-SCNC: 14 MMOL/L (ref 5–15)
AST SERPL-CCNC: 6 U/L (ref 1–32)
B-HCG UR QL: NEGATIVE
BACTERIA UR QL AUTO: ABNORMAL /HPF
BASOPHILS # BLD AUTO: 0.08 10*3/MM3 (ref 0–0.2)
BASOPHILS NFR BLD AUTO: 0.8 % (ref 0–1.5)
BILIRUB SERPL-MCNC: 0.2 MG/DL (ref 0.2–1.2)
BILIRUB UR QL STRIP: ABNORMAL
BUN BLD-MCNC: 14 MG/DL (ref 6–20)
BUN/CREAT SERPL: 11.4 (ref 7–25)
CALCIUM SPEC-SCNC: 9.7 MG/DL (ref 8.6–10.5)
CHLORIDE SERPL-SCNC: 89 MMOL/L (ref 98–107)
CLARITY UR: CLEAR
CO2 SERPL-SCNC: 28 MMOL/L (ref 22–29)
COLOR UR: YELLOW
CREAT BLD-MCNC: 1.23 MG/DL (ref 0.57–1)
DEPRECATED RDW RBC AUTO: 44.3 FL (ref 37–54)
EOSINOPHIL # BLD AUTO: 0.04 10*3/MM3 (ref 0–0.4)
EOSINOPHIL # BLD MANUAL: 0.11 10*3/MM3 (ref 0–0.4)
EOSINOPHIL NFR BLD AUTO: 0.4 % (ref 0.3–6.2)
EOSINOPHIL NFR BLD MANUAL: 1 % (ref 0.3–6.2)
ERYTHROCYTE [DISTWIDTH] IN BLOOD BY AUTOMATED COUNT: 13.7 % (ref 12.3–15.4)
GFR SERPL CREATININE-BSD FRML MDRD: 56 ML/MIN/1.73
GLOBULIN UR ELPH-MCNC: 4.8 GM/DL
GLUCOSE BLD-MCNC: 584 MG/DL (ref 65–99)
GLUCOSE BLDC GLUCOMTR-MCNC: 362 MG/DL (ref 70–130)
GLUCOSE UR STRIP-MCNC: ABNORMAL MG/DL
HCT VFR BLD AUTO: 39.8 % (ref 34–46.6)
HGB BLD-MCNC: 13.2 G/DL (ref 12–15.9)
HGB UR QL STRIP.AUTO: ABNORMAL
HOLD SPECIMEN: NORMAL
HOLD SPECIMEN: NORMAL
HYALINE CASTS UR QL AUTO: ABNORMAL /LPF
IMM GRANULOCYTES # BLD AUTO: 0.83 10*3/MM3 (ref 0–0.05)
IMM GRANULOCYTES NFR BLD AUTO: 7.9 % (ref 0–0.5)
KETONES UR QL STRIP: ABNORMAL
LEUKOCYTE ESTERASE UR QL STRIP.AUTO: NEGATIVE
LIPASE SERPL-CCNC: 14 U/L (ref 13–60)
LYMPHOCYTES # BLD AUTO: 1.47 10*3/MM3 (ref 0.7–3.1)
LYMPHOCYTES # BLD MANUAL: 1.79 10*3/MM3 (ref 0.7–3.1)
LYMPHOCYTES NFR BLD AUTO: 13.9 % (ref 19.6–45.3)
LYMPHOCYTES NFR BLD MANUAL: 17 % (ref 19.6–45.3)
LYMPHOCYTES NFR BLD MANUAL: 5 % (ref 5–12)
MAGNESIUM SERPL-MCNC: 1.9 MG/DL (ref 1.7–2.2)
MCH RBC QN AUTO: 29.3 PG (ref 26.6–33)
MCHC RBC AUTO-ENTMCNC: 33.2 G/DL (ref 31.5–35.7)
MCV RBC AUTO: 88.2 FL (ref 79–97)
METAMYELOCYTES NFR BLD MANUAL: 3 % (ref 0–0)
MONOCYTES # BLD AUTO: 0.43 10*3/MM3 (ref 0.1–0.9)
MONOCYTES # BLD AUTO: 0.53 10*3/MM3 (ref 0.1–0.9)
MONOCYTES NFR BLD AUTO: 4.1 % (ref 5–12)
MYELOCYTES NFR BLD MANUAL: 1 % (ref 0–0)
NEUTROPHILS # BLD AUTO: 7.69 10*3/MM3 (ref 1.7–7)
NEUTROPHILS # BLD AUTO: 7.69 10*3/MM3 (ref 1.7–7)
NEUTROPHILS NFR BLD AUTO: 72.9 % (ref 42.7–76)
NEUTROPHILS NFR BLD MANUAL: 73 % (ref 42.7–76)
NITRITE UR QL STRIP: NEGATIVE
NRBC BLD AUTO-RTO: 0 /100 WBC (ref 0–0.2)
PH UR STRIP.AUTO: 6 [PH] (ref 5–9)
PLATELET # BLD AUTO: 799 10*3/MM3 (ref 140–450)
PMV BLD AUTO: 9.7 FL (ref 6–12)
POTASSIUM BLD-SCNC: 4.2 MMOL/L (ref 3.5–5.2)
PROT SERPL-MCNC: 8.3 G/DL (ref 6–8.5)
PROT UR QL STRIP: NEGATIVE
RBC # BLD AUTO: 4.51 10*6/MM3 (ref 3.77–5.28)
RBC # UR: ABNORMAL /HPF
RBC MORPH BLD: NORMAL
REF LAB TEST METHOD: ABNORMAL
SMALL PLATELETS BLD QL SMEAR: ABNORMAL
SODIUM BLD-SCNC: 131 MMOL/L (ref 136–145)
SP GR UR STRIP: ABNORMAL
SQUAMOUS #/AREA URNS HPF: ABNORMAL /HPF
UROBILINOGEN UR QL STRIP: ABNORMAL
WBC MORPH BLD: NORMAL
WBC NRBC COR # BLD: 10.54 10*3/MM3 (ref 3.4–10.8)
WBC UR QL AUTO: ABNORMAL /HPF
WHOLE BLOOD HOLD SPECIMEN: NORMAL

## 2019-10-08 PROCEDURE — 96374 THER/PROPH/DIAG INJ IV PUSH: CPT

## 2019-10-08 PROCEDURE — 82962 GLUCOSE BLOOD TEST: CPT

## 2019-10-08 PROCEDURE — 74018 RADEX ABDOMEN 1 VIEW: CPT

## 2019-10-08 PROCEDURE — 74177 CT ABD & PELVIS W/CONTRAST: CPT

## 2019-10-08 PROCEDURE — 81025 URINE PREGNANCY TEST: CPT | Performed by: PHYSICIAN ASSISTANT

## 2019-10-08 PROCEDURE — 80053 COMPREHEN METABOLIC PANEL: CPT | Performed by: PHYSICIAN ASSISTANT

## 2019-10-08 PROCEDURE — 25010000002 IOPAMIDOL 61 % SOLUTION: Performed by: EMERGENCY MEDICINE

## 2019-10-08 PROCEDURE — 96361 HYDRATE IV INFUSION ADD-ON: CPT

## 2019-10-08 PROCEDURE — 83735 ASSAY OF MAGNESIUM: CPT | Performed by: PHYSICIAN ASSISTANT

## 2019-10-08 PROCEDURE — 25010000002 MORPHINE PER 10 MG: Performed by: EMERGENCY MEDICINE

## 2019-10-08 PROCEDURE — 99283 EMERGENCY DEPT VISIT LOW MDM: CPT

## 2019-10-08 PROCEDURE — 85007 BL SMEAR W/DIFF WBC COUNT: CPT | Performed by: PHYSICIAN ASSISTANT

## 2019-10-08 PROCEDURE — 83690 ASSAY OF LIPASE: CPT | Performed by: PHYSICIAN ASSISTANT

## 2019-10-08 PROCEDURE — 87086 URINE CULTURE/COLONY COUNT: CPT | Performed by: PHYSICIAN ASSISTANT

## 2019-10-08 PROCEDURE — 85025 COMPLETE CBC W/AUTO DIFF WBC: CPT | Performed by: PHYSICIAN ASSISTANT

## 2019-10-08 PROCEDURE — 63710000001 INSULIN REGULAR HUMAN PER 5 UNITS: Performed by: PHYSICIAN ASSISTANT

## 2019-10-08 PROCEDURE — 81001 URINALYSIS AUTO W/SCOPE: CPT | Performed by: PHYSICIAN ASSISTANT

## 2019-10-08 RX ORDER — ONDANSETRON 4 MG/1
4 TABLET, ORALLY DISINTEGRATING ORAL EVERY 8 HOURS PRN
Qty: 21 TABLET | Refills: 0 | Status: SHIPPED | OUTPATIENT
Start: 2019-10-08 | End: 2019-10-15

## 2019-10-08 RX ORDER — DICYCLOMINE HYDROCHLORIDE 10 MG/1
10 CAPSULE ORAL 4 TIMES DAILY PRN
Qty: 28 CAPSULE | Refills: 0 | Status: SHIPPED | OUTPATIENT
Start: 2019-10-08 | End: 2019-10-15

## 2019-10-08 RX ADMIN — HUMAN INSULIN 4 UNITS: 100 INJECTION, SOLUTION SUBCUTANEOUS at 14:30

## 2019-10-08 RX ADMIN — SODIUM CHLORIDE 1000 ML: 900 INJECTION, SOLUTION INTRAVENOUS at 14:27

## 2019-10-08 RX ADMIN — IOPAMIDOL 65 ML: 612 INJECTION, SOLUTION INTRAVENOUS at 16:07

## 2019-10-08 RX ADMIN — MORPHINE SULFATE 4 MG: 4 INJECTION, SOLUTION INTRAMUSCULAR; INTRAVENOUS at 17:53

## 2019-10-08 NOTE — DISCHARGE INSTRUCTIONS
Please finish antibiotics for your UTI. Take prescriptions as prescribed and follow up with Rufus Marshall. Return to the ER with any concerning or worsening symptoms.

## 2019-10-08 NOTE — ED PROVIDER NOTES
Subjective   Pt, hx of DMI, was d/c from hospital 6 days ago for DKA and now in the ED due to worsening hematuria and generalized abdominal pain which started yesterday. Pt reports she is still taking her ABX for UTI from d/c but unable to specify which ABX. Pt reports N/V has improved.         History provided by:  Patient   used: No        Review of Systems   Constitutional: Negative for fatigue.   HENT: Negative for congestion.    Respiratory: Negative for cough and shortness of breath.    Gastrointestinal: Positive for abdominal pain, nausea and vomiting.   Endocrine: Negative for polyuria.   Skin: Negative for color change.   Neurological: Negative for syncope.   Psychiatric/Behavioral: Negative for agitation.       Past Medical History:   Diagnosis Date   • ADHD    • Bipolar 1 disorder (CMS/HCC)    • Borderline personality disorder (CMS/HCC)    • Cannabinoid hyperemesis syndrome (CMS/HCC)    • Diabetes mellitus type 1 (CMS/HCC)    • Diabetic gastroparesis (CMS/HCC)    • Diabetic ketoacidosis (CMS/HCC)    • Diabetic neuropathy (CMS/HCC)    • Post traumatic stress disorder (PTSD)    • Recurrent UTI    • Seizure disorder (CMS/HCC)    • Severe depressed bipolar II disorder without psychotic features (CMS/HCC)    • Uncontrolled diabetes mellitus (CMS/HCC)        Allergies   Allergen Reactions   • Pineapple Anaphylaxis   • Benzoyl Peroxide Swelling       Past Surgical History:   Procedure Laterality Date   •  SECTION N/A 2018       Family History   Problem Relation Age of Onset   • Drug abuse Father    • Suicide Attempts Brother    • Dementia Maternal Grandfather    • Hypertension Paternal Grandmother        Social History     Socioeconomic History   • Marital status: Single     Spouse name: Not on file   • Number of children: Not on file   • Years of education: Not on file   • Highest education level: Not on file   Tobacco Use   • Smoking status: Current Every Day Smoker      Packs/day: 0.50     Years: 1.00     Pack years: 0.50   • Smokeless tobacco: Never Used   Substance and Sexual Activity   • Alcohol use: No   • Drug use: Yes     Types: Marijuana   • Sexual activity: Yes     Partners: Male   Social History Narrative    Substance Abuse: Pt drinks EtOH occasionally, stating once every 6 months. Pt smokes marijuana, but denied any other illicit drugs. Pt smokes 0.5-1 ppd of cigarettes x 1 year. Pt denies caffeine consumption, states she drinks only water.         Marriages: none    Current Relationships: Recent breakup with her boyfriend    Children: one child that  2wks ago at 2 months old.        Occupation:  Pt is a  and loves her job, but hasn't been able to work since baby was born via C/S    Living Situation: was living with her boyfriend but they are  over the death of their child.  She plans to live with mom after discharge.           Objective   Physical Exam   Constitutional: She is oriented to person, place, and time. She appears well-developed and well-nourished.   HENT:   Head: Normocephalic.   Right Ear: Hearing normal.   Left Ear: Hearing normal.   Nose: Nose normal.   Eyes: Conjunctivae, EOM and lids are normal.   Neck: Trachea normal and full passive range of motion without pain.   Cardiovascular: Regular rhythm, S1 normal, S2 normal, normal heart sounds and normal pulses.   Pulmonary/Chest: Effort normal and breath sounds normal.   Abdominal: Normal appearance and bowel sounds are normal.   Insulin pump located on LLQ   Neurological: She is alert and oriented to person, place, and time. She is not disoriented.   Skin: Skin is warm and dry. She is not diaphoretic.   Psychiatric: She has a normal mood and affect. Her speech is normal and behavior is normal. Thought content normal.   Nursing note and vitals reviewed.      Procedures         Labs Reviewed   COMPREHENSIVE METABOLIC PANEL - Abnormal; Notable for the following components:       Result  Value    Glucose 584 (*)     Creatinine 1.23 (*)     Sodium 131 (*)     Chloride 89 (*)     Alkaline Phosphatase 135 (*)     eGFR Non  Amer 56 (*)     All other components within normal limits    Narrative:     GFR Normal >60  Chronic Kidney Disease <60  Kidney Failure <15   URINALYSIS W/ CULTURE IF INDICATED - Abnormal; Notable for the following components:    Glucose, UA >=1000 mg/dL (3+) (*)     Ketones, UA 15 mg/dL (1+) (*)     Bilirubin, UA Small (1+) (*)     Blood, UA Small (1+) (*)     All other components within normal limits   CBC WITH AUTO DIFFERENTIAL - Abnormal; Notable for the following components:    Platelets 799 (*)     Lymphocyte % 13.9 (*)     Monocyte % 4.1 (*)     Immature Grans % 7.9 (*)     Neutrophils, Absolute 7.69 (*)     Immature Grans, Absolute 0.83 (*)     All other components within normal limits   URINALYSIS, MICROSCOPIC ONLY - Abnormal; Notable for the following components:    RBC, UA 0-2 (*)     WBC, UA 6-12 (*)     Squamous Epithelial Cells, UA 3-5 (*)     All other components within normal limits   POCT GLUCOSE FINGERSTICK - Abnormal; Notable for the following components:    Glucose 362 (*)     All other components within normal limits   MANUAL DIFFERENTIAL - Abnormal; Notable for the following components:    Lymphocyte % 17.0 (*)     Metamyelocyte % 3.0 (*)     Myelocyte % 1.0 (*)     Neutrophils Absolute 7.69 (*)     All other components within normal limits   LIPASE - Normal   PREGNANCY, URINE - Normal   MAGNESIUM - Normal   URINE CULTURE   POCT GLUCOSE FINGERSTICK   POCT GLUCOSE FINGERSTICK   CBC AND DIFFERENTIAL    Narrative:     The following orders were created for panel order CBC & Differential.  Procedure                               Abnormality         Status                     ---------                               -----------         ------                     CBC Auto Differential[[id number]]        Abnormal            Final result                 Please view  results for these tests on the individual orders.   EXTRA TUBES    Narrative:     The following orders were created for panel order Extra Tubes.  Procedure                               Abnormality         Status                     ---------                               -----------         ------                     Light Blue Top[[id number]]                                   Final result               Gold Top - SST[[id number]]                                   Final result               King Top[[id number]]                                         Final result                 Please view results for these tests on the individual orders.   LIGHT BLUE TOP   GOLD TOP - SST   GRAY TOP       CT Abdomen Pelvis With Contrast   Final Result   1. Mildly thick-walled appearance of small bowel, which may be   secondary to enteritis. No dilated bowel to suggest obstruction   is identified   2. The appendix is visualized and appears normal.   3. Mildly thick-walled appearance of the urinary bladder, which   could be exaggerated by underdistention, but correlation with   urinalysis is suggested.       Electronically signed by:  Cha Cooney MD  10/8/2019 4:30 PM CDT   Workstation: [id number]      XR Abdomen KUB   Final Result   1.  Mechanical device which appears to overlie the skin surface   in the left superior pelvic region. Recommend clinical   correlation.    2.  Otherwise negative abdomen.      Electronically signed by:  Evelio Castaneda MD  10/8/2019 3:22 PM CDT   Workstation: [id number]              ED Course        Rechecked pt and informed results. Pt reports feeling better after given medications in the ED. All questions addressed at this time.           MDM    Final diagnoses:   Urinary tract infection with hematuria, site unspecified   Generalized abdominal pain   Enteritis              Evelyne Mcnamara PA-C  10/08/19 2100

## 2019-10-09 LAB
BACTERIA SPEC AEROBE CULT: NO GROWTH
GLUCOSE BLDC GLUCOMTR-MCNC: 441 MG/DL (ref 70–130)
GLUCOSE BLDC GLUCOMTR-MCNC: 465 MG/DL (ref 70–130)

## 2019-10-29 ENCOUNTER — HOSPITAL ENCOUNTER (OUTPATIENT)
Facility: HOSPITAL | Age: 19
Setting detail: OBSERVATION
Discharge: HOME | End: 2019-11-01
Attending: FAMILY MEDICINE | Admitting: FAMILY MEDICINE

## 2019-10-29 ENCOUNTER — APPOINTMENT (OUTPATIENT)
Dept: CT IMAGING | Facility: HOSPITAL | Age: 19
End: 2019-10-29

## 2019-10-29 DIAGNOSIS — E10.10 DIABETIC KETOACIDOSIS WITHOUT COMA ASSOCIATED WITH TYPE 1 DIABETES MELLITUS (HCC): Primary | ICD-10-CM

## 2019-10-29 DIAGNOSIS — D72.820 LYMPHOCYTOSIS: ICD-10-CM

## 2019-10-29 DIAGNOSIS — D75.839 THROMBOCYTOSIS: ICD-10-CM

## 2019-10-29 LAB
ACETONE BLD QL: ABNORMAL
ALBUMIN SERPL-MCNC: 3.3 G/DL (ref 3.5–5.2)
ALBUMIN/GLOB SERPL: 0.6 G/DL
ALP SERPL-CCNC: 152 U/L (ref 39–117)
ALT SERPL W P-5'-P-CCNC: 9 U/L (ref 1–33)
AMPHET+METHAMPHET UR QL: NEGATIVE
AMPHETAMINES UR QL: NEGATIVE
ANION GAP SERPL CALCULATED.3IONS-SCNC: 41 MMOL/L (ref 5–15)
AST SERPL-CCNC: 7 U/L (ref 1–32)
BACTERIA UR QL AUTO: ABNORMAL /HPF
BARBITURATES UR QL SCN: NEGATIVE
BASOPHILS # BLD AUTO: 0.11 10*3/MM3 (ref 0–0.2)
BASOPHILS NFR BLD AUTO: 0.3 % (ref 0–1.5)
BENZODIAZ UR QL SCN: NEGATIVE
BILIRUB SERPL-MCNC: 0.2 MG/DL (ref 0.2–1.2)
BILIRUB UR QL STRIP: NEGATIVE
BUN BLD-MCNC: 28 MG/DL (ref 6–20)
BUN/CREAT SERPL: 17.1 (ref 7–25)
BUPRENORPHINE SERPL-MCNC: NEGATIVE NG/ML
CALCIUM SPEC-SCNC: 10.1 MG/DL (ref 8.6–10.5)
CANNABINOIDS SERPL QL: NEGATIVE
CHLORIDE SERPL-SCNC: 84 MMOL/L (ref 98–107)
CLARITY UR: CLEAR
CO2 SERPL-SCNC: 4 MMOL/L (ref 22–29)
COCAINE UR QL: NEGATIVE
COLOR UR: YELLOW
CREAT BLD-MCNC: 1.64 MG/DL (ref 0.57–1)
CRP SERPL-MCNC: 24.93 MG/DL (ref 0–0.5)
D-DIMER, QUANTITATIVE (MAD,POW, STR): >4000 NG/ML (FEU) (ref 0–470)
D-LACTATE SERPL-SCNC: 2.5 MMOL/L (ref 0.5–2)
DEPRECATED RDW RBC AUTO: 50.3 FL (ref 37–54)
EOSINOPHIL # BLD AUTO: 0.02 10*3/MM3 (ref 0–0.4)
EOSINOPHIL NFR BLD AUTO: 0.1 % (ref 0.3–6.2)
ERYTHROCYTE [DISTWIDTH] IN BLOOD BY AUTOMATED COUNT: 14.3 % (ref 12.3–15.4)
FLUAV AG NPH QL: NEGATIVE
FLUBV AG NPH QL IA: NEGATIVE
GFR SERPL CREATININE-BSD FRML MDRD: 40 ML/MIN/1.73
GLOBULIN UR ELPH-MCNC: 5.3 GM/DL
GLUCOSE BLD-MCNC: 915 MG/DL (ref 65–99)
GLUCOSE UR STRIP-MCNC: ABNORMAL MG/DL
HCG SERPL QL: NEGATIVE
HCT VFR BLD AUTO: 32.9 % (ref 34–46.6)
HGB BLD-MCNC: 9.6 G/DL (ref 12–15.9)
HGB UR QL STRIP.AUTO: ABNORMAL
HOLD SPECIMEN: NORMAL
HYALINE CASTS UR QL AUTO: ABNORMAL /LPF
HYPOCHROMIA BLD QL: ABNORMAL
IMM GRANULOCYTES # BLD AUTO: 4.83 10*3/MM3 (ref 0–0.05)
IMM GRANULOCYTES NFR BLD AUTO: 12.4 % (ref 0–0.5)
KETONES UR QL STRIP: ABNORMAL
LEUKOCYTE ESTERASE UR QL STRIP.AUTO: NEGATIVE
LYMPHOCYTES # BLD AUTO: 2.47 10*3/MM3 (ref 0.7–3.1)
LYMPHOCYTES # BLD MANUAL: 1.94 10*3/MM3 (ref 0.7–3.1)
LYMPHOCYTES NFR BLD AUTO: 6.4 % (ref 19.6–45.3)
LYMPHOCYTES NFR BLD MANUAL: 3 % (ref 5–12)
LYMPHOCYTES NFR BLD MANUAL: 5 % (ref 19.6–45.3)
MAGNESIUM SERPL-MCNC: 2.4 MG/DL (ref 1.7–2.2)
MCH RBC QN AUTO: 28.4 PG (ref 26.6–33)
MCHC RBC AUTO-ENTMCNC: 29.2 G/DL (ref 31.5–35.7)
MCV RBC AUTO: 97.3 FL (ref 79–97)
METAMYELOCYTES NFR BLD MANUAL: 7 % (ref 0–0)
METHADONE UR QL SCN: NEGATIVE
MONOCYTES # BLD AUTO: 0.64 10*3/MM3 (ref 0.1–0.9)
MONOCYTES # BLD AUTO: 1.17 10*3/MM3 (ref 0.1–0.9)
MONOCYTES NFR BLD AUTO: 1.6 % (ref 5–12)
NEUTROPHILS # BLD AUTO: 30.81 10*3/MM3 (ref 1.7–7)
NEUTROPHILS # BLD AUTO: 33.05 10*3/MM3 (ref 1.7–7)
NEUTROPHILS NFR BLD AUTO: 79.2 % (ref 42.7–76)
NEUTROPHILS NFR BLD MANUAL: 80 % (ref 42.7–76)
NEUTS BAND NFR BLD MANUAL: 5 % (ref 0–5)
NITRITE UR QL STRIP: NEGATIVE
NRBC BLD AUTO-RTO: 0 /100 WBC (ref 0–0.2)
NT-PROBNP SERPL-MCNC: 923.5 PG/ML (ref 5–450)
OPIATES UR QL: NEGATIVE
OXYCODONE UR QL SCN: NEGATIVE
PCP UR QL SCN: NEGATIVE
PH UR STRIP.AUTO: <=5 [PH] (ref 5–9)
PLATELET # BLD AUTO: 1059 10*3/MM3 (ref 140–450)
PMV BLD AUTO: 9.9 FL (ref 6–12)
POTASSIUM BLD-SCNC: 5.5 MMOL/L (ref 3.5–5.2)
PROPOXYPH UR QL: NEGATIVE
PROT SERPL-MCNC: 8.6 G/DL (ref 6–8.5)
PROT UR QL STRIP: NEGATIVE
RBC # BLD AUTO: 3.38 10*6/MM3 (ref 3.77–5.28)
RBC # UR: ABNORMAL /HPF
REF LAB TEST METHOD: ABNORMAL
SMALL PLATELETS BLD QL SMEAR: ABNORMAL
SODIUM BLD-SCNC: 129 MMOL/L (ref 136–145)
SP GR UR STRIP: 1.02 (ref 1–1.03)
SQUAMOUS #/AREA URNS HPF: ABNORMAL /HPF
T4 FREE SERPL-MCNC: 0.91 NG/DL (ref 0.93–1.7)
TRICYCLICS UR QL SCN: NEGATIVE
TSH SERPL DL<=0.05 MIU/L-ACNC: 3.33 UIU/ML (ref 0.27–4.2)
UROBILINOGEN UR QL STRIP: ABNORMAL
WBC MORPH BLD: NORMAL
WBC NRBC COR # BLD: 38.88 10*3/MM3 (ref 3.4–10.8)
WBC UR QL AUTO: ABNORMAL /HPF
WHOLE BLOOD HOLD SPECIMEN: NORMAL
WHOLE BLOOD HOLD SPECIMEN: NORMAL

## 2019-10-29 PROCEDURE — 83735 ASSAY OF MAGNESIUM: CPT | Performed by: FAMILY MEDICINE

## 2019-10-29 PROCEDURE — 99284 EMERGENCY DEPT VISIT MOD MDM: CPT

## 2019-10-29 PROCEDURE — 96366 THER/PROPH/DIAG IV INF ADDON: CPT

## 2019-10-29 PROCEDURE — G0378 HOSPITAL OBSERVATION PER HR: HCPCS

## 2019-10-29 PROCEDURE — 63710000001 INSULIN REGULAR HUMAN PER 5 UNITS: Performed by: FAMILY MEDICINE

## 2019-10-29 PROCEDURE — 74176 CT ABD & PELVIS W/O CONTRAST: CPT

## 2019-10-29 PROCEDURE — 83605 ASSAY OF LACTIC ACID: CPT | Performed by: FAMILY MEDICINE

## 2019-10-29 PROCEDURE — 84439 ASSAY OF FREE THYROXINE: CPT | Performed by: FAMILY MEDICINE

## 2019-10-29 PROCEDURE — 25010000002 MORPHINE PER 10 MG: Performed by: FAMILY MEDICINE

## 2019-10-29 PROCEDURE — 86140 C-REACTIVE PROTEIN: CPT | Performed by: FAMILY MEDICINE

## 2019-10-29 PROCEDURE — 83930 ASSAY OF BLOOD OSMOLALITY: CPT | Performed by: INTERNAL MEDICINE

## 2019-10-29 PROCEDURE — 85007 BL SMEAR W/DIFF WBC COUNT: CPT | Performed by: FAMILY MEDICINE

## 2019-10-29 PROCEDURE — 83880 ASSAY OF NATRIURETIC PEPTIDE: CPT | Performed by: FAMILY MEDICINE

## 2019-10-29 PROCEDURE — 84443 ASSAY THYROID STIM HORMONE: CPT | Performed by: FAMILY MEDICINE

## 2019-10-29 PROCEDURE — 85025 COMPLETE CBC W/AUTO DIFF WBC: CPT | Performed by: FAMILY MEDICINE

## 2019-10-29 PROCEDURE — 96376 TX/PRO/DX INJ SAME DRUG ADON: CPT

## 2019-10-29 PROCEDURE — 83036 HEMOGLOBIN GLYCOSYLATED A1C: CPT | Performed by: INTERNAL MEDICINE

## 2019-10-29 PROCEDURE — 80053 COMPREHEN METABOLIC PANEL: CPT | Performed by: FAMILY MEDICINE

## 2019-10-29 PROCEDURE — 81001 URINALYSIS AUTO W/SCOPE: CPT | Performed by: FAMILY MEDICINE

## 2019-10-29 PROCEDURE — 82009 KETONE BODYS QUAL: CPT | Performed by: FAMILY MEDICINE

## 2019-10-29 PROCEDURE — 84703 CHORIONIC GONADOTROPIN ASSAY: CPT | Performed by: FAMILY MEDICINE

## 2019-10-29 PROCEDURE — 96365 THER/PROPH/DIAG IV INF INIT: CPT

## 2019-10-29 PROCEDURE — 85379 FIBRIN DEGRADATION QUANT: CPT | Performed by: FAMILY MEDICINE

## 2019-10-29 PROCEDURE — 80307 DRUG TEST PRSMV CHEM ANLYZR: CPT | Performed by: FAMILY MEDICINE

## 2019-10-29 PROCEDURE — 96375 TX/PRO/DX INJ NEW DRUG ADDON: CPT

## 2019-10-29 PROCEDURE — 87804 INFLUENZA ASSAY W/OPTIC: CPT | Performed by: FAMILY MEDICINE

## 2019-10-29 RX ORDER — SODIUM CHLORIDE AND POTASSIUM CHLORIDE 150; 450 MG/100ML; MG/100ML
250 INJECTION, SOLUTION INTRAVENOUS CONTINUOUS PRN
Status: DISCONTINUED | OUTPATIENT
Start: 2019-10-29 | End: 2019-10-30

## 2019-10-29 RX ORDER — SODIUM CHLORIDE 450 MG/100ML
250 INJECTION, SOLUTION INTRAVENOUS CONTINUOUS PRN
Status: DISCONTINUED | OUTPATIENT
Start: 2019-10-29 | End: 2019-10-30

## 2019-10-29 RX ORDER — FLUOXETINE HYDROCHLORIDE 20 MG/1
40 CAPSULE ORAL DAILY
Status: DISCONTINUED | OUTPATIENT
Start: 2019-10-30 | End: 2019-11-01 | Stop reason: HOSPADM

## 2019-10-29 RX ORDER — SODIUM CHLORIDE 9 MG/ML
250 INJECTION, SOLUTION INTRAVENOUS CONTINUOUS PRN
Status: DISCONTINUED | OUTPATIENT
Start: 2019-10-29 | End: 2019-10-30

## 2019-10-29 RX ORDER — SODIUM CHLORIDE 9 MG/ML
10 INJECTION, SOLUTION INTRAVENOUS CONTINUOUS PRN
Status: DISCONTINUED | OUTPATIENT
Start: 2019-10-29 | End: 2019-10-30

## 2019-10-29 RX ORDER — NICOTINE 21 MG/24HR
1 PATCH, TRANSDERMAL 24 HOURS TRANSDERMAL
Status: DISCONTINUED | OUTPATIENT
Start: 2019-10-30 | End: 2019-11-01 | Stop reason: HOSPADM

## 2019-10-29 RX ORDER — DEXTROSE, SODIUM CHLORIDE, AND POTASSIUM CHLORIDE 5; .45; .3 G/100ML; G/100ML; G/100ML
250 INJECTION INTRAVENOUS CONTINUOUS PRN
Status: DISCONTINUED | OUTPATIENT
Start: 2019-10-29 | End: 2019-10-30

## 2019-10-29 RX ORDER — DEXTROSE MONOHYDRATE 25 G/50ML
25-50 INJECTION, SOLUTION INTRAVENOUS
Status: DISCONTINUED | OUTPATIENT
Start: 2019-10-29 | End: 2019-10-30

## 2019-10-29 RX ORDER — SODIUM CHLORIDE 0.9 % (FLUSH) 0.9 %
10 SYRINGE (ML) INJECTION ONCE AS NEEDED
Status: COMPLETED | OUTPATIENT
Start: 2019-10-29 | End: 2019-10-31

## 2019-10-29 RX ORDER — NORTRIPTYLINE HYDROCHLORIDE 25 MG/1
25 CAPSULE ORAL NIGHTLY PRN
Status: DISCONTINUED | OUTPATIENT
Start: 2019-10-29 | End: 2019-11-01 | Stop reason: HOSPADM

## 2019-10-29 RX ORDER — SODIUM CHLORIDE 9 MG/ML
INJECTION, SOLUTION INTRAVENOUS
Status: COMPLETED
Start: 2019-10-29 | End: 2019-10-29

## 2019-10-29 RX ORDER — DEXTROSE MONOHYDRATE 25 G/50ML
12.5 INJECTION, SOLUTION INTRAVENOUS AS NEEDED
Status: DISCONTINUED | OUTPATIENT
Start: 2019-10-29 | End: 2019-11-01 | Stop reason: HOSPADM

## 2019-10-29 RX ORDER — SODIUM CHLORIDE 0.9 % (FLUSH) 0.9 %
10 SYRINGE (ML) INJECTION AS NEEDED
Status: DISCONTINUED | OUTPATIENT
Start: 2019-10-29 | End: 2019-11-01 | Stop reason: HOSPADM

## 2019-10-29 RX ORDER — SODIUM CHLORIDE AND POTASSIUM CHLORIDE 150; 900 MG/100ML; MG/100ML
250 INJECTION, SOLUTION INTRAVENOUS CONTINUOUS PRN
Status: DISCONTINUED | OUTPATIENT
Start: 2019-10-29 | End: 2019-10-30

## 2019-10-29 RX ORDER — DEXTROSE AND SODIUM CHLORIDE 5; .45 G/100ML; G/100ML
250 INJECTION, SOLUTION INTRAVENOUS CONTINUOUS PRN
Status: DISCONTINUED | OUTPATIENT
Start: 2019-10-29 | End: 2019-10-30

## 2019-10-29 RX ORDER — DEXTROSE, SODIUM CHLORIDE, AND POTASSIUM CHLORIDE 5; .45; .15 G/100ML; G/100ML; G/100ML
250 INJECTION INTRAVENOUS CONTINUOUS PRN
Status: DISCONTINUED | OUTPATIENT
Start: 2019-10-29 | End: 2019-10-30

## 2019-10-29 RX ORDER — SODIUM CHLORIDE AND POTASSIUM CHLORIDE 300; 900 MG/100ML; MG/100ML
250 INJECTION, SOLUTION INTRAVENOUS CONTINUOUS PRN
Status: DISCONTINUED | OUTPATIENT
Start: 2019-10-29 | End: 2019-10-30

## 2019-10-29 RX ADMIN — SODIUM CHLORIDE: 900 INJECTION, SOLUTION INTRAVENOUS at 23:50

## 2019-10-29 RX ADMIN — HUMAN INSULIN 10 UNITS: 100 INJECTION, SOLUTION SUBCUTANEOUS at 22:12

## 2019-10-29 RX ADMIN — SODIUM CHLORIDE 1000 ML: 9 INJECTION, SOLUTION INTRAVENOUS at 22:27

## 2019-10-29 RX ADMIN — SODIUM CHLORIDE 1000 ML: 900 INJECTION, SOLUTION INTRAVENOUS at 23:45

## 2019-10-29 RX ADMIN — MORPHINE SULFATE 4 MG: 4 INJECTION INTRAVENOUS at 21:36

## 2019-10-29 RX ADMIN — SODIUM CHLORIDE 4 UNITS/HR: 9 INJECTION, SOLUTION INTRAVENOUS at 21:55

## 2019-10-29 RX ADMIN — SODIUM CHLORIDE 1000 ML: 9 INJECTION, SOLUTION INTRAVENOUS at 21:16

## 2019-10-29 RX ADMIN — MORPHINE SULFATE 4 MG: 4 INJECTION INTRAVENOUS at 23:03

## 2019-10-29 RX ADMIN — SODIUM CHLORIDE 1000 ML: 900 INJECTION, SOLUTION INTRAVENOUS at 21:16

## 2019-10-29 RX ADMIN — SODIUM CHLORIDE: 9 INJECTION, SOLUTION INTRAVENOUS at 23:50

## 2019-10-30 ENCOUNTER — APPOINTMENT (OUTPATIENT)
Dept: ULTRASOUND IMAGING | Facility: HOSPITAL | Age: 19
End: 2019-10-30

## 2019-10-30 ENCOUNTER — APPOINTMENT (OUTPATIENT)
Dept: GENERAL RADIOLOGY | Facility: HOSPITAL | Age: 19
End: 2019-10-30

## 2019-10-30 LAB
ANION GAP SERPL CALCULATED.3IONS-SCNC: 13 MMOL/L (ref 5–15)
ANION GAP SERPL CALCULATED.3IONS-SCNC: 15 MMOL/L (ref 5–15)
ANION GAP SERPL CALCULATED.3IONS-SCNC: 20 MMOL/L (ref 5–15)
ANION GAP SERPL CALCULATED.3IONS-SCNC: 35 MMOL/L (ref 5–15)
BASOPHILS # BLD AUTO: 0.06 10*3/MM3 (ref 0–0.2)
BASOPHILS # BLD AUTO: 0.09 10*3/MM3 (ref 0–0.2)
BASOPHILS NFR BLD AUTO: 0.2 % (ref 0–1.5)
BASOPHILS NFR BLD AUTO: 0.3 % (ref 0–1.5)
BUN BLD-MCNC: 18 MG/DL (ref 6–20)
BUN BLD-MCNC: 21 MG/DL (ref 6–20)
BUN BLD-MCNC: 24 MG/DL (ref 6–20)
BUN BLD-MCNC: 26 MG/DL (ref 6–20)
BUN/CREAT SERPL: 19.8 (ref 7–25)
BUN/CREAT SERPL: 21.2 (ref 7–25)
BUN/CREAT SERPL: 21.6 (ref 7–25)
BUN/CREAT SERPL: 22.1 (ref 7–25)
CALCIUM SPEC-SCNC: 8.3 MG/DL (ref 8.6–10.5)
CALCIUM SPEC-SCNC: 8.5 MG/DL (ref 8.6–10.5)
CALCIUM SPEC-SCNC: 8.8 MG/DL (ref 8.6–10.5)
CALCIUM SPEC-SCNC: 9.2 MG/DL (ref 8.6–10.5)
CHLORIDE SERPL-SCNC: 103 MMOL/L (ref 98–107)
CHLORIDE SERPL-SCNC: 104 MMOL/L (ref 98–107)
CHLORIDE SERPL-SCNC: 106 MMOL/L (ref 98–107)
CHLORIDE SERPL-SCNC: 94 MMOL/L (ref 98–107)
CO2 SERPL-SCNC: 13 MMOL/L (ref 22–29)
CO2 SERPL-SCNC: 17 MMOL/L (ref 22–29)
CO2 SERPL-SCNC: 19 MMOL/L (ref 22–29)
CO2 SERPL-SCNC: 5 MMOL/L (ref 22–29)
CREAT BLD-MCNC: 0.85 MG/DL (ref 0.57–1)
CREAT BLD-MCNC: 0.95 MG/DL (ref 0.57–1)
CREAT BLD-MCNC: 1.11 MG/DL (ref 0.57–1)
CREAT BLD-MCNC: 1.31 MG/DL (ref 0.57–1)
D-LACTATE SERPL-SCNC: 0.8 MMOL/L (ref 0.5–2)
DEPRECATED RDW RBC AUTO: 43.4 FL (ref 37–54)
DEPRECATED RDW RBC AUTO: 50 FL (ref 37–54)
EOSINOPHIL # BLD AUTO: 0.01 10*3/MM3 (ref 0–0.4)
EOSINOPHIL # BLD AUTO: 0.07 10*3/MM3 (ref 0–0.4)
EOSINOPHIL NFR BLD AUTO: 0 % (ref 0.3–6.2)
EOSINOPHIL NFR BLD AUTO: 0.3 % (ref 0.3–6.2)
ERYTHROCYTE [DISTWIDTH] IN BLOOD BY AUTOMATED COUNT: 13.9 % (ref 12.3–15.4)
ERYTHROCYTE [DISTWIDTH] IN BLOOD BY AUTOMATED COUNT: 14.2 % (ref 12.3–15.4)
GFR SERPL CREATININE-BSD FRML MDRD: 52 ML/MIN/1.73
GFR SERPL CREATININE-BSD FRML MDRD: 63 ML/MIN/1.73
GFR SERPL CREATININE-BSD FRML MDRD: 75 ML/MIN/1.73
GFR SERPL CREATININE-BSD FRML MDRD: 85 ML/MIN/1.73
GLUCOSE BLD-MCNC: 164 MG/DL (ref 65–99)
GLUCOSE BLD-MCNC: 327 MG/DL (ref 65–99)
GLUCOSE BLD-MCNC: 643 MG/DL (ref 65–99)
GLUCOSE BLD-MCNC: 72 MG/DL (ref 65–99)
GLUCOSE BLDC GLUCOMTR-MCNC: 104 MG/DL (ref 70–130)
GLUCOSE BLDC GLUCOMTR-MCNC: 136 MG/DL (ref 70–130)
GLUCOSE BLDC GLUCOMTR-MCNC: 150 MG/DL (ref 70–130)
GLUCOSE BLDC GLUCOMTR-MCNC: 179 MG/DL (ref 70–130)
GLUCOSE BLDC GLUCOMTR-MCNC: 218 MG/DL (ref 70–130)
GLUCOSE BLDC GLUCOMTR-MCNC: 219 MG/DL (ref 70–130)
GLUCOSE BLDC GLUCOMTR-MCNC: 237 MG/DL (ref 70–130)
GLUCOSE BLDC GLUCOMTR-MCNC: 294 MG/DL (ref 70–130)
GLUCOSE BLDC GLUCOMTR-MCNC: 383 MG/DL (ref 70–130)
GLUCOSE BLDC GLUCOMTR-MCNC: 84 MG/DL (ref 70–130)
GLUCOSE BLDC GLUCOMTR-MCNC: 96 MG/DL (ref 70–130)
HBA1C MFR BLD: 12.8 % (ref 4.8–5.6)
HCT VFR BLD AUTO: 24.2 % (ref 34–46.6)
HCT VFR BLD AUTO: 33.3 % (ref 34–46.6)
HGB BLD-MCNC: 8.1 G/DL (ref 12–15.9)
HGB BLD-MCNC: 9.9 G/DL (ref 12–15.9)
HOLD SPECIMEN: NORMAL
IMM GRANULOCYTES # BLD AUTO: 1.6 10*3/MM3 (ref 0–0.05)
IMM GRANULOCYTES # BLD AUTO: 3.78 10*3/MM3 (ref 0–0.05)
IMM GRANULOCYTES NFR BLD AUTO: 11 % (ref 0–0.5)
IMM GRANULOCYTES NFR BLD AUTO: 5.8 % (ref 0–0.5)
LYMPHOCYTES # BLD AUTO: 2.37 10*3/MM3 (ref 0.7–3.1)
LYMPHOCYTES # BLD AUTO: 2.84 10*3/MM3 (ref 0.7–3.1)
LYMPHOCYTES NFR BLD AUTO: 10.3 % (ref 19.6–45.3)
LYMPHOCYTES NFR BLD AUTO: 6.9 % (ref 19.6–45.3)
MAGNESIUM SERPL-MCNC: 1.7 MG/DL (ref 1.7–2.2)
MAGNESIUM SERPL-MCNC: 1.8 MG/DL (ref 1.7–2.2)
MAGNESIUM SERPL-MCNC: 1.9 MG/DL (ref 1.7–2.2)
MAGNESIUM SERPL-MCNC: 2.1 MG/DL (ref 1.7–2.2)
MCH RBC QN AUTO: 28.9 PG (ref 26.6–33)
MCH RBC QN AUTO: 28.9 PG (ref 26.6–33)
MCHC RBC AUTO-ENTMCNC: 29.7 G/DL (ref 31.5–35.7)
MCHC RBC AUTO-ENTMCNC: 33.5 G/DL (ref 31.5–35.7)
MCV RBC AUTO: 86.4 FL (ref 79–97)
MCV RBC AUTO: 97.1 FL (ref 79–97)
MONOCYTES # BLD AUTO: 1.03 10*3/MM3 (ref 0.1–0.9)
MONOCYTES # BLD AUTO: 1.21 10*3/MM3 (ref 0.1–0.9)
MONOCYTES NFR BLD AUTO: 3 % (ref 5–12)
MONOCYTES NFR BLD AUTO: 4.4 % (ref 5–12)
NEUTROPHILS # BLD AUTO: 21.72 10*3/MM3 (ref 1.7–7)
NEUTROPHILS # BLD AUTO: 27.16 10*3/MM3 (ref 1.7–7)
NEUTROPHILS NFR BLD AUTO: 78.9 % (ref 42.7–76)
NEUTROPHILS NFR BLD AUTO: 78.9 % (ref 42.7–76)
NRBC BLD AUTO-RTO: 0 /100 WBC (ref 0–0.2)
NRBC BLD AUTO-RTO: 0 /100 WBC (ref 0–0.2)
OSMOLALITY SERPL: 349 MOSM/KG (ref 280–290)
PHOSPHATE SERPL-MCNC: 2.4 MG/DL (ref 2.5–4.5)
PHOSPHATE SERPL-MCNC: 2.5 MG/DL (ref 2.5–4.5)
PHOSPHATE SERPL-MCNC: 3.4 MG/DL (ref 2.5–4.5)
PHOSPHATE SERPL-MCNC: 5.7 MG/DL (ref 2.5–4.5)
PLATELET # BLD AUTO: 782 10*3/MM3 (ref 140–450)
PLATELET # BLD AUTO: 789 10*3/MM3 (ref 140–450)
PMV BLD AUTO: 10 FL (ref 6–12)
PMV BLD AUTO: 8.8 FL (ref 6–12)
POTASSIUM BLD-SCNC: 4 MMOL/L (ref 3.5–5.2)
POTASSIUM BLD-SCNC: 4.4 MMOL/L (ref 3.5–5.2)
POTASSIUM BLD-SCNC: 4.5 MMOL/L (ref 3.5–5.2)
POTASSIUM BLD-SCNC: 4.6 MMOL/L (ref 3.5–5.2)
RBC # BLD AUTO: 2.8 10*6/MM3 (ref 3.77–5.28)
RBC # BLD AUTO: 3.43 10*6/MM3 (ref 3.77–5.28)
SODIUM BLD-SCNC: 134 MMOL/L (ref 136–145)
SODIUM BLD-SCNC: 136 MMOL/L (ref 136–145)
SODIUM BLD-SCNC: 136 MMOL/L (ref 136–145)
SODIUM BLD-SCNC: 138 MMOL/L (ref 136–145)
TROPONIN T SERPL-MCNC: <0.01 NG/ML (ref 0–0.03)
WBC NRBC COR # BLD: 27.53 10*3/MM3 (ref 3.4–10.8)
WBC NRBC COR # BLD: 34.41 10*3/MM3 (ref 3.4–10.8)

## 2019-10-30 PROCEDURE — 84100 ASSAY OF PHOSPHORUS: CPT | Performed by: INTERNAL MEDICINE

## 2019-10-30 PROCEDURE — 83605 ASSAY OF LACTIC ACID: CPT | Performed by: FAMILY MEDICINE

## 2019-10-30 PROCEDURE — 25010000002 HEPARIN (PORCINE) PER 1000 UNITS: Performed by: INTERNAL MEDICINE

## 2019-10-30 PROCEDURE — 96372 THER/PROPH/DIAG INJ SC/IM: CPT

## 2019-10-30 PROCEDURE — G0378 HOSPITAL OBSERVATION PER HR: HCPCS

## 2019-10-30 PROCEDURE — 96368 THER/DIAG CONCURRENT INF: CPT

## 2019-10-30 PROCEDURE — 85025 COMPLETE CBC W/AUTO DIFF WBC: CPT | Performed by: INTERNAL MEDICINE

## 2019-10-30 PROCEDURE — 71045 X-RAY EXAM CHEST 1 VIEW: CPT

## 2019-10-30 PROCEDURE — 96375 TX/PRO/DX INJ NEW DRUG ADDON: CPT

## 2019-10-30 PROCEDURE — 25010000002 METOCLOPRAMIDE PER 10 MG: Performed by: INTERNAL MEDICINE

## 2019-10-30 PROCEDURE — 96366 THER/PROPH/DIAG IV INF ADDON: CPT

## 2019-10-30 PROCEDURE — 63710000001 INSULIN DETEMIR PER 5 UNITS: Performed by: INTERNAL MEDICINE

## 2019-10-30 PROCEDURE — 84484 ASSAY OF TROPONIN QUANT: CPT | Performed by: INTERNAL MEDICINE

## 2019-10-30 PROCEDURE — 80048 BASIC METABOLIC PNL TOTAL CA: CPT | Performed by: INTERNAL MEDICINE

## 2019-10-30 PROCEDURE — 82962 GLUCOSE BLOOD TEST: CPT

## 2019-10-30 PROCEDURE — 63710000001 INSULIN ASPART PER 5 UNITS: Performed by: INTERNAL MEDICINE

## 2019-10-30 PROCEDURE — 83735 ASSAY OF MAGNESIUM: CPT | Performed by: INTERNAL MEDICINE

## 2019-10-30 PROCEDURE — 25810000003 SODIUM CHLORIDE 0.9 % WITH KCL 20 MEQ 20-0.9 MEQ/L-% SOLUTION: Performed by: INTERNAL MEDICINE

## 2019-10-30 RX ORDER — HEPARIN SODIUM 5000 [USP'U]/ML
5000 INJECTION, SOLUTION INTRAVENOUS; SUBCUTANEOUS EVERY 12 HOURS SCHEDULED
Status: DISCONTINUED | OUTPATIENT
Start: 2019-10-30 | End: 2019-11-01 | Stop reason: HOSPADM

## 2019-10-30 RX ORDER — ACETAMINOPHEN 325 MG/1
650 TABLET ORAL EVERY 6 HOURS PRN
Status: DISCONTINUED | OUTPATIENT
Start: 2019-10-30 | End: 2019-11-01 | Stop reason: HOSPADM

## 2019-10-30 RX ORDER — METOCLOPRAMIDE 10 MG/1
10 TABLET ORAL
Status: DISCONTINUED | OUTPATIENT
Start: 2019-10-31 | End: 2019-11-01 | Stop reason: HOSPADM

## 2019-10-30 RX ORDER — SODIUM CHLORIDE 9 MG/ML
100 INJECTION, SOLUTION INTRAVENOUS CONTINUOUS
Status: DISCONTINUED | OUTPATIENT
Start: 2019-10-30 | End: 2019-11-01

## 2019-10-30 RX ORDER — DEXTROSE MONOHYDRATE 25 G/50ML
25 INJECTION, SOLUTION INTRAVENOUS
Status: DISCONTINUED | OUTPATIENT
Start: 2019-10-30 | End: 2019-11-01 | Stop reason: HOSPADM

## 2019-10-30 RX ORDER — NICOTINE POLACRILEX 4 MG
15 LOZENGE BUCCAL
Status: DISCONTINUED | OUTPATIENT
Start: 2019-10-30 | End: 2019-11-01 | Stop reason: HOSPADM

## 2019-10-30 RX ORDER — FAMOTIDINE 10 MG/ML
20 INJECTION, SOLUTION INTRAVENOUS EVERY 12 HOURS SCHEDULED
Status: DISCONTINUED | OUTPATIENT
Start: 2019-10-30 | End: 2019-10-30

## 2019-10-30 RX ORDER — FAMOTIDINE 20 MG/1
20 TABLET, FILM COATED ORAL
Status: DISCONTINUED | OUTPATIENT
Start: 2019-10-30 | End: 2019-11-01 | Stop reason: HOSPADM

## 2019-10-30 RX ORDER — METOCLOPRAMIDE HYDROCHLORIDE 5 MG/ML
10 INJECTION INTRAMUSCULAR; INTRAVENOUS EVERY 6 HOURS PRN
Status: DISCONTINUED | OUTPATIENT
Start: 2019-10-30 | End: 2019-10-30

## 2019-10-30 RX ADMIN — HEPARIN SODIUM 5000 UNITS: 5000 INJECTION INTRAVENOUS; SUBCUTANEOUS at 21:24

## 2019-10-30 RX ADMIN — INSULIN ASPART 4 UNITS: 100 INJECTION, SOLUTION INTRAVENOUS; SUBCUTANEOUS at 17:40

## 2019-10-30 RX ADMIN — SODIUM CHLORIDE 14 UNITS/HR: 9 INJECTION, SOLUTION INTRAVENOUS at 07:17

## 2019-10-30 RX ADMIN — INSULIN DETEMIR 10 UNITS: 100 INJECTION, SOLUTION SUBCUTANEOUS at 21:24

## 2019-10-30 RX ADMIN — INSULIN ASPART 4 UNITS: 100 INJECTION, SOLUTION INTRAVENOUS; SUBCUTANEOUS at 21:23

## 2019-10-30 RX ADMIN — POTASSIUM CHLORIDE, DEXTROSE MONOHYDRATE AND SODIUM CHLORIDE 250 ML/HR: 150; 5; 450 INJECTION, SOLUTION INTRAVENOUS at 07:08

## 2019-10-30 RX ADMIN — FAMOTIDINE 20 MG: 20 TABLET, FILM COATED ORAL at 17:39

## 2019-10-30 RX ADMIN — ACETAMINOPHEN 650 MG: 325 TABLET, FILM COATED ORAL at 08:04

## 2019-10-30 RX ADMIN — FLUOXETINE 40 MG: 20 CAPSULE ORAL at 08:05

## 2019-10-30 RX ADMIN — POTASSIUM CHLORIDE AND SODIUM CHLORIDE 250 ML/HR: 450; 150 INJECTION, SOLUTION INTRAVENOUS at 04:45

## 2019-10-30 RX ADMIN — POTASSIUM CHLORIDE, DEXTROSE MONOHYDRATE AND SODIUM CHLORIDE 250 ML/HR: 150; 5; 450 INJECTION, SOLUTION INTRAVENOUS at 12:45

## 2019-10-30 RX ADMIN — SODIUM CHLORIDE 2000 ML: 900 INJECTION, SOLUTION INTRAVENOUS at 00:45

## 2019-10-30 RX ADMIN — SODIUM CHLORIDE 100 ML/HR: 9 INJECTION, SOLUTION INTRAVENOUS at 14:47

## 2019-10-30 RX ADMIN — FAMOTIDINE 20 MG: 10 INJECTION INTRAVENOUS at 13:32

## 2019-10-30 RX ADMIN — HEPARIN SODIUM 5000 UNITS: 5000 INJECTION INTRAVENOUS; SUBCUTANEOUS at 08:06

## 2019-10-30 RX ADMIN — POTASSIUM CHLORIDE AND SODIUM CHLORIDE 250 ML/HR: 900; 150 INJECTION, SOLUTION INTRAVENOUS at 01:33

## 2019-10-30 RX ADMIN — SODIUM CHLORIDE 100 ML/HR: 9 INJECTION, SOLUTION INTRAVENOUS at 21:24

## 2019-10-30 RX ADMIN — METOCLOPRAMIDE 10 MG: 5 INJECTION, SOLUTION INTRAMUSCULAR; INTRAVENOUS at 16:30

## 2019-10-31 ENCOUNTER — APPOINTMENT (OUTPATIENT)
Dept: CT IMAGING | Facility: HOSPITAL | Age: 19
End: 2019-10-31

## 2019-10-31 LAB
ANISOCYTOSIS BLD QL: ABNORMAL
BACTERIA UR QL AUTO: ABNORMAL /HPF
BILIRUB UR QL STRIP: NEGATIVE
CLARITY UR: CLEAR
COLOR UR: YELLOW
DEPRECATED RDW RBC AUTO: 45.8 FL (ref 37–54)
ERYTHROCYTE [DISTWIDTH] IN BLOOD BY AUTOMATED COUNT: 14.3 % (ref 12.3–15.4)
GLUCOSE BLDC GLUCOMTR-MCNC: 109 MG/DL (ref 70–130)
GLUCOSE BLDC GLUCOMTR-MCNC: 127 MG/DL (ref 70–130)
GLUCOSE BLDC GLUCOMTR-MCNC: 177 MG/DL (ref 70–130)
GLUCOSE BLDC GLUCOMTR-MCNC: 201 MG/DL (ref 70–130)
GLUCOSE BLDC GLUCOMTR-MCNC: 287 MG/DL (ref 70–130)
GLUCOSE UR STRIP-MCNC: ABNORMAL MG/DL
HCT VFR BLD AUTO: 27.6 % (ref 34–46.6)
HGB BLD-MCNC: 9.1 G/DL (ref 12–15.9)
HGB UR QL STRIP.AUTO: ABNORMAL
HYALINE CASTS UR QL AUTO: ABNORMAL /LPF
HYPOCHROMIA BLD QL: ABNORMAL
KETONES UR QL STRIP: NEGATIVE
LEUKOCYTE ESTERASE UR QL STRIP.AUTO: ABNORMAL
LYMPHOCYTES # BLD MANUAL: 1.62 10*3/MM3 (ref 0.7–3.1)
LYMPHOCYTES NFR BLD MANUAL: 2 % (ref 5–12)
LYMPHOCYTES NFR BLD MANUAL: 9 % (ref 19.6–45.3)
MCH RBC QN AUTO: 28.7 PG (ref 26.6–33)
MCHC RBC AUTO-ENTMCNC: 33 G/DL (ref 31.5–35.7)
MCV RBC AUTO: 87.1 FL (ref 79–97)
METAMYELOCYTES NFR BLD MANUAL: 3 % (ref 0–0)
MONOCYTES # BLD AUTO: 0.36 10*3/MM3 (ref 0.1–0.9)
MYELOCYTES NFR BLD MANUAL: 1 % (ref 0–0)
NEUTROPHILS # BLD AUTO: 15.32 10*3/MM3 (ref 1.7–7)
NEUTROPHILS NFR BLD MANUAL: 84 % (ref 42.7–76)
NEUTS BAND NFR BLD MANUAL: 1 % (ref 0–5)
NITRITE UR QL STRIP: NEGATIVE
PH UR STRIP.AUTO: 6 [PH] (ref 5–9)
PLATELET # BLD AUTO: 761 10*3/MM3 (ref 140–450)
PMV BLD AUTO: 9.4 FL (ref 6–12)
POLYCHROMASIA BLD QL SMEAR: ABNORMAL
PROT UR QL STRIP: NEGATIVE
RBC # BLD AUTO: 3.17 10*6/MM3 (ref 3.77–5.28)
RBC # UR: ABNORMAL /HPF
REF LAB TEST METHOD: ABNORMAL
SMALL PLATELETS BLD QL SMEAR: ABNORMAL
SP GR UR STRIP: 1.01 (ref 1–1.03)
SQUAMOUS #/AREA URNS HPF: ABNORMAL /HPF
UROBILINOGEN UR QL STRIP: ABNORMAL
WBC MORPH BLD: NORMAL
WBC NRBC COR # BLD: 18.02 10*3/MM3 (ref 3.4–10.8)
WBC UR QL AUTO: ABNORMAL /HPF

## 2019-10-31 PROCEDURE — G0378 HOSPITAL OBSERVATION PER HR: HCPCS

## 2019-10-31 PROCEDURE — 96376 TX/PRO/DX INJ SAME DRUG ADON: CPT

## 2019-10-31 PROCEDURE — 63710000001 INSULIN ASPART PER 5 UNITS: Performed by: FAMILY MEDICINE

## 2019-10-31 PROCEDURE — 25010000002 HEPARIN (PORCINE) PER 1000 UNITS: Performed by: INTERNAL MEDICINE

## 2019-10-31 PROCEDURE — 25010000002 ONDANSETRON PER 1 MG: Performed by: FAMILY MEDICINE

## 2019-10-31 PROCEDURE — 81001 URINALYSIS AUTO W/SCOPE: CPT | Performed by: FAMILY MEDICINE

## 2019-10-31 PROCEDURE — 85025 COMPLETE CBC W/AUTO DIFF WBC: CPT | Performed by: INTERNAL MEDICINE

## 2019-10-31 PROCEDURE — 0 IOPAMIDOL PER 1 ML: Performed by: INTERNAL MEDICINE

## 2019-10-31 PROCEDURE — 71275 CT ANGIOGRAPHY CHEST: CPT

## 2019-10-31 PROCEDURE — 82962 GLUCOSE BLOOD TEST: CPT

## 2019-10-31 PROCEDURE — 85007 BL SMEAR W/DIFF WBC COUNT: CPT | Performed by: INTERNAL MEDICINE

## 2019-10-31 PROCEDURE — 87086 URINE CULTURE/COLONY COUNT: CPT | Performed by: FAMILY MEDICINE

## 2019-10-31 PROCEDURE — 87186 SC STD MICRODIL/AGAR DIL: CPT | Performed by: FAMILY MEDICINE

## 2019-10-31 PROCEDURE — 63710000001 INSULIN DETEMIR PER 5 UNITS: Performed by: INTERNAL MEDICINE

## 2019-10-31 PROCEDURE — 87088 URINE BACTERIA CULTURE: CPT | Performed by: FAMILY MEDICINE

## 2019-10-31 PROCEDURE — 63710000001 INSULIN ASPART PER 5 UNITS: Performed by: INTERNAL MEDICINE

## 2019-10-31 PROCEDURE — 96375 TX/PRO/DX INJ NEW DRUG ADDON: CPT

## 2019-10-31 PROCEDURE — 96372 THER/PROPH/DIAG INJ SC/IM: CPT

## 2019-10-31 PROCEDURE — 96361 HYDRATE IV INFUSION ADD-ON: CPT

## 2019-10-31 RX ORDER — ONDANSETRON 2 MG/ML
4 INJECTION INTRAMUSCULAR; INTRAVENOUS EVERY 6 HOURS PRN
Status: DISCONTINUED | OUTPATIENT
Start: 2019-10-31 | End: 2019-11-01 | Stop reason: HOSPADM

## 2019-10-31 RX ORDER — GRANULES FOR ORAL 3 G/1
3 POWDER ORAL ONCE
Status: COMPLETED | OUTPATIENT
Start: 2019-10-31 | End: 2019-10-31

## 2019-10-31 RX ORDER — ONDANSETRON 2 MG/ML
4 INJECTION INTRAMUSCULAR; INTRAVENOUS ONCE
Status: COMPLETED | OUTPATIENT
Start: 2019-10-31 | End: 2019-10-31

## 2019-10-31 RX ORDER — LANCING DEVICE
EACH MISCELLANEOUS
Qty: 1 EACH | Refills: 1 | Status: SHIPPED | OUTPATIENT
Start: 2019-10-31 | End: 2020-07-15

## 2019-10-31 RX ORDER — GLUCOSAMINE HCL/CHONDROITIN SU 500-400 MG
CAPSULE ORAL
Qty: 120 EACH | Refills: 11 | Status: SHIPPED | OUTPATIENT
Start: 2019-10-31

## 2019-10-31 RX ORDER — ISOPROPYL ALCOHOL 0.7 ML/1
SWAB TOPICAL
Qty: 120 EACH | Refills: 11 | Status: ON HOLD | OUTPATIENT
Start: 2019-10-31 | End: 2020-07-16

## 2019-10-31 RX ADMIN — METOCLOPRAMIDE HYDROCHLORIDE 10 MG: 10 TABLET ORAL at 18:00

## 2019-10-31 RX ADMIN — ONDANSETRON 4 MG: 2 INJECTION INTRAMUSCULAR; INTRAVENOUS at 19:16

## 2019-10-31 RX ADMIN — SODIUM CHLORIDE, PRESERVATIVE FREE 10 ML: 5 INJECTION INTRAVENOUS at 08:36

## 2019-10-31 RX ADMIN — INSULIN ASPART 11 UNITS: 100 INJECTION, SOLUTION INTRAVENOUS; SUBCUTANEOUS at 18:36

## 2019-10-31 RX ADMIN — IOPAMIDOL 50 ML: 755 INJECTION, SOLUTION INTRAVENOUS at 18:30

## 2019-10-31 RX ADMIN — HEPARIN SODIUM 5000 UNITS: 5000 INJECTION INTRAVENOUS; SUBCUTANEOUS at 20:46

## 2019-10-31 RX ADMIN — FAMOTIDINE 20 MG: 20 TABLET, FILM COATED ORAL at 18:00

## 2019-10-31 RX ADMIN — INSULIN ASPART 11 UNITS: 100 INJECTION, SOLUTION INTRAVENOUS; SUBCUTANEOUS at 11:38

## 2019-10-31 RX ADMIN — INSULIN ASPART 6 UNITS: 100 INJECTION, SOLUTION INTRAVENOUS; SUBCUTANEOUS at 11:36

## 2019-10-31 RX ADMIN — FOSFOMYCIN TROMETHAMINE 3 G: 3 POWDER ORAL at 18:00

## 2019-10-31 RX ADMIN — HEPARIN SODIUM 5000 UNITS: 5000 INJECTION INTRAVENOUS; SUBCUTANEOUS at 08:25

## 2019-10-31 RX ADMIN — NORTRIPTYLINE HYDROCHLORIDE 25 MG: 25 CAPSULE ORAL at 21:09

## 2019-10-31 RX ADMIN — INSULIN ASPART 4 UNITS: 100 INJECTION, SOLUTION INTRAVENOUS; SUBCUTANEOUS at 08:23

## 2019-10-31 RX ADMIN — METOCLOPRAMIDE HYDROCHLORIDE 10 MG: 10 TABLET ORAL at 11:35

## 2019-10-31 RX ADMIN — ONDANSETRON 4 MG: 2 INJECTION INTRAMUSCULAR; INTRAVENOUS at 10:29

## 2019-10-31 RX ADMIN — FAMOTIDINE 20 MG: 20 TABLET, FILM COATED ORAL at 08:35

## 2019-10-31 RX ADMIN — SODIUM CHLORIDE 100 ML/HR: 9 INJECTION, SOLUTION INTRAVENOUS at 11:40

## 2019-10-31 RX ADMIN — INSULIN DETEMIR 10 UNITS: 100 INJECTION, SOLUTION SUBCUTANEOUS at 20:46

## 2019-10-31 RX ADMIN — INSULIN ASPART 8 UNITS: 100 INJECTION, SOLUTION INTRAVENOUS; SUBCUTANEOUS at 09:53

## 2019-10-31 RX ADMIN — ACETAMINOPHEN 650 MG: 325 TABLET, FILM COATED ORAL at 23:32

## 2019-10-31 RX ADMIN — FLUOXETINE 40 MG: 20 CAPSULE ORAL at 08:25

## 2019-10-31 RX ADMIN — METOCLOPRAMIDE HYDROCHLORIDE 10 MG: 10 TABLET ORAL at 08:25

## 2019-10-31 RX ADMIN — INSULIN ASPART 2 UNITS: 100 INJECTION, SOLUTION INTRAVENOUS; SUBCUTANEOUS at 20:46

## 2019-11-01 VITALS
HEIGHT: 67 IN | RESPIRATION RATE: 18 BRPM | DIASTOLIC BLOOD PRESSURE: 75 MMHG | BODY MASS INDEX: 15.73 KG/M2 | WEIGHT: 100.2 LBS | HEART RATE: 75 BPM | SYSTOLIC BLOOD PRESSURE: 111 MMHG | TEMPERATURE: 97.2 F | OXYGEN SATURATION: 100 %

## 2019-11-01 PROBLEM — N30.00 ACUTE CYSTITIS: Status: ACTIVE | Noted: 2019-11-01

## 2019-11-01 LAB
ANION GAP SERPL CALCULATED.3IONS-SCNC: 11 MMOL/L (ref 5–15)
BASOPHILS # BLD AUTO: 0.02 10*3/MM3 (ref 0–0.2)
BASOPHILS NFR BLD AUTO: 0.2 % (ref 0–1.5)
BUN BLD-MCNC: 7 MG/DL (ref 6–20)
BUN/CREAT SERPL: 9.9 (ref 7–25)
CALCIUM SPEC-SCNC: 8.3 MG/DL (ref 8.6–10.5)
CHLORIDE SERPL-SCNC: 101 MMOL/L (ref 98–107)
CO2 SERPL-SCNC: 25 MMOL/L (ref 22–29)
CREAT BLD-MCNC: 0.71 MG/DL (ref 0.57–1)
DEPRECATED RDW RBC AUTO: 44.9 FL (ref 37–54)
EOSINOPHIL # BLD AUTO: 0.02 10*3/MM3 (ref 0–0.4)
EOSINOPHIL NFR BLD AUTO: 0.2 % (ref 0.3–6.2)
ERYTHROCYTE [DISTWIDTH] IN BLOOD BY AUTOMATED COUNT: 14.3 % (ref 12.3–15.4)
GFR SERPL CREATININE-BSD FRML MDRD: 105 ML/MIN/1.73
GLUCOSE BLD-MCNC: 114 MG/DL (ref 65–99)
GLUCOSE BLDC GLUCOMTR-MCNC: 142 MG/DL (ref 70–130)
GLUCOSE BLDC GLUCOMTR-MCNC: 185 MG/DL (ref 70–130)
HCT VFR BLD AUTO: 25.9 % (ref 34–46.6)
HGB BLD-MCNC: 8.5 G/DL (ref 12–15.9)
IMM GRANULOCYTES # BLD AUTO: 0.22 10*3/MM3 (ref 0–0.05)
IMM GRANULOCYTES NFR BLD AUTO: 2.2 % (ref 0–0.5)
LYMPHOCYTES # BLD AUTO: 1.12 10*3/MM3 (ref 0.7–3.1)
LYMPHOCYTES NFR BLD AUTO: 11.3 % (ref 19.6–45.3)
MCH RBC QN AUTO: 28.2 PG (ref 26.6–33)
MCHC RBC AUTO-ENTMCNC: 32.8 G/DL (ref 31.5–35.7)
MCV RBC AUTO: 86 FL (ref 79–97)
MONOCYTES # BLD AUTO: 0.38 10*3/MM3 (ref 0.1–0.9)
MONOCYTES NFR BLD AUTO: 3.8 % (ref 5–12)
NEUTROPHILS # BLD AUTO: 8.17 10*3/MM3 (ref 1.7–7)
NEUTROPHILS NFR BLD AUTO: 82.3 % (ref 42.7–76)
NRBC BLD AUTO-RTO: 0 /100 WBC (ref 0–0.2)
PLATELET # BLD AUTO: 528 10*3/MM3 (ref 140–450)
PMV BLD AUTO: 9.5 FL (ref 6–12)
POTASSIUM BLD-SCNC: 3.3 MMOL/L (ref 3.5–5.2)
RBC # BLD AUTO: 3.01 10*6/MM3 (ref 3.77–5.28)
SODIUM BLD-SCNC: 137 MMOL/L (ref 136–145)
WBC NRBC COR # BLD: 9.93 10*3/MM3 (ref 3.4–10.8)

## 2019-11-01 PROCEDURE — 85025 COMPLETE CBC W/AUTO DIFF WBC: CPT | Performed by: INTERNAL MEDICINE

## 2019-11-01 PROCEDURE — 25010000002 HEPARIN (PORCINE) PER 1000 UNITS: Performed by: INTERNAL MEDICINE

## 2019-11-01 PROCEDURE — 96361 HYDRATE IV INFUSION ADD-ON: CPT

## 2019-11-01 PROCEDURE — G0378 HOSPITAL OBSERVATION PER HR: HCPCS

## 2019-11-01 PROCEDURE — 96372 THER/PROPH/DIAG INJ SC/IM: CPT

## 2019-11-01 PROCEDURE — 63710000001 INSULIN ASPART PER 5 UNITS: Performed by: FAMILY MEDICINE

## 2019-11-01 PROCEDURE — 25010000002 ONDANSETRON PER 1 MG: Performed by: FAMILY MEDICINE

## 2019-11-01 PROCEDURE — 96376 TX/PRO/DX INJ SAME DRUG ADON: CPT

## 2019-11-01 PROCEDURE — 80048 BASIC METABOLIC PNL TOTAL CA: CPT | Performed by: FAMILY MEDICINE

## 2019-11-01 PROCEDURE — 82962 GLUCOSE BLOOD TEST: CPT

## 2019-11-01 RX ORDER — NICOTINE 21 MG/24HR
1 PATCH, TRANSDERMAL 24 HOURS TRANSDERMAL
Qty: 30 PATCH | Refills: 0 | Status: SHIPPED | OUTPATIENT
Start: 2019-11-02 | End: 2020-07-15 | Stop reason: SDDI

## 2019-11-01 RX ORDER — ONDANSETRON 4 MG/1
4 TABLET, FILM COATED ORAL EVERY 8 HOURS PRN
Qty: 30 TABLET | Refills: 0 | Status: SHIPPED | OUTPATIENT
Start: 2019-11-01 | End: 2020-07-15

## 2019-11-01 RX ORDER — METOCLOPRAMIDE 5 MG/1
5 TABLET ORAL
Qty: 120 TABLET | Refills: 0 | Status: SHIPPED | OUTPATIENT
Start: 2019-11-01 | End: 2019-12-01

## 2019-11-01 RX ORDER — POTASSIUM CHLORIDE 1.5 G/1.77G
40 POWDER, FOR SOLUTION ORAL ONCE
Status: COMPLETED | OUTPATIENT
Start: 2019-11-01 | End: 2019-11-01

## 2019-11-01 RX ADMIN — ONDANSETRON 4 MG: 2 INJECTION INTRAMUSCULAR; INTRAVENOUS at 06:26

## 2019-11-01 RX ADMIN — SODIUM CHLORIDE 100 ML/HR: 9 INJECTION, SOLUTION INTRAVENOUS at 08:18

## 2019-11-01 RX ADMIN — POTASSIUM CHLORIDE 40 MEQ: 1.5 POWDER, FOR SOLUTION ORAL at 11:58

## 2019-11-01 RX ADMIN — INSULIN ASPART 2 UNITS: 100 INJECTION, SOLUTION INTRAVENOUS; SUBCUTANEOUS at 08:18

## 2019-11-01 RX ADMIN — FLUOXETINE 40 MG: 20 CAPSULE ORAL at 08:17

## 2019-11-01 RX ADMIN — FAMOTIDINE 20 MG: 20 TABLET, FILM COATED ORAL at 08:17

## 2019-11-01 RX ADMIN — METOCLOPRAMIDE HYDROCHLORIDE 10 MG: 10 TABLET ORAL at 08:16

## 2019-11-01 RX ADMIN — HEPARIN SODIUM 5000 UNITS: 5000 INJECTION INTRAVENOUS; SUBCUTANEOUS at 08:16

## 2019-11-01 NOTE — DISCHARGE SUMMARY
DISCHARGE SUMMARY    NAME: Fernanda Patterson   PHYSICIAN: Yobani Herrera MD  : 2000  MRN: [id number]    ADMITTED: 10/29/2019   DISCHARGED:  19    ADMISSION DIAGNOSES:   Present on Admission:  • Diabetic ketoacidosis without coma associated with type 1 diabetes mellitus (CMS/HCC)  • Severe depressed bipolar II disorder without psychotic features (CMS/HCC)  • Acute cystitis    DISCHARGE DIAGNOSES:     Diabetic ketoacidosis without coma associated with type 1 diabetes mellitus (CMS/HCC)    Severe depressed bipolar II disorder without psychotic features (CMS/HCC)    Acute cystitis      SERVICE: Medicine. Attending  Yobani Herrera MD    CONSULTS:   Consult Orders (all) (From admission, onward)    Start     Ordered    10/31/19 1130  Inpatient Endocrinology Consult  Once     Specialty:  Endocrinology  Provider:  Tavon Avila MD    10/31/19 1129    10/29/19 2355  Inpatient Nutrition Consult  Once     Provider:  (Not yet assigned)    10/29/19 2355    10/29/19 2355  Inpatient Diabetes Educator Consult  Once     Provider:  (Not yet assigned)    10/29/19 2355    10/29/19 2214  Hospitalist (on-call MD unless specified)  Once     Specialty:  Hospitalist  Provider:  Fernando Childs MD    10/29/19 2214          PROCEDURES:   Imaging Results (Last 7 Days)     Procedure Component Value Units Date/Time    CT Angiogram Chest [[id number]] Collected:  10/31/19 1724     Updated:  10/31/19 175    Narrative:       CT chest with contrast. CTA thorax. Pulmonary embolism study.       CLINICAL INDICATION: Chest pain       COMPARISON: Chest x-ray 2019.       TECHNIQUE: Nonionic IV contrast. 85 mL Isovue-370. Helical  scanning with axial and coronal reformations. Soft tissue, lung,  liver, and bone windows reviewed. Computer generated 3-D images,  CT angiography including MIP images are obtained.    This exam was performed according to our departmental  dose-optimization program, which includes automated  exposure  control, adjustment of the mA and/or kV according to patient size  and/or use of iterative reconstruction technique.    CHEST CT FINDINGS:  There are no pulmonary emboli.    No evidence  of pathologically enlarged nodes.     The lungs are clear.    No evidence of pleural fluid.           Impression:       CONCLUSION: No evidence for pulmonary emboli. Normal exam.    Electronically signed by:  Jules Sargent MD  10/31/2019 5:50 PM CDT  Workstation: [id number]    XR Chest 1 View [[id number]] Collected:  10/30/19 0612     Updated:  10/30/19 0637    Narrative:       Exam: AP portable chest    INDICATION: Chest pain    COMPARISON: 9/29/2019    FINDINGS: AP portable chest the bony structures are intact. The  cardiomediastinal silhouette is unremarkable. The lungs are  clear. No pneumothorax of pleural effusion.      Impression:       No acute cardiopulmonary abnormality.    Electronically signed by:  Jad Lamb MD  10/30/2019 6:35 AM  CDT Workstation: [id number]    CT Abdomen Pelvis Without Contrast [[id number]] Collected:  10/29/19 2315     Updated:  10/29/19 2353    Narrative:       PROCEDURE:  CT ABDOMEN PELVIS WO CONTRAST    CLINICAL HISTORY:  19 years  Female  abd pain, leukocytosis,  thrombocytosis, E10.10 Type 1 diabetes mellitus with ketoacidosis  without coma D72.820 Lymphocytosis (symptomatic) D47.3 Essential  (hemorrhagic) thrombocythemia      TECHNIQUE: Contiguous axial images obtained through the abdomen  and pelvis without intravenous contrast administration.  Coronal  and sagittal reformatted images provided.      This CT exam was performed according to our departmental  dose-optimization program, which includes one or more of the  following dose reduction techniques: automated exposure control,  adjustment of the mA and/or kV according to patient size, and/or  use of iterative reconstruction technique.    COMPARISON: Comparison is made with the prior exam dated  10/8/2019.    FINDINGS:    There is  moderate gastric distention with fluid. No mural  thickening or definite evidence of a mechanical outlet  obstruction.    Suspected mild diffuse small bowel wall thickening without  abnormal dilatation. No obstruction, pneumatosis, or free  intraperitoneal air. Normal appendix and colon.    There is a small amount of diffuse peritoneal ascites, layering  in the pelvis. No visualized adnexal mass.    There is diffuse thickening of the urinary bladder. No  hydronephrosis or urinary calculus on either side. Right renal  scarring.    The lung bases, unenhanced liver, biliary tree, gallbladder,  pancreas, spleen, adrenal glands, and osseous structures are  normal.       Impression:         Gastric distention without evidence of mechanical obstruction,  possibly reflecting gastroparesis.    Possible diffuse enteritis without bowel obstruction or  perforation.    Small amount of diffuse peritoneal ascites.    Suspected cystitis, correlate with urinalysis.    Electronically signed by:  Gely Spencer MD  10/29/2019 11:52 PM  CDT Workstation: [id number]          HISTORY OF PRESENT ILLNESS: *Copied from Fernando Childs MD's H&P*  The patient is a 19-year old  woman with a 10-year history of diabetes mellitus type 1.  She is known to be noncompliant with recommended therapy, having been previously admitted at different times for the management of DKA.  She uses an insulin pump and she has been followed by an endocrinologist.  The patient smokes cigarettes on a regular basis and she is not ready to quit at this time.     She came to the emergency department because of a chest discomfort.  There was also associated nausea and vomiting.  She was evaluated in the emergency department and noted to have features suggestive of diabetic ketoacidosis.  She has been commenced on insulin infusion and her electrolytes and fluid balance are being monitored and corrected as necessary.     The patient denies any fever, chills, double  vision, blurred vision, abdominal pain, urinary symptoms.  On further questioning, the patient states her chest pain is centrally located, started spontaneously and seems to be pleuritic in nature.    DIAGNOSTIC DATA:   Lab Results (last 7 days)     Procedure Component Value Units Date/Time    Urine Culture - Urine, Urine, Clean Catch [[id number]]  (Abnormal) Collected:  10/31/19 1044    Specimen:  Urine, Clean Catch Updated:  11/01/19 1025     Urine Culture 50,000 CFU/mL Escherichia coli    Basic Metabolic Panel [[id number]]  (Abnormal) Collected:  11/01/19 0631    Specimen:  Blood Updated:  11/01/19 0927     Glucose 114 mg/dL      BUN 7 mg/dL      Creatinine 0.71 mg/dL      Sodium 137 mmol/L      Potassium 3.3 mmol/L      Chloride 101 mmol/L      CO2 25.0 mmol/L      Calcium 8.3 mg/dL      eGFR Non African Amer 105 mL/min/1.73      BUN/Creatinine Ratio 9.9     Anion Gap 11.0 mmol/L     Narrative:       GFR Normal >60  Chronic Kidney Disease <60  Kidney Failure <15    POC Glucose Once [[id number]]  (Abnormal) Collected:  11/01/19 0736    Specimen:  Blood Updated:  11/01/19 0807     Glucose 142 mg/dL      Comment: RN NotifiedOperator: [id number] [facility]: [id number]       CBC & Differential [[id number]] Collected:  11/01/19 0631    Specimen:  Blood Updated:  11/01/19 0721    Narrative:       The following orders were created for panel order CBC & Differential.  Procedure                               Abnormality         Status                     ---------                               -----------         ------                     CBC Auto Differential[[id number]]        Abnormal            Final result                 Please view results for these tests on the individual orders.    CBC Auto Differential [[id number]]  (Abnormal) Collected:  11/01/19 0631    Specimen:  Blood Updated:  11/01/19 0721     WBC 9.93 10*3/mm3      RBC 3.01 10*6/mm3      Hemoglobin 8.5 g/dL      Hematocrit 25.9 %      MCV 86.0 fL       MCH 28.2 pg      MCHC 32.8 g/dL      RDW 14.3 %      RDW-SD 44.9 fl      MPV 9.5 fL      Platelets 528 10*3/mm3      Neutrophil % 82.3 %      Lymphocyte % 11.3 %      Monocyte % 3.8 %      Eosinophil % 0.2 %      Basophil % 0.2 %      Immature Grans % 2.2 %      Neutrophils, Absolute 8.17 10*3/mm3      Lymphocytes, Absolute 1.12 10*3/mm3      Monocytes, Absolute 0.38 10*3/mm3      Eosinophils, Absolute 0.02 10*3/mm3      Basophils, Absolute 0.02 10*3/mm3      Immature Grans, Absolute 0.22 10*3/mm3      nRBC 0.0 /100 WBC     POC Glucose Once [[id number]]  (Normal) Collected:  10/31/19 2328    Specimen:  Blood Updated:  10/31/19 2341     Glucose 109 mg/dL      Comment: : [id number] RANDI WYNNEAMeter ID: [id number]       POC Glucose Once [[id number]]  (Abnormal) Collected:  10/31/19 1926    Specimen:  Blood Updated:  10/31/19 2004     Glucose 177 mg/dL      Comment: RN NotifiedOperator: [id number] CJ BERMUDEZLEYMeter ID: [id number]       POC Glucose Once [[id number]]  (Normal) Collected:  10/31/19 1653    Specimen:  Blood Updated:  10/31/19 1709     Glucose 127 mg/dL      Comment: RN NotifiedOperator: [id number] Zenytime DAISHAMeter ID: [id number]       POC Glucose Once [[id number]]  (Abnormal) Collected:  10/31/19 1049    Specimen:  Blood Updated:  10/31/19 1108     Glucose 287 mg/dL      Comment: RN NotifiedOperator: [id number] Zenytime DAISHAMeter ID: [id number]       Urinalysis With Culture If Indicated - Urine, Clean Catch [[id number]]  (Abnormal) Collected:  10/31/19 1044    Specimen:  Urine, Clean Catch Updated:  10/31/19 1055     Color, UA Yellow     Appearance, UA Clear     pH, UA 6.0     Specific Gravity, UA 1.009     Glucose, UA >=1000 mg/dL (3+)     Ketones, UA Negative     Bilirubin, UA Negative     Blood, UA Small (1+)     Protein, UA Negative     Leuk Esterase, UA Small (1+)     Nitrite, UA Negative     Urobilinogen, UA 0.2 E.U./dL    Urinalysis, Microscopic Only - Urine, Clean Catch  [[id number]]  (Abnormal) Collected:  10/31/19 1044    Specimen:  Urine, Clean Catch Updated:  10/31/19 1055     RBC, UA 0-2 /HPF      WBC, UA 21-30 /HPF      Bacteria, UA None Seen /HPF      Squamous Epithelial Cells, UA None Seen /HPF      Hyaline Casts, UA 0-2 /LPF      Methodology Automated Microscopy    Manual Differential [[id number]]  (Abnormal) Collected:  10/31/19 0743    Specimen:  Blood Updated:  10/31/19 0947     Neutrophil % 84.0 %      Lymphocyte % 9.0 %      Monocyte % 2.0 %      Bands %  1.0 %      Metamyelocyte % 3.0 %      Myelocyte % 1.0 %      Neutrophils Absolute 15.32 10*3/mm3      Lymphocytes Absolute 1.62 10*3/mm3      Monocytes Absolute 0.36 10*3/mm3      Anisocytosis Slight/1+     Hypochromia Slight/1+     Polychromasia Slight/1+     WBC Morphology Normal     Platelet Estimate Increased    CBC & Differential [[id number]] Collected:  10/31/19 0743    Specimen:  Blood Updated:  10/31/19 0813    Narrative:       The following orders were created for panel order CBC & Differential.  Procedure                               Abnormality         Status                     ---------                               -----------         ------                     CBC Auto Differential[[id number]]        Abnormal            Final result                 Please view results for these tests on the individual orders.    CBC Auto Differential [[id number]]  (Abnormal) Collected:  10/31/19 0743    Specimen:  Blood Updated:  10/31/19 0813     WBC 18.02 10*3/mm3      RBC 3.17 10*6/mm3      Hemoglobin 9.1 g/dL      Hematocrit 27.6 %      MCV 87.1 fL      MCH 28.7 pg      MCHC 33.0 g/dL      RDW 14.3 %      RDW-SD 45.8 fl      MPV 9.4 fL      Platelets 761 10*3/mm3     POC Glucose Once [[id number]]  (Abnormal) Collected:  10/31/19 0720    Specimen:  Blood Updated:  10/31/19 0749     Glucose 201 mg/dL      Comment: RN NotifiedOperator: [id number] CIVILS DAISHAMeter ID: [id number]       POC Glucose Once [[id number]]   (Normal) Collected:  10/30/19 1057    Specimen:  Blood Updated:  10/30/19 2149     Glucose 96 mg/dL      Comment: : [id number] CORLEY STACIEMeter ID: [id number]       POC Glucose Once [[id number]]  (Abnormal) Collected:  10/30/19 2117    Specimen:  Blood Updated:  10/30/19 2131     Glucose 237 mg/dL      Comment: RN NotifiedOperator: [id number] ESME LABOYMeter ID: [id number]       POC Glucose Once [[id number]]  (Abnormal) Collected:  10/30/19 1712    Specimen:  Blood Updated:  10/30/19 1724     Glucose 218 mg/dL      Comment: RN NotifiedOperator: [id number] ELLIOT BARRETTMeter ID: [id number]       Basic Metabolic Panel [[id number]]  (Abnormal) Collected:  10/30/19 1157    Specimen:  Blood Updated:  10/30/19 1302     Glucose 72 mg/dL      BUN 18 mg/dL      Creatinine 0.85 mg/dL      Sodium 138 mmol/L      Potassium 4.0 mmol/L      Chloride 106 mmol/L      CO2 19.0 mmol/L      Calcium 8.3 mg/dL      eGFR Non African Amer 85 mL/min/1.73      BUN/Creatinine Ratio 21.2     Anion Gap 13.0 mmol/L     Narrative:       GFR Normal >60  Chronic Kidney Disease <60  Kidney Failure <15    Magnesium [[id number]]  (Normal) Collected:  10/30/19 1157    Specimen:  Blood Updated:  10/30/19 1257     Magnesium 1.7 mg/dL     POC Glucose Once [[id number]]  (Normal) Collected:  10/30/19 1243    Specimen:  Blood Updated:  10/30/19 1255     Glucose 84 mg/dL      Comment: : [id number] CORLEY STACIEMeter ID: [id number]       POC Glucose Once [[id number]]  (Abnormal) Collected:  10/30/19 0906    Specimen:  Blood Updated:  10/30/19 1255     Glucose 136 mg/dL      Comment: : [id number] CORLEY STACIEMeter ID: [id number]       Phosphorus [[id number]]  (Normal) Collected:  10/30/19 1157    Specimen:  Blood Updated:  10/30/19 1230     Phosphorus 2.5 mg/dL     CBC & Differential [[id number]] Collected:  10/30/19 1157    Specimen:  Blood Updated:  10/30/19 1208    Narrative:       The following orders were created for panel  order CBC & Differential.  Procedure                               Abnormality         Status                     ---------                               -----------         ------                     CBC Auto Differential[[id number]]        Abnormal            Final result                 Please view results for these tests on the individual orders.    CBC Auto Differential [[id number]]  (Abnormal) Collected:  10/30/19 1157    Specimen:  Blood Updated:  10/30/19 1208     WBC 27.53 10*3/mm3      RBC 2.80 10*6/mm3      Hemoglobin 8.1 g/dL      Comment: Specimen reanalyzed to confirm hgb results        Hematocrit 24.2 %      MCV 86.4 fL      MCH 28.9 pg      MCHC 33.5 g/dL      RDW 13.9 %      RDW-SD 43.4 fl      MPV 8.8 fL      Platelets 782 10*3/mm3      Neutrophil % 78.9 %      Lymphocyte % 10.3 %      Monocyte % 4.4 %      Eosinophil % 0.3 %      Basophil % 0.3 %      Immature Grans % 5.8 %      Neutrophils, Absolute 21.72 10*3/mm3      Lymphocytes, Absolute 2.84 10*3/mm3      Monocytes, Absolute 1.21 10*3/mm3      Eosinophils, Absolute 0.07 10*3/mm3      Basophils, Absolute 0.09 10*3/mm3      Immature Grans, Absolute 1.60 10*3/mm3      nRBC 0.0 /100 WBC     POC Glucose Once [[id number]]  (Normal) Collected:  10/30/19 1019    Specimen:  Blood Updated:  10/30/19 1038     Glucose 104 mg/dL      Comment: : [id number] CORLEY STACIEMeter ID: [id number]       POC Glucose Once [[id number]]  (Abnormal) Collected:  10/30/19 0808    Specimen:  Blood Updated:  10/30/19 0920     Glucose 150 mg/dL      Comment: : [id number] CORLEY STACIEMeter ID: [id number]       POC Glucose Once [[id number]]  (Abnormal) Collected:  10/30/19 0645    Specimen:  Blood Updated:  10/30/19 0920     Glucose 179 mg/dL      Comment: : [id number] SERRANO JESSICAMeter ID: [id number]       Phosphorus [[id number]]  (Abnormal) Collected:  10/30/19 0700    Specimen:  Blood Updated:  10/30/19 0746     Phosphorus 2.4 mg/dL     Basic  Metabolic Panel [[id number]]  (Abnormal) Collected:  10/30/19 0700    Specimen:  Blood Updated:  10/30/19 0746     Glucose 164 mg/dL      BUN 21 mg/dL      Creatinine 0.95 mg/dL      Sodium 136 mmol/L      Potassium 4.4 mmol/L      Chloride 104 mmol/L      CO2 17.0 mmol/L      Calcium 8.5 mg/dL      eGFR Non African Amer 75 mL/min/1.73      BUN/Creatinine Ratio 22.1     Anion Gap 15.0 mmol/L     Narrative:       GFR Normal >60  Chronic Kidney Disease <60  Kidney Failure <15    Magnesium [[id number]]  (Normal) Collected:  10/30/19 0700    Specimen:  Blood Updated:  10/30/19 0741     Magnesium 1.8 mg/dL     Troponin [[id number]]  (Normal) Collected:  10/30/19 0700    Specimen:  Blood Updated:  10/30/19 0739     Troponin T <0.010 ng/mL     Narrative:       Troponin T Reference Range:  <= 0.03 ng/mL-   Negative for AMI  >0.03 ng/mL-     Abnormal for myocardial necrosis.  Clinicians would have to utilize clinical acumen, EKG, Troponin and serial changes to determine if it is an Acute Myocardial Infarction or myocardial injury due to an underlying chronic condition.     POC Glucose Once [[id number]]  (Abnormal) Collected:  10/30/19 0540    Specimen:  Blood Updated:  10/30/19 0606     Glucose 219 mg/dL      Comment: : [id number] RENETTA OurcastMeter ID: [id number]       POC Glucose Once [[id number]]  (Abnormal) Collected:  10/30/19 0416    Specimen:  Blood Updated:  10/30/19 0509     Glucose 294 mg/dL      Comment: Result Not ConfirmedOperator: [id number] RENETTA [employer]Meter ID: [id number]       Basic Metabolic Panel [[id number]]  (Abnormal) Collected:  10/30/19 0358    Specimen:  Blood Updated:  10/30/19 0426     Glucose 327 mg/dL      BUN 24 mg/dL      Creatinine 1.11 mg/dL      Sodium 136 mmol/L      Potassium 4.5 mmol/L      Chloride 103 mmol/L      CO2 13.0 mmol/L      Calcium 8.8 mg/dL      eGFR Non African Amer 63 mL/min/1.73      BUN/Creatinine Ratio 21.6     Anion Gap 20.0 mmol/L     Narrative:       GFR  Normal >60  Chronic Kidney Disease <60  Kidney Failure <15    Phosphorus [[id number]]  (Normal) Collected:  10/30/19 0358    Specimen:  Blood Updated:  10/30/19 0426     Phosphorus 3.4 mg/dL     Magnesium [[id number]]  (Normal) Collected:  10/30/19 0358    Specimen:  Blood Updated:  10/30/19 0423     Magnesium 1.9 mg/dL     POC Glucose Once [[id number]]  (Abnormal) Collected:  10/30/19 0245    Specimen:  Blood Updated:  10/30/19 0307     Glucose 383 mg/dL      Comment: Result Not ConfirmedOperator: [id number] [facility] [id number]       Lactic Acid, Reflex [[id number]]  (Normal) Collected:  10/30/19 0208    Specimen:  Blood Updated:  10/30/19 0239     Lactate 0.8 mmol/L     Lactic Acid, Reflex Timer (This will reflex a repeat order 3-3:15 hours after ordered.) [[id number]] Collected:  10/29/19 2224    Specimen:  Blood Updated:  10/30/19 0154     Extra Tube Hold for add-ons.     Comment: Auto resulted.       Basic Metabolic Panel [[id number]]  (Abnormal) Collected:  10/30/19 0025    Specimen:  Blood Updated:  10/30/19 0122     Glucose 643 mg/dL      BUN 26 mg/dL      Creatinine 1.31 mg/dL      Sodium 134 mmol/L      Potassium 4.6 mmol/L      Chloride 94 mmol/L      CO2 5.0 mmol/L      Calcium 9.2 mg/dL      eGFR Non African Amer 52 mL/min/1.73      BUN/Creatinine Ratio 19.8     Anion Gap 35.0 mmol/L     Narrative:       GFR Normal >60  Chronic Kidney Disease <60  Kidney Failure <15    Phosphorus [[id number]]  (Abnormal) Collected:  10/30/19 0025    Specimen:  Blood Updated:  10/30/19 0121     Phosphorus 5.7 mg/dL     Magnesium [[id number]]  (Normal) Collected:  10/30/19 0025    Specimen:  Blood Updated:  10/30/19 0116     Magnesium 2.1 mg/dL     CBC & Differential [[id number]] Collected:  10/30/19 0025    Specimen:  Blood Updated:  10/30/19 0104    Narrative:       The following orders were created for panel order CBC & Differential.  Procedure                               Abnormality         Status                      ---------                               -----------         ------                     CBC Auto Differential[[id number]]        Abnormal            Final result                 Please view results for these tests on the individual orders.    CBC Auto Differential [[id number]]  (Abnormal) Collected:  10/30/19 0025    Specimen:  Blood Updated:  10/30/19 0104     WBC 34.41 10*3/mm3      RBC 3.43 10*6/mm3      Hemoglobin 9.9 g/dL      Hematocrit 33.3 %      MCV 97.1 fL      MCH 28.9 pg      MCHC 29.7 g/dL      RDW 14.2 %      RDW-SD 50.0 fl      MPV 10.0 fL      Platelets 789 10*3/mm3      Neutrophil % 78.9 %      Lymphocyte % 6.9 %      Monocyte % 3.0 %      Eosinophil % 0.0 %      Basophil % 0.2 %      Immature Grans % 11.0 %      Neutrophils, Absolute 27.16 10*3/mm3      Lymphocytes, Absolute 2.37 10*3/mm3      Monocytes, Absolute 1.03 10*3/mm3      Eosinophils, Absolute 0.01 10*3/mm3      Basophils, Absolute 0.06 10*3/mm3      Immature Grans, Absolute 3.78 10*3/mm3      nRBC 0.0 /100 WBC     Osmolality, Serum [[id number]]  (Abnormal) Collected:  10/29/19 2124    Specimen:  Blood Updated:  10/30/19 0039     Osmolality 349 mOsm/kg     Hemoglobin A1c [[id number]]  (Abnormal) Collected:  10/29/19 2059    Specimen:  Blood from Arm, Right Updated:  10/30/19 0030     Hemoglobin A1C 12.80 %     Narrative:       Hemoglobin A1C Ranges:    Increased Risk for Diabetes  5.7% to 6.4%  Diabetes                     >= 6.5%  Diabetic Goal                < 7.0%    Lactic Acid, Plasma [[id number]]  (Abnormal) Collected:  10/29/19 2224    Specimen:  Blood Updated:  10/29/19 2240     Lactate 2.5 mmol/L     Manual Differential [[id number]]  (Abnormal) Collected:  10/29/19 2059    Specimen:  Blood from Arm, Right Updated:  10/29/19 2201     Neutrophil % 80.0 %      Lymphocyte % 5.0 %      Monocyte % 3.0 %      Bands %  5.0 %      Metamyelocyte % 7.0 %      Neutrophils Absolute 33.05 10*3/mm3      Lymphocytes Absolute 1.94 10*3/mm3       Monocytes Absolute 1.17 10*3/mm3      Hypochromia Slight/1+     WBC Morphology Normal     Platelet Estimate Increased    [place] Draw [[id number]] Collected:  10/29/19 2059    Specimen:  Blood Updated:  10/29/19 2200    Narrative:       The following orders were created for panel order [place] Draw.  Procedure                               Abnormality         Status                     ---------                               -----------         ------                     Light Blue Top[[id number]]                                   Final result               Green Top (Gel)[[id number]]                                  Final result               Lavender Top[[id number]]                                     Final result               Gold Top - SST[[id number]]                                   Final result                 Please view results for these tests on the individual orders.    Light Blue Top [[id number]] Collected:  10/29/19 2059    Specimen:  Blood from Arm, Right Updated:  10/29/19 2200     Extra Tube hold for add-on     Comment: Auto resulted       Lavender Top [[id number]] Collected:  10/29/19 2059    Specimen:  Blood from Arm, Right Updated:  10/29/19 2200     Extra Tube hold for add-on     Comment: Auto resulted       Gold Top - SST [[id number]] Collected:  10/29/19 2059    Specimen:  Blood from Arm, Right Updated:  10/29/19 2200     Extra Tube Hold for add-ons.     Comment: Auto resulted.       Extra Tubes [[id number]] Collected:  10/29/19 2059    Specimen:  Blood, Venous Line Updated:  10/29/19 2200    Narrative:       The following orders were created for panel order Extra Tubes.  Procedure                               Abnormality         Status                     ---------                               -----------         ------                     Gold Top - SST[[id number]]                                   Final result               Green Top (Gel)[[id number]]                                  Final result                  Please view results for these tests on the individual orders.    Gold Top - SST [[id number]] Collected:  10/29/19 2059    Specimen:  Blood from Arm, Right Updated:  10/29/19 2200     Extra Tube Hold for add-ons.     Comment: Auto resulted.       Green Top (Gel) [[id number]] Collected:  10/29/19 2059    Specimen:  Blood from Arm, Right Updated:  10/29/19 2200     Extra Tube Hold for add-ons.     Comment: Auto resulted.       Green Top (Gel) [[id number]] Collected:  10/29/19 2059    Specimen:  Blood from Arm, Right Updated:  10/29/19 2200     Extra Tube Hold for add-ons.     Comment: Auto resulted.       Influenza Antigen, Rapid - Swab, Nasopharynx [[id number]]  (Normal) Collected:  10/29/19 2135    Specimen:  Swab from Nasopharynx Updated:  10/29/19 2151     Influenza A Ag, EIA Negative     Influenza B Ag, EIA Negative    D-dimer, Quantitative [[id number]]  (Abnormal) Collected:  10/29/19 2059    Specimen:  Blood from Arm, Right Updated:  10/29/19 2150     D-Dimer, Quantitative >4,000 ng/mL (FEU)     Narrative:       Dimer values <500 ng/ml FEU are FDA approved as aid in diagnosis of deep venous thrombosis and pulmonary embolism.  This test should not be used in an exclusion strategy with pretest probability alone.    A recent guideline regarding diagnosis for pulmonary thromboembolism recommends an adjusted exclusion criterion of age x 10 ng/ml FEU for patients >50 years of age ([employer] Intern Med 2015; 163: 701-711).    C-reactive Protein [[id number]]  (Abnormal) Collected:  10/29/19 2124    Specimen:  Blood Updated:  10/29/19 2142     C-Reactive Protein 24.93 mg/dL     BNP [[id number]]  (Abnormal) Collected:  10/29/19 2124    Specimen:  Blood Updated:  10/29/19 2141     proBNP 923.5 pg/mL     Narrative:       Among patients with dyspnea, NT-proBNP is highly sensitive for the detection of acute congestive heart failure. In addition NT-proBNP of <300 pg/ml effectively rules out acute congestive heart failure with  99% negative predictive value.    Comprehensive Metabolic Panel [[id number]]  (Abnormal) Collected:  10/29/19 2059    Specimen:  Blood from Arm, Right Updated:  10/29/19 2139     Glucose 915 mg/dL      BUN 28 mg/dL      Creatinine 1.64 mg/dL      Sodium 129 mmol/L      Potassium 5.5 mmol/L      Chloride 84 mmol/L      CO2 4.0 mmol/L      Calcium 10.1 mg/dL      Total Protein 8.6 g/dL      Albumin 3.30 g/dL      ALT (SGPT) 9 U/L      AST (SGOT) 7 U/L      Alkaline Phosphatase 152 U/L      Total Bilirubin 0.2 mg/dL      eGFR Non African Amer 40 mL/min/1.73      Globulin 5.3 gm/dL      A/G Ratio 0.6 g/dL      BUN/Creatinine Ratio 17.1     Anion Gap 41.0 mmol/L     Narrative:       GFR Normal >60  Chronic Kidney Disease <60  Kidney Failure <15    TSH [[id number]]  (Normal) Collected:  10/29/19 2059    Specimen:  Blood from Arm, Right Updated:  10/29/19 2134     TSH 3.330 uIU/mL     T4, Free [[id number]]  (Abnormal) Collected:  10/29/19 2059    Specimen:  Blood from Arm, Right Updated:  10/29/19 2134     Free T4 0.91 ng/dL     Urine Drug Screen - Urine, Clean Catch [[id number]]  (Normal) Collected:  10/29/19 2109    Specimen:  Urine, Clean Catch Updated:  10/29/19 2132     THC, Screen, Urine Negative     Phencyclidine (PCP), Urine Negative     Cocaine Screen, Urine Negative     Methamphetamine, Ur Negative     Opiate Screen Negative     Amphetamine Screen, Urine Negative     Benzodiazepine Screen, Urine Negative     Tricyclic Antidepressants Screen Negative     Methadone Screen, Urine Negative     Barbiturates Screen, Urine Negative     Oxycodone Screen, Urine Negative     Propoxyphene Screen Negative     Buprenorphine, Screen, Urine Negative    Narrative:       Cutoff For Drugs Screened:    Amphetamines               500 ng/ml  Barbiturates               200 ng/ml  Benzodiazepines            150 ng/ml  Cocaine                    150 ng/ml  Methadone                  200 ng/ml  Opiates                    100  ng/ml  Phencyclidine               25 ng/ml  THC                            50 ng/ml  Methamphetamine            500 ng/ml  Tricyclic Antidepressants  300 ng/ml  Oxycodone                  100 ng/ml  Propoxyphene               300 ng/ml  Buprenorphine               10 ng/ml    The normal value for all drugs tested is negative. This report includes unconfirmed screening results, with the cutoff values listed, to be used for medical treatment purposes only.  Unconfirmed results must not be used for non-medical purposes such as employment or legal testing.  Clinical consideration should be applied to any drug of abuse test, particularly when unconfirmed results are used.      Magnesium [[id number]]  (Abnormal) Collected:  10/29/19 2059    Specimen:  Blood from Arm, Right Updated:  10/29/19 2131     Magnesium 2.4 mg/dL     Ketone Bodies, Serum (Not performed at [facility]) [[id number]] Collected:  10/29/19 2124    Specimen:  Blood Updated:  10/29/19 2128    Narrative:       The following orders were created for panel order Ketone Bodies, Serum (Not performed at [facility]).  Procedure                               Abnormality         Status                     ---------                               -----------         ------                     Acetone[[id number]]                      Abnormal            Final result                 Please view results for these tests on the individual orders.    Acetone [[id number]]  (Abnormal) Collected:  10/29/19 2124    Specimen:  Blood Updated:  10/29/19 2128     Acetone Large    Urinalysis, Microscopic Only - Urine, Clean Catch [[id number]]  (Abnormal) Collected:  10/29/19 2109    Specimen:  Urine, Clean Catch Updated:  10/29/19 2124     RBC, UA None Seen /HPF      WBC, UA 6-12 /HPF      Bacteria, UA None Seen /HPF      Squamous Epithelial Cells, UA None Seen /HPF      Hyaline Casts, UA 0-2 /LPF      Methodology Automated Microscopy    Urinalysis With Microscopic If Indicated (No Culture)  - Urine, Clean Catch [[id number]]  (Abnormal) Collected:  10/29/19 2109    Specimen:  Urine, Clean Catch Updated:  10/29/19 2123     Color, UA Yellow     Appearance, UA Clear     pH, UA <=5.0     Specific Gravity, UA 1.020     Glucose, UA >=1000 mg/dL (3+)     Ketones, UA 80 mg/dL (3+)     Bilirubin, UA Negative     Blood, UA Small (1+)     Protein, UA Negative     Leuk Esterase, UA Negative     Nitrite, UA Negative     Urobilinogen, UA 0.2 E.U./dL    CBC & Differential [[id number]] Collected:  10/29/19 2059    Specimen:  Blood Updated:  10/29/19 2121    Narrative:       The following orders were created for panel order CBC & Differential.  Procedure                               Abnormality         Status                     ---------                               -----------         ------                     CBC Auto Differential[[id number]]        Abnormal            Final result                 Please view results for these tests on the individual orders.    CBC Auto Differential [[id number]]  (Abnormal) Collected:  10/29/19 2059    Specimen:  Blood from Arm, Right Updated:  10/29/19 2121     WBC 38.88 10*3/mm3      RBC 3.38 10*6/mm3      Hemoglobin 9.6 g/dL      Hematocrit 32.9 %      MCV 97.3 fL      MCH 28.4 pg      MCHC 29.2 g/dL      RDW 14.3 %      RDW-SD 50.3 fl      MPV 9.9 fL      Platelets 1,059 10*3/mm3      Neutrophil % 79.2 %      Lymphocyte % 6.4 %      Monocyte % 1.6 %      Eosinophil % 0.1 %      Basophil % 0.3 %      Immature Grans % 12.4 %      Neutrophils, Absolute 30.81 10*3/mm3      Lymphocytes, Absolute 2.47 10*3/mm3      Monocytes, Absolute 0.64 10*3/mm3      Eosinophils, Absolute 0.02 10*3/mm3      Basophils, Absolute 0.11 10*3/mm3      Immature Grans, Absolute 4.83 10*3/mm3      nRBC 0.0 /100 WBC     hCG, Serum, Qualitative [[id number]]  (Normal) Collected:  10/29/19 2059    Specimen:  Blood from Arm, Right Updated:  10/29/19 2119     HCG Qualitative Negative          HOSPITAL  "COURSE:  Active Hospital Problems    Diagnosis POA   • **Diabetic ketoacidosis without coma associated with type 1 diabetes mellitus (CMS/HCC) [E10.10] Yes   • Severe depressed bipolar II disorder without psychotic features (CMS/HCC) [F31.81] Yes       #. Chest pain.   11/1. Chest pain free. Negative CTA. 20 units toujeo qhs. 1 unit aspart per 5 carbs mealtime. Other instructions per endocrinology. Follow up closely.  10/31. High D-dimer. Low risk for PE. CTA chest w/ contrast. Heparin SQ. Improved.  #. DKA. IDDM.   11/1. SQ insulin on discharge.   10/31. Endocrine consulted. Basal. Carb count. Appreciate endocrine input.  #. Depression. Prozac.  #. Tobacco abuse. Cessation counseling. Nicotine patch.  #. N/V. Improving. Reglan. Zofran.  #. GERD. Pepcid  #. Acute cystitis. Fosfomycin given. Wiley culture to final. Told patient will notify if different abx needed.  Urine Culture   Date Value Ref Range Status   10/31/2019 50,000 CFU/mL Escherichia coli (A)  Preliminary     Results from last 7 days   Lab Units 11/01/19  0631 10/31/19  0743 10/30/19  1157 10/30/19  0025 10/29/19  2059   WBC 10*3/mm3 9.93 18.02* 27.53* 34.41* 38.88*   #. Hypokalemia. Replaced prior to discharge.      PHYSICAL EXAM ON DISCHARGE:  /75 (BP Location: Right arm, Patient Position: Lying)   Pulse 75   Temp 97.2 °F (36.2 °C) (Oral)   Resp 18   Ht 170.2 cm (67\")   Wt 45.5 kg (100 lb 3.2 oz)   LMP 10/23/2019   SpO2 100%   BMI 15.69 kg/m²      Physical Exam   Constitutional: She is oriented to person, place, and time. She appears well-developed and well-nourished. No distress.   HENT:   Head: Normocephalic and atraumatic.   Right Ear: External ear normal.   Left Ear: External ear normal.   Nose: Nose normal.   Eyes: Conjunctivae and EOM are normal. Pupils are equal, round, and reactive to light.   Neck: Neck supple. No thyromegaly present.   Cardiovascular: Normal rate, regular rhythm and normal heart sounds.   Pulmonary/Chest: Effort " normal. She has no wheezes. She has no rales. She exhibits no tenderness.   Abdominal: Soft. Bowel sounds are normal. She exhibits no distension and no mass. There is no tenderness. There is no rebound and no guarding.   Musculoskeletal: She exhibits no edema.   Neurological: She is alert and oriented to person, place, and time.   Skin: Skin is warm and dry. No rash noted. She is not diaphoretic. No erythema. No pallor.   Psychiatric: She has a normal mood and affect. Her behavior is normal.   Nursing note and vitals reviewed.      CONDITION ON DISCHARGE:   Good    Review of Systems   Constitutional: Negative for activity change, appetite change, fatigue and fever.   Respiratory: Negative for cough and shortness of breath.    Cardiovascular: Negative for chest pain and palpitations.   Gastrointestinal: Negative for abdominal pain and nausea.   Genitourinary: Negative for difficulty urinating and dysuria.   Musculoskeletal: Negative for arthralgias and gait problem.   Skin: Negative for color change and rash.   Neurological: Negative for dizziness, weakness and headaches.   Psychiatric/Behavioral: Negative for agitation, confusion and sleep disturbance.       DISPOSITION:  Home or Self Care    DISCHARGE MEDICATIONS     Discharge Medications      New Medications      Instructions Start Date   Alcohol Wipes 70 % pads   Use 4 x daily      insulin aspart 100 UNIT/ML solution pen-injector sc pen  Commonly known as:  NOVOLOG FLEXPEN  Replaces:  insulin aspart 100 UNIT/ML injection   Up to 20 units with meals      Insulin Glargine (1 Unit Dial) 300 UNIT/ML solution pen-injector injection  Commonly known as:  TOUJEO SOLOSTAR   20 Units, Subcutaneous, Every Night at Bedtime      Insulin Pen Needle 31G X 5 MM misc  Commonly known as:  B-D UF III MINI PEN NEEDLES   Use 4 times daily  , ICD10 code is E11.9      metoclopramide 5 MG tablet  Commonly known as:  REGLAN   5 mg, Oral, 4 Times Daily Before Meals & Nightly      nicotine  14 MG/24HR patch  Commonly known as:  NICODERM CQ   1 patch, Transdermal, Every 24 Hours Scheduled   Start Date:  11/2/2019     ondansetron 4 MG tablet  Commonly known as:  ZOFRAN   4 mg, Oral, Every 8 Hours PRN         Changes to Medications      Instructions Start Date   Blood Glucose Monitoring Suppl w/Device kit  What changed:  Another medication with the same name was added. Make sure you understand how and when to take each.   USE AS INDICATED, ANY MONITOR , ICD10 code is E11.9      Blood Glucose Monitoring Suppl w/Device kit  What changed:  You were already taking a medication with the same name, and this prescription was added. Make sure you understand how and when to take each.   USE AS INDICATED, ANY MONITOR , ICD10 code is E11.9      Blood Glucose Test strip  What changed:  Another medication with the same name was added. Make sure you understand how and when to take each.   Use 4 x daily use any brand covered by insurance or same brand as before ICD10 code is E11.9      Blood Glucose Test strip  What changed:  You were already taking a medication with the same name, and this prescription was added. Make sure you understand how and when to take each.   Use 4 x daily use any brand covered by insurance or same brand as before ICD10 code is E11.9      Lancing Device misc  What changed:  Another medication with the same name was added. Make sure you understand how and when to take each.   USE AS INDICATED TO CORRELATE WITH STRIPS AND METER      Lancing Device misc  What changed:  You were already taking a medication with the same name, and this prescription was added. Make sure you understand how and when to take each.   USE AS INDICATED TO CORRELATE WITH STRIPS AND METER         Continue These Medications      Instructions Start Date   ALIGN 4 MG capsule   1 tablet, Oral, Daily      Cariprazine HCl 1.5 MG capsule capsule  Commonly known as:  VRAYLAR   1.5 mg, Oral, Daily      FLUoxetine 40 MG  capsule  Commonly known as:  PROzac   40 mg, Oral, Daily      Lancets 30G misc   USE 4 X DAILY      nortriptyline 25 MG capsule  Commonly known as:  PAMELOR   25 mg, Oral, Nightly PRN         Stop These Medications    insulin aspart 100 UNIT/ML injection  Commonly known as:  novoLOG  Replaced by:  insulin aspart 100 UNIT/ML solution pen-injector sc pen     promethazine 25 MG tablet  Commonly known as:  PHENERGAN            INSTRUCTIONS:  Activity:   Activity Instructions     Activity as Tolerated          Diet:   Diet Instructions     Diet: Regular, Consistent Carbohydrate      Discharge Diet:   Regular  Consistent Carbohydrate             Special instructions: Patient instructed to call MD or return to ED with worsening shortness of breath, chest pain, fever greater than 100.4 degrees F or any other medical concerns..    FOLLOW UP:   Follow-up Information     Rufus Marshall APRN Follow up on 11/7/2019.    Specialty:  Family Medicine  Why:  10 am with Rufus   Contact information:  [address] [place] [id number] [phone]             Tavon Avila MD Follow up.    Specialty:  Endocrinology  Contact information:  [address]  [address]  [facility] [address] [phone]                   PENDING TEST RESULTS AT DISCHARGE   Order Current Status    Urine Culture - Urine, Urine, Clean Catch Preliminary result          Time: Discharge 30 min          This document has been electronically signed by Yobani Herrera MD on November 1, 2019 10:46 AM

## 2019-11-01 NOTE — PLAN OF CARE
Problem: Patient Care Overview  Goal: Plan of Care Review  Outcome: Ongoing (interventions implemented as appropriate)   10/31/19 1313   Coping/Psychosocial   Plan of Care Reviewed With patient   Plan of Care Review   Progress improving   OTHER   Outcome Summary Patient has had nausea today. Zofran ordered. Her D-dimer elevated on 10/29. ct scan done of chest negative. New PIV 20 ga placed to R AC.      Goal: Individualization and Mutuality  Outcome: Ongoing (interventions implemented as appropriate)      Problem: Skin Injury Risk (Adult)  Goal: Identify Related Risk Factors and Signs and Symptoms  Outcome: Outcome(s) achieved Date Met: 10/31/19    Goal: Skin Health and Integrity  Outcome: Ongoing (interventions implemented as appropriate)      Problem: Diabetes, Type 1 (Adult)  Goal: Signs and Symptoms of Listed Potential Problems Will be Absent, Minimized or Managed (Diabetes, Type 1)  Outcome: Ongoing (interventions implemented as appropriate)

## 2019-11-01 NOTE — PLAN OF CARE
Problem: Patient Care Overview  Goal: Plan of Care Review  Outcome: Ongoing (interventions implemented as appropriate)   10/31/19 2090   Coping/Psychosocial   Plan of Care Reviewed With patient   Plan of Care Review   Progress no change     Goal: Individualization and Mutuality  Outcome: Ongoing (interventions implemented as appropriate)    Goal: Discharge Needs Assessment  Outcome: Ongoing (interventions implemented as appropriate)    Goal: Interprofessional Rounds/Family Conf  Outcome: Ongoing (interventions implemented as appropriate)      Problem: Skin Injury Risk (Adult)  Goal: Skin Health and Integrity  Outcome: Ongoing (interventions implemented as appropriate)      Problem: Diabetes, Type 1 (Adult)  Goal: Signs and Symptoms of Listed Potential Problems Will be Absent, Minimized or Managed (Diabetes, Type 1)  Outcome: Ongoing (interventions implemented as appropriate)

## 2019-11-02 ENCOUNTER — READMISSION MANAGEMENT (OUTPATIENT)
Dept: CALL CENTER | Facility: HOSPITAL | Age: 19
End: 2019-11-02

## 2019-11-02 LAB — BACTERIA SPEC AEROBE CULT: ABNORMAL

## 2019-11-02 NOTE — OUTREACH NOTE
Prep Survey      Responses   Facility patient discharged from?  [facility]   Is patient eligible?  Yes   Discharge diagnosis  DKA   Does the patient have one of the following disease processes/diagnoses(primary or secondary)?  Other   Does the patient have Home health ordered?  No   Is there a DME ordered?  No   Comments regarding appointments  see AVS   General alerts for this patient  non-compliant with insulin,  severe depressed bipolar II disorder without psychotic features   Prep survey completed?  Yes          Reshma Barajas RN

## 2019-11-05 ENCOUNTER — READMISSION MANAGEMENT (OUTPATIENT)
Dept: CALL CENTER | Facility: HOSPITAL | Age: 19
End: 2019-11-05

## 2019-11-05 NOTE — OUTREACH NOTE
Medical Week 1 Survey      Responses   Facility patient discharged from?  [facility]   Does the patient have one of the following disease processes/diagnoses(primary or secondary)?  Other   Is there a successful TCM telephone encounter documented?  No   Week 1 attempt successful?  No   Unsuccessful attempts  Attempt 1          April Johns RN

## 2019-11-06 ENCOUNTER — READMISSION MANAGEMENT (OUTPATIENT)
Dept: CALL CENTER | Facility: HOSPITAL | Age: 19
End: 2019-11-06

## 2019-11-06 NOTE — OUTREACH NOTE
Medical Week 1 Survey      Responses   Facility patient discharged from?  [facility]   Does the patient have one of the following disease processes/diagnoses(primary or secondary)?  Other   Is there a successful TCM telephone encounter documented?  No   Week 1 attempt successful?  No   Unsuccessful attempts  Attempt 2          Yaneth Miles RN

## 2019-11-12 ENCOUNTER — READMISSION MANAGEMENT (OUTPATIENT)
Dept: CALL CENTER | Facility: HOSPITAL | Age: 19
End: 2019-11-12

## 2019-11-12 NOTE — OUTREACH NOTE
Medical Week 2 Survey      Responses   Facility patient discharged from?  [facility]   Does the patient have one of the following disease processes/diagnoses(primary or secondary)?  Other   Week 2 attempt successful?  No   Unsuccessful attempts  Attempt 2          Jani Conteh RN

## 2019-11-15 ENCOUNTER — READMISSION MANAGEMENT (OUTPATIENT)
Dept: CALL CENTER | Facility: HOSPITAL | Age: 19
End: 2019-11-15

## 2019-11-15 NOTE — OUTREACH NOTE
Medical Week 3 Survey      Responses   Facility patient discharged from?  [facility]   Does the patient have one of the following disease processes/diagnoses(primary or secondary)?  Other   Week 3 attempt successful?  No   Unsuccessful attempts  Attempt 1          Yaneth Miles RN

## 2019-11-18 ENCOUNTER — READMISSION MANAGEMENT (OUTPATIENT)
Dept: CALL CENTER | Facility: HOSPITAL | Age: 19
End: 2019-11-18

## 2019-11-18 NOTE — OUTREACH NOTE
Medical Week 3 Survey      Responses   Facility patient discharged from?  [facility]   Does the patient have one of the following disease processes/diagnoses(primary or secondary)?  Other   Week 3 attempt successful?  No   Unsuccessful attempts  Attempt 2          Adrianna Plunkett RN

## 2019-12-31 ENCOUNTER — HOSPITAL ENCOUNTER (EMERGENCY)
Facility: HOSPITAL | Age: 19
Discharge: HOME | End: 2019-12-31
Attending: EMERGENCY MEDICINE | Admitting: EMERGENCY MEDICINE

## 2019-12-31 VITALS
RESPIRATION RATE: 18 BRPM | WEIGHT: 102.7 LBS | BODY MASS INDEX: 16.12 KG/M2 | SYSTOLIC BLOOD PRESSURE: 116 MMHG | HEIGHT: 67 IN | TEMPERATURE: 99.9 F | HEART RATE: 84 BPM | OXYGEN SATURATION: 97 % | DIASTOLIC BLOOD PRESSURE: 78 MMHG

## 2019-12-31 DIAGNOSIS — N39.0 URINARY TRACT INFECTION IN FEMALE: Primary | ICD-10-CM

## 2019-12-31 LAB
ALBUMIN SERPL-MCNC: 3.8 G/DL (ref 3.5–5.2)
ALBUMIN/GLOB SERPL: 0.8 G/DL
ALP SERPL-CCNC: 110 U/L (ref 39–117)
ALT SERPL W P-5'-P-CCNC: 9 U/L (ref 1–33)
ANION GAP SERPL CALCULATED.3IONS-SCNC: 15 MMOL/L (ref 5–15)
AST SERPL-CCNC: 12 U/L (ref 1–32)
BACTERIA UR QL AUTO: ABNORMAL /HPF
BASOPHILS # BLD AUTO: 0.04 10*3/MM3 (ref 0–0.2)
BASOPHILS NFR BLD AUTO: 0.4 % (ref 0–1.5)
BILIRUB SERPL-MCNC: 0.2 MG/DL (ref 0.2–1.2)
BILIRUB UR QL STRIP: NEGATIVE
BUN BLD-MCNC: 5 MG/DL (ref 6–20)
BUN/CREAT SERPL: 4.7 (ref 7–25)
CALCIUM SPEC-SCNC: 9.5 MG/DL (ref 8.6–10.5)
CHLORIDE SERPL-SCNC: 91 MMOL/L (ref 98–107)
CLARITY UR: ABNORMAL
CO2 SERPL-SCNC: 26 MMOL/L (ref 22–29)
COLOR UR: YELLOW
CREAT BLD-MCNC: 1.06 MG/DL (ref 0.57–1)
DEPRECATED RDW RBC AUTO: 47.3 FL (ref 37–54)
EOSINOPHIL # BLD AUTO: 0.03 10*3/MM3 (ref 0–0.4)
EOSINOPHIL NFR BLD AUTO: 0.3 % (ref 0.3–6.2)
ERYTHROCYTE [DISTWIDTH] IN BLOOD BY AUTOMATED COUNT: 16.1 % (ref 12.3–15.4)
GFR SERPL CREATININE-BSD FRML MDRD: 66 ML/MIN/1.73
GLOBULIN UR ELPH-MCNC: 4.9 GM/DL
GLUCOSE BLD-MCNC: 361 MG/DL (ref 65–99)
GLUCOSE UR STRIP-MCNC: ABNORMAL MG/DL
HCT VFR BLD AUTO: 33.1 % (ref 34–46.6)
HGB BLD-MCNC: 10.5 G/DL (ref 12–15.9)
HGB UR QL STRIP.AUTO: ABNORMAL
HOLD SPECIMEN: NORMAL
HYALINE CASTS UR QL AUTO: ABNORMAL /LPF
IMM GRANULOCYTES # BLD AUTO: 0.09 10*3/MM3 (ref 0–0.05)
IMM GRANULOCYTES NFR BLD AUTO: 0.8 % (ref 0–0.5)
KETONES UR QL STRIP: NEGATIVE
LEUKOCYTE ESTERASE UR QL STRIP.AUTO: ABNORMAL
LYMPHOCYTES # BLD AUTO: 1.86 10*3/MM3 (ref 0.7–3.1)
LYMPHOCYTES NFR BLD AUTO: 17.4 % (ref 19.6–45.3)
MCH RBC QN AUTO: 25.7 PG (ref 26.6–33)
MCHC RBC AUTO-ENTMCNC: 31.7 G/DL (ref 31.5–35.7)
MCV RBC AUTO: 80.9 FL (ref 79–97)
MONOCYTES # BLD AUTO: 0.4 10*3/MM3 (ref 0.1–0.9)
MONOCYTES NFR BLD AUTO: 3.7 % (ref 5–12)
NEUTROPHILS # BLD AUTO: 8.28 10*3/MM3 (ref 1.7–7)
NEUTROPHILS NFR BLD AUTO: 77.4 % (ref 42.7–76)
NITRITE UR QL STRIP: NEGATIVE
NRBC BLD AUTO-RTO: 0 /100 WBC (ref 0–0.2)
PH UR STRIP.AUTO: 7 [PH] (ref 5–9)
PLATELET # BLD AUTO: 430 10*3/MM3 (ref 140–450)
PMV BLD AUTO: 9.2 FL (ref 6–12)
POTASSIUM BLD-SCNC: 4 MMOL/L (ref 3.5–5.2)
PROT SERPL-MCNC: 8.7 G/DL (ref 6–8.5)
PROT UR QL STRIP: ABNORMAL
RBC # BLD AUTO: 4.09 10*6/MM3 (ref 3.77–5.28)
RBC # UR: ABNORMAL /HPF
REF LAB TEST METHOD: ABNORMAL
SODIUM BLD-SCNC: 132 MMOL/L (ref 136–145)
SP GR UR STRIP: 1.01 (ref 1–1.03)
SQUAMOUS #/AREA URNS HPF: ABNORMAL /HPF
UROBILINOGEN UR QL STRIP: ABNORMAL
WBC NRBC COR # BLD: 10.7 10*3/MM3 (ref 3.4–10.8)
WBC UR QL AUTO: ABNORMAL /HPF
WHOLE BLOOD HOLD SPECIMEN: NORMAL

## 2019-12-31 PROCEDURE — 87088 URINE BACTERIA CULTURE: CPT | Performed by: EMERGENCY MEDICINE

## 2019-12-31 PROCEDURE — 87086 URINE CULTURE/COLONY COUNT: CPT | Performed by: EMERGENCY MEDICINE

## 2019-12-31 PROCEDURE — 87186 SC STD MICRODIL/AGAR DIL: CPT | Performed by: EMERGENCY MEDICINE

## 2019-12-31 PROCEDURE — 96361 HYDRATE IV INFUSION ADD-ON: CPT

## 2019-12-31 PROCEDURE — 80053 COMPREHEN METABOLIC PANEL: CPT | Performed by: NURSE PRACTITIONER

## 2019-12-31 PROCEDURE — 85025 COMPLETE CBC W/AUTO DIFF WBC: CPT | Performed by: NURSE PRACTITIONER

## 2019-12-31 PROCEDURE — 96365 THER/PROPH/DIAG IV INF INIT: CPT

## 2019-12-31 PROCEDURE — 25010000002 CEFTRIAXONE: Performed by: NURSE PRACTITIONER

## 2019-12-31 PROCEDURE — 96375 TX/PRO/DX INJ NEW DRUG ADDON: CPT

## 2019-12-31 PROCEDURE — 99284 EMERGENCY DEPT VISIT MOD MDM: CPT

## 2019-12-31 PROCEDURE — 81001 URINALYSIS AUTO W/SCOPE: CPT

## 2019-12-31 PROCEDURE — 25010000002 KETOROLAC TROMETHAMINE PER 15 MG: Performed by: NURSE PRACTITIONER

## 2019-12-31 RX ORDER — KETOROLAC TROMETHAMINE 15 MG/ML
15 INJECTION, SOLUTION INTRAMUSCULAR; INTRAVENOUS ONCE
Status: COMPLETED | OUTPATIENT
Start: 2019-12-31 | End: 2019-12-31

## 2019-12-31 RX ORDER — SODIUM CHLORIDE 0.9 % (FLUSH) 0.9 %
10 SYRINGE (ML) INJECTION AS NEEDED
Status: DISCONTINUED | OUTPATIENT
Start: 2019-12-31 | End: 2019-12-31 | Stop reason: HOSPADM

## 2019-12-31 RX ADMIN — CEFTRIAXONE 1 G: 1 INJECTION, POWDER, FOR SOLUTION INTRAMUSCULAR; INTRAVENOUS at 19:53

## 2019-12-31 RX ADMIN — KETOROLAC TROMETHAMINE 15 MG: 15 INJECTION, SOLUTION INTRAMUSCULAR; INTRAVENOUS at 19:33

## 2019-12-31 RX ADMIN — SODIUM CHLORIDE 1000 ML: 9 INJECTION, SOLUTION INTRAVENOUS at 19:33

## 2020-01-01 NOTE — ED NOTES
Pt presents to the ED with c/o painful urination, pain in abdomen and back, and sick to stomach.      Luz Gallegos, RN  12/31/19 1920

## 2020-01-01 NOTE — ED PROVIDER NOTES
Subjective   Patient presents to the ER with complaints of continued UTI symptoms.  She states she was seen at the urgent care yesterday and was diagnosed with a UTI.  She was started on Cipro 500 twice daily x10 days.  She states she came today because she still hurting and she has had increased and abdominal pain is radiating to her back.  She states she has pain with urination and has been having some nausea without vomiting.  Patient denies any fever.          Review of Systems   Constitutional: Positive for fatigue. Negative for chills and fever.   Respiratory: Negative.    Cardiovascular: Negative.    Gastrointestinal: Positive for abdominal pain (Suprapubic), nausea and vomiting (Yesterday). Negative for constipation and diarrhea.   Genitourinary: Positive for dysuria, flank pain, frequency and hematuria (Per UA from yesterday). Negative for difficulty urinating, vaginal bleeding, vaginal discharge and vaginal pain.   Musculoskeletal: Positive for back pain.   Neurological: Negative for dizziness, weakness and headaches.       Past Medical History:   Diagnosis Date   • ADHD    • Bipolar 1 disorder (CMS/HCC)    • Borderline personality disorder (CMS/HCC)    • Cannabinoid hyperemesis syndrome (CMS/HCC)    • Diabetes mellitus type 1 (CMS/HCC)    • Diabetic gastroparesis (CMS/HCC)    • Diabetic ketoacidosis (CMS/HCC)    • Diabetic neuropathy (CMS/HCC)    • Post traumatic stress disorder (PTSD)    • Recurrent UTI    • Seizure disorder (CMS/HCC)    • Severe depressed bipolar II disorder without psychotic features (CMS/HCC)    • Uncontrolled diabetes mellitus (CMS/HCC)        Allergies   Allergen Reactions   • Pineapple Anaphylaxis   • Benzoyl Peroxide Swelling       Past Surgical History:   Procedure Laterality Date   •  SECTION N/A 2018       Family History   Problem Relation Age of Onset   • Drug abuse Father    • Suicide Attempts Brother    • Dementia Maternal Grandfather    • Hypertension Paternal  "Grandmother        Social History     Socioeconomic History   • Marital status: Single     Spouse name: Not on file   • Number of children: Not on file   • Years of education: Not on file   • Highest education level: Not on file   Tobacco Use   • Smoking status: Current Every Day Smoker     Packs/day: 0.50     Years: 1.00     Pack years: 0.50   • Smokeless tobacco: Never Used   Substance and Sexual Activity   • Alcohol use: No   • Drug use: Yes     Types: Marijuana   • Sexual activity: Yes     Partners: Male   Social History Narrative    Substance Abuse: Pt drinks EtOH occasionally, stating once every 6 months. Pt smokes marijuana, but denied any other illicit drugs. Pt smokes 0.5-1 ppd of cigarettes x 1 year. Pt denies caffeine consumption, states she drinks only water.         Marriages: none    Current Relationships: Recent breakup with her boyfriend    Children: one child that  2wks ago at 2 months old.        Occupation:  Pt is a  and loves her job, but hasn't been able to work since baby was born via C/S    Living Situation: was living with her boyfriend but they are  over the death of their child.  She plans to live with mom after discharge.           Objective    /78   Pulse 84   Temp 99.9 °F (37.7 °C) (Oral)   Resp 18   Ht 170.2 cm (67\")   Wt 46.6 kg (102 lb 11.2 oz)   LMP 10/29/2019   SpO2 97%   BMI 16.09 kg/m²     Physical Exam   Constitutional: She is oriented to person, place, and time. She appears well-developed and well-nourished. No distress.   HENT:   Head: Normocephalic and atraumatic.   Cardiovascular: Normal rate, regular rhythm, normal heart sounds and intact distal pulses.   Pulmonary/Chest: Effort normal and breath sounds normal. No respiratory distress.   Abdominal: Soft. Bowel sounds are normal.   Musculoskeletal: Normal range of motion.   Neurological: She is alert and oriented to person, place, and time.   Skin: Skin is warm and dry. Capillary refill takes " less than 2 seconds. She is not diaphoretic.   Psychiatric: She has a normal mood and affect. Her behavior is normal. Judgment and thought content normal.       Procedures  Results for orders placed or performed during the hospital encounter of 12/31/19   Urine Culture - Urine, Urine, Clean Catch   Result Value Ref Range    Urine Culture >100,000 CFU/mL Escherichia coli (A)        Susceptibility    Escherichia coli - BRYAN     Ampicillin <=2 Susceptible ug/ml     Ampicillin + Sulbactam <=2 Susceptible ug/ml     Cefazolin <=4 Susceptible ug/ml     Cefepime <=1 Susceptible ug/ml     Ceftazidime <=1 Susceptible ug/ml     Ceftriaxone <=1 Susceptible ug/ml     Gentamicin <=1 Susceptible ug/ml     Levofloxacin <=0.12 Susceptible ug/ml     Nitrofurantoin <=16 Susceptible ug/ml     Piperacillin + Tazobactam <=4 Susceptible ug/ml     Tetracycline <=1 Susceptible ug/ml     Trimethoprim + Sulfamethoxazole <=20 Susceptible ug/ml   Urinalysis With Culture If Indicated - Urine, Clean Catch   Result Value Ref Range    Color, UA Yellow Yellow, Straw, Dark Yellow, [place]    Appearance, UA Cloudy (A) Clear    pH, UA 7.0 5.0 - 9.0    Specific Gravity, UA 1.010 1.003 - 1.030    Glucose, UA >=1000 mg/dL (3+) (A) Negative    Ketones, UA Negative Negative    Bilirubin, UA Negative Negative    Blood, UA Trace (A) Negative    Protein,  mg/dL (2+) (A) Negative    Leuk Esterase, UA Small (1+) (A) Negative    Nitrite, UA Negative Negative    Urobilinogen, UA 0.2 E.U./dL 0.2 - 1.0 E.U./dL   Urinalysis, Microscopic Only - Urine, Clean Catch   Result Value Ref Range    RBC, UA 0-2 (A) None Seen /HPF    WBC, UA Too Numerous to Count (A) None Seen, 0-2, 3-5 /HPF    Bacteria, UA None Seen None Seen /HPF    Squamous Epithelial Cells, UA None Seen None Seen, 0-2 /HPF    Hyaline Casts, UA None Seen None Seen /LPF    Methodology Automated Microscopy    Comprehensive Metabolic Panel   Result Value Ref Range    Glucose 361 (H) 65 - 99 mg/dL    BUN 5  (L) 6 - 20 mg/dL    Creatinine 1.06 (H) 0.57 - 1.00 mg/dL    Sodium 132 (L) 136 - 145 mmol/L    Potassium 4.0 3.5 - 5.2 mmol/L    Chloride 91 (L) 98 - 107 mmol/L    CO2 26.0 22.0 - 29.0 mmol/L    Calcium 9.5 8.6 - 10.5 mg/dL    Total Protein 8.7 (H) 6.0 - 8.5 g/dL    Albumin 3.80 3.50 - 5.20 g/dL    ALT (SGPT) 9 1 - 33 U/L    AST (SGOT) 12 1 - 32 U/L    Alkaline Phosphatase 110 39 - 117 U/L    Total Bilirubin 0.2 0.2 - 1.2 mg/dL    eGFR Non African Amer 66 >60 mL/min/1.73    Globulin 4.9 gm/dL    A/G Ratio 0.8 g/dL    BUN/Creatinine Ratio 4.7 (L) 7.0 - 25.0    Anion Gap 15.0 5.0 - 15.0 mmol/L   CBC Auto Differential   Result Value Ref Range    WBC 10.70 3.40 - 10.80 10*3/mm3    RBC 4.09 3.77 - 5.28 10*6/mm3    Hemoglobin 10.5 (L) 12.0 - 15.9 g/dL    Hematocrit 33.1 (L) 34.0 - 46.6 %    MCV 80.9 79.0 - 97.0 fL    MCH 25.7 (L) 26.6 - 33.0 pg    MCHC 31.7 31.5 - 35.7 g/dL    RDW 16.1 (H) 12.3 - 15.4 %    RDW-SD 47.3 37.0 - 54.0 fl    MPV 9.2 6.0 - 12.0 fL    Platelets 430 140 - 450 10*3/mm3    Neutrophil % 77.4 (H) 42.7 - 76.0 %    Lymphocyte % 17.4 (L) 19.6 - 45.3 %    Monocyte % 3.7 (L) 5.0 - 12.0 %    Eosinophil % 0.3 0.3 - 6.2 %    Basophil % 0.4 0.0 - 1.5 %    Immature Grans % 0.8 (H) 0.0 - 0.5 %    Neutrophils, Absolute 8.28 (H) 1.70 - 7.00 10*3/mm3    Lymphocytes, Absolute 1.86 0.70 - 3.10 10*3/mm3    Monocytes, Absolute 0.40 0.10 - 0.90 10*3/mm3    Eosinophils, Absolute 0.03 0.00 - 0.40 10*3/mm3    Basophils, Absolute 0.04 0.00 - 0.20 10*3/mm3    Immature Grans, Absolute 0.09 (H) 0.00 - 0.05 10*3/mm3    nRBC 0.0 0.0 - 0.2 /100 WBC   Light Blue Top   Result Value Ref Range    Extra Tube hold for add-on    Gold Top - SST   Result Value Ref Range    Extra Tube Hold for add-ons.               ED Course  ED Course as of Jan 02 0959 Tue Dec 31, 2019   2103 Patient states she is feeling much better at this time. States pain has decreased.     [SH]      ED Course User Index  [SH] Tamara Arredondo, APRN                       [place] Coma Scale Score: 15                          MDM    Final diagnoses:   Urinary tract infection in female            ArnulfoZhaojayshree Salinas, ARMOND  01/02/20 0959

## 2020-01-01 NOTE — DISCHARGE INSTRUCTIONS
Increase your fluid intake. Take your antibiotics as directed. Return to the ER if your symptoms worsen or do not improve with use of antibiotics.

## 2020-01-02 LAB — BACTERIA SPEC AEROBE CULT: ABNORMAL

## 2020-04-13 ENCOUNTER — TELEMEDICINE (OUTPATIENT)
Dept: ENDOCRINOLOGY | Facility: CLINIC | Age: 20
End: 2020-04-13

## 2020-04-13 DIAGNOSIS — E10.65 TYPE 1 DIABETES MELLITUS WITH HYPERGLYCEMIA (HCC): Primary | ICD-10-CM

## 2020-04-13 DIAGNOSIS — F17.200 SMOKING: ICD-10-CM

## 2020-04-13 PROCEDURE — 99214 OFFICE O/P EST MOD 30 MIN: CPT | Performed by: NURSE PRACTITIONER

## 2020-04-13 NOTE — PROGRESS NOTES
Subjective    Fernanda Patterson is a 20 y.o. female. she is here for follow up     History of Present Illness         TELEHEALTH VIDEO VISIT     This a video visit due to [facility] current guidelines for social distancing due to the COVID 19 pandemic    History of Present Illness    Reason - diabetes type 1      Duration/Timing:  Diabetes mellitus type 1, Age at onset of diabetes: 6 years  constant     not controlled but patient states lately improved      severity high              Severity (Complications/Hospitalizations)      Secondary Macrovascular complications : none   Secondary Microvascular Complications:  Diabetic Neuropathy     Context      Diabetes Regimen:  Insulin      Lab Results   Component Value Date    HGBA1C 12.80 (H) 10/29/2019                   Blood Glucose Readings     Variable from 60 up to 600     She states she has mild ketones and bg is 400    She does not feel she is in DKA            Exercise:  Exercises     Associated Signs/Symptoms  Hyperglycemic Symptoms:  Polyuria, Polydipsia, Polyphagia have resolved                   The following portions of the patient's history were reviewed and updated as appropriate:   Past Medical History:   Diagnosis Date   • ADHD    • Bipolar 1 disorder (CMS/HCC)    • Borderline personality disorder (CMS/HCC)    • Cannabinoid hyperemesis syndrome (CMS/HCC)    • Diabetes mellitus type 1 (CMS/HCC)    • Diabetic gastroparesis (CMS/HCC)    • Diabetic ketoacidosis (CMS/HCC)    • Diabetic neuropathy (CMS/HCC)    • Post traumatic stress disorder (PTSD)    • Recurrent UTI    • Seizure disorder (CMS/HCC)    • Severe depressed bipolar II disorder without psychotic features (CMS/HCC)    • Uncontrolled diabetes mellitus (CMS/HCC)      Past Surgical History:   Procedure Laterality Date   •  SECTION N/A 2018     Family History   Problem Relation Age of Onset   • Drug abuse Father    • Suicide Attempts Brother    • Dementia Maternal Grandfather    • Hypertension  Paternal Grandmother      OB History        1    Para   1    Term           1    AB        Living   1       SAB        TAB        Ectopic        Molar        Multiple   0    Live Births   1              Current Outpatient Medications   Medication Sig Dispense Refill   • Alcohol Swabs (ALCOHOL WIPES) 70 % pads Use 4 x daily 120 each 11   • Blood Glucose Monitoring Suppl w/Device kit USE AS INDICATED, ANY MONITOR , ICD10 code is E11.9 1 each 1   • Blood Glucose Monitoring Suppl w/Device kit USE AS INDICATED, ANY MONITOR , ICD10 code is E11.9 1 each 1   • Cariprazine HCl (VRAYLAR) 1.5 MG capsule capsule Take 1.5 mg by mouth Daily.     • FLUoxetine (PROzac) 40 MG capsule Take 40 mg by mouth Daily.     • Glucose Blood (BLOOD GLUCOSE TEST) strip Use 4 x daily use any brand covered by insurance or same brand as before ICD10 code is E11.9 120 each 11   • Glucose Blood (BLOOD GLUCOSE TEST) strip Use 4 x daily use any brand covered by insurance or same brand as before ICD10 code is E11.9 120 each 11   • insulin aspart (NOVOLOG FLEXPEN) 100 UNIT/ML solution pen-injector sc pen Up to 20 units with meals 6 pen 11   • insulin aspart (NOVOLOG) 100 UNIT/ML injection INJECT 70 UNITS DAILY THROUGH INSULIN PUMP 60 mL 0   • Insulin Glargine, 1 Unit Dial, (TOUJEO SOLOSTAR) 300 UNIT/ML solution pen-injector injection Inject 20 Units under the skin into the appropriate area as directed every night at bedtime. 2 pen 11   • Insulin Pen Needle (B-D UF III MINI PEN NEEDLES) 31G X 5 MM misc Use 4 times daily  , ICD10 code is E11.9 120 each 11   • Lancet Devices (LANCING DEVICE) misc USE AS INDICATED TO CORRELATE WITH STRIPS AND METER 1 each 1   • Lancet Devices (LANCING DEVICE) misc USE AS INDICATED TO CORRELATE WITH STRIPS AND METER 1 each 1   • Lancets 30G misc USE 4 X DAILY 120 each 11   • nicotine (NICODERM CQ) 14 MG/24HR patch Place 1 patch on the skin as directed by provider Daily. 30 patch 0   • nortriptyline (PAMELOR) 25  MG capsule Take 25 mg by mouth At Night As Needed for Sleep.     • ondansetron (ZOFRAN) 4 MG tablet Take 1 tablet by mouth Every 8 (Eight) Hours As Needed for Nausea or Vomiting. 30 tablet 0   • Probiotic Product (ALIGN) 4 MG capsule Take 1 tablet by mouth Daily. 10 capsule 0     No current facility-administered medications for this visit.      Allergies   Allergen Reactions   • Pineapple Anaphylaxis   • Benzoyl Peroxide Swelling     Social History     Socioeconomic History   • Marital status: Single     Spouse name: Not on file   • Number of children: Not on file   • Years of education: Not on file   • Highest education level: Not on file   Tobacco Use   • Smoking status: Current Every Day Smoker     Packs/day: 0.50     Years: 1.00     Pack years: 0.50   • Smokeless tobacco: Never Used   Substance and Sexual Activity   • Alcohol use: No   • Drug use: Yes     Types: Marijuana   • Sexual activity: Yes     Partners: Male   Social History Narrative    Substance Abuse: Pt drinks EtOH occasionally, stating once every 6 months. Pt smokes marijuana, but denied any other illicit drugs. Pt smokes 0.5-1 ppd of cigarettes x 1 year. Pt denies caffeine consumption, states she drinks only water.         Marriages: none    Current Relationships: Recent breakup with her boyfriend    Children: one child that  2wks ago at 2 months old.        Occupation:  Pt is a  and loves her job, but hasn't been able to work since baby was born via C/S    Living Situation: was living with her boyfriend but they are  over the death of their child.  She plans to live with mom after discharge.       Review of Systems  Review of Systems   Constitutional: Negative for activity change, appetite change, diaphoresis and fatigue.   HENT: Negative for facial swelling, sneezing, sore throat, tinnitus, trouble swallowing and voice change.    Eyes: Negative for photophobia, pain, discharge, redness, itching and visual disturbance.    Respiratory: Negative for apnea, cough, choking, chest tightness and shortness of breath.    Cardiovascular: Negative for chest pain, palpitations and leg swelling.   Gastrointestinal: Negative for abdominal distention, abdominal pain, constipation, diarrhea, nausea and vomiting.   Endocrine: Negative for cold intolerance, heat intolerance, polydipsia, polyphagia and polyuria.   Genitourinary: Negative for difficulty urinating, dysuria, frequency, hematuria and urgency.   Musculoskeletal: Negative for arthralgias, back pain, gait problem, joint swelling, myalgias, neck pain and neck stiffness.   Skin: Negative for color change, pallor, rash and wound.   Neurological: Negative for dizziness, tremors, weakness, light-headedness, numbness and headaches.   Hematological: Negative for adenopathy. Does not bruise/bleed easily.   Psychiatric/Behavioral: Negative for behavioral problems, confusion and sleep disturbance.        Objective    There were no vitals taken for this visit.  Physical Exam   Constitutional: She is oriented to person, place, and time. She appears well-developed and well-nourished.   HENT:   Head: Normocephalic.   Right Ear: External ear normal.   Left Ear: External ear normal.   Nose: Nose normal.   Mouth/Throat: Oropharynx is clear and moist.   Eyes: Pupils are equal, round, and reactive to light. Conjunctivae and EOM are normal.   Neck: Normal range of motion.   Pulmonary/Chest: Effort normal. No respiratory distress.   Musculoskeletal: Normal range of motion.   Neurological: She is alert and oriented to person, place, and time.   Skin: No pallor.   Psychiatric: She has a normal mood and affect. Her behavior is normal. Judgment and thought content normal.       Lab Review  Glucose (mg/dL)   Date Value   12/31/2019 361 (H)   11/01/2019 114 (H)   10/30/2019 72     Glucose, Arterial (mmol/L)   Date Value   01/09/2018 325   05/25/2017 507     Sodium (mmol/L)   Date Value   12/31/2019 132 (L)    11/01/2019 137   10/30/2019 138     Potassium (mmol/L)   Date Value   12/31/2019 4.0   11/01/2019 3.3 (L)   10/30/2019 4.0     Chloride (mmol/L)   Date Value   12/31/2019 91 (L)   11/01/2019 101   10/30/2019 106     CO2 (mmol/L)   Date Value   12/31/2019 26.0   11/01/2019 25.0   10/30/2019 19.0 (L)     BUN (mg/dL)   Date Value   12/31/2019 5 (L)   11/01/2019 7   10/30/2019 18     Creatinine (mg/dL)   Date Value   12/31/2019 1.06 (H)   11/01/2019 0.71   10/30/2019 0.85     Hemoglobin A1C (%)   Date Value   10/29/2019 12.80 (H)   09/29/2019 12.00 (H)   08/11/2019 12.80 (H)       Assessment/Plan      1. Type 1 diabetes mellitus with hyperglycemia (CMS/[facility])    2. Smoking    .    Medications prescribed:  Outpatient Encounter Medications as of 4/13/2020   Medication Sig Dispense Refill   • Alcohol Swabs (ALCOHOL WIPES) 70 % pads Use 4 x daily 120 each 11   • Blood Glucose Monitoring Suppl w/Device kit USE AS INDICATED, ANY MONITOR , ICD10 code is E11.9 1 each 1   • Blood Glucose Monitoring Suppl w/Device kit USE AS INDICATED, ANY MONITOR , ICD10 code is E11.9 1 each 1   • Cariprazine HCl (VRAYLAR) 1.5 MG capsule capsule Take 1.5 mg by mouth Daily.     • FLUoxetine (PROzac) 40 MG capsule Take 40 mg by mouth Daily.     • Glucose Blood (BLOOD GLUCOSE TEST) strip Use 4 x daily use any brand covered by insurance or same brand as before ICD10 code is E11.9 120 each 11   • Glucose Blood (BLOOD GLUCOSE TEST) strip Use 4 x daily use any brand covered by insurance or same brand as before ICD10 code is E11.9 120 each 11   • insulin aspart (NOVOLOG FLEXPEN) 100 UNIT/ML solution pen-injector sc pen Up to 20 units with meals 6 pen 11   • insulin aspart (NOVOLOG) 100 UNIT/ML injection INJECT 70 UNITS DAILY THROUGH INSULIN PUMP 60 mL 0   • Insulin Glargine, 1 Unit Dial, (TOUJEO SOLOSTAR) 300 UNIT/ML solution pen-injector injection Inject 20 Units under the skin into the appropriate area as directed every night at bedtime. 2 pen 11   •  Insulin Pen Needle (B-D UF III MINI PEN NEEDLES) 31G X 5 MM misc Use 4 times daily  , ICD10 code is E11.9 120 each 11   • Lancet Devices (LANCING DEVICE) misc USE AS INDICATED TO CORRELATE WITH STRIPS AND METER 1 each 1   • Lancet Devices (LANCING DEVICE) misc USE AS INDICATED TO CORRELATE WITH STRIPS AND METER 1 each 1   • Lancets 30G misc USE 4 X DAILY 120 each 11   • nicotine (NICODERM CQ) 14 MG/24HR patch Place 1 patch on the skin as directed by provider Daily. 30 patch 0   • nortriptyline (PAMELOR) 25 MG capsule Take 25 mg by mouth At Night As Needed for Sleep.     • ondansetron (ZOFRAN) 4 MG tablet Take 1 tablet by mouth Every 8 (Eight) Hours As Needed for Nausea or Vomiting. 30 tablet 0   • Probiotic Product (ALIGN) 4 MG capsule Take 1 tablet by mouth Daily. 10 capsule 0     No facility-administered encounter medications on file as of 4/13/2020.        Orders placed during this encounter include:  No orders of the defined types were placed in this encounter.    Glycemic Management:    Diabetes mellitus type 1      Lab Results   Component Value Date    HGBA1C 12.80 (H) 10/29/2019                toujeo  taking 30 units      novolog 1:10    Sliding scale 1 per 50 above 150     Change to 1 for every 40 above 140        she is to give correction dose, push fluids    If the ketones increase to moderate or large, bg does not improve , or any symptoms of DKA she has to go to the ER immediately     Fernanda knows the symptoms of DKA and agrees that she will go if becomes worse     I will call her later today and check on her    She did want two days off work to improve BG levels     Cannot afford the dexcom at this time       Previous settings         Tandem insulin pump      Basal 0.6-0.74-0.9        Carb ratio     10     Correction      50     Target      120         Microvascular Complication Monitoring:        Diabetic Neuropathy, Neuropathy type painful and sensorial        gastroparesis - no reglan            Preventive Care:  Patient is smoking       She is not interested in smoking cessation                  Weight Related:  Not overweight, No obesity     Patient's Body mass index is 17.7 kg/m². BMI is within normal parameters. No follow-up required.        Other Diabetes Related Aspects           Cell phone [phone]        Total time for telehealth visit 25 minutes of video visit   ( there was also time for preparation, ordering labs, medications, etc.)  Patient instructed to call for problems with medications, blood sugars     She has agreed to call back and we can direct admit her if ketones worsen or bg does not return to normal             4. Follow-up: No follow-ups on file.

## 2020-04-15 NOTE — PROGRESS NOTES
You have chosen to receive care through a telehealth visit.  Do you consent to use a video/audio connection for your medical care today? Yes

## 2020-04-21 ENCOUNTER — HOSPITAL ENCOUNTER (EMERGENCY)
Facility: HOSPITAL | Age: 20
Discharge: HOME | End: 2020-04-21
Attending: EMERGENCY MEDICINE | Admitting: EMERGENCY MEDICINE

## 2020-04-21 VITALS
OXYGEN SATURATION: 100 % | HEIGHT: 67 IN | SYSTOLIC BLOOD PRESSURE: 109 MMHG | WEIGHT: 112 LBS | DIASTOLIC BLOOD PRESSURE: 69 MMHG | HEART RATE: 85 BPM | RESPIRATION RATE: 18 BRPM | BODY MASS INDEX: 17.58 KG/M2 | TEMPERATURE: 98.5 F

## 2020-04-21 DIAGNOSIS — I88.9 OCCIPITAL LYMPHADENITIS: Primary | ICD-10-CM

## 2020-04-21 LAB
ANION GAP SERPL CALCULATED.3IONS-SCNC: 16 MMOL/L (ref 5–15)
BASOPHILS # BLD AUTO: 0.07 10*3/MM3 (ref 0–0.2)
BASOPHILS NFR BLD AUTO: 0.9 % (ref 0–1.5)
BUN BLD-MCNC: 16 MG/DL (ref 6–20)
BUN/CREAT SERPL: 18.4 (ref 7–25)
CALCIUM SPEC-SCNC: 9.7 MG/DL (ref 8.6–10.5)
CHLORIDE SERPL-SCNC: 98 MMOL/L (ref 98–107)
CO2 SERPL-SCNC: 26 MMOL/L (ref 22–29)
CREAT BLD-MCNC: 0.87 MG/DL (ref 0.57–1)
DEPRECATED RDW RBC AUTO: 45.3 FL (ref 37–54)
EOSINOPHIL # BLD AUTO: 0.14 10*3/MM3 (ref 0–0.4)
EOSINOPHIL NFR BLD AUTO: 1.8 % (ref 0.3–6.2)
ERYTHROCYTE [DISTWIDTH] IN BLOOD BY AUTOMATED COUNT: 14.4 % (ref 12.3–15.4)
GFR SERPL CREATININE-BSD FRML MDRD: 82 ML/MIN/1.73
GLUCOSE BLD-MCNC: 164 MG/DL (ref 65–99)
HCT VFR BLD AUTO: 34.7 % (ref 34–46.6)
HGB BLD-MCNC: 11.5 G/DL (ref 12–15.9)
HOLD SPECIMEN: NORMAL
IMM GRANULOCYTES # BLD AUTO: 0.05 10*3/MM3 (ref 0–0.05)
IMM GRANULOCYTES NFR BLD AUTO: 0.6 % (ref 0–0.5)
LYMPHOCYTES # BLD AUTO: 2.88 10*3/MM3 (ref 0.7–3.1)
LYMPHOCYTES NFR BLD AUTO: 36.1 % (ref 19.6–45.3)
MCH RBC QN AUTO: 28.5 PG (ref 26.6–33)
MCHC RBC AUTO-ENTMCNC: 33.1 G/DL (ref 31.5–35.7)
MCV RBC AUTO: 85.9 FL (ref 79–97)
MONOCYTES # BLD AUTO: 0.49 10*3/MM3 (ref 0.1–0.9)
MONOCYTES NFR BLD AUTO: 6.1 % (ref 5–12)
NEUTROPHILS # BLD AUTO: 4.35 10*3/MM3 (ref 1.7–7)
NEUTROPHILS NFR BLD AUTO: 54.5 % (ref 42.7–76)
NRBC BLD AUTO-RTO: 0 /100 WBC (ref 0–0.2)
PLATELET # BLD AUTO: 398 10*3/MM3 (ref 140–450)
PMV BLD AUTO: 9.8 FL (ref 6–12)
POTASSIUM BLD-SCNC: 3.3 MMOL/L (ref 3.5–5.2)
RBC # BLD AUTO: 4.04 10*6/MM3 (ref 3.77–5.28)
SODIUM BLD-SCNC: 140 MMOL/L (ref 136–145)
WBC NRBC COR # BLD: 7.98 10*3/MM3 (ref 3.4–10.8)
WHOLE BLOOD HOLD SPECIMEN: NORMAL

## 2020-04-21 PROCEDURE — 96374 THER/PROPH/DIAG INJ IV PUSH: CPT

## 2020-04-21 PROCEDURE — 25010000002 KETOROLAC TROMETHAMINE PER 15 MG: Performed by: EMERGENCY MEDICINE

## 2020-04-21 PROCEDURE — 99283 EMERGENCY DEPT VISIT LOW MDM: CPT

## 2020-04-21 PROCEDURE — 80048 BASIC METABOLIC PNL TOTAL CA: CPT | Performed by: EMERGENCY MEDICINE

## 2020-04-21 PROCEDURE — 85025 COMPLETE CBC W/AUTO DIFF WBC: CPT | Performed by: EMERGENCY MEDICINE

## 2020-04-21 RX ORDER — SODIUM CHLORIDE 0.9 % (FLUSH) 0.9 %
10 SYRINGE (ML) INJECTION AS NEEDED
Status: DISCONTINUED | OUTPATIENT
Start: 2020-04-21 | End: 2020-04-22 | Stop reason: HOSPADM

## 2020-04-21 RX ORDER — CEPHALEXIN 500 MG/1
500 CAPSULE ORAL 4 TIMES DAILY
Qty: 28 CAPSULE | Refills: 0 | Status: SHIPPED | OUTPATIENT
Start: 2020-04-21 | End: 2020-04-28

## 2020-04-21 RX ORDER — POTASSIUM CHLORIDE 750 MG/1
20 CAPSULE, EXTENDED RELEASE ORAL ONCE
Status: COMPLETED | OUTPATIENT
Start: 2020-04-21 | End: 2020-04-21

## 2020-04-21 RX ORDER — KETOROLAC TROMETHAMINE 30 MG/ML
30 INJECTION, SOLUTION INTRAMUSCULAR; INTRAVENOUS ONCE
Status: COMPLETED | OUTPATIENT
Start: 2020-04-21 | End: 2020-04-21

## 2020-04-21 RX ORDER — NAPROXEN SODIUM 550 MG/1
550 TABLET ORAL 2 TIMES DAILY PRN
Qty: 20 TABLET | Refills: 0 | Status: SHIPPED | OUTPATIENT
Start: 2020-04-21 | End: 2020-12-16 | Stop reason: HOSPADM

## 2020-04-21 RX ORDER — CEPHALEXIN 500 MG/1
500 CAPSULE ORAL ONCE
Status: COMPLETED | OUTPATIENT
Start: 2020-04-21 | End: 2020-04-21

## 2020-04-21 RX ADMIN — KETOROLAC TROMETHAMINE 30 MG: 30 INJECTION, SOLUTION INTRAMUSCULAR at 22:04

## 2020-04-21 RX ADMIN — CEPHALEXIN 500 MG: 500 CAPSULE ORAL at 22:38

## 2020-04-21 RX ADMIN — POTASSIUM CHLORIDE 20 MEQ: 10 CAPSULE, COATED, EXTENDED RELEASE ORAL at 22:38

## 2020-04-22 NOTE — ED PROVIDER NOTES
"Subjective   19yo female pmh significant dm1/bipolar disorder, presents ED c/o occipital \"knot\" x3-4wks duration which has become painful over past 2 weeks and now is causing a headache.  ROS neg fever/chills/trauma/otalgia/sinus pressure/cough.      History provided by:  Patient  Neck Pain   Pain location:  Occipital region  Quality:  Shooting  Pain severity:  Moderate  Onset quality:  Gradual  Duration:  2 weeks  Chronicity:  New  Associated symptoms: headaches        Review of Systems   Constitutional: Negative.    Respiratory: Negative.    Cardiovascular: Negative.    Gastrointestinal: Negative.    Musculoskeletal: Positive for neck pain.   Neurological: Positive for headaches.   All other systems reviewed and are negative.      Past Medical History:   Diagnosis Date   • ADHD    • Bipolar 1 disorder (CMS/HCC)    • Borderline personality disorder (CMS/HCC)    • Cannabinoid hyperemesis syndrome (CMS/HCC)    • Diabetes mellitus type 1 (CMS/HCC)    • Diabetic gastroparesis (CMS/HCC)    • Diabetic ketoacidosis (CMS/HCC)    • Diabetic neuropathy (CMS/HCC)    • Post traumatic stress disorder (PTSD)    • Recurrent UTI    • Seizure disorder (CMS/HCC)    • Severe depressed bipolar II disorder without psychotic features (CMS/HCC)    • Uncontrolled diabetes mellitus (CMS/HCC)        Allergies   Allergen Reactions   • Pineapple Anaphylaxis   • Benzoyl Peroxide Swelling       Past Surgical History:   Procedure Laterality Date   •  SECTION N/A 2018       Family History   Problem Relation Age of Onset   • Drug abuse Father    • Suicide Attempts Brother    • Dementia Maternal Grandfather    • Hypertension Paternal Grandmother        Social History     Socioeconomic History   • Marital status: Single     Spouse name: Not on file   • Number of children: Not on file   • Years of education: Not on file   • Highest education level: Not on file   Tobacco Use   • Smoking status: Current Every Day Smoker     Packs/day: 0.50 "     Years: 1.00     Pack years: 0.50   • Smokeless tobacco: Never Used   Substance and Sexual Activity   • Alcohol use: No   • Drug use: Yes     Types: Marijuana   • Sexual activity: Yes     Partners: Male   Social History Narrative    Substance Abuse: Pt drinks EtOH occasionally, stating once every 6 months. Pt smokes marijuana, but denied any other illicit drugs. Pt smokes 0.5-1 ppd of cigarettes x 1 year. Pt denies caffeine consumption, states she drinks only water.         Marriages: none    Current Relationships: Recent breakup with her boyfriend    Children: one child that  2wks ago at 2 months old.        Occupation:  Pt is a  and loves her job, but hasn't been able to work since baby was born via C/S    Living Situation: was living with her boyfriend but they are  over the death of their child.  She plans to live with mom after discharge.           Objective   Physical Exam   Constitutional: She is oriented to person, place, and time. She appears well-developed and well-nourished.   HENT:   Head: Normocephalic and atraumatic.       Right Ear: Hearing, tympanic membrane, external ear and ear canal normal. No mastoid tenderness.   Left Ear: Hearing, tympanic membrane, external ear and ear canal normal. No mastoid tenderness.   Nose: Nose normal.   Mouth/Throat: Oropharynx is clear and moist.   Eyes: Pupils are equal, round, and reactive to light.   Neck: Normal range of motion. Neck supple. No JVD present. No neck rigidity. No tracheal deviation and normal range of motion present. No Brudzinski's sign noted.   Cardiovascular: Normal rate, regular rhythm, normal heart sounds and intact distal pulses. Exam reveals no gallop and no friction rub.   No murmur heard.  Pulmonary/Chest: Effort normal and breath sounds normal. She has no wheezes. She has no rales.   Abdominal: Soft. Bowel sounds are normal. She exhibits no distension and no mass. There is no tenderness. There is no rebound and no  guarding.   Musculoskeletal: Normal range of motion.   Lymphadenopathy:     She has no cervical adenopathy.   Neurological: She is alert and oriented to person, place, and time. GCS eye subscore is 4. GCS verbal subscore is 5. GCS motor subscore is 6.   Nursing note and vitals reviewed.      Procedures           ED Course      Labs Reviewed   BASIC METABOLIC PANEL - Abnormal; Notable for the following components:       Result Value    Glucose 164 (*)     Potassium 3.3 (*)     Anion Gap 16.0 (*)     All other components within normal limits    Narrative:     GFR Normal >60  Chronic Kidney Disease <60  Kidney Failure <15     CBC WITH AUTO DIFFERENTIAL - Abnormal; Notable for the following components:    Hemoglobin 11.5 (*)     Immature Grans % 0.6 (*)     All other components within normal limits   CBC AND DIFFERENTIAL    Narrative:     The following orders were created for panel order CBC & Differential.  Procedure                               Abnormality         Status                     ---------                               -----------         ------                     CBC Auto Differential[[id number]]        Abnormal            Final result                 Please view results for these tests on the individual orders.   EXTRA TUBES    Narrative:     The following orders were created for panel order Extra Tubes.  Procedure                               Abnormality         Status                     ---------                               -----------         ------                     Light Blue Top[[id number]]                                   In process                 Gold Top - [facility][[id number]]                                   In process                   Please view results for these tests on the individual orders.   LIGHT BLUE TOP   GOLD TOP - [facility]    Final diagnoses:   Occipital lymphadenitis            Jeffry Woods MD  04/21/20 2284

## 2020-04-22 NOTE — ED NOTES
Pt presents to the ED with c/o head and neck pain. Pt reports she noticed a knot on her head a couple months ago. Pt reports it is now painful causing her to have a headache.      Luz Gallegos RN  04/21/20 2123

## 2020-05-06 DIAGNOSIS — E10.65 TYPE 1 DIABETES MELLITUS WITH HYPERGLYCEMIA (HCC): Primary | ICD-10-CM

## 2020-05-08 ENCOUNTER — HOSPITAL ENCOUNTER (EMERGENCY)
Facility: HOSPITAL | Age: 20
Discharge: HOME | End: 2020-05-08
Attending: EMERGENCY MEDICINE | Admitting: EMERGENCY MEDICINE

## 2020-05-08 ENCOUNTER — TELEPHONE (OUTPATIENT)
Dept: ENDOCRINOLOGY | Facility: CLINIC | Age: 20
End: 2020-05-08

## 2020-05-08 ENCOUNTER — APPOINTMENT (OUTPATIENT)
Dept: GENERAL RADIOLOGY | Facility: HOSPITAL | Age: 20
End: 2020-05-08

## 2020-05-08 VITALS
TEMPERATURE: 97.8 F | HEART RATE: 62 BPM | SYSTOLIC BLOOD PRESSURE: 98 MMHG | OXYGEN SATURATION: 100 % | BODY MASS INDEX: 16.98 KG/M2 | WEIGHT: 108.2 LBS | DIASTOLIC BLOOD PRESSURE: 52 MMHG | HEIGHT: 67 IN | RESPIRATION RATE: 18 BRPM

## 2020-05-08 DIAGNOSIS — E10.9 TYPE 1 DIABETES MELLITUS WITHOUT COMPLICATION (HCC): ICD-10-CM

## 2020-05-08 DIAGNOSIS — N39.0 URINARY TRACT INFECTION IN FEMALE: Primary | ICD-10-CM

## 2020-05-08 LAB
ACETONE BLD QL: NEGATIVE
ALBUMIN SERPL-MCNC: 4 G/DL (ref 3.5–5.2)
ALBUMIN/GLOB SERPL: 1.4 G/DL
ALP SERPL-CCNC: 57 U/L (ref 39–117)
ALT SERPL W P-5'-P-CCNC: 8 U/L (ref 1–33)
ANION GAP SERPL CALCULATED.3IONS-SCNC: 13 MMOL/L (ref 5–15)
ARTERIAL PATENCY WRIST A: ABNORMAL
AST SERPL-CCNC: 11 U/L (ref 1–32)
ATMOSPHERIC PRESS: 748 MMHG
B-HCG UR QL: NEGATIVE
BACTERIA UR QL AUTO: ABNORMAL /HPF
BASE EXCESS BLDA CALC-SCNC: -0.8 MMOL/L (ref 0–2)
BASOPHILS # BLD AUTO: 0.06 10*3/MM3 (ref 0–0.2)
BASOPHILS NFR BLD AUTO: 0.8 % (ref 0–1.5)
BDY SITE: ABNORMAL
BILIRUB SERPL-MCNC: 0.2 MG/DL (ref 0.2–1.2)
BILIRUB UR QL STRIP: NEGATIVE
BUN BLD-MCNC: 12 MG/DL (ref 6–20)
BUN/CREAT SERPL: 14.3 (ref 7–25)
CALCIUM SPEC-SCNC: 9.1 MG/DL (ref 8.6–10.5)
CHLORIDE SERPL-SCNC: 99 MMOL/L (ref 98–107)
CLARITY UR: CLEAR
CO2 SERPL-SCNC: 23 MMOL/L (ref 22–29)
COLOR UR: YELLOW
CREAT BLD-MCNC: 0.84 MG/DL (ref 0.57–1)
DEPRECATED RDW RBC AUTO: 41.5 FL (ref 37–54)
EOSINOPHIL # BLD AUTO: 0.39 10*3/MM3 (ref 0–0.4)
EOSINOPHIL NFR BLD AUTO: 5.2 % (ref 0.3–6.2)
ERYTHROCYTE [DISTWIDTH] IN BLOOD BY AUTOMATED COUNT: 13.3 % (ref 12.3–15.4)
GFR SERPL CREATININE-BSD FRML MDRD: 86 ML/MIN/1.73
GLOBULIN UR ELPH-MCNC: 2.9 GM/DL
GLUCOSE BLD-MCNC: 311 MG/DL (ref 65–99)
GLUCOSE BLDC GLUCOMTR-MCNC: 325 MG/DL (ref 70–130)
GLUCOSE UR STRIP-MCNC: ABNORMAL MG/DL
HCO3 BLDA-SCNC: 23 MMOL/L (ref 20–26)
HCT VFR BLD AUTO: 33 % (ref 34–46.6)
HGB BLD-MCNC: 11.3 G/DL (ref 12–15.9)
HGB UR QL STRIP.AUTO: NEGATIVE
HYALINE CASTS UR QL AUTO: ABNORMAL /LPF
IMM GRANULOCYTES # BLD AUTO: 0.03 10*3/MM3 (ref 0–0.05)
IMM GRANULOCYTES NFR BLD AUTO: 0.4 % (ref 0–0.5)
KETONES UR QL STRIP: NEGATIVE
LEUKOCYTE ESTERASE UR QL STRIP.AUTO: ABNORMAL
LYMPHOCYTES # BLD AUTO: 2.5 10*3/MM3 (ref 0.7–3.1)
LYMPHOCYTES NFR BLD AUTO: 33 % (ref 19.6–45.3)
Lab: ABNORMAL
MCH RBC QN AUTO: 29.1 PG (ref 26.6–33)
MCHC RBC AUTO-ENTMCNC: 34.2 G/DL (ref 31.5–35.7)
MCV RBC AUTO: 85.1 FL (ref 79–97)
MODALITY: ABNORMAL
MONOCYTES # BLD AUTO: 0.28 10*3/MM3 (ref 0.1–0.9)
MONOCYTES NFR BLD AUTO: 3.7 % (ref 5–12)
NEUTROPHILS # BLD AUTO: 4.31 10*3/MM3 (ref 1.7–7)
NEUTROPHILS NFR BLD AUTO: 56.9 % (ref 42.7–76)
NITRITE UR QL STRIP: POSITIVE
NRBC BLD AUTO-RTO: 0 /100 WBC (ref 0–0.2)
PCO2 BLDA: 34.3 MM HG (ref 35–45)
PH BLDA: 7.44 PH UNITS (ref 7.35–7.45)
PH UR STRIP.AUTO: 7 [PH] (ref 5–9)
PLATELET # BLD AUTO: 295 10*3/MM3 (ref 140–450)
PMV BLD AUTO: 10.2 FL (ref 6–12)
PO2 BLDA: 106 MM HG (ref 83–108)
POTASSIUM BLD-SCNC: 4.1 MMOL/L (ref 3.5–5.2)
PROCALCITONIN SERPL-MCNC: 0.03 NG/ML (ref 0.1–0.25)
PROT SERPL-MCNC: 6.9 G/DL (ref 6–8.5)
PROT UR QL STRIP: NEGATIVE
RBC # BLD AUTO: 3.88 10*6/MM3 (ref 3.77–5.28)
RBC # UR: ABNORMAL /HPF
REF LAB TEST METHOD: ABNORMAL
SAO2 % BLDCOA: 99.2 % (ref 94–99)
SODIUM BLD-SCNC: 135 MMOL/L (ref 136–145)
SP GR UR STRIP: 1.01 (ref 1–1.03)
SQUAMOUS #/AREA URNS HPF: ABNORMAL /HPF
TROPONIN T SERPL-MCNC: <0.01 NG/ML (ref 0–0.03)
UROBILINOGEN UR QL STRIP: ABNORMAL
VENTILATOR MODE: ABNORMAL
WBC NRBC COR # BLD: 7.57 10*3/MM3 (ref 3.4–10.8)
WBC UR QL AUTO: ABNORMAL /HPF
WHOLE BLOOD HOLD SPECIMEN: NORMAL

## 2020-05-08 PROCEDURE — 93010 ELECTROCARDIOGRAM REPORT: CPT | Performed by: INTERNAL MEDICINE

## 2020-05-08 PROCEDURE — 71045 X-RAY EXAM CHEST 1 VIEW: CPT

## 2020-05-08 PROCEDURE — 81025 URINE PREGNANCY TEST: CPT | Performed by: NURSE PRACTITIONER

## 2020-05-08 PROCEDURE — 87086 URINE CULTURE/COLONY COUNT: CPT | Performed by: NURSE PRACTITIONER

## 2020-05-08 PROCEDURE — 93005 ELECTROCARDIOGRAM TRACING: CPT | Performed by: NURSE PRACTITIONER

## 2020-05-08 PROCEDURE — 80053 COMPREHEN METABOLIC PANEL: CPT | Performed by: NURSE PRACTITIONER

## 2020-05-08 PROCEDURE — 84145 PROCALCITONIN (PCT): CPT | Performed by: NURSE PRACTITIONER

## 2020-05-08 PROCEDURE — 82009 KETONE BODYS QUAL: CPT | Performed by: NURSE PRACTITIONER

## 2020-05-08 PROCEDURE — 85025 COMPLETE CBC W/AUTO DIFF WBC: CPT | Performed by: NURSE PRACTITIONER

## 2020-05-08 PROCEDURE — 87186 SC STD MICRODIL/AGAR DIL: CPT | Performed by: NURSE PRACTITIONER

## 2020-05-08 PROCEDURE — 82803 BLOOD GASES ANY COMBINATION: CPT

## 2020-05-08 PROCEDURE — 25010000002 CEFTRIAXONE: Performed by: NURSE PRACTITIONER

## 2020-05-08 PROCEDURE — 63710000001 INSULIN REGULAR HUMAN PER 5 UNITS: Performed by: NURSE PRACTITIONER

## 2020-05-08 PROCEDURE — 96365 THER/PROPH/DIAG IV INF INIT: CPT

## 2020-05-08 PROCEDURE — 84484 ASSAY OF TROPONIN QUANT: CPT | Performed by: NURSE PRACTITIONER

## 2020-05-08 PROCEDURE — 82962 GLUCOSE BLOOD TEST: CPT

## 2020-05-08 PROCEDURE — 81001 URINALYSIS AUTO W/SCOPE: CPT | Performed by: NURSE PRACTITIONER

## 2020-05-08 PROCEDURE — 25010000002 HYDROMORPHONE 1 MG/ML SOLUTION: Performed by: NURSE PRACTITIONER

## 2020-05-08 PROCEDURE — 36600 WITHDRAWAL OF ARTERIAL BLOOD: CPT

## 2020-05-08 PROCEDURE — 99283 EMERGENCY DEPT VISIT LOW MDM: CPT

## 2020-05-08 PROCEDURE — 96375 TX/PRO/DX INJ NEW DRUG ADDON: CPT

## 2020-05-08 RX ORDER — NITROFURANTOIN 25; 75 MG/1; MG/1
100 CAPSULE ORAL 2 TIMES DAILY
Qty: 14 CAPSULE | Refills: 0 | Status: SHIPPED | OUTPATIENT
Start: 2020-05-08 | End: 2020-05-15

## 2020-05-08 RX ORDER — SODIUM CHLORIDE 0.9 % (FLUSH) 0.9 %
10 SYRINGE (ML) INJECTION AS NEEDED
Status: DISCONTINUED | OUTPATIENT
Start: 2020-05-08 | End: 2020-05-08 | Stop reason: HOSPADM

## 2020-05-08 RX ADMIN — CEFTRIAXONE 1 G: 1 INJECTION, POWDER, FOR SOLUTION INTRAMUSCULAR; INTRAVENOUS at 18:33

## 2020-05-08 RX ADMIN — HUMAN INSULIN 6 UNITS: 100 INJECTION, SOLUTION SUBCUTANEOUS at 18:33

## 2020-05-08 RX ADMIN — SODIUM CHLORIDE 1000 ML: 900 INJECTION, SOLUTION INTRAVENOUS at 17:22

## 2020-05-08 RX ADMIN — HYDROMORPHONE HYDROCHLORIDE 0.5 MG: 1 INJECTION, SOLUTION INTRAMUSCULAR; INTRAVENOUS; SUBCUTANEOUS at 17:21

## 2020-05-08 NOTE — ED PROVIDER NOTES
Subjective   Patient presents to the ER with concerns for possible DKA.  She states at home she was spilling some ketones.  She states she also has generalized back pain with dysuria.  She states her blood sugars at home running between 250 and in the 300s despite use of insulin.  Patient denies any shortness of breath, chest pain, abdominal pain, vomiting.  She does report some nausea.          Review of Systems   Constitutional: Negative.    HENT: Negative.    Respiratory: Negative.    Cardiovascular: Negative.    Gastrointestinal: Positive for nausea. Negative for abdominal pain, constipation, diarrhea and vomiting.   Genitourinary: Positive for dysuria, frequency and urgency. Negative for decreased urine volume and menstrual problem.   Musculoskeletal: Positive for back pain.   Neurological: Negative.    Psychiatric/Behavioral: Negative.        Past Medical History:   Diagnosis Date   • ADHD    • Bipolar 1 disorder (CMS/HCC)    • Borderline personality disorder (CMS/HCC)    • Cannabinoid hyperemesis syndrome (CMS/HCC)    • Diabetes mellitus type 1 (CMS/HCC)    • Diabetic gastroparesis (CMS/[facility])    • Diabetic ketoacidosis (CMS/[facility])    • Diabetic neuropathy (CMS/[facility])    • Post traumatic stress disorder (PTSD)    • Recurrent UTI    • Seizure disorder (CMS/HCC)    • Severe depressed bipolar II disorder without psychotic features (CMS/[facility])    • Uncontrolled diabetes mellitus (CMS/[facility])        Allergies   Allergen Reactions   • Pineapple Anaphylaxis   • Benzoyl Peroxide Swelling       Past Surgical History:   Procedure Laterality Date   •  SECTION N/A 2018       Family History   Problem Relation Age of Onset   • Drug abuse Father    • Suicide Attempts Brother    • Dementia Maternal Grandfather    • Hypertension Paternal Grandmother        Social History     Socioeconomic History   • Marital status: Single     Spouse name: Not on file   • Number of children: Not on file   • Years of education: Not on file  "  • Highest education level: Not on file   Tobacco Use   • Smoking status: Current Every Day Smoker     Packs/day: 0.50     Years: 1.00     Pack years: 0.50   • Smokeless tobacco: Never Used   Substance and Sexual Activity   • Alcohol use: No   • Drug use: Yes     Types: Marijuana   • Sexual activity: Yes     Partners: Male   Social History Narrative    Substance Abuse: Pt drinks EtOH occasionally, stating once every 6 months. Pt smokes marijuana, but denied any other illicit drugs. Pt smokes 0.5-1 ppd of cigarettes x 1 year. Pt denies caffeine consumption, states she drinks only water.         Marriages: none    Current Relationships: Recent breakup with her boyfriend    Children: one child that  2wks ago at 2 months old.        Occupation:  Pt is a  and loves her job, but hasn't been able to work since baby was born via C/S    Living Situation: was living with her boyfriend but they are  over the death of their child.  She plans to live with mom after discharge.           Objective    BP 98/52 (BP Location: Right arm, Patient Position: Lying)   Pulse 62   Temp 97.8 °F (36.6 °C) (Skin)   Resp 18   Ht 170.2 cm (67\")   Wt 49.1 kg (108 lb 3.2 oz)   LMP 2020 (Exact Date)   SpO2 100%   BMI 16.95 kg/m²     Physical Exam   Constitutional: She is oriented to person, place, and time. She appears well-developed and well-nourished. No distress.   HENT:   Head: Normocephalic and atraumatic.   Neck: Normal range of motion. Neck supple.   Cardiovascular: Normal rate, regular rhythm, normal heart sounds and intact distal pulses.   No murmur heard.  Pulmonary/Chest: Effort normal and breath sounds normal. No respiratory distress. She has no wheezes.   Abdominal: Soft. Bowel sounds are normal. She exhibits no distension. There is no tenderness.   Musculoskeletal: Normal range of motion.   Neurological: She is alert and oriented to person, place, and time. Coordination normal.   Skin: Skin is warm " and dry. Capillary refill takes less than 2 seconds.   Psychiatric: She has a normal mood and affect. Her behavior is normal. Judgment and thought content normal.   Nursing note and vitals reviewed.    Results for orders placed or performed during the hospital encounter of 05/08/20   Urine Culture - Urine, Urine, Clean Catch   Result Value Ref Range    Urine Culture >100,000 CFU/mL Escherichia coli (A)        Susceptibility    Escherichia coli - BRYAN     Ampicillin <=2 Susceptible ug/ml     Ampicillin + Sulbactam <=2 Susceptible ug/ml     Cefazolin <=4 Susceptible ug/ml     Cefepime <=1 Susceptible ug/ml     Ceftazidime <=1 Susceptible ug/ml     Ceftriaxone <=1 Susceptible ug/ml     Gentamicin <=1 Susceptible ug/ml     Levofloxacin <=0.12 Susceptible ug/ml     Nitrofurantoin <=16 Susceptible ug/ml     Piperacillin + Tazobactam <=4 Susceptible ug/ml     Tetracycline <=1 Susceptible ug/ml     Trimethoprim + Sulfamethoxazole <=20 Susceptible ug/ml   Comprehensive Metabolic Panel   Result Value Ref Range    Glucose 311 (H) 65 - 99 mg/dL    BUN 12 6 - 20 mg/dL    Creatinine 0.84 0.57 - 1.00 mg/dL    Sodium 135 (L) 136 - 145 mmol/L    Potassium 4.1 3.5 - 5.2 mmol/L    Chloride 99 98 - 107 mmol/L    CO2 23.0 22.0 - 29.0 mmol/L    Calcium 9.1 8.6 - 10.5 mg/dL    Total Protein 6.9 6.0 - 8.5 g/dL    Albumin 4.00 3.50 - 5.20 g/dL    ALT (SGPT) 8 1 - 33 U/L    AST (SGOT) 11 1 - 32 U/L    Alkaline Phosphatase 57 39 - 117 U/L    Total Bilirubin 0.2 0.2 - 1.2 mg/dL    eGFR Non African Amer 86 >60 mL/min/1.73    Globulin 2.9 gm/dL    A/G Ratio 1.4 g/dL    BUN/Creatinine Ratio 14.3 7.0 - 25.0    Anion Gap 13.0 5.0 - 15.0 mmol/L   Urinalysis With Microscopic If Indicated (No Culture) - Urine, Clean Catch   Result Value Ref Range    Color, UA Yellow Yellow, Straw, Dark Yellow, [place]    Appearance, UA Clear Clear    pH, UA 7.0 5.0 - 9.0    Specific [place], UA 1.015 1.003 - 1.030    Glucose, UA >=1000 mg/dL (3+) (A) Negative    Ketones,  UA Negative Negative    Bilirubin, UA Negative Negative    Blood, UA Negative Negative    Protein, UA Negative Negative    Leuk Esterase, UA Trace (A) Negative    Nitrite, UA Positive (A) Negative    Urobilinogen, UA 0.2 E.U./dL 0.2 - 1.0 E.U./dL   Pregnancy, Urine - Urine, Clean Catch   Result Value Ref Range    HCG, Urine QL Negative Negative   Troponin   Result Value Ref Range    Troponin T <0.010 0.000 - 0.030 ng/mL   Procalcitonin   Result Value Ref Range    Procalcitonin 0.03 (L) 0.10 - 0.25 ng/mL   CBC Auto Differential   Result Value Ref Range    WBC 7.57 3.40 - 10.80 10*3/mm3    RBC 3.88 3.77 - 5.28 10*6/mm3    Hemoglobin 11.3 (L) 12.0 - 15.9 g/dL    Hematocrit 33.0 (L) 34.0 - 46.6 %    MCV 85.1 79.0 - 97.0 fL    MCH 29.1 26.6 - 33.0 pg    MCHC 34.2 31.5 - 35.7 g/dL    RDW 13.3 12.3 - 15.4 %    RDW-SD 41.5 37.0 - 54.0 fl    MPV 10.2 6.0 - 12.0 fL    Platelets 295 140 - 450 10*3/mm3    Neutrophil % 56.9 42.7 - 76.0 %    Lymphocyte % 33.0 19.6 - 45.3 %    Monocyte % 3.7 (L) 5.0 - 12.0 %    Eosinophil % 5.2 0.3 - 6.2 %    Basophil % 0.8 0.0 - 1.5 %    Immature Grans % 0.4 0.0 - 0.5 %    Neutrophils, Absolute 4.31 1.70 - 7.00 10*3/mm3    Lymphocytes, Absolute 2.50 0.70 - 3.10 10*3/mm3    Monocytes, Absolute 0.28 0.10 - 0.90 10*3/mm3    Eosinophils, Absolute 0.39 0.00 - 0.40 10*3/mm3    Basophils, Absolute 0.06 0.00 - 0.20 10*3/mm3    Immature Grans, Absolute 0.03 0.00 - 0.05 10*3/mm3    nRBC 0.0 0.0 - 0.2 /100 WBC   Acetone   Result Value Ref Range    Acetone Negative Negative   Urinalysis, Microscopic Only - Urine, Clean Catch   Result Value Ref Range    RBC, UA 0-2 (A) None Seen /HPF    WBC, UA 31-50 (A) None Seen, 0-2, 3-5 /HPF    Bacteria, UA 4+ (A) None Seen /HPF    Squamous Epithelial Cells, UA None Seen None Seen, 0-2 /HPF    Hyaline Casts, UA None Seen None Seen /LPF    Methodology Automated Microscopy    Blood Gas, Arterial   Result Value Ref Range    Site Right Radial     Drew's Test N/A     pH,  Arterial 7.436 7.350 - 7.450 pH units    pCO2, Arterial 34.3 (L) 35.0 - 45.0 mm Hg    pO2, Arterial 106.0 83.0 - 108.0 mm Hg    HCO3, Arterial 23.0 20.0 - 26.0 mmol/L    Base Excess, Arterial -0.8 (L) 0.0 - 2.0 mmol/L    O2 Saturation, Arterial 99.2 (H) 94.0 - 99.0 %    Barometric Pressure for Blood Gas 748 mmHg    Modality Room Air     Ventilator Mode NA     Collected by RENE T    POC Glucose Once   Result Value Ref Range    Glucose 325 (H) 70 - 130 mg/dL   Light Blue Top   Result Value Ref Range    Extra Tube hold for add-on      Xr Chest 1 View    Result Date: 5/8/2020  Narrative: [facility] [place] Patient Name: ANDREA ZELAYA ORDERING: TAMARA NICOLAS ATTENDING: TASH WOODS REFERRING: TAMARA NICOLAS ----------------------- PROCEDURE: Chest Single View TECHNIQUE: Single AP upright portable radiograph of the chest. COMPARISON: 10/30/2019 HISTORY: possible DKA FINDINGS:  Life-support devices: None. Lungs/pleura: No consolidation, pleural effusion, or pneumothorax. Heart, hilar and mediastinal structures: The heart size and mediastinal contours are within limits of normal. The trachea is midline. Skeletal Structures: No acute findings. No free air beneath the diaphragm.     Impression: No acute pulmonary or pleural finding. Electronically signed by:  Víctor Murray MD  5/8/2020 6:01 PM CDT Workstation: [id number]      ECG 12 Lead    Date/Time: 5/8/2020 5:55 PM  Performed by: Tamara Nicolas APRN  Authorized by: Tamara Nicolas APRN   Interpreted by physician  Rhythm: sinus bradycardia  Rate: bradycardic  BPM: 53  ST Segments: ST segments normal                   ED Course  ED Course as of May 10 1326   Fri May 08, 2020   1815 Patient reviewed with Dr. Woods. Will treat BS and UTI at this time and patient is stable for D/C home.     [SH]      ED Course User Index  [SH] Tamara Nicolas APRN                                           [facility]    Final diagnoses:    Urinary tract infection in female   Type 1 diabetes mellitus without complication (CMS/[facility])            Tamara Arredondo, APRN  05/10/20 3189

## 2020-05-08 NOTE — DISCHARGE INSTRUCTIONS
Fill your prescription for antibiotics and take as directed for your UTI. Follow up with your PCP regarding your back pain if symptoms do not improve. Return back to ER as needed.

## 2020-05-10 LAB — BACTERIA SPEC AEROBE CULT: ABNORMAL

## 2020-06-02 ENCOUNTER — APPOINTMENT (OUTPATIENT)
Dept: CT IMAGING | Facility: HOSPITAL | Age: 20
End: 2020-06-02

## 2020-06-02 ENCOUNTER — APPOINTMENT (OUTPATIENT)
Dept: GENERAL RADIOLOGY | Facility: HOSPITAL | Age: 20
End: 2020-06-02

## 2020-06-02 ENCOUNTER — HOSPITAL ENCOUNTER (INPATIENT)
Facility: HOSPITAL | Age: 20
LOS: 2 days | Discharge: HOME | End: 2020-06-06
Attending: EMERGENCY MEDICINE | Admitting: INTERNAL MEDICINE

## 2020-06-02 DIAGNOSIS — J45.909 ASTHMA, UNSPECIFIED ASTHMA SEVERITY, UNSPECIFIED WHETHER COMPLICATED, UNSPECIFIED WHETHER PERSISTENT: ICD-10-CM

## 2020-06-02 DIAGNOSIS — N39.0 RECURRENT UTI: Chronic | ICD-10-CM

## 2020-06-02 DIAGNOSIS — G40.909 SEIZURE DISORDER (HCC): Chronic | ICD-10-CM

## 2020-06-02 DIAGNOSIS — F17.200 SMOKING: ICD-10-CM

## 2020-06-02 DIAGNOSIS — F43.10 POST TRAUMATIC STRESS DISORDER (PTSD): ICD-10-CM

## 2020-06-02 DIAGNOSIS — F60.3 BORDERLINE PERSONALITY DISORDER (HCC): ICD-10-CM

## 2020-06-02 DIAGNOSIS — A41.50 SEPSIS DUE TO GRAM NEGATIVE BACTERIA (HCC): ICD-10-CM

## 2020-06-02 DIAGNOSIS — R78.81 E COLI BACTEREMIA: ICD-10-CM

## 2020-06-02 DIAGNOSIS — F31.81 SEVERE DEPRESSED BIPOLAR II DISORDER WITHOUT PSYCHOTIC FEATURES (HCC): Chronic | ICD-10-CM

## 2020-06-02 DIAGNOSIS — F31.9 BIPOLAR 1 DISORDER (HCC): ICD-10-CM

## 2020-06-02 DIAGNOSIS — N17.9 AKI (ACUTE KIDNEY INJURY) (HCC): ICD-10-CM

## 2020-06-02 DIAGNOSIS — J45.20 MILD INTERMITTENT ASTHMA WITHOUT COMPLICATION: ICD-10-CM

## 2020-06-02 DIAGNOSIS — F12.90 CANNABINOID HYPEREMESIS SYNDROME: ICD-10-CM

## 2020-06-02 DIAGNOSIS — F43.21 COMPLICATED BEREAVEMENT: ICD-10-CM

## 2020-06-02 DIAGNOSIS — F17.210 CIGARETTE NICOTINE DEPENDENCE WITHOUT COMPLICATION: ICD-10-CM

## 2020-06-02 DIAGNOSIS — G47.01 INSOMNIA DUE TO MEDICAL CONDITION: ICD-10-CM

## 2020-06-02 DIAGNOSIS — R11.2 CANNABINOID HYPEREMESIS SYNDROME: ICD-10-CM

## 2020-06-02 DIAGNOSIS — E10.10 DIABETIC KETOACIDOSIS WITHOUT COMA ASSOCIATED WITH TYPE 1 DIABETES MELLITUS (HCC): ICD-10-CM

## 2020-06-02 DIAGNOSIS — E10.42 DIABETIC POLYNEUROPATHY ASSOCIATED WITH TYPE 1 DIABETES MELLITUS (HCC): Chronic | ICD-10-CM

## 2020-06-02 DIAGNOSIS — N12 PYELONEPHRITIS: Primary | ICD-10-CM

## 2020-06-02 DIAGNOSIS — Z30.011 ORAL CONTRACEPTION INITIATION: ICD-10-CM

## 2020-06-02 DIAGNOSIS — E10.65 TYPE 1 DIABETES MELLITUS WITH HYPERGLYCEMIA (HCC): ICD-10-CM

## 2020-06-02 DIAGNOSIS — E10.65 UNCONTROLLED TYPE 1 DIABETES MELLITUS WITH HYPERGLYCEMIA (HCC): ICD-10-CM

## 2020-06-02 DIAGNOSIS — B96.20 E COLI BACTEREMIA: ICD-10-CM

## 2020-06-02 LAB
ACETONE BLD QL: NEGATIVE
ALBUMIN SERPL-MCNC: 4.4 G/DL (ref 3.5–5.2)
ALBUMIN/GLOB SERPL: 1.4 G/DL
ALP SERPL-CCNC: 75 U/L (ref 39–117)
ALT SERPL W P-5'-P-CCNC: 10 U/L (ref 1–33)
ANION GAP SERPL CALCULATED.3IONS-SCNC: 12 MMOL/L (ref 5–15)
AST SERPL-CCNC: 9 U/L (ref 1–32)
B-HCG UR QL: NEGATIVE
BACTERIA UR QL AUTO: ABNORMAL /HPF
BASOPHILS # BLD AUTO: 0.05 10*3/MM3 (ref 0–0.2)
BASOPHILS NFR BLD AUTO: 0.4 % (ref 0–1.5)
BILIRUB SERPL-MCNC: 0.5 MG/DL (ref 0.2–1.2)
BILIRUB UR QL STRIP: NEGATIVE
BUN BLD-MCNC: 11 MG/DL (ref 6–20)
BUN/CREAT SERPL: 10.9 (ref 7–25)
CALCIUM SPEC-SCNC: 9.3 MG/DL (ref 8.6–10.5)
CHLORIDE SERPL-SCNC: 97 MMOL/L (ref 98–107)
CLARITY UR: ABNORMAL
CO2 SERPL-SCNC: 23 MMOL/L (ref 22–29)
COLOR UR: YELLOW
CREAT BLD-MCNC: 1.01 MG/DL (ref 0.57–1)
CRP SERPL-MCNC: 2.37 MG/DL (ref 0–0.5)
D-LACTATE SERPL-SCNC: 1 MMOL/L (ref 0.5–2)
DEPRECATED RDW RBC AUTO: 37.6 FL (ref 37–54)
EOSINOPHIL # BLD AUTO: 0.02 10*3/MM3 (ref 0–0.4)
EOSINOPHIL NFR BLD AUTO: 0.2 % (ref 0.3–6.2)
ERYTHROCYTE [DISTWIDTH] IN BLOOD BY AUTOMATED COUNT: 12.4 % (ref 12.3–15.4)
ERYTHROCYTE [SEDIMENTATION RATE] IN BLOOD: 32 MM/HR (ref 0–20)
GFR SERPL CREATININE-BSD FRML MDRD: 69 ML/MIN/1.73
GLOBULIN UR ELPH-MCNC: 3.2 GM/DL
GLUCOSE BLD-MCNC: 440 MG/DL (ref 65–99)
GLUCOSE BLDC GLUCOMTR-MCNC: 101 MG/DL (ref 70–130)
GLUCOSE UR STRIP-MCNC: ABNORMAL MG/DL
HCT VFR BLD AUTO: 36.7 % (ref 34–46.6)
HGB BLD-MCNC: 12.7 G/DL (ref 12–15.9)
HGB UR QL STRIP.AUTO: ABNORMAL
HOLD SPECIMEN: NORMAL
HYALINE CASTS UR QL AUTO: ABNORMAL /LPF
IMM GRANULOCYTES # BLD AUTO: 0.05 10*3/MM3 (ref 0–0.05)
IMM GRANULOCYTES NFR BLD AUTO: 0.4 % (ref 0–0.5)
KETONES UR QL STRIP: NEGATIVE
LEUKOCYTE ESTERASE UR QL STRIP.AUTO: ABNORMAL
LYMPHOCYTES # BLD AUTO: 1.25 10*3/MM3 (ref 0.7–3.1)
LYMPHOCYTES NFR BLD AUTO: 10.7 % (ref 19.6–45.3)
MCH RBC QN AUTO: 29.1 PG (ref 26.6–33)
MCHC RBC AUTO-ENTMCNC: 34.6 G/DL (ref 31.5–35.7)
MCV RBC AUTO: 84.2 FL (ref 79–97)
MONOCYTES # BLD AUTO: 0.67 10*3/MM3 (ref 0.1–0.9)
MONOCYTES NFR BLD AUTO: 5.8 % (ref 5–12)
NEUTROPHILS # BLD AUTO: 9.6 10*3/MM3 (ref 1.7–7)
NEUTROPHILS NFR BLD AUTO: 82.5 % (ref 42.7–76)
NITRITE UR QL STRIP: POSITIVE
NRBC BLD AUTO-RTO: 0 /100 WBC (ref 0–0.2)
PH UR STRIP.AUTO: 6.5 [PH] (ref 5–9)
PLATELET # BLD AUTO: 287 10*3/MM3 (ref 140–450)
PMV BLD AUTO: 10.5 FL (ref 6–12)
POTASSIUM BLD-SCNC: 4.2 MMOL/L (ref 3.5–5.2)
PROCALCITONIN SERPL-MCNC: 0.11 NG/ML (ref 0.1–0.25)
PROT SERPL-MCNC: 7.6 G/DL (ref 6–8.5)
PROT UR QL STRIP: NEGATIVE
RBC # BLD AUTO: 4.36 10*6/MM3 (ref 3.77–5.28)
RBC # UR: ABNORMAL /HPF
REF LAB TEST METHOD: ABNORMAL
SODIUM BLD-SCNC: 132 MMOL/L (ref 136–145)
SP GR UR STRIP: 1.01 (ref 1–1.03)
SQUAMOUS #/AREA URNS HPF: ABNORMAL /HPF
TSH SERPL DL<=0.05 MIU/L-ACNC: 0.63 UIU/ML (ref 0.27–4.2)
UROBILINOGEN UR QL STRIP: ABNORMAL
WBC NRBC COR # BLD: 11.64 10*3/MM3 (ref 3.4–10.8)
WBC UR QL AUTO: ABNORMAL /HPF
WHOLE BLOOD HOLD SPECIMEN: NORMAL
WHOLE BLOOD HOLD SPECIMEN: NORMAL

## 2020-06-02 PROCEDURE — 87086 URINE CULTURE/COLONY COUNT: CPT | Performed by: EMERGENCY MEDICINE

## 2020-06-02 PROCEDURE — 25010000002 CEFTRIAXONE: Performed by: NURSE PRACTITIONER

## 2020-06-02 PROCEDURE — 85025 COMPLETE CBC W/AUTO DIFF WBC: CPT | Performed by: EMERGENCY MEDICINE

## 2020-06-02 PROCEDURE — 87186 SC STD MICRODIL/AGAR DIL: CPT | Performed by: EMERGENCY MEDICINE

## 2020-06-02 PROCEDURE — 25010000002 ONDANSETRON PER 1 MG: Performed by: INTERNAL MEDICINE

## 2020-06-02 PROCEDURE — 99284 EMERGENCY DEPT VISIT MOD MDM: CPT

## 2020-06-02 PROCEDURE — 85651 RBC SED RATE NONAUTOMATED: CPT | Performed by: NURSE PRACTITIONER

## 2020-06-02 PROCEDURE — 36415 COLL VENOUS BLD VENIPUNCTURE: CPT | Performed by: EMERGENCY MEDICINE

## 2020-06-02 PROCEDURE — 25010000002 HYDROMORPHONE 1 MG/ML SOLUTION: Performed by: NURSE PRACTITIONER

## 2020-06-02 PROCEDURE — G0378 HOSPITAL OBSERVATION PER HR: HCPCS

## 2020-06-02 PROCEDURE — 74176 CT ABD & PELVIS W/O CONTRAST: CPT

## 2020-06-02 PROCEDURE — 82962 GLUCOSE BLOOD TEST: CPT

## 2020-06-02 PROCEDURE — 84145 PROCALCITONIN (PCT): CPT | Performed by: NURSE PRACTITIONER

## 2020-06-02 PROCEDURE — 83605 ASSAY OF LACTIC ACID: CPT | Performed by: EMERGENCY MEDICINE

## 2020-06-02 PROCEDURE — 81025 URINE PREGNANCY TEST: CPT | Performed by: EMERGENCY MEDICINE

## 2020-06-02 PROCEDURE — 80053 COMPREHEN METABOLIC PANEL: CPT | Performed by: EMERGENCY MEDICINE

## 2020-06-02 PROCEDURE — 63710000001 INSULIN REGULAR HUMAN PER 5 UNITS: Performed by: NURSE PRACTITIONER

## 2020-06-02 PROCEDURE — 87040 BLOOD CULTURE FOR BACTERIA: CPT | Performed by: EMERGENCY MEDICINE

## 2020-06-02 PROCEDURE — 81001 URINALYSIS AUTO W/SCOPE: CPT | Performed by: EMERGENCY MEDICINE

## 2020-06-02 PROCEDURE — 25010000002 ONDANSETRON PER 1 MG: Performed by: NURSE PRACTITIONER

## 2020-06-02 PROCEDURE — 82009 KETONE BODYS QUAL: CPT | Performed by: NURSE PRACTITIONER

## 2020-06-02 PROCEDURE — 86140 C-REACTIVE PROTEIN: CPT | Performed by: NURSE PRACTITIONER

## 2020-06-02 PROCEDURE — 71045 X-RAY EXAM CHEST 1 VIEW: CPT

## 2020-06-02 PROCEDURE — 84443 ASSAY THYROID STIM HORMONE: CPT | Performed by: INTERNAL MEDICINE

## 2020-06-02 RX ORDER — LANOLIN ALCOHOL/MO/W.PET/CERES
3 CREAM (GRAM) TOPICAL NIGHTLY PRN
Status: DISCONTINUED | OUTPATIENT
Start: 2020-06-02 | End: 2020-06-06 | Stop reason: HOSPADM

## 2020-06-02 RX ORDER — BISACODYL 10 MG
10 SUPPOSITORY, RECTAL RECTAL DAILY PRN
Status: DISCONTINUED | OUTPATIENT
Start: 2020-06-02 | End: 2020-06-06 | Stop reason: HOSPADM

## 2020-06-02 RX ORDER — SODIUM CHLORIDE 0.9 % (FLUSH) 0.9 %
10 SYRINGE (ML) INJECTION EVERY 12 HOURS SCHEDULED
Status: DISCONTINUED | OUTPATIENT
Start: 2020-06-02 | End: 2020-06-06 | Stop reason: HOSPADM

## 2020-06-02 RX ORDER — ACETAMINOPHEN 325 MG/1
650 TABLET ORAL EVERY 4 HOURS PRN
Status: DISCONTINUED | OUTPATIENT
Start: 2020-06-02 | End: 2020-06-06 | Stop reason: HOSPADM

## 2020-06-02 RX ORDER — ONDANSETRON 2 MG/ML
4 INJECTION INTRAMUSCULAR; INTRAVENOUS EVERY 6 HOURS PRN
Status: DISCONTINUED | OUTPATIENT
Start: 2020-06-02 | End: 2020-06-05

## 2020-06-02 RX ORDER — DEXTROSE MONOHYDRATE 25 G/50ML
25 INJECTION, SOLUTION INTRAVENOUS
Status: DISCONTINUED | OUTPATIENT
Start: 2020-06-02 | End: 2020-06-06 | Stop reason: HOSPADM

## 2020-06-02 RX ORDER — NICOTINE POLACRILEX 4 MG
15 LOZENGE BUCCAL
Status: DISCONTINUED | OUTPATIENT
Start: 2020-06-02 | End: 2020-06-06 | Stop reason: HOSPADM

## 2020-06-02 RX ORDER — ACETAMINOPHEN 650 MG/1
650 SUPPOSITORY RECTAL EVERY 4 HOURS PRN
Status: DISCONTINUED | OUTPATIENT
Start: 2020-06-02 | End: 2020-06-06 | Stop reason: HOSPADM

## 2020-06-02 RX ORDER — ACETAMINOPHEN 160 MG/5ML
650 SOLUTION ORAL EVERY 4 HOURS PRN
Status: DISCONTINUED | OUTPATIENT
Start: 2020-06-02 | End: 2020-06-06 | Stop reason: HOSPADM

## 2020-06-02 RX ORDER — ONDANSETRON 2 MG/ML
4 INJECTION INTRAMUSCULAR; INTRAVENOUS ONCE
Status: COMPLETED | OUTPATIENT
Start: 2020-06-02 | End: 2020-06-02

## 2020-06-02 RX ORDER — KETOROLAC TROMETHAMINE 30 MG/ML
15 INJECTION, SOLUTION INTRAMUSCULAR; INTRAVENOUS EVERY 6 HOURS PRN
Status: DISCONTINUED | OUTPATIENT
Start: 2020-06-02 | End: 2020-06-03

## 2020-06-02 RX ORDER — FAMOTIDINE 40 MG/1
40 TABLET, FILM COATED ORAL DAILY
Status: DISCONTINUED | OUTPATIENT
Start: 2020-06-03 | End: 2020-06-06 | Stop reason: HOSPADM

## 2020-06-02 RX ORDER — CALCIUM CARBONATE 200(500)MG
2 TABLET,CHEWABLE ORAL 2 TIMES DAILY PRN
Status: DISCONTINUED | OUTPATIENT
Start: 2020-06-02 | End: 2020-06-06 | Stop reason: HOSPADM

## 2020-06-02 RX ORDER — ACETAMINOPHEN 325 MG/1
650 TABLET ORAL ONCE
Status: COMPLETED | OUTPATIENT
Start: 2020-06-02 | End: 2020-06-02

## 2020-06-02 RX ORDER — SODIUM CHLORIDE 0.9 % (FLUSH) 0.9 %
10 SYRINGE (ML) INJECTION AS NEEDED
Status: DISCONTINUED | OUTPATIENT
Start: 2020-06-02 | End: 2020-06-06 | Stop reason: HOSPADM

## 2020-06-02 RX ORDER — BISACODYL 5 MG/1
5 TABLET, DELAYED RELEASE ORAL DAILY PRN
Status: DISCONTINUED | OUTPATIENT
Start: 2020-06-02 | End: 2020-06-06 | Stop reason: HOSPADM

## 2020-06-02 RX ORDER — ONDANSETRON 4 MG/1
4 TABLET, FILM COATED ORAL EVERY 6 HOURS PRN
Status: DISCONTINUED | OUTPATIENT
Start: 2020-06-02 | End: 2020-06-05

## 2020-06-02 RX ADMIN — SODIUM CHLORIDE, PRESERVATIVE FREE 10 ML: 5 INJECTION INTRAVENOUS at 22:32

## 2020-06-02 RX ADMIN — ACETAMINOPHEN 650 MG: 325 TABLET, FILM COATED ORAL at 19:18

## 2020-06-02 RX ADMIN — HYDROMORPHONE HYDROCHLORIDE 1 MG: 1 INJECTION, SOLUTION INTRAMUSCULAR; INTRAVENOUS; SUBCUTANEOUS at 19:19

## 2020-06-02 RX ADMIN — SODIUM CHLORIDE 1000 ML: 900 INJECTION, SOLUTION INTRAVENOUS at 19:17

## 2020-06-02 RX ADMIN — CEFTRIAXONE 2 G: 2 INJECTION, POWDER, FOR SOLUTION INTRAMUSCULAR; INTRAVENOUS at 20:04

## 2020-06-02 RX ADMIN — ONDANSETRON 4 MG: 2 INJECTION INTRAMUSCULAR; INTRAVENOUS at 22:32

## 2020-06-02 RX ADMIN — ONDANSETRON 4 MG: 2 INJECTION INTRAMUSCULAR; INTRAVENOUS at 19:18

## 2020-06-02 RX ADMIN — HUMAN INSULIN 5 UNITS: 100 INJECTION, SOLUTION SUBCUTANEOUS at 21:03

## 2020-06-02 NOTE — ED PROVIDER NOTES
Subjective   Patient presents to the ER with complaints of dysuria with blood in her urine.  She states this is been ongoing for a while now.  She was just treated for a UTI back on May 8 with Keflex.  She states she completed the antibiotics at that time.  She also states her blood sugars have been ranging from 40 at night up to 600 during the day despite use of medications.  She does report that she has recently been placed on new medications which include Lamictal, Zoloft, Seroquel, and Vyvanse for her ADD, anxiety and bipolar.  She states she has generalized body aches all over and her pain is 10 out of 10.  She does complain of mid back pain along with headache.  She reports she has a history of being septic and feels like she is headed in that direction.  She does report that she smoked marijuana 1 week ago and that she does routinely smoke cigarettes.  Last time patient was seen on 5/8 we had a weight of 108 pounds.  Today she is weighing in at 103.          Review of Systems   Constitutional: Positive for activity change, chills, fatigue and fever. Negative for diaphoresis.   HENT: Negative.    Respiratory: Negative for cough, chest tightness, shortness of breath and wheezing.    Cardiovascular: Negative for chest pain and palpitations.   Gastrointestinal: Positive for abdominal pain and nausea. Negative for abdominal distention, constipation, diarrhea and vomiting.   Genitourinary: Positive for dysuria, flank pain, frequency and hematuria. Negative for difficulty urinating, pelvic pain, vaginal bleeding, vaginal discharge and vaginal pain.   Musculoskeletal: Positive for back pain and myalgias.   Skin: Negative.    Neurological: Positive for weakness. Negative for dizziness, syncope, speech difficulty, numbness and headaches.   Psychiatric/Behavioral: Negative.        Past Medical History:   Diagnosis Date   • ADHD    • Bipolar 1 disorder (CMS/[facility])    • Borderline personality disorder (CMS/[facility])    •  Cannabinoid hyperemesis syndrome (CMS/HCC)    • Diabetes mellitus type 1 (CMS/HCC)    • Diabetic gastroparesis (CMS/HCC)    • Diabetic ketoacidosis (CMS/HCC)    • Diabetic neuropathy (CMS/HCC)    • Post traumatic stress disorder (PTSD)    • Recurrent UTI    • Seizure disorder (CMS/HCC)    • Severe depressed bipolar II disorder without psychotic features (CMS/HCC)    • Uncontrolled diabetes mellitus (CMS/HCC)        Allergies   Allergen Reactions   • Pineapple Anaphylaxis   • Benzoyl Peroxide Swelling       Past Surgical History:   Procedure Laterality Date   •  SECTION N/A 2018       Family History   Problem Relation Age of Onset   • Drug abuse Father    • Suicide Attempts Brother    • Dementia Maternal Grandfather    • Hypertension Paternal Grandmother        Social History     Socioeconomic History   • Marital status: Single     Spouse name: Not on file   • Number of children: Not on file   • Years of education: Not on file   • Highest education level: Not on file   Tobacco Use   • Smoking status: Current Every Day Smoker     Packs/day: 0.50     Years: 1.00     Pack years: 0.50   • Smokeless tobacco: Never Used   Substance and Sexual Activity   • Alcohol use: No   • Drug use: Yes     Types: Marijuana   • Sexual activity: Yes     Partners: Male   Social History Narrative    Substance Abuse: Pt drinks EtOH occasionally, stating once every 6 months. Pt smokes marijuana, but denied any other illicit drugs. Pt smokes 0.5-1 ppd of cigarettes x 1 year. Pt denies caffeine consumption, states she drinks only water.         Marriages: none    Current Relationships: Recent breakup with her boyfriend    Children: one child that  2wks ago at 2 months old.        Occupation:  Pt is a  and loves her job, but hasn't been able to work since baby was born via C/S    Living Situation: was living with her boyfriend but they are  over the death of their child.  She plans to live with mom after  "discharge.           Objective    /76 (BP Location: Left arm, Patient Position: Sitting)   Pulse 75   Temp 98.1 °F (36.7 °C) (Oral)   Resp 18   Ht 170.2 cm (67\")   Wt 46.7 kg (103 lb)   LMP 05/07/2020 (Exact Date)   SpO2 99%   BMI 16.13 kg/m²     Physical Exam   Constitutional: She is oriented to person, place, and time. She appears well-developed and well-nourished. No distress.   Thin appearing female. Appears to feel unwell - noticeable weight loss since patient last seen by me 5/8/20   HENT:   Head: Normocephalic and atraumatic.   Neck: Normal range of motion. Neck supple.   Cardiovascular: Normal rate, regular rhythm, normal heart sounds and intact distal pulses.   No murmur heard.  Pulmonary/Chest: Effort normal and breath sounds normal. No respiratory distress. She has no wheezes.   Abdominal: Soft. Bowel sounds are normal. She exhibits no distension. There is tenderness.   generalized   Musculoskeletal: Normal range of motion. She exhibits tenderness (CVA).   Neurological: She is alert and oriented to person, place, and time. Coordination normal.   Skin: Skin is warm and dry. Capillary refill takes less than 2 seconds.   Psychiatric: She has a normal mood and affect. Her behavior is normal. Judgment and thought content normal.   Nursing note and vitals reviewed.      Procedures  Results for orders placed or performed during the hospital encounter of 06/02/20   Comprehensive Metabolic Panel   Result Value Ref Range    Glucose 440 (C) 65 - 99 mg/dL    BUN 11 6 - 20 mg/dL    Creatinine 1.01 (H) 0.57 - 1.00 mg/dL    Sodium 132 (L) 136 - 145 mmol/L    Potassium 4.2 3.5 - 5.2 mmol/L    Chloride 97 (L) 98 - 107 mmol/L    CO2 23.0 22.0 - 29.0 mmol/L    Calcium 9.3 8.6 - 10.5 mg/dL    Total Protein 7.6 6.0 - 8.5 g/dL    Albumin 4.40 3.50 - 5.20 g/dL    ALT (SGPT) 10 1 - 33 U/L    AST (SGOT) 9 1 - 32 U/L    Alkaline Phosphatase 75 39 - 117 U/L    Total Bilirubin 0.5 0.2 - 1.2 mg/dL    eGFR Non African " Amer 69 >60 mL/min/1.73    Globulin 3.2 gm/dL    A/G Ratio 1.4 g/dL    BUN/Creatinine Ratio 10.9 7.0 - 25.0    Anion Gap 12.0 5.0 - 15.0 mmol/L   Lactic Acid, Plasma   Result Value Ref Range    Lactate 1.0 0.5 - 2.0 mmol/L   Urinalysis With Culture If Indicated - Urine, Clean Catch   Result Value Ref Range    Color, UA Yellow Yellow, Straw, Dark Yellow, [place]    Appearance, UA Cloudy (A) Clear    pH, UA 6.5 5.0 - 9.0    Specific [place], UA 1.015 1.003 - 1.030    Glucose, UA >=1000 mg/dL (3+) (A) Negative    Ketones, UA Negative Negative    Bilirubin, UA Negative Negative    Blood, UA Trace (A) Negative    Protein, UA Negative Negative    Leuk Esterase, UA Moderate (2+) (A) Negative    Nitrite, UA Positive (A) Negative    Urobilinogen, UA 0.2 E.U./dL 0.2 - 1.0 E.U./dL   Pregnancy, Urine - Urine, Clean Catch   Result Value Ref Range    HCG, Urine QL Negative Negative   CBC Auto Differential   Result Value Ref Range    WBC 11.64 (H) 3.40 - 10.80 10*3/mm3    RBC 4.36 3.77 - 5.28 10*6/mm3    Hemoglobin 12.7 12.0 - 15.9 g/dL    Hematocrit 36.7 34.0 - 46.6 %    MCV 84.2 79.0 - 97.0 fL    MCH 29.1 26.6 - 33.0 pg    MCHC 34.6 31.5 - 35.7 g/dL    RDW 12.4 12.3 - 15.4 %    RDW-SD 37.6 37.0 - 54.0 fl    MPV 10.5 6.0 - 12.0 fL    Platelets 287 140 - 450 10*3/mm3    Neutrophil % 82.5 (H) 42.7 - 76.0 %    Lymphocyte % 10.7 (L) 19.6 - 45.3 %    Monocyte % 5.8 5.0 - 12.0 %    Eosinophil % 0.2 (L) 0.3 - 6.2 %    Basophil % 0.4 0.0 - 1.5 %    Immature Grans % 0.4 0.0 - 0.5 %    Neutrophils, Absolute 9.60 (H) 1.70 - 7.00 10*3/mm3    Lymphocytes, Absolute 1.25 0.70 - 3.10 10*3/mm3    Monocytes, Absolute 0.67 0.10 - 0.90 10*3/mm3    Eosinophils, Absolute 0.02 0.00 - 0.40 10*3/mm3    Basophils, Absolute 0.05 0.00 - 0.20 10*3/mm3    Immature Grans, Absolute 0.05 0.00 - 0.05 10*3/mm3    nRBC 0.0 0.0 - 0.2 /100 WBC   Acetone   Result Value Ref Range    Acetone Negative Negative   Urinalysis, Microscopic Only - Urine, Clean Catch   Result  Value Ref Range    RBC, UA 0-2 (A) None Seen /HPF    WBC, UA Too Numerous to Count (A) None Seen, 0-2, 3-5 /HPF    Bacteria, UA 2+ (A) None Seen /HPF    Squamous Epithelial Cells, UA None Seen None Seen, 0-2 /HPF    Hyaline Casts, UA 0-2 None Seen /LPF    Methodology Automated Microscopy    C-reactive Protein   Result Value Ref Range    C-Reactive Protein 2.37 (H) 0.00 - 0.50 mg/dL   Procalcitonin   Result Value Ref Range    Procalcitonin 0.11 0.10 - 0.25 ng/mL   Sedimentation Rate   Result Value Ref Range    Sed Rate 32 (H) 0 - 20 mm/hr   Green Top (Gel)   Result Value Ref Range    Extra Tube Hold for add-ons.    Lavender Top   Result Value Ref Range    Extra Tube hold for add-on    Light Blue Top   Result Value Ref Range    Extra Tube hold for add-on      Ct Abdomen Pelvis Without Contrast    Result Date: 6/2/2020  Narrative: EXAM DESCRIPTION:  CT ABDOMEN PELVIS WO CONTRAST CLINICAL HISTORY: 20 years  Female  abd pain, N12 Tubulo-interstitial nephritis, not specified as acute or chronic E10.65 Type 1 diabetes mellitus with hyperglycemia COMPARISON: October 29, 2019. TECHNIQUE: Images were obtained in axial, sagittal, and coronal planes. No intravenous contrast was administered.   This exam was performed according to our departmental dose-optimization program which includes use of Automated Exposure Control, adjustment of the mA and/or kV according to patient size and/or use of iterative reconstruction technique.  FINDINGS: No abnormality involving the liver, spleen, pancreas, or adrenal glands bilaterally. Suspected sludge within the gallbladder. No obstructing renal calcifications bilaterally. No hydronephrosis bilaterally. Unremarkable bladder. Appendix within normal limits. Inspissated contrast identified within the appendix. No bowel obstruction, perforation, or inflammation. No dilatation abdominal aorta. No adenopathy or abnormal fluid collections seen. No abnormality lower lungs bilaterally. No acute  osseous abnormality.     Impression: No acute intra-abdominal abnormality. Electronically signed by:  Caro Carrillo MD  6/2/2020 9:08 PM CDT Workstation: [id number]    Xr Chest 1 View    Result Date: 6/2/2020  Narrative: PROCEDURE: XR CHEST 1 VW VIEWS:Single INDICATION: Sepsis COMPARISON: CXR: 5/8/2020 FINDINGS:   - lines/tubes: None   - cardiac: Size within normal limits.   - mediastinum: Contour within normal limits.   - lungs: No evidence of a focal air space process, pulmonary interstitial edema, nodule(s)/mass.   - pleura: No evidence of  fluid.    - osseous: Unremarkable for age.     Impression: Negative examination. Electronically signed by:  Cha Cooney MD  6/2/2020 7:48 PM CDT Workstation: [id number]    Xr Chest 1 View    Result Date: 5/8/2020  Narrative: [facility] Patient Name: ANDREA ZELAYA ORDERING: TAMARA NICOLAS ATTENDING: TASH CHRISTY REFERRING: TAMARA NICOLAS ----------------------- PROCEDURE: Chest Single View TECHNIQUE: Single AP upright portable radiograph of the chest. COMPARISON: 10/30/2019 HISTORY: possible DKA FINDINGS:  Life-support devices: None. Lungs/pleura: No consolidation, pleural effusion, or pneumothorax. Heart, hilar and mediastinal structures: The heart size and mediastinal contours are within limits of normal. The trachea is midline. Skeletal Structures: No acute findings. No free air beneath the diaphragm.     Impression: No acute pulmonary or pleural finding. Electronically signed by:  Víctor Murray MD  5/8/2020 6:01 PM CDT Workstation: [id number]             ED Course  ED Course as of Jun 02 2144   Tue Jun 02, 2020 2042 Hospitalist paged.     []   2055 Spoke with Dr. Parrish. Will admit at this time for pylonephritis and Type 1 Diabetes.     []   2105 Patient up dated on admission status.     []      ED Course User Index  [] Tamara Nicolas APRN                                           [facility]    Final diagnoses:    Pyelonephritis   Type 1 diabetes mellitus with hyperglycemia (CMS/[facility])            Tamara Arredondo, ARMOND  06/02/20 9891

## 2020-06-03 LAB
ALBUMIN SERPL-MCNC: 3.6 G/DL (ref 3.5–5.2)
ALBUMIN/GLOB SERPL: 1.2 G/DL
ALP SERPL-CCNC: 58 U/L (ref 39–117)
ALT SERPL W P-5'-P-CCNC: 9 U/L (ref 1–33)
ANION GAP SERPL CALCULATED.3IONS-SCNC: 12 MMOL/L (ref 5–15)
AST SERPL-CCNC: 10 U/L (ref 1–32)
BASOPHILS # BLD AUTO: 0.04 10*3/MM3 (ref 0–0.2)
BASOPHILS NFR BLD AUTO: 0.2 % (ref 0–1.5)
BILIRUB SERPL-MCNC: 0.2 MG/DL (ref 0.2–1.2)
BUN BLD-MCNC: 12 MG/DL (ref 6–20)
BUN/CREAT SERPL: 11.4 (ref 7–25)
CALCIUM SPEC-SCNC: 8.6 MG/DL (ref 8.6–10.5)
CHLORIDE SERPL-SCNC: 97 MMOL/L (ref 98–107)
CO2 SERPL-SCNC: 27 MMOL/L (ref 22–29)
CREAT BLD-MCNC: 1.05 MG/DL (ref 0.57–1)
DEPRECATED RDW RBC AUTO: 38.1 FL (ref 37–54)
EOSINOPHIL # BLD AUTO: 0 10*3/MM3 (ref 0–0.4)
EOSINOPHIL NFR BLD AUTO: 0 % (ref 0.3–6.2)
ERYTHROCYTE [DISTWIDTH] IN BLOOD BY AUTOMATED COUNT: 12.4 % (ref 12.3–15.4)
GFR SERPL CREATININE-BSD FRML MDRD: 66 ML/MIN/1.73
GLOBULIN UR ELPH-MCNC: 2.9 GM/DL
GLUCOSE BLD-MCNC: 340 MG/DL (ref 65–99)
GLUCOSE BLDC GLUCOMTR-MCNC: 102 MG/DL (ref 70–130)
GLUCOSE BLDC GLUCOMTR-MCNC: 154 MG/DL (ref 70–130)
GLUCOSE BLDC GLUCOMTR-MCNC: 170 MG/DL (ref 70–130)
GLUCOSE BLDC GLUCOMTR-MCNC: 195 MG/DL (ref 70–130)
GLUCOSE BLDC GLUCOMTR-MCNC: 234 MG/DL (ref 70–130)
GLUCOSE BLDC GLUCOMTR-MCNC: 342 MG/DL (ref 70–130)
GLUCOSE BLDC GLUCOMTR-MCNC: 64 MG/DL (ref 70–130)
GLUCOSE BLDC GLUCOMTR-MCNC: 64 MG/DL (ref 70–130)
GLUCOSE BLDC GLUCOMTR-MCNC: 82 MG/DL (ref 70–130)
GLUCOSE BLDC GLUCOMTR-MCNC: 95 MG/DL (ref 70–130)
HCT VFR BLD AUTO: 32.3 % (ref 34–46.6)
HGB BLD-MCNC: 11.1 G/DL (ref 12–15.9)
IMM GRANULOCYTES # BLD AUTO: 0.13 10*3/MM3 (ref 0–0.05)
IMM GRANULOCYTES NFR BLD AUTO: 0.7 % (ref 0–0.5)
LYMPHOCYTES # BLD AUTO: 0.89 10*3/MM3 (ref 0.7–3.1)
LYMPHOCYTES NFR BLD AUTO: 5.1 % (ref 19.6–45.3)
MAGNESIUM SERPL-MCNC: 1.6 MG/DL (ref 1.6–2.6)
MCH RBC QN AUTO: 29.2 PG (ref 26.6–33)
MCHC RBC AUTO-ENTMCNC: 34.4 G/DL (ref 31.5–35.7)
MCV RBC AUTO: 85 FL (ref 79–97)
MONOCYTES # BLD AUTO: 0.6 10*3/MM3 (ref 0.1–0.9)
MONOCYTES NFR BLD AUTO: 3.4 % (ref 5–12)
NEUTROPHILS # BLD AUTO: 15.83 10*3/MM3 (ref 1.7–7)
NEUTROPHILS NFR BLD AUTO: 90.6 % (ref 42.7–76)
NRBC BLD AUTO-RTO: 0 /100 WBC (ref 0–0.2)
PHOSPHATE SERPL-MCNC: 3.9 MG/DL (ref 2.5–4.5)
PLATELET # BLD AUTO: 230 10*3/MM3 (ref 140–450)
PMV BLD AUTO: 10.5 FL (ref 6–12)
POTASSIUM BLD-SCNC: 4.2 MMOL/L (ref 3.5–5.2)
PROT SERPL-MCNC: 6.5 G/DL (ref 6–8.5)
RBC # BLD AUTO: 3.8 10*6/MM3 (ref 3.77–5.28)
SODIUM BLD-SCNC: 136 MMOL/L (ref 136–145)
WBC NRBC COR # BLD: 17.49 10*3/MM3 (ref 3.4–10.8)

## 2020-06-03 PROCEDURE — 83735 ASSAY OF MAGNESIUM: CPT | Performed by: INTERNAL MEDICINE

## 2020-06-03 PROCEDURE — 25010000002 CEFTRIAXONE: Performed by: INTERNAL MEDICINE

## 2020-06-03 PROCEDURE — G0378 HOSPITAL OBSERVATION PER HR: HCPCS

## 2020-06-03 PROCEDURE — 25010000002 KETOROLAC TROMETHAMINE PER 15 MG: Performed by: INTERNAL MEDICINE

## 2020-06-03 PROCEDURE — 63710000001 INSULIN DETEMIR PER 5 UNITS: Performed by: INTERNAL MEDICINE

## 2020-06-03 PROCEDURE — 82962 GLUCOSE BLOOD TEST: CPT

## 2020-06-03 PROCEDURE — 84100 ASSAY OF PHOSPHORUS: CPT | Performed by: INTERNAL MEDICINE

## 2020-06-03 PROCEDURE — 63710000001 INSULIN ASPART PER 5 UNITS: Performed by: INTERNAL MEDICINE

## 2020-06-03 PROCEDURE — 80053 COMPREHEN METABOLIC PANEL: CPT | Performed by: INTERNAL MEDICINE

## 2020-06-03 PROCEDURE — 85025 COMPLETE CBC W/AUTO DIFF WBC: CPT | Performed by: INTERNAL MEDICINE

## 2020-06-03 PROCEDURE — 25010000002 ONDANSETRON PER 1 MG: Performed by: INTERNAL MEDICINE

## 2020-06-03 PROCEDURE — 25010000002 MORPHINE PER 10 MG: Performed by: FAMILY MEDICINE

## 2020-06-03 RX ORDER — MORPHINE SULFATE 2 MG/ML
1 INJECTION, SOLUTION INTRAMUSCULAR; INTRAVENOUS ONCE AS NEEDED
Status: COMPLETED | OUTPATIENT
Start: 2020-06-03 | End: 2020-06-03

## 2020-06-03 RX ORDER — TRAMADOL HYDROCHLORIDE 50 MG/1
50 TABLET ORAL ONCE
Status: COMPLETED | OUTPATIENT
Start: 2020-06-03 | End: 2020-06-03

## 2020-06-03 RX ORDER — MORPHINE SULFATE 2 MG/ML
1 INJECTION, SOLUTION INTRAMUSCULAR; INTRAVENOUS ONCE AS NEEDED
Status: DISCONTINUED | OUTPATIENT
Start: 2020-06-03 | End: 2020-06-04

## 2020-06-03 RX ORDER — LAMOTRIGINE 25 MG/1
25 TABLET ORAL DAILY
COMMUNITY
End: 2020-12-15

## 2020-06-03 RX ORDER — HYDROCODONE BITARTRATE AND ACETAMINOPHEN 7.5; 325 MG/1; MG/1
1 TABLET ORAL EVERY 6 HOURS PRN
Status: DISCONTINUED | OUTPATIENT
Start: 2020-06-03 | End: 2020-06-06 | Stop reason: HOSPADM

## 2020-06-03 RX ORDER — HYDROXYZINE HYDROCHLORIDE 10 MG/1
10 TABLET, FILM COATED ORAL 3 TIMES DAILY PRN
COMMUNITY
End: 2022-06-27

## 2020-06-03 RX ORDER — SODIUM CHLORIDE 450 MG/100ML
100 INJECTION, SOLUTION INTRAVENOUS CONTINUOUS
Status: DISCONTINUED | OUTPATIENT
Start: 2020-06-03 | End: 2020-06-06 | Stop reason: HOSPADM

## 2020-06-03 RX ORDER — SERTRALINE HYDROCHLORIDE 100 MG/1
100 TABLET, FILM COATED ORAL NIGHTLY
COMMUNITY
End: 2021-11-08 | Stop reason: HOSPADM

## 2020-06-03 RX ADMIN — INSULIN ASPART 5 UNITS: 100 INJECTION, SOLUTION INTRAVENOUS; SUBCUTANEOUS at 08:11

## 2020-06-03 RX ADMIN — MORPHINE SULFATE 1 MG: 2 INJECTION, SOLUTION INTRAMUSCULAR; INTRAVENOUS at 11:57

## 2020-06-03 RX ADMIN — TRAMADOL HYDROCHLORIDE 50 MG: 50 TABLET, FILM COATED ORAL at 00:48

## 2020-06-03 RX ADMIN — KETOROLAC TROMETHAMINE 15 MG: 30 INJECTION, SOLUTION INTRAMUSCULAR at 01:25

## 2020-06-03 RX ADMIN — HYDROCODONE BITARTRATE AND ACETAMINOPHEN 1 TABLET: 7.5; 325 TABLET ORAL at 20:18

## 2020-06-03 RX ADMIN — CEFTRIAXONE 1 G: 1 INJECTION, POWDER, FOR SOLUTION INTRAMUSCULAR; INTRAVENOUS at 18:34

## 2020-06-03 RX ADMIN — DEXTROSE MONOHYDRATE 25 G: 25 INJECTION, SOLUTION INTRAVENOUS at 13:06

## 2020-06-03 RX ADMIN — FAMOTIDINE 40 MG: 40 TABLET ORAL at 08:11

## 2020-06-03 RX ADMIN — SODIUM CHLORIDE 100 ML/HR: 4.5 INJECTION, SOLUTION INTRAVENOUS at 22:55

## 2020-06-03 RX ADMIN — SODIUM CHLORIDE, PRESERVATIVE FREE 10 ML: 5 INJECTION INTRAVENOUS at 08:10

## 2020-06-03 RX ADMIN — DEXTROSE MONOHYDRATE 25 G: 25 INJECTION, SOLUTION INTRAVENOUS at 18:32

## 2020-06-03 RX ADMIN — INSULIN DETEMIR 20 UNITS: 100 INJECTION, SOLUTION SUBCUTANEOUS at 06:30

## 2020-06-03 RX ADMIN — SODIUM CHLORIDE, PRESERVATIVE FREE 10 ML: 5 INJECTION INTRAVENOUS at 20:19

## 2020-06-03 RX ADMIN — DEXTROSE MONOHYDRATE 25 G: 25 INJECTION, SOLUTION INTRAVENOUS at 21:27

## 2020-06-03 RX ADMIN — MELATONIN TAB 3 MG 3 MG: 3 TAB at 00:59

## 2020-06-03 RX ADMIN — SODIUM CHLORIDE 100 ML/HR: 4.5 INJECTION, SOLUTION INTRAVENOUS at 11:57

## 2020-06-03 RX ADMIN — ACETAMINOPHEN 650 MG: 325 TABLET, FILM COATED ORAL at 11:57

## 2020-06-03 RX ADMIN — ONDANSETRON 4 MG: 2 INJECTION INTRAMUSCULAR; INTRAVENOUS at 08:10

## 2020-06-03 RX ADMIN — ONDANSETRON 4 MG: 2 INJECTION INTRAMUSCULAR; INTRAVENOUS at 18:35

## 2020-06-03 NOTE — PROGRESS NOTES
"    [facility] Medicine Services  INPATIENT PROGRESS NOTE    Length of Stay: 1  Date of Admission: 6/2/2020  Primary Care Physician: Rufus Marshall APRN    Subjective   Chief Complaint: abdominal, back pain  HPI:  Feels \"terrible\" this morning. Said she had some relief in pain and nausea overnight following meds given in ER. Tramadol, however, does not help her pain. Says pain is worst throughout her lower back. Hurts to touch it. Now with nausea, nonbloody emesis x2 this morning. States she can't keep anything down. Continues with dysuria, frequency, urgency. No hematuria. No vaginal discharge, burning, pressure.     Review of Systems     All pertinent negatives and positives are as above. All other systems have been reviewed and are negative unless otherwise stated.     Objective    Temp:  [97.8 °F (36.6 °C)-101.5 °F (38.6 °C)] 98.7 °F (37.1 °C)  Heart Rate:  [] 79  Resp:  [18-20] 18  BP: (108-152)/(60-84) 136/84    Physical Exam   Constitutional: She is oriented to person, place, and time. She appears well-developed.   HENT:   Head: Normocephalic and atraumatic.   Eyes: Pupils are equal, round, and reactive to light.   Neck: Normal range of motion.   Cardiovascular: Normal rate, regular rhythm, normal heart sounds and intact distal pulses.   Pulmonary/Chest: Effort normal and breath sounds normal. No respiratory distress.   Abdominal: Soft. Bowel sounds are normal. She exhibits no distension. There is tenderness. There is no guarding.   Tender to deep palpation in bilateral lower quadrants. No peritoneal signs. Positive CVA tenderness R>L.    Musculoskeletal: She exhibits no edema.   Neurological: She is alert and oriented to person, place, and time.   Psychiatric: She has a normal mood and affect.           Results Review:  I have reviewed the labs, radiology results, and diagnostic studies.    Laboratory Data:   Results from last 7 days   Lab Units 06/03/20  0616 " 06/02/20  1850   SODIUM mmol/L 136 132*   POTASSIUM mmol/L 4.2 4.2   CHLORIDE mmol/L 97* 97*   CO2 mmol/L 27.0 23.0   BUN mg/dL 12 11   CREATININE mg/dL 1.05* 1.01*   GLUCOSE mg/dL 340* 440*   CALCIUM mg/dL 8.6 9.3   BILIRUBIN mg/dL 0.2 0.5   ALK PHOS U/L 58 75   ALT (SGPT) U/L 9 10   AST (SGOT) U/L 10 9   ANION GAP mmol/L 12.0 12.0     Estimated Creatinine Clearance: 64.1 mL/min (A) (by C-G formula based on SCr of 1.05 mg/dL (H)).  Results from last 7 days   Lab Units 06/03/20  0616   MAGNESIUM mg/dL 1.6   PHOSPHORUS mg/dL 3.9         Results from last 7 days   Lab Units 06/03/20  0616 06/02/20  1850   WBC 10*3/mm3 17.49* 11.64*   HEMOGLOBIN g/dL 11.1* 12.7   HEMATOCRIT % 32.3* 36.7   PLATELETS 10*3/mm3 230 287           Culture Data:   No results found for: BLOODCX  Urine Culture   Date Value Ref Range Status   06/02/2020 >100,000 CFU/mL Escherichia coli (A)  Preliminary     No results found for: RESPCX  No results found for: WOUNDCX  No results found for: STOOLCX  No components found for: BODYFLD    Radiology Data:   Imaging Results (Last 24 Hours)     Procedure Component Value Units Date/Time    CT Abdomen Pelvis Without Contrast [[id number]] Collected:  06/02/20 2015     Updated:  06/02/20 2109    Narrative:       EXAM DESCRIPTION:     CT ABDOMEN PELVIS WO CONTRAST    CLINICAL HISTORY:     20 years  Female  abd pain, N12 Tubulo-interstitial nephritis,  not specified as acute or chronic E10.65 Type 1 diabetes mellitus  with hyperglycemia    COMPARISON:     October 29, 2019.    TECHNIQUE:     Images were obtained in axial, sagittal, and coronal planes. No  intravenous contrast was administered.      This exam was performed according to our departmental  dose-optimization program which includes use of Automated  Exposure Control, adjustment of the mA and/or kV according to  patient size and/or use of iterative reconstruction technique.      FINDINGS:     No abnormality involving the liver, spleen, pancreas, or  adrenal  glands bilaterally. Suspected sludge within the gallbladder.    No obstructing renal calcifications bilaterally. No  hydronephrosis bilaterally. Unremarkable bladder.    Appendix within normal limits. Inspissated contrast identified  within the appendix. No bowel obstruction, perforation, or  inflammation.    No dilatation abdominal aorta. No adenopathy or abnormal fluid  collections seen.    No abnormality lower lungs bilaterally.    No acute osseous abnormality.      Impression:         No acute intra-abdominal abnormality.    Electronically signed by:  Caro Carrillo MD  6/2/2020 9:08 PM CDT  Workstation: [id number]    XR Chest 1 View [[id number]] Collected:  06/02/20 1928     Updated:  06/02/20 1949    Narrative:       PROCEDURE: XR CHEST 1 VW    VIEWS:Single    INDICATION: Sepsis    COMPARISON: CXR: 5/8/2020    FINDINGS:       - lines/tubes: None    - cardiac: Size within normal limits.    - mediastinum: Contour within normal limits.     - lungs: No evidence of a focal air space process, pulmonary  interstitial edema, nodule(s)/mass.     - pleura: No evidence of  fluid.      - osseous: Unremarkable for age.      Impression:       Negative examination.      Electronically signed by:  Cha Cooney MD  6/2/2020 7:48 PM CDT  Workstation: [id number]          I have reviewed the patient's current medications.     Assessment/Plan     Active Problems:    Uncontrolled type 1 diabetes mellitus with hyperglycemia (CMS/HCC)-Uncontrolled with last recorded A1c >12 in 2019. Notes history of recurrent DKA. BS control variable although non-acidotic currently. Will add IVF. Continue basal insulin at increased dose. Continue SSI/accuchecks. Repeat BMP in AM.     Pyelonephritis with secondary sepsis - Remains hemodynamically stable. Urine culture already with growth of Ecoli. Blood cultures remain pending. Continue ceftriaxone. Await C&S. Add IVF as above.          Discharge Planning: I expect patient to be discharged to home in   1-2 days.    Darlene Sánchez MD

## 2020-06-03 NOTE — PROGRESS NOTES
Discharge Planning Assessment  [facility]     Patient Name: Fernanda Patterson  MRN: [id number]  Today's Date: 6/3/2020    Admit Date: 6/2/2020    Discharge Needs Assessment     Row Name 06/03/20 1001       Living Environment    Lives With  alone    Current Living Arrangements  home/apartment/condo    Primary Care Provided by  self    Provides Primary Care For  no one    Family Caregiver if Needed  parent(s)    Quality of Family Relationships  helpful;supportive    Able to Return to Prior Arrangements  yes    Living Arrangement Comments  Pt resides at home alone.        Resource/Environmental Concerns    Resource/Environmental Concerns  none    Transportation Concerns  car, none       Transition Planning    Patient/Family Anticipates Transition to  home    Patient/Family Anticipated Services at Transition  none    Transportation Anticipated  family or friend will provide       Discharge Needs Assessment    Concerns to be Addressed  adjustment to diagnosis/illness    Equipment Currently Used at Home  glucometer    Anticipated Changes Related to Illness  none    Equipment Needed After Discharge  none    Discharge Facility/Level of Care Needs  -- Mother did not anticipate any needs at this time.     Current Discharge Risk  chronically ill;lives alone        Discharge Plan     Row Name 06/03/20 1005       Plan    Plan Comments  LSW assessment complete. Pt resides at home alone. Pt has good support system. Mother reports pt was independent prior to hospitalization. Pt is employed. Mothers goal is for pt to return home at d/c. She did not anticipate any needs at this time. LSW/case mgt will follow up as consulted. Jordan Cote LSW    Row Name 06/03/20 4910       Plan    Plan Comments  UTI - IV antibiotics.     ALESHA Boudreaux, TIMOTHY CCM         Destination      Coordination has not been started for this encounter.      Durable Medical Equipment      Coordination has not been started for this encounter.      Dialysis/Infusion       Coordination has not been started for this encounter.      Home Medical Care      Coordination has not been started for this encounter.      Therapy      Coordination has not been started for this encounter.      Community Resources      Coordination has not been started for this encounter.          Demographic Summary     Row Name 06/03/20 0959       General Information    Admission Type  observation    Referral Source  high risk screening    Reason for Consult  discharge planning    Preferred Language  English     Used During This Interaction  yes Mother       Contact Information    Contact Information Obtained for          Functional Status     Row Name 06/03/20 0959       Functional Status    Usual Activity Tolerance  excellent    Current Activity Tolerance  moderate       Functional Status, IADL    Medications  independent Pt uses [facility] pharmacy.     Meal Preparation  independent    Housekeeping  independent    Laundry  independent    Shopping  independent       Mental Status Summary    Recent Changes in Mental Status/Cognitive Functioning  no changes       Employment/    Employment Status  employed full time        Psychosocial    No documentation.       Abuse/Neglect    No documentation.       Legal    No documentation.       Substance Abuse    No documentation.       Patient Forms    No documentation.           JUNIE Farrell

## 2020-06-03 NOTE — NURSING NOTE
"Pt requested FSBS at 1200, she states, \"Can you recheck my sugar?  I think it is low.  I don't feel good.\"  FSBS checked and it is 64.  8 oz of juice given.  FSBS remains 64.  One ampule of D50 given per order.  FSBS increased to 234.  Dr. Darlene Sánchez made aware of this event.  States that she will change order for basal insulin to decrease.  "

## 2020-06-03 NOTE — PLAN OF CARE
Will revisit pt to discuss supplement/ways to increase her weight when pt is feeling better.  Problem: Patient Care Overview  Goal: Plan of Care Review  Outcome: Ongoing (interventions implemented as appropriate)

## 2020-06-03 NOTE — PLAN OF CARE
Temperature elevated this afternoon, but has improved after tylenol administration, removing blankets, and adjusting thermostat.  Vital signs otherwise stable.  Will continue to monitor.

## 2020-06-03 NOTE — H&P
[facility] Medicine Admission      Date of Admission: 2020      Primary Care Physician: Rufus Marshall APRN      Chief Complaint: Abdominal pain, back pain    HPI:  20-year-old female with past medical history significant for ADHD, bipolar 1 disorder, type 1 diabetes mellitus, history of diverticular paresis/ketoacidosis, diabetic neuropathy who presented to the hospital with complaints of back pain as well as abdominal pain.  Patient reports that this morning.  She also reports that she has been having polyuria, dysuria, hematuria along with nausea and vomiting.  She describes her back pain as lower back pain, nonradiating, without aggravating relieving factors.  Her abdominal pain is also suprapubic pain which he describes as dull aching, without any aggravating or relieving factors  On arrival to the ER, urinalysis was positive for possible UTI.  She is being admitted to the hospital service for further evaluation and management.    CT scan abdomen/pelvis was negative for any acute intra-abdominal pathology.        Concurrent Medical History:  has a past medical history of ADHD, Bipolar 1 disorder (CMS/HCC), Borderline personality disorder (CMS/HCC), Cannabinoid hyperemesis syndrome (CMS/HCC), Diabetes mellitus type 1 (CMS/HCC), Diabetic gastroparesis (CMS/HCC), Diabetic ketoacidosis (CMS/HCC), Diabetic neuropathy (CMS/HCC), Post traumatic stress disorder (PTSD), Recurrent UTI, Seizure disorder (CMS/HCC), Severe depressed bipolar II disorder without psychotic features (CMS/HCC), and Uncontrolled diabetes mellitus (CMS/HCC).    Past Surgical History:  has a past surgical history that includes  section (N/A, 2018).    Family History: family history includes Dementia in her maternal grandfather; Drug abuse in her father; Hypertension in her paternal grandmother; Suicide Attempts in her brother.       Social History:  reports that she has been smoking. She  has a 0.50 pack-year smoking history. She has never used smokeless tobacco. She reports that she has current or past drug history. Drug: Marijuana. She reports that she does not drink alcohol.    Allergies:   Allergies   Allergen Reactions   • Pineapple Anaphylaxis   • Benzoyl Peroxide Swelling       Medications:   Prior to Admission medications    Medication Sig Start Date End Date Taking? Authorizing Provider   Alcohol Swabs (ALCOHOL WIPES) 70 % pads Use 4 x daily 10/31/19   Tavon Avila MD   Blood Glucose Monitoring Suppl w/Device kit USE AS INDICATED, ANY MONITOR , ICD10 code is E11.9 3/14/19   Tavon Avila MD   Blood Glucose Monitoring Suppl w/Device kit USE AS INDICATED, ANY MONITOR , ICD10 code is E11.9 10/31/19   Tavon Avila MD   Cariprazine HCl (VRAYLAR) 1.5 MG capsule capsule Take 1.5 mg by mouth Daily.    ProviderAbigail MD   FLUoxetine (PROzac) 40 MG capsule Take 40 mg by mouth Daily.    ProviderAbigail MD   Glucose Blood (BLOOD GLUCOSE TEST) strip Use 4 x daily use any brand covered by insurance or same brand as before ICD10 code is E11.9 3/14/19   Tavon Avila MD   Glucose Blood (BLOOD GLUCOSE TEST) strip Use 4 x daily use any brand covered by insurance or same brand as before ICD10 code is E11.9 10/31/19   Tavon Avila MD   insulin aspart (NOVOLOG FLEXPEN) 100 UNIT/ML solution pen-injector sc pen Up to 20 units with meals 10/31/19   Tavon Avila MD   insulin aspart (NOVOLOG) 100 UNIT/ML injection INJECT 70 UNITS DAILY THROUGH INSULIN PUMP 1/13/20   Severo Presley APRN   Insulin Glargine (LANTUS SOLOSTAR) 100 UNIT/ML injection pen Inject 20 Units under the skin into the appropriate area as directed Daily. 5/8/20   Tavon Avila MD   Insulin Pen Needle (B-D UF III MINI PEN NEEDLES) 31G X 5 MM misc Use 4 times daily  , ICD10 code is E11.9 10/31/19   Tavon Avila MD   Lancet Devices  (LANCING DEVICE) misc USE AS INDICATED TO CORRELATE WITH STRIPS AND METER 3/14/19   Tavon Avila MD   Lancet Devices (LANCING DEVICE) misc USE AS INDICATED TO CORRELATE WITH STRIPS AND METER 10/31/19   Tavon Avila MD   Lancets 30G misc USE 4 X DAILY 3/14/19   aTvon Avila MD   naproxen sodium (ANAPROX) 550 MG tablet Take 1 tablet by mouth 2 (Two) Times a Day As Needed for Mild Pain . 4/21/20   Jeffry Woods MD   nicotine (NICODERM CQ) 14 MG/24HR patch Place 1 patch on the skin as directed by provider Daily. 11/2/19   Yobani Herrera MD   nortriptyline (PAMELOR) 25 MG capsule Take 25 mg by mouth At Night As Needed for Sleep.    Provider, MD Abigail   ondansetron (ZOFRAN) 4 MG tablet Take 1 tablet by mouth Every 8 (Eight) Hours As Needed for Nausea or Vomiting. 11/1/19   Yobani Herrera MD   Probiotic Product (ALIGN) 4 MG capsule Take 1 tablet by mouth Daily. 10/2/19   Prabhakar Robles DO       Review of Systems:  Review of Systems   Otherwise complete ROS is negative except as mentioned above.    Physical Exam:   Temp:  [101.5 °F (38.6 °C)] 101.5 °F (38.6 °C)  Heart Rate:  [] 75  Resp:  [18-20] 18  BP: (108-125)/(67-76) 119/76  Physical Exam    General: [Appears stated age, alert, oriented ×3, cooperative]  HEENT: [Normocephalic, atraumatic, EOMI, PERRLA, unremarkable external ear, moist mucous membranes, supple neck, no lymphadenopathy]  CVS: [RRR, S1, S2, no murmurs, normal peripheral pulses]  Respiratory: [CTA bilaterally, symmetrical expansion, no wheezing, no rales, no crackles]  Gastrointestinal: [Soft, suprapubic tenderness, nondistended, no organomegaly could be appreciated, normal bowel sounds]  Musculoskeletal: [Grossly normal, no tenderness, normal ROM]  Skin: [No rashes, no erythema, no lesions]  Extremities: [Normal inspection.  No edema, no cyanosis, no clubbing.  Normal capillary refill]  Neuro: [Alert, oriented ×3, grossly normal motor and sensory  "function]  Psychiatry: [No anxiety, no depression, nonsuicidal]      Results Reviewed:  I have personally reviewed current lab, radiology, and data and agree with results.  Lab Results (last 24 hours)     Procedure Component Value Units Date/Time    Sedimentation Rate [[id number]]  (Abnormal) Collected:  06/02/20 1850    Specimen:  Blood Updated:  06/02/20 2045     Sed Rate 32 mm/hr     Procalcitonin [[id number]]  (Normal) Collected:  06/02/20 1850    Specimen:  Blood Updated:  06/02/20 2016     Procalcitonin 0.11 ng/mL     Narrative:       As a Marker for Sepsis (Non-Neonates):   1. <0.5 ng/mL represents a low risk of severe sepsis and/or septic shock.  1. >2 ng/mL represents a high risk of severe sepsis and/or septic shock.    As a Marker for Lower Respiratory Tract Infections that require antibiotic therapy:  PCT on Admission     Antibiotic Therapy             6-12 Hrs later  > 0.5                Strongly Recommended            >0.25 - <0.5         Recommended  0.1 - 0.25           Discouraged                   Remeasure/reassess PCT  <0.1                 Strongly Discouraged          Remeasure/reassess PCT      As 28 day mortality risk marker: \"Change in Procalcitonin Result\" (> 80 % or <=80 %) if Day 0 (or Day 1) and Day 4 values are available. Refer to http://www.[facility]pct-calculator.com/   Change in PCT <=80 %   A decrease of PCT levels below or equal to 80 % defines a positive change in PCT test result representing a higher risk for 28-day all-cause mortality of patients diagnosed with severe sepsis or septic shock.  Change in PCT > 80 %   A decrease of PCT levels of more than 80 % defines a negative change in PCT result representing a lower risk for 28-day all-cause mortality of patients diagnosed with severe sepsis or septic shock.                Results may be falsely decreased if patient taking Biotin.     [place] Draw [[id number]] Collected:  06/02/20 1850    Specimen:  Blood Updated:  06/02/20 2000    " Narrative:       The following orders were created for panel order [place] Draw.  Procedure                               Abnormality         Status                     ---------                               -----------         ------                     Light Blue Top[[id number]]                                                              Green Top (Gel)[[id number]]                                  Final result               Lavender Top[[id number]]                                     Final result               Gold Top - SST[[id number]]                                                                Please view results for these tests on the individual orders.    Green Top (Gel) [[id number]] Collected:  06/02/20 1850    Specimen:  Blood Updated:  06/02/20 2000     Extra Tube Hold for add-ons.     Comment: Auto resulted.       Lavender Top [[id number]] Collected:  06/02/20 1850    Specimen:  Blood Updated:  06/02/20 2000     Extra Tube hold for add-on     Comment: Auto resulted       [place] Draw [[id number]] Collected:  06/02/20 1850    Specimen:  Blood Updated:  06/02/20 2000    Narrative:       The following orders were created for panel order [place] Draw.  Procedure                               Abnormality         Status                     ---------                               -----------         ------                     Light Blue Top[[id number]]                                   Final result               Green Top (Gel)[[id number]]                                                             Lavender Top[[id number]]                                                                Gold Top - SST[[id number]]                                                                Please view results for these tests on the individual orders.    Light Blue Top [[id number]] Collected:  06/02/20 1850    Specimen:  Blood Updated:  06/02/20 2000     Extra Tube hold for add-on     Comment: Auto resulted       C-reactive Protein  [[id number]]  (Abnormal) Collected:  06/02/20 1850    Specimen:  Blood Updated:  06/02/20 1952     C-Reactive Protein 2.37 mg/dL     Comprehensive Metabolic Panel [[id number]]  (Abnormal) Collected:  06/02/20 1850    Specimen:  Blood Updated:  06/02/20 1928     Glucose 440 mg/dL      BUN 11 mg/dL      Creatinine 1.01 mg/dL      Sodium 132 mmol/L      Potassium 4.2 mmol/L      Chloride 97 mmol/L      CO2 23.0 mmol/L      Calcium 9.3 mg/dL      Total Protein 7.6 g/dL      Albumin 4.40 g/dL      ALT (SGPT) 10 U/L      AST (SGOT) 9 U/L      Alkaline Phosphatase 75 U/L      Total Bilirubin 0.5 mg/dL      eGFR Non African Amer 69 mL/min/1.73      Globulin 3.2 gm/dL      A/G Ratio 1.4 g/dL      BUN/Creatinine Ratio 10.9     Anion Gap 12.0 mmol/L     Narrative:       GFR Normal >60  Chronic Kidney Disease <60  Kidney Failure <15      Ketone Bodies, Serum (Not performed at [place]) [[id number]] Collected:  06/02/20 1850    Specimen:  Blood Updated:  06/02/20 1924    Narrative:       The following orders were created for panel order Ketone Bodies, Serum (Not performed at [place]).  Procedure                               Abnormality         Status                     ---------                               -----------         ------                     Acetone[[id number]]                      Normal              Final result                 Please view results for these tests on the individual orders.    Acetone [[id number]]  (Normal) Collected:  06/02/20 1850    Specimen:  Blood Updated:  06/02/20 1924     Acetone Negative    Lactic Acid, Plasma [[id number]]  (Normal) Collected:  06/02/20 1850    Specimen:  Blood Updated:  06/02/20 1923     Lactate 1.0 mmol/L     Urinalysis, Microscopic Only - Urine, Clean Catch [[id number]]  (Abnormal) Collected:  06/02/20 1850    Specimen:  Urine, Clean Catch Updated:  06/02/20 1912     RBC, UA 0-2 /HPF      WBC, UA Too Numerous to Count /HPF      Bacteria, UA 2+ /HPF      Squamous Epithelial  Cells, UA None Seen /HPF      Hyaline Casts, UA 0-2 /LPF      Methodology Automated Microscopy    Urine Culture - Urine, Urine, Clean Catch [[id number]] Collected:  06/02/20 1850    Specimen:  Urine, Clean Catch Updated:  06/02/20 1912    Urinalysis With Culture If Indicated - Urine, Clean Catch [[id number]]  (Abnormal) Collected:  06/02/20 1850    Specimen:  Urine, Clean Catch Updated:  06/02/20 1910     Color, UA Yellow     Appearance, UA Cloudy     pH, UA 6.5     Specific Gravity, UA 1.015     Glucose, UA >=1000 mg/dL (3+)     Ketones, UA Negative     Bilirubin, UA Negative     Blood, UA Trace     Protein, UA Negative     Leuk Esterase, UA Moderate (2+)     Nitrite, UA Positive     Urobilinogen, UA 0.2 E.U./dL    Pregnancy, Urine - Urine, Clean Catch [[id number]]  (Normal) Collected:  06/02/20 1850    Specimen:  Urine, Clean Catch Updated:  06/02/20 1910     HCG, Urine QL Negative    CBC & Differential [[id number]] Collected:  06/02/20 1850    Specimen:  Blood Updated:  06/02/20 1906    Narrative:       The following orders were created for panel order CBC & Differential.  Procedure                               Abnormality         Status                     ---------                               -----------         ------                     CBC Auto Differential[[id number]]        Abnormal            Final result                 Please view results for these tests on the individual orders.    CBC Auto Differential [[id number]]  (Abnormal) Collected:  06/02/20 1850    Specimen:  Blood Updated:  06/02/20 1906     WBC 11.64 10*3/mm3      RBC 4.36 10*6/mm3      Hemoglobin 12.7 g/dL      Hematocrit 36.7 %      MCV 84.2 fL      MCH 29.1 pg      MCHC 34.6 g/dL      RDW 12.4 %      RDW-SD 37.6 fl      MPV 10.5 fL      Platelets 287 10*3/mm3      Neutrophil % 82.5 %      Lymphocyte % 10.7 %      Monocyte % 5.8 %      Eosinophil % 0.2 %      Basophil % 0.4 %      Immature Grans % 0.4 %      Neutrophils, Absolute 9.60 10*3/mm3       Lymphocytes, Absolute 1.25 10*3/mm3      Monocytes, Absolute 0.67 10*3/mm3      Eosinophils, Absolute 0.02 10*3/mm3      Basophils, Absolute 0.05 10*3/mm3      Immature Grans, Absolute 0.05 10*3/mm3      nRBC 0.0 /100 WBC     Blood Culture - Blood, Arm, Right [[id number]] Collected:  06/02/20 1850    Specimen:  Blood from Arm, Right Updated:  06/02/20 1902        Imaging Results (Last 24 Hours)     Procedure Component Value Units Date/Time    CT Abdomen Pelvis Without Contrast [[id number]] Resulted:  06/02/20 2024     Updated:  06/02/20 2025    XR Chest 1 View [[id number]] Collected:  06/02/20 1928     Updated:  06/02/20 1949    Narrative:       PROCEDURE: XR CHEST 1 VW    VIEWS:Single    INDICATION: Sepsis    COMPARISON: CXR: 5/8/2020    FINDINGS:       - lines/tubes: None    - cardiac: Size within normal limits.    - mediastinum: Contour within normal limits.     - lungs: No evidence of a focal air space process, pulmonary  interstitial edema, nodule(s)/mass.     - pleura: No evidence of  fluid.      - osseous: Unremarkable for age.      Impression:       Negative examination.      Electronically signed by:  Cha Cooney MD  6/2/2020 7:48 PM CDT  Workstation: [id number]            Assessment:    Active Hospital Problems    Diagnosis   • Pyelonephritis             Plan:    20-year-old female with past medical history significant for depression, bipolar disorder, poorly controlled type 1 diabetes, history of DKA who presented to the hospital with complaints of abdominal pain as well as back pain possibly secondary to complicated urinary tract infection.    Complicated UTI:  Admit to the MedSurg floor  Close vitals monitoring  Blood cultures in process  Urine cultures in process  IV ceftriaxone    Diabetes mellitus:  Levemir 20 units subcutaneous daily  Sliding scale insulin    Bipolar affective disorder:  Resume home medications once confirmed          Patient is full code  Estimated length of stay greater than 2  midnights  DVT prophylaxis SCDs    I discussed the patient's findings and my recommendations with: Patient    Bebe Parrish MD

## 2020-06-03 NOTE — ED NOTES
"Pt presents to ED with C/O bilateral flank pain, abd pain, and back pain X1 week. Pt reports elevated BS, but reports BS is \"always all over the place\". Pt reports BS was 239 approx 1 hour PTA. Pt reports blood in urine that began today.     Princess Mckeon RN  06/02/20 1914    "

## 2020-06-03 NOTE — CONSULTS
"Adult Nutrition  Assessment    Patient Name:  Fernanda Patterson  YOB: 2000  MRN: [id number]  Admit Date:  6/2/2020    Assessment Date:  6/3/2020    Comments:  Pt was admitted with pyelonephritis. She is known to the RDN staff. Pt's bmi is less than 19. Pt reported she lost 30 lb in 3 weeks due to being sick and wants to gain her weight back..  She is nauseated plus the hot foods make her more sick at her stomach. Encouraged her to choose cold foods when desired to prevent the nausea associated with the aroma of the meals. Obtained sf jello for pt as requested. Will speak with pt again to complete assessment. Spoke with PDA to offer cold foods for supper and breakfast.    Reason for Assessment     Row Name 06/03/20 1518          Reason for Assessment    Reason For Assessment  identified at risk by screening criteria     Diagnosis  infection/sepsis     Identified At Risk by Screening Criteria  BMI;need for education;reduced oral intake over the last month;unintentional loss of 10 lbs or more in the past 2 mos         Nutrition/Diet History     Row Name 06/03/20 1520          Nutrition/Diet History    Typical Food/Fluid Intake  pt said she is sick. the hot food makes her nauseated. \"nothing stays down\" asked for sf jello     Functional Status  able to prepare meals;able to purchase food;ambulatory     Factors Affecting Nutritional Intake  nausea           Labs/Tests/Procedures/Meds     Row Name 06/03/20 1520          Labs/Procedures/Meds    Lab Results Reviewed  reviewed, pertinent        Medications    Pertinent Medications Reviewed  reviewed, pertinent         Physical Findings     Row Name 06/03/20 1521          Physical Findings    Overall Physical Appearance  underweight     Gastrointestinal  nausea         Estimated/Assessed Needs     Row Name 06/03/20 1521          Calculation Measurements    Weight Used For Calculations  61 kg (134 lb 7.7 oz)        Estimated/Assessed Needs    Additional " Documentation  Protein Requirements (Group);Real-St. Jeor Equation (Group);Fluid Requirements (Group);Calorie Requirements (Group)        Calorie Requirements    Weight Used For Calorie Calculations  61 kg (134 lb 7.7 oz)     Estimated Calorie Requirement (kcal/day)  2000     Estimated Calorie Need Method  Real-St Jeor        Real-St. Jeor Equation    RMR (Real-St. Jeor Equation)  1407.625     Real-St. Jeor Activity Factors  1.4 - 1.5     Activity Factors (Real-St. Jeor)  1810.675 - 2111.4376        Protein Requirements    Weight Used For Protein Calculations  61.2 kg (135 lb)     Est Protein Requirement Amount (gms/kg)  1.1 gm protein     Estimated Protein Requirements (gms/day)  67.36        Fluid Requirements    Estimated Fluid Requirements (mL/day)  2000     RDA Method (mL)  2000         Nutrition Prescription Ordered     Row Name 06/03/20 1524          Nutrition Prescription PO    Current PO Diet  Regular     Common Modifiers  Consistent Carbohydrate                 Electronically signed by:  Vania Pearl RD  06/03/20 15:25

## 2020-06-03 NOTE — PLAN OF CARE
Problem: Patient Care Overview  Goal: Plan of Care Review  Outcome: Ongoing (interventions implemented as appropriate)  Flowsheets  Taken 6/3/2020 9639  Progress: no change  Outcome Summary: Pt vitals stable.  Pt complained of nausea and pain on this shift.  PRN Zofran given and effective.  Dr. Parrish notified about pt pain.  See new orders.  Pt agitated on this shift and not happy with pain management.  Asked to see house supervisor.  House supervisor spoke with pt.  Will continue to monitor this pt.  Taken 6/2/2020 0482  Plan of Care Reviewed With: patient

## 2020-06-04 LAB
ALBUMIN SERPL-MCNC: 3.5 G/DL (ref 3.5–5.2)
ALBUMIN/GLOB SERPL: 1.1 G/DL
ALP SERPL-CCNC: 81 U/L (ref 39–117)
ALT SERPL W P-5'-P-CCNC: 6 U/L (ref 1–33)
ANION GAP SERPL CALCULATED.3IONS-SCNC: 12 MMOL/L (ref 5–15)
AST SERPL-CCNC: 10 U/L (ref 1–32)
BACTERIA SPEC AEROBE CULT: ABNORMAL
BASOPHILS # BLD AUTO: 0.04 10*3/MM3 (ref 0–0.2)
BASOPHILS NFR BLD AUTO: 0.4 % (ref 0–1.5)
BILIRUB SERPL-MCNC: 0.2 MG/DL (ref 0.2–1.2)
BUN BLD-MCNC: 8 MG/DL (ref 6–20)
BUN/CREAT SERPL: 9.5 (ref 7–25)
CALCIUM SPEC-SCNC: 8.3 MG/DL (ref 8.6–10.5)
CHLORIDE SERPL-SCNC: 97 MMOL/L (ref 98–107)
CO2 SERPL-SCNC: 26 MMOL/L (ref 22–29)
CREAT BLD-MCNC: 0.84 MG/DL (ref 0.57–1)
DEPRECATED RDW RBC AUTO: 37.3 FL (ref 37–54)
EOSINOPHIL # BLD AUTO: 0.03 10*3/MM3 (ref 0–0.4)
EOSINOPHIL NFR BLD AUTO: 0.3 % (ref 0.3–6.2)
ERYTHROCYTE [DISTWIDTH] IN BLOOD BY AUTOMATED COUNT: 12.2 % (ref 12.3–15.4)
GFR SERPL CREATININE-BSD FRML MDRD: 86 ML/MIN/1.73
GLOBULIN UR ELPH-MCNC: 3.3 GM/DL
GLUCOSE BLD-MCNC: 143 MG/DL (ref 65–99)
GLUCOSE BLDC GLUCOMTR-MCNC: 124 MG/DL (ref 70–130)
GLUCOSE BLDC GLUCOMTR-MCNC: 138 MG/DL (ref 70–130)
GLUCOSE BLDC GLUCOMTR-MCNC: 175 MG/DL (ref 70–130)
GLUCOSE BLDC GLUCOMTR-MCNC: 177 MG/DL (ref 70–130)
GLUCOSE BLDC GLUCOMTR-MCNC: 189 MG/DL (ref 70–130)
GLUCOSE BLDC GLUCOMTR-MCNC: 231 MG/DL (ref 70–130)
GLUCOSE BLDC GLUCOMTR-MCNC: 39 MG/DL (ref 70–130)
GLUCOSE BLDC GLUCOMTR-MCNC: 52 MG/DL (ref 70–130)
GLUCOSE BLDC GLUCOMTR-MCNC: 59 MG/DL (ref 70–130)
GLUCOSE BLDC GLUCOMTR-MCNC: 99 MG/DL (ref 70–130)
HCT VFR BLD AUTO: 33.2 % (ref 34–46.6)
HGB BLD-MCNC: 11.3 G/DL (ref 12–15.9)
IMM GRANULOCYTES # BLD AUTO: 0.03 10*3/MM3 (ref 0–0.05)
IMM GRANULOCYTES NFR BLD AUTO: 0.3 % (ref 0–0.5)
LYMPHOCYTES # BLD AUTO: 0.98 10*3/MM3 (ref 0.7–3.1)
LYMPHOCYTES NFR BLD AUTO: 9.2 % (ref 19.6–45.3)
MCH RBC QN AUTO: 28.9 PG (ref 26.6–33)
MCHC RBC AUTO-ENTMCNC: 34 G/DL (ref 31.5–35.7)
MCV RBC AUTO: 84.9 FL (ref 79–97)
MONOCYTES # BLD AUTO: 0.48 10*3/MM3 (ref 0.1–0.9)
MONOCYTES NFR BLD AUTO: 4.5 % (ref 5–12)
NEUTROPHILS # BLD AUTO: 9.08 10*3/MM3 (ref 1.7–7)
NEUTROPHILS NFR BLD AUTO: 85.3 % (ref 42.7–76)
NRBC BLD AUTO-RTO: 0 /100 WBC (ref 0–0.2)
PLATELET # BLD AUTO: 211 10*3/MM3 (ref 140–450)
PMV BLD AUTO: 10.3 FL (ref 6–12)
POTASSIUM BLD-SCNC: 3.5 MMOL/L (ref 3.5–5.2)
PROT SERPL-MCNC: 6.8 G/DL (ref 6–8.5)
RBC # BLD AUTO: 3.91 10*6/MM3 (ref 3.77–5.28)
SODIUM BLD-SCNC: 135 MMOL/L (ref 136–145)
WBC NRBC COR # BLD: 10.64 10*3/MM3 (ref 3.4–10.8)

## 2020-06-04 PROCEDURE — 80053 COMPREHEN METABOLIC PANEL: CPT | Performed by: FAMILY MEDICINE

## 2020-06-04 PROCEDURE — 25010000002 ONDANSETRON PER 1 MG: Performed by: INTERNAL MEDICINE

## 2020-06-04 PROCEDURE — 85025 COMPLETE CBC W/AUTO DIFF WBC: CPT | Performed by: FAMILY MEDICINE

## 2020-06-04 PROCEDURE — 63710000001 INSULIN DETEMIR PER 5 UNITS: Performed by: FAMILY MEDICINE

## 2020-06-04 PROCEDURE — 82962 GLUCOSE BLOOD TEST: CPT

## 2020-06-04 PROCEDURE — 25010000002 CEFTRIAXONE PER 250 MG: Performed by: INTERNAL MEDICINE

## 2020-06-04 PROCEDURE — 25010000002 MORPHINE PER 10 MG: Performed by: NURSE PRACTITIONER

## 2020-06-04 RX ORDER — LAMOTRIGINE 25 MG/1
25 TABLET ORAL DAILY
Status: DISCONTINUED | OUTPATIENT
Start: 2020-06-04 | End: 2020-06-06 | Stop reason: HOSPADM

## 2020-06-04 RX ORDER — HYDROXYZINE HCL 10 MG/5 ML
10 SOLUTION, ORAL ORAL 3 TIMES DAILY PRN
Status: DISCONTINUED | OUTPATIENT
Start: 2020-06-04 | End: 2020-06-06 | Stop reason: HOSPADM

## 2020-06-04 RX ORDER — MORPHINE SULFATE 2 MG/ML
1 INJECTION, SOLUTION INTRAMUSCULAR; INTRAVENOUS
Status: DISCONTINUED | OUTPATIENT
Start: 2020-06-04 | End: 2020-06-06 | Stop reason: HOSPADM

## 2020-06-04 RX ADMIN — MORPHINE SULFATE 1 MG: 2 INJECTION, SOLUTION INTRAMUSCULAR; INTRAVENOUS at 12:17

## 2020-06-04 RX ADMIN — HYDROCODONE BITARTRATE AND ACETAMINOPHEN 1 TABLET: 7.5; 325 TABLET ORAL at 04:20

## 2020-06-04 RX ADMIN — ONDANSETRON 4 MG: 2 INJECTION INTRAMUSCULAR; INTRAVENOUS at 23:51

## 2020-06-04 RX ADMIN — INSULIN DETEMIR 20 UNITS: 100 INJECTION, SOLUTION SUBCUTANEOUS at 06:37

## 2020-06-04 RX ADMIN — HYDROCODONE BITARTRATE AND ACETAMINOPHEN 1 TABLET: 7.5; 325 TABLET ORAL at 09:50

## 2020-06-04 RX ADMIN — CEFTRIAXONE 1 G: 1 INJECTION, POWDER, FOR SOLUTION INTRAMUSCULAR; INTRAVENOUS at 21:28

## 2020-06-04 RX ADMIN — MORPHINE SULFATE 1 MG: 2 INJECTION, SOLUTION INTRAMUSCULAR; INTRAVENOUS at 23:47

## 2020-06-04 RX ADMIN — DEXTROSE MONOHYDRATE 25 G: 25 INJECTION, SOLUTION INTRAVENOUS at 17:05

## 2020-06-04 RX ADMIN — ONDANSETRON 4 MG: 2 INJECTION INTRAMUSCULAR; INTRAVENOUS at 06:38

## 2020-06-04 RX ADMIN — SODIUM CHLORIDE 100 ML/HR: 4.5 INJECTION, SOLUTION INTRAVENOUS at 09:31

## 2020-06-04 RX ADMIN — DEXTROSE MONOHYDRATE 25 G: 25 INJECTION, SOLUTION INTRAVENOUS at 04:20

## 2020-06-04 RX ADMIN — FAMOTIDINE 40 MG: 40 TABLET ORAL at 09:31

## 2020-06-04 RX ADMIN — SODIUM CHLORIDE 100 ML/HR: 4.5 INJECTION, SOLUTION INTRAVENOUS at 20:39

## 2020-06-04 RX ADMIN — LAMOTRIGINE 25 MG: 25 TABLET ORAL at 13:20

## 2020-06-04 RX ADMIN — ONDANSETRON 4 MG: 2 INJECTION INTRAMUSCULAR; INTRAVENOUS at 00:46

## 2020-06-04 RX ADMIN — SERTRALINE HYDROCHLORIDE 100 MG: 50 TABLET ORAL at 13:20

## 2020-06-04 RX ADMIN — SODIUM CHLORIDE, PRESERVATIVE FREE 10 ML: 5 INJECTION INTRAVENOUS at 20:41

## 2020-06-04 RX ADMIN — MORPHINE SULFATE 1 MG: 2 INJECTION, SOLUTION INTRAMUSCULAR; INTRAVENOUS at 17:21

## 2020-06-04 RX ADMIN — ACETAMINOPHEN 650 MG: 325 TABLET, FILM COATED ORAL at 17:22

## 2020-06-04 NOTE — NURSING NOTE
Called to pt room to check FSBS per request.  FSBS 39.  One ampule of D50 given.  Reported this to nightshift nurse for re-eval.  Pt also c/o pain.  MD paged again, awaiting return page.

## 2020-06-04 NOTE — PROGRESS NOTES
"Nutrition Services    Patient Name:  Fernanda Patterson  YOB: 2000  MRN: [id number]  Admit Date:  6/2/2020    Spoke with pt during lunch time. She was asleep and nursing went in to speak with pt who asked for pain meds. Pt said she is still unable to eat due to nausea-\"that greasy hamburger and fries will come back up\" understood pt requested this for lunch.  Obtained cottage cheese and chix noodle soup for pt who wants to eat but doesn't want to throw the food up.  Pt has a food allergy to pineapple. Pt said she has been diagnoses with gastroparesis so she has to be selective in her food choices also. She denied the need for any education or written material-\"I have a packet at home-just need to figure it out.\"No difficulty chewing or swallowing. Pt was covered up with blankets well so unable to see any signs of malnutrition today . Pt may be at risk for malnutrition but nutrition focused physical exam was not performed due to the pandemic restrictions and patient was not feeling well/ upset with nursing. Will continue to monitor. Encouraged cold food for supper and breakfast.     Electronically signed by:  Vania Pearl RD  06/04/20 13:52   "

## 2020-06-04 NOTE — CONSULTS
"Inpatient Urology Consult  Consult performed by: Tomer Holcomb APRN  Consult ordered by: Chelsea Monreal APRN          Patient Care Team:  Rufus Marshall APRN as PCP - General (Family Medicine)    Chief complaint: recurrent UTIs    Subjective     Ms. Fernanda Patterson is a 20-year-old female consulted to Urology for recurrent UTIs, present since at least age 6 when she was diagnosed with Type I DM but has acutely worsened in the last 9 months. She is admitted inpatient and is being treated for acute pyelonephritis. She reports nearly of week long history of polyuria, dysuria, hematuria, nausea, vomiting, low back aching. She also has anorexia and nausea with poor intake. Her Type I DM is poorly controlled and has microvascular complications of neuropathy and has also been diagnosed with gastroparesis, reports her glucose levels have been as high as 600 mg/dL and she no longer uses an insulin pump. She's been working with ARMOND Toney, and Dr. Aly Elizabeth, Endocrinology, and her most HgbA1c was 12.80. Her mother was active participant in obtaining history from childhood: reports Dr. Palma from [place], Urologist, followed for some time and believes he diagnosed her with ureteral reflux but also vaguely mentions some sort of procedure requiring a \"stretching\" of something and she wasn't really sure if \"that was good thing or a bad thing, if it helped her UTIs or caused them.\" Patient says she has a significant history of seizures and depression along with ADHD and went for some time without them but says she is feeling better since restarting them. Surgical history includes  section.    Urinary Tract Infection    This is a recurrent problem. The current episode started more than 1 year ago. The problem occurs intermittently. The problem has been waxing and waning. The quality of the pain is described as aching. The pain is mild. There has been no fever. There is a history of pyelonephritis. " Associated symptoms include frequency and nausea. She has tried antibiotics and increased fluids for the symptoms. The treatment provided mild relief. Her past medical history is significant for recurrent UTIs.       Review of Systems   Constitutional: Positive for appetite change and fatigue.   HENT: Negative.    Eyes: Negative.    Respiratory: Negative.    Cardiovascular: Negative.    Gastrointestinal: Positive for nausea.   Endocrine: Positive for polyuria.   Genitourinary: Positive for dysuria and frequency.   Musculoskeletal: Positive for back pain.   Allergic/Immunologic: Negative.    Neurological: Negative.    Hematological: Negative.    Psychiatric/Behavioral: Negative.         Past Medical History:   Diagnosis Date   • ADHD    • Bipolar 1 disorder (CMS/HCC)    • Borderline personality disorder (CMS/HCC)    • Cannabinoid hyperemesis syndrome (CMS/HCC)    • Diabetes mellitus type 1 (CMS/HCC)    • Diabetic gastroparesis (CMS/HCC)    • Diabetic ketoacidosis (CMS/[facility])    • Diabetic neuropathy (CMS/[facility])    • Post traumatic stress disorder (PTSD)    • Recurrent UTI    • Seizure disorder (CMS/[facility])    • Severe depressed bipolar II disorder without psychotic features (CMS/[facility])    • Uncontrolled diabetes mellitus (CMS/[facility])    ,   Past Surgical History:   Procedure Laterality Date   •  SECTION N/A 2018   ,   Family History   Problem Relation Age of Onset   • Drug abuse Father    • Suicide Attempts Brother    • Dementia Maternal Grandfather    • Hypertension Paternal Grandmother    ,   Social History     Tobacco Use   • Smoking status: Current Every Day Smoker     Packs/day: 0.50     Years: 1.00     Pack years: 0.50   • Smokeless tobacco: Never Used   Substance Use Topics   • Alcohol use: No   • Drug use: Yes     Types: Marijuana   ,   Medications Prior to Admission   Medication Sig Dispense Refill Last Dose   • insulin aspart (NOVOLOG FLEXPEN) 100 UNIT/ML solution pen-injector sc pen Up to 20 units with  meals 6 pen 11 Past Week at Unknown time   • lamoTRIgine (LaMICtal) 25 MG tablet Take 25 mg by mouth Daily. Take two tablets by mouth daily      • lisdexamfetamine (Vyvanse) 20 MG capsule Take 20 mg by mouth Every Morning      • Alcohol Swabs (ALCOHOL WIPES) 70 % pads Use 4 x daily (Patient not taking: Reported on 6/2/2020) 120 each 11 Not Taking at Unknown time   • Blood Glucose Monitoring Suppl w/Device kit USE AS INDICATED, ANY MONITOR , ICD10 code is E11.9 1 each 1 8/11/2019 at Unknown time   • Blood Glucose Monitoring Suppl w/Device kit USE AS INDICATED, ANY MONITOR , ICD10 code is E11.9 1 each 1    • Cariprazine HCl (VRAYLAR) 1.5 MG capsule capsule Take 1.5 mg by mouth Daily.   9/29/2019 at Unknown time   • FLUoxetine (PROzac) 40 MG capsule Take 40 mg by mouth Daily.   Past Week at Unknown time   • Glucose Blood (BLOOD GLUCOSE TEST) strip Use 4 x daily use any brand covered by insurance or same brand as before ICD10 code is E11.9 (Patient not taking: Reported on 6/2/2020) 120 each 11 Not Taking at Unknown time   • Glucose Blood (BLOOD GLUCOSE TEST) strip Use 4 x daily use any brand covered by insurance or same brand as before ICD10 code is E11.9 120 each 11    • hydrOXYzine (ATARAX) 10 MG tablet Take 10 mg by mouth 3 (Three) Times a Day As Needed for Itching.   Unknown at Unknown time   • insulin aspart (NOVOLOG) 100 UNIT/ML injection INJECT 70 UNITS DAILY THROUGH INSULIN PUMP 60 mL 0 Unknown at Unknown time   • Insulin Glargine (LANTUS SOLOSTAR) 100 UNIT/ML injection pen Inject 20 Units under the skin into the appropriate area as directed Daily. 18 mL 0 Unknown at Unknown time   • Insulin Pen Needle (B-D UF III MINI PEN NEEDLES) 31G X 5 MM misc Use 4 times daily  , ICD10 code is E11.9 120 each 11    • Lancet Devices (LANCING DEVICE) misc USE AS INDICATED TO CORRELATE WITH STRIPS AND METER 1 each 1 8/11/2019 at Unknown time   • Lancet Devices (LANCING DEVICE) misc USE AS INDICATED TO CORRELATE WITH STRIPS AND  METER 1 each 1    • Lancets 30G misc USE 4 X DAILY 120 each 11 8/11/2019 at Unknown time   • naproxen sodium (ANAPROX) 550 MG tablet Take 1 tablet by mouth 2 (Two) Times a Day As Needed for Mild Pain . 20 tablet 0    • nicotine (NICODERM CQ) 14 MG/24HR patch Place 1 patch on the skin as directed by provider Daily. (Patient not taking: Reported on 6/3/2020) 30 patch 0 Not Taking at Unknown time   • nortriptyline (PAMELOR) 25 MG capsule Take 25 mg by mouth At Night As Needed for Sleep.   Past Week at Unknown time   • ondansetron (ZOFRAN) 4 MG tablet Take 1 tablet by mouth Every 8 (Eight) Hours As Needed for Nausea or Vomiting. 30 tablet 0 Unknown at Unknown time   • Probiotic Product (ALIGN) 4 MG capsule Take 1 tablet by mouth Daily. 10 capsule 0    • sertraline (ZOLOFT) 100 MG tablet Take 100 mg by mouth Daily.   Unknown at Unknown time        Allergies:  Pineapple and Benzoyl peroxide    Objective      Vital Signs  Temp:  [97.5 °F (36.4 °C)-100.2 °F (37.9 °C)] 97.7 °F (36.5 °C)  Heart Rate:  [62-92] 62  Resp:  [16-18] 16  BP: ()/(56-75) 105/63    Physical Exam    Results Review:   Lab Results (last 24 hours)     Procedure Component Value Units Date/Time    POC Glucose Once [[id number]]  (Abnormal) Collected:  06/03/20 1825    Specimen:  Blood Updated:  06/04/20 1520     Glucose 39 mg/dL      Comment: RN NotifiedOperator: [id number] KIRA BRITTANYMeter ID: [id number]       POC Glucose Once [[id number]]  (Abnormal) Collected:  06/04/20 1121    Specimen:  Blood Updated:  06/04/20 1135     Glucose 177 mg/dL      Comment: RN NotifiedOperator: [id number] TYSHAWN GRACEMeter ID: [id number]       POC Glucose Once [[id number]]  (Normal) Collected:  06/04/20 0941    Specimen:  Blood Updated:  06/04/20 0959     Glucose 99 mg/dL      Comment: : [id number] ROHITH ARCEOISSAMeter ID: [id number]       Comprehensive Metabolic Panel [[id number]]  (Abnormal) Collected:  06/04/20 0714    Specimen:  Blood Updated:  06/04/20  0750     Glucose 143 mg/dL      BUN 8 mg/dL      Creatinine 0.84 mg/dL      Sodium 135 mmol/L      Potassium 3.5 mmol/L      Chloride 97 mmol/L      CO2 26.0 mmol/L      Calcium 8.3 mg/dL      Total Protein 6.8 g/dL      Albumin 3.50 g/dL      ALT (SGPT) 6 U/L      AST (SGOT) 10 U/L      Alkaline Phosphatase 81 U/L      Total Bilirubin 0.2 mg/dL      eGFR Non African Amer 86 mL/min/1.73      Globulin 3.3 gm/dL      A/G Ratio 1.1 g/dL      BUN/Creatinine Ratio 9.5     Anion Gap 12.0 mmol/L     Narrative:       GFR Normal >60  Chronic Kidney Disease <60  Kidney Failure <15      CBC & Differential [[id number]] Collected:  06/04/20 0714    Specimen:  Blood Updated:  06/04/20 0727    Narrative:       The following orders were created for panel order CBC & Differential.  Procedure                               Abnormality         Status                     ---------                               -----------         ------                     CBC Auto Differential[[id number]]        Abnormal            Final result                 Please view results for these tests on the individual orders.    CBC Auto Differential [[id number]]  (Abnormal) Collected:  06/04/20 0714    Specimen:  Blood Updated:  06/04/20 0727     WBC 10.64 10*3/mm3      RBC 3.91 10*6/mm3      Hemoglobin 11.3 g/dL      Hematocrit 33.2 %      MCV 84.9 fL      MCH 28.9 pg      MCHC 34.0 g/dL      RDW 12.2 %      RDW-SD 37.3 fl      MPV 10.3 fL      Platelets 211 10*3/mm3      Neutrophil % 85.3 %      Lymphocyte % 9.2 %      Monocyte % 4.5 %      Eosinophil % 0.3 %      Basophil % 0.4 %      Immature Grans % 0.3 %      Neutrophils, Absolute 9.08 10*3/mm3      Lymphocytes, Absolute 0.98 10*3/mm3      Monocytes, Absolute 0.48 10*3/mm3      Eosinophils, Absolute 0.03 10*3/mm3      Basophils, Absolute 0.04 10*3/mm3      Immature Grans, Absolute 0.03 10*3/mm3      nRBC 0.0 /100 WBC     POC Glucose Once [[id number]]  (Abnormal) Collected:  06/04/20 0412    Specimen:   Blood Updated:  06/04/20 0719     Glucose 52 mg/dL      Comment: RN NotifiedOperator: [id number] TRACI CASTROMeter ID: [id number]       POC Glucose Once [[id number]]  (Abnormal) Collected:  06/03/20 2121    Specimen:  Blood Updated:  06/04/20 0719     Glucose 59 mg/dL      Comment: RN NotifiedOperator: [id number] TRACI CASTROMeter ID: [id number]       POC Glucose Once [[id number]]  (Abnormal) Collected:  06/04/20 0636    Specimen:  Blood Updated:  06/04/20 0654     Glucose 138 mg/dL      Comment: RN NotifiedOperator: [id number] INDIRAHER PALMAJOANNMeter ID: [id number]       POC Glucose Once [[id number]]  (Abnormal) Collected:  06/04/20 0527    Specimen:  Blood Updated:  06/04/20 0540     Glucose 189 mg/dL      Comment: RN NotifiedOperator: [id number] INDIRA LOUIEJOANNMeter ID: [id number]       Urine Culture - Urine, Urine, Clean Catch [[id number]]  (Abnormal)  (Susceptibility) Collected:  06/02/20 1850    Specimen:  Urine, Clean Catch Updated:  06/04/20 0300     Urine Culture >100,000 CFU/mL Escherichia coli    Susceptibility      Escherichia coli     BRYAN     Ampicillin Susceptible     Ampicillin + Sulbactam Susceptible     Cefazolin Susceptible     Cefepime Susceptible     Ceftazidime Susceptible     Ceftriaxone Susceptible     Gentamicin Susceptible     Levofloxacin Susceptible     Nitrofurantoin Susceptible     Piperacillin + Tazobactam Susceptible     Tetracycline Susceptible     Trimethoprim + Sulfamethoxazole Susceptible                    Blood Culture - Blood, Arm, Left [[id number]] Collected:  06/02/20 2218    Specimen:  Blood from Arm, Left Updated:  06/03/20 2231     Blood Culture No growth at 24 hours    POC Glucose Once [[id number]]  (Abnormal) Collected:  06/03/20 2205    Specimen:  Blood Updated:  06/03/20 2217     Glucose 195 mg/dL      Comment: RN NotifiedOperator: [id number] INDIRAHER ZHANGMeter ID: [id number]       POC Glucose Once [[id number]]  (Abnormal) Collected:  06/03/20 1904     Specimen:  Blood Updated:  06/03/20 1932     Glucose 170 mg/dL      Comment: RN NotifiedOperator: [id number] TRACI Green ID: [id number]       Blood Culture - Blood, Arm, Right [[id number]] Collected:  06/02/20 1850    Specimen:  Blood from Arm, Right Updated:  06/03/20 1916     Blood Culture No growth at 24 hours         Imaging Results (Last 24 Hours)     ** No results found for the last 24 hours. **           I reviewed the patient's new clinical results.  I reviewed the patient's new imaging results and agree with the interpretation.  I reviewed the patient's other test results and agree with the interpretation        Assessment/Plan       Uncontrolled type 1 diabetes mellitus with hyperglycemia (CMS/HCC)    Pyelonephritis      Assessment & Plan    Consulted to Urology for recurrent UTI in poorly controlled Type I diabetic female, possible history of vesicoureteric reflux, possible history of urethral stenosis??  -Imaging shows no hydronephrosis or perinephric stranding, no renal abscess, bladder unremarkable.   -WBC 17.49-->10.64  -HgbA1C 12.80  -TSH 0.627  -Estimated Creatinine Clearance: 79.1 mL/min (by C-G formula based on SCr of 0.84 mg/dL).   -6/2/20 Pan sensitive E.Col urine culture, blood cultures IP Rocephin day #3    Plan:  --She would benefit from cystoscopy and VCUG. I will provide some information for Ms. Patterson to read over and I will come back tomorrow to go over the procedures with her and answer her questions. Dr. Cowan will meet her tonight.   --Await final blood culture results, continue UTI treatment,     I discussed the patient's findings and my recommendations with patient, family and ARMOND Melchor  06/04/20  17:16

## 2020-06-04 NOTE — SIGNIFICANT NOTE
"Mother on phone wanting an update.  Permission to speak to mother was granted by pt.  Mother asking multiple questions about why the pt is not getting her \"mood stablizer\".  Told pt I was unsure, and would page the MD to have her call and update.  MD paged, awaiting return page.  "

## 2020-06-04 NOTE — PLAN OF CARE
Problem: Patient Care Overview  Goal: Plan of Care Review  Outcome: Ongoing (interventions implemented as appropriate)  Flowsheets  Taken 6/3/2020 1229  Progress: no change  Taken 6/3/2020 2018  Plan of Care Reviewed With: patient  Taken 6/3/2020 3508  Outcome Summary: Pt blood sugar in the 50's 2x on this shift.  D50 given and effective.  Pt PRN pain medication given and effective.  Pt mom would like MD to contact her for an update.  Will continue to monitor this pt.

## 2020-06-04 NOTE — PAYOR COMM NOTE
"Allison Freitas  [facility]  P :[phone]  F: [phone]    Ref#[id number]    Fernanda Patterson (20 y.o. Female)     Date of Birth Social Security Number Address Home Phone MRN    2000  [id number]  [address]   [facility] [address] [phone] [id number]    [employer] Mar[employer] Status          None Single       Admission Date Admission Type Admitting Provider Attending Provider Department, Room/Bed    6/2/20 Emergency Bebe Parrish MD Haider, Sajjad, MD [facility] 3 EAST, 381/1    Discharge Date Discharge Disposition Discharge Destination                       Attending Provider:  Bebe Parrish MD    Allergies:  Pineapple, Benzoyl Peroxide    Isolation:  None   Infection:  None   Code Status:  CPR    Ht:  170.2 cm (67\")   Wt:  47.3 kg (104 lb 3.2 oz)    Admission Cmt:  None   Principal Problem:  None                Active Insurance as of 6/2/2020     Primary Coverage     Payor Plan Insurance Group Employer/Plan Group    [facility] [id number]     Payor Plan Address Payor Plan Phone Number Payor Plan Fax Number Effective Dates    [address] [address] [phone]  4/29/2019 - None Entered    [facility] [id number]       Subscriber Name Subscriber Birth Date Member ID       DEBORAH DON [birth year]/7/1971 [id number]                 Emergency Contacts      (Rel.) Home Phone Work Phone Mobile Phone    Juana Don (Mother) [phone] -- [phone]               History & Physical      Bebe Parrish MD at 06/02/20 2104                [facility] Medicine Admission      Date of Admission: 6/2/2020      Primary Care Physician: Rufus Marshall APRN      Chief Complaint: Abdominal pain, back pain    HPI:  20-year-old female with past medical history significant for ADHD, bipolar 1 disorder, type 1 diabetes mellitus, history of diverticular paresis/ketoacidosis, diabetic neuropathy who presented to the hospital with complaints " of back pain as well as abdominal pain.  Patient reports that this morning.  She also reports that she has been having polyuria, dysuria, hematuria along with nausea and vomiting.  She describes her back pain as lower back pain, nonradiating, without aggravating relieving factors.  Her abdominal pain is also suprapubic pain which he describes as dull aching, without any aggravating or relieving factors  On arrival to the ER, urinalysis was positive for possible UTI.  She is being admitted to the hospital service for further evaluation and management.    CT scan abdomen/pelvis was negative for any acute intra-abdominal pathology.        Concurrent Medical History:  has a past medical history of ADHD, Bipolar 1 disorder (CMS/HCC), Borderline personality disorder (CMS/HCC), Cannabinoid hyperemesis syndrome (CMS/HCC), Diabetes mellitus type 1 (CMS/HCC), Diabetic gastroparesis (CMS/HCC), Diabetic ketoacidosis (CMS/HCC), Diabetic neuropathy (CMS/HCC), Post traumatic stress disorder (PTSD), Recurrent UTI, Seizure disorder (CMS/HCC), Severe depressed bipolar II disorder without psychotic features (CMS/HCC), and Uncontrolled diabetes mellitus (CMS/[facility]).    Past Surgical History:  has a past surgical history that includes  section (N/A, 2018).    Family History: family history includes Dementia in her maternal grandfather; Drug abuse in her father; Hypertension in her paternal grandmother; Suicide Attempts in her brother.       Social History:  reports that she has been smoking. She has a 0.50 pack-year smoking history. She has never used smokeless tobacco. She reports that she has current or past drug history. Drug: Marijuana. She reports that she does not drink alcohol.    Allergies:   Allergies   Allergen Reactions   • Pineapple Anaphylaxis   • Benzoyl Peroxide Swelling       Medications:   Prior to Admission medications    Medication Sig Start Date End Date Taking? Authorizing Provider   Alcohol Swabs  (ALCOHOL WIPES) 70 % pads Use 4 x daily 10/31/19   Tavon Avila MD   Blood Glucose Monitoring Suppl w/Device kit USE AS INDICATED, ANY MONITOR , ICD10 code is E11.9 3/14/19   Tavon Avila MD   Blood Glucose Monitoring Suppl w/Device kit USE AS INDICATED, ANY MONITOR , ICD10 code is E11.9 10/31/19   Tavon Avila MD   Cariprazine HCl (VRAYLAR) 1.5 MG capsule capsule Take 1.5 mg by mouth Daily.    ProviderAbigail MD   FLUoxetine (PROzac) 40 MG capsule Take 40 mg by mouth Daily.    ProviderAbigail MD   Glucose Blood (BLOOD GLUCOSE TEST) strip Use 4 x daily use any brand covered by insurance or same brand as before ICD10 code is E11.9 3/14/19   Tavon Avila MD   Glucose Blood (BLOOD GLUCOSE TEST) strip Use 4 x daily use any brand covered by insurance or same brand as before ICD10 code is E11.9 10/31/19   Tavon Avila MD   insulin aspart (NOVOLOG FLEXPEN) 100 UNIT/ML solution pen-injector sc pen Up to 20 units with meals 10/31/19   Tavon Avila MD   insulin aspart (NOVOLOG) 100 UNIT/ML injection INJECT 70 UNITS DAILY THROUGH INSULIN PUMP 1/13/20   Severo Presley APRN   Insulin Glargine (LANTUS SOLOSTAR) 100 UNIT/ML injection pen Inject 20 Units under the skin into the appropriate area as directed Daily. 5/8/20   Tavon Avila MD   Insulin Pen Needle (B-D UF III MINI PEN NEEDLES) 31G X 5 MM misc Use 4 times daily  , ICD10 code is E11.9 10/31/19   Tavon Avila MD   Lancet Devices (LANCING DEVICE) misc USE AS INDICATED TO CORRELATE WITH STRIPS AND METER 3/14/19   Tavon Avila MD   Lancet Devices (LANCING DEVICE) misc USE AS INDICATED TO CORRELATE WITH STRIPS AND METER 10/31/19   Tavon Avila MD   Lancets 30G misc USE 4 X DAILY 3/14/19   Tavon Avila MD   naproxen sodium (ANAPROX) 550 MG tablet Take 1 tablet by mouth 2 (Two) Times a Day As Needed for Mild Pain .  4/21/20   Jeffry Woods MD   nicotine (NICODERM CQ) 14 MG/24HR patch Place 1 patch on the skin as directed by provider Daily. 11/2/19   Yobani Herrera MD   nortriptyline (PAMELOR) 25 MG capsule Take 25 mg by mouth At Night As Needed for Sleep.    Provider, MD Abigail   ondansetron (ZOFRAN) 4 MG tablet Take 1 tablet by mouth Every 8 (Eight) Hours As Needed for Nausea or Vomiting. 11/1/19   Yobani Herrera MD   Probiotic Product (ALIGN) 4 MG capsule Take 1 tablet by mouth Daily. 10/2/19   Prabhakar Robles,        Review of Systems:  Review of Systems   Otherwise complete ROS is negative except as mentioned above.    Physical Exam:   Temp:  [101.5 °F (38.6 °C)] 101.5 °F (38.6 °C)  Heart Rate:  [] 75  Resp:  [18-20] 18  BP: (108-125)/(67-76) 119/76  Physical Exam    General: [Appears stated age, alert, oriented ×3, cooperative]  HEENT: [Normocephalic, atraumatic, EOMI, PERRLA, unremarkable external ear, moist mucous membranes, supple neck, no lymphadenopathy]  CVS: [RRR, S1, S2, no murmurs, normal peripheral pulses]  Respiratory: [CTA bilaterally, symmetrical expansion, no wheezing, no rales, no crackles]  Gastrointestinal: [Soft, suprapubic tenderness, nondistended, no organomegaly could be appreciated, normal bowel sounds]  Musculoskeletal: [Grossly normal, no tenderness, normal ROM]  Skin: [No rashes, no erythema, no lesions]  Extremities: [Normal inspection.  No edema, no cyanosis, no clubbing.  Normal capillary refill]  Neuro: [Alert, oriented ×3, grossly normal motor and sensory function]  Psychiatry: [No anxiety, no depression, nonsuicidal]      Results Reviewed:  I have personally reviewed current lab, radiology, and data and agree with results.  Lab Results (last 24 hours)     Procedure Component Value Units Date/Time    Sedimentation Rate [[id number]]  (Abnormal) Collected:  06/02/20 1850    Specimen:  Blood Updated:  06/02/20 2045     Sed Rate 32 mm/hr     Procalcitonin [[id number]]  (Normal)  "Collected:  06/02/20 1850    Specimen:  Blood Updated:  06/02/20 2016     Procalcitonin 0.11 ng/mL     Narrative:       As a Marker for Sepsis (Non-Neonates):   1. <0.5 ng/mL represents a low risk of severe sepsis and/or septic shock.  1. >2 ng/mL represents a high risk of severe sepsis and/or septic shock.    As a Marker for Lower Respiratory Tract Infections that require antibiotic therapy:  PCT on Admission     Antibiotic Therapy             6-12 Hrs later  > 0.5                Strongly Recommended            >0.25 - <0.5         Recommended  0.1 - 0.25           Discouraged                   Remeasure/reassess PCT  <0.1                 Strongly Discouraged          Remeasure/reassess PCT      As 28 day mortality risk marker: \"Change in Procalcitonin Result\" (> 80 % or <=80 %) if Day 0 (or Day 1) and Day 4 values are available. Refer to http://www.[employer]-calculator.com/   Change in PCT <=80 %   A decrease of PCT levels below or equal to 80 % defines a positive change in PCT test result representing a higher risk for 28-day all-cause mortality of patients diagnosed with severe sepsis or septic shock.  Change in PCT > 80 %   A decrease of PCT levels of more than 80 % defines a negative change in PCT result representing a lower risk for 28-day all-cause mortality of patients diagnosed with severe sepsis or septic shock.                Results may be falsely decreased if patient taking Biotin.     [place] Draw [[id number]] Collected:  06/02/20 1850    Specimen:  Blood Updated:  06/02/20 2000    Narrative:       The following orders were created for panel order [place] Draw.  Procedure                               Abnormality         Status                     ---------                               -----------         ------                     Light Blue Top[[id number]]                                                              Green Top (Gel)[[id number]]                                  Final result             "   Lavender Top[[id number]]                                     Final result               Gold Top - SST[[id number]]                                                                Please view results for these tests on the individual orders.    Green Top (Gel) [[id number]] Collected:  06/02/20 1850    Specimen:  Blood Updated:  06/02/20 2000     Extra Tube Hold for add-ons.     Comment: Auto resulted.       Lavender Top [[id number]] Collected:  06/02/20 1850    Specimen:  Blood Updated:  06/02/20 2000     Extra Tube hold for add-on     Comment: Auto resulted       [place] Draw [[id number]] Collected:  06/02/20 1850    Specimen:  Blood Updated:  06/02/20 2000    Narrative:       The following orders were created for panel order [place] Draw.  Procedure                               Abnormality         Status                     ---------                               -----------         ------                     Light Blue Top[[id number]]                                   Final result               Green Top (Gel)[[id number]]                                                             Lavender Top[[id number]]                                                                Gold Top - SST[[id number]]                                                                Please view results for these tests on the individual orders.    Light Blue Top [[id number]] Collected:  06/02/20 1850    Specimen:  Blood Updated:  06/02/20 2000     Extra Tube hold for add-on     Comment: Auto resulted       C-reactive Protein [[id number]]  (Abnormal) Collected:  06/02/20 1850    Specimen:  Blood Updated:  06/02/20 1952     C-Reactive Protein 2.37 mg/dL     Comprehensive Metabolic Panel [[id number]]  (Abnormal) Collected:  06/02/20 1850    Specimen:  Blood Updated:  06/02/20 1928     Glucose 440 mg/dL      BUN 11 mg/dL      Creatinine 1.01 mg/dL      Sodium 132 mmol/L      Potassium 4.2 mmol/L      Chloride 97 mmol/L      CO2 23.0 mmol/L      Calcium 9.3  mg/dL      Total Protein 7.6 g/dL      Albumin 4.40 g/dL      ALT (SGPT) 10 U/L      AST (SGOT) 9 U/L      Alkaline Phosphatase 75 U/L      Total Bilirubin 0.5 mg/dL      eGFR Non African Amer 69 mL/min/1.73      Globulin 3.2 gm/dL      A/G Ratio 1.4 g/dL      BUN/Creatinine Ratio 10.9     Anion Gap 12.0 mmol/L     Narrative:       GFR Normal >60  Chronic Kidney Disease <60  Kidney Failure <15      Ketone Bodies, Serum (Not performed at [place]) [[id number]] Collected:  06/02/20 1850    Specimen:  Blood Updated:  06/02/20 1924    Narrative:       The following orders were created for panel order Ketone Bodies, Serum (Not performed at [place]).  Procedure                               Abnormality         Status                     ---------                               -----------         ------                     Acetone[[id number]]                      Normal              Final result                 Please view results for these tests on the individual orders.    Acetone [[id number]]  (Normal) Collected:  06/02/20 1850    Specimen:  Blood Updated:  06/02/20 1924     Acetone Negative    Lactic Acid, Plasma [[id number]]  (Normal) Collected:  06/02/20 1850    Specimen:  Blood Updated:  06/02/20 1923     Lactate 1.0 mmol/L     Urinalysis, Microscopic Only - Urine, Clean Catch [[id number]]  (Abnormal) Collected:  06/02/20 1850    Specimen:  Urine, Clean Catch Updated:  06/02/20 1912     RBC, UA 0-2 /HPF      WBC, UA Too Numerous to Count /HPF      Bacteria, UA 2+ /HPF      Squamous Epithelial Cells, UA None Seen /HPF      Hyaline Casts, UA 0-2 /LPF      Methodology Automated Microscopy    Urine Culture - Urine, Urine, Clean Catch [[id number]] Collected:  06/02/20 1850    Specimen:  Urine, Clean Catch Updated:  06/02/20 1912    Urinalysis With Culture If Indicated - Urine, Clean Catch [[id number]]  (Abnormal) Collected:  06/02/20 1850    Specimen:  Urine, Clean Catch Updated:  06/02/20 1910     Color, UA Yellow      Appearance, UA Cloudy     pH, UA 6.5     Specific Gravity, UA 1.015     Glucose, UA >=1000 mg/dL (3+)     Ketones, UA Negative     Bilirubin, UA Negative     Blood, UA Trace     Protein, UA Negative     Leuk Esterase, UA Moderate (2+)     Nitrite, UA Positive     Urobilinogen, UA 0.2 E.U./dL    Pregnancy, Urine - Urine, Clean Catch [[id number]]  (Normal) Collected:  06/02/20 1850    Specimen:  Urine, Clean Catch Updated:  06/02/20 1910     HCG, Urine QL Negative    CBC & Differential [[id number]] Collected:  06/02/20 1850    Specimen:  Blood Updated:  06/02/20 1906    Narrative:       The following orders were created for panel order CBC & Differential.  Procedure                               Abnormality         Status                     ---------                               -----------         ------                     CBC Auto Differential[[id number]]        Abnormal            Final result                 Please view results for these tests on the individual orders.    CBC Auto Differential [[id number]]  (Abnormal) Collected:  06/02/20 1850    Specimen:  Blood Updated:  06/02/20 1906     WBC 11.64 10*3/mm3      RBC 4.36 10*6/mm3      Hemoglobin 12.7 g/dL      Hematocrit 36.7 %      MCV 84.2 fL      MCH 29.1 pg      MCHC 34.6 g/dL      RDW 12.4 %      RDW-SD 37.6 fl      MPV 10.5 fL      Platelets 287 10*3/mm3      Neutrophil % 82.5 %      Lymphocyte % 10.7 %      Monocyte % 5.8 %      Eosinophil % 0.2 %      Basophil % 0.4 %      Immature Grans % 0.4 %      Neutrophils, Absolute 9.60 10*3/mm3      Lymphocytes, Absolute 1.25 10*3/mm3      Monocytes, Absolute 0.67 10*3/mm3      Eosinophils, Absolute 0.02 10*3/mm3      Basophils, Absolute 0.05 10*3/mm3      Immature Grans, Absolute 0.05 10*3/mm3      nRBC 0.0 /100 WBC     Blood Culture - Blood, Arm, Right [[id number]] Collected:  06/02/20 1850    Specimen:  Blood from Arm, Right Updated:  06/02/20 1902        Imaging Results (Last 24 Hours)     Procedure Component  Value Units Date/Time    CT Abdomen Pelvis Without Contrast [[id number]] Resulted:  06/02/20 2024     Updated:  06/02/20 2025    XR Chest 1 View [[id number]] Collected:  06/02/20 1928     Updated:  06/02/20 1949    Narrative:       PROCEDURE: XR CHEST 1 VW    VIEWS:Single    INDICATION: Sepsis    COMPARISON: CXR: 5/8/2020    FINDINGS:       - lines/tubes: None    - cardiac: Size within normal limits.    - mediastinum: Contour within normal limits.     - lungs: No evidence of a focal air space process, pulmonary  interstitial edema, nodule(s)/mass.     - pleura: No evidence of  fluid.      - osseous: Unremarkable for age.      Impression:       Negative examination.      Electronically signed by:  Cha Cooney MD  6/2/2020 7:48 PM CDT  Workstation: [id number]            Assessment:    Active Hospital Problems    Diagnosis   • Pyelonephritis             Plan:    20-year-old female with past medical history significant for depression, bipolar disorder, poorly controlled type 1 diabetes, history of DKA who presented to the hospital with complaints of abdominal pain as well as back pain possibly secondary to complicated urinary tract infection.    Complicated UTI:  Admit to the MedSurg floor  Close vitals monitoring  Blood cultures in process  Urine cultures in process  IV ceftriaxone    Diabetes mellitus:  Levemir 20 units subcutaneous daily  Sliding scale insulin    Bipolar affective disorder:  Resume home medications once confirmed          Patient is full code  Estimated length of stay greater than 2 midnights  DVT prophylaxis SCDs    I discussed the patient's findings and my recommendations with: Patient    Bebe Parrish MD                  Electronically signed by Bebe Parrish MD at 06/03/20 0604          Emergency Department Notes      Tere Perez at 06/02/20 1725        Pt provided with mask upon arrival      Tere Perez  06/02/20 1725      Electronically signed by Tere Perez at 06/02/20 1725     Arnulfo  ARMOND Wayne at 06/02/20 1832     Attestation signed by Jeffry Woods MD at 06/02/20 1521          For this patient encounter, I reviewed the NP or PA documentation, treatment plan, and medical decision making. Jeffry Woods MD 6/2/2020 22:10                  Subjective   Patient presents to the ER with complaints of dysuria with blood in her urine.  She states this is been ongoing for a while now.  She was just treated for a UTI back on May 8 with Keflex.  She states she completed the antibiotics at that time.  She also states her blood sugars have been ranging from 40 at night up to 600 during the day despite use of medications.  She does report that she has recently been placed on new medications which include Lamictal, Zoloft, Seroquel, and Vyvanse for her ADD, anxiety and bipolar.  She states she has generalized body aches all over and her pain is 10 out of 10.  She does complain of mid back pain along with headache.  She reports she has a history of being septic and feels like she is headed in that direction.  She does report that she smoked marijuana 1 week ago and that she does routinely smoke cigarettes.  Last time patient was seen on 5/8 we had a weight of 108 pounds.  Today she is weighing in at 103.          Review of Systems   Constitutional: Positive for activity change, chills, fatigue and fever. Negative for diaphoresis.   HENT: Negative.    Respiratory: Negative for cough, chest tightness, shortness of breath and wheezing.    Cardiovascular: Negative for chest pain and palpitations.   Gastrointestinal: Positive for abdominal pain and nausea. Negative for abdominal distention, constipation, diarrhea and vomiting.   Genitourinary: Positive for dysuria, flank pain, frequency and hematuria. Negative for difficulty urinating, pelvic pain, vaginal bleeding, vaginal discharge and vaginal pain.   Musculoskeletal: Positive for back pain and myalgias.   Skin: Negative.    Neurological: Positive for  weakness. Negative for dizziness, syncope, speech difficulty, numbness and headaches.   Psychiatric/Behavioral: Negative.        Past Medical History:   Diagnosis Date   • ADHD    • Bipolar 1 disorder (CMS/HCC)    • Borderline personality disorder (CMS/HCC)    • Cannabinoid hyperemesis syndrome (CMS/HCC)    • Diabetes mellitus type 1 (CMS/HCC)    • Diabetic gastroparesis (CMS/HCC)    • Diabetic ketoacidosis (CMS/HCC)    • Diabetic neuropathy (CMS/HCC)    • Post traumatic stress disorder (PTSD)    • Recurrent UTI    • Seizure disorder (CMS/HCC)    • Severe depressed bipolar II disorder without psychotic features (CMS/HCC)    • Uncontrolled diabetes mellitus (CMS/HCC)        Allergies   Allergen Reactions   • Pineapple Anaphylaxis   • Benzoyl Peroxide Swelling       Past Surgical History:   Procedure Laterality Date   •  SECTION N/A 2018       Family History   Problem Relation Age of Onset   • Drug abuse Father    • Suicide Attempts Brother    • Dementia Maternal Grandfather    • Hypertension Paternal Grandmother        Social History     Socioeconomic History   • Marital status: Single     Spouse name: Not on file   • Number of children: Not on file   • Years of education: Not on file   • Highest education level: Not on file   Tobacco Use   • Smoking status: Current Every Day Smoker     Packs/day: 0.50     Years: 1.00     Pack years: 0.50   • Smokeless tobacco: Never Used   Substance and Sexual Activity   • Alcohol use: No   • Drug use: Yes     Types: Marijuana   • Sexual activity: Yes     Partners: Male   Social History Narrative    Substance Abuse: Pt drinks EtOH occasionally, stating once every 6 months. Pt smokes marijuana, but denied any other illicit drugs. Pt smokes 0.5-1 ppd of cigarettes x 1 year. Pt denies caffeine consumption, states she drinks only water.         Marriages: none    Current Relationships: Recent breakup with her boyfriend    Children: one child that  2wks ago at 2 months  "old.        Occupation:  Pt is a  and loves her job, but hasn't been able to work since baby was born via C/S    Living Situation: was living with her boyfriend but they are  over the death of their child.  She plans to live with mom after discharge.           Objective    /76 (BP Location: Left arm, Patient Position: Sitting)   Pulse 75   Temp 98.1 °F (36.7 °C) (Oral)   Resp 18   Ht 170.2 cm (67\")   Wt 46.7 kg (103 lb)   LMP 05/07/2020 (Exact Date)   SpO2 99%   BMI 16.13 kg/m²      Physical Exam   Constitutional: She is oriented to person, place, and time. She appears well-developed and well-nourished. No distress.   Thin appearing female. Appears to feel unwell - noticeable weight loss since patient last seen by me 5/8/20   HENT:   Head: Normocephalic and atraumatic.   Neck: Normal range of motion. Neck supple.   Cardiovascular: Normal rate, regular rhythm, normal heart sounds and intact distal pulses.   No murmur heard.  Pulmonary/Chest: Effort normal and breath sounds normal. No respiratory distress. She has no wheezes.   Abdominal: Soft. Bowel sounds are normal. She exhibits no distension. There is tenderness.   generalized   Musculoskeletal: Normal range of motion. She exhibits tenderness (CVA).   Neurological: She is alert and oriented to person, place, and time. Coordination normal.   Skin: Skin is warm and dry. Capillary refill takes less than 2 seconds.   Psychiatric: She has a normal mood and affect. Her behavior is normal. Judgment and thought content normal.   Nursing note and vitals reviewed.      Procedures  Results for orders placed or performed during the hospital encounter of 06/02/20   Comprehensive Metabolic Panel   Result Value Ref Range    Glucose 440 (C) 65 - 99 mg/dL    BUN 11 6 - 20 mg/dL    Creatinine 1.01 (H) 0.57 - 1.00 mg/dL    Sodium 132 (L) 136 - 145 mmol/L    Potassium 4.2 3.5 - 5.2 mmol/L    Chloride 97 (L) 98 - 107 mmol/L    CO2 23.0 22.0 - 29.0 mmol/L "    Calcium 9.3 8.6 - 10.5 mg/dL    Total Protein 7.6 6.0 - 8.5 g/dL    Albumin 4.40 3.50 - 5.20 g/dL    ALT (SGPT) 10 1 - 33 U/L    AST (SGOT) 9 1 - 32 U/L    Alkaline Phosphatase 75 39 - 117 U/L    Total Bilirubin 0.5 0.2 - 1.2 mg/dL    eGFR Non African Amer 69 >60 mL/min/1.73    Globulin 3.2 gm/dL    A/G Ratio 1.4 g/dL    BUN/Creatinine Ratio 10.9 7.0 - 25.0    Anion Gap 12.0 5.0 - 15.0 mmol/L   Lactic Acid, Plasma   Result Value Ref Range    Lactate 1.0 0.5 - 2.0 mmol/L   Urinalysis With Culture If Indicated - Urine, Clean Catch   Result Value Ref Range    Color, UA Yellow Yellow, Straw, Dark Yellow, [place]    Appearance, UA Cloudy (A) Clear    pH, UA 6.5 5.0 - 9.0    Specific [place], UA 1.015 1.003 - 1.030    Glucose, UA >=1000 mg/dL (3+) (A) Negative    Ketones, UA Negative Negative    Bilirubin, UA Negative Negative    Blood, UA Trace (A) Negative    Protein, UA Negative Negative    Leuk Esterase, UA Moderate (2+) (A) Negative    Nitrite, UA Positive (A) Negative    Urobilinogen, UA 0.2 E.U./dL 0.2 - 1.0 E.U./dL   Pregnancy, Urine - Urine, Clean Catch   Result Value Ref Range    HCG, Urine QL Negative Negative   CBC Auto Differential   Result Value Ref Range    WBC 11.64 (H) 3.40 - 10.80 10*3/mm3    RBC 4.36 3.77 - 5.28 10*6/mm3    Hemoglobin 12.7 12.0 - 15.9 g/dL    Hematocrit 36.7 34.0 - 46.6 %    MCV 84.2 79.0 - 97.0 fL    MCH 29.1 26.6 - 33.0 pg    MCHC 34.6 31.5 - 35.7 g/dL    RDW 12.4 12.3 - 15.4 %    RDW-SD 37.6 37.0 - 54.0 fl    MPV 10.5 6.0 - 12.0 fL    Platelets 287 140 - 450 10*3/mm3    Neutrophil % 82.5 (H) 42.7 - 76.0 %    Lymphocyte % 10.7 (L) 19.6 - 45.3 %    Monocyte % 5.8 5.0 - 12.0 %    Eosinophil % 0.2 (L) 0.3 - 6.2 %    Basophil % 0.4 0.0 - 1.5 %    Immature Grans % 0.4 0.0 - 0.5 %    Neutrophils, Absolute 9.60 (H) 1.70 - 7.00 10*3/mm3    Lymphocytes, Absolute 1.25 0.70 - 3.10 10*3/mm3    Monocytes, Absolute 0.67 0.10 - 0.90 10*3/mm3    Eosinophils, Absolute 0.02 0.00 - 0.40 10*3/mm3     Basophils, Absolute 0.05 0.00 - 0.20 10*3/mm3    Immature Grans, Absolute 0.05 0.00 - 0.05 10*3/mm3    nRBC 0.0 0.0 - 0.2 /100 WBC   Acetone   Result Value Ref Range    Acetone Negative Negative   Urinalysis, Microscopic Only - Urine, Clean Catch   Result Value Ref Range    RBC, UA 0-2 (A) None Seen /HPF    WBC, UA Too Numerous to Count (A) None Seen, 0-2, 3-5 /HPF    Bacteria, UA 2+ (A) None Seen /HPF    Squamous Epithelial Cells, UA None Seen None Seen, 0-2 /HPF    Hyaline Casts, UA 0-2 None Seen /LPF    Methodology Automated Microscopy    C-reactive Protein   Result Value Ref Range    C-Reactive Protein 2.37 (H) 0.00 - 0.50 mg/dL   Procalcitonin   Result Value Ref Range    Procalcitonin 0.11 0.10 - 0.25 ng/mL   Sedimentation Rate   Result Value Ref Range    Sed Rate 32 (H) 0 - 20 mm/hr   Green Top (Gel)   Result Value Ref Range    Extra Tube Hold for add-ons.    Lavender Top   Result Value Ref Range    Extra Tube hold for add-on    Light Blue Top   Result Value Ref Range    Extra Tube hold for add-on      Ct Abdomen Pelvis Without Contrast    Result Date: 6/2/2020  Narrative: EXAM DESCRIPTION:  CT ABDOMEN PELVIS WO CONTRAST CLINICAL HISTORY: 20 years  Female  abd pain, N12 Tubulo-interstitial nephritis, not specified as acute or chronic E10.65 Type 1 diabetes mellitus with hyperglycemia COMPARISON: October 29, 2019. TECHNIQUE: Images were obtained in axial, sagittal, and coronal planes. No intravenous contrast was administered.   This exam was performed according to our departmental dose-optimization program which includes use of Automated Exposure Control, adjustment of the mA and/or kV according to patient size and/or use of iterative reconstruction technique.  FINDINGS: No abnormality involving the liver, spleen, pancreas, or adrenal glands bilaterally. Suspected sludge within the gallbladder. No obstructing renal calcifications bilaterally. No hydronephrosis bilaterally. Unremarkable bladder. Appendix within  normal limits. Inspissated contrast identified within the appendix. No bowel obstruction, perforation, or inflammation. No dilatation abdominal aorta. No adenopathy or abnormal fluid collections seen. No abnormality lower lungs bilaterally. No acute osseous abnormality.     Impression: No acute intra-abdominal abnormality. Electronically signed by:  Caro Carrillo MD  6/2/2020 9:08 PM CDT Workstation: [id number]    Xr Chest 1 View    Result Date: 6/2/2020  Narrative: PROCEDURE: XR CHEST 1 VW VIEWS:Single INDICATION: Sepsis COMPARISON: CXR: 5/8/2020 FINDINGS:   - lines/tubes: None   - cardiac: Size within normal limits.   - mediastinum: Contour within normal limits.   - lungs: No evidence of a focal air space process, pulmonary interstitial edema, nodule(s)/mass.   - pleura: No evidence of  fluid.    - osseous: Unremarkable for age.     Impression: Negative examination. Electronically signed by:  Cha Cooney MD  6/2/2020 7:48 PM CDT Workstation: [id number]    Xr Chest 1 View    Result Date: 5/8/2020  Narrative: [facility] Patient Name: ANDREA ZELAYA ORDERING: NIKOLAI NICOLAS ATTENDING: TASH CHRISTY REFERRING: NIKOLAI NICOLAS ----------------------- PROCEDURE: Chest Single View TECHNIQUE: Single AP upright portable radiograph of the chest. COMPARISON: 10/30/2019 HISTORY: possible DKA FINDINGS:  Life-support devices: None. Lungs/pleura: No consolidation, pleural effusion, or pneumothorax. Heart, hilar and mediastinal structures: The heart size and mediastinal contours are within limits of normal. The trachea is midline. Skeletal Structures: No acute findings. No free air beneath the diaphragm.     Impression: No acute pulmonary or pleural finding. Electronically signed by:  Víctor Murray MD  5/8/2020 6:01 PM CDT Workstation: [id number]            ED Course  ED Course as of Jun 02 2144   Tue Jun 02, 2020 2042 Hospitalist paged.     []   2055 Spoke with Dr. Parrish. Will admit at  "this time for pylonephritis and Type 1 Diabetes.     []   2105 Patient up dated on admission status.     []      ED Course User Index  [SH] Tamara Arredondo APRN                                           [facility]    Final diagnoses:   Pyelonephritis   Type 1 diabetes mellitus with hyperglycemia (CMS/[facility])            Tamara Arredondo APRN  06/02/20 2144      Electronically signed by Jeffry Woods MD at 06/02/20 2210     Princess Mckeon, RN at 06/02/20 1913        Pt presents to ED with C/O bilateral flank pain, abd pain, and back pain X1 week. Pt reports elevated BS, but reports BS is \"always all over the place\". Pt reports BS was 239 approx 1 hour PTA. Pt reports blood in urine that began today.     Princess Mckeon RN  06/02/20 1914      Electronically signed by Princess Mckeon RN at 06/02/20 1914     Pearl Rabago RN at 06/02/20 2117        Report given to TIMOTHY Yousif Kayla M, RN  06/02/20 2117      Electronically signed by Pearl Rabago RN at 06/02/20 2117         Facility-Administered Medications as of 6/4/2020   Medication Dose Route Frequency Provider Last Rate Last Dose   • acetaminophen (TYLENOL) tablet 650 mg  650 mg Oral Q4H PRN Bebe Parrish MD   650 mg at 06/03/20 1157    Or   • acetaminophen (TYLENOL) 160 MG/5ML solution 650 mg  650 mg Oral Q4H PRN Bebe Parrish MD        Or   • acetaminophen (TYLENOL) suppository 650 mg  650 mg Rectal Q4H PRN Bebe Parrish MD       • [COMPLETED] acetaminophen (TYLENOL) tablet 650 mg  650 mg Oral Once Tamara Arredondo APRN   650 mg at 06/02/20 1918   • bisacodyl (DULCOLAX) EC tablet 5 mg  5 mg Oral Daily PRN Bebe Parrish MD       • bisacodyl (DULCOLAX) suppository 10 mg  10 mg Rectal Daily PRN Bebe Parrish MD       • calcium carbonate (TUMS) chewable tablet 500 mg (200 mg elemental)  2 tablet Oral BID PRN Bebe Parrish MD       • cefTRIAXone (ROCEPHIN) 1 g/100 mL 0.9% NS (MBP)  1 g " Intravenous Q24H Bebe Parrish MD   Stopped at 06/03/20 2040   • [COMPLETED] cefTRIAXone (ROCEPHIN) 2 g/100 mL 0.9% NS VTB (CHERYL)  2 g Intravenous Once Tamara Arredondo APRN   Stopped at 06/02/20 2102   • dextrose (D50W) 25 g/ 50mL Intravenous Solution 25 g  25 g Intravenous Q15 Min PRN Bebe Parrish MD   25 g at 06/04/20 0420   • dextrose (GLUTOSE) oral gel 15 g  15 g Oral Q15 Min PRN Bebe Parrish MD       • famotidine (PEPCID) tablet 40 mg  40 mg Oral Daily Bebe Parrish MD   40 mg at 06/04/20 0931   • glucagon (human recombinant) (GLUCAGEN DIAGNOSTIC) injection 1 mg  1 mg Subcutaneous Q15 Min PRN Bebe Parrish MD       • HYDROcodone-acetaminophen (NORCO) 7.5-325 MG per tablet 1 tablet  1 tablet Oral Q6H PRN Darlene Sánchez MD   1 tablet at 06/04/20 0950   • [COMPLETED] HYDROmorphone (DILAUDID) injection 1 mg  1 mg Intravenous Once Tamara Arredondo APRN   1 mg at 06/02/20 1919   • hydrOXYzine (ATARAX) 10 MG/5ML syrup 10 mg  10 mg Oral TID PRN Chelsea Monreal APRN       • insulin aspart (novoLOG) injection 0-14 Units  0-14 Units Subcutaneous TID AC Bebe Parrish MD   5 Units at 06/03/20 0811   • insulin detemir (LEVEMIR) injection 20 Units  20 Units Subcutaneous QAM Darlene Sánchez MD   20 Units at 06/04/20 0637   • [COMPLETED] insulin regular (humuLIN R,novoLIN R) injection 5 Units  5 Units Intravenous Once Tamara Arredondo APRN   5 Units at 06/02/20 2103   • lamoTRIgine (LaMICtal) tablet 25 mg  25 mg Oral Daily Chelsea Monreal APRN   25 mg at 06/04/20 1320   • magnesium hydroxide (MILK OF MAGNESIA) suspension 2400 mg/10mL 10 mL  10 mL Oral Daily PRN Bebe Parrish MD       • melatonin tablet 3 mg  3 mg Oral Nightly PRN Bebe Parrish MD   3 mg at 06/03/20 0059   • [COMPLETED] morphine injection 1 mg  1 mg Intravenous Once PRN Darlene Sánchez MD   1 mg at 06/03/20 1157   • morphine injection 1 mg  1 mg Intravenous Q2H PRN Chelsea Monreal APRN   1 mg at 06/04/20 1217   •  [COMPLETED] ondansetron (ZOFRAN) injection 4 mg  4 mg Intravenous Once Tamara Arredondo APRN   4 mg at 06/02/20 1918   • ondansetron (ZOFRAN) tablet 4 mg  4 mg Oral Q6H PRN Bebe Parrish MD        Or   • ondansetron (ZOFRAN) injection 4 mg  4 mg Intravenous Q6H PRN Bebe Parrish MD   4 mg at 06/04/20 0638   • sertraline (ZOLOFT) tablet 100 mg  100 mg Oral Daily Chelsea Monreal APRN   100 mg at 06/04/20 1320   • sodium chloride 0.45 % infusion  100 mL/hr Intravenous Continuous Darlene Sánchez  mL/hr at 06/04/20 1218 100 mL/hr at 06/04/20 1218   • [COMPLETED] sodium chloride 0.9 % bolus 1,000 mL  1,000 mL Intravenous Once Tamara Arredondo APRN   Stopped at 06/02/20 2004   • sodium chloride 0.9 % flush 10 mL  10 mL Intravenous PRN Jeffry Woods MD       • sodium chloride 0.9 % flush 10 mL  10 mL Intravenous Q12H Bebe Parrish MD   10 mL at 06/03/20 2019   • sodium chloride 0.9 % flush 10 mL  10 mL Intravenous PRN Bebe Parrish MD       • [COMPLETED] traMADol (ULTRAM) tablet 50 mg  50 mg Oral Once Bebe Parrish MD   50 mg at 06/03/20 0048     Lab Results (last 72 hours)     Procedure Component Value Units Date/Time    POC Glucose Once [[id number]]  (Abnormal) Collected:  06/04/20 1121    Specimen:  Blood Updated:  06/04/20 1135     Glucose 177 mg/dL      Comment: RN NotifiedOperator: [id number] TYSHAWN PABLOCEMeter ID: [id number]       POC Glucose Once [[id number]]  (Normal) Collected:  06/04/20 0941    Specimen:  Blood Updated:  06/04/20 0959     Glucose 99 mg/dL      Comment: : [id number] ROHITH ARCEOISSAMeter ID: [id number]       Comprehensive Metabolic Panel [[id number]]  (Abnormal) Collected:  06/04/20 0714    Specimen:  Blood Updated:  06/04/20 0750     Glucose 143 mg/dL      BUN 8 mg/dL      Creatinine 0.84 mg/dL      Sodium 135 mmol/L      Potassium 3.5 mmol/L      Chloride 97 mmol/L      CO2 26.0 mmol/L      Calcium 8.3 mg/dL      Total Protein 6.8 g/dL      Albumin 3.50 g/dL       ALT (SGPT) 6 U/L      AST (SGOT) 10 U/L      Alkaline Phosphatase 81 U/L      Total Bilirubin 0.2 mg/dL      eGFR Non African Amer 86 mL/min/1.73      Globulin 3.3 gm/dL      A/G Ratio 1.1 g/dL      BUN/Creatinine Ratio 9.5     Anion Gap 12.0 mmol/L     Narrative:       GFR Normal >60  Chronic Kidney Disease <60  Kidney Failure <15      CBC & Differential [[id number]] Collected:  06/04/20 0714    Specimen:  Blood Updated:  06/04/20 0727    Narrative:       The following orders were created for panel order CBC & Differential.  Procedure                               Abnormality         Status                     ---------                               -----------         ------                     CBC Auto Differential[[id number]]        Abnormal            Final result                 Please view results for these tests on the individual orders.    CBC Auto Differential [[id number]]  (Abnormal) Collected:  06/04/20 0714    Specimen:  Blood Updated:  06/04/20 0727     WBC 10.64 10*3/mm3      RBC 3.91 10*6/mm3      Hemoglobin 11.3 g/dL      Hematocrit 33.2 %      MCV 84.9 fL      MCH 28.9 pg      MCHC 34.0 g/dL      RDW 12.2 %      RDW-SD 37.3 fl      MPV 10.3 fL      Platelets 211 10*3/mm3      Neutrophil % 85.3 %      Lymphocyte % 9.2 %      Monocyte % 4.5 %      Eosinophil % 0.3 %      Basophil % 0.4 %      Immature Grans % 0.3 %      Neutrophils, Absolute 9.08 10*3/mm3      Lymphocytes, Absolute 0.98 10*3/mm3      Monocytes, Absolute 0.48 10*3/mm3      Eosinophils, Absolute 0.03 10*3/mm3      Basophils, Absolute 0.04 10*3/mm3      Immature Grans, Absolute 0.03 10*3/mm3      nRBC 0.0 /100 WBC     POC Glucose Once [[id number]]  (Abnormal) Collected:  06/04/20 0412    Specimen:  Blood Updated:  06/04/20 0719     Glucose 52 mg/dL      Comment: RN NotifiedOperator: [id number] TRACI JINNITHINMeter ID: [id number]       POC Glucose Once [[id number]]  (Abnormal) Collected:  06/03/20 2121    Specimen:  Blood Updated:   06/04/20 0719     Glucose 59 mg/dL      Comment: RN NotifiedOperator: [id number] TRACI MATTHEWMeter ID: [id number]       POC Glucose Once [[id number]]  (Abnormal) Collected:  06/04/20 0636    Specimen:  Blood Updated:  06/04/20 0654     Glucose 138 mg/dL      Comment: RN NotifiedOperator: [id number] INDIRA VICENTEMeter ID: [id number]       POC Glucose Once [[id number]]  (Abnormal) Collected:  06/04/20 0527    Specimen:  Blood Updated:  06/04/20 0540     Glucose 189 mg/dL      Comment: RN NotifiedOperator: [id number] INDIRAHER PALMAJOANNMeter ID: [id number]       Urine Culture - Urine, Urine, Clean Catch [[id number]]  (Abnormal)  (Susceptibility) Collected:  06/02/20 1850    Specimen:  Urine, Clean Catch Updated:  06/04/20 0300     Urine Culture >100,000 CFU/mL Escherichia coli    Susceptibility      Escherichia coli     BRYAN     Ampicillin Susceptible     Ampicillin + Sulbactam Susceptible     Cefazolin Susceptible     Cefepime Susceptible     Ceftazidime Susceptible     Ceftriaxone Susceptible     Gentamicin Susceptible     Levofloxacin Susceptible     Nitrofurantoin Susceptible     Piperacillin + Tazobactam Susceptible     Tetracycline Susceptible     Trimethoprim + Sulfamethoxazole Susceptible                    Blood Culture - Blood, Arm, Left [[id number]] Collected:  06/02/20 2218    Specimen:  Blood from Arm, Left Updated:  06/03/20 2231     Blood Culture No growth at 24 hours    POC Glucose Once [[id number]]  (Abnormal) Collected:  06/03/20 2205    Specimen:  Blood Updated:  06/03/20 2217     Glucose 195 mg/dL      Comment: RN NotifiedOperator: [id number] INDIRA PALMAJOANNMeter ID: [id number]       POC Glucose Once [[id number]]  (Abnormal) Collected:  06/03/20 1904    Specimen:  Blood Updated:  06/03/20 1932     Glucose 170 mg/dL      Comment: RN NotifiedOperator: [id number] TRACI MATTHEWMeter ID: [id number]       Blood Culture - Blood, Arm, Right [[id number]] Collected:  06/02/20 1850    Specimen:  Blood  from Arm, Right Updated:  06/03/20 1916     Blood Culture No growth at 24 hours    POC Glucose Once [[id number]]  (Abnormal) Collected:  06/03/20 1633    Specimen:  Blood Updated:  06/03/20 1653     Glucose 154 mg/dL      Comment: RN NotifiedOperator: [id number] ELIE PAXTONMeter ID: [id number]       POC Glucose Once [[id number]]  (Abnormal) Collected:  06/03/20 1336    Specimen:  Blood Updated:  06/03/20 1348     Glucose 234 mg/dL      Comment: RN NotifiedOperator: [id number] BHARGAVIROSIO KAYLIEANNEMeter ID: [id number]       POC Glucose Once [[id number]]  (Abnormal) Collected:  06/03/20 1251    Specimen:  Blood Updated:  06/03/20 1303     Glucose 64 mg/dL      Comment: : [id number] BHARGAVIROSIO AMBROSEYLJAMESANNEMeter ID: [id number]       POC Glucose Once [[id number]]  (Abnormal) Collected:  06/03/20 1205    Specimen:  Blood Updated:  06/03/20 1217     Glucose 64 mg/dL      Comment: : [id number] MALONE BRITTANYMeter ID: [id number]       POC Glucose Once [[id number]]  (Normal) Collected:  06/03/20 1103    Specimen:  Blood Updated:  06/03/20 1119     Glucose 102 mg/dL      Comment: RN NotifiedOperator: [id number] BHARGAVIER HALIEYLIEANNEMeter ID: [id number]       POC Glucose Once [[id number]]  (Abnormal) Collected:  06/03/20 0628    Specimen:  Blood Updated:  06/03/20 0717     Glucose 342 mg/dL      Comment: RN NotifiedOperator: [id number] INDIRA PALMASEYMeter ID: [id number]       POC Glucose Once [[id number]]  (Normal) Collected:  06/03/20 0219    Specimen:  Blood Updated:  06/03/20 0716     Glucose 95 mg/dL      Comment: : [id number] CJ HAILEYMeter ID: [id number]       POC Glucose Once [[id number]]  (Normal) Collected:  06/03/20 0149    Specimen:  Blood Updated:  06/03/20 0716     Glucose 82 mg/dL      Comment: : [id number] INDIRA Herzog ID: [id number]       Comprehensive Metabolic Panel [[id number]]  (Abnormal) Collected:  06/03/20 0616    Specimen:  Blood Updated:  06/03/20 0644      Glucose 340 mg/dL      BUN 12 mg/dL      Creatinine 1.05 mg/dL      Sodium 136 mmol/L      Potassium 4.2 mmol/L      Chloride 97 mmol/L      CO2 27.0 mmol/L      Calcium 8.6 mg/dL      Total Protein 6.5 g/dL      Albumin 3.60 g/dL      ALT (SGPT) 9 U/L      AST (SGOT) 10 U/L      Alkaline Phosphatase 58 U/L      Total Bilirubin 0.2 mg/dL      eGFR Non African Amer 66 mL/min/1.73      Globulin 2.9 gm/dL      A/G Ratio 1.2 g/dL      BUN/Creatinine Ratio 11.4     Anion Gap 12.0 mmol/L     Narrative:       GFR Normal >60  Chronic Kidney Disease <60  Kidney Failure <15      Magnesium [[id number]]  (Normal) Collected:  06/03/20 0616    Specimen:  Blood Updated:  06/03/20 0644     Magnesium 1.6 mg/dL     Phosphorus [[id number]]  (Normal) Collected:  06/03/20 0616    Specimen:  Blood Updated:  06/03/20 0644     Phosphorus 3.9 mg/dL     CBC Auto Differential [[id number]]  (Abnormal) Collected:  06/03/20 0616    Specimen:  Blood Updated:  06/03/20 0633     WBC 17.49 10*3/mm3      RBC 3.80 10*6/mm3      Hemoglobin 11.1 g/dL      Hematocrit 32.3 %      MCV 85.0 fL      MCH 29.2 pg      MCHC 34.4 g/dL      RDW 12.4 %      RDW-SD 38.1 fl      MPV 10.5 fL      Platelets 230 10*3/mm3      Neutrophil % 90.6 %      Lymphocyte % 5.1 %      Monocyte % 3.4 %      Eosinophil % 0.0 %      Basophil % 0.2 %      Immature Grans % 0.7 %      Neutrophils, Absolute 15.83 10*3/mm3      Lymphocytes, Absolute 0.89 10*3/mm3      Monocytes, Absolute 0.60 10*3/mm3      Eosinophils, Absolute 0.00 10*3/mm3      Basophils, Absolute 0.04 10*3/mm3      Immature Grans, Absolute 0.13 10*3/mm3      nRBC 0.0 /100 WBC     POC Glucose Once [[id number]]  (Normal) Collected:  06/02/20 2216    Specimen:  Blood Updated:  06/02/20 2230     Glucose 101 mg/dL      Comment: : [id number] CJ CENTENOMeter ID: [id number]       TSH [[id number]]  (Normal) Collected:  06/02/20 1850    Specimen:  Blood Updated:  06/02/20 2145     TSH 0.627 uIU/mL     Sedimentation  "Rate [[id number]]  (Abnormal) Collected:  06/02/20 1850    Specimen:  Blood Updated:  06/02/20 2045     Sed Rate 32 mm/hr     Procalcitonin [[id number]]  (Normal) Collected:  06/02/20 1850    Specimen:  Blood Updated:  06/02/20 2016     Procalcitonin 0.11 ng/mL     Narrative:       As a Marker for Sepsis (Non-Neonates):   1. <0.5 ng/mL represents a low risk of severe sepsis and/or septic shock.  1. >2 ng/mL represents a high risk of severe sepsis and/or septic shock.    As a Marker for Lower Respiratory Tract Infections that require antibiotic therapy:  PCT on Admission     Antibiotic Therapy             6-12 Hrs later  > 0.5                Strongly Recommended            >0.25 - <0.5         Recommended  0.1 - 0.25           Discouraged                   Remeasure/reassess PCT  <0.1                 Strongly Discouraged          Remeasure/reassess PCT      As 28 day mortality risk marker: \"Change in Procalcitonin Result\" (> 80 % or <=80 %) if Day 0 (or Day 1) and Day 4 values are available. Refer to http://[email or web address]-calculator.com/   Change in PCT <=80 %   A decrease of PCT levels below or equal to 80 % defines a positive change in PCT test result representing a higher risk for 28-day all-cause mortality of patients diagnosed with severe sepsis or septic shock.  Change in PCT > 80 %   A decrease of PCT levels of more than 80 % defines a negative change in PCT result representing a lower risk for 28-day all-cause mortality of patients diagnosed with severe sepsis or septic shock.                Results may be falsely decreased if patient taking Biotin.     [place] Draw [[id number]] Collected:  06/02/20 1850    Specimen:  Blood Updated:  06/02/20 2000    Narrative:       The following orders were created for panel order [place] Draw.  Procedure                               Abnormality         Status                     ---------                               -----------         ------                     Light Blue " Top[[id number]]                                                              Green Top (Gel)[[id number]]                                  Final result               Lavender Top[[id number]]                                     Final result               Gold Top - SST[[id number]]                                                                Please view results for these tests on the individual orders.    Green Top (Gel) [[id number]] Collected:  06/02/20 1850    Specimen:  Blood Updated:  06/02/20 2000     Extra Tube Hold for add-ons.     Comment: Auto resulted.       Lavender Top [[id number]] Collected:  06/02/20 1850    Specimen:  Blood Updated:  06/02/20 2000     Extra Tube hold for add-on     Comment: Auto resulted       [place] Draw [[id number]] Collected:  06/02/20 1850    Specimen:  Blood Updated:  06/02/20 2000    Narrative:       The following orders were created for panel order [place] Draw.  Procedure                               Abnormality         Status                     ---------                               -----------         ------                     Light Blue Top[[id number]]                                   Final result               Green Top (Gel)[[id number]]                                                             Lavender Top[[id number]]                                                                Gold Top - SST[[id number]]                                                                Please view results for these tests on the individual orders.    Light Blue Top [[id number]] Collected:  06/02/20 1850    Specimen:  Blood Updated:  06/02/20 2000     Extra Tube hold for add-on     Comment: Auto resulted       C-reactive Protein [[id number]]  (Abnormal) Collected:  06/02/20 1850    Specimen:  Blood Updated:  06/02/20 1952     C-Reactive Protein 2.37 mg/dL     Comprehensive Metabolic Panel [[id number]]  (Abnormal) Collected:  06/02/20 1850    Specimen:  Blood Updated:  06/02/20 1928     Glucose 440  mg/dL      BUN 11 mg/dL      Creatinine 1.01 mg/dL      Sodium 132 mmol/L      Potassium 4.2 mmol/L      Chloride 97 mmol/L      CO2 23.0 mmol/L      Calcium 9.3 mg/dL      Total Protein 7.6 g/dL      Albumin 4.40 g/dL      ALT (SGPT) 10 U/L      AST (SGOT) 9 U/L      Alkaline Phosphatase 75 U/L      Total Bilirubin 0.5 mg/dL      eGFR Non African Amer 69 mL/min/1.73      Globulin 3.2 gm/dL      A/G Ratio 1.4 g/dL      BUN/Creatinine Ratio 10.9     Anion Gap 12.0 mmol/L     Narrative:       GFR Normal >60  Chronic Kidney Disease <60  Kidney Failure <15      Ketone Bodies, Serum (Not performed at [facility]) [[id number]] Collected:  06/02/20 1850    Specimen:  Blood Updated:  06/02/20 1924    Narrative:       The following orders were created for panel order Ketone Bodies, Serum (Not performed at [facility]).  Procedure                               Abnormality         Status                     ---------                               -----------         ------                     Acetone[[id number]]                      Normal              Final result                 Please view results for these tests on the individual orders.    Acetone [[id number]]  (Normal) Collected:  06/02/20 1850    Specimen:  Blood Updated:  06/02/20 1924     Acetone Negative    Lactic Acid, Plasma [[id number]]  (Normal) Collected:  06/02/20 1850    Specimen:  Blood Updated:  06/02/20 1923     Lactate 1.0 mmol/L     Urinalysis, Microscopic Only - Urine, Clean Catch [[id number]]  (Abnormal) Collected:  06/02/20 1850    Specimen:  Urine, Clean Catch Updated:  06/02/20 1912     RBC, UA 0-2 /HPF      WBC, UA Too Numerous to Count /HPF      Bacteria, UA 2+ /HPF      Squamous Epithelial Cells, UA None Seen /HPF      Hyaline Casts, UA 0-2 /LPF      Methodology Automated Microscopy    Urinalysis With Culture If Indicated - Urine, Clean Catch [[id number]]  (Abnormal) Collected:  06/02/20 1850    Specimen:  Urine, Clean Catch Updated:  06/02/20 1910     Color,  UA Yellow     Appearance, UA Cloudy     pH, UA 6.5     Specific Gravity, UA 1.015     Glucose, UA >=1000 mg/dL (3+)     Ketones, UA Negative     Bilirubin, UA Negative     Blood, UA Trace     Protein, UA Negative     Leuk Esterase, UA Moderate (2+)     Nitrite, UA Positive     Urobilinogen, UA 0.2 E.U./dL    Pregnancy, Urine - Urine, Clean Catch [[id number]]  (Normal) Collected:  06/02/20 1850    Specimen:  Urine, Clean Catch Updated:  06/02/20 1910     HCG, Urine QL Negative    CBC & Differential [[id number]] Collected:  06/02/20 1850    Specimen:  Blood Updated:  06/02/20 1906    Narrative:       The following orders were created for panel order CBC & Differential.  Procedure                               Abnormality         Status                     ---------                               -----------         ------                     CBC Auto Differential[[id number]]        Abnormal            Final result                 Please view results for these tests on the individual orders.    CBC Auto Differential [[id number]]  (Abnormal) Collected:  06/02/20 1850    Specimen:  Blood Updated:  06/02/20 1906     WBC 11.64 10*3/mm3      RBC 4.36 10*6/mm3      Hemoglobin 12.7 g/dL      Hematocrit 36.7 %      MCV 84.2 fL      MCH 29.1 pg      MCHC 34.6 g/dL      RDW 12.4 %      RDW-SD 37.6 fl      MPV 10.5 fL      Platelets 287 10*3/mm3      Neutrophil % 82.5 %      Lymphocyte % 10.7 %      Monocyte % 5.8 %      Eosinophil % 0.2 %      Basophil % 0.4 %      Immature Grans % 0.4 %      Neutrophils, Absolute 9.60 10*3/mm3      Lymphocytes, Absolute 1.25 10*3/mm3      Monocytes, Absolute 0.67 10*3/mm3      Eosinophils, Absolute 0.02 10*3/mm3      Basophils, Absolute 0.05 10*3/mm3      Immature Grans, Absolute 0.05 10*3/mm3      nRBC 0.0 /100 WBC           Imaging Results (Last 72 Hours)     Procedure Component Value Units Date/Time    CT Abdomen Pelvis Without Contrast [[id number]] Collected:  06/02/20 2015     Updated:  06/02/20  2109    Narrative:       EXAM DESCRIPTION:     CT ABDOMEN PELVIS WO CONTRAST    CLINICAL HISTORY:     20 years  Female  abd pain, N12 Tubulo-interstitial nephritis,  not specified as acute or chronic E10.65 Type 1 diabetes mellitus  with hyperglycemia    COMPARISON:     October 29, 2019.    TECHNIQUE:     Images were obtained in axial, sagittal, and coronal planes. No  intravenous contrast was administered.      This exam was performed according to our departmental  dose-optimization program which includes use of Automated  Exposure Control, adjustment of the mA and/or kV according to  patient size and/or use of iterative reconstruction technique.      FINDINGS:     No abnormality involving the liver, spleen, pancreas, or adrenal  glands bilaterally. Suspected sludge within the gallbladder.    No obstructing renal calcifications bilaterally. No  hydronephrosis bilaterally. Unremarkable bladder.    Appendix within normal limits. Inspissated contrast identified  within the appendix. No bowel obstruction, perforation, or  inflammation.    No dilatation abdominal aorta. No adenopathy or abnormal fluid  collections seen.    No abnormality lower lungs bilaterally.    No acute osseous abnormality.      Impression:         No acute intra-abdominal abnormality.    Electronically signed by:  Caro Carrillo MD  6/2/2020 9:08 PM CDT  Workstation: [id number]    XR Chest 1 View [[id number]] Collected:  06/02/20 1928     Updated:  06/02/20 1949    Narrative:       PROCEDURE: XR CHEST 1 VW    VIEWS:Single    INDICATION: Sepsis    COMPARISON: CXR: 5/8/2020    FINDINGS:       - lines/tubes: None    - cardiac: Size within normal limits.    - mediastinum: Contour within normal limits.     - lungs: No evidence of a focal air space process, pulmonary  interstitial edema, nodule(s)/mass.     - pleura: No evidence of  fluid.      - osseous: Unremarkable for age.      Impression:       Negative examination.      Electronically signed by:  Cha  "Eros GASPAR  6/2/2020 7:48 PM CDT  Workstation: [id number]        ECG/EMG Results (last 72 hours)     ** No results found for the last 72 hours. **           Physician Progress Notes (last 72 hours) (Notes from 06/01/20 1459 through 06/04/20 1459)      Darlene Sánchez MD at 06/03/20 [address] [place] [address] Medicine Services  INPATIENT PROGRESS NOTE    Length of Stay: 1  Date of Admission: 6/2/2020  Primary Care Physician: Rufus Marshall APRN    Subjective   Chief Complaint: abdominal, back pain  HPI:  Feels \"terrible\" this morning. Said she had some relief in pain and nausea overnight following meds given in ER. Tramadol, however, does not help her pain. Says pain is worst throughout her lower back. Hurts to touch it. Now with nausea, nonbloody emesis x2 this morning. States she can't keep anything down. Continues with dysuria, frequency, urgency. No hematuria. No vaginal discharge, burning, pressure.     Review of Systems     All pertinent negatives and positives are as above. All other systems have been reviewed and are negative unless otherwise stated.     Objective    Temp:  [97.8 °F (36.6 °C)-101.5 °F (38.6 °C)] 98.7 °F (37.1 °C)  Heart Rate:  [] 79  Resp:  [18-20] 18  BP: (108-152)/(60-84) 136/84    Physical Exam   Constitutional: She is oriented to person, place, and time. She appears well-developed.   HENT:   Head: Normocephalic and atraumatic.   Eyes: Pupils are equal, round, and reactive to light.   Neck: Normal range of motion.   Cardiovascular: Normal rate, regular rhythm, normal heart sounds and intact distal pulses.   Pulmonary/Chest: Effort normal and breath sounds normal. No respiratory distress.   Abdominal: Soft. Bowel sounds are normal. She exhibits no distension. There is tenderness. There is no guarding.   Tender to deep palpation in bilateral lower quadrants. No peritoneal signs. Positive CVA tenderness R>L.    Musculoskeletal: She exhibits no " edema.   Neurological: She is alert and oriented to person, place, and time.   Psychiatric: She has a normal mood and affect.           Results Review:  I have reviewed the labs, radiology results, and diagnostic studies.    Laboratory Data:   Results from last 7 days   Lab Units 06/03/20  0616 06/02/20  1850   SODIUM mmol/L 136 132*   POTASSIUM mmol/L 4.2 4.2   CHLORIDE mmol/L 97* 97*   CO2 mmol/L 27.0 23.0   BUN mg/dL 12 11   CREATININE mg/dL 1.05* 1.01*   GLUCOSE mg/dL 340* 440*   CALCIUM mg/dL 8.6 9.3   BILIRUBIN mg/dL 0.2 0.5   ALK PHOS U/L 58 75   ALT (SGPT) U/L 9 10   AST (SGOT) U/L 10 9   ANION GAP mmol/L 12.0 12.0     Estimated Creatinine Clearance: 64.1 mL/min (A) (by C-G formula based on SCr of 1.05 mg/dL (H)).  Results from last 7 days   Lab Units 06/03/20  0616   MAGNESIUM mg/dL 1.6   PHOSPHORUS mg/dL 3.9         Results from last 7 days   Lab Units 06/03/20  0616 06/02/20  1850   WBC 10*3/mm3 17.49* 11.64*   HEMOGLOBIN g/dL 11.1* 12.7   HEMATOCRIT % 32.3* 36.7   PLATELETS 10*3/mm3 230 287           Culture Data:   No results found for: BLOODCX  Urine Culture   Date Value Ref Range Status   06/02/2020 >100,000 CFU/mL Escherichia coli (A)  Preliminary     No results found for: RESPCX  No results found for: WOUNDCX  No results found for: STOOLCX  No components found for: BODYFLD    Radiology Data:   Imaging Results (Last 24 Hours)     Procedure Component Value Units Date/Time    CT Abdomen Pelvis Without Contrast [[id number]] Collected:  06/02/20 2015     Updated:  06/02/20 2109    Narrative:       EXAM DESCRIPTION:     CT ABDOMEN PELVIS WO CONTRAST    CLINICAL HISTORY:     20 years  Female  abd pain, N12 Tubulo-interstitial nephritis,  not specified as acute or chronic E10.65 Type 1 diabetes mellitus  with hyperglycemia    COMPARISON:     October 29, 2019.    TECHNIQUE:     Images were obtained in axial, sagittal, and coronal planes. No  intravenous contrast was administered.      This exam was performed  according to our departmental  dose-optimization program which includes use of Automated  Exposure Control, adjustment of the mA and/or kV according to  patient size and/or use of iterative reconstruction technique.      FINDINGS:     No abnormality involving the liver, spleen, pancreas, or adrenal  glands bilaterally. Suspected sludge within the gallbladder.    No obstructing renal calcifications bilaterally. No  hydronephrosis bilaterally. Unremarkable bladder.    Appendix within normal limits. Inspissated contrast identified  within the appendix. No bowel obstruction, perforation, or  inflammation.    No dilatation abdominal aorta. No adenopathy or abnormal fluid  collections seen.    No abnormality lower lungs bilaterally.    No acute osseous abnormality.      Impression:         No acute intra-abdominal abnormality.    Electronically signed by:  Caro Carrillo MD  6/2/2020 9:08 PM CDT  Workstation: [id number]    XR Chest 1 View [[id number]] Collected:  06/02/20 1928     Updated:  06/02/20 1949    Narrative:       PROCEDURE: XR CHEST 1 VW    VIEWS:Single    INDICATION: Sepsis    COMPARISON: CXR: 5/8/2020    FINDINGS:       - lines/tubes: None    - cardiac: Size within normal limits.    - mediastinum: Contour within normal limits.     - lungs: No evidence of a focal air space process, pulmonary  interstitial edema, nodule(s)/mass.     - pleura: No evidence of  fluid.      - osseous: Unremarkable for age.      Impression:       Negative examination.      Electronically signed by:  Cha Cooney MD  6/2/2020 7:48 PM CDT  Workstation: [id number]          I have reviewed the patient's current medications.     Assessment/Plan     Active Problems:    Uncontrolled type 1 diabetes mellitus with hyperglycemia (CMS/HCC)-Uncontrolled with last recorded A1c >12 in 2019. Notes history of recurrent DKA. BS control variable although non-acidotic currently. Will add IVF. Continue basal insulin at increased dose. Continue SSI/accuchecks.  Repeat BMP in AM.     Pyelonephritis with secondary sepsis - Remains hemodynamically stable. Urine culture already with growth of Ecoli. Blood cultures remain pending. Continue ceftriaxone. Await C&S. Add IVF as above.          Discharge Planning: I expect patient to be discharged to home in  1-2 days.    Darlene Sánchez MD    Electronically signed by Darlene Sánchez MD at 06/03/20 1044       Consult Notes (last 72 hours) (Notes from 06/01/20 4653 through 06/04/20 7798)    No notes of this type exist for this encounter.

## 2020-06-04 NOTE — PROGRESS NOTES
[facility] Medicine Services  INPATIENT PROGRESS NOTE    Length of Stay: 1  Date of Admission: 6/2/2020  Primary Care Physician: Rufus Marshall APRN    Subjective   Chief Complaint: No complaints    HPI:     6/4/2020: Patient continues to complain of pain in her back.  She states she has no appetite and is unable to hold food down secondary to nausea.  Patient has urine cultures dating back to October of last year positive for E. coli.  She has been on multiple rounds of antibiotics but continues to grow E. coli on current culture.  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      This is a 20-year-old  female with past medical history of ADHD, bipolar 1 disorder, DM type I, and diabetic neuropathy that presented to [facility] on 6/2/2020 with complaints of back pain, suprapubic pain, abdominal pain, polyuria, dysuria, hematuria and nausea/ vomiting.  CT scan abdomen/pelvis was negative for any acute intra-abdominal pathology.      Review of Systems   Constitutional: Negative for activity change and fatigue.   HENT: Negative for ear pain and sore throat.    Eyes: Negative for pain and discharge.   Respiratory: Negative for cough and shortness of breath.    Cardiovascular: Negative for chest pain and palpitations.   Gastrointestinal: Negative for abdominal pain and nausea.   Endocrine: Negative for cold intolerance and heat intolerance.   Genitourinary: Positive for dysuria, flank pain and hematuria. Negative for difficulty urinating.   Musculoskeletal: Negative for arthralgias and gait problem.   Skin: Negative for color change and rash.   Neurological: Negative for dizziness and weakness.   Psychiatric/Behavioral: Negative for agitation and confusion.        Objective    Temp:  [97.5 °F (36.4 °C)-100.2 °F (37.9 °C)] 97.7 °F (36.5 °C)  Heart Rate:  [62-92] 68  Resp:  [16-18] 16  BP: ()/(56-75)  105/63    Physical Exam   Constitutional: She is oriented to person, place, and time. She appears well-developed and well-nourished.   HENT:   Head: Normocephalic and atraumatic.   Eyes: Pupils are equal, round, and reactive to light. EOM are normal.   Neck: Normal range of motion. Neck supple.   Cardiovascular: Normal rate and regular rhythm.   Pulmonary/Chest: Effort normal and breath sounds normal.   Abdominal: Soft. Bowel sounds are normal.   Musculoskeletal: Normal range of motion.   Neurological: She is alert and oriented to person, place, and time.   Skin: Skin is warm and dry.   Psychiatric: She has a normal mood and affect. Her behavior is normal.     Results Review:  I have reviewed the labs, radiology results, and diagnostic studies.    Laboratory Data:   Results from last 7 days   Lab Units 06/04/20  0714 06/03/20  0616 06/02/20  1850   SODIUM mmol/L 135* 136 132*   POTASSIUM mmol/L 3.5 4.2 4.2   CHLORIDE mmol/L 97* 97* 97*   CO2 mmol/L 26.0 27.0 23.0   BUN mg/dL 8 12 11   CREATININE mg/dL 0.84 1.05* 1.01*   GLUCOSE mg/dL 143* 340* 440*   CALCIUM mg/dL 8.3* 8.6 9.3   BILIRUBIN mg/dL 0.2 0.2 0.5   ALK PHOS U/L 81 58 75   ALT (SGPT) U/L 6 9 10   AST (SGOT) U/L 10 10 9   ANION GAP mmol/L 12.0 12.0 12.0     Estimated Creatinine Clearance: 79.1 mL/min (by C-G formula based on SCr of 0.84 mg/dL).  Results from last 7 days   Lab Units 06/03/20  0616   MAGNESIUM mg/dL 1.6   PHOSPHORUS mg/dL 3.9         Results from last 7 days   Lab Units 06/04/20  0714 06/03/20  0616 06/02/20  1850   WBC 10*3/mm3 10.64 17.49* 11.64*   HEMOGLOBIN g/dL 11.3* 11.1* 12.7   HEMATOCRIT % 33.2* 32.3* 36.7   PLATELETS 10*3/mm3 211 230 287           Culture Data:   Blood Culture   Date Value Ref Range Status   06/02/2020 No growth at 24 hours  Preliminary   06/02/2020 No growth at 24 hours  Preliminary     Urine Culture   Date Value Ref Range Status   06/02/2020 >100,000 CFU/mL Escherichia coli (A)  Final     No results found for:  RESPCX  No results found for: WOUNDCX  No results found for: STOOLCX  No components found for: BODYFLD    Radiology Data:   Imaging Results (Last 24 Hours)     ** No results found for the last 24 hours. **          I have reviewed the patient's current medications.     Assessment/Plan     Active Hospital Problems    Diagnosis POA   • Pyelonephritis [N12] Yes   • Uncontrolled type 1 diabetes mellitus with hyperglycemia (CMS/[facility]) [E10.65] Yes       Plan:    1.  Urinary tract infection, E. coli: IV Rocephin.  Blood cultures are negative.  Urology consult appreciated secondary to multiple E. coli urinary tract infections.  2.  Diabetes mellitus, type I: Continue long-acting and SSI.  3.  Bipolar disorder: Continue Zoloft and Lamictal.      Discharge Planning: I expect patient to be discharged to home in 1-2 days.      This document has been electronically signed by ARMOND Ballesteros on June 4, 2020 16:35

## 2020-06-05 LAB
ALBUMIN SERPL-MCNC: 2.9 G/DL (ref 3.5–5.2)
ALBUMIN/GLOB SERPL: 1 G/DL
ALP SERPL-CCNC: 72 U/L (ref 39–117)
ALT SERPL W P-5'-P-CCNC: 7 U/L (ref 1–33)
ANION GAP SERPL CALCULATED.3IONS-SCNC: 9 MMOL/L (ref 5–15)
AST SERPL-CCNC: 8 U/L (ref 1–32)
BASOPHILS # BLD AUTO: 0.03 10*3/MM3 (ref 0–0.2)
BASOPHILS NFR BLD AUTO: 0.5 % (ref 0–1.5)
BILIRUB SERPL-MCNC: <0.2 MG/DL (ref 0.2–1.2)
BUN BLD-MCNC: 5 MG/DL (ref 6–20)
BUN/CREAT SERPL: 6.3 (ref 7–25)
CALCIUM SPEC-SCNC: 8.1 MG/DL (ref 8.6–10.5)
CHLORIDE SERPL-SCNC: 100 MMOL/L (ref 98–107)
CO2 SERPL-SCNC: 27 MMOL/L (ref 22–29)
CREAT BLD-MCNC: 0.8 MG/DL (ref 0.57–1)
DEPRECATED RDW RBC AUTO: 36.8 FL (ref 37–54)
EOSINOPHIL # BLD AUTO: 0.25 10*3/MM3 (ref 0–0.4)
EOSINOPHIL NFR BLD AUTO: 3.9 % (ref 0.3–6.2)
ERYTHROCYTE [DISTWIDTH] IN BLOOD BY AUTOMATED COUNT: 12.1 % (ref 12.3–15.4)
GFR SERPL CREATININE-BSD FRML MDRD: 91 ML/MIN/1.73
GLOBULIN UR ELPH-MCNC: 3 GM/DL
GLUCOSE BLD-MCNC: 171 MG/DL (ref 65–99)
GLUCOSE BLDC GLUCOMTR-MCNC: 100 MG/DL (ref 70–130)
GLUCOSE BLDC GLUCOMTR-MCNC: 137 MG/DL (ref 70–130)
GLUCOSE BLDC GLUCOMTR-MCNC: 144 MG/DL (ref 70–130)
GLUCOSE BLDC GLUCOMTR-MCNC: 164 MG/DL (ref 70–130)
GLUCOSE BLDC GLUCOMTR-MCNC: 172 MG/DL (ref 70–130)
GLUCOSE BLDC GLUCOMTR-MCNC: 188 MG/DL (ref 70–130)
GLUCOSE BLDC GLUCOMTR-MCNC: 191 MG/DL (ref 70–130)
GLUCOSE BLDC GLUCOMTR-MCNC: 209 MG/DL (ref 70–130)
GLUCOSE BLDC GLUCOMTR-MCNC: 80 MG/DL (ref 70–130)
HCT VFR BLD AUTO: 30.5 % (ref 34–46.6)
HGB BLD-MCNC: 10.4 G/DL (ref 12–15.9)
IMM GRANULOCYTES # BLD AUTO: 0.02 10*3/MM3 (ref 0–0.05)
IMM GRANULOCYTES NFR BLD AUTO: 0.3 % (ref 0–0.5)
LYMPHOCYTES # BLD AUTO: 1.21 10*3/MM3 (ref 0.7–3.1)
LYMPHOCYTES NFR BLD AUTO: 19.1 % (ref 19.6–45.3)
MCH RBC QN AUTO: 28.9 PG (ref 26.6–33)
MCHC RBC AUTO-ENTMCNC: 34.1 G/DL (ref 31.5–35.7)
MCV RBC AUTO: 84.7 FL (ref 79–97)
MONOCYTES # BLD AUTO: 0.45 10*3/MM3 (ref 0.1–0.9)
MONOCYTES NFR BLD AUTO: 7.1 % (ref 5–12)
NEUTROPHILS # BLD AUTO: 4.37 10*3/MM3 (ref 1.7–7)
NEUTROPHILS NFR BLD AUTO: 69.1 % (ref 42.7–76)
NRBC BLD AUTO-RTO: 0 /100 WBC (ref 0–0.2)
PLATELET # BLD AUTO: 214 10*3/MM3 (ref 140–450)
PMV BLD AUTO: 10.7 FL (ref 6–12)
POTASSIUM BLD-SCNC: 4 MMOL/L (ref 3.5–5.2)
PROT SERPL-MCNC: 5.9 G/DL (ref 6–8.5)
RBC # BLD AUTO: 3.6 10*6/MM3 (ref 3.77–5.28)
SODIUM BLD-SCNC: 136 MMOL/L (ref 136–145)
WBC NRBC COR # BLD: 6.33 10*3/MM3 (ref 3.4–10.8)

## 2020-06-05 PROCEDURE — 82962 GLUCOSE BLOOD TEST: CPT

## 2020-06-05 PROCEDURE — 63710000001 INSULIN DETEMIR PER 5 UNITS: Performed by: FAMILY MEDICINE

## 2020-06-05 PROCEDURE — 25010000002 CEFTRIAXONE PER 250 MG: Performed by: INTERNAL MEDICINE

## 2020-06-05 PROCEDURE — 80053 COMPREHEN METABOLIC PANEL: CPT | Performed by: NURSE PRACTITIONER

## 2020-06-05 PROCEDURE — 25010000002 ONDANSETRON PER 1 MG: Performed by: INTERNAL MEDICINE

## 2020-06-05 PROCEDURE — 25010000002 MORPHINE PER 10 MG: Performed by: NURSE PRACTITIONER

## 2020-06-05 PROCEDURE — 85025 COMPLETE CBC W/AUTO DIFF WBC: CPT | Performed by: NURSE PRACTITIONER

## 2020-06-05 PROCEDURE — 25010000002 PROMETHAZINE PER 50 MG: Performed by: NURSE PRACTITIONER

## 2020-06-05 RX ORDER — PROMETHAZINE HYDROCHLORIDE 25 MG/ML
12.5 INJECTION, SOLUTION INTRAMUSCULAR; INTRAVENOUS EVERY 6 HOURS PRN
Status: DISCONTINUED | OUTPATIENT
Start: 2020-06-05 | End: 2020-06-06 | Stop reason: HOSPADM

## 2020-06-05 RX ADMIN — INSULIN DETEMIR 20 UNITS: 100 INJECTION, SOLUTION SUBCUTANEOUS at 06:10

## 2020-06-05 RX ADMIN — MORPHINE SULFATE 1 MG: 2 INJECTION, SOLUTION INTRAMUSCULAR; INTRAVENOUS at 09:02

## 2020-06-05 RX ADMIN — MORPHINE SULFATE 1 MG: 2 INJECTION, SOLUTION INTRAMUSCULAR; INTRAVENOUS at 05:59

## 2020-06-05 RX ADMIN — LAMOTRIGINE 25 MG: 25 TABLET ORAL at 08:34

## 2020-06-05 RX ADMIN — MORPHINE SULFATE 1 MG: 2 INJECTION, SOLUTION INTRAMUSCULAR; INTRAVENOUS at 17:47

## 2020-06-05 RX ADMIN — PROMETHAZINE HYDROCHLORIDE 12.5 MG: 25 INJECTION INTRAMUSCULAR; INTRAVENOUS at 19:55

## 2020-06-05 RX ADMIN — SODIUM CHLORIDE 100 ML/HR: 4.5 INJECTION, SOLUTION INTRAVENOUS at 06:41

## 2020-06-05 RX ADMIN — ONDANSETRON 4 MG: 2 INJECTION INTRAMUSCULAR; INTRAVENOUS at 13:36

## 2020-06-05 RX ADMIN — SERTRALINE HYDROCHLORIDE 100 MG: 50 TABLET ORAL at 08:34

## 2020-06-05 RX ADMIN — FAMOTIDINE 40 MG: 40 TABLET ORAL at 08:34

## 2020-06-05 RX ADMIN — SODIUM CHLORIDE, PRESERVATIVE FREE 10 ML: 5 INJECTION INTRAVENOUS at 08:35

## 2020-06-05 RX ADMIN — CEFTRIAXONE 1 G: 1 INJECTION, POWDER, FOR SOLUTION INTRAMUSCULAR; INTRAVENOUS at 17:47

## 2020-06-05 RX ADMIN — SODIUM CHLORIDE 100 ML/HR: 4.5 INJECTION, SOLUTION INTRAVENOUS at 18:37

## 2020-06-05 RX ADMIN — ONDANSETRON 4 MG: 2 INJECTION INTRAMUSCULAR; INTRAVENOUS at 05:59

## 2020-06-05 NOTE — PROGRESS NOTES
[facility] Medicine Services  INPATIENT PROGRESS NOTE    Length of Stay: 2  Date of Admission: 6/2/2020  Primary Care Physician: Rufus Marshall APRN    Subjective   Chief Complaint: No complaints    HPI:     6/5/2020: I spoke with Tomer LEAHY with urology and cystoscopy can be scheduled as an outpatient.  Patient states she had several episodes of vomiting this morning and is still having issues with flank pain.    6/4/2020: Patient continues to complain of pain in her back.  She states she has no appetite and is unable to hold food down secondary to nausea.  Patient has urine cultures dating back to October of last year positive for E. coli.  She has been on multiple rounds of antibiotics but continues to grow E. coli on current culture.  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      This is a 20-year-old  female with past medical history of ADHD, bipolar 1 disorder, DM type I, and diabetic neuropathy that presented to [facility] on 6/2/2020 with complaints of back pain, suprapubic pain, abdominal pain, polyuria, dysuria, hematuria and nausea/ vomiting.  CT scan abdomen/pelvis was negative for any acute intra-abdominal pathology.      Review of Systems   Constitutional: Negative for activity change and fatigue.   HENT: Negative for ear pain and sore throat.    Eyes: Negative for pain and discharge.   Respiratory: Negative for cough and shortness of breath.    Cardiovascular: Negative for chest pain and palpitations.   Gastrointestinal: Negative for abdominal pain and nausea.   Endocrine: Negative for cold intolerance and heat intolerance.   Genitourinary: Positive for dysuria, flank pain and hematuria. Negative for difficulty urinating.   Musculoskeletal: Negative for arthralgias and gait problem.   Skin: Negative for color change and rash.   Neurological: Negative for dizziness and weakness.    Psychiatric/Behavioral: Negative for agitation and confusion.        Objective    Temp:  [98 °F (36.7 °C)-99 °F (37.2 °C)] 98 °F (36.7 °C)  Heart Rate:  [55-75] 61  Resp:  [18] 18  BP: ()/(48-90) 110/65    Physical Exam   Constitutional: She is oriented to person, place, and time. She appears well-developed and well-nourished.   HENT:   Head: Normocephalic and atraumatic.   Eyes: Pupils are equal, round, and reactive to light. EOM are normal.   Neck: Normal range of motion. Neck supple.   Cardiovascular: Normal rate and regular rhythm.   Pulmonary/Chest: Effort normal and breath sounds normal.   Abdominal: Soft. Bowel sounds are normal.   Musculoskeletal: Normal range of motion.   Neurological: She is alert and oriented to person, place, and time.   Skin: Skin is warm and dry.   Psychiatric: She has a normal mood and affect. Her behavior is normal.     Results Review:  I have reviewed the labs, radiology results, and diagnostic studies.    Laboratory Data:   Results from last 7 days   Lab Units 06/05/20  0545 06/04/20  0714 06/03/20  0616   SODIUM mmol/L 136 135* 136   POTASSIUM mmol/L 4.0 3.5 4.2   CHLORIDE mmol/L 100 97* 97*   CO2 mmol/L 27.0 26.0 27.0   BUN mg/dL 5* 8 12   CREATININE mg/dL 0.80 0.84 1.05*   GLUCOSE mg/dL 171* 143* 340*   CALCIUM mg/dL 8.1* 8.3* 8.6   BILIRUBIN mg/dL <0.2* 0.2 0.2   ALK PHOS U/L 72 81 58   ALT (SGPT) U/L 7 6 9   AST (SGOT) U/L 8 10 10   ANION GAP mmol/L 9.0 12.0 12.0     Estimated Creatinine Clearance: 82.9 mL/min (by C-G formula based on SCr of 0.8 mg/dL).  Results from last 7 days   Lab Units 06/03/20  0616   MAGNESIUM mg/dL 1.6   PHOSPHORUS mg/dL 3.9         Results from last 7 days   Lab Units 06/05/20  0545 06/04/20  0714 06/03/20  0616 06/02/20  1850   WBC 10*3/mm3 6.33 10.64 17.49* 11.64*   HEMOGLOBIN g/dL 10.4* 11.3* 11.1* 12.7   HEMATOCRIT % 30.5* 33.2* 32.3* 36.7   PLATELETS 10*3/mm3 214 211 230 287           Culture Data:   Blood Culture   Date Value Ref Range  Status   06/02/2020 No growth at 2 days  Preliminary   06/02/2020 No growth at 2 days  Preliminary     Urine Culture   Date Value Ref Range Status   06/02/2020 >100,000 CFU/mL Escherichia coli (A)  Final     No results found for: RESPCX  No results found for: WOUNDCX  No results found for: STOOLCX  No components found for: BODYFLD    Radiology Data:   Imaging Results (Last 24 Hours)     ** No results found for the last 24 hours. **          I have reviewed the patient's current medications.     Assessment/Plan     Active Hospital Problems    Diagnosis POA   • Pyelonephritis [N12] Yes   • Uncontrolled type 1 diabetes mellitus with hyperglycemia (CMS/[facility]) [E10.65] Yes       Plan:    1.  Urinary tract infection, E. coli: IV Rocephin.  Blood cultures are negative.  Urology consult appreciated secondary to multiple E. coli urinary tract infections.  Cystoscopy will be scheduled as an outpatient.  Continue pain and nausea medications.  Possible discharge tomorrow if pain and nausea improved.  2.  Diabetes mellitus, type I: Continue long-acting and SSI.  3.  Bipolar disorder: Continue Zoloft and Lamictal.      Discharge Planning: I expect patient to be discharged to home in 1-2 days.      This document has been electronically signed by ARMOND Ballesteros on June 5, 2020 17:18

## 2020-06-05 NOTE — PLAN OF CARE
Pt continues with nausea preventing her from meeting her nutrition needs. Has refused supplements and education.  Problem: Patient Care Overview  Goal: Plan of Care Review  Outcome: Ongoing (interventions implemented as appropriate)  Flowsheets (Taken 6/5/2020 1226)  Plan of Care Reviewed With: patient

## 2020-06-05 NOTE — PLAN OF CARE
Problem: Patient Care Overview  Goal: Plan of Care Review  Outcome: Ongoing (interventions implemented as appropriate)  Flowsheets  Taken 6/5/2020 0350  Progress: no change  Outcome Summary: Pt resting at this time.  Blood sugar closely monitored on this shift.  PRN pain medication given.  Will continue to monitor this pt.  Taken 6/4/2020 2015  Plan of Care Reviewed With: patient

## 2020-06-05 NOTE — PLAN OF CARE
Problem: Patient Care Overview  Goal: Plan of Care Review  Flowsheets  Taken 6/5/2020 1656 by Chavo Joseph RN  Progress: improving  Taken 6/5/2020 1226 by Vania Pearl RD  Plan of Care Reviewed With: patient     Problem: Pain, Chronic (Adult)  Goal: Acceptable Pain/Comfort Level and Functional Ability  Flowsheets (Taken 6/5/2020 1656)  Acceptable Pain/Comfort Level and Functional Ability: achieves outcome     Problem: Urinary Tract Infection (Adult)  Goal: Signs and Symptoms of Listed Potential Problems Will be Absent, Minimized or Managed (Urinary Tract Infection)  Flowsheets (Taken 6/5/2020 1656)  Problems Assessed (Urinary Tract Infection): all  Problems Present (UTI): pain     Problem: Diabetes, Type 1 (Adult)  Goal: Signs and Symptoms of Listed Potential Problems Will be Absent, Minimized or Managed (Diabetes, Type 1)  Flowsheets (Taken 6/5/2020 1656)  Problems Assessed (Type 1 Diabetes): all  Problems Present (Type 1 Diabetes): none

## 2020-06-05 NOTE — CONSULTS
"Adult Nutrition  Assessment    Patient Name:  Fernanda Patterson  YOB: 2000  MRN: [id number]  Admit Date:  6/2/2020    Assessment Date:  6/5/2020    Comments:  Spoke with pt at noon when PDA was picking up trays. Pt said she wasn't finished eating.  Body language revealed frustration. Pt was sleeping when RDN knocked on her door. Pt received clear liquids at lunch. Completed malnutrition severity assessment with 2 criteria of weight loss and poor intake at home prior to admit\"since October\" pt told RDN. Pt has kept the blanket over her body so a visual physical exam is not possible. Will note severe malnutrition from weight loss and poor intake. RDN will continue to monitor/ note food preferences to improve intake. Pt needs oral supplements but her nausea continues.    Reason for Assessment     Row Name 06/05/20 1217          Reason for Assessment    Reason For Assessment  follow-up protocol     Diagnosis  infection/sepsis     Identified At Risk by Screening Criteria  BMI;need for education;reduced oral intake over the last month;unintentional loss of 10 lbs or more in the past 2 mos         Nutrition/Diet History     Row Name 06/05/20 1217          Nutrition/Diet History    Typical Food/Fluid Intake  pt said she is not finished eating. had broth and juice on her tray.     Factors Affecting Nutritional Intake  nausea           Labs/Tests/Procedures/Meds     Row Name 06/05/20 1218          Labs/Procedures/Meds    Lab Results Reviewed  reviewed, pertinent        Medications    Pertinent Medications Reviewed  reviewed, pertinent         Physical Findings     Row Name 06/05/20 1218          Physical Findings    Overall Physical Appearance  underweight     Gastrointestinal  nausea             Evaluation of Received Nutrient/Fluid Intake     Row Name 06/05/20 1219          PO Evaluation    Number of Meals  2     % PO Intake  25           Malnutrition Severity Assessment     Row Name 06/05/20 1219          " Malnutrition Severity Assessment    Malnutrition Type  Chronic Disease - Related Malnutrition        Insufficient Energy Intake     Insufficient Energy Intake Findings  Severe     Insufficient Energy Intake   <75% of est. energy requirement for > or equal to 3 months        Unintentional Weight Loss     Unintentional Weight Loss Findings  Severe     Unintentional Weight Loss   Weight loss of 7.5% in three months        Criteria Met (Must meet criteria for severity in at least 2 of these categories: M Wasting, Fat Loss, Fluid, Secondary Signs, Wt. Status, Intake)    Patient meets criteria for   Severe Malnutrition           Electronically signed by:  Vania Pearl RD  06/05/20 12:20

## 2020-06-05 NOTE — PROGRESS NOTES
"   LOS: 2 days   Patient Care Team:  Rufus Marshall APRN as PCP - General (Family Medicine)    Subjective     Subjective:  Symptoms:  (Complains of nausea. Complains of lump on mons. Needs a possible return to work date.).    Diet:  She reports  nausea.    Pain:  Pain is requiring pain medication.        History taken from: patient chart    Objective     Vital Signs  Temp:  [97.7 °F (36.5 °C)-99 °F (37.2 °C)] 98 °F (36.7 °C)  Heart Rate:  [55-75] 59  Resp:  [16-18] 18  BP: ()/(48-90) 110/59    Objective:  General Appearance:  In no acute distress.    Vital signs: (most recent): Blood pressure 110/59, pulse 59, temperature 98 °F (36.7 °C), temperature source Oral, resp. rate 18, height 170.2 cm (67\"), weight 47.2 kg (104 lb), last menstrual period 05/07/2020, SpO2 99 %, not currently breastfeeding.  Vital signs are normal.  No fever.    Output: Producing urine.    Lungs:  Normal effort.  She is not in respiratory distress.    Chest: Symmetric chest wall expansion.   Abdomen: Abdomen is soft and non-distended.  (No indurated areas appreciated but one small area of redness (? Folliculitis) she says tender ).   Pulses: Distal pulses are intact.    Neurological: Patient is alert and oriented to person, place and time.    Pupils:  Pupils are equal, round, and reactive to light.    Skin:  Warm and dry.            Results Review:    Lab Results (last 24 hours)     Procedure Component Value Units Date/Time    POC Glucose Once [[id number]]  (Abnormal) Collected:  06/05/20 1008    Specimen:  Blood Updated:  06/05/20 1025     Glucose 172 mg/dL      Comment: RN NotifiedOperator: [id number] KRISTEL LEXIEMeter ID: [id number]       POC Glucose Once [[id number]]  (Abnormal) Collected:  06/05/20 0729    Specimen:  Blood Updated:  06/05/20 0743     Glucose 137 mg/dL      Comment: RN NotifiedOperator: [id number] KRISTEL LEXIEMeter ID: [id number]       Comprehensive Metabolic Panel [[id number]]  (Abnormal) Collected:  06/05/20 0545 "    Specimen:  Blood Updated:  06/05/20 0644     Glucose 171 mg/dL      BUN 5 mg/dL      Creatinine 0.80 mg/dL      Sodium 136 mmol/L      Potassium 4.0 mmol/L      Chloride 100 mmol/L      CO2 27.0 mmol/L      Calcium 8.1 mg/dL      Total Protein 5.9 g/dL      Albumin 2.90 g/dL      ALT (SGPT) 7 U/L      AST (SGOT) 8 U/L      Alkaline Phosphatase 72 U/L      Total Bilirubin <0.2 mg/dL      eGFR Non African Amer 91 mL/min/1.73      Globulin 3.0 gm/dL      A/G Ratio 1.0 g/dL      BUN/Creatinine Ratio 6.3     Anion Gap 9.0 mmol/L     Narrative:       GFR Normal >60  Chronic Kidney Disease <60  Kidney Failure <15      CBC & Differential [[id number]] Collected:  06/05/20 0545    Specimen:  Blood Updated:  06/05/20 0628    Narrative:       The following orders were created for panel order CBC & Differential.  Procedure                               Abnormality         Status                     ---------                               -----------         ------                     CBC Auto Differential[[id number]]        Abnormal            Final result                 Please view results for these tests on the individual orders.    CBC Auto Differential [[id number]]  (Abnormal) Collected:  06/05/20 0545    Specimen:  Blood Updated:  06/05/20 0628     WBC 6.33 10*3/mm3      RBC 3.60 10*6/mm3      Hemoglobin 10.4 g/dL      Hematocrit 30.5 %      MCV 84.7 fL      MCH 28.9 pg      MCHC 34.1 g/dL      RDW 12.1 %      RDW-SD 36.8 fl      MPV 10.7 fL      Platelets 214 10*3/mm3      Neutrophil % 69.1 %      Lymphocyte % 19.1 %      Monocyte % 7.1 %      Eosinophil % 3.9 %      Basophil % 0.5 %      Immature Grans % 0.3 %      Neutrophils, Absolute 4.37 10*3/mm3      Lymphocytes, Absolute 1.21 10*3/mm3      Monocytes, Absolute 0.45 10*3/mm3      Eosinophils, Absolute 0.25 10*3/mm3      Basophils, Absolute 0.03 10*3/mm3      Immature Grans, Absolute 0.02 10*3/mm3      nRBC 0.0 /100 WBC     POC Glucose Once [[id number]]  (Abnormal)  Collected:  06/05/20 0609    Specimen:  Blood Updated:  06/05/20 0624     Glucose 164 mg/dL      Comment: RN NotifiedOperator: [id number] INDIRA ZHANGMeter ID: [id number]       POC Glucose Once [[id number]]  (Abnormal) Collected:  06/05/20 0315    Specimen:  Blood Updated:  06/05/20 0327     Glucose 191 mg/dL      Comment: RN NotifiedOperator: [id number] INDIRA ZHANGMeter ID: [id number]       POC Glucose Once [[id number]]  (Abnormal) Collected:  06/05/20 0112    Specimen:  Blood Updated:  06/05/20 0124     Glucose 209 mg/dL      Comment: RN NotifiedOperator: [id number] SHERLYN WILKSMeter ID: [id number]       POC Glucose Once [[id number]]  (Normal) Collected:  06/04/20 2325    Specimen:  Blood Updated:  06/04/20 2344     Glucose 124 mg/dL      Comment: RN NotifiedOperator: [id number] SHERLYN WILKSMeter ID: [id number]       Blood Culture - Blood, Arm, Left [[id number]] Collected:  06/02/20 2218    Specimen:  Blood from Arm, Left Updated:  06/04/20 2231     Blood Culture No growth at 2 days    POC Glucose Once [[id number]]  (Abnormal) Collected:  06/04/20 2034    Specimen:  Blood Updated:  06/04/20 2114     Glucose 231 mg/dL      Comment: RN NotifiedOperator: [id number] INDIRA ZHANGMeter ID: [id number]       Blood Culture - Blood, Arm, Right [[id number]] Collected:  06/02/20 1850    Specimen:  Blood from Arm, Right Updated:  06/04/20 1915     Blood Culture No growth at 2 days    POC Glucose Once [[id number]]  (Abnormal) Collected:  06/04/20 1722    Specimen:  Blood Updated:  06/04/20 1735     Glucose 175 mg/dL      Comment: RN NotifiedOperator: [id number] KIRA Lombardo ID: [id number]       POC Glucose Once [[id number]]  (Abnormal) Collected:  06/03/20 1825    Specimen:  Blood Updated:  06/04/20 1520     Glucose 39 mg/dL      Comment: RN NotifiedOperator: [id number] KIRA JEFFERSONYMeter ID: [id number]            Imaging Results (Last 24 Hours)     ** No results found for the last 24 hours. **            I reviewed the patient's new clinical results.  I reviewed the patient's new imaging results and agree with the interpretation.  I reviewed the patient's other test results and agree with the interpretation      Assessment/Plan       Uncontrolled type 1 diabetes mellitus with hyperglycemia (CMS/HCC)    Pyelonephritis      Assessment & Plan    Consulted to Urology for recurrent UTI in poorly controlled Type I diabetic female, possible history of vesicoureteric reflux, possible history of urethral stenosis??  -Imaging shows no hydronephrosis or perinephric stranding, no renal abscess, bladder unremarkable.   -WBC 17.49-->10.64  -HgbA1C 12.80  -TSH 0.627  -Estimated Creatinine Clearance: 82.9 mL/min (by C-G formula based on SCr of 0.8 mg/dL).   -6/2/20 Pan sensitive E.Col urine culture, blood cultures IP Rocephin day #3     Plan:  --She would benefit from cystoscopy and VCUG, both can be performed outpatient.  --Continue UTI treatment, see Urology outpatient 3-4 days    ARMOND Monahan  06/05/20  13:10

## 2020-06-05 NOTE — PROGRESS NOTES
Malnutrition Severity Assessment    Patient Name:  Fernanda Patterson  YOB: 2000  MRN: [id number]  Admit Date:  6/2/2020    Patient meets criteria for : Severe Malnutrition    Comments:  Severe malnutrition as related to pyelonephritis as related to weight loss of 30 lb in 3 weeks or 28% weight loss  in 3 weeks and poor intake since October as reported by patient.    Malnutrition Severity Assessment  Malnutrition Type: Chronic Disease - Related Malnutrition     Malnutrition Type (last 8 hours)      Malnutrition Severity Assessment     Row Name 06/05/20 1219       Malnutrition Severity Assessment    Malnutrition Type  Chronic Disease - Related Malnutrition    Row Name 06/05/20 1219       Insufficient Energy Intake     Insufficient Energy Intake Findings  Severe    Insufficient Energy Intake   <75% of est. energy requirement for > or equal to 3 months    Row Name 06/05/20 1219       Unintentional Weight Loss     Unintentional Weight Loss Findings  Severe    Unintentional Weight Loss   Weight loss of 7.5% in three months    Row Name 06/05/20 1219       Criteria Met (Must meet criteria for severity in at least 2 of these categories: M Wasting, Fat Loss, Fluid, Secondary Signs, Wt. Status, Intake)    Patient meets criteria for   Severe Malnutrition          Electronically signed by:  Vania Pearl RD  06/05/20 12:27

## 2020-06-06 VITALS
OXYGEN SATURATION: 98 % | BODY MASS INDEX: 16.32 KG/M2 | TEMPERATURE: 98.5 F | DIASTOLIC BLOOD PRESSURE: 79 MMHG | RESPIRATION RATE: 18 BRPM | HEIGHT: 67 IN | HEART RATE: 72 BPM | SYSTOLIC BLOOD PRESSURE: 127 MMHG | WEIGHT: 104 LBS

## 2020-06-06 LAB
ALBUMIN SERPL-MCNC: 2.9 G/DL (ref 3.5–5.2)
ALBUMIN/GLOB SERPL: 0.9 G/DL
ALP SERPL-CCNC: 68 U/L (ref 39–117)
ALT SERPL W P-5'-P-CCNC: 7 U/L (ref 1–33)
ANION GAP SERPL CALCULATED.3IONS-SCNC: 10 MMOL/L (ref 5–15)
AST SERPL-CCNC: 9 U/L (ref 1–32)
BASOPHILS # BLD AUTO: 0.03 10*3/MM3 (ref 0–0.2)
BASOPHILS NFR BLD AUTO: 0.5 % (ref 0–1.5)
BILIRUB SERPL-MCNC: <0.2 MG/DL (ref 0.2–1.2)
BUN BLD-MCNC: 3 MG/DL (ref 6–20)
BUN/CREAT SERPL: 3.6 (ref 7–25)
CALCIUM SPEC-SCNC: 8.6 MG/DL (ref 8.6–10.5)
CHLORIDE SERPL-SCNC: 102 MMOL/L (ref 98–107)
CO2 SERPL-SCNC: 25 MMOL/L (ref 22–29)
CREAT BLD-MCNC: 0.84 MG/DL (ref 0.57–1)
DEPRECATED RDW RBC AUTO: 37.5 FL (ref 37–54)
EOSINOPHIL # BLD AUTO: 0.18 10*3/MM3 (ref 0–0.4)
EOSINOPHIL NFR BLD AUTO: 3 % (ref 0.3–6.2)
ERYTHROCYTE [DISTWIDTH] IN BLOOD BY AUTOMATED COUNT: 12.1 % (ref 12.3–15.4)
GFR SERPL CREATININE-BSD FRML MDRD: 86 ML/MIN/1.73
GLOBULIN UR ELPH-MCNC: 3.3 GM/DL
GLUCOSE BLD-MCNC: 302 MG/DL (ref 65–99)
GLUCOSE BLDC GLUCOMTR-MCNC: 184 MG/DL (ref 70–130)
GLUCOSE BLDC GLUCOMTR-MCNC: 233 MG/DL (ref 70–130)
GLUCOSE BLDC GLUCOMTR-MCNC: 310 MG/DL (ref 70–130)
GLUCOSE BLDC GLUCOMTR-MCNC: 327 MG/DL (ref 70–130)
GLUCOSE BLDC GLUCOMTR-MCNC: 38 MG/DL (ref 70–130)
HCT VFR BLD AUTO: 32 % (ref 34–46.6)
HGB BLD-MCNC: 11 G/DL (ref 12–15.9)
IMM GRANULOCYTES # BLD AUTO: 0.02 10*3/MM3 (ref 0–0.05)
IMM GRANULOCYTES NFR BLD AUTO: 0.3 % (ref 0–0.5)
LYMPHOCYTES # BLD AUTO: 1.83 10*3/MM3 (ref 0.7–3.1)
LYMPHOCYTES NFR BLD AUTO: 30.4 % (ref 19.6–45.3)
MCH RBC QN AUTO: 29.4 PG (ref 26.6–33)
MCHC RBC AUTO-ENTMCNC: 34.4 G/DL (ref 31.5–35.7)
MCV RBC AUTO: 85.6 FL (ref 79–97)
MONOCYTES # BLD AUTO: 0.38 10*3/MM3 (ref 0.1–0.9)
MONOCYTES NFR BLD AUTO: 6.3 % (ref 5–12)
NEUTROPHILS # BLD AUTO: 3.58 10*3/MM3 (ref 1.7–7)
NEUTROPHILS NFR BLD AUTO: 59.5 % (ref 42.7–76)
NRBC BLD AUTO-RTO: 0 /100 WBC (ref 0–0.2)
PLATELET # BLD AUTO: 234 10*3/MM3 (ref 140–450)
PMV BLD AUTO: 10.7 FL (ref 6–12)
POTASSIUM BLD-SCNC: 4.5 MMOL/L (ref 3.5–5.2)
PROT SERPL-MCNC: 6.2 G/DL (ref 6–8.5)
RBC # BLD AUTO: 3.74 10*6/MM3 (ref 3.77–5.28)
SODIUM BLD-SCNC: 137 MMOL/L (ref 136–145)
WBC NRBC COR # BLD: 6.02 10*3/MM3 (ref 3.4–10.8)

## 2020-06-06 PROCEDURE — 25010000002 MORPHINE PER 10 MG: Performed by: NURSE PRACTITIONER

## 2020-06-06 PROCEDURE — 82962 GLUCOSE BLOOD TEST: CPT

## 2020-06-06 PROCEDURE — 63710000001 INSULIN DETEMIR PER 5 UNITS: Performed by: FAMILY MEDICINE

## 2020-06-06 PROCEDURE — 80053 COMPREHEN METABOLIC PANEL: CPT | Performed by: NURSE PRACTITIONER

## 2020-06-06 PROCEDURE — 25010000002 PROMETHAZINE PER 50 MG: Performed by: NURSE PRACTITIONER

## 2020-06-06 PROCEDURE — 85025 COMPLETE CBC W/AUTO DIFF WBC: CPT | Performed by: NURSE PRACTITIONER

## 2020-06-06 RX ORDER — PROMETHAZINE HYDROCHLORIDE 12.5 MG/1
12.5 TABLET ORAL EVERY 6 HOURS PRN
Qty: 30 TABLET | Refills: 0 | Status: ON HOLD | OUTPATIENT
Start: 2020-06-06 | End: 2020-07-16

## 2020-06-06 RX ORDER — CEFDINIR 300 MG/1
300 CAPSULE ORAL 2 TIMES DAILY
Qty: 10 CAPSULE | Refills: 0 | Status: SHIPPED | OUTPATIENT
Start: 2020-06-06 | End: 2020-06-11

## 2020-06-06 RX ORDER — HYDROCODONE BITARTRATE AND ACETAMINOPHEN 7.5; 325 MG/1; MG/1
1 TABLET ORAL EVERY 6 HOURS PRN
Qty: 12 TABLET | Refills: 0 | Status: SHIPPED | OUTPATIENT
Start: 2020-06-06 | End: 2020-06-09

## 2020-06-06 RX ADMIN — HYDROXYZINE HYDROCHLORIDE 10 MG: 10 SYRUP ORAL at 01:46

## 2020-06-06 RX ADMIN — PROMETHAZINE HYDROCHLORIDE 12.5 MG: 25 INJECTION INTRAMUSCULAR; INTRAVENOUS at 06:23

## 2020-06-06 RX ADMIN — SERTRALINE HYDROCHLORIDE 100 MG: 50 TABLET ORAL at 08:28

## 2020-06-06 RX ADMIN — LAMOTRIGINE 25 MG: 25 TABLET ORAL at 08:28

## 2020-06-06 RX ADMIN — FAMOTIDINE 40 MG: 40 TABLET ORAL at 08:28

## 2020-06-06 RX ADMIN — SODIUM CHLORIDE, PRESERVATIVE FREE 10 ML: 5 INJECTION INTRAVENOUS at 08:29

## 2020-06-06 RX ADMIN — INSULIN DETEMIR 20 UNITS: 100 INJECTION, SOLUTION SUBCUTANEOUS at 06:33

## 2020-06-06 RX ADMIN — MORPHINE SULFATE 1 MG: 2 INJECTION, SOLUTION INTRAMUSCULAR; INTRAVENOUS at 01:45

## 2020-06-06 RX ADMIN — SODIUM CHLORIDE 100 ML/HR: 4.5 INJECTION, SOLUTION INTRAVENOUS at 05:36

## 2020-06-06 NOTE — PLAN OF CARE
Problem: Patient Care Overview  Goal: Plan of Care Review  Flowsheets  Taken 6/6/2020 1005  Progress: improving  Taken 6/6/2020 0757  Plan of Care Reviewed With: patient     Problem: Pain, Chronic (Adult)  Goal: Acceptable Pain/Comfort Level and Functional Ability  Flowsheets (Taken 6/6/2020 1005)  Acceptable Pain/Comfort Level and Functional Ability: achieves outcome     Problem: Urinary Tract Infection (Adult)  Goal: Signs and Symptoms of Listed Potential Problems Will be Absent, Minimized or Managed (Urinary Tract Infection)  Flowsheets  Taken 6/5/2020 1656  Problems Assessed (Urinary Tract Infection): all  Taken 6/6/2020 1005  Problems Present (UTI): none     Problem: Diabetes, Type 1 (Adult)  Goal: Signs and Symptoms of Listed Potential Problems Will be Absent, Minimized or Managed (Diabetes, Type 1)  Flowsheets  Taken 6/5/2020 1656  Problems Assessed (Type 1 Diabetes): all  Taken 6/6/2020 1005  Problems Present (Type 1 Diabetes): hyperglycemia

## 2020-06-06 NOTE — DISCHARGE SUMMARY
[facility] Medicine Services  DISCHARGE SUMMARY       Date of Admission: 6/2/2020  Date of Discharge:  6/6/2020  Primary Care Physician: Rufus Marshall APRN    Presenting Problem/History of Present Illness:  Pyelonephritis [N12]  Type 1 diabetes mellitus with hyperglycemia (CMS/[facility]) [E10.65]  Pyelonephritis [N12]     Final Discharge Diagnoses:  Active Hospital Problems    Diagnosis   • Pyelonephritis   • Uncontrolled type 1 diabetes mellitus with hyperglycemia (CMS/[facility])       Consults:   Consults     Date and Time Order Name Status Description    6/4/2020 1217 Inpatient Urology Consult Completed           Pertinent Test Results:   Lab Results (last 24 hours)     Procedure Component Value Units Date/Time    POC Glucose Once [[id number]]  (Abnormal) Collected:  06/06/20 1109    Specimen:  Blood Updated:  06/06/20 1127     Glucose 184 mg/dL      Comment: RN NotifiedOperator: [id number] HUSK CELESTEMeter ID: [id number]       POC Glucose Once [[id number]]  (Abnormal) Collected:  06/04/20 1654    Specimen:  Blood Updated:  06/06/20 1109     Glucose 38 mg/dL      Comment: RN NotifiedOperator: [id number] TYSHAWN GRACEMeter ID: [id number]       POC Glucose Once [[id number]]  (Abnormal) Collected:  06/06/20 0737    Specimen:  Blood Updated:  06/06/20 0754     Glucose 310 mg/dL      Comment: RN NotifiedOperator: [id number] [facility] CELE[facility] ID: [id number]       POC Glucose Once [[id number]]  (Abnormal) Collected:  06/06/20 0632    Specimen:  Blood Updated:  06/06/20 0647     Glucose 327 mg/dL      Comment: RN NotifiedOperator: [id number] ARLIN RAMIREZMeter ID: [id number]       Comprehensive Metabolic Panel [[id number]]  (Abnormal) Collected:  06/06/20 0539    Specimen:  Blood Updated:  06/06/20 0616     Glucose 302 mg/dL      BUN 3 mg/dL      Creatinine 0.84 mg/dL      Sodium 137 mmol/L      Potassium 4.5 mmol/L      Chloride 102 mmol/L      CO2 25.0 mmol/L      Calcium 8.6 mg/dL       Total Protein 6.2 g/dL      Albumin 2.90 g/dL      ALT (SGPT) 7 U/L      AST (SGOT) 9 U/L      Alkaline Phosphatase 68 U/L      Total Bilirubin <0.2 mg/dL      eGFR Non African Amer 86 mL/min/1.73      Globulin 3.3 gm/dL      A/G Ratio 0.9 g/dL      BUN/Creatinine Ratio 3.6     Anion Gap 10.0 mmol/L     Narrative:       GFR Normal >60  Chronic Kidney Disease <60  Kidney Failure <15      CBC & Differential [[id number]] Collected:  06/06/20 0539    Specimen:  Blood Updated:  06/06/20 0549    Narrative:       The following orders were created for panel order CBC & Differential.  Procedure                               Abnormality         Status                     ---------                               -----------         ------                     CBC Auto Differential[[id number]]        Abnormal            Final result                 Please view results for these tests on the individual orders.    CBC Auto Differential [[id number]]  (Abnormal) Collected:  06/06/20 0539    Specimen:  Blood Updated:  06/06/20 0549     WBC 6.02 10*3/mm3      RBC 3.74 10*6/mm3      Hemoglobin 11.0 g/dL      Hematocrit 32.0 %      MCV 85.6 fL      MCH 29.4 pg      MCHC 34.4 g/dL      RDW 12.1 %      RDW-SD 37.5 fl      MPV 10.7 fL      Platelets 234 10*3/mm3      Neutrophil % 59.5 %      Lymphocyte % 30.4 %      Monocyte % 6.3 %      Eosinophil % 3.0 %      Basophil % 0.5 %      Immature Grans % 0.3 %      Neutrophils, Absolute 3.58 10*3/mm3      Lymphocytes, Absolute 1.83 10*3/mm3      Monocytes, Absolute 0.38 10*3/mm3      Eosinophils, Absolute 0.18 10*3/mm3      Basophils, Absolute 0.03 10*3/mm3      Immature Grans, Absolute 0.02 10*3/mm3      nRBC 0.0 /100 WBC     POC Glucose Once [[id number]]  (Abnormal) Collected:  06/06/20 0152    Specimen:  Blood Updated:  06/06/20 0204     Glucose 233 mg/dL      Comment: RN NotifiedOperator: [id number] ARLIN Villalobos ID: [id number]       Blood Culture - Blood, Arm, Left [[id number]] Collected:   06/02/20 2218    Specimen:  Blood from Arm, Left Updated:  06/05/20 2231     Blood Culture No growth at 3 days    POC Glucose Once [[id number]]  (Abnormal) Collected:  06/05/20 1922    Specimen:  Blood Updated:  06/05/20 2013     Glucose 188 mg/dL      Comment: Result Not ConfirmedOperator: [id number] GLORIA LABOYMeter ID: [id number]       Blood Culture - Blood, Arm, Right [[id number]] Collected:  06/02/20 1850    Specimen:  Blood from Arm, Right Updated:  06/05/20 1915     Blood Culture No growth at 3 days    POC Glucose Once [[id number]]  (Normal) Collected:  06/05/20 1700    Specimen:  Blood Updated:  06/05/20 1713     Glucose 80 mg/dL      Comment: : [id number] KRISTEL ARMENIEMeter ID: [id number]       POC Glucose Once [[id number]]  (Abnormal) Collected:  06/05/20 1310    Specimen:  Blood Updated:  06/05/20 1325     Glucose 144 mg/dL      Comment: : [id number] PERLITA ROBINMeter ID: [id number]       POC Glucose Once [[id number]]  (Normal) Collected:  06/05/20 0830    Specimen:  Blood Updated:  06/05/20 1325     Glucose 100 mg/dL      Comment: : [id number] PERLITA ROBINMeter ID: [id number]           Imaging Results (Last 24 Hours)     ** No results found for the last 24 hours. **        Chief Complaint on Day of Discharge: No complaints    Hospital Course:    This is a 20-year-old  female with past medical history of ADHD, bipolar 1 disorder, DM type I, and diabetic neuropathy that presented to [facility] on [facility]/2020 with complaints of back pain, suprapubic pain, abdominal pain, polyuria, dysuria, hematuria and nausea/ vomiting.  CT scan abdomen/pelvis was negative for any acute intra-abdominal pathology.    The patient tested positive for a E. coli urinary tract infection.  This was the fourth E. coli urinary tract infection since October of last year.  Urology was consulted secondary to continuous UTI in a patient with type 1 diabetes.  Patient was started on IV  "Rocephin and changed to oral Omnicef at discharge.  She will follow-up with Dr. Cowan office next week and have an outpatient cystoscopy and VCUG.  Patient was given 3 days of oral pain medication.  Follow-up with primary care physician in 1 week.    Condition on Discharge: Stable    Physical Exam on Discharge:  /79 (BP Location: Right arm, Patient Position: Lying)   Pulse 72   Temp 98.5 °F (36.9 °C) (Oral)   Resp 18   Ht 170.2 cm (67\")   Wt 47.2 kg (104 lb)   LMP 05/07/2020 (Exact Date)   SpO2 98%   BMI 16.29 kg/m²   Physical Exam   Constitutional: She is oriented to person, place, and time. She appears well-developed and well-nourished.   HENT:   Head: Normocephalic and atraumatic.   Eyes: Pupils are equal, round, and reactive to light. EOM are normal.   Neck: Normal range of motion. Neck supple.   Cardiovascular: Normal rate and regular rhythm.   Pulmonary/Chest: Effort normal and breath sounds normal.   Abdominal: Soft. Bowel sounds are normal.   Musculoskeletal: Normal range of motion.   Neurological: She is alert and oriented to person, place, and time.   Skin: Skin is warm and dry.   Psychiatric: She has a normal mood and affect. Her behavior is normal.       Discharge Disposition:  Home or Self Care    Discharge Medications:     Discharge Medications      New Medications      Instructions Start Date   cefdinir 300 MG capsule  Commonly known as:  OMNICEF   300 mg, Oral, 2 Times Daily      HYDROcodone-acetaminophen 7.5-325 MG per tablet  Commonly known as:  NORCO   1 tablet, Oral, Every 6 Hours PRN      promethazine 12.5 MG tablet  Commonly known as:  PHENERGAN   12.5 mg, Oral, Every 6 Hours PRN         Changes to Medications      Instructions Start Date   Blood Glucose Test strip  What changed:  Another medication with the same name was removed. Continue taking this medication, and follow the directions you see here.   Use 4 x daily use any brand covered by insurance or same brand as before " ICD10 code is E11.9         Continue These Medications      Instructions Start Date   Alcohol Wipes 70 % pads   Use 4 x daily      Align 4 MG capsule   1 tablet, Oral, Daily      Blood Glucose Monitoring Suppl w/Device kit   USE AS INDICATED, ANY MONITOR , ICD10 code is E11.9      Blood Glucose Monitoring Suppl w/Device kit   USE AS INDICATED, ANY MONITOR , ICD10 code is E11.9      hydrOXYzine 10 MG tablet  Commonly known as:  ATARAX   10 mg, Oral, 3 Times Daily PRN      insulin aspart 100 UNIT/ML solution pen-injector sc pen  Commonly known as:  NovoLOG FlexPen   Up to 20 units with meals      insulin aspart 100 UNIT/ML injection  Commonly known as:  NovoLOG   INJECT 70 UNITS DAILY THROUGH INSULIN PUMP      Insulin Glargine 100 UNIT/ML injection pen  Commonly known as:  LANTUS SOLOSTAR   20 Units, Subcutaneous, Daily      Insulin Pen Needle 31G X 5 MM misc  Commonly known as:  B-D UF III MINI PEN NEEDLES   Use 4 times daily  , ICD10 code is E11.9      lamoTRIgine 25 MG tablet  Commonly known as:  LaMICtal   25 mg, Oral, Daily, Take two tablets by mouth daily       Lancets 30G misc   USE 4 X DAILY      Lancing Device misc   USE AS INDICATED TO CORRELATE WITH STRIPS AND METER      Lancing Device misc   USE AS INDICATED TO CORRELATE WITH STRIPS AND METER      naproxen sodium 550 MG tablet  Commonly known as:  ANAPROX   550 mg, Oral, 2 Times Daily PRN      nicotine 14 MG/24HR patch  Commonly known as:  NICODERM CQ   1 patch, Transdermal, Every 24 Hours Scheduled      nortriptyline 25 MG capsule  Commonly known as:  PAMELOR   25 mg, Oral, Nightly PRN      ondansetron 4 MG tablet  Commonly known as:  ZOFRAN   4 mg, Oral, Every 8 Hours PRN      sertraline 100 MG tablet  Commonly known as:  ZOLOFT   100 mg, Oral, Daily      Vyvanse 20 MG capsule  Generic drug:  lisdexamfetamine   20 mg, Oral, Every Morning         Stop These Medications    Cariprazine HCl 1.5 MG capsule capsule  Commonly known as:  VRAYLAR     FLUoxetine  40 MG capsule  Commonly known as:  PROzac            Discharge Diet:   Diet Instructions     Diet: Consistent Carbohydrate      Discharge Diet:  Consistent Carbohydrate          Activity at Discharge:   Activity Instructions     Activity as Tolerated            Discharge Care Plan/Instructions: As tolerated    Follow-up Appointment:  Follow-up Information     Rufus Marshall APRN Follow up in 1 week(s).    Specialty:  Family Medicine  Contact information:  [address] [facility] [address] [phone]             Tomer Holcomb APRN Follow up in 3 day(s).    Specialty:  Nurse Practitioner  Contact information:  [address]  [facility] [address]  [facility] [address] [phone]                   Test Results Pending at Discharge:    Order Current Status    Blood Culture - Blood, Arm, Left Preliminary result    Blood Culture - Blood, Arm, Right Preliminary result            This document has been electronically signed by ARMOND Ballesteros on June 6, 2020 14:06        Time: Greater than 30 minutes.

## 2020-06-06 NOTE — PLAN OF CARE
Problem: Patient Care Overview  Goal: Plan of Care Review  Outcome: Ongoing (interventions implemented as appropriate)  Flowsheets  Taken 6/6/2020 0245  Progress: improving  Outcome Summary: vss. pain controlled with medication. pt resting in between care. will continue to monitor.  Taken 6/5/2020 2000  Plan of Care Reviewed With: patient     Problem: Pain, Chronic (Adult)  Goal: Acceptable Pain/Comfort Level and Functional Ability  Outcome: Ongoing (interventions implemented as appropriate)  Flowsheets (Taken 6/6/2020 0245)  Acceptable Pain/Comfort Level and Functional Ability: making progress toward outcome     Problem: Urinary Tract Infection (Adult)  Goal: Signs and Symptoms of Listed Potential Problems Will be Absent, Minimized or Managed (Urinary Tract Infection)  Outcome: Ongoing (interventions implemented as appropriate)  Flowsheets (Taken 6/5/2020 1656 by Chavo Joseph, RN)  Problems Assessed (Urinary Tract Infection): all  Problems Present (UTI): pain     Problem: Diabetes, Type 1 (Adult)  Goal: Signs and Symptoms of Listed Potential Problems Will be Absent, Minimized or Managed (Diabetes, Type 1)  Outcome: Ongoing (interventions implemented as appropriate)  Flowsheets (Taken 6/5/2020 1656 by Chavo Joseph, RN)  Problems Assessed (Type 1 Diabetes): all  Problems Present (Type 1 Diabetes): none

## 2020-06-06 NOTE — PROGRESS NOTES
"   LOS: 3 days   Patient Care Team:  Rufus Marshall APRN as PCP - General (Family Medicine)    Subjective     Subjective:  Symptoms:  (Says she feels ready to go home today, some back pain reduced with oral narcotic.).    Pain:  Pain is requiring pain medication.        History taken from: patient chart    Objective     Vital Signs  Temp:  [96.7 °F (35.9 °C)-99 °F (37.2 °C)] 96.7 °F (35.9 °C)  Heart Rate:  [44-66] 53  Resp:  [16-18] 16  BP: ()/(51-73) 113/73    Objective:  General Appearance:  In no acute distress.    Vital signs: (most recent): Blood pressure 113/73, pulse 53, temperature 96.7 °F (35.9 °C), temperature source Oral, resp. rate 16, height 170.2 cm (67\"), weight 47.2 kg (104 lb), last menstrual period 05/07/2020, SpO2 98 %, not currently breastfeeding.  Vital signs are normal.  No fever.    Output: Producing urine.    Lungs:  Normal effort.  Breath sounds clear to auscultation.  She is not in respiratory distress.    Heart: Normal rate.  S1 normal and S2 normal.    Chest: Symmetric chest wall expansion.   Abdomen: Abdomen is soft and non-distended.    Pulses: Distal pulses are intact.    Neurological: Patient is alert and oriented to person, place and time.    Pupils:  Pupils are equal, round, and reactive to light.    Skin:  Warm and dry.              Results Review:    Lab Results (last 24 hours)     Procedure Component Value Units Date/Time    POC Glucose Once [[id number]]  (Abnormal) Collected:  06/06/20 0737    Specimen:  Blood Updated:  06/06/20 0754     Glucose 310 mg/dL      Comment: RN NotifiedOperator: [id number] HUSK CELESTEMeter ID: [id number]       POC Glucose Once [[id number]]  (Abnormal) Collected:  06/06/20 0632    Specimen:  Blood Updated:  06/06/20 0647     Glucose 327 mg/dL      Comment: RN NotifiedOperator: [id number] MAY KELSEYMeter ID: [id number]       Comprehensive Metabolic Panel [[id number]]  (Abnormal) Collected:  06/06/20 0539    Specimen:  Blood Updated:  06/06/20 " 0616     Glucose 302 mg/dL      BUN 3 mg/dL      Creatinine 0.84 mg/dL      Sodium 137 mmol/L      Potassium 4.5 mmol/L      Chloride 102 mmol/L      CO2 25.0 mmol/L      Calcium 8.6 mg/dL      Total Protein 6.2 g/dL      Albumin 2.90 g/dL      ALT (SGPT) 7 U/L      AST (SGOT) 9 U/L      Alkaline Phosphatase 68 U/L      Total Bilirubin <0.2 mg/dL      eGFR Non African Amer 86 mL/min/1.73      Globulin 3.3 gm/dL      A/G Ratio 0.9 g/dL      BUN/Creatinine Ratio 3.6     Anion Gap 10.0 mmol/L     Narrative:       GFR Normal >60  Chronic Kidney Disease <60  Kidney Failure <15      CBC & Differential [[id number]] Collected:  06/06/20 0539    Specimen:  Blood Updated:  06/06/20 0549    Narrative:       The following orders were created for panel order CBC & Differential.  Procedure                               Abnormality         Status                     ---------                               -----------         ------                     CBC Auto Differential[[id number]]        Abnormal            Final result                 Please view results for these tests on the individual orders.    CBC Auto Differential [[id number]]  (Abnormal) Collected:  06/06/20 0539    Specimen:  Blood Updated:  06/06/20 0549     WBC 6.02 10*3/mm3      RBC 3.74 10*6/mm3      Hemoglobin 11.0 g/dL      Hematocrit 32.0 %      MCV 85.6 fL      MCH 29.4 pg      MCHC 34.4 g/dL      RDW 12.1 %      RDW-SD 37.5 fl      MPV 10.7 fL      Platelets 234 10*3/mm3      Neutrophil % 59.5 %      Lymphocyte % 30.4 %      Monocyte % 6.3 %      Eosinophil % 3.0 %      Basophil % 0.5 %      Immature Grans % 0.3 %      Neutrophils, Absolute 3.58 10*3/mm3      Lymphocytes, Absolute 1.83 10*3/mm3      Monocytes, Absolute 0.38 10*3/mm3      Eosinophils, Absolute 0.18 10*3/mm3      Basophils, Absolute 0.03 10*3/mm3      Immature Grans, Absolute 0.02 10*3/mm3      nRBC 0.0 /100 WBC     POC Glucose Once [[id number]]  (Abnormal) Collected:  06/06/20 0152    Specimen:   Blood Updated:  06/06/20 0204     Glucose 233 mg/dL      Comment: RN NotifiedOperator: [id number] ARLIN RAMIREZMeter ID: [id number]       Blood Culture - Blood, Arm, Left [[id number]] Collected:  06/02/20 2218    Specimen:  Blood from Arm, Left Updated:  06/05/20 2231     Blood Culture No growth at 3 days    POC Glucose Once [[id number]]  (Abnormal) Collected:  06/05/20 1922    Specimen:  Blood Updated:  06/05/20 2013     Glucose 188 mg/dL      Comment: Result Not ConfirmedOperator: [id number] GLORIA LABOYMeter ID: [id number]       Blood Culture - Blood, Arm, Right [[id number]] Collected:  06/02/20 1850    Specimen:  Blood from Arm, Right Updated:  06/05/20 1915     Blood Culture No growth at 3 days    POC Glucose Once [[id number]]  (Normal) Collected:  06/05/20 1700    Specimen:  Blood Updated:  06/05/20 1713     Glucose 80 mg/dL      Comment: : [id number] KRISTEL AYERSJAMESMeter ID: [id number]       POC Glucose Once [[id number]]  (Abnormal) Collected:  06/05/20 1310    Specimen:  Blood Updated:  06/05/20 1325     Glucose 144 mg/dL      Comment: : [id number] PERLITA BERRIOSINMeter ID: [id number]       POC Glucose Once [[id number]]  (Normal) Collected:  06/05/20 0830    Specimen:  Blood Updated:  06/05/20 1325     Glucose 100 mg/dL      Comment: : [id number] PERLITA BERRIOSINMeter ID: [id number]            Imaging Results (Last 24 Hours)     ** No results found for the last 24 hours. **           I reviewed the patient's new clinical results.  I reviewed the patient's new imaging results and agree with the interpretation.  I reviewed the patient's other test results and agree with the interpretation      Assessment/Plan       Uncontrolled type 1 diabetes mellitus with hyperglycemia (CMS/HCC)    Pyelonephritis      Assessment & Plan    Consulted to Urology for recurrent UTI in poorly controlled Type I diabetic female, possible history of vesicoureteric reflux, possible history of urethral  stenosis??  -Imaging shows no hydronephrosis or perinephric stranding, no renal abscess, bladder unremarkable.   -WBC 17.49-->10.64-->6.02  -HgbA1C 12.80  -TSH 0.627  -Estimated Creatinine Clearance: 78.9 mL/min (by C-G formula based on SCr of 0.84 mg/dL).   -6/2/20 Pan sensitive E.Col urine culture, blood cultures IP Rocephin day #4     Plan:  --She would benefit from cystoscopy and VCUG, both can be performed outpatient.  --Continue UTI treatment, see Urology outpatient 3-4 days    ARMOND Monahan  06/06/20  10:36

## 2020-06-07 ENCOUNTER — READMISSION MANAGEMENT (OUTPATIENT)
Dept: CALL CENTER | Facility: HOSPITAL | Age: 20
End: 2020-06-07

## 2020-06-07 LAB
BACTERIA SPEC AEROBE CULT: NORMAL
BACTERIA SPEC AEROBE CULT: NORMAL

## 2020-06-07 NOTE — OUTREACH NOTE
Prep Survey      Responses   [facility] patient discharged from?  [place]   Is LACE score < 7 ?  No   Eligibility  Readm Mgmt   Discharge diagnosis  Uncontrolled DM, pyelonephritis   COVID-19 Test Status  Not tested   Does the patient have one of the following disease processes/diagnoses(primary or secondary)?  Other   Does the patient have Home health ordered?  No   Is there a DME ordered?  No   Comments regarding appointments  Pt to schedule F/U appointments   Medication alerts for this patient  see AVS   Prep survey completed?  Yes          Emma Cooney RN

## 2020-06-08 ENCOUNTER — READMISSION MANAGEMENT (OUTPATIENT)
Dept: CALL CENTER | Facility: HOSPITAL | Age: 20
End: 2020-06-08

## 2020-06-08 NOTE — PAYOR COMM NOTE
"Allison Freitas  [facility]  P :[phone]  F: [phone]    RUST# [id number]    Fernanda Patterson (20 y.o. Female)     Date of Birth Social Security Number Address Home Phone MRN    2000  [id number]  [address]   [facility] [address] [phone] [id number]    Judaism Marital Status          None Single       Admission Date Admission Type Admitting Provider Attending Provider Department, Room/Bed    6/2/20 Emergency Bebe Parrish MD  [facility] 3 EAST, 381/1    Discharge Date Discharge Disposition Discharge Destination        6/6/2020 Home or Self Care              Attending Provider:  (none)   Allergies:  Pineapple, Benzoyl Peroxide    Isolation:  None   Infection:  None   Code Status:  Prior    Ht:  170.2 cm (67\")   Wt:  47.2 kg (104 lb)    Admission Cmt:  None   Principal Problem:  None                Active Insurance as of 6/2/2020     Primary Coverage     Payor Plan Insurance Group Employer/Plan Group    [facility] [id number]     Payor Plan Address Payor Plan Phone Number Payor Plan Fax Number Effective Dates    [address] [address] [phone]  4/29/2019 - None Entered    [facility] [id number]       Subscriber Name Subscriber Birth Date Member ID       DEBORAH DON 11/7/1971 [id number]                 Emergency Contacts      (Rel.) Home Phone Work Phone Mobile Phone    Juana Don (Mother) [phone] -- [phone]               Discharge Summary      Chelsea Monreal APRN at 06/06/20 1130     Attestation signed by Neftali Barker MD at 06/06/20 1520    I attest that I have reviewed the discharge summary for Ms. Fernanda Patterson as documented by the nurse practitioner ARMOND Sanchez.  I agree with the discharge summary.                      [facility]  DISCHARGE SUMMARY       Date of Admission: 6/2/2020  Date of Discharge:  6/6/2020  Primary Care Physician: " Rufus Marshall APRN    Presenting Problem/History of Present Illness:  Pyelonephritis [N12]  Type 1 diabetes mellitus with hyperglycemia (CMS/[facility]) [E10.65]  Pyelonephritis [N12]     Final Discharge Diagnoses:  Active Hospital Problems    Diagnosis   • Pyelonephritis   • Uncontrolled type 1 diabetes mellitus with hyperglycemia (CMS/[facility])       Consults:   Consults     Date and Time Order Name Status Description    6/4/2020 1217 Inpatient Urology Consult Completed           Pertinent Test Results:   Lab Results (last 24 hours)     Procedure Component Value Units Date/Time    POC Glucose Once [[id number]]  (Abnormal) Collected:  06/06/20 1109    Specimen:  Blood Updated:  06/06/20 1127     Glucose 184 mg/dL      Comment: RN NotifiedOperator: [id number] HUSK MARILUESTEMeter ID: [id number]       POC Glucose Once [[id number]]  (Abnormal) Collected:  06/04/20 1654    Specimen:  Blood Updated:  06/06/20 1109     Glucose 38 mg/dL      Comment: RN NotifiedOperator: [id number] TYSHAWN TYSONMeter ID: [id number]       POC Glucose Once [[id number]]  (Abnormal) Collected:  06/06/20 0737    Specimen:  Blood Updated:  06/06/20 0754     Glucose 310 mg/dL      Comment: RN NotifiedOperator: [id number] HUSK CELESTEMeter ID: [id number]       POC Glucose Once [[id number]]  (Abnormal) Collected:  06/06/20 0632    Specimen:  Blood Updated:  06/06/20 0647     Glucose 327 mg/dL      Comment: RN NotifiedOperator: [id number] ARLIN PAEZJOANNMeter ID: [id number]       Comprehensive Metabolic Panel [[id number]]  (Abnormal) Collected:  06/06/20 0539    Specimen:  Blood Updated:  06/06/20 0616     Glucose 302 mg/dL      BUN 3 mg/dL      Creatinine 0.84 mg/dL      Sodium 137 mmol/L      Potassium 4.5 mmol/L      Chloride 102 mmol/L      CO2 25.0 mmol/L      Calcium 8.6 mg/dL      Total Protein 6.2 g/dL      Albumin 2.90 g/dL      ALT (SGPT) 7 U/L      AST (SGOT) 9 U/L      Alkaline Phosphatase 68 U/L      Total Bilirubin <0.2 mg/dL      eGFR Non African Amer  86 mL/min/1.73      Globulin 3.3 gm/dL      A/G Ratio 0.9 g/dL      BUN/Creatinine Ratio 3.6     Anion Gap 10.0 mmol/L     Narrative:       GFR Normal >60  Chronic Kidney Disease <60  Kidney Failure <15      CBC & Differential [[id number]] Collected:  06/06/20 0539    Specimen:  Blood Updated:  06/06/20 0549    Narrative:       The following orders were created for panel order CBC & Differential.  Procedure                               Abnormality         Status                     ---------                               -----------         ------                     CBC Auto Differential[[id number]]        Abnormal            Final result                 Please view results for these tests on the individual orders.    CBC Auto Differential [[id number]]  (Abnormal) Collected:  06/06/20 0539    Specimen:  Blood Updated:  06/06/20 0549     WBC 6.02 10*3/mm3      RBC 3.74 10*6/mm3      Hemoglobin 11.0 g/dL      Hematocrit 32.0 %      MCV 85.6 fL      MCH 29.4 pg      MCHC 34.4 g/dL      RDW 12.1 %      RDW-SD 37.5 fl      MPV 10.7 fL      Platelets 234 10*3/mm3      Neutrophil % 59.5 %      Lymphocyte % 30.4 %      Monocyte % 6.3 %      Eosinophil % 3.0 %      Basophil % 0.5 %      Immature Grans % 0.3 %      Neutrophils, Absolute 3.58 10*3/mm3      Lymphocytes, Absolute 1.83 10*3/mm3      Monocytes, Absolute 0.38 10*3/mm3      Eosinophils, Absolute 0.18 10*3/mm3      Basophils, Absolute 0.03 10*3/mm3      Immature Grans, Absolute 0.02 10*3/mm3      nRBC 0.0 /100 WBC     POC Glucose Once [[id number]]  (Abnormal) Collected:  06/06/20 0152    Specimen:  Blood Updated:  06/06/20 0204     Glucose 233 mg/dL      Comment: RN NotifiedOperator: [id number] ARLIN Villalobos ID: [id number]       Blood Culture - Blood, Arm, Left [[id number]] Collected:  06/02/20 2218    Specimen:  Blood from Arm, Left Updated:  06/05/20 2231     Blood Culture No growth at 3 days    POC Glucose Once [[id number]]  (Abnormal) Collected:  06/05/20 1922     Specimen:  Blood Updated:  06/05/20 2013     Glucose 188 mg/dL      Comment: Result Not ConfirmedOperator: [id number] GLORIA LABOYMeter ID: [id number]       Blood Culture - Blood, Arm, Right [[id number]] Collected:  06/02/20 1850    Specimen:  Blood from Arm, Right Updated:  06/05/20 1915     Blood Culture No growth at 3 days    POC Glucose Once [[id number]]  (Normal) Collected:  06/05/20 1700    Specimen:  Blood Updated:  06/05/20 1713     Glucose 80 mg/dL      Comment: : [id number] KRISTEL MOSELEYMeter ID: [id number]       POC Glucose Once [[id number]]  (Abnormal) Collected:  06/05/20 1310    Specimen:  Blood Updated:  06/05/20 1325     Glucose 144 mg/dL      Comment: : [id number] PERLITA BERRIOSINMeter ID: [id number]       POC Glucose Once [[id number]]  (Normal) Collected:  06/05/20 0830    Specimen:  Blood Updated:  06/05/20 1325     Glucose 100 mg/dL      Comment: : [id number] PERLITA ROBINMeter ID: [id number]           Imaging Results (Last 24 Hours)     ** No results found for the last 24 hours. **        Chief Complaint on Day of Discharge: No complaints    Hospital Course:    This is a 20-year-old  female with past medical history of ADHD, bipolar 1 disorder, DM type I, and diabetic neuropathy that presented to [facility] on 6/2/2020 with complaints of back pain, suprapubic pain, abdominal pain, polyuria, dysuria, hematuria and nausea/ vomiting.  CT scan abdomen/pelvis was negative for any acute intra-abdominal pathology.     The patient tested positive for a E. coli urinary tract infection.  This was the fourth E. coli urinary tract infection since October of last year.  Urology was consulted secondary to continuous UTI in a patient with type 1 diabetes.  Patient was started on IV Rocephin and changed to oral Omnicef at discharge.  She will follow-up with Dr. Cowan office next week and have an outpatient cystoscopy and VCUG.  Patient was given 3 days of oral pain  "medication.  Follow-up with primary care physician in 1 week.    Condition on Discharge: Stable    Physical Exam on Discharge:  /79 (BP Location: Right arm, Patient Position: Lying)   Pulse 72   Temp 98.5 °F (36.9 °C) (Oral)   Resp 18   Ht 170.2 cm (67\")   Wt 47.2 kg (104 lb)   LMP 05/07/2020 (Exact Date)   SpO2 98%   BMI 16.29 kg/m²    Physical Exam   Constitutional: She is oriented to person, place, and time. She appears well-developed and well-nourished.   HENT:   Head: Normocephalic and atraumatic.   Eyes: Pupils are equal, round, and reactive to light. EOM are normal.   Neck: Normal range of motion. Neck supple.   Cardiovascular: Normal rate and regular rhythm.   Pulmonary/Chest: Effort normal and breath sounds normal.   Abdominal: Soft. Bowel sounds are normal.   Musculoskeletal: Normal range of motion.   Neurological: She is alert and oriented to person, place, and time.   Skin: Skin is warm and dry.   Psychiatric: She has a normal mood and affect. Her behavior is normal.       Discharge Disposition:  Home or Self Care    Discharge Medications:     Discharge Medications      New Medications      Instructions Start Date   cefdinir 300 MG capsule  Commonly known as:  OMNICEF   300 mg, Oral, 2 Times Daily      HYDROcodone-acetaminophen 7.5-325 MG per tablet  Commonly known as:  NORCO   1 tablet, Oral, Every 6 Hours PRN      promethazine 12.5 MG tablet  Commonly known as:  PHENERGAN   12.5 mg, Oral, Every 6 Hours PRN         Changes to Medications      Instructions Start Date   Blood Glucose Test strip  What changed:  Another medication with the same name was removed. Continue taking this medication, and follow the directions you see here.   Use 4 x daily use any brand covered by insurance or same brand as before ICD10 code is E11.9         Continue These Medications      Instructions Start Date   Alcohol Wipes 70 % pads   Use 4 x daily      Align 4 MG capsule   1 tablet, Oral, Daily      Blood " Glucose Monitoring Suppl w/Device kit   USE AS INDICATED, ANY MONITOR , ICD10 code is E11.9      Blood Glucose Monitoring Suppl w/Device kit   USE AS INDICATED, ANY MONITOR , ICD10 code is E11.9      hydrOXYzine 10 MG tablet  Commonly known as:  ATARAX   10 mg, Oral, 3 Times Daily PRN      insulin aspart 100 UNIT/ML solution pen-injector sc pen  Commonly known as:  NovoLOG FlexPen   Up to 20 units with meals      insulin aspart 100 UNIT/ML injection  Commonly known as:  NovoLOG   INJECT 70 UNITS DAILY THROUGH INSULIN PUMP      Insulin Glargine 100 UNIT/ML injection pen  Commonly known as:  LANTUS SOLOSTAR   20 Units, Subcutaneous, Daily      Insulin Pen Needle 31G X 5 MM misc  Commonly known as:  B-D UF III MINI PEN NEEDLES   Use 4 times daily  , ICD10 code is E11.9      lamoTRIgine 25 MG tablet  Commonly known as:  LaMICtal   25 mg, Oral, Daily, Take two tablets by mouth daily       Lancets 30G misc   USE 4 X DAILY      Lancing Device misc   USE AS INDICATED TO CORRELATE WITH STRIPS AND METER      Lancing Device misc   USE AS INDICATED TO CORRELATE WITH STRIPS AND METER      naproxen sodium 550 MG tablet  Commonly known as:  ANAPROX   550 mg, Oral, 2 Times Daily PRN      nicotine 14 MG/24HR patch  Commonly known as:  NICODERM CQ   1 patch, Transdermal, Every 24 Hours Scheduled      nortriptyline 25 MG capsule  Commonly known as:  PAMELOR   25 mg, Oral, Nightly PRN      ondansetron 4 MG tablet  Commonly known as:  ZOFRAN   4 mg, Oral, Every 8 Hours PRN      sertraline 100 MG tablet  Commonly known as:  ZOLOFT   100 mg, Oral, Daily      Vyvanse 20 MG capsule  Generic drug:  lisdexamfetamine   20 mg, Oral, Every Morning         Stop These Medications    Cariprazine HCl 1.5 MG capsule capsule  Commonly known as:  VRAYLAR     FLUoxetine 40 MG capsule  Commonly known as:  PROzac            Discharge Diet:   Diet Instructions     Diet: Consistent Carbohydrate      Discharge Diet:  Consistent Carbohydrate          Activity  at Discharge:   Activity Instructions     Activity as Tolerated            Discharge Care Plan/Instructions: As tolerated    Follow-up Appointment:  Follow-up Information     Rufus Marshall APRN Follow up in 1 week(s).    Specialty:  Family Medicine  Contact information:  [address] [facility] [address] [phone]             Tomer Holcomb APRN Follow up in 3 day(s).    Specialty:  Nurse Practitioner  Contact information:  [address]  [facility] [address] [phone]                   Test Results Pending at Discharge:    Order Current Status    Blood Culture - Blood, Arm, Left Preliminary result    Blood Culture - Blood, Arm, Right Preliminary result            This document has been electronically signed by ARMOND Ballesteros on June 6, 2020 14:06        Time: Greater than 30 minutes.                Electronically signed by Shiva Barker MD at 06/06/20 1520       Discharge Order (From admission, onward)     Start     Ordered    06/06/20 1000  Discharge patient  Once     Expected Discharge Date:  06/06/20    Discharge Disposition:  Home or Self Care    Physician of Record for Attribution - Please select from Treatment Team:  SHIVA BARKER [[id number]]    Review needed by CMO to determine Physician of Record:  No       Question Answer Comment   Physician of Record for Attribution - Please select from Treatment Team SHIVA BARKER    Review needed by CMO to determine Physician of Record No        06/06/20 1002

## 2020-06-08 NOTE — OUTREACH NOTE
Medical Week 1 Survey      Responses   [facility] patient discharged from?  [place]   COVID-19 Test Status  Not tested   Does the patient have one of the following disease processes/diagnoses(primary or secondary)?  Other   Is there a successful TCM telephone encounter documented?  No   Week 1 attempt successful?  No   Unsuccessful attempts  Attempt 1          Katelin Goodman RN

## 2020-06-09 ENCOUNTER — READMISSION MANAGEMENT (OUTPATIENT)
Dept: CALL CENTER | Facility: HOSPITAL | Age: 20
End: 2020-06-09

## 2020-06-09 NOTE — OUTREACH NOTE
Medical Week 1 Survey      Responses   [facility] patient discharged from?  [place]   COVID-19 Test Status  Not tested   Does the patient have one of the following disease processes/diagnoses(primary or secondary)?  Other   Is there a successful TCM telephone encounter documented?  No   Week 1 attempt successful?  No   Unsuccessful attempts  Attempt 2          Kathy Arreola, RN

## 2020-06-12 ENCOUNTER — READMISSION MANAGEMENT (OUTPATIENT)
Dept: CALL CENTER | Facility: HOSPITAL | Age: 20
End: 2020-06-12

## 2020-06-12 NOTE — OUTREACH NOTE
Medical Week 1 Survey      Responses   [facility] patient discharged from?  [place]   COVID-19 Test Status  Not tested   Does the patient have one of the following disease processes/diagnoses(primary or secondary)?  Other   Is there a successful TCM telephone encounter documented?  No   Week 1 attempt successful?  Yes   Call start time  1338   Call end time  1341   Discharge diagnosis  Uncontrolled DM, pyelonephritis   Is patient permission given to speak with other caregiver?  Yes   List who call center can speak with  Mother, Juana Don   Person spoke with today (if not patient) and relationship  mother   Meds reviewed with patient/caregiver?  Yes   Is the patient taking all medications as directed (includes completed medication regime)?  Yes   Does the patient have a primary care provider?   Yes   Comments regarding PCP  PCP ARMOND Milton. Mother reports that patient has already had f/u appt.    Has the patient kept scheduled appointments due by today?  Yes   Comments  Mother did not give specifics on appts. She reported that patient has already seen Dr's for follow up appts. No further information.    Has home health visited the patient within 72 hours of discharge?  N/A   Pulse Ox monitoring  None   Psychosocial issues?  No   Did the patient receive a copy of their discharge instructions?  Yes   Nursing interventions  Reviewed instructions with patient [mother]   What is the patient's perception of their health status since discharge?  Improving   Is the patient/caregiver able to teach back signs and symptoms related to disease process for when to call PCP?  Yes   Is the patient/caregiver able to teach back signs and symptoms related to disease process for when to call 911?  Yes   Is the patient/caregiver able to teach back the hierarchy of who to call/visit for symptoms/problems? PCP, Specialist, Home health nurse, Urgent Care, ED, 911  Yes   Week 1 call completed?  Yes          Emma MOSS  Moise, RN

## 2020-06-18 ENCOUNTER — READMISSION MANAGEMENT (OUTPATIENT)
Dept: CALL CENTER | Facility: HOSPITAL | Age: 20
End: 2020-06-18

## 2020-06-18 DIAGNOSIS — R33.9 RETENTION OF URINE, UNSPECIFIED: Primary | ICD-10-CM

## 2020-06-18 NOTE — OUTREACH NOTE
Medical Week 2 Survey      Responses   [facility] patient discharged from?  [place]   COVID-19 Test Status  Not tested   Does the patient have one of the following disease processes/diagnoses(primary or secondary)?  Other   Week 2 attempt successful?  No   Unsuccessful attempts  Attempt 1          Arleen Lezama RN

## 2020-06-19 ENCOUNTER — READMISSION MANAGEMENT (OUTPATIENT)
Dept: CALL CENTER | Facility: HOSPITAL | Age: 20
End: 2020-06-19

## 2020-06-19 NOTE — OUTREACH NOTE
Medical Week 2 Survey      Responses   [facility] patient discharged from?  [place]   COVID-19 Test Status  Not tested   Does the patient have one of the following disease processes/diagnoses(primary or secondary)?  Other   Week 2 attempt successful?  No   Unsuccessful attempts  Attempt 2          Casie Adame, RN

## 2020-06-22 ENCOUNTER — HOSPITAL ENCOUNTER (OUTPATIENT)
Dept: GENERAL RADIOLOGY | Facility: HOSPITAL | Age: 20
Discharge: HOME | End: 2020-06-22
Admitting: RADIOLOGY

## 2020-06-22 DIAGNOSIS — R33.9 RETENTION OF URINE, UNSPECIFIED: ICD-10-CM

## 2020-06-22 PROCEDURE — 0 DIATRIZOATE MEGLUMINE PER 1 ML: Performed by: UROLOGY

## 2020-06-22 PROCEDURE — 74455 X-RAY URETHRA/BLADDER: CPT

## 2020-06-22 RX ADMIN — DIATRIZOATE MEGLUMINE 250 ML: 300 INJECTION, SOLUTION INTRAVENOUS at 10:09

## 2020-06-24 ENCOUNTER — PREP FOR SURGERY (OUTPATIENT)
Dept: OTHER | Facility: HOSPITAL | Age: 20
End: 2020-06-24

## 2020-06-24 DIAGNOSIS — N39.0 RECURRENT URINARY TRACT INFECTION: Primary | ICD-10-CM

## 2020-06-26 ENCOUNTER — READMISSION MANAGEMENT (OUTPATIENT)
Dept: CALL CENTER | Facility: HOSPITAL | Age: 20
End: 2020-06-26

## 2020-06-26 NOTE — OUTREACH NOTE
Medical Week 3 Survey      Responses   [facility]   COVID-19 Test Status  Not tested   Does the patient have one of the following disease processes/diagnoses(primary or secondary)?  Other   Week 3 attempt successful?  Yes   Call start time  1417   Call end time  1418   Discharge diagnosis  Uncontrolled DM, pyelonephritis   Meds reviewed with patient/caregiver?  Yes   Is the patient having any side effects they believe may be caused by any medication additions or changes?  No   Does the patient have all medications ordered at discharge?  Yes   Is the patient taking all medications as directed (includes completed medication regime)?  Yes   Does the patient have a primary care provider?   Yes   Does the patient have an appointment with their PCP within 7 days of discharge?  Yes   Has the patient kept scheduled appointments due by today?  Yes   Has home health visited the patient within 72 hours of discharge?  N/A   Pulse Ox monitoring  None   Psychosocial issues?  No   Did the patient receive a copy of their discharge instructions?  Yes   Nursing interventions  Reviewed instructions with patient   What is the patient's perception of their health status since discharge?  Improving   Is the patient/caregiver able to teach back signs and symptoms related to disease process for when to call PCP?  Yes   Is the patient/caregiver able to teach back signs and symptoms related to disease process for when to call 911?  Yes   Is the patient/caregiver able to teach back the hierarchy of who to call/visit for symptoms/problems? PCP, Specialist, Home health nurse, Urgent Care, ED, 911  Yes   Week 3 Call Completed?  Yes          Fernando Rasheed RN

## 2020-07-03 ENCOUNTER — READMISSION MANAGEMENT (OUTPATIENT)
Dept: CALL CENTER | Facility: HOSPITAL | Age: 20
End: 2020-07-03

## 2020-07-03 NOTE — OUTREACH NOTE
Medical Week 4 Survey      Responses   [facility] patient discharged from?  [place]   COVID-19 Test Status  Not tested   Does the patient have one of the following disease processes/diagnoses(primary or secondary)?  Other   Week 4 attempt successful?  Yes   Call start time  1625   Call end time  1628   Discharge diagnosis  Uncontrolled DM, pyelonephritis   Meds reviewed with patient/caregiver?  Yes   Is the patient having any side effects they believe may be caused by any medication additions or changes?  No   Is the patient taking all medications as directed (includes completed medication regime)?  Yes   Has the patient kept scheduled appointments due by today?  Yes   Is the patient still receiving Home Health Services?  N/A   Pulse Ox monitoring  None   Psychosocial issues?  No   Comments  afebrile   What is the patient's perception of their health status since discharge?  Improving   Is the patient/caregiver able to teach back signs and symptoms related to disease process for when to call PCP?  Yes   Is the patient/caregiver able to teach back signs and symptoms related to disease process for when to call 911?  Yes   Is the patient/caregiver able to teach back the hierarchy of who to call/visit for symptoms/problems? PCP, Specialist, Home health nurse, Urgent Care, ED, 911  Yes   Additional teach back comments  states has been on clear liquids to flush kidneys, free of signs of pyelo, states has had 1 cystoscopy and next one in series is scheduled soon   Week 4 Call Completed?  Yes   Would the patient like one additional call?  No   Graduated  Yes   Did the patient feel the follow up calls were helpful during their recovery period?  Yes   Was the number of calls appropriate?  Yes          Tessa Bianchi RN

## 2020-07-13 PROCEDURE — C9803 HOPD COVID-19 SPEC COLLECT: HCPCS | Performed by: UROLOGY

## 2020-07-13 PROCEDURE — U0003 INFECTIOUS AGENT DETECTION BY NUCLEIC ACID (DNA OR RNA); SEVERE ACUTE RESPIRATORY SYNDROME CORONAVIRUS 2 (SARS-COV-2) (CORONAVIRUS DISEASE [COVID-19]), AMPLIFIED PROBE TECHNIQUE, MAKING USE OF HIGH THROUGHPUT TECHNOLOGIES AS DESCRIBED BY CMS-2020-01-R: HCPCS | Performed by: UROLOGY

## 2020-07-14 LAB
COVID LABCORP PRIORITY: NORMAL
SARS-COV-2 RNA RESP QL NAA+PROBE: NOT DETECTED

## 2020-07-15 ENCOUNTER — ANESTHESIA EVENT (OUTPATIENT)
Dept: PERIOP | Facility: HOSPITAL | Age: 20
End: 2020-07-15

## 2020-07-15 NOTE — H&P
[facility] History and Physical  ANDREA ZELAYA  20-year-old; :2000;single;female  [address];[place] [address]  Ins: 1) KARLY  Employer: [employer]  MRN:[id number]  ZACHARY LAWRENCE MD  [facility] [place]  Allergies  benzoyl peroxide topical Unknown  PINEAPPLE Unknown  Current Medications  NovoLOG 100 units/mL injectable solution  LaMICtal 25 mg oral tablet  Vyvanse 20 mg oral capsule  Vraylar 1.5 mg oral capsule  PROzac 40 mg oral capsule  hydrOXYzine hydrochloride 10 mg oral tablet  naproxen sodium 550 mg oral tablet  Pamelor 25 mg oral capsule  Zoloft 100 mg oral tablet  Macrodantin 100 mg oral capsule 1 capsule oral daily  * Medications Reconciled  Problem List  Recurrent urinary tract infection  Medical History  Diabetes mellitus  Attention deficit hyperactivity disorder  Bipolar disorder  Recurrent urinary tract infection  Psychiatric History  Patient is generally satisfied with life. No  depression, anxiety or thoughts of suicide.  Family History  Family history of cancer of colon  Diabetes mellitus  Mother Alive Father Alive Brother Alive Sister  Alive Brother   Social History  CURRENT EVERYDAY SMOKER. Does not  drink. Employed. PART TIME. Single. Lives alone.  Chief Complaint  UTI  History of Present Illness  ANDREA ZELAYA is a 20-year-old female here for evaluation of recurrent UTI. VCUG is negative. Patient wishes to have cystoscopy at the hospital instead of office.  Location: Urinary tract.  Modifying factors: UTI  Review of Systems  Gastrointestinal: Reports: abdominal pain, nausea, vomiting and heartburn; Denies: indigestion  Genitourinary: Denies: other symptoms (see HPI)  Musculoskeletal: Reports: back pain; Denies: joint pain and neck pain  Neurological: Reports: numbness / tingling; Denies: tremors and dizzy spells  Endocrine: Denies: Excessive thirst, cold intolerance, heat intolerance and  tired/sluggish  Constitutional: Reports: fever, chills  and weight loss  Vital Signs  Resp Rate: 18 (/min) Height: 67 (in)  Current every day smoker  Exam  General appearance: The patient appears well developed and well nourished, in no apparent acute distress.  Examination of neck: Neck appears supple.  Assessment of respiratory effort: Respiratory effort appears normal. No apparent  distress.  Obtain stool sample: Stool specimen for occult blood is not indicated.  Inspection and/or palpation of skin and subcutaneous tissue: Exam of the skin is within normal limits. The color and skin turgor are normal.  Orientation: She appears alert and oriented.  Mood and affect: Mood and affect appear normal.  Data Review  Medications and chart reviewed. History and physical form/ROS reviewed and no  changes noted. Previous encounter reviewed. VCUG reviewed.  Lab Results  06/11/2020  PROTEIN POSITIVE, BILIRUBIN NEGATIVE, Urobilinogen 0.1 E.U./dl- 1.0 E.U./dl,  GLUCOSE NEGATIVE, KETONE NEGATIVE, SPECIFIC GRAVITY 1.015, PH 8.5,  BLOOD NEGATIVE, NITRITE NEGATIVE, LEUKOCYTES SMALL  06/16/2020  PROTEIN NEGATIVE, BILIRUBIN NEGATIVE, Urobilinogen 0.1 E.U./dl- 1.0 E.U./dl,  GLUCOSE NEGATIVE, KETONE TRACE, SPECIFIC GRAVITY 1.015, PH 6.0,  BLOOD TRACE NON-HEMOLYZED, NITRITE POSITIVE, LEUKOCYTES SMALL  Assessment - Recurrent urinary tract infection  DX:  Recurrent urinary tract infection  SNO: [id number], ICD-9: 599.0, ICD-10: N39.0  Assessment Notes:  Recurrent UTI  Plan  Plan Notes:  Cystoscopy  Discussion:  Discussed my findings and plan of action and reasoning behind decision making. All questions were answered. FINDINGS WERE DISCUSSED WITH PATIENT. RISKS AND BENEFITS EXPLAINED. PATIENT WISHES TO PROCEED.  Instructions:  Patient is instructed to call with any problems. Patient is instructed to call if the  condition worsens. Patient is instructed to call with any changes in condition.

## 2020-07-16 ENCOUNTER — ANESTHESIA (OUTPATIENT)
Dept: PERIOP | Facility: HOSPITAL | Age: 20
End: 2020-07-16

## 2020-07-16 ENCOUNTER — HOSPITAL ENCOUNTER (OUTPATIENT)
Facility: HOSPITAL | Age: 20
Setting detail: HOSPITAL OUTPATIENT SURGERY
Discharge: HOME | End: 2020-07-16
Attending: UROLOGY | Admitting: UROLOGY

## 2020-07-16 VITALS
BODY MASS INDEX: 15.85 KG/M2 | SYSTOLIC BLOOD PRESSURE: 104 MMHG | HEIGHT: 67 IN | OXYGEN SATURATION: 100 % | TEMPERATURE: 98.6 F | HEART RATE: 68 BPM | RESPIRATION RATE: 16 BRPM | WEIGHT: 101 LBS | DIASTOLIC BLOOD PRESSURE: 69 MMHG

## 2020-07-16 DIAGNOSIS — N39.0 RECURRENT URINARY TRACT INFECTION: ICD-10-CM

## 2020-07-16 LAB
AMPHET+METHAMPHET UR QL: NEGATIVE
AMPHETAMINES UR QL: NEGATIVE
ANION GAP SERPL CALCULATED.3IONS-SCNC: 8 MMOL/L (ref 5–15)
B-HCG UR QL: NEGATIVE
BARBITURATES UR QL SCN: NEGATIVE
BENZODIAZ UR QL SCN: NEGATIVE
BILIRUB UR QL STRIP: NEGATIVE
BUN SERPL-MCNC: 15 MG/DL (ref 6–20)
BUN/CREAT SERPL: 17.2 (ref 7–25)
BUPRENORPHINE SERPL-MCNC: NEGATIVE NG/ML
CALCIUM SPEC-SCNC: 9 MG/DL (ref 8.6–10.5)
CANNABINOIDS SERPL QL: POSITIVE
CHLORIDE SERPL-SCNC: 100 MMOL/L (ref 98–107)
CLARITY UR: ABNORMAL
CO2 SERPL-SCNC: 25 MMOL/L (ref 22–29)
COCAINE UR QL: NEGATIVE
COLOR UR: YELLOW
CREAT SERPL-MCNC: 0.87 MG/DL (ref 0.57–1)
GFR SERPL CREATININE-BSD FRML MDRD: 82 ML/MIN/1.73
GLUCOSE SERPL-MCNC: 260 MG/DL (ref 65–99)
GLUCOSE UR STRIP-MCNC: ABNORMAL MG/DL
HGB UR QL STRIP.AUTO: ABNORMAL
KETONES UR QL STRIP: NEGATIVE
LEUKOCYTE ESTERASE UR QL STRIP.AUTO: ABNORMAL
METHADONE UR QL SCN: NEGATIVE
NITRITE UR QL STRIP: POSITIVE
OPIATES UR QL: NEGATIVE
OXYCODONE UR QL SCN: NEGATIVE
PCP UR QL SCN: NEGATIVE
PH UR STRIP.AUTO: 5.5 [PH] (ref 5–9)
POTASSIUM SERPL-SCNC: 4.9 MMOL/L (ref 3.5–5.2)
PROPOXYPH UR QL: NEGATIVE
PROT UR QL STRIP: NEGATIVE
SODIUM SERPL-SCNC: 133 MMOL/L (ref 136–145)
SP GR UR STRIP: 1.01 (ref 1–1.03)
TRICYCLICS UR QL SCN: NEGATIVE
UROBILINOGEN UR QL STRIP: ABNORMAL

## 2020-07-16 PROCEDURE — 81025 URINE PREGNANCY TEST: CPT | Performed by: ANESTHESIOLOGY

## 2020-07-16 PROCEDURE — 80306 DRUG TEST PRSMV INSTRMNT: CPT | Performed by: ANESTHESIOLOGY

## 2020-07-16 PROCEDURE — 80048 BASIC METABOLIC PNL TOTAL CA: CPT | Performed by: ANESTHESIOLOGY

## 2020-07-16 PROCEDURE — 25010000002 MIDAZOLAM PER 1 MG: Performed by: NURSE ANESTHETIST, CERTIFIED REGISTERED

## 2020-07-16 PROCEDURE — 25010000002 FENTANYL CITRATE (PF) 100 MCG/2ML SOLUTION: Performed by: NURSE ANESTHETIST, CERTIFIED REGISTERED

## 2020-07-16 PROCEDURE — 81003 URINALYSIS AUTO W/O SCOPE: CPT | Performed by: UROLOGY

## 2020-07-16 PROCEDURE — 25010000002 PROPOFOL 10 MG/ML EMULSION: Performed by: NURSE ANESTHETIST, CERTIFIED REGISTERED

## 2020-07-16 PROCEDURE — 25010000002 CEFTRIAXONE: Performed by: UROLOGY

## 2020-07-16 RX ORDER — PROMETHAZINE HYDROCHLORIDE 25 MG/1
25 TABLET ORAL EVERY 6 HOURS PRN
COMMUNITY
End: 2020-12-16 | Stop reason: HOSPADM

## 2020-07-16 RX ORDER — ONDANSETRON 2 MG/ML
4 INJECTION INTRAMUSCULAR; INTRAVENOUS ONCE AS NEEDED
Status: DISCONTINUED | OUTPATIENT
Start: 2020-07-16 | End: 2020-07-16 | Stop reason: HOSPADM

## 2020-07-16 RX ORDER — MIDAZOLAM HYDROCHLORIDE 1 MG/ML
INJECTION INTRAMUSCULAR; INTRAVENOUS AS NEEDED
Status: DISCONTINUED | OUTPATIENT
Start: 2020-07-16 | End: 2020-07-16 | Stop reason: SURG

## 2020-07-16 RX ORDER — SODIUM CHLORIDE 9 MG/ML
1000 INJECTION, SOLUTION INTRAVENOUS CONTINUOUS
Status: DISCONTINUED | OUTPATIENT
Start: 2020-07-16 | End: 2020-07-16 | Stop reason: HOSPADM

## 2020-07-16 RX ORDER — FENTANYL CITRATE 50 UG/ML
INJECTION, SOLUTION INTRAMUSCULAR; INTRAVENOUS AS NEEDED
Status: DISCONTINUED | OUTPATIENT
Start: 2020-07-16 | End: 2020-07-16 | Stop reason: SURG

## 2020-07-16 RX ORDER — PROPOFOL 10 MG/ML
VIAL (ML) INTRAVENOUS AS NEEDED
Status: DISCONTINUED | OUTPATIENT
Start: 2020-07-16 | End: 2020-07-16 | Stop reason: SURG

## 2020-07-16 RX ADMIN — PROPOFOL 20 MG: 10 INJECTION, EMULSION INTRAVENOUS at 16:14

## 2020-07-16 RX ADMIN — PROPOFOL 50 MG: 10 INJECTION, EMULSION INTRAVENOUS at 16:12

## 2020-07-16 RX ADMIN — PROPOFOL 20 MG: 10 INJECTION, EMULSION INTRAVENOUS at 16:18

## 2020-07-16 RX ADMIN — FENTANYL CITRATE 25 MCG: 50 INJECTION, SOLUTION INTRAMUSCULAR; INTRAVENOUS at 16:12

## 2020-07-16 RX ADMIN — FENTANYL CITRATE 25 MCG: 50 INJECTION, SOLUTION INTRAMUSCULAR; INTRAVENOUS at 16:17

## 2020-07-16 RX ADMIN — PROPOFOL 20 MG: 10 INJECTION, EMULSION INTRAVENOUS at 16:16

## 2020-07-16 RX ADMIN — CEFTRIAXONE 1 G: 1 INJECTION, POWDER, FOR SOLUTION INTRAMUSCULAR; INTRAVENOUS at 16:12

## 2020-07-16 RX ADMIN — SODIUM CHLORIDE 1000 ML: 9 INJECTION, SOLUTION INTRAVENOUS at 12:50

## 2020-07-16 RX ADMIN — MIDAZOLAM HYDROCHLORIDE 2 MG: 2 INJECTION, SOLUTION INTRAMUSCULAR; INTRAVENOUS at 16:07

## 2020-07-16 NOTE — ANESTHESIA POSTPROCEDURE EVALUATION
Patient: Fernanda Patterson    Procedure Summary     Date:  07/16/20 Room / Location:  [facility] OR 06 / [facility] OR    Anesthesia Start:  1609 Anesthesia Stop:  1624    Procedure:  CYSTOSCOPY FLEXIBLE       (DONE ON STRETCHER) (N/A Urethra) Diagnosis:       Recurrent urinary tract infection      (Recurrent urinary tract infection [N39.0])    Surgeon:  Antonio Cowan MD Provider:  Omid Henderson MD    Anesthesia Type:  MAC ASA Status:  3          Anesthesia Type: MAC    Vitals  No vitals data found for the desired time range.          Post Anesthesia Care and Evaluation    Patient location during evaluation: bedside  Patient participation: complete - patient participated  Level of consciousness: sleepy but conscious and awake  Pain score: 0  Pain management: adequate  Airway patency: patent  Anesthetic complications: No anesthetic complications  PONV Status: none  Cardiovascular status: acceptable  Respiratory status: acceptable and spontaneous ventilation  Hydration status: acceptable

## 2020-07-16 NOTE — OP NOTE
CYSTOSCOPY  Procedure Note    Fernanda Patterson  7/16/2020    Pre-op Diagnosis:   Recurrent urinary tract infection [N39.0]    Post-op Diagnosis:     Post-Op Diagnosis Codes:     * Recurrent urinary tract infection [N39.0]      Procedure(s):  CYSTOSCOPY FLEXIBLE       (DONE ON STRETCHER)    Surgeon(s):  Antonio Cowan MD    Anesthesia: General    Staff:   Circulator: Patricia Rojas RN  Scrub Person: Sarahy Rodriguez  Assistant: Natalia Burgos CSA    Estimated Blood Loss: none    Specimens:                None      Drains: * No LDAs found *    Findings: No tumors or obstruction    Complications: None     Indications: Same    Description of Procedure: Brought cystoscopy placed in the supine position sterile prep and drape the genitalia I passed a flexible cystoscope into the urethra which is open and was negative for infection tumor or calculi.  The ureters are normal position and had clear reflux follow this bladder drained scope was removed patient taken recovery    Antonio Cowan MD     Date: 7/16/2020  Time: 17:53

## 2020-07-16 NOTE — ANESTHESIA PREPROCEDURE EVALUATION
Anesthesia Evaluation     Patient summary reviewed and Nursing notes reviewed   no history of anesthetic complications:  NPO Solid Status: > 8 hours  NPO Liquid Status: > 4 hours           Airway   Mallampati: I  TM distance: >3 FB  Neck ROM: full  possible difficult intubation  Dental - normal exam     Pulmonary - normal exam    breath sounds clear to auscultation  (+) a smoker Current Smoked day of surgery, asthma,    ROS comment:   - lines/tubes: None    - cardiac: Size within normal limits.    - mediastinum: Contour within normal limits.     - lungs: No evidence of a focal air space process, pulmonary  interstitial edema, nodule(s)/mass.     - pleura: No evidence of  fluid.      - osseous: Unremarkable for age.     IMPRESSION:  Negative examination.        Electronically signed by:  Cha Cooney MD  6/2/2020 7:48 PM CDT  Cardiovascular - negative cardio ROS and normal exam    ECG reviewed  Rhythm: regular  Rate: normal    (-) murmur    ROS comment: Date/Time: 5/8/2020 5:55 PM  Performed by: Tamara Arredondo APRN  Authorized by: Tamara Arredondo APRN   Interpreted by physician  Rhythm: sinus bradycardia  Rate: bradycardic  BPM: 53  ST Segments: ST segments normal    Interpreted by Tamara Arredondo APRN on 5/10/2020  1:26 PM        Neuro/Psych  (+) seizures, psychiatric history Bipolar, ADHD, Depression and PTSD,     GI/Hepatic/Renal/Endo    (+)   renal disease (Resolved.) ARF, diabetes mellitus type 1 using insulin,     Musculoskeletal (-) negative ROS    Abdominal    Substance History   (+) drug use (Cannabis.)     OB/GYN negative ob/gyn ROS         Other - negative ROS                       Anesthesia Plan    ASA 3     general   total IV anesthesia  intravenous induction     Anesthetic plan, all risks, benefits, and alternatives have been provided, discussed and informed consent has been obtained with: patient.

## 2020-10-13 ENCOUNTER — APPOINTMENT (OUTPATIENT)
Dept: CT IMAGING | Facility: HOSPITAL | Age: 20
End: 2020-10-13

## 2020-10-13 ENCOUNTER — HOSPITAL ENCOUNTER (EMERGENCY)
Facility: HOSPITAL | Age: 20
Discharge: HOME | End: 2020-10-13
Attending: EMERGENCY MEDICINE | Admitting: EMERGENCY MEDICINE

## 2020-10-13 VITALS
WEIGHT: 104.4 LBS | SYSTOLIC BLOOD PRESSURE: 112 MMHG | HEIGHT: 67 IN | TEMPERATURE: 97.4 F | BODY MASS INDEX: 16.39 KG/M2 | OXYGEN SATURATION: 100 % | HEART RATE: 56 BPM | DIASTOLIC BLOOD PRESSURE: 75 MMHG | RESPIRATION RATE: 18 BRPM

## 2020-10-13 DIAGNOSIS — R10.84 GENERALIZED ABDOMINAL PAIN: Primary | ICD-10-CM

## 2020-10-13 DIAGNOSIS — R73.9 HYPERGLYCEMIA: ICD-10-CM

## 2020-10-13 DIAGNOSIS — K59.00 CONSTIPATION, UNSPECIFIED CONSTIPATION TYPE: ICD-10-CM

## 2020-10-13 LAB
ACETONE BLD QL: NEGATIVE
ALBUMIN SERPL-MCNC: 4.6 G/DL (ref 3.5–5.2)
ALBUMIN/GLOB SERPL: 1.7 G/DL
ALP SERPL-CCNC: 56 U/L (ref 39–117)
ALT SERPL W P-5'-P-CCNC: 17 U/L (ref 1–33)
ANION GAP SERPL CALCULATED.3IONS-SCNC: 11 MMOL/L (ref 5–15)
ANISOCYTOSIS BLD QL: NORMAL
AST SERPL-CCNC: 14 U/L (ref 1–32)
BASOPHILS # BLD AUTO: 0.06 10*3/MM3 (ref 0–0.2)
BASOPHILS NFR BLD AUTO: 1 % (ref 0–1.5)
BILIRUB SERPL-MCNC: 0.2 MG/DL (ref 0–1.2)
BILIRUB UR QL STRIP: NEGATIVE
BUN SERPL-MCNC: 17 MG/DL (ref 6–20)
BUN/CREAT SERPL: 14.4 (ref 7–25)
CALCIUM SPEC-SCNC: 9.2 MG/DL (ref 8.6–10.5)
CHLORIDE SERPL-SCNC: 97 MMOL/L (ref 98–107)
CLARITY UR: CLEAR
CO2 SERPL-SCNC: 24 MMOL/L (ref 22–29)
COLOR UR: YELLOW
CREAT SERPL-MCNC: 1.18 MG/DL (ref 0.57–1)
DEPRECATED RDW RBC AUTO: 37.5 FL (ref 37–54)
EOSINOPHIL # BLD AUTO: 0.27 10*3/MM3 (ref 0–0.4)
EOSINOPHIL NFR BLD AUTO: 4.5 % (ref 0.3–6.2)
ERYTHROCYTE [DISTWIDTH] IN BLOOD BY AUTOMATED COUNT: 12.4 % (ref 12.3–15.4)
GFR SERPL CREATININE-BSD FRML MDRD: 58 ML/MIN/1.73
GLOBULIN UR ELPH-MCNC: 2.7 GM/DL
GLUCOSE BLDC GLUCOMTR-MCNC: 349 MG/DL (ref 70–130)
GLUCOSE SERPL-MCNC: 451 MG/DL (ref 65–99)
GLUCOSE UR STRIP-MCNC: ABNORMAL MG/DL
HCG SERPL QL: NEGATIVE
HCT VFR BLD AUTO: 38.4 % (ref 34–46.6)
HGB BLD-MCNC: 13.2 G/DL (ref 12–15.9)
HGB UR QL STRIP.AUTO: NEGATIVE
HOLD SPECIMEN: NORMAL
IMM GRANULOCYTES # BLD AUTO: 0.01 10*3/MM3 (ref 0–0.05)
IMM GRANULOCYTES NFR BLD AUTO: 0.2 % (ref 0–0.5)
KETONES UR QL STRIP: NEGATIVE
LARGE PLATELETS: NORMAL
LEUKOCYTE ESTERASE UR QL STRIP.AUTO: NEGATIVE
LIPASE SERPL-CCNC: 21 U/L (ref 13–60)
LYMPHOCYTES # BLD AUTO: 3.55 10*3/MM3 (ref 0.7–3.1)
LYMPHOCYTES NFR BLD AUTO: 59.2 % (ref 19.6–45.3)
MAGNESIUM SERPL-MCNC: 1.9 MG/DL (ref 1.6–2.6)
MCH RBC QN AUTO: 28.7 PG (ref 26.6–33)
MCHC RBC AUTO-ENTMCNC: 34.4 G/DL (ref 31.5–35.7)
MCV RBC AUTO: 83.5 FL (ref 79–97)
MONOCYTES # BLD AUTO: 0.29 10*3/MM3 (ref 0.1–0.9)
MONOCYTES NFR BLD AUTO: 4.8 % (ref 5–12)
NEUTROPHILS NFR BLD AUTO: 1.82 10*3/MM3 (ref 1.7–7)
NEUTROPHILS NFR BLD AUTO: 30.3 % (ref 42.7–76)
NITRITE UR QL STRIP: NEGATIVE
NRBC BLD AUTO-RTO: 0 /100 WBC (ref 0–0.2)
PH UR STRIP.AUTO: 6 [PH] (ref 5–9)
PLATELET # BLD AUTO: 275 10*3/MM3 (ref 140–450)
PMV BLD AUTO: 10.6 FL (ref 6–12)
POTASSIUM SERPL-SCNC: 3.9 MMOL/L (ref 3.5–5.2)
PROT SERPL-MCNC: 7.3 G/DL (ref 6–8.5)
PROT UR QL STRIP: NEGATIVE
RBC # BLD AUTO: 4.6 10*6/MM3 (ref 3.77–5.28)
SMALL PLATELETS BLD QL SMEAR: ADEQUATE
SODIUM SERPL-SCNC: 132 MMOL/L (ref 136–145)
SP GR UR STRIP: 1.02 (ref 1–1.03)
UROBILINOGEN UR QL STRIP: ABNORMAL
WBC # BLD AUTO: 6 10*3/MM3 (ref 3.4–10.8)
WBC MORPH BLD: NORMAL
WHOLE BLOOD HOLD SPECIMEN: NORMAL

## 2020-10-13 PROCEDURE — 96375 TX/PRO/DX INJ NEW DRUG ADDON: CPT

## 2020-10-13 PROCEDURE — 82962 GLUCOSE BLOOD TEST: CPT

## 2020-10-13 PROCEDURE — 85025 COMPLETE CBC W/AUTO DIFF WBC: CPT | Performed by: EMERGENCY MEDICINE

## 2020-10-13 PROCEDURE — 25010000002 ONDANSETRON PER 1 MG: Performed by: EMERGENCY MEDICINE

## 2020-10-13 PROCEDURE — 25010000002 MORPHINE PER 10 MG: Performed by: EMERGENCY MEDICINE

## 2020-10-13 PROCEDURE — 74177 CT ABD & PELVIS W/CONTRAST: CPT

## 2020-10-13 PROCEDURE — 84703 CHORIONIC GONADOTROPIN ASSAY: CPT | Performed by: EMERGENCY MEDICINE

## 2020-10-13 PROCEDURE — 83690 ASSAY OF LIPASE: CPT | Performed by: EMERGENCY MEDICINE

## 2020-10-13 PROCEDURE — 85007 BL SMEAR W/DIFF WBC COUNT: CPT | Performed by: EMERGENCY MEDICINE

## 2020-10-13 PROCEDURE — 99283 EMERGENCY DEPT VISIT LOW MDM: CPT

## 2020-10-13 PROCEDURE — 25010000002 IOPAMIDOL 61 % SOLUTION: Performed by: EMERGENCY MEDICINE

## 2020-10-13 PROCEDURE — 82009 KETONE BODYS QUAL: CPT | Performed by: EMERGENCY MEDICINE

## 2020-10-13 PROCEDURE — 80053 COMPREHEN METABOLIC PANEL: CPT | Performed by: EMERGENCY MEDICINE

## 2020-10-13 PROCEDURE — 96374 THER/PROPH/DIAG INJ IV PUSH: CPT

## 2020-10-13 PROCEDURE — 83735 ASSAY OF MAGNESIUM: CPT | Performed by: EMERGENCY MEDICINE

## 2020-10-13 PROCEDURE — 81003 URINALYSIS AUTO W/O SCOPE: CPT | Performed by: EMERGENCY MEDICINE

## 2020-10-13 RX ORDER — SODIUM CHLORIDE 0.9 % (FLUSH) 0.9 %
10 SYRINGE (ML) INJECTION AS NEEDED
Status: DISCONTINUED | OUTPATIENT
Start: 2020-10-13 | End: 2020-10-14 | Stop reason: HOSPADM

## 2020-10-13 RX ORDER — ONDANSETRON 2 MG/ML
4 INJECTION INTRAMUSCULAR; INTRAVENOUS ONCE
Status: COMPLETED | OUTPATIENT
Start: 2020-10-13 | End: 2020-10-13

## 2020-10-13 RX ORDER — POLYETHYLENE GLYCOL 3350 17 G/17G
17 POWDER, FOR SOLUTION ORAL DAILY
Qty: 15 PACKET | Refills: 0 | Status: SHIPPED | OUTPATIENT
Start: 2020-10-13 | End: 2020-10-28

## 2020-10-13 RX ADMIN — MORPHINE SULFATE 4 MG: 4 INJECTION INTRAVENOUS at 22:26

## 2020-10-13 RX ADMIN — ONDANSETRON 4 MG: 2 INJECTION INTRAMUSCULAR; INTRAVENOUS at 21:17

## 2020-10-13 RX ADMIN — SODIUM CHLORIDE 1000 ML: 9 INJECTION, SOLUTION INTRAVENOUS at 21:17

## 2020-10-13 RX ADMIN — IOPAMIDOL 75 ML: 612 INJECTION, SOLUTION INTRAVENOUS at 22:40

## 2020-10-14 LAB — GLUCOSE BLDC GLUCOMTR-MCNC: 478 MG/DL (ref 70–130)

## 2020-10-14 NOTE — ED TRIAGE NOTES
Pt here with lower abd pain 2-3 days, has nausea, pt is type 1 diabetic and sugars haven been all over the place per the patient. She takes lantus and novolog.  Pt states she is also 1 month late on her period but had a negative home preg test

## 2020-10-14 NOTE — ED PROVIDER NOTES
"Subjective   20-year-old female known type I diabetic presents emergency department with concern for hyperglycemia, abdominal pain, and a missed period.  She reports she is 1 month late for her period.  But had a negative urine pregnancy test at home.  Reports abdominal pain and distention times about 2 days that she points to the upper abdomen.  Denies any urinary symptoms.  Reports some nausea and vomiting.  Reports she does not \"feel like she is in DKA\".  But she does report multiple episodes of DKA in the past.    Family history, surgical history, social history, current medications and allergies are reviewed with the patient and triage documentation and vitals are reviewed.      History provided by:  Patient   used: No        Review of Systems   Constitutional: Negative for chills and fever.   HENT: Negative for congestion and sore throat.    Eyes: Negative for photophobia and visual disturbance.   Respiratory: Negative for cough, shortness of breath and wheezing.    Cardiovascular: Negative for chest pain, palpitations and leg swelling.   Gastrointestinal: Positive for abdominal distention, abdominal pain, nausea and vomiting. Negative for constipation and diarrhea.   Endocrine: Negative for polydipsia, polyphagia and polyuria.   Genitourinary: Negative for frequency and urgency.   Musculoskeletal: Negative for arthralgias, back pain, myalgias and neck pain.   Skin: Negative for rash and wound.   Allergic/Immunologic: Negative.    Neurological: Negative.    Hematological: Negative.    Psychiatric/Behavioral: Negative.        Past Medical History:   Diagnosis Date   • ADHD    • Bipolar 1 disorder (CMS/HCC)    • Borderline personality disorder (CMS/HCC)    • Cannabinoid hyperemesis syndrome    • Diabetes mellitus type 1 (CMS/HCC)    • Diabetic gastroparesis (CMS/HCC)    • Diabetic ketoacidosis (CMS/HCC)    • Diabetic neuropathy (CMS/HCC)    • Post traumatic stress disorder (PTSD)    • " Recurrent UTI    • Seizure disorder (CMS/HCC)    • Severe depressed bipolar II disorder without psychotic features (CMS/HCC)    • Uncontrolled diabetes mellitus (CMS/HCC)        Allergies   Allergen Reactions   • Pineapple Anaphylaxis   • Benzoyl Peroxide Swelling       Past Surgical History:   Procedure Laterality Date   •  SECTION N/A 2018   • CYSTOSCOPY N/A 2020    Procedure: CYSTOSCOPY FLEXIBLE       (DONE ON STRETCHER);  Surgeon: Chapo, Antonio PRYOR MD;  Location: [facility];  Service: Urology;  Laterality: N/A;       Family History   Problem Relation Age of Onset   • Drug abuse Father    • Suicide Attempts Brother    • Dementia Maternal Grandfather    • Hypertension Paternal Grandmother        Social History     Socioeconomic History   • Marital status: Single     Spouse name: Not on file   • Number of children: Not on file   • Years of education: Not on file   • Highest education level: Not on file   Tobacco Use   • Smoking status: Current Every Day Smoker     Packs/day: 0.50     Years: 1.00     Pack years: 0.50   • Smokeless tobacco: Never Used   Substance and Sexual Activity   • Alcohol use: No   • Drug use: Yes     Types: Marijuana     Comment:    • Sexual activity: Yes     Partners: Male     Birth control/protection: None   Social History Narrative    Substance Abuse: Pt drinks EtOH occasionally, stating once every 6 months. Pt smokes marijuana, but denied any other illicit drugs. Pt smokes 0.5-1 ppd of cigarettes x 1 year. Pt denies caffeine consumption, states she drinks only water.         Marriages: none    Current Relationships: Recent breakup with her boyfriend    Children: one child that  2wks ago at 2 months old.        Occupation:  Pt is a  and loves her job, but hasn't been able to work since baby was born via C/S    Living Situation: was living with her boyfriend but they are  over the death of their child.  She plans to live with mom after discharge.            Objective   Physical Exam  Vitals signs and nursing note reviewed.   Constitutional:       General: She is not in acute distress.     Appearance: She is well-developed and normal weight. She is not ill-appearing, toxic-appearing or diaphoretic.   HENT:      Head: Normocephalic.   Cardiovascular:      Rate and Rhythm: Normal rate and regular rhythm.   Pulmonary:      Effort: Pulmonary effort is normal.      Breath sounds: Normal breath sounds.   Abdominal:      General: Bowel sounds are decreased. There is distension.      Palpations: Abdomen is soft. There is no hepatomegaly or splenomegaly.      Tenderness: There is generalized abdominal tenderness. There is no right CVA tenderness, left CVA tenderness, guarding or rebound. Negative signs include Wing's sign and McBurney's sign.      Hernia: No hernia is present.   Skin:     General: Skin is warm and dry.      Capillary Refill: Capillary refill takes less than 2 seconds.   Neurological:      General: No focal deficit present.      Mental Status: She is alert and oriented to person, place, and time.   Psychiatric:         Mood and Affect: Mood normal.         Behavior: Behavior normal.         Procedures  none         ED Course      Labs Reviewed   COMPREHENSIVE METABOLIC PANEL - Abnormal; Notable for the following components:       Result Value    Glucose 451 (*)     Creatinine 1.18 (*)     Sodium 132 (*)     Chloride 97 (*)     eGFR Non  Amer 58 (*)     All other components within normal limits    Narrative:     GFR Normal >60  Chronic Kidney Disease <60  Kidney Failure <15     URINALYSIS W/ MICROSCOPIC IF INDICATED (NO CULTURE) - Abnormal; Notable for the following components:    Glucose, UA >=1000 mg/dL (3+) (*)     All other components within normal limits    Narrative:     Urine microscopic not indicated.   CBC WITH AUTO DIFFERENTIAL - Abnormal; Notable for the following components:    Neutrophil % 30.3 (*)     Lymphocyte % 59.2 (*)      Monocyte % 4.8 (*)     Lymphocytes, Absolute 3.55 (*)     All other components within normal limits   HCG, SERUM, QUALITATIVE - Normal   LIPASE - Normal   MAGNESIUM - Normal   ACETONE - Normal   SCAN SLIDE   POCT GLUCOSE FINGERSTICK   CBC AND DIFFERENTIAL    Narrative:     The following orders were created for panel order CBC & Differential.  Procedure                               Abnormality         Status                     ---------                               -----------         ------                     CBC Auto Differential[[id number]]        Abnormal            Final result                 Please view results for these tests on the individual orders.   KETONE BODIES SERUM    Narrative:     The following orders were created for panel order Ketone Bodies, Serum (Not performed at [place]).  Procedure                               Abnormality         Status                     ---------                               -----------         ------                     Acetone[[id number]]                      Normal              Final result                 Please view results for these tests on the individual orders.   EXTRA TUBES    Narrative:     The following orders were created for panel order Extra Tubes.  Procedure                               Abnormality         Status                     ---------                               -----------         ------                     Light Blue Top[[id number]]                                   Final result               Green Top (Gel)[[id number]]                                  Final result                 Please view results for these tests on the individual orders.   LIGHT BLUE TOP   GREEN TOP     Ct Abdomen Pelvis With Contrast    Result Date: 10/13/2020  Narrative: CT SCANS ABDOMEN AND PELVIS WITH IV CONTRAST. HISTORY: abd pain, distension COMPARISON: 6/2/2020. TECHNIQUE: Radiation dose reduction techniques were utilized, including automated exposure control and  exposure modulation based on body size. Axial images were obtained from the lung bases to the symphysis pubis with IV contrast only.. Oral contrast was not administered per request. FINDINGS :  There is mild periportal edema. This is nonspecific but can be seen with infection/hepatitis as well as vascular and lymphatic congestive states. No biliary dilation. There is some mild chronic appearing collecting system dilatation on the right side which appears stable and may be related to reflux nephropathy. Remaining solid organs have an unremarkable appearance. Normal aorta and appendix. Moderate constipation. The GI tract not opacified for assessment but non obstructive in appearance.     Impression: IMPRESSION : 1. Mild nonspecific periportal edema. 2. Constipation without obstruction or acute inflammation Electronically signed by:  Chucky Hawkins MD  10/13/2020 10:58 PM CDT Workstation: [id number]          [facility]  Number of Diagnoses or Management Options  Constipation, unspecified constipation type:   Generalized abdominal pain:   Hyperglycemia:      Amount and/or Complexity of Data Reviewed  Clinical lab tests: reviewed  Tests in the radiology section of CPT®: reviewed    Patient Progress  Patient progress: stable    Patient not in DKA.  Pregnancy test negative.  CT scan reveals constipation with no other acute abnormality.  Blood glucose improves to 349 with just fluids in the emergency department.  Patient advised to go home to take her nightly medications.  Advised on MiraLAX or mag citrate to help with constipation.  Advised on Tylenol Motrin for discomfort as other things will increase constipation.  Rest return with any new or worsening symptoms and agreeable to discharge.    Final diagnoses:   Generalized abdominal pain   Constipation, unspecified constipation type   Hyperglycemia            Francisco Poole, DO  10/13/20 2321

## 2020-10-14 NOTE — DISCHARGE INSTRUCTIONS
Please return with new or worsening symptoms.  Get medication filled and take as directed to help with symptoms.  Follow-up with primary care.  Monitor blood glucose closely.

## 2020-10-21 ENCOUNTER — APPOINTMENT (OUTPATIENT)
Dept: GENERAL RADIOLOGY | Facility: HOSPITAL | Age: 20
End: 2020-10-21

## 2020-10-21 ENCOUNTER — HOSPITAL ENCOUNTER (EMERGENCY)
Facility: HOSPITAL | Age: 20
Discharge: LEFT BEFORE DISCHARGE | End: 2020-10-21
Attending: STUDENT IN AN ORGANIZED HEALTH CARE EDUCATION/TRAINING PROGRAM | Admitting: STUDENT IN AN ORGANIZED HEALTH CARE EDUCATION/TRAINING PROGRAM

## 2020-10-21 VITALS
OXYGEN SATURATION: 99 % | SYSTOLIC BLOOD PRESSURE: 114 MMHG | HEIGHT: 67 IN | RESPIRATION RATE: 18 BRPM | TEMPERATURE: 97.7 F | WEIGHT: 98 LBS | BODY MASS INDEX: 15.38 KG/M2 | HEART RATE: 86 BPM | DIASTOLIC BLOOD PRESSURE: 63 MMHG

## 2020-10-21 DIAGNOSIS — R63.1 POLYDIPSIA: ICD-10-CM

## 2020-10-21 DIAGNOSIS — R10.84 GENERALIZED ABDOMINAL PAIN: ICD-10-CM

## 2020-10-21 DIAGNOSIS — R52 BODY ACHES: Primary | ICD-10-CM

## 2020-10-21 DIAGNOSIS — R35.89 POLYURIA: ICD-10-CM

## 2020-10-21 LAB
ALBUMIN SERPL-MCNC: 4.7 G/DL (ref 3.5–5.2)
ALBUMIN/GLOB SERPL: 1.7 G/DL
ALP SERPL-CCNC: 59 U/L (ref 39–117)
ALT SERPL W P-5'-P-CCNC: 20 U/L (ref 1–33)
ANION GAP SERPL CALCULATED.3IONS-SCNC: 14 MMOL/L (ref 5–15)
AST SERPL-CCNC: 14 U/L (ref 1–32)
BASOPHILS # BLD MANUAL: 0.05 10*3/MM3 (ref 0–0.2)
BASOPHILS NFR BLD AUTO: 1 % (ref 0–1.5)
BILIRUB SERPL-MCNC: 0.3 MG/DL (ref 0–1.2)
BUN SERPL-MCNC: 17 MG/DL (ref 6–20)
BUN/CREAT SERPL: 15.7 (ref 7–25)
CALCIUM SPEC-SCNC: 9.4 MG/DL (ref 8.6–10.5)
CHLORIDE SERPL-SCNC: 98 MMOL/L (ref 98–107)
CO2 SERPL-SCNC: 24 MMOL/L (ref 22–29)
CREAT SERPL-MCNC: 1.08 MG/DL (ref 0.57–1)
DEPRECATED RDW RBC AUTO: 36.2 FL (ref 37–54)
EOSINOPHIL # BLD MANUAL: 0.05 10*3/MM3 (ref 0–0.4)
EOSINOPHIL NFR BLD MANUAL: 1 % (ref 0.3–6.2)
ERYTHROCYTE [DISTWIDTH] IN BLOOD BY AUTOMATED COUNT: 12.2 % (ref 12.3–15.4)
GFR SERPL CREATININE-BSD FRML MDRD: 64 ML/MIN/1.73
GLOBULIN UR ELPH-MCNC: 2.8 GM/DL
GLUCOSE BLDC GLUCOMTR-MCNC: 328 MG/DL (ref 70–130)
GLUCOSE SERPL-MCNC: 319 MG/DL (ref 65–99)
HCG SERPL QL: NEGATIVE
HCT VFR BLD AUTO: 36.5 % (ref 34–46.6)
HGB BLD-MCNC: 12.6 G/DL (ref 12–15.9)
HOLD SPECIMEN: NORMAL
INR PPP: 0.95 (ref 0.8–1.2)
LIPASE SERPL-CCNC: 11 U/L (ref 13–60)
LYMPHOCYTES # BLD MANUAL: 2.46 10*3/MM3 (ref 0.7–3.1)
LYMPHOCYTES NFR BLD MANUAL: 46 % (ref 19.6–45.3)
LYMPHOCYTES NFR BLD MANUAL: 6 % (ref 5–12)
MCH RBC QN AUTO: 28.2 PG (ref 26.6–33)
MCHC RBC AUTO-ENTMCNC: 34.5 G/DL (ref 31.5–35.7)
MCV RBC AUTO: 81.7 FL (ref 79–97)
MONOCYTES # BLD AUTO: 0.32 10*3/MM3 (ref 0.1–0.9)
NEUTROPHILS # BLD AUTO: 2.35 10*3/MM3 (ref 1.7–7)
NEUTROPHILS NFR BLD MANUAL: 43 % (ref 42.7–76)
NEUTS BAND NFR BLD MANUAL: 1 % (ref 0–5)
NT-PROBNP SERPL-MCNC: 118.4 PG/ML (ref 0–450)
PLATELET # BLD AUTO: 269 10*3/MM3 (ref 140–450)
PMV BLD AUTO: 10.5 FL (ref 6–12)
POTASSIUM SERPL-SCNC: 4.2 MMOL/L (ref 3.5–5.2)
PROT SERPL-MCNC: 7.5 G/DL (ref 6–8.5)
PROTHROMBIN TIME: 13.1 SECONDS (ref 11.1–15.3)
RBC # BLD AUTO: 4.47 10*6/MM3 (ref 3.77–5.28)
RBC MORPH BLD: NORMAL
SARS-COV-2 N GENE RESP QL NAA+PROBE: NOT DETECTED
SMALL PLATELETS BLD QL SMEAR: ADEQUATE
SODIUM SERPL-SCNC: 136 MMOL/L (ref 136–145)
TROPONIN T SERPL-MCNC: <0.01 NG/ML (ref 0–0.03)
VARIANT LYMPHS NFR BLD MANUAL: 2 % (ref 0–5)
WBC # BLD AUTO: 5.35 10*3/MM3 (ref 3.4–10.8)
WBC MORPH BLD: NORMAL
WHOLE BLOOD HOLD SPECIMEN: NORMAL
WHOLE BLOOD HOLD SPECIMEN: NORMAL

## 2020-10-21 PROCEDURE — 96374 THER/PROPH/DIAG INJ IV PUSH: CPT

## 2020-10-21 PROCEDURE — 84484 ASSAY OF TROPONIN QUANT: CPT | Performed by: STUDENT IN AN ORGANIZED HEALTH CARE EDUCATION/TRAINING PROGRAM

## 2020-10-21 PROCEDURE — 71046 X-RAY EXAM CHEST 2 VIEWS: CPT

## 2020-10-21 PROCEDURE — 93005 ELECTROCARDIOGRAM TRACING: CPT

## 2020-10-21 PROCEDURE — 93005 ELECTROCARDIOGRAM TRACING: CPT | Performed by: STUDENT IN AN ORGANIZED HEALTH CARE EDUCATION/TRAINING PROGRAM

## 2020-10-21 PROCEDURE — 83690 ASSAY OF LIPASE: CPT | Performed by: STUDENT IN AN ORGANIZED HEALTH CARE EDUCATION/TRAINING PROGRAM

## 2020-10-21 PROCEDURE — 85025 COMPLETE CBC W/AUTO DIFF WBC: CPT

## 2020-10-21 PROCEDURE — 93010 ELECTROCARDIOGRAM REPORT: CPT | Performed by: INTERNAL MEDICINE

## 2020-10-21 PROCEDURE — 80053 COMPREHEN METABOLIC PANEL: CPT | Performed by: STUDENT IN AN ORGANIZED HEALTH CARE EDUCATION/TRAINING PROGRAM

## 2020-10-21 PROCEDURE — 87635 SARS-COV-2 COVID-19 AMP PRB: CPT | Performed by: STUDENT IN AN ORGANIZED HEALTH CARE EDUCATION/TRAINING PROGRAM

## 2020-10-21 PROCEDURE — 99283 EMERGENCY DEPT VISIT LOW MDM: CPT

## 2020-10-21 PROCEDURE — 84703 CHORIONIC GONADOTROPIN ASSAY: CPT | Performed by: STUDENT IN AN ORGANIZED HEALTH CARE EDUCATION/TRAINING PROGRAM

## 2020-10-21 PROCEDURE — 85007 BL SMEAR W/DIFF WBC COUNT: CPT | Performed by: STUDENT IN AN ORGANIZED HEALTH CARE EDUCATION/TRAINING PROGRAM

## 2020-10-21 PROCEDURE — 82962 GLUCOSE BLOOD TEST: CPT

## 2020-10-21 PROCEDURE — 83880 ASSAY OF NATRIURETIC PEPTIDE: CPT | Performed by: STUDENT IN AN ORGANIZED HEALTH CARE EDUCATION/TRAINING PROGRAM

## 2020-10-21 PROCEDURE — 25010000002 ONDANSETRON PER 1 MG: Performed by: STUDENT IN AN ORGANIZED HEALTH CARE EDUCATION/TRAINING PROGRAM

## 2020-10-21 PROCEDURE — 85610 PROTHROMBIN TIME: CPT | Performed by: STUDENT IN AN ORGANIZED HEALTH CARE EDUCATION/TRAINING PROGRAM

## 2020-10-21 RX ORDER — ONDANSETRON 2 MG/ML
4 INJECTION INTRAMUSCULAR; INTRAVENOUS ONCE
Status: COMPLETED | OUTPATIENT
Start: 2020-10-21 | End: 2020-10-21

## 2020-10-21 RX ORDER — HYDROXYZINE PAMOATE 25 MG/1
50 CAPSULE ORAL ONCE
Status: DISCONTINUED | OUTPATIENT
Start: 2020-10-21 | End: 2020-10-21 | Stop reason: HOSPADM

## 2020-10-21 RX ADMIN — ONDANSETRON HYDROCHLORIDE 4 MG: 2 INJECTION, SOLUTION INTRAMUSCULAR; INTRAVENOUS at 13:44

## 2020-10-21 RX ADMIN — SODIUM CHLORIDE, POTASSIUM CHLORIDE, SODIUM LACTATE AND CALCIUM CHLORIDE 1000 ML: 600; 310; 30; 20 INJECTION, SOLUTION INTRAVENOUS at 13:44

## 2020-10-21 NOTE — ED NOTES
Pt requesting to leave AMA. Charge nurse, Matthew, notified.      Kellen Quintana  10/21/20 1413

## 2020-10-21 NOTE — ED PROVIDER NOTES
Subjective   20-year-old female past medical history of type 1 diabetes comes to the ER chief complaint of body aches, abdominal pain, polyuria polydipsia for the last several days.  Patient denies fevers or chills.  She is nauseated, but no vomiting.  Patient does not have much of an appetite.  She has been in DKA before.  Her blood sugars are not well controlled.  They vary widely.  Patient's glucose has been 600 for the last day.  She continues to take her insulin.          Review of Systems   Constitutional: Positive for activity change, appetite change and fatigue. Negative for chills, diaphoresis and fever.   HENT: Negative for congestion and rhinorrhea.    Respiratory: Negative for cough, shortness of breath and wheezing.    Cardiovascular: Negative for chest pain, palpitations and leg swelling.   Gastrointestinal: Positive for nausea. Negative for abdominal pain, diarrhea and vomiting.   Endocrine: Positive for polydipsia and polyuria.   Genitourinary: Positive for frequency. Negative for difficulty urinating, dysuria, flank pain and urgency.   Musculoskeletal: Positive for arthralgias and myalgias.   Skin: Negative for color change and rash.   Neurological: Negative for dizziness and headaches.   Psychiatric/Behavioral: Negative for agitation. The patient is not nervous/anxious.        Past Medical History:   Diagnosis Date   • ADHD    • Bipolar 1 disorder (CMS/[facility])    • Borderline personality disorder (CMS/[facility])    • Cannabinoid hyperemesis syndrome    • Diabetes mellitus type 1 (CMS/[facility])    • Diabetic gastroparesis (CMS/[facility])    • Diabetic ketoacidosis (CMS/[facility])    • Diabetic neuropathy (CMS/[facility])    • Post traumatic stress disorder (PTSD)    • Recurrent UTI    • Seizure disorder (CMS/[facility])    • Severe depressed bipolar II disorder without psychotic features (CMS/[facility])    • Uncontrolled diabetes mellitus (CMS/[facility])        Allergies   Allergen Reactions   • Pineapple Anaphylaxis   • Benzoyl Peroxide Swelling        Past Surgical History:   Procedure Laterality Date   •  SECTION N/A 2018   • CYSTOSCOPY N/A 2020    Procedure: CYSTOSCOPY FLEXIBLE       (DONE ON STRETCHER);  Surgeon: Chapo, Antonio PRYOR MD;  Location: [facility];  Service: Urology;  Laterality: N/A;       Family History   Problem Relation Age of Onset   • Drug abuse Father    • Suicide Attempts Brother    • Dementia Maternal Grandfather    • Hypertension Paternal Grandmother        Social History     Socioeconomic History   • Marital status: Single     Spouse name: Not on file   • Number of children: Not on file   • Years of education: Not on file   • Highest education level: Not on file   Tobacco Use   • Smoking status: Current Every Day Smoker     Packs/day: 0.50     Years: 1.00     Pack years: 0.50   • Smokeless tobacco: Never Used   Substance and Sexual Activity   • Alcohol use: No   • Drug use: Yes     Types: Marijuana     Comment:    • Sexual activity: Yes     Partners: Male     Birth control/protection: None   Social History Narrative    Substance Abuse: Pt drinks EtOH occasionally, stating once every 6 months. Pt smokes marijuana, but denied any other illicit drugs. Pt smokes 0.5-1 ppd of cigarettes x 1 year. Pt denies caffeine consumption, states she drinks only water.         Marriages: none    Current Relationships: Recent breakup with her boyfriend    Children: one child that  2wks ago at 2 months old.        Occupation:  Pt is a  and loves her job, but hasn't been able to work since baby was born via C/S    Living Situation: was living with her boyfriend but they are  over the death of their child.  She plans to live with mom after discharge.           Objective    Vitals:    10/21/20 1233 10/21/20 1322 10/21/20 1414   BP: 107/75 91/51 114/63   BP Location: Left arm     Patient Position: Sitting     Pulse: 86     Resp: 18  18   Temp: 97.7 °F (36.5 °C)     TempSrc: Tympanic     SpO2: 100% 99%    Weight:  "44.5 kg (98 lb)     Height: 170.2 cm (67\")         Physical Exam  Vitals signs and nursing note reviewed.   Constitutional:       General: She is not in acute distress.     Appearance: She is well-developed and normal weight. She is ill-appearing. She is not toxic-appearing or diaphoretic.   HENT:      Head: Normocephalic.      Right Ear: External ear normal.      Left Ear: External ear normal.   Eyes:      Conjunctiva/sclera: Conjunctivae normal.   Pulmonary:      Effort: Pulmonary effort is normal. No accessory muscle usage or respiratory distress.      Breath sounds: No decreased breath sounds or wheezing.   Chest:      Chest wall: No tenderness.   Abdominal:      General: Bowel sounds are normal.      Palpations: Abdomen is soft. Abdomen is not rigid.      Tenderness: There is generalized abdominal tenderness (deep palpation).   Skin:     General: Skin is warm and dry.      Capillary Refill: Capillary refill takes less than 2 seconds.   Neurological:      Mental Status: She is alert and oriented to person, place, and time. She is not disoriented.      Cranial Nerves: No cranial nerve deficit (grossly intact).   Psychiatric:         Mood and Affect: Affect is labile.         Behavior: Behavior is agitated and aggressive.         Procedures           ED Course      Results for orders placed or performed during the hospital encounter of 10/21/20   Troponin    Specimen: Blood   Result Value Ref Range    Troponin T <0.010 0.000 - 0.030 ng/mL   Comprehensive Metabolic Panel    Specimen: Blood   Result Value Ref Range    Glucose 319 (H) 65 - 99 mg/dL    BUN 17 6 - 20 mg/dL    Creatinine 1.08 (H) 0.57 - 1.00 mg/dL    Sodium 136 136 - 145 mmol/L    Potassium 4.2 3.5 - 5.2 mmol/L    Chloride 98 98 - 107 mmol/L    CO2 24.0 22.0 - 29.0 mmol/L    Calcium 9.4 8.6 - 10.5 mg/dL    Total Protein 7.5 6.0 - 8.5 g/dL    Albumin 4.70 3.50 - 5.20 g/dL    ALT (SGPT) 20 1 - 33 U/L    AST (SGOT) 14 1 - 32 U/L    Alkaline Phosphatase 59 " 39 - 117 U/L    Total Bilirubin 0.3 0.0 - 1.2 mg/dL    eGFR Non African Amer 64 >60 mL/min/1.73    Globulin 2.8 gm/dL    A/G Ratio 1.7 g/dL    BUN/Creatinine Ratio 15.7 7.0 - 25.0    Anion Gap 14.0 5.0 - 15.0 mmol/L   BNP    Specimen: Blood   Result Value Ref Range    proBNP 118.4 0.0 - 450.0 pg/mL   hCG, Serum, Qualitative    Specimen: Blood   Result Value Ref Range    HCG Qualitative Negative Negative   Protime-INR    Specimen: Blood   Result Value Ref Range    Protime 13.1 11.1 - 15.3 Seconds    INR 0.95 0.80 - 1.20   CBC Auto Differential    Specimen: Blood   Result Value Ref Range    WBC 5.35 3.40 - 10.80 10*3/mm3    RBC 4.47 3.77 - 5.28 10*6/mm3    Hemoglobin 12.6 12.0 - 15.9 g/dL    Hematocrit 36.5 34.0 - 46.6 %    MCV 81.7 79.0 - 97.0 fL    MCH 28.2 26.6 - 33.0 pg    MCHC 34.5 31.5 - 35.7 g/dL    RDW 12.2 (L) 12.3 - 15.4 %    RDW-SD 36.2 (L) 37.0 - 54.0 fl    MPV 10.5 6.0 - 12.0 fL    Platelets 269 140 - 450 10*3/mm3   Manual Differential    Specimen: Blood   Result Value Ref Range    Neutrophil % 43.0 42.7 - 76.0 %    Lymphocyte % 46.0 (H) 19.6 - 45.3 %    Monocyte % 6.0 5.0 - 12.0 %    Eosinophil % 1.0 0.3 - 6.2 %    Basophil % 1.0 0.0 - 1.5 %    Bands %  1.0 0.0 - 5.0 %    Atypical Lymphocyte % 2.0 0.0 - 5.0 %    Neutrophils Absolute 2.35 1.70 - 7.00 10*3/mm3    Lymphocytes Absolute 2.46 0.70 - 3.10 10*3/mm3    Monocytes Absolute 0.32 0.10 - 0.90 10*3/mm3    Eosinophils Absolute 0.05 0.00 - 0.40 10*3/mm3    Basophils Absolute 0.05 0.00 - 0.20 10*3/mm3    RBC Morphology Normal Normal    WBC Morphology Normal Normal    Platelet Estimate Adequate Normal   Lipase    Specimen: Blood   Result Value Ref Range    Lipase 11 (L) 13 - 60 U/L   POC Glucose Once    Specimen: Blood   Result Value Ref Range    Glucose 328 (H) 70 - 130 mg/dL   Light Blue Top   Result Value Ref Range    Extra Tube hold for add-on    Green Top (Gel)   Result Value Ref Range    Extra Tube Hold for add-ons.    Lavender Top   Result Value Ref  Range    Extra Tube hold for add-on      XR Chest 2 View   Final Result   No evidence of active disease. Normal chest.          Electronically signed by:  Jules Sargent MD  10/21/2020 1:16 PM CDT   Workstation: [id number]                                           MD[facility]  Number of Diagnoses or Management Options  Body aches: new and requires workup  Generalized abdominal pain: new and requires workup  Polydipsia: new and requires workup  Polyuria: new and requires workup  Diagnosis management comments: Patient became aggressive, agitated, belligerent with the ER nurses and myself.  Patient is demanding narcotics to treat her pain.  She became quite upset when she did not receive it.  Patient states she wants to leave AMA. Patient is competent.  Patient was advised and expressed understanding of  the risks leaving AGAINST MEDICAL ADVICE include worsening of their medical condition resulting in disability or death.        Amount and/or Complexity of Data Reviewed  Clinical lab tests: reviewed and ordered  Tests in the radiology section of CPT®: reviewed and ordered  Tests in the medicine section of CPT®: ordered and reviewed  Decide to obtain previous medical records or to obtain history from someone other than the patient: yes  Review and summarize past medical records: yes        Final diagnoses:   Body aches   Generalized abdominal pain   Polyuria   Polydipsia            Sarthak Hobson MD  10/21/20 6517

## 2020-10-22 ENCOUNTER — TELEPHONE (OUTPATIENT)
Dept: OTHER | Facility: HOSPITAL | Age: 20
End: 2020-10-22

## 2020-12-15 ENCOUNTER — HOSPITAL ENCOUNTER (OUTPATIENT)
Facility: HOSPITAL | Age: 20
Setting detail: OBSERVATION
Discharge: HOME | End: 2020-12-16
Attending: FAMILY MEDICINE | Admitting: INTERNAL MEDICINE

## 2020-12-15 DIAGNOSIS — E10.65 HYPERGLYCEMIA DUE TO TYPE 1 DIABETES MELLITUS (HCC): Primary | ICD-10-CM

## 2020-12-15 LAB
ACETONE BLD QL: NEGATIVE
ALBUMIN SERPL-MCNC: 4.7 G/DL (ref 3.5–5.2)
ALBUMIN/GLOB SERPL: 1.5 G/DL
ALP SERPL-CCNC: 66 U/L (ref 39–117)
ALT SERPL W P-5'-P-CCNC: 28 U/L (ref 1–33)
ANION GAP SERPL CALCULATED.3IONS-SCNC: 11 MMOL/L (ref 5–15)
ANION GAP SERPL CALCULATED.3IONS-SCNC: 9 MMOL/L (ref 5–15)
AST SERPL-CCNC: 19 U/L (ref 1–32)
BASOPHILS # BLD AUTO: 0.04 10*3/MM3 (ref 0–0.2)
BASOPHILS NFR BLD AUTO: 0.7 % (ref 0–1.5)
BILIRUB SERPL-MCNC: 0.4 MG/DL (ref 0–1.2)
BILIRUB UR QL STRIP: NEGATIVE
BUN SERPL-MCNC: 18 MG/DL (ref 6–20)
BUN SERPL-MCNC: 23 MG/DL (ref 6–20)
BUN/CREAT SERPL: 18.2 (ref 7–25)
BUN/CREAT SERPL: 22.1 (ref 7–25)
CALCIUM SPEC-SCNC: 10 MG/DL (ref 8.6–10.5)
CALCIUM SPEC-SCNC: 9.3 MG/DL (ref 8.6–10.5)
CHLORIDE SERPL-SCNC: 101 MMOL/L (ref 98–107)
CHLORIDE SERPL-SCNC: 88 MMOL/L (ref 98–107)
CLARITY UR: CLEAR
CO2 SERPL-SCNC: 26 MMOL/L (ref 22–29)
CO2 SERPL-SCNC: 27 MMOL/L (ref 22–29)
COLOR UR: YELLOW
CREAT SERPL-MCNC: 0.99 MG/DL (ref 0.57–1)
CREAT SERPL-MCNC: 1.04 MG/DL (ref 0.57–1)
DEPRECATED RDW RBC AUTO: 39.5 FL (ref 37–54)
EOSINOPHIL # BLD AUTO: 0.16 10*3/MM3 (ref 0–0.4)
EOSINOPHIL NFR BLD AUTO: 2.7 % (ref 0.3–6.2)
ERYTHROCYTE [DISTWIDTH] IN BLOOD BY AUTOMATED COUNT: 13 % (ref 12.3–15.4)
GFR SERPL CREATININE-BSD FRML MDRD: 67 ML/MIN/1.73
GFR SERPL CREATININE-BSD FRML MDRD: 71 ML/MIN/1.73
GLOBULIN UR ELPH-MCNC: 3.2 GM/DL
GLUCOSE BLDC GLUCOMTR-MCNC: 228 MG/DL (ref 70–130)
GLUCOSE BLDC GLUCOMTR-MCNC: 254 MG/DL (ref 70–130)
GLUCOSE SERPL-MCNC: 211 MG/DL (ref 65–99)
GLUCOSE SERPL-MCNC: 727 MG/DL (ref 65–99)
GLUCOSE UR STRIP-MCNC: ABNORMAL MG/DL
HCG SERPL QL: NEGATIVE
HCT VFR BLD AUTO: 37.9 % (ref 34–46.6)
HGB BLD-MCNC: 13.3 G/DL (ref 12–15.9)
HGB UR QL STRIP.AUTO: NEGATIVE
IMM GRANULOCYTES # BLD AUTO: 0.02 10*3/MM3 (ref 0–0.05)
IMM GRANULOCYTES NFR BLD AUTO: 0.3 % (ref 0–0.5)
KETONES UR QL STRIP: NEGATIVE
LEUKOCYTE ESTERASE UR QL STRIP.AUTO: NEGATIVE
LYMPHOCYTES # BLD AUTO: 1.47 10*3/MM3 (ref 0.7–3.1)
LYMPHOCYTES NFR BLD AUTO: 24.7 % (ref 19.6–45.3)
MCH RBC QN AUTO: 29.2 PG (ref 26.6–33)
MCHC RBC AUTO-ENTMCNC: 35.1 G/DL (ref 31.5–35.7)
MCV RBC AUTO: 83.1 FL (ref 79–97)
MONOCYTES # BLD AUTO: 0.32 10*3/MM3 (ref 0.1–0.9)
MONOCYTES NFR BLD AUTO: 5.4 % (ref 5–12)
NEUTROPHILS NFR BLD AUTO: 3.94 10*3/MM3 (ref 1.7–7)
NEUTROPHILS NFR BLD AUTO: 66.2 % (ref 42.7–76)
NITRITE UR QL STRIP: NEGATIVE
NRBC BLD AUTO-RTO: 0 /100 WBC (ref 0–0.2)
PH UR STRIP.AUTO: 7 [PH] (ref 5–9)
PLATELET # BLD AUTO: 321 10*3/MM3 (ref 140–450)
PMV BLD AUTO: 10.3 FL (ref 6–12)
POTASSIUM SERPL-SCNC: 3.5 MMOL/L (ref 3.5–5.2)
POTASSIUM SERPL-SCNC: 4.6 MMOL/L (ref 3.5–5.2)
PROCALCITONIN SERPL-MCNC: 0.05 NG/ML (ref 0–0.25)
PROT SERPL-MCNC: 7.9 G/DL (ref 6–8.5)
PROT UR QL STRIP: NEGATIVE
RBC # BLD AUTO: 4.56 10*6/MM3 (ref 3.77–5.28)
SODIUM SERPL-SCNC: 125 MMOL/L (ref 136–145)
SODIUM SERPL-SCNC: 137 MMOL/L (ref 136–145)
SP GR UR STRIP: 1.03 (ref 1–1.03)
UROBILINOGEN UR QL STRIP: ABNORMAL
WBC # BLD AUTO: 5.95 10*3/MM3 (ref 3.4–10.8)

## 2020-12-15 PROCEDURE — 25010000002 KETOROLAC TROMETHAMINE PER 15 MG: Performed by: NURSE PRACTITIONER

## 2020-12-15 PROCEDURE — 80053 COMPREHEN METABOLIC PANEL: CPT | Performed by: NURSE PRACTITIONER

## 2020-12-15 PROCEDURE — 96361 HYDRATE IV INFUSION ADD-ON: CPT

## 2020-12-15 PROCEDURE — 63710000001 PROMETHAZINE PER 25 MG: Performed by: INTERNAL MEDICINE

## 2020-12-15 PROCEDURE — G0378 HOSPITAL OBSERVATION PER HR: HCPCS

## 2020-12-15 PROCEDURE — 25010000002 MORPHINE PER 10 MG: Performed by: NURSE PRACTITIONER

## 2020-12-15 PROCEDURE — 99284 EMERGENCY DEPT VISIT MOD MDM: CPT

## 2020-12-15 PROCEDURE — 96375 TX/PRO/DX INJ NEW DRUG ADDON: CPT

## 2020-12-15 PROCEDURE — 81003 URINALYSIS AUTO W/O SCOPE: CPT | Performed by: NURSE PRACTITIONER

## 2020-12-15 PROCEDURE — 25010000002 ONDANSETRON PER 1 MG: Performed by: NURSE PRACTITIONER

## 2020-12-15 PROCEDURE — 84703 CHORIONIC GONADOTROPIN ASSAY: CPT | Performed by: NURSE PRACTITIONER

## 2020-12-15 PROCEDURE — 84145 PROCALCITONIN (PCT): CPT | Performed by: NURSE PRACTITIONER

## 2020-12-15 PROCEDURE — 63710000001 INSULIN DETEMIR PER 5 UNITS: Performed by: INTERNAL MEDICINE

## 2020-12-15 PROCEDURE — 85025 COMPLETE CBC W/AUTO DIFF WBC: CPT | Performed by: NURSE PRACTITIONER

## 2020-12-15 PROCEDURE — 63710000001 INSULIN ASPART PER 5 UNITS: Performed by: INTERNAL MEDICINE

## 2020-12-15 PROCEDURE — 25010000002 ENOXAPARIN PER 10 MG: Performed by: INTERNAL MEDICINE

## 2020-12-15 PROCEDURE — 82009 KETONE BODYS QUAL: CPT | Performed by: NURSE PRACTITIONER

## 2020-12-15 PROCEDURE — 25010000003 INSULIN REGULAR HUMAN PER 5 UNITS: Performed by: NURSE PRACTITIONER

## 2020-12-15 PROCEDURE — 96365 THER/PROPH/DIAG IV INF INIT: CPT

## 2020-12-15 PROCEDURE — 82962 GLUCOSE BLOOD TEST: CPT

## 2020-12-15 RX ORDER — ACETAMINOPHEN 325 MG/1
650 TABLET ORAL EVERY 4 HOURS PRN
Status: DISCONTINUED | OUTPATIENT
Start: 2020-12-15 | End: 2020-12-16 | Stop reason: HOSPADM

## 2020-12-15 RX ORDER — BREXPIPRAZOLE 2 MG/1
TABLET ORAL DAILY
COMMUNITY
End: 2022-03-29 | Stop reason: HOSPADM

## 2020-12-15 RX ORDER — SODIUM CHLORIDE 9 MG/ML
100 INJECTION, SOLUTION INTRAVENOUS CONTINUOUS
Status: DISCONTINUED | OUTPATIENT
Start: 2020-12-15 | End: 2020-12-16 | Stop reason: HOSPADM

## 2020-12-15 RX ORDER — SODIUM CHLORIDE 0.9 % (FLUSH) 0.9 %
10 SYRINGE (ML) INJECTION AS NEEDED
Status: DISCONTINUED | OUTPATIENT
Start: 2020-12-15 | End: 2020-12-16 | Stop reason: HOSPADM

## 2020-12-15 RX ORDER — SODIUM CHLORIDE 0.9 % (FLUSH) 0.9 %
30 SYRINGE (ML) INJECTION ONCE AS NEEDED
Status: DISCONTINUED | OUTPATIENT
Start: 2020-12-15 | End: 2020-12-16 | Stop reason: HOSPADM

## 2020-12-15 RX ORDER — LACTULOSE 10 G/15ML
20 SOLUTION ORAL ONCE
Status: COMPLETED | OUTPATIENT
Start: 2020-12-15 | End: 2020-12-15

## 2020-12-15 RX ORDER — KETOROLAC TROMETHAMINE 15 MG/ML
15 INJECTION, SOLUTION INTRAMUSCULAR; INTRAVENOUS ONCE
Status: COMPLETED | OUTPATIENT
Start: 2020-12-15 | End: 2020-12-15

## 2020-12-15 RX ORDER — ACETAMINOPHEN 650 MG/1
650 SUPPOSITORY RECTAL EVERY 4 HOURS PRN
Status: DISCONTINUED | OUTPATIENT
Start: 2020-12-15 | End: 2020-12-16 | Stop reason: HOSPADM

## 2020-12-15 RX ORDER — SODIUM CHLORIDE 0.9 % (FLUSH) 0.9 %
10 SYRINGE (ML) INJECTION EVERY 12 HOURS SCHEDULED
Status: DISCONTINUED | OUTPATIENT
Start: 2020-12-15 | End: 2020-12-16 | Stop reason: HOSPADM

## 2020-12-15 RX ORDER — MORPHINE SULFATE 2 MG/ML
0.5 INJECTION, SOLUTION INTRAMUSCULAR; INTRAVENOUS ONCE
Status: COMPLETED | OUTPATIENT
Start: 2020-12-15 | End: 2020-12-15

## 2020-12-15 RX ORDER — PROMETHAZINE HYDROCHLORIDE 25 MG/1
25 TABLET ORAL EVERY 6 HOURS PRN
Status: DISCONTINUED | OUTPATIENT
Start: 2020-12-15 | End: 2020-12-16 | Stop reason: HOSPADM

## 2020-12-15 RX ORDER — DEXTROSE MONOHYDRATE 25 G/50ML
25 INJECTION, SOLUTION INTRAVENOUS
Status: DISCONTINUED | OUTPATIENT
Start: 2020-12-15 | End: 2020-12-16 | Stop reason: HOSPADM

## 2020-12-15 RX ORDER — ASCORBIC ACID 500 MG
2500 TABLET ORAL
Status: DISCONTINUED | OUTPATIENT
Start: 2020-12-15 | End: 2020-12-16 | Stop reason: HOSPADM

## 2020-12-15 RX ORDER — ACETAMINOPHEN 160 MG/5ML
650 SOLUTION ORAL EVERY 4 HOURS PRN
Status: DISCONTINUED | OUTPATIENT
Start: 2020-12-15 | End: 2020-12-16 | Stop reason: HOSPADM

## 2020-12-15 RX ORDER — HYDROCODONE BITARTRATE AND ACETAMINOPHEN 5; 325 MG/1; MG/1
1 TABLET ORAL EVERY 8 HOURS PRN
Status: DISCONTINUED | OUTPATIENT
Start: 2020-12-15 | End: 2020-12-16 | Stop reason: HOSPADM

## 2020-12-15 RX ORDER — ONDANSETRON 2 MG/ML
4 INJECTION INTRAMUSCULAR; INTRAVENOUS EVERY 6 HOURS PRN
Status: DISCONTINUED | OUTPATIENT
Start: 2020-12-15 | End: 2020-12-16 | Stop reason: HOSPADM

## 2020-12-15 RX ORDER — SODIUM CHLORIDE 0.9 % (FLUSH) 0.9 %
10 SYRINGE (ML) INJECTION AS NEEDED
Status: DISCONTINUED | OUTPATIENT
Start: 2020-12-15 | End: 2020-12-15

## 2020-12-15 RX ORDER — SODIUM CHLORIDE 9 MG/ML
30 INJECTION, SOLUTION INTRAVENOUS CONTINUOUS PRN
Status: DISCONTINUED | OUTPATIENT
Start: 2020-12-15 | End: 2020-12-16 | Stop reason: HOSPADM

## 2020-12-15 RX ORDER — NICOTINE POLACRILEX 4 MG
15 LOZENGE BUCCAL
Status: DISCONTINUED | OUTPATIENT
Start: 2020-12-15 | End: 2020-12-16 | Stop reason: HOSPADM

## 2020-12-15 RX ORDER — ONDANSETRON 2 MG/ML
4 INJECTION INTRAMUSCULAR; INTRAVENOUS ONCE
Status: DISCONTINUED | OUTPATIENT
Start: 2020-12-15 | End: 2020-12-16 | Stop reason: HOSPADM

## 2020-12-15 RX ORDER — HYDROXYZINE HYDROCHLORIDE 10 MG/1
10 TABLET, FILM COATED ORAL 3 TIMES DAILY PRN
Status: DISCONTINUED | OUTPATIENT
Start: 2020-12-15 | End: 2020-12-16 | Stop reason: HOSPADM

## 2020-12-15 RX ORDER — LAMOTRIGINE 25 MG/1
25 TABLET ORAL 3 TIMES DAILY
COMMUNITY
End: 2020-12-16 | Stop reason: HOSPADM

## 2020-12-15 RX ADMIN — ACETAMINOPHEN 650 MG: 325 TABLET, FILM COATED ORAL at 20:59

## 2020-12-15 RX ADMIN — KETOROLAC TROMETHAMINE 15 MG: 15 INJECTION, SOLUTION INTRAMUSCULAR; INTRAVENOUS at 10:39

## 2020-12-15 RX ADMIN — PROMETHAZINE HYDROCHLORIDE 25 MG: 25 TABLET ORAL at 22:41

## 2020-12-15 RX ADMIN — INSULIN DETEMIR 20 UNITS: 100 INJECTION, SOLUTION SUBCUTANEOUS at 21:32

## 2020-12-15 RX ADMIN — SODIUM CHLORIDE 1000 ML: 900 INJECTION, SOLUTION INTRAVENOUS at 12:45

## 2020-12-15 RX ADMIN — SODIUM CHLORIDE 1000 ML: 9 INJECTION, SOLUTION INTRAVENOUS at 10:39

## 2020-12-15 RX ADMIN — Medication 2500 MG: at 17:21

## 2020-12-15 RX ADMIN — SODIUM CHLORIDE 100 ML/HR: 9 INJECTION, SOLUTION INTRAVENOUS at 18:11

## 2020-12-15 RX ADMIN — LACTULOSE 20 G: 20 SOLUTION ORAL at 20:59

## 2020-12-15 RX ADMIN — SODIUM CHLORIDE 4 UNITS/HR: 9 INJECTION, SOLUTION INTRAVENOUS at 12:45

## 2020-12-15 RX ADMIN — INSULIN ASPART 9 UNITS: 100 INJECTION, SOLUTION INTRAVENOUS; SUBCUTANEOUS at 14:24

## 2020-12-15 RX ADMIN — SERTRALINE HYDROCHLORIDE 100 MG: 50 TABLET ORAL at 20:59

## 2020-12-15 RX ADMIN — MORPHINE SULFATE 0.5 MG: 2 INJECTION, SOLUTION INTRAMUSCULAR; INTRAVENOUS at 13:11

## 2020-12-15 RX ADMIN — SODIUM CHLORIDE, PRESERVATIVE FREE 10 ML: 5 INJECTION INTRAVENOUS at 14:25

## 2020-12-15 RX ADMIN — HYDROCODONE BITARTRATE AND ACETAMINOPHEN 1 TABLET: 5; 325 TABLET ORAL at 17:21

## 2020-12-15 RX ADMIN — BREXPIPRAZOLE 2 MG: 2 TABLET ORAL at 20:59

## 2020-12-15 RX ADMIN — INSULIN ASPART 4 UNITS: 100 INJECTION, SOLUTION INTRAVENOUS; SUBCUTANEOUS at 18:12

## 2020-12-15 NOTE — ED NOTES
Report called to TIMOTHY Mckinnon, all questions answered at this time     Reid Doherty RN  12/15/20 3541

## 2020-12-15 NOTE — H&P
----------------      [facility] Medicine Admission      Date of Admission: 12/15/2020      Primary Care Physician: Rufus Marshall APRN      Chief Complaint: weak    HPI:She has been with DM type 1 since 6 years old, anxiety, who controls her sugar levels with long acting insulin, lantus 30 units subc at night;\  and SSI with novolog; last A1C was around 10 over 2 months; associated symptoms are headaches and lower back and dysuria  She denies fever, nausea, vomiting, diarrhea, cough, shortness of breath      Concurrent Medical History:  has a past medical history of ADHD, Bipolar 1 disorder (CMS/HCC), Borderline personality disorder (CMS/HCC), Cannabinoid hyperemesis syndrome, Diabetes mellitus type 1 (CMS/HCC), Diabetic gastroparesis (CMS/HCC), Diabetic ketoacidosis (CMS/HCC), Diabetic neuropathy (CMS/HCC), Post traumatic stress disorder (PTSD), Recurrent UTI, Seizure disorder (CMS/HCC), Severe depressed bipolar II disorder without psychotic features (CMS/HCC), and Uncontrolled diabetes mellitus (CMS/HCC).    Past Surgical History:  has a past surgical history that includes  section (N/A, 2018) and Cystoscopy (N/A, 2020).    Family History: family history includes Dementia in her maternal grandfather; Drug abuse in her father; Hypertension in her paternal grandmother; Suicide Attempts in her brother.   Mom is 34 y/o, alive, with no medical problems  Dad is 35 y/o alive, unknown his whereabouts    Social History:  reports that she has been smoking. She has a 0.50 pack-year smoking history. She has never used smokeless tobacco. She reports current drug use. Drug: Marijuana. She reports that she does not drink alcohol.   She denies smoking tobacco, drinking alcohol, and she smokes weed once a month  She is single, and one son that  of sudden infant death syndrome  POA is her Mom    Allergies:   Allergies   Allergen Reactions   • Pineapple Anaphylaxis   •  Benzoyl Peroxide Swelling       Medications:   Prior to Admission medications    Medication Sig Start Date End Date Taking? Authorizing Provider   Blood Glucose Monitoring Suppl w/Device kit USE AS INDICATED, ANY MONITOR , ICD10 code is E11.9 3/14/19   Tavon Avila MD   Glucose Blood (BLOOD GLUCOSE TEST) strip Use 4 x daily use any brand covered by insurance or same brand as before ICD10 code is E11.9 10/31/19   Tavon Avila MD   hydrOXYzine (ATARAX) 10 MG tablet Take 10 mg by mouth 3 (Three) Times a Day As Needed for Itching or Anxiety.    Abigail Castro MD   insulin aspart (NOVOLOG FLEXPEN) 100 UNIT/ML solution pen-injector sc pen Up to 20 units with meals 10/31/19   Tavon Avila MD   Insulin Glargine (LANTUS SOLOSTAR) 100 UNIT/ML injection pen Inject 20 Units under the skin into the appropriate area as directed Daily. 5/8/20   Tavon Avila MD   Insulin Pen Needle (B-D UF III MINI PEN NEEDLES) 31G X 5 MM misc Use 4 times daily  , ICD10 code is E11.9 10/31/19   Tavon Avila MD   lamoTRIgine (LaMICtal) 25 MG tablet Take 25 mg by mouth Daily. Take two tablets by mouth daily    Abigail Castro MD   Lancet Devices (LANCING DEVICE) misc USE AS INDICATED TO CORRELATE WITH STRIPS AND METER 3/14/19   Tavon Avila MD   Lancets 30G misc USE 4 X DAILY 3/14/19   Tavon Avila MD   lisdexamfetamine (Vyvanse) 20 MG capsule Take 20 mg by mouth Every Morning    Abigail Castro MD   naproxen sodium (ANAPROX) 550 MG tablet Take 1 tablet by mouth 2 (Two) Times a Day As Needed for Mild Pain . 4/21/20   Jeffry Woods MD   promethazine (PHENERGAN) 25 MG tablet Take 25 mg by mouth Every 6 (Six) Hours As Needed for Nausea or Vomiting.    Abigail Castro MD   sertraline (ZOLOFT) 100 MG tablet Take 100 mg by mouth Every Night.    Abigail Castro MD       ondansetron, 4 mg, Intravenous, Once  sodium chloride, 1,000 mL,  Intravenous, Once      insulin, 1-20 Units/hr  sodium chloride, 30 mL/hr        Review of Systems:  Review of Systems   Constitutional: Positive for activity change. Negative for fever.   HENT: Negative for congestion.    Eyes: Negative for discharge and redness.   Respiratory: Negative for apnea and chest tightness.    Cardiovascular: Negative for chest pain and leg swelling.   Endocrine: Negative for cold intolerance.   Genitourinary: Positive for dysuria. Negative for difficulty urinating.   Musculoskeletal: Positive for back pain.   Neurological: Negative for dizziness and headaches.   Psychiatric/Behavioral: Negative for agitation and behavioral problems.      Otherwise complete ROS is negative except as mentioned above.    Physical Exam:   Temp:  [97.8 °F (36.6 °C)] 97.8 °F (36.6 °C)  Heart Rate:  [] 78  Resp:  [18] 18  BP: (115-130)/(75-86) 115/75  Physical Exam  Constitutional:       Appearance: She is ill-appearing.   HENT:      Head: Normocephalic.      Mouth/Throat:      Mouth: Mucous membranes are dry.   Eyes:      Extraocular Movements: Extraocular movements intact.   Neck:      Musculoskeletal: Normal range of motion and neck supple.   Cardiovascular:      Rate and Rhythm: Normal rate and regular rhythm.      Heart sounds: Normal heart sounds.   Pulmonary:      Effort: Pulmonary effort is normal.      Breath sounds: Normal breath sounds.   Abdominal:      General: Bowel sounds are normal.      Palpations: Abdomen is soft.   Musculoskeletal: Normal range of motion.      Comments: Lower back pain upon palpation   Skin:     General: Skin is warm.   Neurological:      General: No focal deficit present.      Mental Status: She is oriented to person, place, and time. Mental status is at baseline.   Psychiatric:         Mood and Affect: Mood normal.         Behavior: Behavior normal.           Results Reviewed:  I have personally reviewed current lab, radiology, and data and agree with results.  Lab  "Results (last 24 hours)     Procedure Component Value Units Date/Time    Comprehensive Metabolic Panel [[id number]]  (Abnormal) Collected: 12/15/20 1043    Specimen: Blood Updated: 12/15/20 1133     Glucose 727 mg/dL      BUN 23 mg/dL      Creatinine 1.04 mg/dL      Sodium 125 mmol/L      Potassium 4.6 mmol/L      Comment: Slight hemolysis detected by analyzer. Results may be affected.        Chloride 88 mmol/L      CO2 26.0 mmol/L      Calcium 10.0 mg/dL      Total Protein 7.9 g/dL      Albumin 4.70 g/dL      ALT (SGPT) 28 U/L      AST (SGOT) 19 U/L      Alkaline Phosphatase 66 U/L      Total Bilirubin 0.4 mg/dL      eGFR Non African Amer 67 mL/min/1.73      Globulin 3.2 gm/dL      A/G Ratio 1.5 g/dL      BUN/Creatinine Ratio 22.1     Anion Gap 11.0 mmol/L     Narrative:      GFR Normal >60  Chronic Kidney Disease <60  Kidney Failure <15      Procalcitonin [[id number]]  (Normal) Collected: 12/15/20 1043    Specimen: Blood Updated: 12/15/20 1116     Procalcitonin 0.05 ng/mL     Narrative:      As a Marker for Sepsis (Non-Neonates):   1. <0.5 ng/mL represents a low risk of severe sepsis and/or septic shock.  1. >2 ng/mL represents a high risk of severe sepsis and/or septic shock.    As a Marker for Lower Respiratory Tract Infections that require antibiotic therapy:  PCT on Admission     Antibiotic Therapy             6-12 Hrs later  > 0.5                Strongly Recommended            >0.25 - <0.5         Recommended  0.1 - 0.25           Discouraged                   Remeasure/reassess PCT  <0.1                 Strongly Discouraged          Remeasure/reassess PCT      As 28 day mortality risk marker: \"Change in Procalcitonin Result\" (> 80 % or <=80 %) if Day 0 (or Day 1) and Day 4 values are available. Refer to http://www.AutoReflex.coms-pct-calculator.com/   Change in PCT <=80 %   A decrease of PCT levels below or equal to 80 % defines a positive change in PCT test result representing a higher risk for 28-day all-cause " mortality of patients diagnosed with severe sepsis or septic shock.  Change in PCT > 80 %   A decrease of PCT levels of more than 80 % defines a negative change in PCT result representing a lower risk for 28-day all-cause mortality of patients diagnosed with severe sepsis or septic shock.                Results may be falsely decreased if patient taking Biotin.     hCG, Serum, Qualitative [[id number]]  (Normal) Collected: 12/15/20 1042    Specimen: Blood Updated: 12/15/20 1112     HCG Qualitative Negative    Ketone Bodies, Serum (Not performed at [facility]) [[id number]]  (Normal) Collected: 12/15/20 1042    Specimen: Blood Updated: 12/15/20 1109    Narrative:      The following orders were created for panel order Ketone Bodies, Serum (Not performed at [facility]).  Procedure                               Abnormality         Status                     ---------                               -----------         ------                     Acetone[[id number]]                      Normal              Final result                 Please view results for these tests on the individual orders.    Acetone [[id number]]  (Normal) Collected: 12/15/20 1042    Specimen: Blood Updated: 12/15/20 1109     Acetone Negative    Urinalysis With Culture If Indicated - Urine, Clean Catch [[id number]]  (Abnormal) Collected: 12/15/20 1039    Specimen: Urine, Clean Catch Updated: 12/15/20 1106     Color, UA Yellow     Appearance, UA Clear     pH, UA 7.0     Specific Gravity, UA 1.028     Glucose, UA >=1000 mg/dL (3+)     Ketones, UA Negative     Bilirubin, UA Negative     Blood, UA Negative     Protein, UA Negative     Leuk Esterase, UA Negative     Nitrite, UA Negative     Urobilinogen, UA 0.2 E.U./dL    Narrative:      Urine microscopic not indicated.    CBC & Differential [[id number]]  (Normal) Collected: 12/15/20 1043    Specimen: Blood Updated: 12/15/20 1045    Narrative:      The following orders were created for panel order CBC &  Differential.  Procedure                               Abnormality         Status                     ---------                               -----------         ------                     CBC Auto Differential[[id number]]        Normal              Final result                 Please view results for these tests on the individual orders.    CBC Auto Differential [[id number]]  (Normal) Collected: 12/15/20 1043    Specimen: Blood Updated: 12/15/20 1045     WBC 5.95 10*3/mm3      RBC 4.56 10*6/mm3      Hemoglobin 13.3 g/dL      Hematocrit 37.9 %      MCV 83.1 fL      MCH 29.2 pg      MCHC 35.1 g/dL      RDW 13.0 %      RDW-SD 39.5 fl      MPV 10.3 fL      Platelets 321 10*3/mm3      Neutrophil % 66.2 %      Lymphocyte % 24.7 %      Monocyte % 5.4 %      Eosinophil % 2.7 %      Basophil % 0.7 %      Immature Grans % 0.3 %      Neutrophils, Absolute 3.94 10*3/mm3      Lymphocytes, Absolute 1.47 10*3/mm3      Monocytes, Absolute 0.32 10*3/mm3      Eosinophils, Absolute 0.16 10*3/mm3      Basophils, Absolute 0.04 10*3/mm3      Immature Grans, Absolute 0.02 10*3/mm3      nRBC 0.0 /100 WBC         Imaging Results (Last 24 Hours)     ** No results found for the last 24 hours. **      Assessment and Plan    Hyperglycemia on DM type 1     Continue with the second liter of NSS and then maintenance     Stop now insulin drip since her glucose levels decrease with hydration     Continue with SSI and long acting insulin; start her diabetic diet    Lower back pain     Most likely related to above; toradol and morphine given one time     UA negative for an acute UTI     Monitor     Chronic medical problems     Anxiety     Bipolar disorder     PSTD      I discussed the patient's findings and my recommendations with patient  Eriberto Miranda MD

## 2020-12-15 NOTE — ED PROVIDER NOTES
Subjective   Patient presents to the ER with c/o elevated blood glucose and possible kidney infection. Patient states her blood sugars have been running between 400-600 at home for the past 3 days. She stats this AM it was running 600 and she took 7 units of regular insulin 90 minutes prior to arrival. She also states she has chronic kidney infections in which she see Dr. Cowan for. Last appointment with Dr. Kurtz was in October. She states she started this AM with bilateral lower back pain. Medications include Tylenol at home without relief. Patient states last menstrual cycle was >5 months ago and has been irregular since she had her baby in 2018. Serum HCG negative 11/24/20.           Review of Systems   Constitutional: Positive for appetite change and fatigue. Negative for chills and fever.   HENT: Negative.    Eyes: Negative.    Respiratory: Negative for cough, shortness of breath and wheezing.    Cardiovascular: Negative for chest pain and palpitations.   Gastrointestinal: Positive for abdominal pain (suprapubic) and nausea. Negative for constipation, diarrhea and vomiting.   Genitourinary: Positive for dysuria, flank pain, frequency and menstrual problem. Negative for decreased urine volume, difficulty urinating, hematuria, vaginal bleeding, vaginal discharge and vaginal pain.   Musculoskeletal: Positive for back pain.   Skin: Negative.    Neurological: Positive for weakness (generalized). Negative for dizziness, light-headedness, numbness and headaches.   Psychiatric/Behavioral: Negative.        Past Medical History:   Diagnosis Date   • ADHD    • Bipolar 1 disorder (CMS/HCC)    • Borderline personality disorder (CMS/HCC)    • Cannabinoid hyperemesis syndrome    • Diabetes mellitus type 1 (CMS/HCC)    • Diabetic gastroparesis (CMS/HCC)    • Diabetic ketoacidosis (CMS/HCC)    • Diabetic neuropathy (CMS/[facility])    • Post traumatic stress disorder (PTSD)    • Recurrent UTI    • Seizure disorder (CMS/HCC)    •  Severe depressed bipolar II disorder without psychotic features (CMS/HCC)    • Uncontrolled diabetes mellitus (CMS/HCC)        Allergies   Allergen Reactions   • Pineapple Anaphylaxis   • Benzoyl Peroxide Swelling       Past Surgical History:   Procedure Laterality Date   •  SECTION N/A 2018   • CYSTOSCOPY N/A 2020    Procedure: CYSTOSCOPY FLEXIBLE       (DONE ON STRETCHER);  Surgeon: Chapo, Antonio PRYOR MD;  Location: [facility];  Service: Urology;  Laterality: N/A;       Family History   Problem Relation Age of Onset   • Drug abuse Father    • Suicide Attempts Brother    • Dementia Maternal Grandfather    • Hypertension Paternal Grandmother        Social History     Socioeconomic History   • Marital status: Single     Spouse name: Not on file   • Number of children: Not on file   • Years of education: Not on file   • Highest education level: Not on file   Tobacco Use   • Smoking status: Current Every Day Smoker     Packs/day: 0.50     Years: 1.00     Pack years: 0.50   • Smokeless tobacco: Never Used   Substance and Sexual Activity   • Alcohol use: No   • Drug use: Yes     Types: Marijuana     Comment:    • Sexual activity: Yes     Partners: Male     Birth control/protection: None   Social History Narrative    Substance Abuse: Pt drinks EtOH occasionally, stating once every 6 months. Pt smokes marijuana, but denied any other illicit drugs. Pt smokes 0.5-1 ppd of cigarettes x 1 year. Pt denies caffeine consumption, states she drinks only water.         Marriages: none    Current Relationships: Recent breakup with her boyfriend    Children: one child that  2wks ago at 2 months old.        Occupation:  Pt is a  and loves her job, but hasn't been able to work since baby was born via C/S    Living Situation: was living with her boyfriend but they are  over the death of their child.  She plans to live with mom after discharge.           Objective    /75   Pulse 78    "Temp 97.8 °F (36.6 °C) (Infrared)   Resp 18   Ht 170.2 cm (67\")   Wt 44.5 kg (98 lb)   SpO2 99%   BMI 15.35 kg/m²     Physical Exam  Vitals signs and nursing note reviewed.   Constitutional:       General: She is not in acute distress.     Appearance: She is well-developed. She is not ill-appearing or toxic-appearing.      Comments: Thin appearing female   HENT:      Head: Normocephalic and atraumatic.   Neck:      Musculoskeletal: Normal range of motion and neck supple.   Cardiovascular:      Rate and Rhythm: Regular rhythm. Tachycardia present.      Heart sounds: Normal heart sounds. No murmur.   Pulmonary:      Effort: Pulmonary effort is normal. No respiratory distress.      Breath sounds: Normal breath sounds. No wheezing.   Abdominal:      General: Abdomen is flat. Bowel sounds are normal. There is no distension.      Palpations: Abdomen is soft.      Tenderness: There is abdominal tenderness in the suprapubic area. There is right CVA tenderness and left CVA tenderness. There is no guarding or rebound.   Musculoskeletal: Normal range of motion.   Skin:     General: Skin is warm and dry.      Capillary Refill: Capillary refill takes less than 2 seconds.   Neurological:      Mental Status: She is alert and oriented to person, place, and time.      Coordination: Coordination normal.   Psychiatric:         Behavior: Behavior normal.         Thought Content: Thought content normal.         Judgment: Judgment normal.         Procedures  Results for orders placed or performed during the hospital encounter of 12/15/20   Comprehensive Metabolic Panel    Specimen: Blood   Result Value Ref Range    Glucose 727 (C) 65 - 99 mg/dL    BUN 23 (H) 6 - 20 mg/dL    Creatinine 1.04 (H) 0.57 - 1.00 mg/dL    Sodium 125 (L) 136 - 145 mmol/L    Potassium 4.6 3.5 - 5.2 mmol/L    Chloride 88 (L) 98 - 107 mmol/L    CO2 26.0 22.0 - 29.0 mmol/L    Calcium 10.0 8.6 - 10.5 mg/dL    Total Protein 7.9 6.0 - 8.5 g/dL    Albumin 4.70 3.50 " - 5.20 g/dL    ALT (SGPT) 28 1 - 33 U/L    AST (SGOT) 19 1 - 32 U/L    Alkaline Phosphatase 66 39 - 117 U/L    Total Bilirubin 0.4 0.0 - 1.2 mg/dL    eGFR Non African Amer 67 >60 mL/min/1.73    Globulin 3.2 gm/dL    A/G Ratio 1.5 g/dL    BUN/Creatinine Ratio 22.1 7.0 - 25.0    Anion Gap 11.0 5.0 - 15.0 mmol/L   hCG, Serum, Qualitative    Specimen: Blood   Result Value Ref Range    HCG Qualitative Negative Negative   Urinalysis With Culture If Indicated - Urine, Clean Catch    Specimen: Urine, Clean Catch   Result Value Ref Range    Color, UA Yellow Yellow, Straw, Dark Yellow, [place]    Appearance, UA Clear Clear    pH, UA 7.0 5.0 - 9.0    Specific Gravity, UA 1.028 1.003 - 1.030    Glucose, UA >=1000 mg/dL (3+) (A) Negative    Ketones, UA Negative Negative    Bilirubin, UA Negative Negative    Blood, UA Negative Negative    Protein, UA Negative Negative    Leuk Esterase, UA Negative Negative    Nitrite, UA Negative Negative    Urobilinogen, UA 0.2 E.U./dL 0.2 - 1.0 E.U./dL   Procalcitonin    Specimen: Blood   Result Value Ref Range    Procalcitonin 0.05 0.00 - 0.25 ng/mL   CBC Auto Differential    Specimen: Blood   Result Value Ref Range    WBC 5.95 3.40 - 10.80 10*3/mm3    RBC 4.56 3.77 - 5.28 10*6/mm3    Hemoglobin 13.3 12.0 - 15.9 g/dL    Hematocrit 37.9 34.0 - 46.6 %    MCV 83.1 79.0 - 97.0 fL    MCH 29.2 26.6 - 33.0 pg    MCHC 35.1 31.5 - 35.7 g/dL    RDW 13.0 12.3 - 15.4 %    RDW-SD 39.5 37.0 - 54.0 fl    MPV 10.3 6.0 - 12.0 fL    Platelets 321 140 - 450 10*3/mm3    Neutrophil % 66.2 42.7 - 76.0 %    Lymphocyte % 24.7 19.6 - 45.3 %    Monocyte % 5.4 5.0 - 12.0 %    Eosinophil % 2.7 0.3 - 6.2 %    Basophil % 0.7 0.0 - 1.5 %    Immature Grans % 0.3 0.0 - 0.5 %    Neutrophils, Absolute 3.94 1.70 - 7.00 10*3/mm3    Lymphocytes, Absolute 1.47 0.70 - 3.10 10*3/mm3    Monocytes, Absolute 0.32 0.10 - 0.90 10*3/mm3    Eosinophils, Absolute 0.16 0.00 - 0.40 10*3/mm3    Basophils, Absolute 0.04 0.00 - 0.20 10*3/mm3     Immature Grans, Absolute 0.02 0.00 - 0.05 10*3/mm3    nRBC 0.0 0.0 - 0.2 /100 WBC   Acetone    Specimen: Blood   Result Value Ref Range    Acetone Negative Negative              ED Course  ED Course as of Dec 17 1025   Tue Dec 15, 2020   1138 Patient discussed with Dr. Hu. Will need to OBS patient for insulin to get BS down to a safe level for D/C home. Dr. Miranda paged.     [SH]   1157 Dr. Miranda repaged.     [SH]   1202 Spoke with Dr. Miranda. Agrees to admission at this time to step down. Advised to start titration at 4 unit and provide another liter of fluids.     [SH]   1230 Patient sleeping upon entering room. Discussed labs and admission with patient. Patient verified understanding.     [SH]   1257 Dr. Miranda in ER to see patient. Patient requesting morphine per patient. Patient ordered 0.5 mg morphine per verbal order Dr. Miranda.     [SH]      ED Course User Index  [SH] Tamara Arredondo, ARMOND                                           [facility]    Final diagnoses:   Hyperglycemia due to type 1 diabetes mellitus (CMS/[facility])            Tamara Arredondo, ARMOND  12/17/20 1025

## 2020-12-16 VITALS
TEMPERATURE: 98.3 F | BODY MASS INDEX: 16.05 KG/M2 | DIASTOLIC BLOOD PRESSURE: 81 MMHG | OXYGEN SATURATION: 99 % | HEIGHT: 67 IN | WEIGHT: 102.25 LBS | RESPIRATION RATE: 18 BRPM | HEART RATE: 92 BPM | SYSTOLIC BLOOD PRESSURE: 118 MMHG

## 2020-12-16 LAB
ANION GAP SERPL CALCULATED.3IONS-SCNC: 10 MMOL/L (ref 5–15)
BUN SERPL-MCNC: 18 MG/DL (ref 6–20)
BUN/CREAT SERPL: 25.4 (ref 7–25)
CALCIUM SPEC-SCNC: 8.9 MG/DL (ref 8.6–10.5)
CHLORIDE SERPL-SCNC: 104 MMOL/L (ref 98–107)
CO2 SERPL-SCNC: 27 MMOL/L (ref 22–29)
CREAT SERPL-MCNC: 0.71 MG/DL (ref 0.57–1)
GFR SERPL CREATININE-BSD FRML MDRD: 104 ML/MIN/1.73
GLUCOSE BLDC GLUCOMTR-MCNC: 111 MG/DL (ref 70–130)
GLUCOSE BLDC GLUCOMTR-MCNC: 123 MG/DL (ref 70–130)
GLUCOSE BLDC GLUCOMTR-MCNC: 248 MG/DL (ref 70–130)
GLUCOSE BLDC GLUCOMTR-MCNC: 59 MG/DL (ref 70–130)
GLUCOSE SERPL-MCNC: 114 MG/DL (ref 65–99)
HBA1C MFR BLD: 12.3 % (ref 4.8–5.6)
MAGNESIUM SERPL-MCNC: 1.6 MG/DL (ref 1.6–2.6)
PHOSPHATE SERPL-MCNC: 4.1 MG/DL (ref 2.5–4.5)
POTASSIUM SERPL-SCNC: 3.6 MMOL/L (ref 3.5–5.2)
SODIUM SERPL-SCNC: 141 MMOL/L (ref 136–145)

## 2020-12-16 PROCEDURE — G0378 HOSPITAL OBSERVATION PER HR: HCPCS

## 2020-12-16 PROCEDURE — 96361 HYDRATE IV INFUSION ADD-ON: CPT

## 2020-12-16 PROCEDURE — 82962 GLUCOSE BLOOD TEST: CPT

## 2020-12-16 PROCEDURE — 83735 ASSAY OF MAGNESIUM: CPT | Performed by: INTERNAL MEDICINE

## 2020-12-16 PROCEDURE — 84100 ASSAY OF PHOSPHORUS: CPT | Performed by: INTERNAL MEDICINE

## 2020-12-16 PROCEDURE — 83036 HEMOGLOBIN GLYCOSYLATED A1C: CPT | Performed by: INTERNAL MEDICINE

## 2020-12-16 PROCEDURE — 80048 BASIC METABOLIC PNL TOTAL CA: CPT | Performed by: INTERNAL MEDICINE

## 2020-12-16 RX ORDER — ASCORBIC ACID 500 MG
1000 TABLET ORAL
Qty: 180 TABLET | Refills: 0 | Status: SHIPPED | OUTPATIENT
Start: 2020-12-16 | End: 2021-01-15

## 2020-12-16 RX ADMIN — SODIUM CHLORIDE 100 ML/HR: 9 INJECTION, SOLUTION INTRAVENOUS at 05:05

## 2020-12-16 RX ADMIN — SODIUM CHLORIDE, PRESERVATIVE FREE 10 ML: 5 INJECTION INTRAVENOUS at 09:24

## 2020-12-16 RX ADMIN — HYDROCODONE BITARTRATE AND ACETAMINOPHEN 1 TABLET: 5; 325 TABLET ORAL at 05:05

## 2020-12-16 RX ADMIN — Medication 2500 MG: at 09:24

## 2020-12-16 NOTE — PLAN OF CARE
Pt reports pain; medicated. Last FSBS 228. Will continue to monitor.     Goal Outcome Evaluation:

## 2020-12-16 NOTE — CONSULTS
Adult Nutrition  Assessment    Patient Name:  Fernanda Patterson  YOB: 2000  MRN: [id number]  Admit Date:  12/15/2020    Assessment Date:  12/16/2020    Comments:  Pt with dx Type 1 DM.  Pt reports good appetite.  Intake 100% for 1 snack.  BMI 16 with pt at 75% IBW.  Pt declined milk, supplement but was willing to receive cottage cheese with lunch and supper.  Blood glucose low on occasion but is usually elevated.  HgbA1C is elevated at 12.3.  Pt indicated she and her MD were working on blood sugar control and that he HgbA1C was previously 14.  Levemir insulin, sliding scale novolog prescribed.  Pt denied need for diet ed/info.  Pt has discharge orders.    Reason for Assessment     Row Name 12/16/20 1351          Reason for Assessment    Reason For Assessment  per organizational policy underweight     Diagnosis  endocrine conditions dx Type 1 DM     Identified At Risk by Screening Criteria  BMI             Labs/Tests/Procedures/Meds     Row Name 12/16/20 1352          Labs/Procedures/Meds    Lab Results Reviewed  reviewed, pertinent        Medications    Pertinent Medications Reviewed  reviewed, pertinent         Physical Findings     Row Name 12/16/20 1353          Physical Findings    Overall Physical Appearance  underweight         Estimated/Assessed Needs     Row Name 12/16/20 1353          Calculation Measurements    Weight Used For Calculations  61.3 kg (135 lb 2.3 oz)        Estimated/Assessed Needs    Additional Documentation  Calorie Requirements (Group);Fluid Requirements (Group);Protein Requirements (Group);[place]. Jeor Equation (Group)        Calorie Requirements    Estimated Calorie Requirement (kcal/day)  1833     Estimated Calorie Need Method  [place]-St Jeor        [place]-St. Jeor Equation    RMR ([place]-St. Jeor Equation)  1410.625        Protein Requirements    Weight Used For Protein Calculations  61.3 kg (135 lb 2.3 oz)     Est Protein Requirement Amount (gms/kg)  1.0 gm  protein     Estimated Protein Requirements (gms/day)  61.3        Fluid Requirements    Fluid Requirements (mL/day)  1840     RDA Method (mL)  1840         Nutrition Prescription Ordered     Row Name 12/16/20 1355          Nutrition Prescription PO    Current PO Diet  Regular     Common Modifiers  Consistent Carbohydrate         Evaluation of Received Nutrient/Fluid Intake     Row Name 12/16/20 1355          PO Evaluation    Number of Meals  -- 1 snack     % PO Intake  100% for 1 snack               Electronically signed by:  Johanna Pisano, JORDAN  12/16/20 14:00 CST

## 2020-12-16 NOTE — NURSING NOTE
Pt stated since emesis episode around 2220 last night doesn't remember voiding. States voided x2 yesterday. Hat placed in toilet. Bladder scan 143ml. No distention noted. Did note 4lb weight gain via standing scale this am. Will continue to monitor.

## 2020-12-16 NOTE — PLAN OF CARE
Goal Outcome Evaluation:  Plan of Care Reviewed With: patient  Progress: improving  Outcome Summary: Pt's VSS. Did have one hypoglycemic episode this am around 0200. FSBS was 59. Responded well to snack/juice. N/V episode x1. Pt seems to have rested after phenergan PO administered. Will continue to monitor.

## 2020-12-16 NOTE — DISCHARGE SUMMARY
[facility] Medicine Services  DISCHARGE SUMMARY       Date of Admission: 12/15/2020  Date of Discharge:  12/16/2020  Primary Care Physician: Rufus Marshall APRN    Presenting Problem/History of Present Illness:  Hyperglycemia due to type 1 diabetes mellitus (CMS/[facility]) [E10.65]       Final Discharge Diagnoses:      Severe Hyperglycemia on DM type 1     No DKA     with A1C 12.3; resolving; tolerating PO; no nausea, no vomit; continue with home meds     Lower back pain     related to constipation; resolved     UTI was ruled out with UA; will continue with vitamin C at home     Chronic medical problems     Anxiety     Bipolar disorder     PSTD       Consults:   Consults     No orders found for last 30 day(s).          Procedures Performed: None               Pertinent Test Results:   Lab Results (most recent)     Procedure Component Value Units Date/Time    POC Glucose Once [[id number]]  (Abnormal) Collected: 12/16/20 1003    Specimen: Blood Updated: 12/16/20 1024     Glucose 248 mg/dL      Comment: RN NotifiedOperator: [id number] [place] VICKERYMeter ID: [id number]       POC Glucose Once [[id number]]  (Normal) Collected: 12/16/20 0604    Specimen: Blood Updated: 12/16/20 0629     Glucose 111 mg/dL      Comment: RN NotifiedOperator: [id number] PIER[place] MCGUIREFERMeter ID: [id number]       Hemoglobin A1c [[id number]]  (Abnormal) Collected: 12/16/20 0510    Specimen: Blood Updated: 12/16/20 0615     Hemoglobin A1C 12.30 %     Narrative:      Hemoglobin A1C Ranges:    Increased Risk for Diabetes  5.7% to 6.4%  Diabetes                     >= 6.5%  Diabetic Goal                < 7.0%    Basic Metabolic Panel [[id number]]  (Abnormal) Collected: 12/16/20 0510    Specimen: Blood Updated: 12/16/20 0613     Glucose 114 mg/dL      BUN 18 mg/dL      Creatinine 0.71 mg/dL      Sodium 141 mmol/L      Potassium 3.6 mmol/L      Chloride 104 mmol/L      CO2 27.0 mmol/L      Calcium 8.9 mg/dL       eGFR Non African Amer 104 mL/min/1.73      BUN/Creatinine Ratio 25.4     Anion Gap 10.0 mmol/L     Narrative:      GFR Normal >60  Chronic Kidney Disease <60  Kidney Failure <15      Magnesium [[id number]]  (Normal) Collected: 12/16/20 0510    Specimen: Blood Updated: 12/16/20 0613     Magnesium 1.6 mg/dL     Phosphorus [[id number]]  (Normal) Collected: 12/16/20 0510    Specimen: Blood Updated: 12/16/20 0613     Phosphorus 4.1 mg/dL     Basic Metabolic Panel [[id number]]  (Abnormal) Collected: 12/15/20 1751    Specimen: Blood Updated: 12/15/20 1826     Glucose 211 mg/dL      BUN 18 mg/dL      Creatinine 0.99 mg/dL      Sodium 137 mmol/L      Potassium 3.5 mmol/L      Comment: Slight hemolysis detected by analyzer. Results may be affected.        Chloride 101 mmol/L      CO2 27.0 mmol/L      Calcium 9.3 mg/dL      eGFR Non African Amer 71 mL/min/1.73      BUN/Creatinine Ratio 18.2     Anion Gap 9.0 mmol/L     Narrative:      GFR Normal >60  Chronic Kidney Disease <60  Kidney Failure <15      Comprehensive Metabolic Panel [[id number]]  (Abnormal) Collected: 12/15/20 1043    Specimen: Blood Updated: 12/15/20 1133     Glucose 727 mg/dL      BUN 23 mg/dL      Creatinine 1.04 mg/dL      Sodium 125 mmol/L      Potassium 4.6 mmol/L      Comment: Slight hemolysis detected by analyzer. Results may be affected.        Chloride 88 mmol/L      CO2 26.0 mmol/L      Calcium 10.0 mg/dL      Total Protein 7.9 g/dL      Albumin 4.70 g/dL      ALT (SGPT) 28 U/L      AST (SGOT) 19 U/L      Alkaline Phosphatase 66 U/L      Total Bilirubin 0.4 mg/dL      eGFR Non African Amer 67 mL/min/1.73      Globulin 3.2 gm/dL      A/G Ratio 1.5 g/dL      BUN/Creatinine Ratio 22.1     Anion Gap 11.0 mmol/L     Narrative:      GFR Normal >60  Chronic Kidney Disease <60  Kidney Failure <15      Procalcitonin [[id number]]  (Normal) Collected: 12/15/20 1043    Specimen: Blood Updated: 12/15/20 1116     Procalcitonin 0.05 ng/mL     Narrative:      As a Marker  "for Sepsis (Non-Neonates):   1. <0.5 ng/mL represents a low risk of severe sepsis and/or septic shock.  1. >2 ng/mL represents a high risk of severe sepsis and/or septic shock.    As a Marker for Lower Respiratory Tract Infections that require antibiotic therapy:  PCT on Admission     Antibiotic Therapy             6-12 Hrs later  > 0.5                Strongly Recommended            >0.25 - <0.5         Recommended  0.1 - 0.25           Discouraged                   Remeasure/reassess PCT  <0.1                 Strongly Discouraged          Remeasure/reassess PCT      As 28 day mortality risk marker: \"Change in Procalcitonin Result\" (> 80 % or <=80 %) if Day 0 (or Day 1) and Day 4 values are available. Refer to [email or web address]   Change in PCT <=80 %   A decrease of PCT levels below or equal to 80 % defines a positive change in PCT test result representing a higher risk for 28-day all-cause mortality of patients diagnosed with severe sepsis or septic shock.  Change in PCT > 80 %   A decrease of PCT levels of more than 80 % defines a negative change in PCT result representing a lower risk for 28-day all-cause mortality of patients diagnosed with severe sepsis or septic shock.      Results may be falsely decreased if patient taking Biotin.     hCG, Serum, Qualitative [[id number]]  (Normal) Collected: 12/15/20 1042    Specimen: Blood Updated: 12/15/20 1112     HCG Qualitative Negative    Ketone Bodies, Serum (Not performed at [place]) [[id number]]  (Normal) Collected: 12/15/20 1042    Specimen: Blood Updated: 12/15/20 1109    Narrative:      The following orders were created for panel order Ketone Bodies, Serum (Not performed at [place]).  Procedure                               Abnormality         Status                     ---------                               -----------         ------                     Acetone[[id number]]                      Normal              Final result             "     Please view results for these tests on the individual orders.    Acetone [[id number]]  (Normal) Collected: 12/15/20 1042    Specimen: Blood Updated: 12/15/20 1109     Acetone Negative    Urinalysis With Culture If Indicated - Urine, Clean Catch [[id number]]  (Abnormal) Collected: 12/15/20 1039    Specimen: Urine, Clean Catch Updated: 12/15/20 1106     Color, UA Yellow     Appearance, UA Clear     pH, UA 7.0     Specific Gravity, UA 1.028     Glucose, UA >=1000 mg/dL (3+)     Ketones, UA Negative     Bilirubin, UA Negative     Blood, UA Negative     Protein, UA Negative     Leuk Esterase, UA Negative     Nitrite, UA Negative     Urobilinogen, UA 0.2 E.U./dL    Narrative:      Urine microscopic not indicated.    CBC & Differential [[id number]]  (Normal) Collected: 12/15/20 1043    Specimen: Blood Updated: 12/15/20 1045    Narrative:      The following orders were created for panel order CBC & Differential.  Procedure                               Abnormality         Status                     ---------                               -----------         ------                     CBC Auto Differential[[id number]]        Normal              Final result                 Please view results for these tests on the individual orders.    CBC Auto Differential [[id number]]  (Normal) Collected: 12/15/20 1043    Specimen: Blood Updated: 12/15/20 1045     WBC 5.95 10*3/mm3      RBC 4.56 10*6/mm3      Hemoglobin 13.3 g/dL      Hematocrit 37.9 %      MCV 83.1 fL      MCH 29.2 pg      MCHC 35.1 g/dL      RDW 13.0 %      RDW-SD 39.5 fl      MPV 10.3 fL      Platelets 321 10*3/mm3      Neutrophil % 66.2 %      Lymphocyte % 24.7 %      Monocyte % 5.4 %      Eosinophil % 2.7 %      Basophil % 0.7 %      Immature Grans % 0.3 %      Neutrophils, Absolute 3.94 10*3/mm3      Lymphocytes, Absolute 1.47 10*3/mm3      Monocytes, Absolute 0.32 10*3/mm3      Eosinophils, Absolute 0.16 10*3/mm3      Basophils, Absolute 0.04 10*3/mm3      Immature  "Grans, Absolute 0.02 10*3/mm3      nRBC 0.0 /100 WBC         Imaging Results (Most Recent)     None          Chief Complaint on Day of Discharge: none    Hospital Course:  The patient is a 20 y.o. female who presented to [facility] with severe hyperglycemia, with lower back pain and dysuria; she has been hydrated, and given lactulose for constipation and all her symptoms has been resolved; she is eager to go home now. She was educated regarding changing lifestyle and stay away from process foods      Condition on Discharge:  Stable    Physical Exam on Discharge:  /81 (BP Location: Left arm, Patient Position: Lying)   Pulse 92   Temp 98.3 °F (36.8 °C) (Oral)   Resp 18   Ht 170.2 cm (67\")   Wt 46.4 kg (102 lb 4 oz)   SpO2 99%   BMI 16.01 kg/m²      Physical Exam  Constitutional:       Appearance: Normal appearance.   HENT:      Head: Normocephalic.      Mouth/Throat:      Mouth: Mucous membranes are moist.   Eyes:      Extraocular Movements: Extraocular movements intact.   Neck:      Musculoskeletal: Normal range of motion and neck supple.   Cardiovascular:      Rate and Rhythm: Normal rate and regular rhythm.      Pulses: Normal pulses.   Pulmonary:      Effort: Pulmonary effort is normal.      Breath sounds: Normal breath sounds.   Abdominal:      General: Bowel sounds are normal.      Palpations: Abdomen is soft.   Musculoskeletal: Normal range of motion.   Skin:     General: Skin is warm.   Neurological:      General: No focal deficit present.      Mental Status: She is alert. Mental status is at baseline.   Psychiatric:         Mood and Affect: Mood normal.         Behavior: Behavior normal.         Discharge Disposition: Home      Discharge Medications:     Your medication list      START taking these medications      Instructions Last Dose Given Next Dose Due   ascorbic acid 500 MG tablet  Commonly known as: VITAMIN C      Take 2 tablets by mouth 3 (Three) Times a Day With Meals " for 30 days.          CHANGE how you take these medications      Instructions Last Dose Given Next Dose Due   Insulin Glargine 100 UNIT/ML injection pen  Commonly known as: LANTUS SOLOSTAR  What changed: how much to take      Inject 20 Units under the skin into the appropriate area as directed Daily.          CONTINUE taking these medications      Instructions Last Dose Given Next Dose Due   Blood Glucose Monitoring Suppl w/Device kit      USE AS INDICATED, ANY MONITOR , ICD10 code is E11.9       Blood Glucose Test strip      Use 4 x daily use any brand covered by insurance or same brand as before ICD10 code is E11.9       hydrOXYzine 10 MG tablet  Commonly known as: ATARAX      Take 10 mg by mouth 3 (Three) Times a Day As Needed for Itching or Anxiety.       insulin aspart 100 UNIT/ML solution pen-injector sc pen  Commonly known as: NovoLOG FlexPen      Up to 20 units with meals       Insulin Pen Needle 31G X 5 MM misc  Commonly known as: B-D UF III MINI PEN NEEDLES      Use 4 times daily  , ICD10 code is E11.9       Lancets 30G misc      USE 4 X DAILY       Lancing Device misc      USE AS INDICATED TO CORRELATE WITH STRIPS AND METER       Rexulti 2 MG tablet  Generic drug: Brexpiprazole      Take  by mouth Daily.       sertraline 100 MG tablet  Commonly known as: ZOLOFT      Take 100 mg by mouth Every Night.       Vyvanse 20 MG capsule  Generic drug: lisdexamfetamine      Take 20 mg by mouth Every Morning          STOP taking these medications    lamoTRIgine 25 MG tablet  Commonly known as: LaMICtal        naproxen sodium 550 MG tablet  Commonly known as: ANAPROX        promethazine 25 MG tablet  Commonly known as: PHENERGAN              Where to Get Your Medications      These medications were sent to [facility]/pharmacy #[id number] - [place], [place] - [address] [place] [address] - [phone]  - [phone] [address] [address]    Phone: [phone]   · ascorbic acid 500 MG tablet          Discharge Diet:  diabetic diet    Activity at Discharge: as tolerated    Discharge Care Plan/Instructions: see chart    Follow-up Appointments:   No future appointments.    Test Results Pending at Discharge:     Eriberto Miranda MD    Time: 32 min

## 2020-12-17 ENCOUNTER — READMISSION MANAGEMENT (OUTPATIENT)
Dept: CALL CENTER | Facility: HOSPITAL | Age: 20
End: 2020-12-17

## 2020-12-17 LAB
GLUCOSE BLDC GLUCOMTR-MCNC: 411 MG/DL (ref 70–130)
GLUCOSE BLDC GLUCOMTR-MCNC: 495 MG/DL (ref 70–130)

## 2020-12-17 NOTE — OUTREACH NOTE
Prep Survey      Responses   [facility] facility patient discharged from?  [place]   Is LACE score < 7 ?  No   Eligibility  Readm Mgmt   Discharge diagnosis  Severe Hyperglycemia on DM type 1   Does the patient have one of the following disease processes/diagnoses(primary or secondary)?  Other   Does the patient have Home health ordered?  No   Is there a DME ordered?  No   General alerts for this patient  Non-compliant with Insulin    Prep survey completed?  Yes          Mariah Duggan RN

## 2020-12-21 ENCOUNTER — READMISSION MANAGEMENT (OUTPATIENT)
Dept: CALL CENTER | Facility: HOSPITAL | Age: 20
End: 2020-12-21

## 2020-12-21 NOTE — OUTREACH NOTE
Medical Week 1 Survey      Responses   [facility] patient discharged from?  [place]   Does the patient have one of the following disease processes/diagnoses(primary or secondary)?  Other   Week 1 attempt successful?  No   Unsuccessful attempts  Attempt 1          Mary Bermudze LPN

## 2020-12-23 ENCOUNTER — READMISSION MANAGEMENT (OUTPATIENT)
Dept: CALL CENTER | Facility: HOSPITAL | Age: 20
End: 2020-12-23

## 2020-12-23 NOTE — OUTREACH NOTE
Medical Week 2 Survey      Responses   [facility] patient discharged from?  [place]   Does the patient have one of the following disease processes/diagnoses(primary or secondary)?  Other   Week 2 attempt successful?  Yes   Call start time  1626   Discharge diagnosis  Severe Hyperglycemia on DM type 1   Call end time  1629   Meds reviewed with patient/caregiver?  Yes   Is the patient having any side effects they believe may be caused by any medication additions or changes?  No   Does the patient have all medications ordered at discharge?  Yes   Is the patient taking all medications as directed (includes completed medication regime)?  Yes   Does the patient have a primary care provider?   Yes   Does the patient have an appointment with their PCP within 7 days of discharge?  Yes   Has the patient kept scheduled appointments due by today?  Yes   Has home health visited the patient within 72 hours of discharge?  N/A   Psychosocial issues?  No   Did the patient receive a copy of their discharge instructions?  Yes   Nursing interventions  Reviewed instructions with patient   What is the patient's perception of their health status since discharge?  Improving   Is the patient/caregiver able to teach back signs and symptoms related to disease process for when to call PCP?  Yes   Is the patient/caregiver able to teach back signs and symptoms related to disease process for when to call 911?  Yes   Is the patient/caregiver able to teach back the hierarchy of who to call/visit for symptoms/problems? PCP, Specialist, Home health nurse, Urgent Care, ED, 911  Yes   If the patient is a current smoker, are they able to teach back resources for cessation?  Not a smoker   Week 2 Call Completed?  Yes   Wrap up additional comments  Short call patient was at work. Reports she is doing great.           Jani Conteh RN

## 2020-12-29 ENCOUNTER — READMISSION MANAGEMENT (OUTPATIENT)
Dept: CALL CENTER | Facility: HOSPITAL | Age: 20
End: 2020-12-29

## 2020-12-29 NOTE — OUTREACH NOTE
Medical Week 3 Survey      Responses   [facility] patient discharged from?  [place]   Does the patient have one of the following disease processes/diagnoses(primary or secondary)?  Other   Week 3 attempt successful?  No   Unsuccessful attempts  Attempt 1          Yaneth Miles RN

## 2020-12-30 ENCOUNTER — READMISSION MANAGEMENT (OUTPATIENT)
Dept: CALL CENTER | Facility: HOSPITAL | Age: 20
End: 2020-12-30

## 2020-12-30 NOTE — OUTREACH NOTE
Medical Week 3 Survey      Responses   [facility] patient discharged from?  [place]   Does the patient have one of the following disease processes/diagnoses(primary or secondary)?  Other   Week 3 attempt successful?  No   Unsuccessful attempts  Attempt 2          Fernando Rasheed RN

## 2021-01-08 ENCOUNTER — APPOINTMENT (OUTPATIENT)
Dept: CT IMAGING | Facility: HOSPITAL | Age: 21
End: 2021-01-08

## 2021-01-08 ENCOUNTER — HOSPITAL ENCOUNTER (OUTPATIENT)
Dept: CT IMAGING | Facility: HOSPITAL | Age: 21
Discharge: HOME | End: 2021-01-08
Admitting: NURSE PRACTITIONER

## 2021-01-08 DIAGNOSIS — R10.32 LEFT LOWER QUADRANT PAIN: ICD-10-CM

## 2021-01-08 PROCEDURE — 25010000002 IOPAMIDOL 61 % SOLUTION: Performed by: NURSE PRACTITIONER

## 2021-01-08 PROCEDURE — 74177 CT ABD & PELVIS W/CONTRAST: CPT

## 2021-01-08 RX ADMIN — IOPAMIDOL 75 ML: 612 INJECTION, SOLUTION INTRAVENOUS at 15:56

## 2021-01-09 ENCOUNTER — APPOINTMENT (OUTPATIENT)
Dept: GENERAL RADIOLOGY | Facility: HOSPITAL | Age: 21
End: 2021-01-09

## 2021-01-09 ENCOUNTER — HOSPITAL ENCOUNTER (EMERGENCY)
Facility: HOSPITAL | Age: 21
Discharge: HOME | End: 2021-01-10
Attending: FAMILY MEDICINE | Admitting: FAMILY MEDICINE

## 2021-01-09 DIAGNOSIS — R73.9 HYPERGLYCEMIA: ICD-10-CM

## 2021-01-09 DIAGNOSIS — E86.0 DEHYDRATION: Primary | ICD-10-CM

## 2021-01-09 DIAGNOSIS — R11.2 NON-INTRACTABLE VOMITING WITH NAUSEA, UNSPECIFIED VOMITING TYPE: ICD-10-CM

## 2021-01-09 LAB
ALBUMIN SERPL-MCNC: 4.1 G/DL (ref 3.5–5.2)
ALBUMIN/GLOB SERPL: 1.4 G/DL
ALP SERPL-CCNC: 71 U/L (ref 39–117)
ALT SERPL W P-5'-P-CCNC: 15 U/L (ref 1–33)
ANION GAP SERPL CALCULATED.3IONS-SCNC: 11 MMOL/L (ref 5–15)
AST SERPL-CCNC: 13 U/L (ref 1–32)
BASOPHILS # BLD AUTO: 0.05 10*3/MM3 (ref 0–0.2)
BASOPHILS NFR BLD AUTO: 0.6 % (ref 0–1.5)
BILIRUB SERPL-MCNC: 0.3 MG/DL (ref 0–1.2)
BUN SERPL-MCNC: 15 MG/DL (ref 6–20)
BUN/CREAT SERPL: 13 (ref 7–25)
CALCIUM SPEC-SCNC: 9.2 MG/DL (ref 8.6–10.5)
CHLORIDE SERPL-SCNC: 100 MMOL/L (ref 98–107)
CO2 SERPL-SCNC: 25 MMOL/L (ref 22–29)
CREAT SERPL-MCNC: 1.15 MG/DL (ref 0.57–1)
DEPRECATED RDW RBC AUTO: 36.9 FL (ref 37–54)
EOSINOPHIL # BLD AUTO: 0.13 10*3/MM3 (ref 0–0.4)
EOSINOPHIL NFR BLD AUTO: 1.7 % (ref 0.3–6.2)
ERYTHROCYTE [DISTWIDTH] IN BLOOD BY AUTOMATED COUNT: 12 % (ref 12.3–15.4)
GFR SERPL CREATININE-BSD FRML MDRD: 60 ML/MIN/1.73
GLOBULIN UR ELPH-MCNC: 2.9 GM/DL
GLUCOSE BLDC GLUCOMTR-MCNC: 330 MG/DL (ref 70–130)
GLUCOSE SERPL-MCNC: 308 MG/DL (ref 65–99)
HCG SERPL QL: NEGATIVE
HCT VFR BLD AUTO: 32.7 % (ref 34–46.6)
HGB BLD-MCNC: 11.4 G/DL (ref 12–15.9)
IMM GRANULOCYTES # BLD AUTO: 0.02 10*3/MM3 (ref 0–0.05)
IMM GRANULOCYTES NFR BLD AUTO: 0.3 % (ref 0–0.5)
LYMPHOCYTES # BLD AUTO: 3.07 10*3/MM3 (ref 0.7–3.1)
LYMPHOCYTES NFR BLD AUTO: 39.8 % (ref 19.6–45.3)
MCH RBC QN AUTO: 29 PG (ref 26.6–33)
MCHC RBC AUTO-ENTMCNC: 34.9 G/DL (ref 31.5–35.7)
MCV RBC AUTO: 83.2 FL (ref 79–97)
MONOCYTES # BLD AUTO: 0.3 10*3/MM3 (ref 0.1–0.9)
MONOCYTES NFR BLD AUTO: 3.9 % (ref 5–12)
NEUTROPHILS NFR BLD AUTO: 4.14 10*3/MM3 (ref 1.7–7)
NEUTROPHILS NFR BLD AUTO: 53.7 % (ref 42.7–76)
NRBC BLD AUTO-RTO: 0 /100 WBC (ref 0–0.2)
PLATELET # BLD AUTO: 286 10*3/MM3 (ref 140–450)
PMV BLD AUTO: 9.8 FL (ref 6–12)
POTASSIUM SERPL-SCNC: 3.6 MMOL/L (ref 3.5–5.2)
PROT SERPL-MCNC: 7 G/DL (ref 6–8.5)
RBC # BLD AUTO: 3.93 10*6/MM3 (ref 3.77–5.28)
SODIUM SERPL-SCNC: 136 MMOL/L (ref 136–145)
TROPONIN T SERPL-MCNC: <0.01 NG/ML (ref 0–0.03)
WBC # BLD AUTO: 7.71 10*3/MM3 (ref 3.4–10.8)

## 2021-01-09 PROCEDURE — 96374 THER/PROPH/DIAG INJ IV PUSH: CPT

## 2021-01-09 PROCEDURE — 84484 ASSAY OF TROPONIN QUANT: CPT | Performed by: STUDENT IN AN ORGANIZED HEALTH CARE EDUCATION/TRAINING PROGRAM

## 2021-01-09 PROCEDURE — 93010 ELECTROCARDIOGRAM REPORT: CPT | Performed by: INTERNAL MEDICINE

## 2021-01-09 PROCEDURE — 84703 CHORIONIC GONADOTROPIN ASSAY: CPT | Performed by: FAMILY MEDICINE

## 2021-01-09 PROCEDURE — 25010000002 DICYCLOMINE PER 20 MG: Performed by: STUDENT IN AN ORGANIZED HEALTH CARE EDUCATION/TRAINING PROGRAM

## 2021-01-09 PROCEDURE — 82962 GLUCOSE BLOOD TEST: CPT

## 2021-01-09 PROCEDURE — 71045 X-RAY EXAM CHEST 1 VIEW: CPT

## 2021-01-09 PROCEDURE — 25010000002 METOCLOPRAMIDE PER 10 MG: Performed by: STUDENT IN AN ORGANIZED HEALTH CARE EDUCATION/TRAINING PROGRAM

## 2021-01-09 PROCEDURE — 96361 HYDRATE IV INFUSION ADD-ON: CPT

## 2021-01-09 PROCEDURE — 83690 ASSAY OF LIPASE: CPT | Performed by: STUDENT IN AN ORGANIZED HEALTH CARE EDUCATION/TRAINING PROGRAM

## 2021-01-09 PROCEDURE — 93005 ELECTROCARDIOGRAM TRACING: CPT | Performed by: FAMILY MEDICINE

## 2021-01-09 PROCEDURE — 99284 EMERGENCY DEPT VISIT MOD MDM: CPT

## 2021-01-09 PROCEDURE — 80053 COMPREHEN METABOLIC PANEL: CPT | Performed by: FAMILY MEDICINE

## 2021-01-09 PROCEDURE — 85025 COMPLETE CBC W/AUTO DIFF WBC: CPT | Performed by: FAMILY MEDICINE

## 2021-01-09 PROCEDURE — 96372 THER/PROPH/DIAG INJ SC/IM: CPT

## 2021-01-09 RX ORDER — DICYCLOMINE HYDROCHLORIDE 10 MG/ML
20 INJECTION INTRAMUSCULAR ONCE
Status: COMPLETED | OUTPATIENT
Start: 2021-01-09 | End: 2021-01-09

## 2021-01-09 RX ORDER — SODIUM CHLORIDE 0.9 % (FLUSH) 0.9 %
10 SYRINGE (ML) INJECTION AS NEEDED
Status: DISCONTINUED | OUTPATIENT
Start: 2021-01-09 | End: 2021-01-10 | Stop reason: HOSPADM

## 2021-01-09 RX ORDER — METOCLOPRAMIDE HYDROCHLORIDE 5 MG/ML
5 INJECTION INTRAMUSCULAR; INTRAVENOUS ONCE
Status: COMPLETED | OUTPATIENT
Start: 2021-01-09 | End: 2021-01-09

## 2021-01-09 RX ORDER — LAMOTRIGINE 25 MG/1
25 TABLET ORAL 3 TIMES DAILY
COMMUNITY
End: 2021-11-08 | Stop reason: HOSPADM

## 2021-01-09 RX ADMIN — DICYCLOMINE HYDROCHLORIDE 20 MG: 20 INJECTION INTRAMUSCULAR at 23:18

## 2021-01-09 RX ADMIN — METOCLOPRAMIDE 5 MG: 5 INJECTION, SOLUTION INTRAMUSCULAR; INTRAVENOUS at 23:17

## 2021-01-09 RX ADMIN — SODIUM CHLORIDE 1000 ML: 9 INJECTION, SOLUTION INTRAVENOUS at 22:59

## 2021-01-10 VITALS
SYSTOLIC BLOOD PRESSURE: 113 MMHG | HEART RATE: 76 BPM | OXYGEN SATURATION: 99 % | WEIGHT: 115 LBS | TEMPERATURE: 97.5 F | RESPIRATION RATE: 14 BRPM | HEIGHT: 67 IN | BODY MASS INDEX: 18.05 KG/M2 | DIASTOLIC BLOOD PRESSURE: 69 MMHG

## 2021-01-10 LAB
BILIRUB UR QL STRIP: NEGATIVE
CLARITY UR: CLEAR
COLOR UR: YELLOW
GLUCOSE UR STRIP-MCNC: ABNORMAL MG/DL
HGB UR QL STRIP.AUTO: NEGATIVE
HOLD SPECIMEN: NORMAL
KETONES UR QL STRIP: NEGATIVE
LEUKOCYTE ESTERASE UR QL STRIP.AUTO: NEGATIVE
LIPASE SERPL-CCNC: 22 U/L (ref 13–60)
NITRITE UR QL STRIP: NEGATIVE
PH UR STRIP.AUTO: 6 [PH] (ref 5–9)
PROT UR QL STRIP: NEGATIVE
QT INTERVAL: 420 MS
QTC INTERVAL: 453 MS
SP GR UR STRIP: 1.02 (ref 1–1.03)
UROBILINOGEN UR QL STRIP: ABNORMAL
WHOLE BLOOD HOLD SPECIMEN: NORMAL
WHOLE BLOOD HOLD SPECIMEN: NORMAL

## 2021-01-10 PROCEDURE — 81003 URINALYSIS AUTO W/O SCOPE: CPT | Performed by: STUDENT IN AN ORGANIZED HEALTH CARE EDUCATION/TRAINING PROGRAM

## 2021-01-10 RX ORDER — DICYCLOMINE HCL 20 MG
20 TABLET ORAL EVERY 6 HOURS PRN
Qty: 21 TABLET | Refills: 0 | Status: SHIPPED | OUTPATIENT
Start: 2021-01-10 | End: 2022-06-27

## 2021-01-10 RX ORDER — ONDANSETRON 4 MG/1
4 TABLET, FILM COATED ORAL EVERY 8 HOURS PRN
Qty: 21 TABLET | Refills: 0 | Status: SHIPPED | OUTPATIENT
Start: 2021-01-10 | End: 2021-01-10 | Stop reason: ALTCHOICE

## 2021-01-10 RX ORDER — PROMETHAZINE HYDROCHLORIDE 12.5 MG/1
12.5 TABLET ORAL EVERY 6 HOURS PRN
Qty: 21 TABLET | Refills: 0 | Status: SHIPPED | OUTPATIENT
Start: 2021-01-10 | End: 2022-06-27 | Stop reason: SDUPTHER

## 2021-01-10 RX ADMIN — SODIUM CHLORIDE 1000 ML: 9 INJECTION, SOLUTION INTRAVENOUS at 00:55

## 2021-01-10 NOTE — DISCHARGE INSTRUCTIONS
Take Phenergan as needed for nausea and vomiting.  You cannot operate heavy machinery including driving while you are taking Phenergan.  Use Bentyl for abdominal cramping.  Follow-up with your PCP or come to the resident clinic to follow-up on different treatments for gastroparesis, anemia, and to evaluate your renal status.  You will need to have a repeat renal function panel early next week.  Return to ED should you develop vomiting that is not controlled with Zofran, inability to hold down fluids, severe abdominal pain, or any other concerning symptoms.

## 2021-01-10 NOTE — ED PROVIDER NOTES
Subjective   Patient presents with complaints of stomach pain for last couple of days and chest pain starting today.  Patient had a CT scan yesterday ordered by her PCP for bilateral lower quadrant pain.  Patient states her abdominal pain is unchanged over the past couple of days and since her scan, but states that her chest pain started today.  She complains of left anterior chest pain that does not radiate that is getting worse.  She describes the pain as throbbing and has associated nausea and diaphoresis with it.  Patient has type 1 diabetes and her blood sugar was greater than 600 prior to arrival and she gave herself 7 units of quick acting insulin.  Patient was recently diagnosed with gastroparesis and also has diabetic neuropathy for which she was treated with gabapentin but has since stopped taking that because she did not like the way it made her feel.  Patient states she has been vomiting daily.  Her last bowel movement was 1 week ago.          Review of Systems   Constitutional: Positive for appetite change. Negative for activity change, chills and fever.   HENT: Positive for sore throat. Negative for congestion and ear pain.    Eyes: Negative for pain and discharge.   Respiratory: Negative for chest tightness and shortness of breath.    Cardiovascular: Negative for chest pain and palpitations.   Gastrointestinal: Positive for abdominal pain, constipation, nausea and vomiting. Negative for abdominal distention.   Endocrine: Negative for cold intolerance and heat intolerance.   Genitourinary: Negative for difficulty urinating and dysuria.   Musculoskeletal: Negative for arthralgias and back pain.   Skin: Negative for color change and rash.   Allergic/Immunologic: Negative for environmental allergies and food allergies.   Neurological: Negative for dizziness and headaches.   Hematological: Negative for adenopathy. Does not bruise/bleed easily.   Psychiatric/Behavioral: Negative for agitation and confusion.  "The patient is nervous/anxious.        Past Medical History:   Diagnosis Date   • ADHD    • Bipolar 1 disorder (CMS/HCC)    • Borderline personality disorder (CMS/HCC)    • Cannabinoid hyperemesis syndrome    • Diabetes mellitus type 1 (CMS/HCC)    • Diabetic gastroparesis (CMS/HCC)    • Diabetic ketoacidosis (CMS/HCC)    • Diabetic neuropathy (CMS/HCC)    • History of transfusion     Pt reports \"no reactions.\"   • Post traumatic stress disorder (PTSD)    • Recurrent UTI    • Seizure disorder (CMS/HCC)    • Severe depressed bipolar II disorder without psychotic features (CMS/HCC)    • Uncontrolled diabetes mellitus (CMS/HCC)        Allergies   Allergen Reactions   • Pineapple Anaphylaxis   • Benzoyl Peroxide Swelling       Past Surgical History:   Procedure Laterality Date   •  SECTION N/A 2018   • CYSTOSCOPY N/A 2020    Procedure: CYSTOSCOPY FLEXIBLE       (DONE ON STRETCHER);  Surgeon: Chapo, Antonio PRYOR MD;  Location: [facility];  Service: Urology;  Laterality: N/A;       Family History   Problem Relation Age of Onset   • Drug abuse Father    • Suicide Attempts Brother    • Dementia Maternal Grandfather    • Hypertension Paternal Grandmother        Social History     Socioeconomic History   • Marital status: Single     Spouse name: Not on file   • Number of children: Not on file   • Years of education: Not on file   • Highest education level: Not on file   Tobacco Use   • Smoking status: Former Smoker     Packs/day: 1.00     Years: 2.00     Pack years: 2.00   • Smokeless tobacco: Never Used   • Tobacco comment: uses E cig   Substance and Sexual Activity   • Alcohol use: No   • Drug use: Yes     Frequency: 1.0 times per week     Types: Marijuana   • Sexual activity: Yes     Partners: Male     Birth control/protection: None   Social History Narrative    Substance Abuse: Pt drinks EtOH occasionally, stating once every 6 months. Pt smokes marijuana, but denied any other illicit drugs. Pt smokes 0.5-1 " ppd of cigarettes x 1 year. Pt denies caffeine consumption, states she drinks only water.         Marriages: none    Current Relationships: Recent breakup with her boyfriend    Children: one child that  2wks ago at 2 months old.        Occupation:  Pt is a  and loves her job, but hasn't been able to work since baby was born via C/S    Living Situation: was living with her boyfriend but they are  over the death of their child.  She plans to live with mom after discharge.           Objective   Physical Exam  Vitals signs and nursing note reviewed.   Constitutional:       Appearance: She is well-developed. She is ill-appearing (mildly).   HENT:      Head: Normocephalic and atraumatic.   Eyes:      Pupils: Pupils are equal, round, and reactive to light.   Neck:      Thyroid: No thyromegaly.      Vascular: No JVD.      Trachea: No tracheal deviation.   Cardiovascular:      Rate and Rhythm: Normal rate and regular rhythm.      Pulses:           Radial pulses are 2+ on the right side and 2+ on the left side.        Dorsalis pedis pulses are 2+ on the right side and 2+ on the left side.      Heart sounds: Normal heart sounds, S1 normal and S2 normal.   Pulmonary:      Effort: Pulmonary effort is normal.      Breath sounds: Normal breath sounds.   Abdominal:      General: Abdomen is flat. Bowel sounds are decreased. There is no distension.      Palpations: Abdomen is soft.      Tenderness: There is abdominal tenderness in the right lower quadrant, epigastric area and left lower quadrant. There is no right CVA tenderness, left CVA tenderness or rebound. Negative signs include Wing's sign and McBurney's sign.   Musculoskeletal: Normal range of motion.   Skin:     General: Skin is warm and dry.      Capillary Refill: Capillary refill takes 2 to 3 seconds.   Neurological:      Mental Status: She is alert and oriented to person, place, and time.      GCS: GCS eye subscore is 4. GCS verbal subscore is 5. GCS  motor subscore is 6.   Psychiatric:         Speech: Speech normal.         Behavior: Behavior normal.         Thought Content: Thought content normal.         ECG 12 Lead      Date/Time: 1/10/2021 1:44 AM  Performed by: Veronica Watson MD  Authorized by: Enrike Hu MD   Interpreted by physician  Comparison: compared with previous ECG from 10/21/2020  Similar to previous ECG  Rhythm: sinus rhythm  Clinical impression: normal ECG                 ED Course  ED Course as of Nah[place] 10 0151   Sat Jan 09, 2021   2351 IV infusing. Patient is unable to urinate at this time. Reviewed labs thus far. Her pain has resolved after medication.     [JORGE]      ED Course User Index  [JORGE] Veronica Watson MD                                         HEART Score (for prediction of 6-week risk of major adverse cardiac event) reviewed and/or performed as part of the patient evaluation and treatment planning process.  The result associated with this review/performance is: 1       MDM  Number of Diagnoses or Management Options  Dehydration: new and requires workup  Hyperglycemia: established and worsening  Non-intractable vomiting with nausea, unspecified vomiting type: new and requires workup  Diagnosis management comments: Labs showed mild DONI and mild anemia.  Chest pain and abdominal pain resolved after administration of fluids, Reglan and Bentyl.  Chest x-ray unremarkable and troponin was negative.  Heart score was a 1.  EKG was normal.  Patient was mildly orthostatic but ambulated without dizziness.  Patient was given 2 L of fluid and instructed for close follow-up with PCP for repeat labs early next week to evaluate her renal function and anemia.  Patient endorses proper follow-up for her diabetes and her primary care.       Amount and/or Complexity of Data Reviewed  Clinical lab tests: ordered and reviewed  Tests in the radiology section of CPT®: ordered and reviewed  Tests in the medicine section of CPT®: ordered and  reviewed  Decide to obtain previous medical records or to obtain history from someone other than the patient: yes  Review and summarize past medical records: yes  Independent visualization of images, tracings, or specimens: yes    Risk of Complications, Morbidity, and/or Mortality  Presenting problems: moderate  Diagnostic procedures: low  Management options: moderate    Patient Progress  Patient progress: improved      Final diagnoses:   Dehydration   Non-intractable vomiting with nausea, unspecified vomiting type   Hyperglycemia         This document has been electronically signed by Veronica Watson MD on January 10, 2021 01:51 CST    Veronica Watson MD PGY-2  Part of this note may be an electronic transcription/translation of spoken language to printed text using the Dragon Dictation System.        Veronica Watson MD  Resident  01/10/21 0150       Veronica Watson MD  Resident  01/10/21 0151

## 2021-01-10 NOTE — ED NOTES
Patient discharged home with rx x2 (phenergan, bentyl), sent to [facility]. Patient verbalizes discharge instructions and need for follow up for repeat lab work. Patient alert and oriented x3. Ambulatory with steady gait out of department. NAD noted upon discharge.      Luz Gibbons, RN  01/10/21 0222

## 2021-06-29 NOTE — OUTREACH NOTE
Medical Week 2 Survey      Responses   Facility patient discharged from?  [place]   Does the patient have one of the following disease processes/diagnoses(primary or secondary)?  Other   Week 2 attempt successful?  No   Unsuccessful attempts  Attempt 2          Sarah Lyons RN   lmovm for pt to return call.

## 2021-10-06 ENCOUNTER — HOSPITAL ENCOUNTER (EMERGENCY)
Facility: HOSPITAL | Age: 21
Discharge: HOME | End: 2021-10-07
Attending: STUDENT IN AN ORGANIZED HEALTH CARE EDUCATION/TRAINING PROGRAM | Admitting: STUDENT IN AN ORGANIZED HEALTH CARE EDUCATION/TRAINING PROGRAM

## 2021-10-06 ENCOUNTER — APPOINTMENT (OUTPATIENT)
Dept: CT IMAGING | Facility: HOSPITAL | Age: 21
End: 2021-10-06

## 2021-10-06 DIAGNOSIS — R73.9 HYPERGLYCEMIA: Primary | ICD-10-CM

## 2021-10-06 DIAGNOSIS — R10.30 LOWER ABDOMINAL PAIN: ICD-10-CM

## 2021-10-06 LAB
ACETONE BLD QL: NEGATIVE
ANION GAP SERPL CALCULATED.3IONS-SCNC: 10 MMOL/L (ref 5–15)
ATMOSPHERIC PRESS: 749 MMHG
BASE EXCESS BLDV CALC-SCNC: 0.2 MMOL/L (ref 0–2)
BASOPHILS # BLD AUTO: 0.03 10*3/MM3 (ref 0–0.2)
BASOPHILS NFR BLD AUTO: 1 % (ref 0–1.5)
BDY SITE: ABNORMAL
BILIRUB UR QL STRIP: NEGATIVE
BUN SERPL-MCNC: 7 MG/DL (ref 6–20)
BUN/CREAT SERPL: 8.8 (ref 7–25)
CALCIUM SPEC-SCNC: 8.7 MG/DL (ref 8.6–10.5)
CHLORIDE SERPL-SCNC: 99 MMOL/L (ref 98–107)
CLARITY UR: CLEAR
CO2 SERPL-SCNC: 25 MMOL/L (ref 22–29)
COLOR UR: YELLOW
CREAT SERPL-MCNC: 0.8 MG/DL (ref 0.57–1)
D-LACTATE SERPL-SCNC: 1.1 MMOL/L (ref 0.5–2)
DEPRECATED RDW RBC AUTO: 39.5 FL (ref 37–54)
EOSINOPHIL # BLD AUTO: 0 10*3/MM3 (ref 0–0.4)
EOSINOPHIL NFR BLD AUTO: 0 % (ref 0.3–6.2)
ERYTHROCYTE [DISTWIDTH] IN BLOOD BY AUTOMATED COUNT: 12.7 % (ref 12.3–15.4)
GFR SERPL CREATININE-BSD FRML MDRD: 90 ML/MIN/1.73
GLUCOSE SERPL-MCNC: 391 MG/DL (ref 65–99)
GLUCOSE UR STRIP-MCNC: ABNORMAL MG/DL
HCG INTACT+B SERPL-ACNC: <0.5 MIU/ML
HCO3 BLDV-SCNC: 26.5 MMOL/L (ref 18–23)
HCT VFR BLD AUTO: 32.2 % (ref 34–46.6)
HGB BLD-MCNC: 11.1 G/DL (ref 12–15.9)
HGB UR QL STRIP.AUTO: NEGATIVE
HOLD SPECIMEN: NORMAL
IMM GRANULOCYTES # BLD AUTO: 0.01 10*3/MM3 (ref 0–0.05)
IMM GRANULOCYTES NFR BLD AUTO: 0.3 % (ref 0–0.5)
KETONES UR QL STRIP: NEGATIVE
LEUKOCYTE ESTERASE UR QL STRIP.AUTO: NEGATIVE
LIPASE SERPL-CCNC: 17 U/L (ref 13–60)
LYMPHOCYTES # BLD AUTO: 1.1 10*3/MM3 (ref 0.7–3.1)
LYMPHOCYTES NFR BLD AUTO: 35.8 % (ref 19.6–45.3)
MCH RBC QN AUTO: 29.5 PG (ref 26.6–33)
MCHC RBC AUTO-ENTMCNC: 34.5 G/DL (ref 31.5–35.7)
MCV RBC AUTO: 85.6 FL (ref 79–97)
MODALITY: ABNORMAL
MONOCYTES # BLD AUTO: 0.28 10*3/MM3 (ref 0.1–0.9)
MONOCYTES NFR BLD AUTO: 9.1 % (ref 5–12)
NEUTROPHILS NFR BLD AUTO: 1.65 10*3/MM3 (ref 1.7–7)
NEUTROPHILS NFR BLD AUTO: 53.8 % (ref 42.7–76)
NITRITE UR QL STRIP: NEGATIVE
NRBC BLD AUTO-RTO: 0 /100 WBC (ref 0–0.2)
PCO2 BLDV: 49.2 MM HG (ref 41–51)
PH BLDV: 7.34 PH UNITS (ref 7.29–7.37)
PH UR STRIP.AUTO: 6.5 [PH] (ref 5–9)
PLATELET # BLD AUTO: 220 10*3/MM3 (ref 140–450)
PMV BLD AUTO: 10.3 FL (ref 6–12)
PO2 BLDV: 31.8 MM HG (ref 27–53)
POTASSIUM SERPL-SCNC: 4.1 MMOL/L (ref 3.5–5.2)
PROT UR QL STRIP: NEGATIVE
RBC # BLD AUTO: 3.76 10*6/MM3 (ref 3.77–5.28)
SAO2 % BLDCOV: 60.8 % (ref 45–75)
SODIUM SERPL-SCNC: 134 MMOL/L (ref 136–145)
SP GR UR STRIP: 1.02 (ref 1–1.03)
UROBILINOGEN UR QL STRIP: ABNORMAL
VENTILATOR MODE: ABNORMAL
WBC # BLD AUTO: 3.07 10*3/MM3 (ref 3.4–10.8)
WHOLE BLOOD HOLD SPECIMEN: NORMAL

## 2021-10-06 PROCEDURE — 80048 BASIC METABOLIC PNL TOTAL CA: CPT | Performed by: STUDENT IN AN ORGANIZED HEALTH CARE EDUCATION/TRAINING PROGRAM

## 2021-10-06 PROCEDURE — 25010000002 ONDANSETRON PER 1 MG: Performed by: STUDENT IN AN ORGANIZED HEALTH CARE EDUCATION/TRAINING PROGRAM

## 2021-10-06 PROCEDURE — 74177 CT ABD & PELVIS W/CONTRAST: CPT

## 2021-10-06 PROCEDURE — 96374 THER/PROPH/DIAG INJ IV PUSH: CPT

## 2021-10-06 PROCEDURE — 99283 EMERGENCY DEPT VISIT LOW MDM: CPT

## 2021-10-06 PROCEDURE — 85025 COMPLETE CBC W/AUTO DIFF WBC: CPT | Performed by: STUDENT IN AN ORGANIZED HEALTH CARE EDUCATION/TRAINING PROGRAM

## 2021-10-06 PROCEDURE — 99284 EMERGENCY DEPT VISIT MOD MDM: CPT

## 2021-10-06 PROCEDURE — 25010000002 IOPAMIDOL 61 % SOLUTION: Performed by: STUDENT IN AN ORGANIZED HEALTH CARE EDUCATION/TRAINING PROGRAM

## 2021-10-06 PROCEDURE — 63710000001 INSULIN REGULAR HUMAN PER 5 UNITS: Performed by: STUDENT IN AN ORGANIZED HEALTH CARE EDUCATION/TRAINING PROGRAM

## 2021-10-06 PROCEDURE — 81003 URINALYSIS AUTO W/O SCOPE: CPT | Performed by: STUDENT IN AN ORGANIZED HEALTH CARE EDUCATION/TRAINING PROGRAM

## 2021-10-06 PROCEDURE — 84702 CHORIONIC GONADOTROPIN TEST: CPT | Performed by: STUDENT IN AN ORGANIZED HEALTH CARE EDUCATION/TRAINING PROGRAM

## 2021-10-06 PROCEDURE — 82962 GLUCOSE BLOOD TEST: CPT

## 2021-10-06 PROCEDURE — 25010000002 MORPHINE PER 10 MG: Performed by: STUDENT IN AN ORGANIZED HEALTH CARE EDUCATION/TRAINING PROGRAM

## 2021-10-06 PROCEDURE — 93010 ELECTROCARDIOGRAM REPORT: CPT | Performed by: INTERNAL MEDICINE

## 2021-10-06 PROCEDURE — 93005 ELECTROCARDIOGRAM TRACING: CPT

## 2021-10-06 PROCEDURE — 83690 ASSAY OF LIPASE: CPT | Performed by: STUDENT IN AN ORGANIZED HEALTH CARE EDUCATION/TRAINING PROGRAM

## 2021-10-06 PROCEDURE — 82009 KETONE BODYS QUAL: CPT | Performed by: STUDENT IN AN ORGANIZED HEALTH CARE EDUCATION/TRAINING PROGRAM

## 2021-10-06 PROCEDURE — 87040 BLOOD CULTURE FOR BACTERIA: CPT | Performed by: STUDENT IN AN ORGANIZED HEALTH CARE EDUCATION/TRAINING PROGRAM

## 2021-10-06 PROCEDURE — 96375 TX/PRO/DX INJ NEW DRUG ADDON: CPT

## 2021-10-06 PROCEDURE — 82803 BLOOD GASES ANY COMBINATION: CPT

## 2021-10-06 PROCEDURE — 93005 ELECTROCARDIOGRAM TRACING: CPT | Performed by: STUDENT IN AN ORGANIZED HEALTH CARE EDUCATION/TRAINING PROGRAM

## 2021-10-06 PROCEDURE — 83605 ASSAY OF LACTIC ACID: CPT | Performed by: STUDENT IN AN ORGANIZED HEALTH CARE EDUCATION/TRAINING PROGRAM

## 2021-10-06 RX ORDER — ONDANSETRON 2 MG/ML
4 INJECTION INTRAMUSCULAR; INTRAVENOUS ONCE
Status: COMPLETED | OUTPATIENT
Start: 2021-10-06 | End: 2021-10-06

## 2021-10-06 RX ADMIN — ONDANSETRON HYDROCHLORIDE 4 MG: 2 INJECTION, SOLUTION INTRAMUSCULAR; INTRAVENOUS at 22:28

## 2021-10-06 RX ADMIN — HUMAN INSULIN 8 UNITS: 100 INJECTION, SOLUTION SUBCUTANEOUS at 22:54

## 2021-10-06 RX ADMIN — MORPHINE SULFATE 4 MG: 4 INJECTION INTRAVENOUS at 22:53

## 2021-10-06 RX ADMIN — SODIUM CHLORIDE, POTASSIUM CHLORIDE, SODIUM LACTATE AND CALCIUM CHLORIDE 1000 ML: 600; 310; 30; 20 INJECTION, SOLUTION INTRAVENOUS at 22:28

## 2021-10-06 RX ADMIN — IOPAMIDOL 80 ML: 612 INJECTION, SOLUTION INTRAVENOUS at 23:52

## 2021-10-07 VITALS
DIASTOLIC BLOOD PRESSURE: 84 MMHG | OXYGEN SATURATION: 97 % | TEMPERATURE: 98.4 F | RESPIRATION RATE: 16 BRPM | HEART RATE: 73 BPM | BODY MASS INDEX: 17.58 KG/M2 | WEIGHT: 112 LBS | SYSTOLIC BLOOD PRESSURE: 119 MMHG | HEIGHT: 67 IN

## 2021-10-07 LAB
GLUCOSE BLDC GLUCOMTR-MCNC: 144 MG/DL (ref 70–130)
GLUCOSE BLDC GLUCOMTR-MCNC: 404 MG/DL (ref 70–130)

## 2021-10-07 PROCEDURE — 82962 GLUCOSE BLOOD TEST: CPT

## 2021-10-07 NOTE — ED PROVIDER NOTES
"Subjective   21-year-old female type I diabetic on insulin comes to the ER chief complaint of worsening abdominal pain and high blood sugar to 400.  Patient reports being in DKA often.  No nausea or vomiting.  She endorses some pain when she urinates as well as flank pain.  No hematuria.  No vaginal bleeding or discharge.  No fevers or chills.      History provided by:  Patient   used: No        Review of Systems   Constitutional: Negative for chills and fever.   HENT: Negative for congestion and rhinorrhea.    Respiratory: Negative for cough and shortness of breath.    Cardiovascular: Negative for chest pain and palpitations.   Gastrointestinal: Positive for abdominal pain. Negative for nausea.   Genitourinary: Positive for flank pain. Negative for dysuria, frequency, hematuria, vaginal bleeding and vaginal discharge.   Skin: Negative for color change and rash.   Neurological: Negative for dizziness, weakness, light-headedness and headaches.   Psychiatric/Behavioral: Negative for agitation. The patient is not nervous/anxious.        Past Medical History:   Diagnosis Date   • ADHD    • Bipolar 1 disorder (CMS/HCC)    • Borderline personality disorder (CMS/HCC)    • Cannabinoid hyperemesis syndrome    • Diabetes mellitus type 1 (CMS/HCC)    • Diabetic gastroparesis (CMS/HCC)    • Diabetic ketoacidosis (CMS/HCC)    • Diabetic neuropathy (CMS/HCC)    • History of transfusion     Pt reports \"no reactions.\"   • Post traumatic stress disorder (PTSD)    • Recurrent UTI    • Seizure disorder (CMS/HCC)    • Severe depressed bipolar II disorder without psychotic features (CMS/HCC)    • Uncontrolled diabetes mellitus (CMS/HCC)        Allergies   Allergen Reactions   • Pineapple Anaphylaxis   • Benzoyl Peroxide Swelling       Past Surgical History:   Procedure Laterality Date   •  SECTION N/A 2018   • CYSTOSCOPY N/A 2020    Procedure: CYSTOSCOPY FLEXIBLE       (DONE ON STRETCHER);  Surgeon: " Geraldine, Antonio PRYOR MD;  Location: [facility];  Service: Urology;  Laterality: N/A;       Family History   Problem Relation Age of Onset   • Drug abuse Father    • Suicide Attempts Brother    • Dementia Maternal Grandfather    • Hypertension Paternal Grandmother        Social History     Socioeconomic History   • Marital status: Single     Spouse name: Not on file   • Number of children: Not on file   • Years of education: Not on file   • Highest education level: Not on file   Tobacco Use   • Smoking status: Former Smoker     Packs/day: 1.00     Years: 2.00     Pack years: 2.00   • Smokeless tobacco: Never Used   • Tobacco comment: uses E cig   Substance and Sexual Activity   • Alcohol use: No   • Drug use: Yes     Frequency: 1.0 times per week     Types: Marijuana   • Sexual activity: Yes     Partners: Male     Birth control/protection: None           Objective    Vitals:    10/06/21 2257 10/06/21 2337 10/07/21 0031 10/07/21 0032   BP:  131/89     BP Location:       Patient Position:       Pulse: 57  66    Resp: 16   15   Temp:       TempSrc:       SpO2: 100%  97%    Weight:       Height:           Physical Exam  Vitals and nursing note reviewed.   Constitutional:       General: She is not in acute distress.     Appearance: She is well-developed. She is ill-appearing. She is not toxic-appearing or diaphoretic.   HENT:      Head: Normocephalic.      Right Ear: External ear normal.      Left Ear: External ear normal.   Cardiovascular:      Rate and Rhythm: Normal rate.   Pulmonary:      Effort: Pulmonary effort is normal. No accessory muscle usage or respiratory distress.      Breath sounds: No decreased breath sounds or wheezing.   Chest:      Chest wall: No tenderness.   Abdominal:      General: Bowel sounds are normal.      Palpations: Abdomen is soft.      Tenderness: There is abdominal tenderness. There is left CVA tenderness. There is no right CVA tenderness, guarding or rebound.   Skin:     General: Skin is warm  and dry.      Capillary Refill: Capillary refill takes less than 2 seconds.   Neurological:      Mental Status: She is alert and oriented to person, place, and time.   Psychiatric:         Behavior: Behavior normal.         ECG 12 Lead      Date/Time: 10/7/2021 12:41 AM  Performed by: Sarthak Hobson MD  Authorized by: Sarthak Hobson MD   Interpreted by physician  Rhythm: sinus rhythm  Rate: normal  QRS axis: normal  ST Segments: ST segments normal  T Waves: T waves normal  Clinical impression: normal ECG                 ED Course      Results for orders placed or performed during the hospital encounter of 10/06/21   Basic Metabolic Panel    Specimen: Blood   Result Value Ref Range    Glucose 391 (H) 65 - 99 mg/dL    BUN 7 6 - 20 mg/dL    Creatinine 0.80 0.57 - 1.00 mg/dL    Sodium 134 (L) 136 - 145 mmol/L    Potassium 4.1 3.5 - 5.2 mmol/L    Chloride 99 98 - 107 mmol/L    CO2 25.0 22.0 - 29.0 mmol/L    Calcium 8.7 8.6 - 10.5 mg/dL    eGFR Non African Amer 90 >60 mL/min/1.73    BUN/Creatinine Ratio 8.8 7.0 - 25.0    Anion Gap 10.0 5.0 - 15.0 mmol/L   Lipase    Specimen: Blood   Result Value Ref Range    Lipase 17 13 - 60 U/L   Urinalysis With Microscopic If Indicated (No Culture) - Urine, Clean Catch    Specimen: Urine, Clean Catch   Result Value Ref Range    Color, UA Yellow Yellow, Straw, Dark Yellow, [place]    Appearance, UA Clear Clear    pH, UA 6.5 5.0 - 9.0    Specific Gravity, UA 1.024 1.003 - 1.030    Glucose, UA >=1000 mg/dL (3+) (A) Negative    Ketones, UA Negative Negative    Bilirubin, UA Negative Negative    Blood, UA Negative Negative    Protein, UA Negative Negative    Leuk Esterase, UA Negative Negative    Nitrite, UA Negative Negative    Urobilinogen, UA 0.2 E.U./dL 0.2 - 1.0 E.U./dL   Lactic Acid, Plasma    Specimen: Arm, Right; Blood   Result Value Ref Range    Lactate 1.1 0.5 - 2.0 mmol/L   Acetone    Specimen: Blood   Result Value Ref Range    Acetone Negative Negative   CBC Auto  Differential    Specimen: Blood   Result Value Ref Range    WBC 3.07 (L) 3.40 - 10.80 10*3/mm3    RBC 3.76 (L) 3.77 - 5.28 10*6/mm3    Hemoglobin 11.1 (L) 12.0 - 15.9 g/dL    Hematocrit 32.2 (L) 34.0 - 46.6 %    MCV 85.6 79.0 - 97.0 fL    MCH 29.5 26.6 - 33.0 pg    MCHC 34.5 31.5 - 35.7 g/dL    RDW 12.7 12.3 - 15.4 %    RDW-SD 39.5 37.0 - 54.0 fl    MPV 10.3 6.0 - 12.0 fL    Platelets 220 140 - 450 10*3/mm3    Neutrophil % 53.8 42.7 - 76.0 %    Lymphocyte % 35.8 19.6 - 45.3 %    Monocyte % 9.1 5.0 - 12.0 %    Eosinophil % 0.0 (L) 0.3 - 6.2 %    Basophil % 1.0 0.0 - 1.5 %    Immature Grans % 0.3 0.0 - 0.5 %    Neutrophils, Absolute 1.65 (L) 1.70 - 7.00 10*3/mm3    Lymphocytes, Absolute 1.10 0.70 - 3.10 10*3/mm3    Monocytes, Absolute 0.28 0.10 - 0.90 10*3/mm3    Eosinophils, Absolute 0.00 0.00 - 0.40 10*3/mm3    Basophils, Absolute 0.03 0.00 - 0.20 10*3/mm3    Immature Grans, Absolute 0.01 0.00 - 0.05 10*3/mm3    nRBC 0.0 0.0 - 0.2 /100 WBC   hCG, Quantitative, Pregnancy    Specimen: Blood   Result Value Ref Range    HCG Quantitative <0.50 mIU/mL   Blood Gas, Venous -    Specimen: Venous Blood   Result Value Ref Range    Site OTHER     pH, Venous 7.340 7.290 - 7.370 pH Units    pCO2, Venous 49.2 41.0 - 51.0 mm Hg    pO2, Venous 31.8 27.0 - 53.0 mm Hg    HCO3, Venous 26.5 (H) 18.0 - 23.0 mmol/L    Base Excess, Venous 0.2 0.0 - 2.0 mmol/L    O2 Saturation, Venous 60.8 45.0 - 75.0 %    Barometric Pressure for Blood Gas 749 mmHg    Modality Room Air     Ventilator Mode NA    Green Top (Gel)   Result Value Ref Range    Extra Tube Hold for add-ons.    Lavender Top   Result Value Ref Range    Extra Tube hold for add-on            MDM  Number of Diagnoses or Management Options  Hyperglycemia: new and requires workup  Lower abdominal pain: new and requires workup  Diagnosis management comments: Vital signs are stable, afebrile.  Labs are unremarkable.  Normal lactic acid and no leukocyte ptosis.  Glucose elevated at 390.   Over thousand glucose in the urine.  VBG is normal and acetones are negative.  CT abdomen pelvis negative for acute findings.  Patient received IVF bolus, insulin, antiemetics.  Recommend follow-up with her PCP.  Return precautions provided.      Final diagnoses:   Hyperglycemia   Lower abdominal pain       ED Disposition  ED Disposition     ED Disposition Condition Comment    Discharge Stable           Rufus Marshall, APRN  [address] [facility] [address]  [phone]    Schedule an appointment as soon as possible for a visit in 2 days  ER follow up         Medication List      Changed    Insulin Glargine 100 UNIT/ML injection pen  Commonly known as: LANTUS SOLOSTAR  Inject 20 Units under the skin into the appropriate area as directed Daily.  What changed: how much to take             Sarthak Hobson MD  10/07/21 0045

## 2021-10-11 LAB — BACTERIA SPEC AEROBE CULT: NORMAL

## 2021-10-15 LAB
QT INTERVAL: 380 MS
QTC INTERVAL: 410 MS

## 2021-11-06 ENCOUNTER — HOSPITAL ENCOUNTER (INPATIENT)
Facility: HOSPITAL | Age: 21
LOS: 2 days | Discharge: HOME | End: 2021-11-08
Attending: FAMILY MEDICINE | Admitting: INTERNAL MEDICINE

## 2021-11-06 ENCOUNTER — APPOINTMENT (OUTPATIENT)
Dept: GENERAL RADIOLOGY | Facility: HOSPITAL | Age: 21
End: 2021-11-06

## 2021-11-06 DIAGNOSIS — E10.10 DIABETIC KETOACIDOSIS WITHOUT COMA ASSOCIATED WITH TYPE 1 DIABETES MELLITUS (HCC): Primary | ICD-10-CM

## 2021-11-06 PROBLEM — E11.10 DKA (DIABETIC KETOACIDOSIS): Status: ACTIVE | Noted: 2021-11-06

## 2021-11-06 LAB
ALBUMIN SERPL-MCNC: 5.1 G/DL (ref 3.5–5.2)
ALBUMIN/GLOB SERPL: 1.9 G/DL
ALP SERPL-CCNC: 79 U/L (ref 39–117)
ALT SERPL W P-5'-P-CCNC: 11 U/L (ref 1–33)
ANION GAP SERPL CALCULATED.3IONS-SCNC: 24 MMOL/L (ref 5–15)
ARTERIAL PATENCY WRIST A: ABNORMAL
AST SERPL-CCNC: 11 U/L (ref 1–32)
ATMOSPHERIC PRESS: 753 MMHG
BACTERIA UR QL AUTO: NORMAL /HPF
BASE EXCESS BLDA CALC-SCNC: -11.5 MMOL/L (ref 0–2)
BASOPHILS # BLD AUTO: 0.07 10*3/MM3 (ref 0–0.2)
BASOPHILS NFR BLD AUTO: 0.6 % (ref 0–1.5)
BDY SITE: ABNORMAL
BILIRUB SERPL-MCNC: 0.5 MG/DL (ref 0–1.2)
BILIRUB UR QL STRIP: NEGATIVE
BILIRUB UR QL STRIP: NEGATIVE
BUN SERPL-MCNC: 23 MG/DL (ref 6–20)
BUN/CREAT SERPL: 21.1 (ref 7–25)
CALCIUM SPEC-SCNC: 9.9 MG/DL (ref 8.6–10.5)
CHLORIDE SERPL-SCNC: 94 MMOL/L (ref 98–107)
CK SERPL-CCNC: 54 U/L (ref 20–180)
CLARITY UR: CLEAR
CLARITY UR: CLEAR
CO2 SERPL-SCNC: 16 MMOL/L (ref 22–29)
COLOR UR: YELLOW
COLOR UR: YELLOW
CREAT SERPL-MCNC: 1.09 MG/DL (ref 0.57–1)
DEPRECATED RDW RBC AUTO: 38.5 FL (ref 37–54)
EOSINOPHIL # BLD AUTO: 0.08 10*3/MM3 (ref 0–0.4)
EOSINOPHIL NFR BLD AUTO: 0.7 % (ref 0.3–6.2)
ERYTHROCYTE [DISTWIDTH] IN BLOOD BY AUTOMATED COUNT: 12.6 % (ref 12.3–15.4)
FLUAV SUBTYP SPEC NAA+PROBE: NOT DETECTED
FLUBV RNA ISLT QL NAA+PROBE: NOT DETECTED
GFR SERPL CREATININE-BSD FRML MDRD: 63 ML/MIN/1.73
GLOBULIN UR ELPH-MCNC: 2.7 GM/DL
GLUCOSE BLDC GLUCOMTR-MCNC: 393 MG/DL (ref 70–130)
GLUCOSE SERPL-MCNC: 459 MG/DL (ref 65–99)
GLUCOSE UR STRIP-MCNC: ABNORMAL MG/DL
GLUCOSE UR STRIP-MCNC: ABNORMAL MG/DL
HBA1C MFR BLD: 11.5 % (ref 4.8–5.6)
HCG SERPL QL: NEGATIVE
HCO3 BLDA-SCNC: 14 MMOL/L (ref 20–26)
HCT VFR BLD AUTO: 36.1 % (ref 34–46.6)
HGB BLD-MCNC: 12.8 G/DL (ref 12–15.9)
HGB UR QL STRIP.AUTO: NEGATIVE
HGB UR QL STRIP.AUTO: NEGATIVE
HYALINE CASTS UR QL AUTO: NORMAL /LPF
IMM GRANULOCYTES # BLD AUTO: 0.1 10*3/MM3 (ref 0–0.05)
IMM GRANULOCYTES NFR BLD AUTO: 0.9 % (ref 0–0.5)
KETONES UR QL STRIP: ABNORMAL
KETONES UR QL STRIP: ABNORMAL
LEUKOCYTE ESTERASE UR QL STRIP.AUTO: NEGATIVE
LEUKOCYTE ESTERASE UR QL STRIP.AUTO: NEGATIVE
LIPASE SERPL-CCNC: 14 U/L (ref 13–60)
LYMPHOCYTES # BLD AUTO: 2.7 10*3/MM3 (ref 0.7–3.1)
LYMPHOCYTES NFR BLD AUTO: 23.6 % (ref 19.6–45.3)
Lab: ABNORMAL
MAGNESIUM SERPL-MCNC: 1.7 MG/DL (ref 1.6–2.6)
MCH RBC QN AUTO: 29.6 PG (ref 26.6–33)
MCHC RBC AUTO-ENTMCNC: 35.5 G/DL (ref 31.5–35.7)
MCV RBC AUTO: 83.4 FL (ref 79–97)
MODALITY: ABNORMAL
MONOCYTES # BLD AUTO: 0.38 10*3/MM3 (ref 0.1–0.9)
MONOCYTES NFR BLD AUTO: 3.3 % (ref 5–12)
NEUTROPHILS NFR BLD AUTO: 70.9 % (ref 42.7–76)
NEUTROPHILS NFR BLD AUTO: 8.09 10*3/MM3 (ref 1.7–7)
NITRITE UR QL STRIP: NEGATIVE
NITRITE UR QL STRIP: NEGATIVE
NRBC BLD AUTO-RTO: 0 /100 WBC (ref 0–0.2)
OSMOLALITY SERPL: 306 MOSM/KG (ref 275–295)
PCO2 BLDA: 29.8 MM HG (ref 35–45)
PH BLDA: 7.28 PH UNITS (ref 7.35–7.45)
PH UR STRIP.AUTO: 5.5 [PH] (ref 5–9)
PH UR STRIP.AUTO: 5.5 [PH] (ref 5–9)
PHOSPHATE SERPL-MCNC: 3.8 MG/DL (ref 2.5–4.5)
PLATELET # BLD AUTO: 295 10*3/MM3 (ref 140–450)
PMV BLD AUTO: 10.8 FL (ref 6–12)
PO2 BLDA: 115 MM HG (ref 83–108)
POTASSIUM SERPL-SCNC: 4.4 MMOL/L (ref 3.5–5.2)
PROT SERPL-MCNC: 7.8 G/DL (ref 6–8.5)
PROT UR QL STRIP: NEGATIVE
PROT UR QL STRIP: NEGATIVE
RBC # BLD AUTO: 4.33 10*6/MM3 (ref 3.77–5.28)
RBC # UR: NORMAL /HPF
REF LAB TEST METHOD: NORMAL
SAO2 % BLDCOA: 98.6 % (ref 94–99)
SARS-COV-2 RNA PNL SPEC NAA+PROBE: NOT DETECTED
SODIUM SERPL-SCNC: 134 MMOL/L (ref 136–145)
SP GR UR STRIP: 1.03 (ref 1–1.03)
SP GR UR STRIP: 1.03 (ref 1–1.03)
SQUAMOUS #/AREA URNS HPF: NORMAL /HPF
UROBILINOGEN UR QL STRIP: ABNORMAL
UROBILINOGEN UR QL STRIP: ABNORMAL
VENTILATOR MODE: ABNORMAL
WBC # BLD AUTO: 11.42 10*3/MM3 (ref 3.4–10.8)
WBC UR QL AUTO: NORMAL /HPF
WHOLE BLOOD HOLD SPECIMEN: NORMAL

## 2021-11-06 PROCEDURE — 0 INSULIN REGULAR HUMAN PER 5 UNITS: Performed by: FAMILY MEDICINE

## 2021-11-06 PROCEDURE — 83690 ASSAY OF LIPASE: CPT | Performed by: FAMILY MEDICINE

## 2021-11-06 PROCEDURE — 82962 GLUCOSE BLOOD TEST: CPT

## 2021-11-06 PROCEDURE — 80053 COMPREHEN METABOLIC PANEL: CPT

## 2021-11-06 PROCEDURE — 25010000002 ONDANSETRON PER 1 MG

## 2021-11-06 PROCEDURE — 81001 URINALYSIS AUTO W/SCOPE: CPT | Performed by: INTERNAL MEDICINE

## 2021-11-06 PROCEDURE — 83735 ASSAY OF MAGNESIUM: CPT | Performed by: FAMILY MEDICINE

## 2021-11-06 PROCEDURE — 85025 COMPLETE CBC W/AUTO DIFF WBC: CPT

## 2021-11-06 PROCEDURE — 25010000002 MORPHINE PER 10 MG: Performed by: INTERNAL MEDICINE

## 2021-11-06 PROCEDURE — 87636 SARSCOV2 & INF A&B AMP PRB: CPT | Performed by: FAMILY MEDICINE

## 2021-11-06 PROCEDURE — 25010000002 MORPHINE PER 10 MG: Performed by: FAMILY MEDICINE

## 2021-11-06 PROCEDURE — 83036 HEMOGLOBIN GLYCOSYLATED A1C: CPT | Performed by: FAMILY MEDICINE

## 2021-11-06 PROCEDURE — 74022 RADEX COMPL AQT ABD SERIES: CPT

## 2021-11-06 PROCEDURE — 25010000002 METOCLOPRAMIDE PER 10 MG: Performed by: INTERNAL MEDICINE

## 2021-11-06 PROCEDURE — 84703 CHORIONIC GONADOTROPIN ASSAY: CPT

## 2021-11-06 PROCEDURE — 82803 BLOOD GASES ANY COMBINATION: CPT

## 2021-11-06 PROCEDURE — 82550 ASSAY OF CK (CPK): CPT | Performed by: FAMILY MEDICINE

## 2021-11-06 PROCEDURE — 99284 EMERGENCY DEPT VISIT MOD MDM: CPT

## 2021-11-06 PROCEDURE — 84100 ASSAY OF PHOSPHORUS: CPT | Performed by: FAMILY MEDICINE

## 2021-11-06 PROCEDURE — 83930 ASSAY OF BLOOD OSMOLALITY: CPT | Performed by: FAMILY MEDICINE

## 2021-11-06 PROCEDURE — 25010000002 ONDANSETRON PER 1 MG: Performed by: FAMILY MEDICINE

## 2021-11-06 RX ORDER — ONDANSETRON 2 MG/ML
4 INJECTION INTRAMUSCULAR; INTRAVENOUS ONCE
Status: COMPLETED | OUTPATIENT
Start: 2021-11-06 | End: 2021-11-06

## 2021-11-06 RX ORDER — SODIUM CHLORIDE 0.9 % (FLUSH) 0.9 %
10 SYRINGE (ML) INJECTION AS NEEDED
Status: DISCONTINUED | OUTPATIENT
Start: 2021-11-06 | End: 2021-11-08 | Stop reason: HOSPADM

## 2021-11-06 RX ORDER — SODIUM CHLORIDE 0.9 % (FLUSH) 0.9 %
10 SYRINGE (ML) INJECTION ONCE AS NEEDED
Status: DISCONTINUED | OUTPATIENT
Start: 2021-11-06 | End: 2021-11-08 | Stop reason: HOSPADM

## 2021-11-06 RX ORDER — DEXTROSE, SODIUM CHLORIDE, AND POTASSIUM CHLORIDE 5; .45; .3 G/100ML; G/100ML; G/100ML
150 INJECTION INTRAVENOUS CONTINUOUS PRN
Status: DISCONTINUED | OUTPATIENT
Start: 2021-11-06 | End: 2021-11-07

## 2021-11-06 RX ORDER — ONDANSETRON 2 MG/ML
4 INJECTION INTRAMUSCULAR; INTRAVENOUS EVERY 6 HOURS PRN
Status: DISCONTINUED | OUTPATIENT
Start: 2021-11-06 | End: 2021-11-08 | Stop reason: HOSPADM

## 2021-11-06 RX ORDER — SODIUM CHLORIDE 9 MG/ML
100 INJECTION, SOLUTION INTRAVENOUS CONTINUOUS
Status: DISCONTINUED | OUTPATIENT
Start: 2021-11-06 | End: 2021-11-08 | Stop reason: HOSPADM

## 2021-11-06 RX ORDER — POTASSIUM CHLORIDE, DEXTROSE MONOHYDRATE AND SODIUM CHLORIDE 300; 5; 900 MG/100ML; G/100ML; MG/100ML
150 INJECTION, SOLUTION INTRAVENOUS CONTINUOUS PRN
Status: DISCONTINUED | OUTPATIENT
Start: 2021-11-06 | End: 2021-11-07

## 2021-11-06 RX ORDER — DEXTROSE MONOHYDRATE 25 G/50ML
12.5 INJECTION, SOLUTION INTRAVENOUS AS NEEDED
Status: DISCONTINUED | OUTPATIENT
Start: 2021-11-06 | End: 2021-11-07

## 2021-11-06 RX ORDER — METOCLOPRAMIDE HYDROCHLORIDE 5 MG/ML
10 INJECTION INTRAMUSCULAR; INTRAVENOUS EVERY 6 HOURS PRN
Status: DISCONTINUED | OUTPATIENT
Start: 2021-11-06 | End: 2021-11-08 | Stop reason: HOSPADM

## 2021-11-06 RX ORDER — DEXTROSE AND SODIUM CHLORIDE 5; .9 G/100ML; G/100ML
150 INJECTION, SOLUTION INTRAVENOUS CONTINUOUS PRN
Status: DISCONTINUED | OUTPATIENT
Start: 2021-11-06 | End: 2021-11-07

## 2021-11-06 RX ORDER — SODIUM CHLORIDE 9 MG/ML
10 INJECTION, SOLUTION INTRAVENOUS CONTINUOUS PRN
Status: DISCONTINUED | OUTPATIENT
Start: 2021-11-06 | End: 2021-11-07

## 2021-11-06 RX ORDER — SODIUM CHLORIDE 9 MG/ML
250 INJECTION, SOLUTION INTRAVENOUS CONTINUOUS PRN
Status: DISCONTINUED | OUTPATIENT
Start: 2021-11-06 | End: 2021-11-07

## 2021-11-06 RX ORDER — ONDANSETRON 2 MG/ML
INJECTION INTRAMUSCULAR; INTRAVENOUS
Status: COMPLETED
Start: 2021-11-06 | End: 2021-11-06

## 2021-11-06 RX ORDER — MORPHINE SULFATE 2 MG/ML
2 INJECTION, SOLUTION INTRAMUSCULAR; INTRAVENOUS
Status: DISCONTINUED | OUTPATIENT
Start: 2021-11-06 | End: 2021-11-08 | Stop reason: HOSPADM

## 2021-11-06 RX ORDER — SODIUM CHLORIDE AND POTASSIUM CHLORIDE 150; 900 MG/100ML; MG/100ML
250 INJECTION, SOLUTION INTRAVENOUS CONTINUOUS PRN
Status: DISCONTINUED | OUTPATIENT
Start: 2021-11-06 | End: 2021-11-07

## 2021-11-06 RX ORDER — SODIUM CHLORIDE AND POTASSIUM CHLORIDE 150; 450 MG/100ML; MG/100ML
250 INJECTION, SOLUTION INTRAVENOUS CONTINUOUS PRN
Status: DISCONTINUED | OUTPATIENT
Start: 2021-11-06 | End: 2021-11-07

## 2021-11-06 RX ORDER — SODIUM CHLORIDE AND POTASSIUM CHLORIDE 300; 900 MG/100ML; MG/100ML
250 INJECTION, SOLUTION INTRAVENOUS CONTINUOUS PRN
Status: DISCONTINUED | OUTPATIENT
Start: 2021-11-06 | End: 2021-11-07

## 2021-11-06 RX ORDER — DEXTROSE AND SODIUM CHLORIDE 5; .45 G/100ML; G/100ML
150 INJECTION, SOLUTION INTRAVENOUS CONTINUOUS PRN
Status: DISCONTINUED | OUTPATIENT
Start: 2021-11-06 | End: 2021-11-07

## 2021-11-06 RX ORDER — DEXTROSE, SODIUM CHLORIDE, AND POTASSIUM CHLORIDE 5; .9; .15 G/100ML; G/100ML; G/100ML
150 INJECTION INTRAVENOUS CONTINUOUS PRN
Status: DISCONTINUED | OUTPATIENT
Start: 2021-11-06 | End: 2021-11-07

## 2021-11-06 RX ORDER — DEXTROSE, SODIUM CHLORIDE, AND POTASSIUM CHLORIDE 5; .45; .15 G/100ML; G/100ML; G/100ML
150 INJECTION INTRAVENOUS CONTINUOUS PRN
Status: DISCONTINUED | OUTPATIENT
Start: 2021-11-06 | End: 2021-11-07

## 2021-11-06 RX ORDER — SODIUM CHLORIDE 0.9 % (FLUSH) 0.9 %
3 SYRINGE (ML) INJECTION EVERY 12 HOURS SCHEDULED
Status: DISCONTINUED | OUTPATIENT
Start: 2021-11-06 | End: 2021-11-08 | Stop reason: HOSPADM

## 2021-11-06 RX ORDER — SODIUM CHLORIDE 450 MG/100ML
250 INJECTION, SOLUTION INTRAVENOUS CONTINUOUS PRN
Status: DISCONTINUED | OUTPATIENT
Start: 2021-11-06 | End: 2021-11-07

## 2021-11-06 RX ADMIN — ONDANSETRON 4 MG: 2 INJECTION INTRAMUSCULAR; INTRAVENOUS at 21:42

## 2021-11-06 RX ADMIN — ONDANSETRON 4 MG: 2 INJECTION INTRAMUSCULAR; INTRAVENOUS at 23:28

## 2021-11-06 RX ADMIN — SODIUM CHLORIDE 125 ML/HR: 9 INJECTION, SOLUTION INTRAVENOUS at 22:22

## 2021-11-06 RX ADMIN — SODIUM CHLORIDE 4 UNITS/HR: 9 INJECTION, SOLUTION INTRAVENOUS at 23:54

## 2021-11-06 RX ADMIN — METOCLOPRAMIDE 10 MG: 5 INJECTION, SOLUTION INTRAMUSCULAR; INTRAVENOUS at 23:52

## 2021-11-06 RX ADMIN — SODIUM CHLORIDE 2000 ML: 9 INJECTION, SOLUTION INTRAVENOUS at 23:06

## 2021-11-06 RX ADMIN — MORPHINE SULFATE 4 MG: 4 INJECTION, SOLUTION INTRAMUSCULAR; INTRAVENOUS at 21:42

## 2021-11-06 RX ADMIN — MORPHINE SULFATE 2 MG: 2 INJECTION, SOLUTION INTRAMUSCULAR; INTRAVENOUS at 23:40

## 2021-11-06 RX ADMIN — SODIUM CHLORIDE 1000 ML: 9 INJECTION, SOLUTION INTRAVENOUS at 21:39

## 2021-11-07 LAB
ALBUMIN SERPL-MCNC: 3.5 G/DL (ref 3.5–5.2)
ALBUMIN/GLOB SERPL: 1.8 G/DL
ALP SERPL-CCNC: 55 U/L (ref 39–117)
ALT SERPL W P-5'-P-CCNC: 9 U/L (ref 1–33)
ANION GAP SERPL CALCULATED.3IONS-SCNC: 11 MMOL/L (ref 5–15)
ANION GAP SERPL CALCULATED.3IONS-SCNC: 11 MMOL/L (ref 5–15)
ANION GAP SERPL CALCULATED.3IONS-SCNC: 15 MMOL/L (ref 5–15)
ANION GAP SERPL CALCULATED.3IONS-SCNC: 25 MMOL/L (ref 5–15)
AST SERPL-CCNC: 9 U/L (ref 1–32)
BILIRUB SERPL-MCNC: 0.2 MG/DL (ref 0–1.2)
BUN SERPL-MCNC: 18 MG/DL (ref 6–20)
BUN SERPL-MCNC: 20 MG/DL (ref 6–20)
BUN SERPL-MCNC: 21 MG/DL (ref 6–20)
BUN SERPL-MCNC: 25 MG/DL (ref 6–20)
BUN/CREAT SERPL: 22.5 (ref 7–25)
BUN/CREAT SERPL: 22.5 (ref 7–25)
BUN/CREAT SERPL: 24.3 (ref 7–25)
BUN/CREAT SERPL: 26.6 (ref 7–25)
CALCIUM SPEC-SCNC: 7.7 MG/DL (ref 8.6–10.5)
CALCIUM SPEC-SCNC: 8.1 MG/DL (ref 8.6–10.5)
CALCIUM SPEC-SCNC: 8.3 MG/DL (ref 8.6–10.5)
CALCIUM SPEC-SCNC: 8.7 MG/DL (ref 8.6–10.5)
CHLORIDE SERPL-SCNC: 100 MMOL/L (ref 98–107)
CHLORIDE SERPL-SCNC: 101 MMOL/L (ref 98–107)
CHLORIDE SERPL-SCNC: 104 MMOL/L (ref 98–107)
CHLORIDE SERPL-SCNC: 104 MMOL/L (ref 98–107)
CO2 SERPL-SCNC: 13 MMOL/L (ref 22–29)
CO2 SERPL-SCNC: 17 MMOL/L (ref 22–29)
CO2 SERPL-SCNC: 20 MMOL/L (ref 22–29)
CO2 SERPL-SCNC: 21 MMOL/L (ref 22–29)
CREAT SERPL-MCNC: 0.79 MG/DL (ref 0.57–1)
CREAT SERPL-MCNC: 0.8 MG/DL (ref 0.57–1)
CREAT SERPL-MCNC: 0.89 MG/DL (ref 0.57–1)
CREAT SERPL-MCNC: 1.03 MG/DL (ref 0.57–1)
DEPRECATED RDW RBC AUTO: 39 FL (ref 37–54)
ERYTHROCYTE [DISTWIDTH] IN BLOOD BY AUTOMATED COUNT: 12.8 % (ref 12.3–15.4)
GFR SERPL CREATININE-BSD FRML MDRD: 67 ML/MIN/1.73
GFR SERPL CREATININE-BSD FRML MDRD: 79 ML/MIN/1.73
GFR SERPL CREATININE-BSD FRML MDRD: 90 ML/MIN/1.73
GFR SERPL CREATININE-BSD FRML MDRD: 91 ML/MIN/1.73
GLOBULIN UR ELPH-MCNC: 1.9 GM/DL
GLUCOSE BLDC GLUCOMTR-MCNC: 145 MG/DL (ref 70–130)
GLUCOSE BLDC GLUCOMTR-MCNC: 177 MG/DL (ref 70–130)
GLUCOSE BLDC GLUCOMTR-MCNC: 215 MG/DL (ref 70–130)
GLUCOSE BLDC GLUCOMTR-MCNC: 269 MG/DL (ref 70–130)
GLUCOSE BLDC GLUCOMTR-MCNC: 287 MG/DL (ref 70–130)
GLUCOSE BLDC GLUCOMTR-MCNC: 290 MG/DL (ref 70–130)
GLUCOSE BLDC GLUCOMTR-MCNC: 294 MG/DL (ref 70–130)
GLUCOSE BLDC GLUCOMTR-MCNC: 338 MG/DL (ref 70–130)
GLUCOSE BLDC GLUCOMTR-MCNC: 87 MG/DL (ref 70–130)
GLUCOSE BLDC GLUCOMTR-MCNC: 88 MG/DL (ref 70–130)
GLUCOSE SERPL-MCNC: 150 MG/DL (ref 65–99)
GLUCOSE SERPL-MCNC: 203 MG/DL (ref 65–99)
GLUCOSE SERPL-MCNC: 305 MG/DL (ref 65–99)
GLUCOSE SERPL-MCNC: 410 MG/DL (ref 65–99)
HCT VFR BLD AUTO: 27.8 % (ref 34–46.6)
HGB BLD-MCNC: 9.5 G/DL (ref 12–15.9)
MAGNESIUM SERPL-MCNC: 1.4 MG/DL (ref 1.6–2.6)
MAGNESIUM SERPL-MCNC: 1.5 MG/DL (ref 1.6–2.6)
MCH RBC QN AUTO: 29 PG (ref 26.6–33)
MCHC RBC AUTO-ENTMCNC: 34.2 G/DL (ref 31.5–35.7)
MCV RBC AUTO: 84.8 FL (ref 79–97)
PHOSPHATE SERPL-MCNC: 2.4 MG/DL (ref 2.5–4.5)
PHOSPHATE SERPL-MCNC: 3.1 MG/DL (ref 2.5–4.5)
PHOSPHATE SERPL-MCNC: 3.2 MG/DL (ref 2.5–4.5)
PHOSPHATE SERPL-MCNC: 3.3 MG/DL (ref 2.5–4.5)
PHOSPHATE SERPL-MCNC: 3.3 MG/DL (ref 2.5–4.5)
PLATELET # BLD AUTO: 214 10*3/MM3 (ref 140–450)
PMV BLD AUTO: 11.3 FL (ref 6–12)
POTASSIUM SERPL-SCNC: 3.9 MMOL/L (ref 3.5–5.2)
POTASSIUM SERPL-SCNC: 4.1 MMOL/L (ref 3.5–5.2)
POTASSIUM SERPL-SCNC: 4.4 MMOL/L (ref 3.5–5.2)
POTASSIUM SERPL-SCNC: 4.6 MMOL/L (ref 3.5–5.2)
PROT SERPL-MCNC: 5.4 G/DL (ref 6–8.5)
RBC # BLD AUTO: 3.28 10*6/MM3 (ref 3.77–5.28)
SODIUM SERPL-SCNC: 133 MMOL/L (ref 136–145)
SODIUM SERPL-SCNC: 135 MMOL/L (ref 136–145)
SODIUM SERPL-SCNC: 136 MMOL/L (ref 136–145)
SODIUM SERPL-SCNC: 138 MMOL/L (ref 136–145)
WBC # BLD AUTO: 11.1 10*3/MM3 (ref 3.4–10.8)

## 2021-11-07 PROCEDURE — 82962 GLUCOSE BLOOD TEST: CPT

## 2021-11-07 PROCEDURE — 25010000002 MORPHINE PER 10 MG: Performed by: INTERNAL MEDICINE

## 2021-11-07 PROCEDURE — 83735 ASSAY OF MAGNESIUM: CPT | Performed by: INTERNAL MEDICINE

## 2021-11-07 PROCEDURE — 25010000002 ONDANSETRON PER 1 MG: Performed by: INTERNAL MEDICINE

## 2021-11-07 PROCEDURE — 84100 ASSAY OF PHOSPHORUS: CPT | Performed by: INTERNAL MEDICINE

## 2021-11-07 PROCEDURE — 36415 COLL VENOUS BLD VENIPUNCTURE: CPT | Performed by: INTERNAL MEDICINE

## 2021-11-07 PROCEDURE — 63710000001 INSULIN DETEMIR PER 5 UNITS: Performed by: INTERNAL MEDICINE

## 2021-11-07 PROCEDURE — 25010000002 CEFTRIAXONE PER 250 MG: Performed by: INTERNAL MEDICINE

## 2021-11-07 PROCEDURE — 85027 COMPLETE CBC AUTOMATED: CPT | Performed by: INTERNAL MEDICINE

## 2021-11-07 PROCEDURE — 25010000002 MAGNESIUM SULFATE 2 GM/50ML SOLUTION: Performed by: INTERNAL MEDICINE

## 2021-11-07 PROCEDURE — 80053 COMPREHEN METABOLIC PANEL: CPT | Performed by: INTERNAL MEDICINE

## 2021-11-07 RX ORDER — MAGNESIUM SULFATE HEPTAHYDRATE 40 MG/ML
4 INJECTION, SOLUTION INTRAVENOUS AS NEEDED
Status: DISCONTINUED | OUTPATIENT
Start: 2021-11-07 | End: 2021-11-08 | Stop reason: HOSPADM

## 2021-11-07 RX ORDER — MAGNESIUM SULFATE HEPTAHYDRATE 40 MG/ML
2 INJECTION, SOLUTION INTRAVENOUS AS NEEDED
Status: DISCONTINUED | OUTPATIENT
Start: 2021-11-07 | End: 2021-11-08 | Stop reason: HOSPADM

## 2021-11-07 RX ORDER — POTASSIUM CHLORIDE 1.5 G/1.77G
40 POWDER, FOR SOLUTION ORAL AS NEEDED
Status: DISCONTINUED | OUTPATIENT
Start: 2021-11-07 | End: 2021-11-08 | Stop reason: HOSPADM

## 2021-11-07 RX ORDER — POTASSIUM CHLORIDE 750 MG/1
40 CAPSULE, EXTENDED RELEASE ORAL AS NEEDED
Status: DISCONTINUED | OUTPATIENT
Start: 2021-11-07 | End: 2021-11-08 | Stop reason: HOSPADM

## 2021-11-07 RX ORDER — POTASSIUM CHLORIDE 7.45 MG/ML
10 INJECTION INTRAVENOUS
Status: DISCONTINUED | OUTPATIENT
Start: 2021-11-07 | End: 2021-11-08 | Stop reason: HOSPADM

## 2021-11-07 RX ORDER — NICOTINE POLACRILEX 4 MG
15 LOZENGE BUCCAL
Status: DISCONTINUED | OUTPATIENT
Start: 2021-11-07 | End: 2021-11-08 | Stop reason: HOSPADM

## 2021-11-07 RX ORDER — DEXTROSE MONOHYDRATE 25 G/50ML
25 INJECTION, SOLUTION INTRAVENOUS
Status: DISCONTINUED | OUTPATIENT
Start: 2021-11-07 | End: 2021-11-08 | Stop reason: HOSPADM

## 2021-11-07 RX ADMIN — MORPHINE SULFATE 2 MG: 2 INJECTION, SOLUTION INTRAMUSCULAR; INTRAVENOUS at 17:24

## 2021-11-07 RX ADMIN — CEFTRIAXONE SODIUM 2 G: 2 INJECTION, POWDER, FOR SOLUTION INTRAMUSCULAR; INTRAVENOUS at 01:00

## 2021-11-07 RX ADMIN — CEFTRIAXONE SODIUM 2 G: 2 INJECTION, POWDER, FOR SOLUTION INTRAMUSCULAR; INTRAVENOUS at 23:45

## 2021-11-07 RX ADMIN — PHENOL 1 SPRAY: 1.5 LIQUID ORAL at 17:19

## 2021-11-07 RX ADMIN — MORPHINE SULFATE 2 MG: 2 INJECTION, SOLUTION INTRAMUSCULAR; INTRAVENOUS at 05:49

## 2021-11-07 RX ADMIN — POTASSIUM CHLORIDE, DEXTROSE MONOHYDRATE AND SODIUM CHLORIDE 150 ML/HR: 150; 5; 450 INJECTION, SOLUTION INTRAVENOUS at 02:00

## 2021-11-07 RX ADMIN — MORPHINE SULFATE 2 MG: 2 INJECTION, SOLUTION INTRAMUSCULAR; INTRAVENOUS at 23:46

## 2021-11-07 RX ADMIN — MAGNESIUM SULFATE HEPTAHYDRATE 2 G: 40 INJECTION, SOLUTION INTRAVENOUS at 16:22

## 2021-11-07 RX ADMIN — ONDANSETRON 4 MG: 2 INJECTION INTRAMUSCULAR; INTRAVENOUS at 08:47

## 2021-11-07 RX ADMIN — INSULIN DETEMIR 30 UNITS: 100 INJECTION, SOLUTION SUBCUTANEOUS at 10:47

## 2021-11-07 RX ADMIN — MORPHINE SULFATE 2 MG: 2 INJECTION, SOLUTION INTRAMUSCULAR; INTRAVENOUS at 20:44

## 2021-11-07 RX ADMIN — MORPHINE SULFATE 2 MG: 2 INJECTION, SOLUTION INTRAMUSCULAR; INTRAVENOUS at 13:07

## 2021-11-07 RX ADMIN — SODIUM CHLORIDE 500 ML: 9 INJECTION, SOLUTION INTRAVENOUS at 01:45

## 2021-11-07 RX ADMIN — MORPHINE SULFATE 2 MG: 2 INJECTION, SOLUTION INTRAMUSCULAR; INTRAVENOUS at 08:47

## 2021-11-07 RX ADMIN — POTASSIUM CHLORIDE, DEXTROSE MONOHYDRATE AND SODIUM CHLORIDE 150 ML/HR: 150; 5; 450 INJECTION, SOLUTION INTRAVENOUS at 09:07

## 2021-11-07 RX ADMIN — SODIUM CHLORIDE, PRESERVATIVE FREE 3 ML: 5 INJECTION INTRAVENOUS at 20:44

## 2021-11-07 RX ADMIN — POTASSIUM & SODIUM PHOSPHATES POWDER PACK 280-160-250 MG 2 PACKET: 280-160-250 PACK at 17:19

## 2021-11-07 RX ADMIN — ONDANSETRON 4 MG: 2 INJECTION INTRAMUSCULAR; INTRAVENOUS at 23:58

## 2021-11-07 RX ADMIN — MAGNESIUM SULFATE HEPTAHYDRATE 2 G: 40 INJECTION, SOLUTION INTRAVENOUS at 18:45

## 2021-11-07 RX ADMIN — SODIUM BICARBONATE 50 MEQ: 84 INJECTION INTRAVENOUS at 01:30

## 2021-11-07 RX ADMIN — MAGNESIUM SULFATE HEPTAHYDRATE 2 G: 40 INJECTION, SOLUTION INTRAVENOUS at 12:59

## 2021-11-07 RX ADMIN — SODIUM CHLORIDE 150 ML/HR: 9 INJECTION, SOLUTION INTRAVENOUS at 12:44

## 2021-11-07 NOTE — PLAN OF CARE
Problem: Adult Inpatient Plan of Care  Goal: Plan of Care Review  Outcome: Ongoing, Progressing  Flowsheets (Taken 11/7/2021 1503)  Progress: improving  Plan of Care Reviewed With: patient  Outcome Summary: off insulin drip, vss   Goal Outcome Evaluation:  Plan of Care Reviewed With: patient        Progress: improving  Outcome Summary: off insulin drip, vss

## 2021-11-07 NOTE — PROGRESS NOTES
[facility]  INPATIENT PROGRESS NOTE    Length of Stay: 1  Date of Admission: 11/6/2021  Primary Care Physician: Provider, No Known    Subjective   Chief Complaint: Nausea and vomiting    HPI: Patient states that the nausea and vomiting has resolved today.  She wants to eat.  Anion gap is closed.  She states that she is under a lot of stress at work and this likely triggered her DKA.    Review of Systems   Constitutional: Negative for activity change, appetite change, chills, fatigue and fever.   HENT: Negative for congestion, ear pain, rhinorrhea, sore throat and trouble swallowing.    Respiratory: Negative for cough, chest tightness, shortness of breath and wheezing.    Cardiovascular: Negative for chest pain, palpitations and leg swelling.   Gastrointestinal: Negative for abdominal distention, abdominal pain, diarrhea, nausea and vomiting.   Genitourinary: Negative for difficulty urinating, dysuria and hematuria.   Musculoskeletal: Negative for arthralgias, back pain and myalgias.   Skin: Negative for pallor and rash.   Neurological: Negative for dizziness, syncope, weakness, light-headedness and headaches.   Hematological: Negative for adenopathy. Does not bruise/bleed easily.   Psychiatric/Behavioral: Negative for agitation and confusion. The patient is not nervous/anxious.      Objective    Temp:  [98 °F (36.7 °C)-99 °F (37.2 °C)] 98.6 °F (37 °C)  Heart Rate:  [] 82  Resp:  [15-20] 16  BP: ()/() 89/55    Physical Exam  Constitutional:       General: She is not in acute distress.     Appearance: She is not ill-appearing or diaphoretic.      Comments: Thin and malnourished appearing   HENT:      Head: Normocephalic and atraumatic.      Right Ear: External ear normal.      Left Ear: External ear normal.      Nose: No congestion or rhinorrhea.      Mouth/Throat:      Mouth: Mucous membranes are moist.      Pharynx: No oropharyngeal  exudate or posterior oropharyngeal erythema.   Eyes:      General: No scleral icterus.     Extraocular Movements: Extraocular movements intact.      Conjunctiva/sclera: Conjunctivae normal.   Cardiovascular:      Rate and Rhythm: Normal rate and regular rhythm.      Heart sounds: Normal heart sounds. No murmur heard.      Pulmonary:      Effort: Pulmonary effort is normal. No respiratory distress.      Breath sounds: Normal breath sounds. No wheezing, rhonchi or rales.   Abdominal:      General: Abdomen is flat. There is no distension.      Palpations: Abdomen is soft.      Tenderness: There is no abdominal tenderness. There is no guarding.   Musculoskeletal:         General: No swelling, tenderness or deformity.      Cervical back: Neck supple. No rigidity. No muscular tenderness.      Right lower leg: No edema.      Left lower leg: No edema.   Lymphadenopathy:      Cervical: No cervical adenopathy.   Skin:     General: Skin is warm and dry.   Neurological:      General: No focal deficit present.      Mental Status: She is alert and oriented to person, place, and time.      Cranial Nerves: No cranial nerve deficit.      Motor: No weakness.   Psychiatric:         Mood and Affect: Mood normal.         Behavior: Behavior normal.         Thought Content: Thought content normal.       Medication Review:    Current Facility-Administered Medications:   •  cefTRIAXone (ROCEPHIN) 2 g/100 mL 0.9% NS IVPB (MBP), 2 g, Intravenous, Q24H, Khalida Escalona MD, 2 g at 11/07/21 0100  •  dextrose (D50W) (25 g/50 mL) IV injection 25 g, 25 g, Intravenous, Q15 Min PRN, Daniel Johnson MD  •  dextrose (GLUTOSE) oral gel 15 g, 15 g, Oral, Q15 Min PRN, Daniel Johnson MD  •  enoxaparin (LOVENOX) syringe 40 mg, 40 mg, Subcutaneous, Q24H, Khalida Escalona MD  •  glucagon (human recombinant) (GLUCAGEN DIAGNOSTIC) injection 1 mg, 1 mg, Subcutaneous, Q15 Min PRN, Daniel Johnson MD  •  insulin aspart (novoLOG)  injection 0-9 Units, 0-9 Units, Subcutaneous, TID AC, Daniel Johnson MD  •  insulin detemir (LEVEMIR) injection 30 Units, 30 Units, Subcutaneous, Daily, Daniel Johnson MD, 30 Units at 11/07/21 1047  •  Magnesium Sulfate 2 gram Bolus, followed by 8 gram infusion (total Mg dose 10 grams)- Mg less than or equal to 1mg/dL, 2 g, Intravenous, PRN **OR** Magnesium Sulfate 2 gram / 50mL Infusion (GIVE X 3 BAGS TO EQUAL 6GM TOTAL DOSE) - Mg 1.1 - 1.5 mg/dl, 2 g, Intravenous, PRN **OR** Magnesium Sulfate 4 gram infusion- Mg 1.6-1.9 mg/dL, 4 g, Intravenous, PRN, Daniel Johnson MD  •  metoclopramide (REGLAN) injection 10 mg, 10 mg, Intravenous, Q6H PRN, Khalida Escalona MD, 10 mg at 11/06/21 2352  •  morphine injection 2 mg, 2 mg, Intravenous, Q3H PRN, Khalida Escalona MD, 2 mg at 11/07/21 0847  •  ondansetron (ZOFRAN) injection 4 mg, 4 mg, Intravenous, Q6H PRN, Khalida Escalona MD, 4 mg at 11/07/21 0847  •  potassium chloride (MICRO-K) CR capsule 40 mEq, 40 mEq, Oral, PRN **OR** potassium chloride (KLOR-CON) packet 40 mEq, 40 mEq, Oral, PRN **OR** potassium chloride 10 mEq in 100 mL IVPB, 10 mEq, Intravenous, Q1H PRN, Daniel Johnson MD  •  sodium chloride 0.9 % flush 10 mL, 10 mL, Intravenous, PRN, Khalida Escalona MD  •  sodium chloride 0.9 % flush 10 mL, 10 mL, Intravenous, PRN, Khalida Escalona MD  •  sodium chloride 0.9 % flush 10 mL, 10 mL, Intravenous, Once PRN, Khalida Escalona MD  •  sodium chloride 0.9 % flush 3 mL, 3 mL, Intravenous, Q12H, Khalida Escalona MD  •  sodium chloride 0.9 % infusion, 150 mL/hr, Intravenous, Continuous, Daniel Johnson MD, Last Rate: 125 mL/hr at 11/06/21 2319, 125 mL/hr at 11/06/21 2319    I have reviewed the patient's current medications.     Results Review:  I have reviewed the labs, radiology results, and diagnostic studies.    Laboratory Data:   Results from last 7 days   Lab Units 11/07/21  5712  11/07/21  0400 11/07/21  0007 11/06/21 2105 11/06/21 2105   SODIUM mmol/L 133* 135* 138   < > 134*   POTASSIUM mmol/L 4.6 3.9 4.4   < > 4.4   CHLORIDE mmol/L 101 104 100   < > 94*   CO2 mmol/L 17.0* 20.0* 13.0*   < > 16.0*   BUN mg/dL 20 21* 25*   < > 23*   CREATININE mg/dL 0.89 0.79 1.03*   < > 1.09*   GLUCOSE mg/dL 305* 150* 410*   < > 459*   CALCIUM mg/dL 8.1* 7.7* 8.7   < > 9.9   BILIRUBIN mg/dL  --  0.2  --   --  0.5   ALK PHOS U/L  --  55  --   --  79   ALT (SGPT) U/L  --  9  --   --  11   AST (SGOT) U/L  --  9  --   --  11   ANION GAP mmol/L 15.0 11.0 25.0*   < > 24.0*    < > = values in this interval not displayed.     Estimated Creatinine Clearance: 72.5 mL/min (by C-G formula based on SCr of 0.89 mg/dL).  Results from last 7 days   Lab Units 11/07/21 0748 11/07/21 0400 11/07/21  0007   MAGNESIUM mg/dL 1.4* 1.4* 1.5*   PHOSPHORUS mg/dL 3.2 3.1 3.3         Results from last 7 days   Lab Units 11/07/21 0400 11/06/21 2105   WBC 10*3/mm3 11.10* 11.42*   HEMOGLOBIN g/dL 9.5* 12.8   HEMATOCRIT % 27.8* 36.1   PLATELETS 10*3/mm3 214 295           Culture Data:   No results found for: BLOODCX  No results found for: URINECX  No results found for: RESPCX  No results found for: WOUNDCX  No results found for: STOOLCX  No components found for: BODYFLD    Radiology Data:   Imaging Results (Last 24 Hours)     Procedure Component Value Units Date/Time    XR Abdomen 2+ VW with Chest 1 VW [[id number]] Collected: 11/06/21 2136     Updated: 11/06/21 2228    Narrative:      EXAM:    XR Abdomen, 2 Views and XR Chest, 1 View    CLINICAL HISTORY:    The patient is 21 years old and is Female; chest pain and  abdominal pain    TECHNIQUE:    Frontal view of the chest, frontal view of the abdomen/pelvis  and upright or decubitus view of the abdomen.    COMPARISON:    None    FINDINGS:    LUNGS:  Lungs are clear.    PLEURAL SPACE:  Normal  No pneumothorax.    HEART:  Normal  No cardiomegaly.    MEDIASTINUM:  Normal     INTRAPERITONEAL SPACE:  No pneumoperitoneum.    GASTROINTESTINAL TRACT:  Nonobstructive bowel gas pattern.    BONES/JOINTS:  Normal    OTHER FINDINGS:  Mild to moderate stool burden.      Impression:        Nonobstructive bowel gas pattern.    Electronically signed by:  Santiago Putnam MD  11/6/2021 10:27 PM CDT  Workstation: [id number]          Assessment/Plan     Hospital Problem List:  Principal Problem:    Diabetic ketoacidosis without coma associated with type 1 diabetes mellitus (HCC)  Active Problems:    Diabetic neuropathy (HCC)    Recurrent UTI    Asthma    Severe depressed bipolar II disorder without psychotic features (HCC)    Post traumatic stress disorder (PTSD)    Seizure disorder (HCC)    Insomnia due to medical condition    Cannabinoid hyperemesis syndrome    Cigarette nicotine dependence without complication    ADHD    Bipolar 1 disorder ([facility])    Nausea and vomiting    DKA (diabetic ketoacidosis) ([facility])    Plan  -Anion gap is closed and DKA.  -We will discontinue insulin drip and start Levemir 30 units daily and NovoLog sliding scale.  -Start diabetic diet  -Continue IV antibiotic with ceftriaxone  -Continue IV normal saline at 150 cc an hour  -Replete magnesium and other electrolytes  -DVT prophylaxis with subcutaneous Lovenox  -CODE STATUS is full code    Discharge Planning: In progress    I confirmed that the patient's Advance Care Plan is present, code status is documented, or surrogate decision maker is listed in the patient's medical record.      I have utilized all available immediate resources to obtain, update, or review the patient's current medications.      Daniel Johnson MD   11/07/21   11:48 CST

## 2021-11-07 NOTE — H&P
"      [facility] Medicine Admission      Date of Admission: 2021      Primary Care Physician: Provider, No Known      Chief Complaint:  She comes into er with acute onset of nausea and vomitng and unable to keep any liquids or solids down.  She states she has been under a great deal of stress   Stress usually triggers these episodes of dka   Intense nausea and vomiting and dehdyration with elevated blood sugars.     HPI  She states she has had increased stress   No travel   No sick contacts.  She states all of a sudden developed increased back pain and increased nausea and vomiting related to that   She knows when she is getting \"into trouble with her blood sugars\" she has been a type I diabetic since age 6.     She knew she could not control her sx at home with temporizing measures.     She was evaluated in er  And noted to be dka   Elevated blood sugars   With then metabolic acidosis and elevated anion gap noted.     Will admit to icu and place on insulin drip until anion gap closes and acidemia is resolved      Concurrent Medical History:  has a past medical history of ADHD, Bipolar 1 disorder (HCC), Borderline personality disorder (HCC), Cannabinoid hyperemesis syndrome, Diabetes mellitus type 1 (HCC), Diabetic gastroparesis (HCC), Diabetic ketoacidosis (HCC), Diabetic neuropathy (HCC), History of transfusion, Post traumatic stress disorder (PTSD), Recurrent UTI, Seizure disorder (HCC), Severe depressed bipolar II disorder without psychotic features (HCC), and Uncontrolled diabetes mellitus (HCC).    Past Surgical History:  has a past surgical history that includes  section (N/A, 2018) and Cystoscopy (N/A, 2020).    Family History: family history includes Dementia in her maternal grandfather; Drug abuse in her father; Hypertension in her paternal grandmother; Suicide Attempts in her brother.     Social History:  reports that she has quit smoking. She " has a 2.00 pack-year smoking history. She has never used smokeless tobacco. She reports current drug use. Frequency: 1.00 time per week. Drug: Marijuana. She reports that she does not drink alcohol.    Allergies:   Allergies   Allergen Reactions   • Pineapple Anaphylaxis   • Benzoyl Peroxide Swelling       Medications:   Prior to Admission medications    Medication Sig Start Date End Date Taking? Authorizing Provider   Blood Glucose Monitoring Suppl w/Device kit USE AS INDICATED, ANY MONITOR , ICD10 code is E11.9 3/14/19   Tavon Avila MD   Brexpiprazole (Rexulti) 2 MG tablet Take  by mouth Daily.    Provider, MD Abigail   dicyclomine (BENTYL) 20 MG tablet Take 1 tablet by mouth Every 6 (Six) Hours As Needed (abdominal cramping). 1/10/21   Veronica Watson MD   Glucose Blood (BLOOD GLUCOSE TEST) strip Use 4 x daily use any brand covered by insurance or same brand as before ICD10 code is E11.9 10/31/19   Tavon Avila MD   hydrOXYzine (ATARAX) 10 MG tablet Take 10 mg by mouth 3 (Three) Times a Day As Needed for Itching or Anxiety.    ProviderAbigail MD   insulin aspart (NOVOLOG FLEXPEN) 100 UNIT/ML solution pen-injector sc pen Up to 20 units with meals 10/31/19   Tavon Avila MD   Insulin Glargine (LANTUS SOLOSTAR) 100 UNIT/ML injection pen Inject 20 Units under the skin into the appropriate area as directed Daily.  Patient taking differently: Inject 24 Units under the skin into the appropriate area as directed Daily. 5/8/20   Tavon Avila MD   Insulin Pen Needle (B-D UF III MINI PEN NEEDLES) 31G X 5 MM misc Use 4 times daily  , ICD10 code is E11.9 10/31/19   Tavon Avila MD   lamoTRIgine (LaMICtal) 25 MG tablet Take 25 mg by mouth 3 (Three) Times a Day.    ProviderAbigail MD   Lancet Devices (LANCING DEVICE) misc USE AS INDICATED TO CORRELATE WITH STRIPS AND METER 3/14/19   Tavon Avila MD   Lancets 30G misc USE 4 X  DAILY 3/14/19   Tavon Avila MD   lisdexamfetamine (Vyvanse) 20 MG capsule Take 20 mg by mouth Every Morning    ProviderAbigail MD   promethazine (PHENERGAN) 12.5 MG tablet Take 1 tablet by mouth Every 6 (Six) Hours As Needed for Nausea or Vomiting. 1/10/21   Veronica Watson MD   sertraline (ZOLOFT) 100 MG tablet Take 100 mg by mouth Every Night.    Provider, MD Abigail       Review of Systems:  Review of Systems   Constitutional: Positive for activity change and unexpected weight change.   HENT: Negative.    Eyes: Negative.    Respiratory: Negative.    Cardiovascular: Positive for palpitations.   Gastrointestinal: Positive for abdominal distention, abdominal pain, nausea and vomiting.   Endocrine: Negative.    Genitourinary: Positive for difficulty urinating, dysuria and urgency.   Musculoskeletal: Positive for arthralgias, back pain, gait problem and joint swelling.   Skin: Negative.    Allergic/Immunologic: Negative.    Neurological: Positive for dizziness, weakness and light-headedness.   Psychiatric/Behavioral: Negative.    All other systems reviewed and are negative.     Otherwise complete ROS is negative except as mentioned above.    Physical Exam:   Temp:  [98.1 °F (36.7 °C)] 98.1 °F (36.7 °C)  Heart Rate:  [] 98  Resp:  [20] 20  BP: (121-127)/() 121/67  Physical Exam  Vitals and nursing note reviewed.   Constitutional:       Appearance: Normal appearance. She is normal weight. She is ill-appearing.   HENT:      Head: Atraumatic.      Nose: Nose normal.      Mouth/Throat:      Mouth: Mucous membranes are moist.   Eyes:      Conjunctiva/sclera: Conjunctivae normal.      Pupils: Pupils are equal, round, and reactive to light.   Cardiovascular:      Rate and Rhythm: Regular rhythm. Tachycardia present.      Pulses: Normal pulses.   Pulmonary:      Effort: Pulmonary effort is normal.   Abdominal:      General: Abdomen is flat. Bowel sounds are normal.      Palpations:  Abdomen is soft.      Comments: She has some bilateral flank pain noted    Musculoskeletal:         General: Normal range of motion.      Cervical back: Normal range of motion.   Skin:     General: Skin is warm.   Neurological:      General: No focal deficit present.      Mental Status: She is alert.   Psychiatric:         Mood and Affect: Mood normal.           Results Reviewed:  I have personally reviewed current lab, radiology, and data and agree with results.  Lab Results (last 24 hours)     Procedure Component Value Units Date/Time    Phosphorus [[id number]]  (Normal) Collected: 11/06/21 2105    Specimen: Blood from Arm, Right Updated: 11/06/21 2322     Phosphorus 3.8 mg/dL     Hemoglobin A1c [[id number]] Collected: 11/06/21 2105    Specimen: Blood from Arm, Right Updated: 11/06/21 2310    Osmolality, Serum [[id number]] Collected: 11/06/21 2105    Specimen: Blood from Arm, Right Updated: 11/06/21 2310    COVID-19 and FLU A/B PCR - Swab, Nasopharynx [[id number]] Collected: 11/06/21 2255    Specimen: Swab from Nasopharynx Updated: 11/06/21 2309    Blood Gas, Arterial - [[id number]]  (Abnormal) Collected: 11/06/21 2233    Specimen: Arterial Blood Updated: 11/06/21 2238     Site Arterial Line     Drew's Test N/A     pH, Arterial 7.281 pH units      Comment: 84 Value below reference range        pCO2, Arterial 29.8 mm Hg      Comment: 84 Value below reference range        pO2, Arterial 115.0 mm Hg      Comment: 83 Value above reference range        HCO3, Arterial 14.0 mmol/L      Comment: 84 Value below reference range        Base Excess, Arterial -11.5 mmol/L      Comment: 84 Value below reference range        O2 Saturation, Arterial 98.6 %      Barometric Pressure for Blood Gas 753 mmHg      Modality Room Air     Ventilator Mode NA     Collected by RT     Comment: Meter: [id number]     :  [id number]       Extra Tubes [[id number]] Collected: 11/06/21 2108    Specimen: Blood, Venous Line Updated: 11/06/21  2215    Narrative:      The following orders were created for panel order Extra Tubes.  Procedure                               Abnormality         Status                     ---------                               -----------         ------                     Light Blue Top[[id number]]                                   Final result                 Please view results for these tests on the individual orders.    Light Blue Top [[id number]] Collected: 11/06/21 2108    Specimen: Blood Updated: 11/06/21 2215     Extra Tube hold for add-on     Comment: Auto resulted       Urinalysis With Culture If Indicated - Urine, Clean Catch [[id number]]  (Abnormal) Collected: 11/06/21 2149    Specimen: Urine, Clean Catch Updated: 11/06/21 2202     Color, UA Yellow     Appearance, UA Clear     pH, UA 5.5     Specific [place], UA 1.030     Comment: Result obtained by Refractometer        Glucose, UA >=1000 mg/dL (3+)     Ketones, UA 80 mg/dL (3+)     Bilirubin, UA Negative     Blood, UA Negative     Protein, UA Negative     Leuk Esterase, UA Negative     Nitrite, UA Negative     Urobilinogen, UA 0.2 E.U./dL    Narrative:      Urine microscopic not indicated.    Magnesium [[id number]]  (Normal) Collected: 11/06/21 2105    Specimen: Blood from Arm, Right Updated: 11/06/21 2153     Magnesium 1.7 mg/dL     CK [[id number]]  (Normal) Collected: 11/06/21 2105    Specimen: Blood from Arm, Right Updated: 11/06/21 2153     Creatine Kinase 54 U/L     Lipase [[id number]]  (Normal) Collected: 11/06/21 2105    Specimen: Blood from Arm, Right Updated: 11/06/21 2153     Lipase 14 U/L     Comprehensive Metabolic Panel [[id number]]  (Abnormal) Collected: 11/06/21 2105    Specimen: Blood from Arm, Right Updated: 11/06/21 2129     Glucose 459 mg/dL      BUN 23 mg/dL      Creatinine 1.09 mg/dL      Sodium 134 mmol/L      Potassium 4.4 mmol/L      Chloride 94 mmol/L      CO2 16.0 mmol/L      Calcium 9.9 mg/dL      Total Protein 7.8 g/dL      Albumin 5.10 g/dL       ALT (SGPT) 11 U/L      AST (SGOT) 11 U/L      Alkaline Phosphatase 79 U/L      Total Bilirubin 0.5 mg/dL      eGFR Non African Amer 63 mL/min/1.73      Globulin 2.7 gm/dL      A/G Ratio 1.9 g/dL      BUN/Creatinine Ratio 21.1     Anion Gap 24.0 mmol/L     Narrative:      GFR Normal >60  Chronic Kidney Disease <60  Kidney Failure <15      hCG, Serum, Qualitative [[id number]]  (Normal) Collected: 11/06/21 2105    Specimen: Blood from Arm, Right Updated: 11/06/21 2124     HCG Qualitative Negative    POC Glucose Once [[id number]]  (Abnormal) Collected: 11/06/21 2059    Specimen: Blood Updated: 11/06/21 2113     Glucose 393 mg/dL      Comment: RN NotifiedOperator: [id number] JAMESON Cassidy ID: [id number]       CBC & Differential [[id number]]  (Abnormal) Collected: 11/06/21 2105    Specimen: Blood from Arm, Right Updated: 11/06/21 2111    Narrative:      The following orders were created for panel order CBC & Differential.  Procedure                               Abnormality         Status                     ---------                               -----------         ------                     CBC Auto Differential[[id number]]        Abnormal            Final result                 Please view results for these tests on the individual orders.    CBC Auto Differential [[id number]]  (Abnormal) Collected: 11/06/21 2105    Specimen: Blood from Arm, Right Updated: 11/06/21 2111     WBC 11.42 10*3/mm3      RBC 4.33 10*6/mm3      Hemoglobin 12.8 g/dL      Hematocrit 36.1 %      MCV 83.4 fL      MCH 29.6 pg      MCHC 35.5 g/dL      RDW 12.6 %      RDW-SD 38.5 fl      MPV 10.8 fL      Platelets 295 10*3/mm3      Neutrophil % 70.9 %      Lymphocyte % 23.6 %      Monocyte % 3.3 %      Eosinophil % 0.7 %      Basophil % 0.6 %      Immature Grans % 0.9 %      Neutrophils, Absolute 8.09 10*3/mm3      Lymphocytes, Absolute 2.70 10*3/mm3      Monocytes, Absolute 0.38 10*3/mm3      Eosinophils, Absolute 0.08 10*3/mm3       Basophils, Absolute 0.07 10*3/mm3      Immature Grans, Absolute 0.10 10*3/mm3      nRBC 0.0 /100 WBC         Imaging Results (Last 24 Hours)     Procedure Component Value Units Date/Time    XR Abdomen 2+ VW with Chest 1 VW [[id number]] Collected: 11/06/21 2136     Updated: 11/06/21 2228    Narrative:      EXAM:    XR Abdomen, 2 Views and XR Chest, 1 View    CLINICAL HISTORY:    The patient is 21 years old and is Female; chest pain and  abdominal pain    TECHNIQUE:    Frontal view of the chest, frontal view of the abdomen/pelvis  and upright or decubitus view of the abdomen.    COMPARISON:    None    FINDINGS:    LUNGS:  Lungs are clear.    PLEURAL SPACE:  Normal  No pneumothorax.    HEART:  Normal  No cardiomegaly.    MEDIASTINUM:  Normal    INTRAPERITONEAL SPACE:  No pneumoperitoneum.    GASTROINTESTINAL TRACT:  Nonobstructive bowel gas pattern.    BONES/JOINTS:  Normal    OTHER FINDINGS:  Mild to moderate stool burden.      Impression:        Nonobstructive bowel gas pattern.    Electronically signed by:  Santiago Putnam MD  11/6/2021 10:27 PM CDT  Workstation: [id number]            Assessment:    Active Hospital Problems    Diagnosis    • **Diabetic ketoacidosis without coma associated with type 1 diabetes mellitus (HCC)    • DKA (diabetic ketoacidosis) ([facility])    • Nausea and vomiting    • Insomnia due to medical condition      - Patient reports that she has bipolar disorder.  She reports that she is not been on her Abilify and Lamictal for 2 weeks, as this medication was discontinued per her request by her therapists in [place].  - Inpatient psychiatry consult placed.  Dr. Bowers states that from his recollection of the patient, her pathology is borderline personality versus bipolar II disorder, likely the former.   * He recommends continuing her home Zoloft.   * Also recommends using trazodone as needed and Vistaril as needed for her insomnia.   * He also recommends strong personal boundaries with nurses and  staff.   * He states that either he or the nurse practitioner will evaluate the patient tomorrow.     • Cannabinoid hyperemesis syndrome      - This is likely contributing to her symptoms.  Discussed extensively with the patient about the pathophysiology of this syndrome.  Encouraged patient to quit.  - Patient would benefit from appetite stimulant instead of using THC.     • Cigarette nicotine dependence without complication      - Patient declines nicotine replacement therapy.  - Tobacco cessation counseling.     • ADHD      - Patient takes Vyvanse outpatient but has not taken this medication for several weeks, as she has not been at work.     • Bipolar 1 disorder (HCC)    • Seizure disorder (HCC)    • Severe depressed bipolar II disorder without psychotic features (HCC)    • Post traumatic stress disorder (PTSD)      - Continue home Zoloft.     • Asthma      - Albuterol nebs as needed     • Recurrent UTI    • Diabetic neuropathy (HCC)              Plan:    Patient being admitted to icu due acute onset of dka   With metabolic acidosis and elevated anion gap and moderate to severe dehydration     Trigger at this point,  Per patient is that of stress In her life.     She also has recurrrent uti and flank and back pain noted     Severe dehydration     Clear acidemia     Monitor and titrate blood sugars with insulin drip     Check labs    scd's     Diet npo for now and advance as acidemia clears and blood sugars improve.         I confirmed that the patient's Advance Care Plan is present, code status is documented, or surrogate decision maker is listed in the patient's medical record.     I have utilized all available immediate resources to obtain, update, or review the patient's current medications.     I discussed the patient's findings and my recommendations with: patient     Khalida Escalona MD

## 2021-11-07 NOTE — PLAN OF CARE
Goal Outcome Evaluation:  Plan of Care Reviewed With: patient        Progress: no change  Outcome Summary: New admit for DKA

## 2021-11-07 NOTE — ED PROVIDER NOTES
Subjective   Patient presents to the emergency department with nausea and vomiting that began 2 hours prior to arrival.  She states that for the past 3 days at home her glucose has been greater than 600.  The most recent fingerstick glucose was in the 300s in the emergency department.  Patient is an insulin-dependent diabetic who states that she has been having DKA every several months since she was 6 years old.          Hyperglycemia  Severity:  Moderate  Onset quality:  Unable to specify  Duration:  3 days  Timing:  Constant  Progression:  Waxing and waning  Chronicity:  Recurrent  Diabetes status:  Controlled with insulin  Associated symptoms: fatigue, nausea, vomiting and weakness    Associated symptoms: no abdominal pain, no chest pain, no confusion, no diaphoresis, no dizziness, no dysuria, no fever, no polyuria and no shortness of breath    Risk factors: hx of DKA    Nausea  The primary symptoms include fatigue, nausea and vomiting. Primary symptoms do not include fever, abdominal pain, diarrhea, dysuria, myalgias or rash.   The illness does not include chills.   Vomiting  The primary symptoms include fatigue, nausea and vomiting. Primary symptoms do not include fever, abdominal pain, diarrhea, dysuria, myalgias or rash.   The illness does not include chills.       Review of Systems   Constitutional: Positive for activity change and fatigue. Negative for appetite change, chills, diaphoresis and fever.   HENT: Negative for congestion, ear discharge, ear pain, nosebleeds, rhinorrhea, sinus pressure, sore throat and trouble swallowing.    Eyes: Negative for discharge and redness.   Respiratory: Negative for apnea, cough, chest tightness, shortness of breath and wheezing.    Cardiovascular: Negative for chest pain.   Gastrointestinal: Positive for nausea and vomiting. Negative for abdominal pain and diarrhea.   Endocrine: Negative for polyuria.   Genitourinary: Negative for dysuria, frequency and urgency.  "  Musculoskeletal: Negative for myalgias and neck pain.   Skin: Negative for color change and rash.   Allergic/Immunologic: Negative for immunocompromised state.   Neurological: Positive for weakness. Negative for dizziness, seizures, syncope, light-headedness and headaches.   Hematological: Negative for adenopathy. Does not bruise/bleed easily.   Psychiatric/Behavioral: Negative for behavioral problems and confusion.   All other systems reviewed and are negative.      Past Medical History:   Diagnosis Date   • ADHD    • Bipolar 1 disorder (HCC)    • Borderline personality disorder (HCC)    • Cannabinoid hyperemesis syndrome    • Diabetes mellitus type 1 (HCC)    • Diabetic gastroparesis (HCC)    • Diabetic ketoacidosis (HCC)    • Diabetic neuropathy (HCC)    • History of transfusion     Pt reports \"no reactions.\"   • Post traumatic stress disorder (PTSD)    • Recurrent UTI    • Seizure disorder (HCC)    • Severe depressed bipolar II disorder without psychotic features (HCC)    • Uncontrolled diabetes mellitus (HCC)        Allergies   Allergen Reactions   • Pineapple Anaphylaxis   • Benzoyl Peroxide Swelling       Past Surgical History:   Procedure Laterality Date   •  SECTION N/A 2018   • CYSTOSCOPY N/A 2020    Procedure: CYSTOSCOPY FLEXIBLE       (DONE ON STRETCHER);  Surgeon: Mesa, Antonio PRYOR MD;  Location: [facility];  Service: Urology;  Laterality: N/A;       Family History   Problem Relation Age of Onset   • Drug abuse Father    • Suicide Attempts Brother    • Dementia Maternal Grandfather    • Hypertension Paternal Grandmother        Social History     Socioeconomic History   • Marital status: Single   Tobacco Use   • Smoking status: Former Smoker     Packs/day: 1.00     Years: 2.00     Pack years: 2.00   • Smokeless tobacco: Never Used   • Tobacco comment: uses E cig   Substance and Sexual Activity   • Alcohol use: No   • Drug use: Yes     Frequency: 1.0 times per week     Types: Marijuana "   • Sexual activity: Yes     Partners: Male     Birth control/protection: None           Objective   Physical Exam  Vitals and nursing note reviewed.   Constitutional:       Appearance: She is well-developed.   HENT:      Head: Normocephalic and atraumatic.      Nose: Nose normal.   Eyes:      General: No scleral icterus.        Right eye: No discharge.         Left eye: No discharge.      Conjunctiva/sclera: Conjunctivae normal.      Pupils: Pupils are equal, round, and reactive to light.   Neck:      Trachea: No tracheal deviation.   Cardiovascular:      Rate and Rhythm: Normal rate and regular rhythm.      Heart sounds: Normal heart sounds. No murmur heard.      Pulmonary:      Effort: Pulmonary effort is normal. No respiratory distress.      Breath sounds: Normal breath sounds. No stridor. No wheezing or rales.   Abdominal:      General: Bowel sounds are normal. There is no distension.      Palpations: Abdomen is soft. There is no mass.      Tenderness: There is no abdominal tenderness. There is no guarding or rebound.   Musculoskeletal:      Cervical back: Normal range of motion and neck supple.   Skin:     General: Skin is warm and dry.      Findings: No erythema or rash.   Neurological:      Mental Status: She is alert and oriented to person, place, and time.      Coordination: Coordination normal.   Psychiatric:         Behavior: Behavior normal.         Thought Content: Thought content normal.         Procedures           ED Course                   Labs Reviewed   COMPREHENSIVE METABOLIC PANEL - Abnormal; Notable for the following components:       Result Value    Glucose 459 (*)     BUN 23 (*)     Creatinine 1.09 (*)     Sodium 134 (*)     Chloride 94 (*)     CO2 16.0 (*)     Anion Gap 24.0 (*)     All other components within normal limits    Narrative:     GFR Normal >60  Chronic Kidney Disease <60  Kidney Failure <15     CBC WITH AUTO DIFFERENTIAL - Abnormal; Notable for the following components:    WBC  11.42 (*)     Monocyte % 3.3 (*)     Immature Grans % 0.9 (*)     Neutrophils, Absolute 8.09 (*)     Immature Grans, Absolute 0.10 (*)     All other components within normal limits   URINALYSIS W/ CULTURE IF INDICATED - Abnormal; Notable for the following components:    Glucose, UA >=1000 mg/dL (3+) (*)     Ketones, UA 80 mg/dL (3+) (*)     All other components within normal limits    Narrative:     Urine microscopic not indicated.   BLOOD GAS, ARTERIAL - Abnormal; Notable for the following components:    pH, Arterial 7.281 (*)     pCO2, Arterial 29.8 (*)     pO2, Arterial 115.0 (*)     HCO3, Arterial 14.0 (*)     Base Excess, Arterial -11.5 (*)     All other components within normal limits   POCT GLUCOSE FINGERSTICK - Abnormal; Notable for the following components:    Glucose 393 (*)     All other components within normal limits   HCG, SERUM, QUALITATIVE - Normal   MAGNESIUM - Normal   CK - Normal   LIPASE - Normal   COVID-19 AND FLU A/B, NP SWAB IN TRANSPORT MEDIA 8-12 HR TAT   BLOOD GAS, ARTERIAL   PHOSPHORUS   OSMOLALITY, SERUM   HEMOGLOBIN A1C   BASIC METABOLIC PANEL   BASIC METABOLIC PANEL   MAGNESIUM   MAGNESIUM   PHOSPHORUS   PHOSPHORUS   POCT GLUCOSE FINGERSTICK   POCT GLUCOSE FINGERSTICK   POCT GLUCOSE FINGERSTICK   POCT GLUCOSE FINGERSTICK   POCT GLUCOSE FINGERSTICK   POCT GLUCOSE FINGERSTICK   POCT GLUCOSE FINGERSTICK   POCT GLUCOSE FINGERSTICK   POCT GLUCOSE FINGERSTICK   POCT GLUCOSE FINGERSTICK   POCT GLUCOSE FINGERSTICK   POCT GLUCOSE FINGERSTICK   POCT GLUCOSE FINGERSTICK   POCT GLUCOSE FINGERSTICK   POCT GLUCOSE FINGERSTICK   POCT GLUCOSE FINGERSTICK   POCT GLUCOSE FINGERSTICK   POCT GLUCOSE FINGERSTICK   POCT GLUCOSE FINGERSTICK   POCT GLUCOSE FINGERSTICK   POCT GLUCOSE FINGERSTICK   POCT GLUCOSE FINGERSTICK   POCT GLUCOSE FINGERSTICK   POCT GLUCOSE FINGERSTICK   POCT GLUCOSE FINGERSTICK   CBC AND DIFFERENTIAL    Narrative:     The following orders were created for panel order CBC &  Differential.  Procedure                               Abnormality         Status                     ---------                               -----------         ------                     CBC Auto Differential[[id number]]        Abnormal            Final result                 Please view results for these tests on the individual orders.   EXTRA TUBES    Narrative:     The following orders were created for panel order Extra Tubes.  Procedure                               Abnormality         Status                     ---------                               -----------         ------                     Light Blue Top[[id number]]                                   Final result                 Please view results for these tests on the individual orders.   LIGHT BLUE TOP       XR Abdomen 2+ VW with Chest 1 VW   Final Result      Nonobstructive bowel gas pattern.      Electronically signed by:  Santiago Putnam MD  11/6/2021 10:27 PM CDT   Workstation: [id number]                                       [facility]    Final diagnoses:   Diabetic ketoacidosis without coma associated with type 1 diabetes mellitus (HCC)       ED Disposition  ED Disposition     ED Disposition Condition Comment    Decision to Admit  Level of Care: Critical Care [6]   Diagnosis: Diabetic ketoacidosis without coma associated with type 1 diabetes mellitus (HCC) [[id number]]   Admitting Physician: ESTEFANIA REILLY [[id number]]   Attending Physician: ESTEFANIA REILLY [[id number]]   Certification: I Certify That Inpatient Hospital Services Are Medically Necessary For Greater Than 2 Midnights            No follow-up provider specified.       Medication List      No changes were made to your prescriptions during this visit.          Enrike Hu MD  11/06/21 [id number]

## 2021-11-08 ENCOUNTER — READMISSION MANAGEMENT (OUTPATIENT)
Dept: CALL CENTER | Facility: HOSPITAL | Age: 21
End: 2021-11-08

## 2021-11-08 VITALS
BODY MASS INDEX: 17.71 KG/M2 | HEIGHT: 67 IN | DIASTOLIC BLOOD PRESSURE: 83 MMHG | OXYGEN SATURATION: 100 % | RESPIRATION RATE: 16 BRPM | SYSTOLIC BLOOD PRESSURE: 117 MMHG | HEART RATE: 64 BPM | WEIGHT: 112.8 LBS | TEMPERATURE: 97.1 F

## 2021-11-08 LAB
ANION GAP SERPL CALCULATED.3IONS-SCNC: 10 MMOL/L (ref 5–15)
BASOPHILS # BLD AUTO: 0.04 10*3/MM3 (ref 0–0.2)
BASOPHILS NFR BLD AUTO: 0.5 % (ref 0–1.5)
BUN SERPL-MCNC: 9 MG/DL (ref 6–20)
BUN/CREAT SERPL: 12.2 (ref 7–25)
CALCIUM SPEC-SCNC: 8.3 MG/DL (ref 8.6–10.5)
CHLORIDE SERPL-SCNC: 106 MMOL/L (ref 98–107)
CO2 SERPL-SCNC: 23 MMOL/L (ref 22–29)
CREAT SERPL-MCNC: 0.74 MG/DL (ref 0.57–1)
DEPRECATED RDW RBC AUTO: 41.2 FL (ref 37–54)
EOSINOPHIL # BLD AUTO: 0.33 10*3/MM3 (ref 0–0.4)
EOSINOPHIL NFR BLD AUTO: 4 % (ref 0.3–6.2)
ERYTHROCYTE [DISTWIDTH] IN BLOOD BY AUTOMATED COUNT: 13.3 % (ref 12.3–15.4)
GFR SERPL CREATININE-BSD FRML MDRD: 98 ML/MIN/1.73
GLUCOSE BLDC GLUCOMTR-MCNC: 145 MG/DL (ref 70–130)
GLUCOSE BLDC GLUCOMTR-MCNC: 163 MG/DL (ref 70–130)
GLUCOSE BLDC GLUCOMTR-MCNC: 451 MG/DL (ref 70–130)
GLUCOSE BLDC GLUCOMTR-MCNC: 60 MG/DL (ref 70–130)
GLUCOSE BLDC GLUCOMTR-MCNC: 77 MG/DL (ref 70–130)
GLUCOSE BLDC GLUCOMTR-MCNC: 84 MG/DL (ref 70–130)
GLUCOSE SERPL-MCNC: 65 MG/DL (ref 65–99)
HCT VFR BLD AUTO: 33.3 % (ref 34–46.6)
HGB BLD-MCNC: 11.2 G/DL (ref 12–15.9)
IMM GRANULOCYTES # BLD AUTO: 0.05 10*3/MM3 (ref 0–0.05)
IMM GRANULOCYTES NFR BLD AUTO: 0.6 % (ref 0–0.5)
LYMPHOCYTES # BLD AUTO: 3.48 10*3/MM3 (ref 0.7–3.1)
LYMPHOCYTES NFR BLD AUTO: 42 % (ref 19.6–45.3)
MAGNESIUM SERPL-MCNC: 2.5 MG/DL (ref 1.6–2.6)
MCH RBC QN AUTO: 28.7 PG (ref 26.6–33)
MCHC RBC AUTO-ENTMCNC: 33.6 G/DL (ref 31.5–35.7)
MCV RBC AUTO: 85.4 FL (ref 79–97)
MONOCYTES # BLD AUTO: 0.45 10*3/MM3 (ref 0.1–0.9)
MONOCYTES NFR BLD AUTO: 5.4 % (ref 5–12)
NEUTROPHILS NFR BLD AUTO: 3.93 10*3/MM3 (ref 1.7–7)
NEUTROPHILS NFR BLD AUTO: 47.5 % (ref 42.7–76)
NRBC BLD AUTO-RTO: 0 /100 WBC (ref 0–0.2)
PLATELET # BLD AUTO: 285 10*3/MM3 (ref 140–450)
PMV BLD AUTO: 10.2 FL (ref 6–12)
POTASSIUM SERPL-SCNC: 3.5 MMOL/L (ref 3.5–5.2)
RBC # BLD AUTO: 3.9 10*6/MM3 (ref 3.77–5.28)
SODIUM SERPL-SCNC: 139 MMOL/L (ref 136–145)
WBC # BLD AUTO: 8.28 10*3/MM3 (ref 3.4–10.8)

## 2021-11-08 PROCEDURE — 63710000001 INSULIN DETEMIR PER 5 UNITS: Performed by: INTERNAL MEDICINE

## 2021-11-08 PROCEDURE — 83735 ASSAY OF MAGNESIUM: CPT | Performed by: INTERNAL MEDICINE

## 2021-11-08 PROCEDURE — 80048 BASIC METABOLIC PNL TOTAL CA: CPT | Performed by: INTERNAL MEDICINE

## 2021-11-08 PROCEDURE — 85025 COMPLETE CBC W/AUTO DIFF WBC: CPT | Performed by: INTERNAL MEDICINE

## 2021-11-08 PROCEDURE — 82962 GLUCOSE BLOOD TEST: CPT

## 2021-11-08 PROCEDURE — 25010000002 MORPHINE PER 10 MG: Performed by: INTERNAL MEDICINE

## 2021-11-08 PROCEDURE — 25010000002 ONDANSETRON PER 1 MG: Performed by: INTERNAL MEDICINE

## 2021-11-08 RX ORDER — DICYCLOMINE HCL 20 MG
20 TABLET ORAL EVERY 6 HOURS PRN
Status: DISCONTINUED | OUTPATIENT
Start: 2021-11-08 | End: 2021-11-08 | Stop reason: HOSPADM

## 2021-11-08 RX ORDER — HYDROXYZINE HCL 10 MG/5 ML
10 SOLUTION, ORAL ORAL 3 TIMES DAILY PRN
Status: DISCONTINUED | OUTPATIENT
Start: 2021-11-08 | End: 2021-11-08 | Stop reason: HOSPADM

## 2021-11-08 RX ADMIN — MORPHINE SULFATE 2 MG: 2 INJECTION, SOLUTION INTRAMUSCULAR; INTRAVENOUS at 10:00

## 2021-11-08 RX ADMIN — SODIUM CHLORIDE 150 ML/HR: 9 INJECTION, SOLUTION INTRAVENOUS at 05:47

## 2021-11-08 RX ADMIN — INSULIN DETEMIR 30 UNITS: 100 INJECTION, SOLUTION SUBCUTANEOUS at 10:35

## 2021-11-08 RX ADMIN — MORPHINE SULFATE 2 MG: 2 INJECTION, SOLUTION INTRAMUSCULAR; INTRAVENOUS at 06:35

## 2021-11-08 RX ADMIN — DICYCLOMINE HYDROCHLORIDE 20 MG: 20 TABLET ORAL at 10:00

## 2021-11-08 RX ADMIN — ONDANSETRON 4 MG: 2 INJECTION INTRAMUSCULAR; INTRAVENOUS at 10:16

## 2021-11-08 NOTE — DISCHARGE SUMMARY
[facility] Medicine Services  DISCHARGE SUMMARY       Date of Admission: 11/6/2021  Date of Discharge:  11/8/2021  Primary Care Physician: Provider, No Known    Presenting Problem/History of Present Illness:  DKA (diabetic ketoacidosis) ([facility]) [E11.10]  Diabetic ketoacidosis without coma associated with type 1 diabetes mellitus ([facility]) [E10.10]       Final Discharge Diagnoses:  Active Hospital Problems    Diagnosis    • **Diabetic ketoacidosis without coma associated with type 1 diabetes mellitus ([facility])    • DKA (diabetic ketoacidosis) ([facility])    • Nausea and vomiting    • Insomnia due to medical condition      - Patient reports that she has bipolar disorder.  She reports that she is not been on her Abilify and Lamictal for 2 weeks, as this medication was discontinued per her request by her therapists in [place].  - Inpatient psychiatry consult placed.  Dr. Bowers states that from his recollection of the patient, her pathology is borderline personality versus bipolar II disorder, likely the former.   * He recommends continuing her home Zoloft.   * Also recommends using trazodone as needed and Vistaril as needed for her insomnia.   * He also recommends strong personal boundaries with nurses and staff.   * He states that either he or the nurse practitioner will evaluate the patient tomorrow.     • Cannabinoid hyperemesis syndrome      - This is likely contributing to her symptoms.  Discussed extensively with the patient about the pathophysiology of this syndrome.  Encouraged patient to quit.  - Patient would benefit from appetite stimulant instead of using THC.     • Cigarette nicotine dependence without complication      - Patient declines nicotine replacement therapy.  - Tobacco cessation counseling.     • ADHD      - Patient takes Vyvanse outpatient but has not taken this medication for several weeks, as she has not been at work.     • Bipolar 1 disorder ([facility])    • Seizure  disorder (HCC)    • Severe depressed bipolar II disorder without psychotic features (HCC)    • Post traumatic stress disorder (PTSD)      - Continue home Zoloft.     • Asthma      - Albuterol nebs as needed     • Recurrent UTI    • Diabetic neuropathy (HCC)        Consults:   Consults     Date and Time Order Name Status Description    11/6/2021 11:05 PM Hospitalist (on-call MD unless specified)            Procedures Performed: None.                Pertinent Test Results:   Lab Results (last 7 days)     Procedure Component Value Units Date/Time    POC Glucose Once [[id number]]  (Normal) Collected: 11/08/21 0623    Specimen: Blood Updated: 11/08/21 0706     Glucose 77 mg/dL      Comment: RN NotifiedOperator: [id number] [employer] ID: [id number]       POC Glucose Once [[id number]]  (Abnormal) Collected: 11/08/21 0552    Specimen: Blood Updated: 11/08/21 0706     Glucose 60 mg/dL      Comment: RN NotifiedOperator: [id number] [employer] ID: [id number]       Magnesium [[id number]]  (Normal) Collected: 11/08/21 0552    Specimen: Blood Updated: 11/08/21 0649     Magnesium 2.5 mg/dL     Basic Metabolic Panel [[id number]]  (Abnormal) Collected: 11/08/21 0552    Specimen: Blood Updated: 11/08/21 0646     Glucose 65 mg/dL      BUN 9 mg/dL      Creatinine 0.74 mg/dL      Sodium 139 mmol/L      Potassium 3.5 mmol/L      Chloride 106 mmol/L      CO2 23.0 mmol/L      Calcium 8.3 mg/dL      eGFR Non African Amer 98 mL/min/1.73      BUN/Creatinine Ratio 12.2     Anion Gap 10.0 mmol/L     Narrative:      GFR Normal >60  Chronic Kidney Disease <60  Kidney Failure <15      CBC & Differential [[id number]]  (Abnormal) Collected: 11/08/21 0552    Specimen: Blood Updated: 11/08/21 0619    Narrative:      The following orders were created for panel order CBC & Differential.  Procedure                               Abnormality         Status                     ---------                               -----------         ------                      CBC Auto Differential[[id number]]        Abnormal            Final result                 Please view results for these tests on the individual orders.    CBC Auto Differential [[id number]]  (Abnormal) Collected: 11/08/21 0552    Specimen: Blood Updated: 11/08/21 0619     WBC 8.28 10*3/mm3      RBC 3.90 10*6/mm3      Hemoglobin 11.2 g/dL      Hematocrit 33.3 %      MCV 85.4 fL      MCH 28.7 pg      MCHC 33.6 g/dL      RDW 13.3 %      RDW-SD 41.2 fl      MPV 10.2 fL      Platelets 285 10*3/mm3      Neutrophil % 47.5 %      Lymphocyte % 42.0 %      Monocyte % 5.4 %      Eosinophil % 4.0 %      Basophil % 0.5 %      Immature Grans % 0.6 %      Neutrophils, Absolute 3.93 10*3/mm3      Lymphocytes, Absolute 3.48 10*3/mm3      Monocytes, Absolute 0.45 10*3/mm3      Eosinophils, Absolute 0.33 10*3/mm3      Basophils, Absolute 0.04 10*3/mm3      Immature Grans, Absolute 0.05 10*3/mm3      nRBC 0.0 /100 WBC     POC Glucose Once [[id number]]  (Abnormal) Collected: 11/07/21 2319    Specimen: Blood Updated: 11/08/21 0450     Glucose 163 mg/dL      Comment: RN NotifiedOperator: [id number] [facility] [id number]       Phosphorus [[id number]]  (Normal) Collected: 11/07/21 2323    Specimen: Blood Updated: 11/07/21 2342     Phosphorus 3.3 mg/dL     POC Glucose Once [[id number]]  (Normal) Collected: 11/07/21 1620    Specimen: Blood Updated: 11/07/21 1723     Glucose 88 mg/dL      Comment: RN NotifiedOperator: [id number] [facility] [place]: [id number]       POC Glucose Once [[id number]]  (Normal) Collected: 11/07/21 1243    Specimen: Blood Updated: 11/07/21 1723     Glucose 87 mg/dL      Comment: RN NotifiedOperator: [id number] REDDEN [place]USTINMeter ID: [id number]       POC Glucose Once [[id number]]  (Abnormal) Collected: 11/07/21 1139    Specimen: Blood Updated: 11/07/21 1722     Glucose 177 mg/dL      Comment: RN NotifiedOperator: [id number] REDDEN AUSTINMeter ID: [id number]       Phosphorus [[id number]]   (Abnormal) Collected: 11/07/21 1149    Specimen: Blood Updated: 11/07/21 1237     Phosphorus 2.4 mg/dL     Basic Metabolic Panel [[id number]]  (Abnormal) Collected: 11/07/21 1149    Specimen: Blood Updated: 11/07/21 1235     Glucose 203 mg/dL      BUN 18 mg/dL      Creatinine 0.80 mg/dL      Sodium 136 mmol/L      Potassium 4.1 mmol/L      Chloride 104 mmol/L      CO2 21.0 mmol/L      Calcium 8.3 mg/dL      eGFR Non African Amer 90 mL/min/1.73      BUN/Creatinine Ratio 22.5     Anion Gap 11.0 mmol/L     Narrative:      GFR Normal >60  Chronic Kidney Disease <60  Kidney Failure <15      Magnesium [[id number]]  (Abnormal) Collected: 11/07/21 1149    Specimen: Blood Updated: 11/07/21 1235     Magnesium 1.4 mg/dL     POC Glucose Once [[id number]]  (Abnormal) Collected: 11/07/21 1034    Specimen: Blood Updated: 11/07/21 1049     Glucose 287 mg/dL      Comment: RN NotifiedOperator: [id number] [facility] [place] ID: [id number]       POC Glucose Once [[id number]]  (Abnormal) Collected: 11/07/21 0945    Specimen: Blood Updated: 11/07/21 1049     Glucose 294 mg/dL      Comment: RN NotifiedOperator: [id number] RED[facility] [place]USTINMeter ID: [id number]       POC Glucose Once [[id number]]  (Abnormal) Collected: 11/07/21 0839    Specimen: Blood Updated: 11/07/21 1049     Glucose 290 mg/dL      Comment: Result Not ConfirmedOperator: [id number] REDDEN [place]USTINMeter ID: [id number]       POC Glucose Once [[id number]]  (Abnormal) Collected: 11/07/21 0727    Specimen: Blood Updated: 11/07/21 1048     Glucose 269 mg/dL      Comment: RN NotifiedOperator: [id number] REDDEN [place]USTINMeter ID: [id number]       Basic Metabolic Panel [[id number]]  (Abnormal) Collected: 11/07/21 0748    Specimen: Blood Updated: 11/07/21 0814     Glucose 305 mg/dL      BUN 20 mg/dL      Creatinine 0.89 mg/dL      Sodium 133 mmol/L      Potassium 4.6 mmol/L      Chloride 101 mmol/L      CO2 17.0 mmol/L      Calcium 8.1 mg/dL      eGFR Non African Amer 79 mL/min/1.73       BUN/Creatinine Ratio 22.5     Anion Gap 15.0 mmol/L     Narrative:      GFR Normal >60  Chronic Kidney Disease <60  Kidney Failure <15      Phosphorus [[id number]]  (Normal) Collected: 11/07/21 0748    Specimen: Blood Updated: 11/07/21 0814     Phosphorus 3.2 mg/dL     Magnesium [[id number]]  (Abnormal) Collected: 11/07/21 0748    Specimen: Blood Updated: 11/07/21 0814     Magnesium 1.4 mg/dL     POC Glucose Once [[id number]]  (Abnormal) Collected: 11/07/21 0522    Specimen: Blood Updated: 11/07/21 0628     Glucose 215 mg/dL      Comment: : [id number] JULIANO MARTINSRAMeter ID: [id number]       Comprehensive Metabolic Panel [[id number]]  (Abnormal) Collected: 11/07/21 0400    Specimen: Blood Updated: 11/07/21 0525     Glucose 150 mg/dL      BUN 21 mg/dL      Creatinine 0.79 mg/dL      Sodium 135 mmol/L      Potassium 3.9 mmol/L      Chloride 104 mmol/L      CO2 20.0 mmol/L      Calcium 7.7 mg/dL      Total Protein 5.4 g/dL      Albumin 3.50 g/dL      ALT (SGPT) 9 U/L      AST (SGOT) 9 U/L      Alkaline Phosphatase 55 U/L      Total Bilirubin 0.2 mg/dL      eGFR Non African Amer 91 mL/min/1.73      Globulin 1.9 gm/dL      A/G Ratio 1.8 g/dL      BUN/Creatinine Ratio 26.6     Anion Gap 11.0 mmol/L     Narrative:      GFR Normal >60  Chronic Kidney Disease <60  Kidney Failure <15      Phosphorus [[id number]]  (Normal) Collected: 11/07/21 0400    Specimen: Blood Updated: 11/07/21 0522     Phosphorus 3.1 mg/dL     Magnesium [[id number]]  (Abnormal) Collected: 11/07/21 0400    Specimen: Blood Updated: 11/07/21 0521     Magnesium 1.4 mg/dL     CBC (No Diff) [[id number]]  (Abnormal) Collected: 11/07/21 0400    Specimen: Blood Updated: 11/07/21 0509     WBC 11.10 10*3/mm3      RBC 3.28 10*6/mm3      Hemoglobin 9.5 g/dL      Hematocrit 27.8 %      MCV 84.8 fL      MCH 29.0 pg      MCHC 34.2 g/dL      RDW 12.8 %      RDW-SD 39.0 fl      MPV 11.3 fL      Platelets 214 10*3/mm3     POC Glucose Once [[id number]]  (Abnormal)  Collected: 11/07/21 0359    Specimen: Blood Updated: 11/07/21 0437     Glucose 145 mg/dL      Comment: Result Not ConfirmedOperator: [id number] FRANK PALMAAMeter ID: [id number]       POC Glucose Once [[id number]]  (Abnormal) Collected: 11/07/21 0007    Specimen: Blood Updated: 11/07/21 0218     Glucose 338 mg/dL      Comment: Result Not ConfirmedOperator: [id number] FRANK PALMAAMeter ID: [id number]       Basic Metabolic Panel [[id number]]  (Abnormal) Collected: 11/07/21 0007    Specimen: Blood Updated: 11/07/21 0054     Glucose 410 mg/dL      BUN 25 mg/dL      Creatinine 1.03 mg/dL      Sodium 138 mmol/L      Potassium 4.4 mmol/L      Chloride 100 mmol/L      CO2 13.0 mmol/L      Calcium 8.7 mg/dL      eGFR Non African Amer 67 mL/min/1.73      BUN/Creatinine Ratio 24.3     Anion Gap 25.0 mmol/L     Narrative:      GFR Normal >60  Chronic Kidney Disease <60  Kidney Failure <15      Phosphorus [[id number]]  (Normal) Collected: 11/07/21 0007    Specimen: Blood Updated: 11/07/21 0052     Phosphorus 3.3 mg/dL     Magnesium [[id number]]  (Abnormal) Collected: 11/07/21 0007    Specimen: Blood Updated: 11/07/21 0052     Magnesium 1.5 mg/dL     Osmolality, Serum [[id number]]  (Abnormal) Collected: 11/06/21 2105    Specimen: Blood from Arm, Right Updated: 11/06/21 2347     Osmolality 306 mOsm/kg     Urinalysis With Microscopic - Urine, Clean Catch [[id number]]  (Abnormal) Collected: 11/06/21 2149    Specimen: Urine, Clean Catch Updated: 11/06/21 2345    Narrative:      The following orders were created for panel order Urinalysis With Microscopic - Urine, Clean Catch.  Procedure                               Abnormality         Status                     ---------                               -----------         ------                     Urinalysis without micro...[[id number]]  Abnormal            Final result               Urinalysis, Microscopic ...[[id number]]                      Final result                 Please view results  for these tests on the individual orders.    Urinalysis, Microscopic Only - Urine, Clean Catch [[id number]] Collected: 11/06/21 2149    Specimen: Urine, Clean Catch Updated: 11/06/21 2345     RBC, UA None Seen /HPF      WBC, UA 0-2 /HPF      Bacteria, UA None Seen /HPF      Squamous Epithelial Cells, UA None Seen /HPF      Hyaline Casts, UA None Seen /LPF      Methodology Automated Microscopy    Urinalysis without microscopic (no culture) - Urine, Clean Catch [[id number]]  (Abnormal) Collected: 11/06/21 2149    Specimen: Urine, Clean Catch Updated: 11/06/21 2345     Color, UA Yellow     Appearance, UA Clear     pH, UA 5.5     Specific [place], UA 1.030     Comment: Result obtained by Refractometer        Glucose, UA >=1000 mg/dL (3+)     Ketones, UA 80 mg/dL (3+)     Bilirubin, UA Negative     Blood, UA Negative     Protein, UA Negative     Leuk Esterase, UA Negative     Nitrite, UA Negative     Urobilinogen, UA 0.2 E.U./dL    COVID-19 and FLU A/B PCR - Swab, Nasopharynx [[id number]]  (Normal) Collected: 11/06/21 2255    Specimen: Swab from Nasopharynx Updated: 11/06/21 2334     COVID19 Not Detected     Influenza A PCR Not Detected     Influenza B PCR Not Detected    Narrative:      Fact sheet for providers: https://www.fda.gov/media/152649/download    Fact sheet for patients: https://www.fda.gov/media/997460/download    Test performed by PCR.    Hemoglobin A1c [[id number]]  (Abnormal) Collected: 11/06/21 2105    Specimen: Blood from Arm, Right Updated: 11/06/21 2326     Hemoglobin A1C 11.50 %     Narrative:      Hemoglobin A1C Ranges:    Increased Risk for Diabetes  5.7% to 6.4%  Diabetes                     >= 6.5%  Diabetic Goal                < 7.0%    Phosphorus [[id number]]  (Normal) Collected: 11/06/21 2105    Specimen: Blood from Arm, Right Updated: 11/06/21 2322     Phosphorus 3.8 mg/dL     Blood Gas, Arterial - [[id number]]  (Abnormal) Collected: 11/06/21 2233    Specimen: Arterial Blood Updated: 11/06/21 2238      Site Arterial Line     Drew's Test N/A     pH, Arterial 7.281 pH units      Comment: 84 Value below reference range        pCO2, Arterial 29.8 mm Hg      Comment: 84 Value below reference range        pO2, Arterial 115.0 mm Hg      Comment: 83 Value above reference range        HCO3, Arterial 14.0 mmol/L      Comment: 84 Value below reference range        Base Excess, Arterial -11.5 mmol/L      Comment: 84 Value below reference range        O2 Saturation, Arterial 98.6 %      Barometric Pressure for Blood Gas 753 mmHg      Modality Room Air     Ventilator Mode NA     Collected by RT     Comment: Meter: [id number]     :  [id number]       Extra Tubes [[id number]] Collected: 11/06/21 2108    Specimen: Blood, Venous Line Updated: 11/06/21 2215    Narrative:      The following orders were created for panel order Extra Tubes.  Procedure                               Abnormality         Status                     ---------                               -----------         ------                     Light Blue Top[[id number]]                                   Final result                 Please view results for these tests on the individual orders.    Light Blue Top [[id number]] Collected: 11/06/21 2108    Specimen: Blood Updated: 11/06/21 2215     Extra Tube hold for add-on     Comment: Auto resulted       Urinalysis With Culture If Indicated - Urine, Clean Catch [[id number]]  (Abnormal) Collected: 11/06/21 2149    Specimen: Urine, Clean Catch Updated: 11/06/21 2202     Color, UA Yellow     Appearance, UA Clear     pH, UA 5.5     Specific [place], UA 1.030     Comment: Result obtained by Refractometer        Glucose, UA >=1000 mg/dL (3+)     Ketones, UA 80 mg/dL (3+)     Bilirubin, UA Negative     Blood, UA Negative     Protein, UA Negative     Leuk Esterase, UA Negative     Nitrite, UA Negative     Urobilinogen, UA 0.2 E.U./dL    Narrative:      Urine microscopic not indicated.    Magnesium [[id number]]   (Normal) Collected: 11/06/21 2105    Specimen: Blood from Arm, Right Updated: 11/06/21 2153     Magnesium 1.7 mg/dL     CK [[id number]]  (Normal) Collected: 11/06/21 2105    Specimen: Blood from Arm, Right Updated: 11/06/21 2153     Creatine Kinase 54 U/L     Lipase [[id number]]  (Normal) Collected: 11/06/21 2105    Specimen: Blood from Arm, Right Updated: 11/06/21 2153     Lipase 14 U/L     Comprehensive Metabolic Panel [[id number]]  (Abnormal) Collected: 11/06/21 2105    Specimen: Blood from Arm, Right Updated: 11/06/21 2129     Glucose 459 mg/dL      BUN 23 mg/dL      Creatinine 1.09 mg/dL      Sodium 134 mmol/L      Potassium 4.4 mmol/L      Chloride 94 mmol/L      CO2 16.0 mmol/L      Calcium 9.9 mg/dL      Total Protein 7.8 g/dL      Albumin 5.10 g/dL      ALT (SGPT) 11 U/L      AST (SGOT) 11 U/L      Alkaline Phosphatase 79 U/L      Total Bilirubin 0.5 mg/dL      eGFR Non African Amer 63 mL/min/1.73      Globulin 2.7 gm/dL      A/G Ratio 1.9 g/dL      BUN/Creatinine Ratio 21.1     Anion Gap 24.0 mmol/L     Narrative:      GFR Normal >60  Chronic Kidney Disease <60  Kidney Failure <15      hCG, Serum, Qualitative [[id number]]  (Normal) Collected: 11/06/21 2105    Specimen: Blood from Arm, Right Updated: 11/06/21 2124     HCG Qualitative Negative    POC Glucose Once [[id number]]  (Abnormal) Collected: 11/06/21 2059    Specimen: Blood Updated: 11/06/21 2113     Glucose 393 mg/dL      Comment: RN NotifiedOperator: [id number] JAMESON AMBROSEKaleigh ID: [id number]       CBC & Differential [[id number]]  (Abnormal) Collected: 11/06/21 2105    Specimen: Blood from Arm, Right Updated: 11/06/21 2111    Narrative:      The following orders were created for panel order CBC & Differential.  Procedure                               Abnormality         Status                     ---------                               -----------         ------                     CBC Auto Differential[[id number]]        Abnormal            Final  result                 Please view results for these tests on the individual orders.    CBC Auto Differential [[id number]]  (Abnormal) Collected: 11/06/21 2105    Specimen: Blood from Arm, Right Updated: 11/06/21 2111     WBC 11.42 10*3/mm3      RBC 4.33 10*6/mm3      Hemoglobin 12.8 g/dL      Hematocrit 36.1 %      MCV 83.4 fL      MCH 29.6 pg      MCHC 35.5 g/dL      RDW 12.6 %      RDW-SD 38.5 fl      MPV 10.8 fL      Platelets 295 10*3/mm3      Neutrophil % 70.9 %      Lymphocyte % 23.6 %      Monocyte % 3.3 %      Eosinophil % 0.7 %      Basophil % 0.6 %      Immature Grans % 0.9 %      Neutrophils, Absolute 8.09 10*3/mm3      Lymphocytes, Absolute 2.70 10*3/mm3      Monocytes, Absolute 0.38 10*3/mm3      Eosinophils, Absolute 0.08 10*3/mm3      Basophils, Absolute 0.07 10*3/mm3      Immature Grans, Absolute 0.10 10*3/mm3      nRBC 0.0 /100 WBC         Imaging Results (Last 7 Days)     Procedure Component Value Units Date/Time    XR Abdomen 2+ VW with Chest 1 VW [[id number]] Collected: 11/06/21 2136     Updated: 11/06/21 2228    Narrative:      EXAM:    XR Abdomen, 2 Views and XR Chest, 1 View    CLINICAL HISTORY:    The patient is 21 years old and is Female; chest pain and  abdominal pain    TECHNIQUE:    Frontal view of the chest, frontal view of the abdomen/pelvis  and upright or decubitus view of the abdomen.    COMPARISON:    None    FINDINGS:    LUNGS:  Lungs are clear.    PLEURAL SPACE:  Normal  No pneumothorax.    HEART:  Normal  No cardiomegaly.    MEDIASTINUM:  Normal    INTRAPERITONEAL SPACE:  No pneumoperitoneum.    GASTROINTESTINAL TRACT:  Nonobstructive bowel gas pattern.    BONES/JOINTS:  Normal    OTHER FINDINGS:  Mild to moderate stool burden.      Impression:        Nonobstructive bowel gas pattern.    Electronically signed by:  Santiago Putnam MD  11/6/2021 10:27 PM CDT  Workstation: [id number]            Chief Complaint on Day of Discharge: None.    Hospital Course:  The patient was admitted for  "DKA and started on the treatment protocol.  DKA did resolve and she was transitioned to basal insulin with Accu-Cheks and sliding scale insulin.  She was able to tolerate her diet and was less symptomatic and less deconditioned on discharge.  Antimicrobial therapy was discontinued as there was no clear indication for antibiotics.  X-ray was clear and urinalysis was unremarkable.      Condition on Discharge: Stable and improved.    Physical Exam on Discharge:  /83 (BP Location: Left arm, Patient Position: Lying)   Pulse 64   Temp 97.1 °F (36.2 °C) (Oral)   Resp 16   Ht 170.2 cm (67\")   Wt 51.2 kg (112 lb 12.8 oz)   LMP 10/15/2021 (Exact Date)   SpO2 100%   Breastfeeding No   BMI 17.67 kg/m²   Physical Exam  Vitals and nursing note reviewed.   Constitutional:       General: She is not in acute distress.     Appearance: She is underweight. She is not diaphoretic.   HENT:      Head: Normocephalic and atraumatic.      Right Ear: External ear normal.      Left Ear: External ear normal.      Nose: Nose normal.   Eyes:      Extraocular Movements: Extraocular movements intact.      Conjunctiva/sclera: Conjunctivae normal.      Pupils: Pupils are equal, round, and reactive to light.   Neck:      Thyroid: No thyromegaly.      Vascular: No JVD.   Cardiovascular:      Rate and Rhythm: Normal rate and regular rhythm.      Heart sounds: Normal heart sounds. No murmur heard.  No friction rub. No gallop.    Pulmonary:      Effort: Pulmonary effort is normal. No respiratory distress.      Breath sounds: Normal breath sounds. No stridor. No wheezing or rales.   Chest:      Chest wall: No tenderness.   Abdominal:      General: Bowel sounds are normal. There is no distension.      Palpations: Abdomen is soft. There is no mass.      Tenderness: There is no abdominal tenderness. There is no right CVA tenderness, left CVA tenderness, guarding or rebound.      Hernia: No hernia is present.   Musculoskeletal:         General: " No swelling, tenderness or deformity. Normal range of motion.      Cervical back: Normal range of motion and neck supple.      Right lower leg: No edema.      Left lower leg: No edema.   Skin:     General: Skin is warm and dry.      Coloration: Skin is not jaundiced or pale.      Findings: No bruising, erythema, lesion or rash.   Neurological:      General: No focal deficit present.      Mental Status: She is alert and oriented to person, place, and time. Mental status is at baseline.      Cranial Nerves: No cranial nerve deficit.      Sensory: No sensory deficit.      Motor: No weakness.      Coordination: Coordination normal.      Gait: Gait normal.      Deep Tendon Reflexes: Reflexes are normal and symmetric. Reflexes normal.   Psychiatric:         Mood and Affect: Mood normal.         Behavior: Behavior normal. Behavior is cooperative.         Thought Content: Thought content normal.         Judgment: Judgment normal.           Discharge Disposition:  Home or Self Care    Discharge Medications:     Discharge Medications      Changes to Medications      Instructions Start Date   Insulin Glargine 100 UNIT/ML injection pen  Commonly known as: LANTUS SOLOSTAR  What changed: how much to take   20 Units, Subcutaneous, Daily         Continue These Medications      Instructions Start Date   Blood Glucose Monitoring Suppl w/Device kit   USE AS INDICATED, ANY MONITOR , ICD10 code is E11.9      Blood Glucose Test strip   Use 4 x daily use any brand covered by insurance or same brand as before ICD10 code is E11.9      dicyclomine 20 MG tablet  Commonly known as: BENTYL   20 mg, Oral, Every 6 Hours PRN      hydrOXYzine 10 MG tablet  Commonly known as: ATARAX   10 mg, Oral, 3 Times Daily PRN      insulin aspart 100 UNIT/ML solution pen-injector sc pen  Commonly known as: NovoLOG FlexPen   Up to 20 units with meals      Insulin Pen Needle 31G X 5 MM misc  Commonly known as: B-D UF III MINI PEN NEEDLES   Use 4 times daily  ,  ICD10 code is E11.9      Lancets 30G misc   USE 4 X DAILY      Lancing Device misc   USE AS INDICATED TO CORRELATE WITH STRIPS AND METER      promethazine 12.5 MG tablet  Commonly known as: PHENERGAN   12.5 mg, Oral, Every 6 Hours PRN      Rexulti 2 MG tablet  Generic drug: Brexpiprazole   Oral, Daily      Vyvanse 20 MG capsule  Generic drug: lisdexamfetamine   20 mg, Oral, Every Morning         Stop These Medications    lamoTRIgine 25 MG tablet  Commonly known as: LaMICtal     sertraline 100 MG tablet  Commonly known as: ZOLOFT            Discharge Diet: Diabetic.    Activity at Discharge: As tolerated.     Discharge Care Plan/Instructions: Patient has been advised to take her medications as prescribed and to return to the emergency room in the event of any worsening symptoms.    Follow-up Appointments: Follow-up with primary care provider as outpatient.    Test Results Pending at Discharge:       Eliud Martinez MD  11/08/21  09:51 CST    Time: 35 minutes

## 2021-11-08 NOTE — PLAN OF CARE
Goal Outcome Evaluation:           Progress: improving  Outcome Summary: Pt transfered from ICU; alert, oriented, ABX administered; monitoring pt, vital signs stable

## 2021-11-09 ENCOUNTER — READMISSION MANAGEMENT (OUTPATIENT)
Dept: CALL CENTER | Facility: HOSPITAL | Age: 21
End: 2021-11-09

## 2021-11-09 NOTE — PAYOR COMM NOTE
"Nubia Templemore  Case Management Extender  [phone] phone  [phone] fax    Auth# [id number]    Fernanda Patterson (21 y.o. Female)             Date of Birth Social Security Number Address Home Phone MRN    2000  [address]  [place] [facility] [address] [phone] [id number]    Jehovah's witness Marital Status             None Single       Admission Date Admission Type Admitting Provider Attending Provider Department, Room/Bed    11/6/21 Emergency Daniel Johnson MD  [facility] 3 [facility], 373/1    Discharge Date Discharge Disposition Discharge Destination          11/8/2021 Home or Self Care              Attending Provider: (none)   Allergies: Pineapple, Benzoyl Peroxide    Isolation: None   Infection: None   Code Status: Prior   Advance Care Planning Activity    Ht: 170.2 cm (67\")   Wt: 51.2 kg (112 lb 12.8 oz)    Admission Cmt: None   Principal Problem: Diabetic ketoacidosis without coma associated with type 1 diabetes mellitus (HCC) [E10.10]                 Active Insurance as of 11/6/2021     Primary Coverage     Payor Plan Insurance Group Employer/Plan Group    [place] [id number]     Payor Plan Address Payor Plan Phone Number Payor Plan Fax Number Effective Dates    [employer] [address] [address] [phone]  1/1/2021 - None Entered    [facility] [id number]       Subscriber Name Subscriber Birth Date Member ID       FERNANDA PATTERSON 2000 [id number]                 Emergency Contacts      (Rel.) Home Phone Work Phone Mobile Phone    Franklyn Patterson (Brother) [phone] -- [phone]               Discharge Summary      Eliud Martinez MD at 11/08/21 0951              [facility]  DISCHARGE SUMMARY       Date of Admission: 11/6/2021  Date of Discharge:  11/8/2021  Primary Care Physician: Provider, No Known    Presenting Problem/History of Present Illness:  DKA " (diabetic ketoacidosis) (Formerly [facility]) [E11.10]  Diabetic ketoacidosis without coma associated with type 1 diabetes mellitus ([facility]ly [facility]) [E10.10]       Final Discharge Diagnoses:  Active Hospital Problems    Diagnosis    • **Diabetic ketoacidosis without coma associated with type 1 diabetes mellitus ([facility])    • DKA (diabetic ketoacidosis) ([facility]ly [facility])    • Nausea and vomiting    • Insomnia due to medical condition      - Patient reports that she has bipolar disorder.  She reports that she is not been on her Abilify and Lamictal for 2 weeks, as this medication was discontinued per her request by her therapists in [place].  - Inpatient psychiatry consult placed.  Dr. Bowers states that from his recollection of the patient, her pathology is borderline personality versus bipolar II disorder, likely the former.   * He recommends continuing her home Zoloft.   * Also recommends using trazodone as needed and Vistaril as needed for her insomnia.   * He also recommends strong personal boundaries with nurses and staff.   * He states that either he or the nurse practitioner will evaluate the patient tomorrow.     • Cannabinoid hyperemesis syndrome      - This is likely contributing to her symptoms.  Discussed extensively with the patient about the pathophysiology of this syndrome.  Encouraged patient to quit.  - Patient would benefit from appetite stimulant instead of using THC.     • Cigarette nicotine dependence without complication      - Patient declines nicotine replacement therapy.  - Tobacco cessation counseling.     • ADHD      - Patient takes Vyvanse outpatient but has not taken this medication for several weeks, as she has not been at work.     • Bipolar 1 disorder (HCC)    • Seizure disorder ([facility]ly [facility])    • Severe depressed bipolar II disorder without psychotic features ([facility]ly [facility])    • Post traumatic stress disorder (PTSD)      - Continue home Zoloft.     • Asthma      - Albuterol nebs as needed     • Recurrent UTI    • Diabetic neuropathy ([facility]ly [facility])         Consults:   Consults     Date and Time Order Name Status Description    11/6/2021 11:05 PM Hospitalist (on-call MD unless specified)            Procedures Performed: None.                Pertinent Test Results:   Lab Results (last 7 days)     Procedure Component Value Units Date/Time    POC Glucose Once [[id number]]  (Normal) Collected: 11/08/21 0623    Specimen: Blood Updated: 11/08/21 0706     Glucose 77 mg/dL      Comment: RN NotifiedOperator: [id number] [employer] ID: [id number]       POC Glucose Once [[id number]]  (Abnormal) Collected: 11/08/21 0552    Specimen: Blood Updated: 11/08/21 0706     Glucose 60 mg/dL      Comment: RN NotifiedOperator: [id number] [employer] ID: [id number]       Magnesium [[id number]]  (Normal) Collected: 11/08/21 0552    Specimen: Blood Updated: 11/08/21 0649     Magnesium 2.5 mg/dL     Basic Metabolic Panel [[id number]]  (Abnormal) Collected: 11/08/21 0552    Specimen: Blood Updated: 11/08/21 0646     Glucose 65 mg/dL      BUN 9 mg/dL      Creatinine 0.74 mg/dL      Sodium 139 mmol/L      Potassium 3.5 mmol/L      Chloride 106 mmol/L      CO2 23.0 mmol/L      Calcium 8.3 mg/dL      eGFR Non African Amer 98 mL/min/1.73      BUN/Creatinine Ratio 12.2     Anion Gap 10.0 mmol/L     Narrative:      GFR Normal >60  Chronic Kidney Disease <60  Kidney Failure <15      CBC & Differential [[id number]]  (Abnormal) Collected: 11/08/21 0552    Specimen: Blood Updated: 11/08/21 0619    Narrative:      The following orders were created for panel order CBC & Differential.  Procedure                               Abnormality         Status                     ---------                               -----------         ------                     CBC Auto Differential[[id number]]        Abnormal            Final result                 Please view results for these tests on the individual orders.    CBC Auto Differential [[id number]]  (Abnormal) Collected: 11/08/21 0552    Specimen:  Blood Updated: 11/08/21 0619     WBC 8.28 10*3/mm3      RBC 3.90 10*6/mm3      Hemoglobin 11.2 g/dL      Hematocrit 33.3 %      MCV 85.4 fL      MCH 28.7 pg      MCHC 33.6 g/dL      RDW 13.3 %      RDW-SD 41.2 fl      MPV 10.2 fL      Platelets 285 10*3/mm3      Neutrophil % 47.5 %      Lymphocyte % 42.0 %      Monocyte % 5.4 %      Eosinophil % 4.0 %      Basophil % 0.5 %      Immature Grans % 0.6 %      Neutrophils, Absolute 3.93 10*3/mm3      Lymphocytes, Absolute 3.48 10*3/mm3      Monocytes, Absolute 0.45 10*3/mm3      Eosinophils, Absolute 0.33 10*3/mm3      Basophils, Absolute 0.04 10*3/mm3      Immature Grans, Absolute 0.05 10*3/mm3      nRBC 0.0 /100 WBC     POC Glucose Once [[id number]]  (Abnormal) Collected: 11/07/21 2319    Specimen: Blood Updated: 11/08/21 0450     Glucose 163 mg/dL      Comment: RN NotifiedOperator: [id number] [facility] ID: [id number]       Phosphorus [[id number]]  (Normal) Collected: 11/07/21 2323    Specimen: Blood Updated: 11/07/21 2342     Phosphorus 3.3 mg/dL     POC Glucose Once [[id number]]  (Normal) Collected: 11/07/21 1620    Specimen: Blood Updated: 11/07/21 1723     Glucose 88 mg/dL      Comment: RN NotifiedOperator: [id number] [facility] ID: [id number]       POC Glucose Once [[id number]]  (Normal) Collected: 11/07/21 1243    Specimen: Blood Updated: 11/07/21 1723     Glucose 87 mg/dL      Comment: RN NotifiedOperator: [id number] [facility] ID: [id number]       POC Glucose Once [[id number]]  (Abnormal) Collected: 11/07/21 1139    Specimen: Blood Updated: 11/07/21 1722     Glucose 177 mg/dL      Comment: RN NotifiedOperator: [id number] [employer] ID: [id number]       Phosphorus [[id number]]  (Abnormal) Collected: 11/07/21 1149    Specimen: Blood Updated: 11/07/21 1237     Phosphorus 2.4 mg/dL     Basic Metabolic Panel [[id number]]  (Abnormal) Collected: 11/07/21 1149    Specimen: Blood Updated: 11/07/21 1235     Glucose 203 mg/dL      BUN 18  mg/dL      Creatinine 0.80 mg/dL      Sodium 136 mmol/L      Potassium 4.1 mmol/L      Chloride 104 mmol/L      CO2 21.0 mmol/L      Calcium 8.3 mg/dL      eGFR Non African Amer 90 mL/min/1.73      BUN/Creatinine Ratio 22.5     Anion Gap 11.0 mmol/L     Narrative:      GFR Normal >60  Chronic Kidney Disease <60  Kidney Failure <15      Magnesium [[id number]]  (Abnormal) Collected: 11/07/21 1149    Specimen: Blood Updated: 11/07/21 1235     Magnesium 1.4 mg/dL     POC Glucose Once [[id number]]  (Abnormal) Collected: 11/07/21 1034    Specimen: Blood Updated: 11/07/21 1049     Glucose 287 mg/dL      Comment: RN NotifiedOperator: [id number] [facility] ID: [id number]       POC Glucose Once [[id number]]  (Abnormal) Collected: 11/07/21 0945    Specimen: Blood Updated: 11/07/21 1049     Glucose 294 mg/dL      Comment: RN NotifiedOperator: [id number] [facility] ID: [id number]       POC Glucose Once [[id number]]  (Abnormal) Collected: 11/07/21 0839    Specimen: Blood Updated: 11/07/21 1049     Glucose 290 mg/dL      Comment: Result Not ConfirmedOperator: [id number] [facility] ID: [id number]       POC Glucose Once [[id number]]  (Abnormal) Collected: 11/07/21 0727    Specimen: Blood Updated: 11/07/21 1048     Glucose 269 mg/dL      Comment: RN NotifiedOperator: [id number] [facility] ID: [id number]       Basic Metabolic Panel [[id number]]  (Abnormal) Collected: 11/07/21 0748    Specimen: Blood Updated: 11/07/21 0814     Glucose 305 mg/dL      BUN 20 mg/dL      Creatinine 0.89 mg/dL      Sodium 133 mmol/L      Potassium 4.6 mmol/L      Chloride 101 mmol/L      CO2 17.0 mmol/L      Calcium 8.1 mg/dL      eGFR Non African Amer 79 mL/min/1.73      BUN/Creatinine Ratio 22.5     Anion Gap 15.0 mmol/L     Narrative:      GFR Normal >60  Chronic Kidney Disease <60  Kidney Failure <15      Phosphorus [[id number]]  (Normal) Collected: 11/07/21 0748    Specimen: Blood Updated: 11/07/21 0814     Phosphorus  3.2 mg/dL     Magnesium [[id number]]  (Abnormal) Collected: 11/07/21 0748    Specimen: Blood Updated: 11/07/21 0814     Magnesium 1.4 mg/dL     POC Glucose Once [[id number]]  (Abnormal) Collected: 11/07/21 0522    Specimen: Blood Updated: 11/07/21 0628     Glucose 215 mg/dL      Comment: : [id number] JULIANO HINTNOeter ID: [id number]       Comprehensive Metabolic Panel [[id number]]  (Abnormal) Collected: 11/07/21 0400    Specimen: Blood Updated: 11/07/21 0525     Glucose 150 mg/dL      BUN 21 mg/dL      Creatinine 0.79 mg/dL      Sodium 135 mmol/L      Potassium 3.9 mmol/L      Chloride 104 mmol/L      CO2 20.0 mmol/L      Calcium 7.7 mg/dL      Total Protein 5.4 g/dL      Albumin 3.50 g/dL      ALT (SGPT) 9 U/L      AST (SGOT) 9 U/L      Alkaline Phosphatase 55 U/L      Total Bilirubin 0.2 mg/dL      eGFR Non African Amer 91 mL/min/1.73      Globulin 1.9 gm/dL      A/G Ratio 1.8 g/dL      BUN/Creatinine Ratio 26.6     Anion Gap 11.0 mmol/L     Narrative:      GFR Normal >60  Chronic Kidney Disease <60  Kidney Failure <15      Phosphorus [[id number]]  (Normal) Collected: 11/07/21 0400    Specimen: Blood Updated: 11/07/21 0522     Phosphorus 3.1 mg/dL     Magnesium [[id number]]  (Abnormal) Collected: 11/07/21 0400    Specimen: Blood Updated: 11/07/21 0521     Magnesium 1.4 mg/dL     CBC (No Diff) [[id number]]  (Abnormal) Collected: 11/07/21 0400    Specimen: Blood Updated: 11/07/21 0509     WBC 11.10 10*3/mm3      RBC 3.28 10*6/mm3      Hemoglobin 9.5 g/dL      Hematocrit 27.8 %      MCV 84.8 fL      MCH 29.0 pg      MCHC 34.2 g/dL      RDW 12.8 %      RDW-SD 39.0 fl      MPV 11.3 fL      Platelets 214 10*3/mm3     POC Glucose Once [[id number]]  (Abnormal) Collected: 11/07/21 0359    Specimen: Blood Updated: 11/07/21 0437     Glucose 145 mg/dL      Comment: Result Not ConfirmedOperator: [id number] [facility] ID: [id number]       POC Glucose Once [[id number]]  (Abnormal) Collected: 11/07/21 0007     Specimen: Blood Updated: 11/07/21 0218     Glucose 338 mg/dL      Comment: Result Not ConfirmedOperator: [id number] FRANK Sorto ID: [id number]       Basic Metabolic Panel [[id number]]  (Abnormal) Collected: 11/07/21 0007    Specimen: Blood Updated: 11/07/21 0054     Glucose 410 mg/dL      BUN 25 mg/dL      Creatinine 1.03 mg/dL      Sodium 138 mmol/L      Potassium 4.4 mmol/L      Chloride 100 mmol/L      CO2 13.0 mmol/L      Calcium 8.7 mg/dL      eGFR Non African Amer 67 mL/min/1.73      BUN/Creatinine Ratio 24.3     Anion Gap 25.0 mmol/L     Narrative:      GFR Normal >60  Chronic Kidney Disease <60  Kidney Failure <15      Phosphorus [[id number]]  (Normal) Collected: 11/07/21 0007    Specimen: Blood Updated: 11/07/21 0052     Phosphorus 3.3 mg/dL     Magnesium [[id number]]  (Abnormal) Collected: 11/07/21 0007    Specimen: Blood Updated: 11/07/21 0052     Magnesium 1.5 mg/dL     Osmolality, Serum [[id number]]  (Abnormal) Collected: 11/06/21 2105    Specimen: Blood from Arm, Right Updated: 11/06/21 2347     Osmolality 306 mOsm/kg     Urinalysis With Microscopic - Urine, Clean Catch [[id number]]  (Abnormal) Collected: 11/06/21 2149    Specimen: Urine, Clean Catch Updated: 11/06/21 2345    Narrative:      The following orders were created for panel order Urinalysis With Microscopic - Urine, Clean Catch.  Procedure                               Abnormality         Status                     ---------                               -----------         ------                     Urinalysis without micro...[[id number]]  Abnormal            Final result               Urinalysis, Microscopic ...[[id number]]                      Final result                 Please view results for these tests on the individual orders.    Urinalysis, Microscopic Only - Urine, Clean Catch [[id number]] Collected: 11/06/21 2149    Specimen: Urine, Clean Catch Updated: 11/06/21 2345     RBC, UA None Seen /HPF      WBC, UA 0-2 /HPF      Bacteria,  UA None Seen /HPF      Squamous Epithelial Cells, UA None Seen /HPF      Hyaline Casts, UA None Seen /LPF      Methodology Automated Microscopy    Urinalysis without microscopic (no culture) - Urine, Clean Catch [[id number]]  (Abnormal) Collected: 11/06/21 2149    Specimen: Urine, Clean Catch Updated: 11/06/21 2345     Color, UA Yellow     Appearance, UA Clear     pH, UA 5.5     Specific [place], UA 1.030     Comment: Result obtained by Refractometer        Glucose, UA >=1000 mg/dL (3+)     Ketones, UA 80 mg/dL (3+)     Bilirubin, UA Negative     Blood, UA Negative     Protein, UA Negative     Leuk Esterase, UA Negative     Nitrite, UA Negative     Urobilinogen, UA 0.2 E.U./dL    COVID-19 and FLU A/B PCR - Swab, Nasopharynx [[id number]]  (Normal) Collected: 11/06/21 2255    Specimen: Swab from Nasopharynx Updated: 11/06/21 2334     COVID19 Not Detected     Influenza A PCR Not Detected     Influenza B PCR Not Detected    Narrative:      Fact sheet for providers: https://www.fda.gov/media/108867/download    Fact sheet for patients: https://www.fda.gov/media/235508/download    Test performed by PCR.    Hemoglobin A1c [[id number]]  (Abnormal) Collected: 11/06/21 2105    Specimen: Blood from Arm, Right Updated: 11/06/21 2326     Hemoglobin A1C 11.50 %     Narrative:      Hemoglobin A1C Ranges:    Increased Risk for Diabetes  5.7% to 6.4%  Diabetes                     >= 6.5%  Diabetic Goal                < 7.0%    Phosphorus [[id number]]  (Normal) Collected: 11/06/21 2105    Specimen: Blood from Arm, Right Updated: 11/06/21 2322     Phosphorus 3.8 mg/dL     Blood Gas, Arterial - [[id number]]  (Abnormal) Collected: 11/06/21 2233    Specimen: Arterial Blood Updated: 11/06/21 2238     Site Arterial Line     Drew's Test N/A     pH, Arterial 7.281 pH units      Comment: 84 Value below reference range        pCO2, Arterial 29.8 mm Hg      Comment: 84 Value below reference range        pO2, Arterial 115.0 mm Hg      Comment: 83  Value above reference range        HCO3, Arterial 14.0 mmol/L      Comment: 84 Value below reference range        Base Excess, Arterial -11.5 mmol/L      Comment: 84 Value below reference range        O2 Saturation, Arterial 98.6 %      Barometric Pressure for Blood Gas 753 mmHg      Modality Room Air     Ventilator Mode NA     Collected by RT     Comment: Meter: [id number]     :  [id number]       Extra Tubes [[id number]] Collected: 11/06/21 2108    Specimen: Blood, Venous Line Updated: 11/06/21 2215    Narrative:      The following orders were created for panel order Extra Tubes.  Procedure                               Abnormality         Status                     ---------                               -----------         ------                     Light Blue Top[[id number]]                                   Final result                 Please view results for these tests on the individual orders.    Light Blue Top [[id number]] Collected: 11/06/21 2108    Specimen: Blood Updated: 11/06/21 2215     Extra Tube hold for add-on     Comment: Auto resulted       Urinalysis With Culture If Indicated - Urine, Clean Catch [[id number]]  (Abnormal) Collected: 11/06/21 2149    Specimen: Urine, Clean Catch Updated: 11/06/21 2202     Color, UA Yellow     Appearance, UA Clear     pH, UA 5.5     Specific [place], UA 1.030     Comment: Result obtained by Refractometer        Glucose, UA >=1000 mg/dL (3+)     Ketones, UA 80 mg/dL (3+)     Bilirubin, UA Negative     Blood, UA Negative     Protein, UA Negative     Leuk Esterase, UA Negative     Nitrite, UA Negative     Urobilinogen, UA 0.2 E.U./dL    Narrative:      Urine microscopic not indicated.    Magnesium [[id number]]  (Normal) Collected: 11/06/21 2105    Specimen: Blood from Arm, Right Updated: 11/06/21 2153     Magnesium 1.7 mg/dL     CK [[id number]]  (Normal) Collected: 11/06/21 2105    Specimen: Blood from Arm, Right Updated: 11/06/21 2153     Creatine Kinase 54 U/L      Lipase [[id number]]  (Normal) Collected: 11/06/21 2105    Specimen: Blood from Arm, Right Updated: 11/06/21 2153     Lipase 14 U/L     Comprehensive Metabolic Panel [[id number]]  (Abnormal) Collected: 11/06/21 2105    Specimen: Blood from Arm, Right Updated: 11/06/21 2129     Glucose 459 mg/dL      BUN 23 mg/dL      Creatinine 1.09 mg/dL      Sodium 134 mmol/L      Potassium 4.4 mmol/L      Chloride 94 mmol/L      CO2 16.0 mmol/L      Calcium 9.9 mg/dL      Total Protein 7.8 g/dL      Albumin 5.10 g/dL      ALT (SGPT) 11 U/L      AST (SGOT) 11 U/L      Alkaline Phosphatase 79 U/L      Total Bilirubin 0.5 mg/dL      eGFR Non African Amer 63 mL/min/1.73      Globulin 2.7 gm/dL      A/G Ratio 1.9 g/dL      BUN/Creatinine Ratio 21.1     Anion Gap 24.0 mmol/L     Narrative:      GFR Normal >60  Chronic Kidney Disease <60  Kidney Failure <15      hCG, Serum, Qualitative [[id number]]  (Normal) Collected: 11/06/21 2105    Specimen: Blood from Arm, Right Updated: 11/06/21 2124     HCG Qualitative Negative    POC Glucose Once [[id number]]  (Abnormal) Collected: 11/06/21 2059    Specimen: Blood Updated: 11/06/21 2113     Glucose 393 mg/dL      Comment: RN NotifiedOperator: [id number] JAMESON SEEjosette ID: [id number]       CBC & Differential [[id number]]  (Abnormal) Collected: 11/06/21 2105    Specimen: Blood from Arm, Right Updated: 11/06/21 2111    Narrative:      The following orders were created for panel order CBC & Differential.  Procedure                               Abnormality         Status                     ---------                               -----------         ------                     CBC Auto Differential[[id number]]        Abnormal            Final result                 Please view results for these tests on the individual orders.    CBC Auto Differential [[id number]]  (Abnormal) Collected: 11/06/21 2105    Specimen: Blood from Arm, Right Updated: 11/06/21 2111     WBC 11.42 10*3/mm3      RBC 4.33  10*6/mm3      Hemoglobin 12.8 g/dL      Hematocrit 36.1 %      MCV 83.4 fL      MCH 29.6 pg      MCHC 35.5 g/dL      RDW 12.6 %      RDW-SD 38.5 fl      MPV 10.8 fL      Platelets 295 10*3/mm3      Neutrophil % 70.9 %      Lymphocyte % 23.6 %      Monocyte % 3.3 %      Eosinophil % 0.7 %      Basophil % 0.6 %      Immature Grans % 0.9 %      Neutrophils, Absolute 8.09 10*3/mm3      Lymphocytes, Absolute 2.70 10*3/mm3      Monocytes, Absolute 0.38 10*3/mm3      Eosinophils, Absolute 0.08 10*3/mm3      Basophils, Absolute 0.07 10*3/mm3      Immature Grans, Absolute 0.10 10*3/mm3      nRBC 0.0 /100 WBC         Imaging Results (Last 7 Days)     Procedure Component Value Units Date/Time    XR Abdomen 2+ VW with Chest 1 VW [[id number]] Collected: 11/06/21 2136     Updated: 11/06/21 2228    Narrative:      EXAM:    XR Abdomen, 2 Views and XR Chest, 1 View    CLINICAL HISTORY:    The patient is 21 years old and is Female; chest pain and  abdominal pain    TECHNIQUE:    Frontal view of the chest, frontal view of the abdomen/pelvis  and upright or decubitus view of the abdomen.    COMPARISON:    None    FINDINGS:    LUNGS:  Lungs are clear.    PLEURAL SPACE:  Normal  No pneumothorax.    HEART:  Normal  No cardiomegaly.    MEDIASTINUM:  Normal    INTRAPERITONEAL SPACE:  No pneumoperitoneum.    GASTROINTESTINAL TRACT:  Nonobstructive bowel gas pattern.    BONES/JOINTS:  Normal    OTHER FINDINGS:  Mild to moderate stool burden.      Impression:        Nonobstructive bowel gas pattern.    Electronically signed by:  Santiago Putnam MD  11/6/2021 10:27 PM CDT  Workstation: [id number]            Chief Complaint on Day of Discharge: None.    Hospital Course:  The patient was admitted for DKA and started on the treatment protocol.  DKA did resolve and she was transitioned to basal insulin with Accu-Cheks and sliding scale insulin.  She was able to tolerate her diet and was less symptomatic and less deconditioned on  "discharge.  Antimicrobial therapy was discontinued as there was no clear indication for antibiotics.  X-ray was clear and urinalysis was unremarkable.      Condition on Discharge: Stable and improved.    Physical Exam on Discharge:  /83 (BP Location: Left arm, Patient Position: Lying)   Pulse 64   Temp 97.1 °F (36.2 °C) (Oral)   Resp 16   Ht 170.2 cm (67\")   Wt 51.2 kg (112 lb 12.8 oz)   LMP 10/15/2021 (Exact Date)   SpO2 100%   Breastfeeding No   BMI 17.67 kg/m²   Physical Exam  Vitals and nursing note reviewed.   Constitutional:       General: She is not in acute distress.     Appearance: She is underweight. She is not diaphoretic.   HENT:      Head: Normocephalic and atraumatic.      Right Ear: External ear normal.      Left Ear: External ear normal.      Nose: Nose normal.   Eyes:      Extraocular Movements: Extraocular movements intact.      Conjunctiva/sclera: Conjunctivae normal.      Pupils: Pupils are equal, round, and reactive to light.   Neck:      Thyroid: No thyromegaly.      Vascular: No JVD.   Cardiovascular:      Rate and Rhythm: Normal rate and regular rhythm.      Heart sounds: Normal heart sounds. No murmur heard.  No friction rub. No gallop.    Pulmonary:      Effort: Pulmonary effort is normal. No respiratory distress.      Breath sounds: Normal breath sounds. No stridor. No wheezing or rales.   Chest:      Chest wall: No tenderness.   Abdominal:      General: Bowel sounds are normal. There is no distension.      Palpations: Abdomen is soft. There is no mass.      Tenderness: There is no abdominal tenderness. There is no right CVA tenderness, left CVA tenderness, guarding or rebound.      Hernia: No hernia is present.   Musculoskeletal:         General: No swelling, tenderness or deformity. Normal range of motion.      Cervical back: Normal range of motion and neck supple.      Right lower leg: No edema.      Left lower leg: No edema.   Skin:     General: Skin is warm and dry.     "  Coloration: Skin is not jaundiced or pale.      Findings: No bruising, erythema, lesion or rash.   Neurological:      General: No focal deficit present.      Mental Status: She is alert and oriented to person, place, and time. Mental status is at baseline.      Cranial Nerves: No cranial nerve deficit.      Sensory: No sensory deficit.      Motor: No weakness.      Coordination: Coordination normal.      Gait: Gait normal.      Deep Tendon Reflexes: Reflexes are normal and symmetric. Reflexes normal.   Psychiatric:         Mood and Affect: Mood normal.         Behavior: Behavior normal. Behavior is cooperative.         Thought Content: Thought content normal.         Judgment: Judgment normal.           Discharge Disposition:  Home or Self Care    Discharge Medications:     Discharge Medications      Changes to Medications      Instructions Start Date   Insulin Glargine 100 UNIT/ML injection pen  Commonly known as: LANSTEPHANIE SOLOSTAR  What changed: how much to take   20 Units, Subcutaneous, Daily         Continue These Medications      Instructions Start Date   Blood Glucose Monitoring Suppl w/Device kit   USE AS INDICATED, ANY MONITOR , ICD10 code is E11.9      Blood Glucose Test strip   Use 4 x daily use any brand covered by insurance or same brand as before ICD10 code is E11.9      dicyclomine 20 MG tablet  Commonly known as: BENTYL   20 mg, Oral, Every 6 Hours PRN      hydrOXYzine 10 MG tablet  Commonly known as: ATARAX   10 mg, Oral, 3 Times Daily PRN      insulin aspart 100 UNIT/ML solution pen-injector sc pen  Commonly known as: NovoLOG FlexPen   Up to 20 units with meals      Insulin Pen Needle 31G X 5 MM misc  Commonly known as: B-D UF III MINI PEN NEEDLES   Use 4 times daily  , ICD10 code is E11.9      Lancets 30G misc   USE 4 X DAILY      Lancing Device misc   USE AS INDICATED TO CORRELATE WITH STRIPS AND METER      promethazine 12.5 MG tablet  Commonly known as: PHENERGAN   12.5 mg, Oral, Every 6 Hours  PRN      Rexulti 2 MG tablet  Generic drug: Brexpiprazole   Oral, Daily      Vyvanse 20 MG capsule  Generic drug: lisdexamfetamine   20 mg, Oral, Every Morning         Stop These Medications    lamoTRIgine 25 MG tablet  Commonly known as: LaMICtal     sertraline 100 MG tablet  Commonly known as: ZOLOFT            Discharge Diet: Diabetic.    Activity at Discharge: As tolerated.     Discharge Care Plan/Instructions: Patient has been advised to take her medications as prescribed and to return to the emergency room in the event of any worsening symptoms.    Follow-up Appointments: Follow-up with primary care provider as outpatient.    Test Results Pending at Discharge:       Eliud Martinez MD  11/08/21  09:51 CST    Time: 35 minutes                Electronically signed by Eliud Martinez MD at 11/08/21 0956        No

## 2021-11-09 NOTE — OUTREACH NOTE
Prep Survey      Responses   [facility] facility patient discharged from? [place]   Is LACE score < 7 ? No   Emergency Room discharge w/ pulse ox? No   Eligibility Readm Mgmt   Discharge diagnosis Diabetic ketoacidosis without coma associated with type 1 diabetes mellitus (   Does the patient have one of the following disease processes/diagnoses(primary or secondary)? Other   Does the patient have Home health ordered? No   Is there a DME ordered? No   Prep survey completed? Yes          Adrianna Plunkett RN

## 2021-11-09 NOTE — OUTREACH NOTE
Medical Week 1 Survey      Responses   [facility] patient discharged from? [place]   Does the patient have one of the following disease processes/diagnoses(primary or secondary)? Other   Week 1 attempt successful? Yes   Call start time 1528   Call end time 1529   Discharge diagnosis Diabetic ketoacidosis without coma associated with type 1 diabetes mellitus (   Person spoke with today (if not patient) and relationship patient   Meds reviewed with patient/caregiver? Yes   Is the patient having any side effects they believe may be caused by any medication additions or changes? No   Does the patient have all medications ordered at discharge? Yes   Is the patient taking all medications as directed (includes completed medication regime)? Yes   Week 1 call completed? Yes   Wrap up additional comments short call, patient is working.  She is doing well.          Malka Russell RN

## 2021-11-15 ENCOUNTER — READMISSION MANAGEMENT (OUTPATIENT)
Dept: CALL CENTER | Facility: HOSPITAL | Age: 21
End: 2021-11-15

## 2021-11-15 NOTE — OUTREACH NOTE
Medical Week 2 Survey      Responses   [facility] patient discharged from? [place]   Does the patient have one of the following disease processes/diagnoses(primary or secondary)? Other   Week 2 attempt successful? No   Unsuccessful attempts Attempt 1          Casie Adame RN

## 2021-11-17 ENCOUNTER — READMISSION MANAGEMENT (OUTPATIENT)
Dept: CALL CENTER | Facility: HOSPITAL | Age: 21
End: 2021-11-17

## 2022-01-14 ENCOUNTER — HOSPITAL ENCOUNTER (EMERGENCY)
Facility: HOSPITAL | Age: 22
Discharge: HOME | End: 2022-01-14
Attending: EMERGENCY MEDICINE | Admitting: EMERGENCY MEDICINE

## 2022-01-14 VITALS
HEIGHT: 67 IN | RESPIRATION RATE: 18 BRPM | DIASTOLIC BLOOD PRESSURE: 84 MMHG | HEART RATE: 81 BPM | WEIGHT: 110 LBS | SYSTOLIC BLOOD PRESSURE: 119 MMHG | OXYGEN SATURATION: 93 % | TEMPERATURE: 98.2 F | BODY MASS INDEX: 17.27 KG/M2

## 2022-01-14 DIAGNOSIS — R73.9 HYPERGLYCEMIA: ICD-10-CM

## 2022-01-14 DIAGNOSIS — L03.811 CELLULITIS OF HEAD EXCEPT FACE: Primary | ICD-10-CM

## 2022-01-14 LAB
ANION GAP SERPL CALCULATED.3IONS-SCNC: 9 MMOL/L (ref 5–15)
BASOPHILS # BLD AUTO: 0.04 10*3/MM3 (ref 0–0.2)
BASOPHILS NFR BLD AUTO: 0.4 % (ref 0–1.5)
BILIRUB UR QL STRIP: NEGATIVE
BUN SERPL-MCNC: 12 MG/DL (ref 6–20)
BUN/CREAT SERPL: 13.2 (ref 7–25)
CALCIUM SPEC-SCNC: 8.2 MG/DL (ref 8.6–10.5)
CHLORIDE SERPL-SCNC: 100 MMOL/L (ref 98–107)
CLARITY UR: CLEAR
CO2 SERPL-SCNC: 26 MMOL/L (ref 22–29)
COLOR UR: YELLOW
CREAT SERPL-MCNC: 0.91 MG/DL (ref 0.57–1)
DEPRECATED RDW RBC AUTO: 39.5 FL (ref 37–54)
EOSINOPHIL # BLD AUTO: 0.12 10*3/MM3 (ref 0–0.4)
EOSINOPHIL NFR BLD AUTO: 1.3 % (ref 0.3–6.2)
ERYTHROCYTE [DISTWIDTH] IN BLOOD BY AUTOMATED COUNT: 12.6 % (ref 12.3–15.4)
GFR SERPL CREATININE-BSD FRML MDRD: 77 ML/MIN/1.73
GLUCOSE SERPL-MCNC: 469 MG/DL (ref 65–99)
GLUCOSE UR STRIP-MCNC: ABNORMAL MG/DL
HBA1C MFR BLD: 12.1 % (ref 4.8–5.6)
HCT VFR BLD AUTO: 33.8 % (ref 34–46.6)
HGB BLD-MCNC: 11.9 G/DL (ref 12–15.9)
HGB UR QL STRIP.AUTO: NEGATIVE
HOLD SPECIMEN: NORMAL
IMM GRANULOCYTES # BLD AUTO: 0.07 10*3/MM3 (ref 0–0.05)
IMM GRANULOCYTES NFR BLD AUTO: 0.8 % (ref 0–0.5)
KETONES UR QL STRIP: ABNORMAL
LEUKOCYTE ESTERASE UR QL STRIP.AUTO: NEGATIVE
LYMPHOCYTES # BLD AUTO: 3.02 10*3/MM3 (ref 0.7–3.1)
LYMPHOCYTES NFR BLD AUTO: 33.6 % (ref 19.6–45.3)
MCH RBC QN AUTO: 30.1 PG (ref 26.6–33)
MCHC RBC AUTO-ENTMCNC: 35.2 G/DL (ref 31.5–35.7)
MCV RBC AUTO: 85.6 FL (ref 79–97)
MONOCYTES # BLD AUTO: 0.3 10*3/MM3 (ref 0.1–0.9)
MONOCYTES NFR BLD AUTO: 3.3 % (ref 5–12)
NEUTROPHILS NFR BLD AUTO: 5.45 10*3/MM3 (ref 1.7–7)
NEUTROPHILS NFR BLD AUTO: 60.6 % (ref 42.7–76)
NITRITE UR QL STRIP: NEGATIVE
NRBC BLD AUTO-RTO: 0 /100 WBC (ref 0–0.2)
PH UR STRIP.AUTO: 7 [PH] (ref 5–9)
PLATELET # BLD AUTO: 323 10*3/MM3 (ref 140–450)
PMV BLD AUTO: 10.7 FL (ref 6–12)
POTASSIUM SERPL-SCNC: 3.7 MMOL/L (ref 3.5–5.2)
PROT UR QL STRIP: NEGATIVE
RBC # BLD AUTO: 3.95 10*6/MM3 (ref 3.77–5.28)
SODIUM SERPL-SCNC: 135 MMOL/L (ref 136–145)
SP GR UR STRIP: 1.02 (ref 1–1.03)
UROBILINOGEN UR QL STRIP: ABNORMAL
WBC NRBC COR # BLD: 9 10*3/MM3 (ref 3.4–10.8)
WHOLE BLOOD HOLD SPECIMEN: NORMAL
WHOLE BLOOD HOLD SPECIMEN: NORMAL

## 2022-01-14 PROCEDURE — 25010000002 ONDANSETRON PER 1 MG: Performed by: EMERGENCY MEDICINE

## 2022-01-14 PROCEDURE — 96375 TX/PRO/DX INJ NEW DRUG ADDON: CPT

## 2022-01-14 PROCEDURE — 25010000002 HYDROMORPHONE 1 MG/ML SOLUTION: Performed by: EMERGENCY MEDICINE

## 2022-01-14 PROCEDURE — 96365 THER/PROPH/DIAG IV INF INIT: CPT

## 2022-01-14 PROCEDURE — 82962 GLUCOSE BLOOD TEST: CPT

## 2022-01-14 PROCEDURE — 63710000001 INSULIN REGULAR HUMAN PER 5 UNITS: Performed by: EMERGENCY MEDICINE

## 2022-01-14 PROCEDURE — 85025 COMPLETE CBC W/AUTO DIFF WBC: CPT | Performed by: EMERGENCY MEDICINE

## 2022-01-14 PROCEDURE — 80048 BASIC METABOLIC PNL TOTAL CA: CPT | Performed by: EMERGENCY MEDICINE

## 2022-01-14 PROCEDURE — 36415 COLL VENOUS BLD VENIPUNCTURE: CPT

## 2022-01-14 PROCEDURE — 99283 EMERGENCY DEPT VISIT LOW MDM: CPT

## 2022-01-14 PROCEDURE — 25010000002 CEFTRIAXONE PER 250 MG: Performed by: EMERGENCY MEDICINE

## 2022-01-14 PROCEDURE — 81003 URINALYSIS AUTO W/O SCOPE: CPT | Performed by: EMERGENCY MEDICINE

## 2022-01-14 PROCEDURE — 83036 HEMOGLOBIN GLYCOSYLATED A1C: CPT | Performed by: EMERGENCY MEDICINE

## 2022-01-14 RX ORDER — HYDROCODONE BITARTRATE AND ACETAMINOPHEN 5; 325 MG/1; MG/1
1 TABLET ORAL EVERY 6 HOURS PRN
Qty: 15 TABLET | Refills: 0 | Status: SHIPPED | OUTPATIENT
Start: 2022-01-14 | End: 2022-03-29 | Stop reason: HOSPADM

## 2022-01-14 RX ORDER — ONDANSETRON 2 MG/ML
4 INJECTION INTRAMUSCULAR; INTRAVENOUS ONCE
Status: COMPLETED | OUTPATIENT
Start: 2022-01-14 | End: 2022-01-14

## 2022-01-14 RX ORDER — SULFAMETHOXAZOLE AND TRIMETHOPRIM 800; 160 MG/1; MG/1
1 TABLET ORAL 2 TIMES DAILY
Qty: 14 TABLET | Refills: 0 | Status: SHIPPED | OUTPATIENT
Start: 2022-01-14 | End: 2022-03-29 | Stop reason: HOSPADM

## 2022-01-14 RX ORDER — ONDANSETRON 4 MG/1
4 TABLET, ORALLY DISINTEGRATING ORAL EVERY 6 HOURS PRN
Qty: 15 TABLET | Refills: 0 | Status: SHIPPED | OUTPATIENT
Start: 2022-01-14 | End: 2022-03-29 | Stop reason: HOSPADM

## 2022-01-14 RX ORDER — SODIUM CHLORIDE 0.9 % (FLUSH) 0.9 %
10 SYRINGE (ML) INJECTION AS NEEDED
Status: DISCONTINUED | OUTPATIENT
Start: 2022-01-14 | End: 2022-01-15 | Stop reason: HOSPADM

## 2022-01-14 RX ORDER — HYDROCODONE BITARTRATE AND ACETAMINOPHEN 5; 325 MG/1; MG/1
1 TABLET ORAL ONCE
Status: COMPLETED | OUTPATIENT
Start: 2022-01-14 | End: 2022-01-14

## 2022-01-14 RX ADMIN — HUMAN INSULIN 10 UNITS: 100 INJECTION, SOLUTION SUBCUTANEOUS at 23:16

## 2022-01-14 RX ADMIN — HUMAN INSULIN 10 UNITS: 100 INJECTION, SOLUTION SUBCUTANEOUS at 20:27

## 2022-01-14 RX ADMIN — HYDROMORPHONE HYDROCHLORIDE 1 MG: 1 INJECTION, SOLUTION INTRAMUSCULAR; INTRAVENOUS; SUBCUTANEOUS at 23:34

## 2022-01-14 RX ADMIN — CEFTRIAXONE SODIUM 1 G: 1 INJECTION, POWDER, FOR SOLUTION INTRAMUSCULAR; INTRAVENOUS at 20:25

## 2022-01-14 RX ADMIN — HYDROCODONE BITARTRATE AND ACETAMINOPHEN 1 TABLET: 5; 325 TABLET ORAL at 20:33

## 2022-01-14 RX ADMIN — SODIUM CHLORIDE 1000 ML: 9 INJECTION, SOLUTION INTRAVENOUS at 20:25

## 2022-01-14 RX ADMIN — ONDANSETRON 4 MG: 2 INJECTION INTRAMUSCULAR; INTRAVENOUS at 23:34

## 2022-01-15 LAB
GLUCOSE BLDC GLUCOMTR-MCNC: 454 MG/DL (ref 70–130)
GLUCOSE BLDC GLUCOMTR-MCNC: 482 MG/DL (ref 70–130)

## 2022-01-15 NOTE — ED PROVIDER NOTES
Subjective   Patient presents emergency department for infection of the left earlobe as well as elevated blood sugars.  She has had elevated blood sugars in the past with the infections.  Patient states that she can get her sugars down at home though they tend to come right back up when she has an infection.  Patient states she had a soft tissue swelling behind the ear on the left which began draining today and she has had some relief of the pain with the drainage.  Patient denies any systemic fevers.  No bowel or bladder problems though have septic hyperglycemia with history of DKA in the past.          Review of Systems   Constitutional: Negative.  Negative for appetite change, chills and fever.   HENT: Positive for ear pain. Negative for congestion.    Eyes: Negative.  Negative for photophobia and visual disturbance.   Respiratory: Negative.  Negative for cough, chest tightness and shortness of breath.    Cardiovascular: Negative.  Negative for chest pain and palpitations.   Gastrointestinal: Negative.  Negative for abdominal pain, constipation, diarrhea, nausea and vomiting.   Endocrine: Negative.    Genitourinary: Negative.  Negative for decreased urine volume, dysuria, flank pain and hematuria.   Musculoskeletal: Negative.  Negative for arthralgias, back pain, myalgias, neck pain and neck stiffness.   Skin: Negative.  Negative for pallor.   Neurological: Negative.  Negative for dizziness, syncope, weakness, light-headedness, numbness and headaches.   Psychiatric/Behavioral: Negative.  Negative for confusion and suicidal ideas. The patient is not nervous/anxious.    All other systems reviewed and are negative.      Past Medical History:   Diagnosis Date   • ADHD    • Bipolar 1 disorder (HCC)    • Borderline personality disorder (HCC)    • Cannabinoid hyperemesis syndrome    • Diabetes mellitus type 1 (HCC)    • Diabetic gastroparesis (HCC)    • Diabetic ketoacidosis (HCC)    • Diabetic neuropathy (HCC)    •  "History of transfusion     Pt reports \"no reactions.\"   • Post traumatic stress disorder (PTSD)    • Recurrent UTI    • Seizure disorder (HCC)    • Severe depressed bipolar II disorder without psychotic features (HCC)    • Uncontrolled diabetes mellitus (HCC)        Allergies   Allergen Reactions   • Pineapple Anaphylaxis   • Benzoyl Peroxide Swelling       Past Surgical History:   Procedure Laterality Date   •  SECTION N/A 2018   • CYSTOSCOPY N/A 2020    Procedure: CYSTOSCOPY FLEXIBLE       (DONE ON STRETCHER);  Surgeon: [place], Antonio PRYOR MD;  Location: [facility];  Service: Urology;  Laterality: N/A;       Family History   Problem Relation Age of Onset   • Drug abuse Father    • Suicide Attempts Brother    • Dementia Maternal Grandfather    • Hypertension Paternal Grandmother        Social History     Socioeconomic History   • Marital status: Single   Tobacco Use   • Smoking status: Former Smoker     Packs/day: 1.00     Years: 2.00     Pack years: 2.00   • Smokeless tobacco: Never Used   • Tobacco comment: uses E cig   Substance and Sexual Activity   • Alcohol use: No   • Drug use: Yes     Frequency: 1.0 times per week     Types: Marijuana   • Sexual activity: Yes     Partners: Male     Birth control/protection: None           Objective   Physical Exam  Vitals and nursing note reviewed.   Constitutional:       General: She is not in acute distress.     Appearance: She is well-developed. She is not ill-appearing or diaphoretic.   HENT:      Head: Normocephalic and atraumatic.      Ears:      Comments: Patient with a small area of soft tissue irritation behind the left ear approximately 1-1/2 cm.  There is no drainage.  There is no significant fluctuant pocket or induration.  Minimal erythema with no obvious cellulitis in the region.     Nose: Nose normal.      Mouth/Throat:      Mouth: Mucous membranes are moist.   Eyes:      General: No scleral icterus.     Extraocular Movements: Extraocular " movements intact.      Conjunctiva/sclera: Conjunctivae normal.   Neck:      Vascular: No JVD.   Cardiovascular:      Rate and Rhythm: Normal rate and regular rhythm.      Heart sounds: Normal heart sounds. No murmur heard.  No friction rub. No gallop.    Pulmonary:      Effort: Pulmonary effort is normal. No respiratory distress.      Breath sounds: No wheezing or rales.   Chest:      Chest wall: No tenderness.   Abdominal:      General: There is no distension.      Palpations: Abdomen is soft. There is no mass.      Tenderness: There is no abdominal tenderness. There is no guarding or rebound.   Musculoskeletal:         General: No tenderness or deformity. Normal range of motion.      Cervical back: Normal range of motion and neck supple.   Lymphadenopathy:      Cervical: No cervical adenopathy.   Skin:     General: Skin is warm and dry.      Capillary Refill: Capillary refill takes less than 2 seconds.      Coloration: Skin is not pale.      Findings: No erythema or rash.   Neurological:      General: No focal deficit present.      Mental Status: She is alert and oriented to person, place, and time.      Cranial Nerves: No cranial nerve deficit.      Motor: No abnormal muscle tone.   Psychiatric:         Behavior: Behavior normal.         Thought Content: Thought content normal.         Judgment: Judgment normal.         Procedures           ED Course  ED Course as of 01/14/22 2329 Fri Jan 14, 2022   2329 No DKA, patient continues with hyperglycemia.  Patient is asking to go home and take her Lantus as well as her insulin and does not wish to stay for further glycemic control.  Patient with normal bicarb with a A1c of 12 with chronically elevated blood sugars it would appear.  Patient given IV antibiotics in the emergency department.  We will continue outpatient antibiotics with interval follow-up. [PC]      ED Course User Index  [PC] Edgardo Acosta MD                                         Labs Reviewed    BASIC METABOLIC PANEL - Abnormal; Notable for the following components:       Result Value    Glucose 469 (*)     Sodium 135 (*)     Calcium 8.2 (*)     All other components within normal limits    Narrative:     GFR Normal >60  Chronic Kidney Disease <60  Kidney Failure <15     HEMOGLOBIN A1C - Abnormal; Notable for the following components:    Hemoglobin A1C 12.10 (*)     All other components within normal limits    Narrative:     Hemoglobin A1C Ranges:    Increased Risk for Diabetes  5.7% to 6.4%  Diabetes                     >= 6.5%  Diabetic Goal                < 7.0%   CBC WITH AUTO DIFFERENTIAL - Abnormal; Notable for the following components:    Hemoglobin 11.9 (*)     Hematocrit 33.8 (*)     Monocyte % 3.3 (*)     Immature Grans % 0.8 (*)     Immature Grans, Absolute 0.07 (*)     All other components within normal limits   URINALYSIS W/ MICROSCOPIC IF INDICATED (NO CULTURE) - Abnormal; Notable for the following components:    Glucose, UA >=1000 mg/dL (3+) (*)     Ketones, UA 15 mg/dL (1+) (*)     All other components within normal limits    Narrative:     Urine microscopic not indicated.   POCT GLUCOSE FINGERSTICK   POCT GLUCOSE FINGERSTICK   POCT GLUCOSE FINGERSTICK   POCT GLUCOSE FINGERSTICK   POCT GLUCOSE FINGERSTICK   CBC AND DIFFERENTIAL    Narrative:     The following orders were created for panel order CBC & Differential.  Procedure                               Abnormality         Status                     ---------                               -----------         ------                     CBC Auto Differential[[id number]]        Abnormal            Final result               Scan Slide[[id number]]                                                                    Please view results for these tests on the individual orders.   EXTRA TUBES    Narrative:     The following orders were created for panel order Extra Tubes.  Procedure                               Abnormality         Status                      ---------                               -----------         ------                     Lavender Top[[id number]]                                     Final result               Green Top (Gel)[[id number]]                                  Final result                 Please view results for these tests on the individual orders.   LAVENDER TOP   GREEN TOP   EXTRA TUBES    Narrative:     The following orders were created for panel order Extra Tubes.  Procedure                               Abnormality         Status                     ---------                               -----------         ------                     Lavender Top[[id number]]                                     Final result                 Please view results for these tests on the individual orders.   LAVENDER TOP       No orders to display             MDM    Final diagnoses:   Cellulitis of head except face   Hyperglycemia       ED Disposition  ED Disposition     ED Disposition Condition Comment    Discharge Stable           [facility] [place] [address] [place] [id number]  On 1/17/2022  If not improved for further evaluation and care         Medication List      New Prescriptions    HYDROcodone-acetaminophen 5-325 MG per tablet  Commonly known as: NORCO  Take 1 tablet by mouth Every 6 (Six) Hours As Needed for Severe Pain .     ondansetron ODT 4 MG disintegrating tablet  Commonly known as: ZOFRAN-ODT  Place 1 tablet on the tongue Every 6 (Six) Hours As Needed for Nausea or Vomiting.     sulfamethoxazole-trimethoprim 800-160 MG per tablet  Commonly known as: BACTRIM DS,SEPTRA DS  Take 1 tablet by mouth 2 (Two) Times a Day.        Changed    Insulin Glargine 100 UNIT/ML injection pen  Commonly known as: LANTUS SOLOSTAR  Inject 20 Units under the skin into the appropriate area as directed Daily.  What changed: how much to take           Where to Get Your Medications      These medications were sent to [facility]/pharmacy #[id number] -  [place], [place] - [address] [place] [address] - [phone]  - [phone] [address] [address]    Phone: [phone]   · HYDROcodone-acetaminophen 5-325 MG per tablet  · ondansetron ODT 4 MG disintegrating tablet  · sulfamethoxazole-trimethoprim 800-160 MG per tablet          Edgardo Acosta MD  01/14/22 2405

## 2022-02-20 ENCOUNTER — HOSPITAL ENCOUNTER (EMERGENCY)
Facility: HOSPITAL | Age: 22
Discharge: LEFT BEFORE DISCHARGE | End: 2022-02-20

## 2022-02-20 VITALS
BODY MASS INDEX: 15.7 KG/M2 | RESPIRATION RATE: 18 BRPM | DIASTOLIC BLOOD PRESSURE: 78 MMHG | HEIGHT: 67 IN | WEIGHT: 100 LBS | OXYGEN SATURATION: 99 % | HEART RATE: 107 BPM | SYSTOLIC BLOOD PRESSURE: 115 MMHG | TEMPERATURE: 97.9 F

## 2022-02-20 PROCEDURE — 99211 OFF/OP EST MAY X REQ PHY/QHP: CPT

## 2022-02-20 NOTE — ED NOTES
Patient presents to the ED with c/o high blood sugar and possible kidney infection. Patient was seen at an urgent care 3 days prior and told she may have a kidney infection. Patient's blood sugar noted to be 301 per person device; self reports as a type I diabetic.      Taina Chester, RN  02/20/22 1024

## 2022-03-27 ENCOUNTER — HOSPITAL ENCOUNTER (INPATIENT)
Facility: HOSPITAL | Age: 22
LOS: 2 days | Discharge: HOME | End: 2022-03-29
Attending: STUDENT IN AN ORGANIZED HEALTH CARE EDUCATION/TRAINING PROGRAM | Admitting: INTERNAL MEDICINE

## 2022-03-27 ENCOUNTER — APPOINTMENT (OUTPATIENT)
Dept: GENERAL RADIOLOGY | Facility: HOSPITAL | Age: 22
End: 2022-03-27

## 2022-03-27 DIAGNOSIS — E10.10 DIABETIC KETOACIDOSIS WITHOUT COMA ASSOCIATED WITH TYPE 1 DIABETES MELLITUS: Primary | ICD-10-CM

## 2022-03-27 LAB
ACETONE BLD QL: ABNORMAL
ALBUMIN SERPL-MCNC: 5 G/DL (ref 3.5–5.2)
ALBUMIN/GLOB SERPL: 1.5 G/DL
ALP SERPL-CCNC: 83 U/L (ref 39–117)
ALT SERPL W P-5'-P-CCNC: 13 U/L (ref 1–33)
AMPHET+METHAMPHET UR QL: NEGATIVE
AMPHETAMINES UR QL: NEGATIVE
ANION GAP SERPL CALCULATED.3IONS-SCNC: 15 MMOL/L (ref 5–15)
ANION GAP SERPL CALCULATED.3IONS-SCNC: 29 MMOL/L (ref 5–15)
ANION GAP SERPL CALCULATED.3IONS-SCNC: 32 MMOL/L (ref 5–15)
ARTERIAL PATENCY WRIST A: ABNORMAL
AST SERPL-CCNC: 15 U/L (ref 1–32)
ATMOSPHERIC PRESS: 752 MMHG
BARBITURATES UR QL SCN: NEGATIVE
BASE EXCESS BLDA CALC-SCNC: -17.4 MMOL/L (ref 0–2)
BASOPHILS # BLD AUTO: 0.15 10*3/MM3 (ref 0–0.2)
BASOPHILS NFR BLD AUTO: 0.9 % (ref 0–1.5)
BDY SITE: ABNORMAL
BENZODIAZ UR QL SCN: NEGATIVE
BILIRUB SERPL-MCNC: 0.5 MG/DL (ref 0–1.2)
BILIRUB UR QL STRIP: NEGATIVE
BUN SERPL-MCNC: 14 MG/DL (ref 6–20)
BUN SERPL-MCNC: 16 MG/DL (ref 6–20)
BUN SERPL-MCNC: 16 MG/DL (ref 6–20)
BUN/CREAT SERPL: 10.5 (ref 7–25)
BUN/CREAT SERPL: 13.9 (ref 7–25)
BUN/CREAT SERPL: 14.4 (ref 7–25)
BUPRENORPHINE SERPL-MCNC: NEGATIVE NG/ML
CALCIUM SPEC-SCNC: 10.2 MG/DL (ref 8.6–10.5)
CALCIUM SPEC-SCNC: 9.2 MG/DL (ref 8.6–10.5)
CALCIUM SPEC-SCNC: 9.6 MG/DL (ref 8.6–10.5)
CANNABINOIDS SERPL QL: POSITIVE
CHLORIDE SERPL-SCNC: 103 MMOL/L (ref 98–107)
CHLORIDE SERPL-SCNC: 90 MMOL/L (ref 98–107)
CHLORIDE SERPL-SCNC: 97 MMOL/L (ref 98–107)
CLARITY UR: CLEAR
CO2 SERPL-SCNC: 20 MMOL/L (ref 22–29)
CO2 SERPL-SCNC: 9 MMOL/L (ref 22–29)
CO2 SERPL-SCNC: 9 MMOL/L (ref 22–29)
COCAINE UR QL: NEGATIVE
COLOR UR: YELLOW
CREAT SERPL-MCNC: 1.11 MG/DL (ref 0.57–1)
CREAT SERPL-MCNC: 1.15 MG/DL (ref 0.57–1)
CREAT SERPL-MCNC: 1.33 MG/DL (ref 0.57–1)
D-LACTATE SERPL-SCNC: 2.2 MMOL/L (ref 0.5–2)
D-LACTATE SERPL-SCNC: 4.5 MMOL/L (ref 0.5–2)
DEPRECATED RDW RBC AUTO: 39 FL (ref 37–54)
EGFRCR SERPLBLD CKD-EPI 2021: 57.8 ML/MIN/1.73
EGFRCR SERPLBLD CKD-EPI 2021: 68.8 ML/MIN/1.73
EGFRCR SERPLBLD CKD-EPI 2021: 71.8 ML/MIN/1.73
EOSINOPHIL # BLD AUTO: 0.34 10*3/MM3 (ref 0–0.4)
EOSINOPHIL NFR BLD AUTO: 2 % (ref 0.3–6.2)
ERYTHROCYTE [DISTWIDTH] IN BLOOD BY AUTOMATED COUNT: 12 % (ref 12.3–15.4)
ETHANOL BLD-MCNC: <10 MG/DL (ref 0–10)
ETHANOL UR QL: <0.01 %
FLUAV RNA RESP QL NAA+PROBE: NOT DETECTED
FLUBV RNA RESP QL NAA+PROBE: NOT DETECTED
GLOBULIN UR ELPH-MCNC: 3.3 GM/DL
GLUCOSE BLDC GLUCOMTR-MCNC: 143 MG/DL (ref 70–130)
GLUCOSE BLDC GLUCOMTR-MCNC: 151 MG/DL (ref 70–130)
GLUCOSE BLDC GLUCOMTR-MCNC: 152 MG/DL (ref 70–130)
GLUCOSE BLDC GLUCOMTR-MCNC: 163 MG/DL (ref 70–130)
GLUCOSE BLDC GLUCOMTR-MCNC: 244 MG/DL (ref 70–130)
GLUCOSE BLDC GLUCOMTR-MCNC: 321 MG/DL (ref 70–130)
GLUCOSE BLDC GLUCOMTR-MCNC: 390 MG/DL (ref 70–130)
GLUCOSE SERPL-MCNC: 131 MG/DL (ref 65–99)
GLUCOSE SERPL-MCNC: 540 MG/DL (ref 65–99)
GLUCOSE SERPL-MCNC: 735 MG/DL (ref 65–99)
GLUCOSE UR STRIP-MCNC: ABNORMAL MG/DL
HBA1C MFR BLD: 11.1 % (ref 4.8–5.6)
HCG SERPL QL: NEGATIVE
HCO3 BLDA-SCNC: 8.8 MMOL/L (ref 20–26)
HCT VFR BLD AUTO: 39.5 % (ref 34–46.6)
HGB BLD-MCNC: 13.4 G/DL (ref 12–15.9)
HGB UR QL STRIP.AUTO: NEGATIVE
IMM GRANULOCYTES # BLD AUTO: 0.46 10*3/MM3 (ref 0–0.05)
IMM GRANULOCYTES NFR BLD AUTO: 2.7 % (ref 0–0.5)
KETONES UR QL STRIP: ABNORMAL
LEUKOCYTE ESTERASE UR QL STRIP.AUTO: NEGATIVE
LYMPHOCYTES # BLD AUTO: 5.09 10*3/MM3 (ref 0.7–3.1)
LYMPHOCYTES NFR BLD AUTO: 30.3 % (ref 19.6–45.3)
Lab: ABNORMAL
MAGNESIUM SERPL-MCNC: 1.6 MG/DL (ref 1.6–2.6)
MAGNESIUM SERPL-MCNC: 1.6 MG/DL (ref 1.6–2.6)
MAGNESIUM SERPL-MCNC: 1.8 MG/DL (ref 1.6–2.6)
MCH RBC QN AUTO: 30.2 PG (ref 26.6–33)
MCHC RBC AUTO-ENTMCNC: 33.9 G/DL (ref 31.5–35.7)
MCV RBC AUTO: 89 FL (ref 79–97)
METHADONE UR QL SCN: NEGATIVE
MODALITY: ABNORMAL
MONOCYTES # BLD AUTO: 0.45 10*3/MM3 (ref 0.1–0.9)
MONOCYTES NFR BLD AUTO: 2.7 % (ref 5–12)
NEUTROPHILS NFR BLD AUTO: 10.32 10*3/MM3 (ref 1.7–7)
NEUTROPHILS NFR BLD AUTO: 61.4 % (ref 42.7–76)
NITRITE UR QL STRIP: NEGATIVE
NRBC BLD AUTO-RTO: 0 /100 WBC (ref 0–0.2)
OPIATES UR QL: NEGATIVE
OSMOLALITY SERPL: 317 MOSM/KG (ref 275–295)
OXYCODONE UR QL SCN: NEGATIVE
PCO2 BLDA: 22.5 MM HG (ref 35–45)
PCP UR QL SCN: NEGATIVE
PH BLDA: 7.2 PH UNITS (ref 7.35–7.45)
PH UR STRIP.AUTO: <=5 [PH] (ref 5–9)
PHOSPHATE SERPL-MCNC: 3.2 MG/DL (ref 2.5–4.5)
PHOSPHATE SERPL-MCNC: 4.3 MG/DL (ref 2.5–4.5)
PHOSPHATE SERPL-MCNC: 4.4 MG/DL (ref 2.5–4.5)
PLATELET # BLD AUTO: 422 10*3/MM3 (ref 140–450)
PMV BLD AUTO: 10.7 FL (ref 6–12)
PO2 BLDA: 134 MM HG (ref 83–108)
POTASSIUM SERPL-SCNC: 4.5 MMOL/L (ref 3.5–5.2)
POTASSIUM SERPL-SCNC: 4.6 MMOL/L (ref 3.5–5.2)
POTASSIUM SERPL-SCNC: 5.1 MMOL/L (ref 3.5–5.2)
PROPOXYPH UR QL: NEGATIVE
PROT SERPL-MCNC: 8.3 G/DL (ref 6–8.5)
PROT UR QL STRIP: NEGATIVE
RBC # BLD AUTO: 4.44 10*6/MM3 (ref 3.77–5.28)
SAO2 % BLDCOA: 99.2 % (ref 94–99)
SARS-COV-2 RNA RESP QL NAA+PROBE: NOT DETECTED
SODIUM SERPL-SCNC: 131 MMOL/L (ref 136–145)
SODIUM SERPL-SCNC: 135 MMOL/L (ref 136–145)
SODIUM SERPL-SCNC: 138 MMOL/L (ref 136–145)
SP GR UR STRIP: 1.02 (ref 1–1.03)
TRICYCLICS UR QL SCN: NEGATIVE
UROBILINOGEN UR QL STRIP: ABNORMAL
VENTILATOR MODE: ABNORMAL
WBC NRBC COR # BLD: 16.81 10*3/MM3 (ref 3.4–10.8)

## 2022-03-27 PROCEDURE — 83930 ASSAY OF BLOOD OSMOLALITY: CPT | Performed by: INTERNAL MEDICINE

## 2022-03-27 PROCEDURE — 83605 ASSAY OF LACTIC ACID: CPT | Performed by: STUDENT IN AN ORGANIZED HEALTH CARE EDUCATION/TRAINING PROGRAM

## 2022-03-27 PROCEDURE — 82009 KETONE BODYS QUAL: CPT | Performed by: STUDENT IN AN ORGANIZED HEALTH CARE EDUCATION/TRAINING PROGRAM

## 2022-03-27 PROCEDURE — 99284 EMERGENCY DEPT VISIT MOD MDM: CPT

## 2022-03-27 PROCEDURE — 83036 HEMOGLOBIN GLYCOSYLATED A1C: CPT | Performed by: INTERNAL MEDICINE

## 2022-03-27 PROCEDURE — 84703 CHORIONIC GONADOTROPIN ASSAY: CPT | Performed by: STUDENT IN AN ORGANIZED HEALTH CARE EDUCATION/TRAINING PROGRAM

## 2022-03-27 PROCEDURE — 87636 SARSCOV2 & INF A&B AMP PRB: CPT | Performed by: STUDENT IN AN ORGANIZED HEALTH CARE EDUCATION/TRAINING PROGRAM

## 2022-03-27 PROCEDURE — 83735 ASSAY OF MAGNESIUM: CPT | Performed by: STUDENT IN AN ORGANIZED HEALTH CARE EDUCATION/TRAINING PROGRAM

## 2022-03-27 PROCEDURE — 71045 X-RAY EXAM CHEST 1 VIEW: CPT

## 2022-03-27 PROCEDURE — 25010000002 ONDANSETRON PER 1 MG: Performed by: STUDENT IN AN ORGANIZED HEALTH CARE EDUCATION/TRAINING PROGRAM

## 2022-03-27 PROCEDURE — 0 INSULIN REGULAR HUMAN PER 5 UNITS: Performed by: INTERNAL MEDICINE

## 2022-03-27 PROCEDURE — 82803 BLOOD GASES ANY COMBINATION: CPT

## 2022-03-27 PROCEDURE — 83735 ASSAY OF MAGNESIUM: CPT | Performed by: INTERNAL MEDICINE

## 2022-03-27 PROCEDURE — 80306 DRUG TEST PRSMV INSTRMNT: CPT | Performed by: STUDENT IN AN ORGANIZED HEALTH CARE EDUCATION/TRAINING PROGRAM

## 2022-03-27 PROCEDURE — 84100 ASSAY OF PHOSPHORUS: CPT | Performed by: INTERNAL MEDICINE

## 2022-03-27 PROCEDURE — 25010000002 MORPHINE PER 10 MG: Performed by: STUDENT IN AN ORGANIZED HEALTH CARE EDUCATION/TRAINING PROGRAM

## 2022-03-27 PROCEDURE — 25010000002 MORPHINE PER 10 MG: Performed by: INTERNAL MEDICINE

## 2022-03-27 PROCEDURE — 82077 ASSAY SPEC XCP UR&BREATH IA: CPT | Performed by: STUDENT IN AN ORGANIZED HEALTH CARE EDUCATION/TRAINING PROGRAM

## 2022-03-27 PROCEDURE — 63710000001 INSULIN DETEMIR PER 5 UNITS: Performed by: INTERNAL MEDICINE

## 2022-03-27 PROCEDURE — 80053 COMPREHEN METABOLIC PANEL: CPT | Performed by: STUDENT IN AN ORGANIZED HEALTH CARE EDUCATION/TRAINING PROGRAM

## 2022-03-27 PROCEDURE — 36415 COLL VENOUS BLD VENIPUNCTURE: CPT | Performed by: STUDENT IN AN ORGANIZED HEALTH CARE EDUCATION/TRAINING PROGRAM

## 2022-03-27 PROCEDURE — 82962 GLUCOSE BLOOD TEST: CPT

## 2022-03-27 PROCEDURE — 81003 URINALYSIS AUTO W/O SCOPE: CPT | Performed by: STUDENT IN AN ORGANIZED HEALTH CARE EDUCATION/TRAINING PROGRAM

## 2022-03-27 PROCEDURE — 0 POTASSIUM CHLORIDE PER 2 MEQ: Performed by: INTERNAL MEDICINE

## 2022-03-27 PROCEDURE — 87040 BLOOD CULTURE FOR BACTERIA: CPT | Performed by: STUDENT IN AN ORGANIZED HEALTH CARE EDUCATION/TRAINING PROGRAM

## 2022-03-27 PROCEDURE — 85025 COMPLETE CBC W/AUTO DIFF WBC: CPT | Performed by: STUDENT IN AN ORGANIZED HEALTH CARE EDUCATION/TRAINING PROGRAM

## 2022-03-27 RX ORDER — ONDANSETRON 2 MG/ML
4 INJECTION INTRAMUSCULAR; INTRAVENOUS EVERY 6 HOURS PRN
Status: DISCONTINUED | OUTPATIENT
Start: 2022-03-27 | End: 2022-03-29 | Stop reason: HOSPADM

## 2022-03-27 RX ORDER — SODIUM CHLORIDE 450 MG/100ML
250 INJECTION, SOLUTION INTRAVENOUS CONTINUOUS PRN
Status: DISCONTINUED | OUTPATIENT
Start: 2022-03-27 | End: 2022-03-28

## 2022-03-27 RX ORDER — ACETAMINOPHEN 650 MG/1
650 SUPPOSITORY RECTAL EVERY 4 HOURS PRN
Status: DISCONTINUED | OUTPATIENT
Start: 2022-03-27 | End: 2022-03-29 | Stop reason: HOSPADM

## 2022-03-27 RX ORDER — SODIUM CHLORIDE 0.9 % (FLUSH) 0.9 %
10 SYRINGE (ML) INJECTION ONCE AS NEEDED
Status: DISCONTINUED | OUTPATIENT
Start: 2022-03-27 | End: 2022-03-28

## 2022-03-27 RX ORDER — DICYCLOMINE HCL 20 MG
20 TABLET ORAL EVERY 6 HOURS PRN
Status: DISCONTINUED | OUTPATIENT
Start: 2022-03-27 | End: 2022-03-29 | Stop reason: HOSPADM

## 2022-03-27 RX ORDER — SODIUM CHLORIDE 0.9 % (FLUSH) 0.9 %
10 SYRINGE (ML) INJECTION AS NEEDED
Status: DISCONTINUED | OUTPATIENT
Start: 2022-03-27 | End: 2022-03-29 | Stop reason: HOSPADM

## 2022-03-27 RX ORDER — PROMETHAZINE HYDROCHLORIDE 12.5 MG/1
12.5 TABLET ORAL EVERY 6 HOURS PRN
Status: DISCONTINUED | OUTPATIENT
Start: 2022-03-27 | End: 2022-03-29 | Stop reason: HOSPADM

## 2022-03-27 RX ORDER — SODIUM CHLORIDE AND POTASSIUM CHLORIDE 150; 900 MG/100ML; MG/100ML
250 INJECTION, SOLUTION INTRAVENOUS CONTINUOUS PRN
Status: DISCONTINUED | OUTPATIENT
Start: 2022-03-27 | End: 2022-03-28

## 2022-03-27 RX ORDER — HYDROXYZINE HCL 10 MG/5 ML
10 SOLUTION, ORAL ORAL 3 TIMES DAILY PRN
Status: DISCONTINUED | OUTPATIENT
Start: 2022-03-27 | End: 2022-03-29 | Stop reason: HOSPADM

## 2022-03-27 RX ORDER — SODIUM CHLORIDE AND POTASSIUM CHLORIDE 150; 450 MG/100ML; MG/100ML
250 INJECTION, SOLUTION INTRAVENOUS CONTINUOUS PRN
Status: DISCONTINUED | OUTPATIENT
Start: 2022-03-27 | End: 2022-03-28

## 2022-03-27 RX ORDER — DEXTROSE, SODIUM CHLORIDE, AND POTASSIUM CHLORIDE 5; .45; .15 G/100ML; G/100ML; G/100ML
150 INJECTION INTRAVENOUS CONTINUOUS PRN
Status: DISCONTINUED | OUTPATIENT
Start: 2022-03-27 | End: 2022-03-28

## 2022-03-27 RX ORDER — ARIPIPRAZOLE 5 MG/1
5 TABLET ORAL DAILY
Status: DISCONTINUED | OUTPATIENT
Start: 2022-03-28 | End: 2022-03-28

## 2022-03-27 RX ORDER — ACETAMINOPHEN 325 MG/1
650 TABLET ORAL EVERY 4 HOURS PRN
Status: DISCONTINUED | OUTPATIENT
Start: 2022-03-27 | End: 2022-03-29 | Stop reason: HOSPADM

## 2022-03-27 RX ORDER — SODIUM CHLORIDE AND POTASSIUM CHLORIDE 300; 900 MG/100ML; MG/100ML
250 INJECTION, SOLUTION INTRAVENOUS CONTINUOUS PRN
Status: DISCONTINUED | OUTPATIENT
Start: 2022-03-27 | End: 2022-03-28

## 2022-03-27 RX ORDER — DEXTROSE MONOHYDRATE 25 G/50ML
12.5 INJECTION, SOLUTION INTRAVENOUS AS NEEDED
Status: DISCONTINUED | OUTPATIENT
Start: 2022-03-27 | End: 2022-03-28

## 2022-03-27 RX ORDER — POTASSIUM CHLORIDE, DEXTROSE MONOHYDRATE AND SODIUM CHLORIDE 300; 5; 900 MG/100ML; G/100ML; MG/100ML
150 INJECTION, SOLUTION INTRAVENOUS CONTINUOUS PRN
Status: DISCONTINUED | OUTPATIENT
Start: 2022-03-27 | End: 2022-03-28

## 2022-03-27 RX ORDER — INSULIN DETEMIR 100 [IU]/ML
22 INJECTION, SOLUTION SUBCUTANEOUS DAILY
COMMUNITY
Start: 2022-03-08 | End: 2022-06-27

## 2022-03-27 RX ORDER — DEXTROSE, SODIUM CHLORIDE, AND POTASSIUM CHLORIDE 5; .9; .15 G/100ML; G/100ML; G/100ML
150 INJECTION INTRAVENOUS CONTINUOUS PRN
Status: DISCONTINUED | OUTPATIENT
Start: 2022-03-27 | End: 2022-03-28

## 2022-03-27 RX ORDER — SODIUM CHLORIDE 0.9 % (FLUSH) 0.9 %
10 SYRINGE (ML) INJECTION EVERY 12 HOURS SCHEDULED
Status: DISCONTINUED | OUTPATIENT
Start: 2022-03-27 | End: 2022-03-29 | Stop reason: HOSPADM

## 2022-03-27 RX ORDER — SODIUM CHLORIDE 9 MG/ML
250 INJECTION, SOLUTION INTRAVENOUS CONTINUOUS PRN
Status: DISCONTINUED | OUTPATIENT
Start: 2022-03-27 | End: 2022-03-28

## 2022-03-27 RX ORDER — PROCHLORPERAZINE EDISYLATE 5 MG/ML
5 INJECTION INTRAMUSCULAR; INTRAVENOUS EVERY 6 HOURS PRN
Status: DISCONTINUED | OUTPATIENT
Start: 2022-03-27 | End: 2022-03-29 | Stop reason: HOSPADM

## 2022-03-27 RX ORDER — DEXTROSE AND SODIUM CHLORIDE 5; .9 G/100ML; G/100ML
150 INJECTION, SOLUTION INTRAVENOUS CONTINUOUS PRN
Status: DISCONTINUED | OUTPATIENT
Start: 2022-03-27 | End: 2022-03-28

## 2022-03-27 RX ORDER — DEXTROSE, SODIUM CHLORIDE, AND POTASSIUM CHLORIDE 5; .45; .3 G/100ML; G/100ML; G/100ML
150 INJECTION INTRAVENOUS CONTINUOUS PRN
Status: DISCONTINUED | OUTPATIENT
Start: 2022-03-27 | End: 2022-03-28

## 2022-03-27 RX ORDER — HYDROCODONE BITARTRATE AND ACETAMINOPHEN 5; 325 MG/1; MG/1
1 TABLET ORAL EVERY 6 HOURS PRN
Status: DISCONTINUED | OUTPATIENT
Start: 2022-03-27 | End: 2022-03-28

## 2022-03-27 RX ORDER — ACETAMINOPHEN 160 MG/5ML
650 SOLUTION ORAL EVERY 4 HOURS PRN
Status: DISCONTINUED | OUTPATIENT
Start: 2022-03-27 | End: 2022-03-28 | Stop reason: SDUPTHER

## 2022-03-27 RX ORDER — ONDANSETRON 2 MG/ML
4 INJECTION INTRAMUSCULAR; INTRAVENOUS ONCE
Status: COMPLETED | OUTPATIENT
Start: 2022-03-27 | End: 2022-03-27

## 2022-03-27 RX ORDER — DEXTROSE AND SODIUM CHLORIDE 5; .45 G/100ML; G/100ML
150 INJECTION, SOLUTION INTRAVENOUS CONTINUOUS PRN
Status: DISCONTINUED | OUTPATIENT
Start: 2022-03-27 | End: 2022-03-28

## 2022-03-27 RX ORDER — SODIUM CHLORIDE 9 MG/ML
10 INJECTION, SOLUTION INTRAVENOUS CONTINUOUS PRN
Status: DISCONTINUED | OUTPATIENT
Start: 2022-03-27 | End: 2022-03-28

## 2022-03-27 RX ADMIN — POTASSIUM CHLORIDE, DEXTROSE MONOHYDRATE AND SODIUM CHLORIDE 150 ML/HR: 150; 5; 450 INJECTION, SOLUTION INTRAVENOUS at 18:32

## 2022-03-27 RX ADMIN — Medication 10 ML: at 14:32

## 2022-03-27 RX ADMIN — MORPHINE SULFATE 4 MG: 4 INJECTION, SOLUTION INTRAMUSCULAR; INTRAVENOUS at 11:47

## 2022-03-27 RX ADMIN — MORPHINE SULFATE 4 MG: 4 INJECTION, SOLUTION INTRAMUSCULAR; INTRAVENOUS at 19:44

## 2022-03-27 RX ADMIN — ONDANSETRON 4 MG: 2 INJECTION INTRAMUSCULAR; INTRAVENOUS at 11:44

## 2022-03-27 RX ADMIN — SODIUM CHLORIDE 4 UNITS/HR: 900 INJECTION INTRAVENOUS at 13:18

## 2022-03-27 RX ADMIN — INSULIN DETEMIR 10 UNITS: 100 INJECTION, SOLUTION SUBCUTANEOUS at 21:33

## 2022-03-27 RX ADMIN — POTASSIUM CHLORIDE AND SODIUM CHLORIDE 250 ML/HR: 450; 150 INJECTION, SOLUTION INTRAVENOUS at 15:33

## 2022-03-27 RX ADMIN — MORPHINE SULFATE 4 MG: 4 INJECTION, SOLUTION INTRAMUSCULAR; INTRAVENOUS at 15:12

## 2022-03-27 RX ADMIN — SODIUM CHLORIDE, POTASSIUM CHLORIDE, SODIUM LACTATE AND CALCIUM CHLORIDE 1000 ML: 600; 310; 30; 20 INJECTION, SOLUTION INTRAVENOUS at 11:43

## 2022-03-27 RX ADMIN — SODIUM CHLORIDE, POTASSIUM CHLORIDE, SODIUM LACTATE AND CALCIUM CHLORIDE 1362 ML: 600; 310; 30; 20 INJECTION, SOLUTION INTRAVENOUS at 13:26

## 2022-03-28 ENCOUNTER — APPOINTMENT (OUTPATIENT)
Dept: ULTRASOUND IMAGING | Facility: HOSPITAL | Age: 22
End: 2022-03-28

## 2022-03-28 ENCOUNTER — APPOINTMENT (OUTPATIENT)
Dept: INTERVENTIONAL RADIOLOGY/VASCULAR | Facility: HOSPITAL | Age: 22
End: 2022-03-28

## 2022-03-28 LAB
ANION GAP SERPL CALCULATED.3IONS-SCNC: 10 MMOL/L (ref 5–15)
ANION GAP SERPL CALCULATED.3IONS-SCNC: 13 MMOL/L (ref 5–15)
ANION GAP SERPL CALCULATED.3IONS-SCNC: 9 MMOL/L (ref 5–15)
BASOPHILS # BLD AUTO: 0.07 10*3/MM3 (ref 0–0.2)
BASOPHILS NFR BLD AUTO: 0.5 % (ref 0–1.5)
BUN SERPL-MCNC: 14 MG/DL (ref 6–20)
BUN SERPL-MCNC: 14 MG/DL (ref 6–20)
BUN SERPL-MCNC: 8 MG/DL (ref 6–20)
BUN/CREAT SERPL: 14.4 (ref 7–25)
BUN/CREAT SERPL: 15.1 (ref 7–25)
BUN/CREAT SERPL: 8.8 (ref 7–25)
CALCIUM SPEC-SCNC: 8.3 MG/DL (ref 8.6–10.5)
CALCIUM SPEC-SCNC: 8.5 MG/DL (ref 8.6–10.5)
CALCIUM SPEC-SCNC: 9 MG/DL (ref 8.6–10.5)
CHLORIDE SERPL-SCNC: 100 MMOL/L (ref 98–107)
CHLORIDE SERPL-SCNC: 100 MMOL/L (ref 98–107)
CHLORIDE SERPL-SCNC: 102 MMOL/L (ref 98–107)
CO2 SERPL-SCNC: 20 MMOL/L (ref 22–29)
CO2 SERPL-SCNC: 22 MMOL/L (ref 22–29)
CO2 SERPL-SCNC: 23 MMOL/L (ref 22–29)
CREAT SERPL-MCNC: 0.91 MG/DL (ref 0.57–1)
CREAT SERPL-MCNC: 0.93 MG/DL (ref 0.57–1)
CREAT SERPL-MCNC: 0.97 MG/DL (ref 0.57–1)
DEPRECATED RDW RBC AUTO: 38 FL (ref 37–54)
EGFRCR SERPLBLD CKD-EPI 2021: 84.4 ML/MIN/1.73
EGFRCR SERPLBLD CKD-EPI 2021: 88.8 ML/MIN/1.73
EGFRCR SERPLBLD CKD-EPI 2021: 91.1 ML/MIN/1.73
EOSINOPHIL # BLD AUTO: 0.14 10*3/MM3 (ref 0–0.4)
EOSINOPHIL NFR BLD AUTO: 1 % (ref 0.3–6.2)
ERYTHROCYTE [DISTWIDTH] IN BLOOD BY AUTOMATED COUNT: 12.3 % (ref 12.3–15.4)
GLUCOSE BLDC GLUCOMTR-MCNC: 115 MG/DL (ref 70–130)
GLUCOSE BLDC GLUCOMTR-MCNC: 121 MG/DL (ref 70–130)
GLUCOSE BLDC GLUCOMTR-MCNC: 122 MG/DL (ref 70–130)
GLUCOSE BLDC GLUCOMTR-MCNC: 122 MG/DL (ref 70–130)
GLUCOSE BLDC GLUCOMTR-MCNC: 148 MG/DL (ref 70–130)
GLUCOSE BLDC GLUCOMTR-MCNC: 153 MG/DL (ref 70–130)
GLUCOSE BLDC GLUCOMTR-MCNC: 156 MG/DL (ref 70–130)
GLUCOSE BLDC GLUCOMTR-MCNC: 159 MG/DL (ref 70–130)
GLUCOSE BLDC GLUCOMTR-MCNC: 169 MG/DL (ref 70–130)
GLUCOSE BLDC GLUCOMTR-MCNC: 176 MG/DL (ref 70–130)
GLUCOSE BLDC GLUCOMTR-MCNC: 218 MG/DL (ref 70–130)
GLUCOSE BLDC GLUCOMTR-MCNC: 223 MG/DL (ref 70–130)
GLUCOSE BLDC GLUCOMTR-MCNC: 446 MG/DL (ref 70–130)
GLUCOSE BLDC GLUCOMTR-MCNC: 533 MG/DL (ref 70–130)
GLUCOSE BLDC GLUCOMTR-MCNC: 66 MG/DL (ref 70–130)
GLUCOSE SERPL-MCNC: 125 MG/DL (ref 65–99)
GLUCOSE SERPL-MCNC: 170 MG/DL (ref 65–99)
GLUCOSE SERPL-MCNC: 206 MG/DL (ref 65–99)
HCT VFR BLD AUTO: 29.5 % (ref 34–46.6)
HGB BLD-MCNC: 10.1 G/DL (ref 12–15.9)
IMM GRANULOCYTES # BLD AUTO: 0.1 10*3/MM3 (ref 0–0.05)
IMM GRANULOCYTES NFR BLD AUTO: 0.7 % (ref 0–0.5)
LYMPHOCYTES # BLD AUTO: 3.74 10*3/MM3 (ref 0.7–3.1)
LYMPHOCYTES NFR BLD AUTO: 26.2 % (ref 19.6–45.3)
MAGNESIUM SERPL-MCNC: 1.5 MG/DL (ref 1.6–2.6)
MAGNESIUM SERPL-MCNC: 2.7 MG/DL (ref 1.6–2.6)
MCH RBC QN AUTO: 29.3 PG (ref 26.6–33)
MCHC RBC AUTO-ENTMCNC: 34.2 G/DL (ref 31.5–35.7)
MCV RBC AUTO: 85.5 FL (ref 79–97)
MONOCYTES # BLD AUTO: 0.73 10*3/MM3 (ref 0.1–0.9)
MONOCYTES NFR BLD AUTO: 5.1 % (ref 5–12)
NEUTROPHILS NFR BLD AUTO: 66.5 % (ref 42.7–76)
NEUTROPHILS NFR BLD AUTO: 9.47 10*3/MM3 (ref 1.7–7)
NRBC BLD AUTO-RTO: 0 /100 WBC (ref 0–0.2)
PHOSPHATE SERPL-MCNC: 3 MG/DL (ref 2.5–4.5)
PHOSPHATE SERPL-MCNC: 3.7 MG/DL (ref 2.5–4.5)
PLATELET # BLD AUTO: 300 10*3/MM3 (ref 140–450)
PMV BLD AUTO: 10.9 FL (ref 6–12)
POTASSIUM SERPL-SCNC: 4.1 MMOL/L (ref 3.5–5.2)
POTASSIUM SERPL-SCNC: 4.3 MMOL/L (ref 3.5–5.2)
POTASSIUM SERPL-SCNC: 4.3 MMOL/L (ref 3.5–5.2)
RBC # BLD AUTO: 3.45 10*6/MM3 (ref 3.77–5.28)
SODIUM SERPL-SCNC: 132 MMOL/L (ref 136–145)
SODIUM SERPL-SCNC: 133 MMOL/L (ref 136–145)
SODIUM SERPL-SCNC: 134 MMOL/L (ref 136–145)
WBC NRBC COR # BLD: 14.25 10*3/MM3 (ref 3.4–10.8)

## 2022-03-28 PROCEDURE — 25010000002 PROCHLORPERAZINE 10 MG/2ML SOLUTION: Performed by: INTERNAL MEDICINE

## 2022-03-28 PROCEDURE — 25010000002 MAGNESIUM SULFATE 2 GM/50ML SOLUTION

## 2022-03-28 PROCEDURE — 80048 BASIC METABOLIC PNL TOTAL CA: CPT

## 2022-03-28 PROCEDURE — 82962 GLUCOSE BLOOD TEST: CPT

## 2022-03-28 PROCEDURE — 25010000002 ONDANSETRON PER 1 MG: Performed by: INTERNAL MEDICINE

## 2022-03-28 PROCEDURE — 63710000001 INSULIN DETEMIR PER 5 UNITS: Performed by: HOSPITALIST

## 2022-03-28 PROCEDURE — 84100 ASSAY OF PHOSPHORUS: CPT | Performed by: INTERNAL MEDICINE

## 2022-03-28 PROCEDURE — 80048 BASIC METABOLIC PNL TOTAL CA: CPT | Performed by: INTERNAL MEDICINE

## 2022-03-28 PROCEDURE — 83735 ASSAY OF MAGNESIUM: CPT | Performed by: INTERNAL MEDICINE

## 2022-03-28 PROCEDURE — 85025 COMPLETE CBC W/AUTO DIFF WBC: CPT | Performed by: INTERNAL MEDICINE

## 2022-03-28 PROCEDURE — 25010000002 MORPHINE PER 10 MG: Performed by: INTERNAL MEDICINE

## 2022-03-28 RX ORDER — SODIUM CHLORIDE, SODIUM LACTATE, POTASSIUM CHLORIDE, CALCIUM CHLORIDE 600; 310; 30; 20 MG/100ML; MG/100ML; MG/100ML; MG/100ML
100 INJECTION, SOLUTION INTRAVENOUS CONTINUOUS
Status: DISCONTINUED | OUTPATIENT
Start: 2022-03-28 | End: 2022-03-29 | Stop reason: HOSPADM

## 2022-03-28 RX ORDER — NICOTINE POLACRILEX 4 MG
15 LOZENGE BUCCAL
Status: DISCONTINUED | OUTPATIENT
Start: 2022-03-28 | End: 2022-03-29 | Stop reason: HOSPADM

## 2022-03-28 RX ORDER — DEXTROSE MONOHYDRATE 25 G/50ML
25 INJECTION, SOLUTION INTRAVENOUS
Status: DISCONTINUED | OUTPATIENT
Start: 2022-03-28 | End: 2022-03-29 | Stop reason: HOSPADM

## 2022-03-28 RX ORDER — MAGNESIUM SULFATE HEPTAHYDRATE 40 MG/ML
4 INJECTION, SOLUTION INTRAVENOUS AS NEEDED
Status: DISCONTINUED | OUTPATIENT
Start: 2022-03-28 | End: 2022-03-29 | Stop reason: HOSPADM

## 2022-03-28 RX ORDER — MAGNESIUM SULFATE HEPTAHYDRATE 40 MG/ML
2 INJECTION, SOLUTION INTRAVENOUS AS NEEDED
Status: DISCONTINUED | OUTPATIENT
Start: 2022-03-28 | End: 2022-03-29 | Stop reason: HOSPADM

## 2022-03-28 RX ADMIN — MORPHINE SULFATE 4 MG: 4 INJECTION, SOLUTION INTRAMUSCULAR; INTRAVENOUS at 01:04

## 2022-03-28 RX ADMIN — ONDANSETRON 4 MG: 2 INJECTION INTRAMUSCULAR; INTRAVENOUS at 08:48

## 2022-03-28 RX ADMIN — PHENOL 1 SPRAY: 1.5 LIQUID ORAL at 08:44

## 2022-03-28 RX ADMIN — MAGNESIUM SULFATE HEPTAHYDRATE 2 G: 2 INJECTION, SOLUTION INTRAVENOUS at 01:53

## 2022-03-28 RX ADMIN — Medication 10 ML: at 08:51

## 2022-03-28 RX ADMIN — PROCHLORPERAZINE EDISYLATE 5 MG: 5 INJECTION INTRAMUSCULAR; INTRAVENOUS at 12:45

## 2022-03-28 RX ADMIN — MORPHINE SULFATE 4 MG: 4 INJECTION, SOLUTION INTRAMUSCULAR; INTRAVENOUS at 11:42

## 2022-03-28 RX ADMIN — MAGNESIUM SULFATE HEPTAHYDRATE 2 G: 2 INJECTION, SOLUTION INTRAVENOUS at 05:23

## 2022-03-28 RX ADMIN — HYDROXYZINE HYDROCHLORIDE 10 MG: 10 SYRUP ORAL at 09:27

## 2022-03-28 RX ADMIN — SODIUM CHLORIDE, POTASSIUM CHLORIDE, SODIUM LACTATE AND CALCIUM CHLORIDE 100 ML/HR: 600; 310; 30; 20 INJECTION, SOLUTION INTRAVENOUS at 21:55

## 2022-03-28 RX ADMIN — POTASSIUM CHLORIDE, DEXTROSE MONOHYDRATE AND SODIUM CHLORIDE 150 ML/HR: 150; 5; 450 INJECTION, SOLUTION INTRAVENOUS at 01:03

## 2022-03-28 RX ADMIN — MORPHINE SULFATE 4 MG: 4 INJECTION, SOLUTION INTRAMUSCULAR; INTRAVENOUS at 16:47

## 2022-03-28 RX ADMIN — SODIUM CHLORIDE, POTASSIUM CHLORIDE, SODIUM LACTATE AND CALCIUM CHLORIDE 100 ML/HR: 600; 310; 30; 20 INJECTION, SOLUTION INTRAVENOUS at 06:38

## 2022-03-28 RX ADMIN — MORPHINE SULFATE 4 MG: 4 INJECTION, SOLUTION INTRAMUSCULAR; INTRAVENOUS at 21:35

## 2022-03-28 RX ADMIN — MAGNESIUM SULFATE HEPTAHYDRATE 2 G: 2 INJECTION, SOLUTION INTRAVENOUS at 03:28

## 2022-03-28 RX ADMIN — MORPHINE SULFATE 4 MG: 4 INJECTION, SOLUTION INTRAMUSCULAR; INTRAVENOUS at 06:48

## 2022-03-28 RX ADMIN — ONDANSETRON 4 MG: 2 INJECTION INTRAMUSCULAR; INTRAVENOUS at 21:35

## 2022-03-28 RX ADMIN — INSULIN DETEMIR 20 UNITS: 100 INJECTION, SOLUTION SUBCUTANEOUS at 21:21

## 2022-03-28 RX ADMIN — DEXTROSE MONOHYDRATE 25 G: 25 INJECTION, SOLUTION INTRAVENOUS at 07:23

## 2022-03-29 ENCOUNTER — READMISSION MANAGEMENT (OUTPATIENT)
Dept: CALL CENTER | Facility: HOSPITAL | Age: 22
End: 2022-03-29

## 2022-03-29 VITALS
DIASTOLIC BLOOD PRESSURE: 76 MMHG | BODY MASS INDEX: 17.3 KG/M2 | TEMPERATURE: 97.2 F | RESPIRATION RATE: 16 BRPM | WEIGHT: 110.2 LBS | HEIGHT: 67 IN | SYSTOLIC BLOOD PRESSURE: 119 MMHG | HEART RATE: 77 BPM | OXYGEN SATURATION: 98 %

## 2022-03-29 LAB
ANION GAP SERPL CALCULATED.3IONS-SCNC: 8 MMOL/L (ref 5–15)
BASOPHILS # BLD AUTO: 0.05 10*3/MM3 (ref 0–0.2)
BASOPHILS NFR BLD AUTO: 0.6 % (ref 0–1.5)
BUN SERPL-MCNC: 5 MG/DL (ref 6–20)
BUN/CREAT SERPL: 5.8 (ref 7–25)
CALCIUM SPEC-SCNC: 8.3 MG/DL (ref 8.6–10.5)
CHLORIDE SERPL-SCNC: 100 MMOL/L (ref 98–107)
CO2 SERPL-SCNC: 26 MMOL/L (ref 22–29)
CREAT SERPL-MCNC: 0.86 MG/DL (ref 0.57–1)
DEPRECATED RDW RBC AUTO: 38.7 FL (ref 37–54)
EGFRCR SERPLBLD CKD-EPI 2021: 97.5 ML/MIN/1.73
EOSINOPHIL # BLD AUTO: 0.22 10*3/MM3 (ref 0–0.4)
EOSINOPHIL NFR BLD AUTO: 2.7 % (ref 0.3–6.2)
ERYTHROCYTE [DISTWIDTH] IN BLOOD BY AUTOMATED COUNT: 12.4 % (ref 12.3–15.4)
GLUCOSE BLDC GLUCOMTR-MCNC: 105 MG/DL (ref 70–130)
GLUCOSE BLDC GLUCOMTR-MCNC: 193 MG/DL (ref 70–130)
GLUCOSE BLDC GLUCOMTR-MCNC: 76 MG/DL (ref 70–130)
GLUCOSE SERPL-MCNC: 97 MG/DL (ref 65–99)
HCT VFR BLD AUTO: 29.7 % (ref 34–46.6)
HGB BLD-MCNC: 10.4 G/DL (ref 12–15.9)
IMM GRANULOCYTES # BLD AUTO: 0.05 10*3/MM3 (ref 0–0.05)
IMM GRANULOCYTES NFR BLD AUTO: 0.6 % (ref 0–0.5)
LYMPHOCYTES # BLD AUTO: 2.61 10*3/MM3 (ref 0.7–3.1)
LYMPHOCYTES NFR BLD AUTO: 32.5 % (ref 19.6–45.3)
MCH RBC QN AUTO: 30.1 PG (ref 26.6–33)
MCHC RBC AUTO-ENTMCNC: 35 G/DL (ref 31.5–35.7)
MCV RBC AUTO: 85.8 FL (ref 79–97)
MONOCYTES # BLD AUTO: 0.48 10*3/MM3 (ref 0.1–0.9)
MONOCYTES NFR BLD AUTO: 6 % (ref 5–12)
NEUTROPHILS NFR BLD AUTO: 4.62 10*3/MM3 (ref 1.7–7)
NEUTROPHILS NFR BLD AUTO: 57.6 % (ref 42.7–76)
NRBC BLD AUTO-RTO: 0 /100 WBC (ref 0–0.2)
PLATELET # BLD AUTO: 267 10*3/MM3 (ref 140–450)
PMV BLD AUTO: 9.9 FL (ref 6–12)
POTASSIUM SERPL-SCNC: 3.8 MMOL/L (ref 3.5–5.2)
RBC # BLD AUTO: 3.46 10*6/MM3 (ref 3.77–5.28)
SODIUM SERPL-SCNC: 134 MMOL/L (ref 136–145)
WBC NRBC COR # BLD: 8.03 10*3/MM3 (ref 3.4–10.8)

## 2022-03-29 PROCEDURE — 25010000002 ONDANSETRON PER 1 MG: Performed by: INTERNAL MEDICINE

## 2022-03-29 PROCEDURE — 85025 COMPLETE CBC W/AUTO DIFF WBC: CPT | Performed by: INTERNAL MEDICINE

## 2022-03-29 PROCEDURE — 82962 GLUCOSE BLOOD TEST: CPT

## 2022-03-29 PROCEDURE — 25010000002 MORPHINE PER 10 MG: Performed by: INTERNAL MEDICINE

## 2022-03-29 PROCEDURE — 80048 BASIC METABOLIC PNL TOTAL CA: CPT

## 2022-03-29 RX ADMIN — ONDANSETRON 4 MG: 2 INJECTION INTRAMUSCULAR; INTRAVENOUS at 14:01

## 2022-03-29 RX ADMIN — ACETAMINOPHEN 650 MG: 325 TABLET, FILM COATED ORAL at 12:25

## 2022-03-29 RX ADMIN — SODIUM CHLORIDE, POTASSIUM CHLORIDE, SODIUM LACTATE AND CALCIUM CHLORIDE 100 ML/HR: 600; 310; 30; 20 INJECTION, SOLUTION INTRAVENOUS at 09:38

## 2022-03-29 RX ADMIN — MORPHINE SULFATE 4 MG: 4 INJECTION, SOLUTION INTRAMUSCULAR; INTRAVENOUS at 14:01

## 2022-03-29 RX ADMIN — MORPHINE SULFATE 4 MG: 4 INJECTION, SOLUTION INTRAMUSCULAR; INTRAVENOUS at 09:38

## 2022-03-29 RX ADMIN — MORPHINE SULFATE 4 MG: 4 INJECTION, SOLUTION INTRAMUSCULAR; INTRAVENOUS at 03:26

## 2022-03-29 RX ADMIN — ONDANSETRON 4 MG: 2 INJECTION INTRAMUSCULAR; INTRAVENOUS at 03:26

## 2022-03-30 NOTE — OUTREACH NOTE
Prep Survey    Flowsheet Row Responses   [facility] facility patient discharged from? [place]   Is LACE score < 7 ? No   Emergency Room discharge w/ pulse ox? No   Eligibility Readm Mgmt   Discharge diagnosis Diabetic ketoacidosis without coma associated with type 1 diabetes    Does the patient have one of the following disease processes/diagnoses(primary or secondary)? Other   Does the patient have Home health ordered? No   Is there a DME ordered? No   Prep survey completed? Yes          SUPRIYA UNDERWOOD - Registered Nurse

## 2022-04-01 ENCOUNTER — READMISSION MANAGEMENT (OUTPATIENT)
Dept: CALL CENTER | Facility: HOSPITAL | Age: 22
End: 2022-04-01

## 2022-04-01 LAB
BACTERIA SPEC AEROBE CULT: NORMAL
BACTERIA SPEC AEROBE CULT: NORMAL

## 2022-04-01 NOTE — OUTREACH NOTE
Medical Week 1 Survey    Flowsheet Row Responses   [facility] patient discharged from? [place]   Does the patient have one of the following disease processes/diagnoses(primary or secondary)? Other   Week 1 attempt successful? Yes   Call start time 1639   Call end time 1642   Discharge diagnosis Diabetic ketoacidosis without coma associated with type 1 diabetes    Is patient permission given to speak with other caregiver? Yes   List who call center can speak with Damon Arlette   [facility] reviewed with patient/caregiver? Yes   Is the patient having any side effects they believe may be caused by any medication additions or changes? No   Does the patient have all medications ordered at discharge? N/A   Does the patient have a primary care provider?  Yes   Does the patient have an appointment with their PCP within 7 days of discharge? Yes   Has the patient kept scheduled appointments due by today? N/A   Psychosocial issues? No   Comments Monitoring gluc at home, low reading to 350 but no symptoms.    Did the patient receive a copy of their discharge instructions? Yes   Nursing interventions Reviewed instructions with patient   What is the patient's perception of their health status since discharge? Returned to baseline/stable   Is the patient/caregiver able to teach back signs and symptoms related to disease process for when to call PCP? Yes   Week 1 call completed? Yes          ENOCH WEIR - Registered Nurse

## 2022-04-12 ENCOUNTER — READMISSION MANAGEMENT (OUTPATIENT)
Dept: CALL CENTER | Facility: HOSPITAL | Age: 22
End: 2022-04-12

## 2022-04-12 NOTE — OUTREACH NOTE
Medical Week 2 Survey    Flowsheet Row Responses   [facility] patient discharged from? [place]   Does the patient have one of the following disease processes/diagnoses(primary or secondary)? Other   Week 2 attempt successful? No   Unsuccessful attempts Attempt 1   Discharge diagnosis Diabetic ketoacidosis without coma associated with type 1 diabetes           DEB LR - Registered Nurse

## 2022-04-18 ENCOUNTER — READMISSION MANAGEMENT (OUTPATIENT)
Dept: CALL CENTER | Facility: HOSPITAL | Age: 22
End: 2022-04-18

## 2022-04-18 NOTE — OUTREACH NOTE
"Medical Week 2 Survey    Flowsheet Row Responses   [facility] patient discharged from? [place]   Does the patient have one of the following disease processes/diagnoses(primary or secondary)? Other   Week 2 attempt successful? Yes   Call start time 1322   Revoke Decline to participate  [Patient and said, \"I'm fine\", then hung up. No answer when attempted to call patient back.]   Call end time 1324          SADIQ ALONSO - Registered Nurse  "

## 2022-06-21 ENCOUNTER — HOSPITAL ENCOUNTER (EMERGENCY)
Facility: HOSPITAL | Age: 22
Discharge: HOME | End: 2022-06-21
Attending: FAMILY MEDICINE | Admitting: FAMILY MEDICINE

## 2022-06-21 VITALS
HEART RATE: 99 BPM | OXYGEN SATURATION: 99 % | BODY MASS INDEX: 16.17 KG/M2 | SYSTOLIC BLOOD PRESSURE: 120 MMHG | RESPIRATION RATE: 16 BRPM | HEIGHT: 67 IN | WEIGHT: 103 LBS | DIASTOLIC BLOOD PRESSURE: 83 MMHG | TEMPERATURE: 97.8 F

## 2022-06-21 DIAGNOSIS — E10.65 HYPERGLYCEMIA DUE TO TYPE 1 DIABETES MELLITUS: Primary | ICD-10-CM

## 2022-06-21 LAB
ACETONE BLD QL: NEGATIVE
ALBUMIN SERPL-MCNC: 4.7 G/DL (ref 3.5–5.2)
ALBUMIN/GLOB SERPL: 1.6 G/DL
ALP SERPL-CCNC: 73 U/L (ref 39–117)
ALT SERPL W P-5'-P-CCNC: 10 U/L (ref 1–33)
ANION GAP SERPL CALCULATED.3IONS-SCNC: 12 MMOL/L (ref 5–15)
ANION GAP SERPL CALCULATED.3IONS-SCNC: 8 MMOL/L (ref 5–15)
AST SERPL-CCNC: 15 U/L (ref 1–32)
BASOPHILS # BLD AUTO: 0.06 10*3/MM3 (ref 0–0.2)
BASOPHILS NFR BLD AUTO: 0.8 % (ref 0–1.5)
BILIRUB SERPL-MCNC: 0.3 MG/DL (ref 0–1.2)
BILIRUB UR QL STRIP: NEGATIVE
BUN SERPL-MCNC: 15 MG/DL (ref 6–20)
BUN SERPL-MCNC: 17 MG/DL (ref 6–20)
BUN/CREAT SERPL: 19.5 (ref 7–25)
BUN/CREAT SERPL: 20.5 (ref 7–25)
CALCIUM SPEC-SCNC: 8.4 MG/DL (ref 8.6–10.5)
CALCIUM SPEC-SCNC: 9.4 MG/DL (ref 8.6–10.5)
CHLORIDE SERPL-SCNC: 102 MMOL/L (ref 98–107)
CHLORIDE SERPL-SCNC: 97 MMOL/L (ref 98–107)
CLARITY UR: CLEAR
CO2 SERPL-SCNC: 26 MMOL/L (ref 22–29)
CO2 SERPL-SCNC: 27 MMOL/L (ref 22–29)
COLOR UR: YELLOW
CREAT SERPL-MCNC: 0.73 MG/DL (ref 0.57–1)
CREAT SERPL-MCNC: 0.87 MG/DL (ref 0.57–1)
DEPRECATED RDW RBC AUTO: 39.5 FL (ref 37–54)
EGFRCR SERPLBLD CKD-EPI 2021: 118.7 ML/MIN/1.73
EGFRCR SERPLBLD CKD-EPI 2021: 96.1 ML/MIN/1.73
EOSINOPHIL # BLD AUTO: 0.33 10*3/MM3 (ref 0–0.4)
EOSINOPHIL NFR BLD AUTO: 4.7 % (ref 0.3–6.2)
ERYTHROCYTE [DISTWIDTH] IN BLOOD BY AUTOMATED COUNT: 12.5 % (ref 12.3–15.4)
GLOBULIN UR ELPH-MCNC: 3 GM/DL
GLUCOSE BLDC GLUCOMTR-MCNC: 301 MG/DL (ref 70–130)
GLUCOSE BLDC GLUCOMTR-MCNC: 387 MG/DL (ref 70–130)
GLUCOSE SERPL-MCNC: 306 MG/DL (ref 65–99)
GLUCOSE SERPL-MCNC: 467 MG/DL (ref 65–99)
GLUCOSE UR STRIP-MCNC: ABNORMAL MG/DL
HCG SERPL QL: NEGATIVE
HCT VFR BLD AUTO: 36.6 % (ref 34–46.6)
HGB BLD-MCNC: 13.1 G/DL (ref 12–15.9)
HGB UR QL STRIP.AUTO: NEGATIVE
IMM GRANULOCYTES # BLD AUTO: 0.07 10*3/MM3 (ref 0–0.05)
IMM GRANULOCYTES NFR BLD AUTO: 1 % (ref 0–0.5)
KETONES UR QL STRIP: ABNORMAL
LEUKOCYTE ESTERASE UR QL STRIP.AUTO: NEGATIVE
LYMPHOCYTES # BLD AUTO: 1.86 10*3/MM3 (ref 0.7–3.1)
LYMPHOCYTES NFR BLD AUTO: 26.2 % (ref 19.6–45.3)
MCH RBC QN AUTO: 31.2 PG (ref 26.6–33)
MCHC RBC AUTO-ENTMCNC: 35.8 G/DL (ref 31.5–35.7)
MCV RBC AUTO: 87.1 FL (ref 79–97)
MONOCYTES # BLD AUTO: 0.39 10*3/MM3 (ref 0.1–0.9)
MONOCYTES NFR BLD AUTO: 5.5 % (ref 5–12)
NEUTROPHILS NFR BLD AUTO: 4.38 10*3/MM3 (ref 1.7–7)
NEUTROPHILS NFR BLD AUTO: 61.8 % (ref 42.7–76)
NITRITE UR QL STRIP: NEGATIVE
NRBC BLD AUTO-RTO: 0 /100 WBC (ref 0–0.2)
PH UR STRIP.AUTO: 5.5 [PH] (ref 5–9)
PLATELET # BLD AUTO: 366 10*3/MM3 (ref 140–450)
PMV BLD AUTO: 10.4 FL (ref 6–12)
POTASSIUM SERPL-SCNC: 3.6 MMOL/L (ref 3.5–5.2)
POTASSIUM SERPL-SCNC: 4.6 MMOL/L (ref 3.5–5.2)
PROT SERPL-MCNC: 7.7 G/DL (ref 6–8.5)
PROT UR QL STRIP: NEGATIVE
RBC # BLD AUTO: 4.2 10*6/MM3 (ref 3.77–5.28)
SODIUM SERPL-SCNC: 135 MMOL/L (ref 136–145)
SODIUM SERPL-SCNC: 137 MMOL/L (ref 136–145)
SP GR UR STRIP: 1.03 (ref 1–1.03)
UROBILINOGEN UR QL STRIP: ABNORMAL
WBC NRBC COR # BLD: 7.09 10*3/MM3 (ref 3.4–10.8)
WHOLE BLOOD HOLD COAG: NORMAL

## 2022-06-21 PROCEDURE — 82009 KETONE BODYS QUAL: CPT | Performed by: STUDENT IN AN ORGANIZED HEALTH CARE EDUCATION/TRAINING PROGRAM

## 2022-06-21 PROCEDURE — 81003 URINALYSIS AUTO W/O SCOPE: CPT | Performed by: STUDENT IN AN ORGANIZED HEALTH CARE EDUCATION/TRAINING PROGRAM

## 2022-06-21 PROCEDURE — 82962 GLUCOSE BLOOD TEST: CPT

## 2022-06-21 PROCEDURE — 80053 COMPREHEN METABOLIC PANEL: CPT | Performed by: STUDENT IN AN ORGANIZED HEALTH CARE EDUCATION/TRAINING PROGRAM

## 2022-06-21 PROCEDURE — 63710000001 INSULIN DETEMIR PER 5 UNITS: Performed by: STUDENT IN AN ORGANIZED HEALTH CARE EDUCATION/TRAINING PROGRAM

## 2022-06-21 PROCEDURE — 84703 CHORIONIC GONADOTROPIN ASSAY: CPT | Performed by: STUDENT IN AN ORGANIZED HEALTH CARE EDUCATION/TRAINING PROGRAM

## 2022-06-21 PROCEDURE — 63710000001 INSULIN REGULAR HUMAN PER 5 UNITS: Performed by: STUDENT IN AN ORGANIZED HEALTH CARE EDUCATION/TRAINING PROGRAM

## 2022-06-21 PROCEDURE — 99283 EMERGENCY DEPT VISIT LOW MDM: CPT

## 2022-06-21 PROCEDURE — 85025 COMPLETE CBC W/AUTO DIFF WBC: CPT | Performed by: STUDENT IN AN ORGANIZED HEALTH CARE EDUCATION/TRAINING PROGRAM

## 2022-06-21 RX ORDER — INSULIN DETEMIR 100 [IU]/ML
22 INJECTION, SOLUTION SUBCUTANEOUS DAILY
Qty: 7 ML | Refills: 0 | Status: SHIPPED | OUTPATIENT
Start: 2022-06-21 | End: 2022-06-27

## 2022-06-21 RX ORDER — IBUPROFEN 400 MG/1
400 TABLET ORAL ONCE
Status: COMPLETED | OUTPATIENT
Start: 2022-06-21 | End: 2022-06-21

## 2022-06-21 RX ADMIN — INSULIN DETEMIR 22 UNITS: 100 INJECTION, SOLUTION SUBCUTANEOUS at 14:07

## 2022-06-21 RX ADMIN — SODIUM CHLORIDE 1000 ML: 9 INJECTION, SOLUTION INTRAVENOUS at 11:43

## 2022-06-21 RX ADMIN — IBUPROFEN 400 MG: 400 TABLET ORAL at 12:48

## 2022-06-21 RX ADMIN — HUMAN INSULIN 4 UNITS: 100 INJECTION, SOLUTION SUBCUTANEOUS at 12:48

## 2022-06-21 RX ADMIN — SODIUM CHLORIDE 1000 ML: 9 INJECTION, SOLUTION INTRAVENOUS at 14:05

## 2022-06-21 NOTE — ED NOTES
Pt hesitant about taking more insulin when she has not ate today. Provider notified, awaiting orders.
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
## 2022-06-22 LAB
GLUCOSE BLDC GLUCOMTR-MCNC: 435 MG/DL (ref 70–130)
GLUCOSE BLDC GLUCOMTR-MCNC: 500 MG/DL (ref 70–130)
GLUCOSE BLDC GLUCOMTR-MCNC: 536 MG/DL (ref 70–130)

## 2022-06-27 ENCOUNTER — OFFICE VISIT (OUTPATIENT)
Dept: ENDOCRINOLOGY | Facility: CLINIC | Age: 22
End: 2022-06-27

## 2022-06-27 VITALS
SYSTOLIC BLOOD PRESSURE: 110 MMHG | DIASTOLIC BLOOD PRESSURE: 80 MMHG | WEIGHT: 100 LBS | HEIGHT: 67 IN | BODY MASS INDEX: 15.7 KG/M2

## 2022-06-27 DIAGNOSIS — E55.9 VITAMIN D DEFICIENCY: ICD-10-CM

## 2022-06-27 DIAGNOSIS — F41.9 ANXIETY: ICD-10-CM

## 2022-06-27 DIAGNOSIS — E10.42 DIABETIC POLYNEUROPATHY ASSOCIATED WITH TYPE 1 DIABETES MELLITUS: ICD-10-CM

## 2022-06-27 DIAGNOSIS — E10.65 TYPE 1 DIABETES MELLITUS WITH HYPERGLYCEMIA: Primary | ICD-10-CM

## 2022-06-27 DIAGNOSIS — K31.84 GASTROPARESIS: ICD-10-CM

## 2022-06-27 PROCEDURE — 99214 OFFICE O/P EST MOD 30 MIN: CPT | Performed by: INTERNAL MEDICINE

## 2022-06-27 RX ORDER — PROCHLORPERAZINE 25 MG/1
1 SUPPOSITORY RECTAL ONCE
Qty: 1 EACH | Refills: 3 | Status: SHIPPED | OUTPATIENT
Start: 2022-06-27 | End: 2022-06-27

## 2022-06-27 RX ORDER — GLUCAGON 3 MG/1
1 POWDER NASAL AS NEEDED
Qty: 2 EACH | Refills: 11 | Status: SHIPPED | OUTPATIENT
Start: 2022-06-27

## 2022-06-27 RX ORDER — METOCLOPRAMIDE 10 MG/1
TABLET ORAL
Qty: 180 TABLET | Refills: 5 | Status: SHIPPED | OUTPATIENT
Start: 2022-06-27 | End: 2023-01-23 | Stop reason: HOSPADM

## 2022-06-27 RX ORDER — PROCHLORPERAZINE 25 MG/1
SUPPOSITORY RECTAL AS NEEDED
Qty: 9 EACH | Refills: 3 | Status: SHIPPED | OUTPATIENT
Start: 2022-06-27 | End: 2022-08-16

## 2022-06-27 RX ORDER — INSULIN DETEMIR 100 [IU]/ML
INJECTION, SOLUTION SUBCUTANEOUS
Qty: 3 PEN | Refills: 11 | Status: SHIPPED | OUTPATIENT
Start: 2022-06-27

## 2022-06-27 RX ORDER — INSULIN ASPART 100 [IU]/ML
INJECTION, SOLUTION INTRAVENOUS; SUBCUTANEOUS
Qty: 3 PEN | Refills: 11 | Status: SHIPPED | OUTPATIENT
Start: 2022-06-27

## 2022-06-27 RX ORDER — PROCHLORPERAZINE 25 MG/1
1 SUPPOSITORY RECTAL ONCE
Qty: 1 EACH | Refills: 1 | Status: SHIPPED | OUTPATIENT
Start: 2022-06-27 | End: 2022-06-27

## 2022-06-27 RX ORDER — CLONAZEPAM 0.5 MG/1
0.25 TABLET ORAL 2 TIMES DAILY PRN
Qty: 30 TABLET | Refills: 2 | Status: SHIPPED | OUTPATIENT
Start: 2022-06-27 | End: 2022-08-15 | Stop reason: SDUPTHER

## 2022-06-27 RX ORDER — PEN NEEDLE, DIABETIC 30 GX3/16"
1 NEEDLE, DISPOSABLE MISCELLANEOUS 4 TIMES DAILY
Qty: 120 EACH | Refills: 11 | Status: SHIPPED | OUTPATIENT
Start: 2022-06-27

## 2022-06-27 RX ORDER — ISOPROPYL ALCOHOL 0.7 ML/1
SWAB TOPICAL
Qty: 120 EACH | Refills: 11 | Status: SHIPPED | OUTPATIENT
Start: 2022-06-27

## 2022-06-27 RX ORDER — LANCING DEVICE
EACH MISCELLANEOUS
Qty: 1 EACH | Refills: 11 | Status: SHIPPED | OUTPATIENT
Start: 2022-06-27

## 2022-06-27 RX ORDER — INSULIN ASPART 100 [IU]/ML
INJECTION, SOLUTION INTRAVENOUS; SUBCUTANEOUS
Qty: 30 ML | Refills: 11 | Status: SHIPPED | OUTPATIENT
Start: 2022-06-27 | End: 2022-12-08 | Stop reason: SDUPTHER

## 2022-06-27 RX ORDER — PROMETHAZINE HYDROCHLORIDE 12.5 MG/1
12.5 TABLET ORAL EVERY 6 HOURS PRN
Qty: 40 TABLET | Refills: 5 | Status: SHIPPED | OUTPATIENT
Start: 2022-06-27 | End: 2023-01-23

## 2022-06-27 RX ORDER — BLOOD-GLUCOSE METER
KIT MISCELLANEOUS
Qty: 120 EACH | Refills: 11 | Status: SHIPPED | OUTPATIENT
Start: 2022-06-27

## 2022-06-27 NOTE — PROGRESS NOTES
"  Subjective    Fernanda Patterson is a 22 y.o. female. she is here today for follow-up of type 1 diabetes       History of Present Illness     22 y o type 1 Diabetes more than 10 years complicated by neuropathy.  Has had frequent DKA    Presently doing levemir and novolog injections w uncontrolled glycemia based on dexcom.     PE    /80   Ht 170.2 cm (67\")   Wt 45.4 kg (100 lb)   LMP 06/07/2022 (Approximate)   BMI 15.66 kg/m²   AOx3  No visible goiter  Normal Respiratory Effort , Lung CTA  RRR  No Edema    Labs    Lab Results   Component Value Date    WBC 7.09 06/21/2022    HGB 13.1 06/21/2022    HCT 36.6 06/21/2022    MCV 87.1 06/21/2022     06/21/2022     Lab Results   Component Value Date    GLUCOSE 306 (H) 06/21/2022    BUN 15 06/21/2022    CREATININE 0.73 06/21/2022    EGFRIFNONA 77 01/14/2022    EGFRIFAFRI 109 04/23/2021    BCR 20.5 06/21/2022     06/21/2022    K 3.6 06/21/2022    CO2 27.0 06/21/2022    CALCIUM 8.4 (L) 06/21/2022    ALBUMIN 4.70 06/21/2022    AST 15 06/21/2022    ALT 10 06/21/2022         Assessment & Plan      1. Type 1 diabetes mellitus with hyperglycemia (HCC)    2. Diabetic polyneuropathy associated with type 1 diabetes mellitus (HCC)    3. Gastroparesis    4. Anxiety    5. Vitamin D deficiency    .  Type 1 DM     Glycemic Management:       Lab Results   Component Value Date    HGBA1C 11.10 (H) 03/27/2022       dexcom 2 week review    Type 1 DM w hyperglycemia         Lantus   22 units      novolog 1:10, correction 50     Approve for Tandem insulin pump - Dexcom     Basal suggest 0.7 units per hour       Carb ratio    10    Correction     50    Target     120         Microvascular Complication Monitoring:  Diabetic Neuropathy, Neuropathy type painful and sensorial        gastroparesis - reglan back up phenergan    improve glycemia              Weight Related: underweight      improve glycemia              Anxiety  Has appt w psychiatry  Klonopin refilled until appt " w them     4. Follow-up: No follow-ups on file.

## 2022-06-29 ENCOUNTER — OFFICE VISIT (OUTPATIENT)
Dept: ENDOCRINOLOGY | Facility: CLINIC | Age: 22
End: 2022-06-29

## 2022-06-29 ENCOUNTER — TELEPHONE (OUTPATIENT)
Dept: ENDOCRINOLOGY | Facility: CLINIC | Age: 22
End: 2022-06-29

## 2022-06-29 DIAGNOSIS — E10.65 TYPE 1 DIABETES MELLITUS WITH HYPERGLYCEMIA: ICD-10-CM

## 2022-06-29 PROCEDURE — 98960 EDU&TRN PT SELF-MGMT NQHP 1: CPT | Performed by: DIETITIAN, REGISTERED

## 2022-06-29 NOTE — TELEPHONE ENCOUNTER
Signed LMN for Tandem insulin pump sent to [facility] at [phone]. Sent to medical records

## 2022-06-30 NOTE — PROGRESS NOTES
Fernanda Patterson is a 22 y.o. female referred for outpatient diabetes education by Dr. Robledo. Patient attended individual education for thirty minutes on 06/29/2022. Topics covered included:     1. Healthy Food Choices   i. Provided patient with detailed carbohydrate counting guide.    ii. Instructed patient to eat 45-60 grams of carbohydrate with each meal (3-4 exchange choices) and 15 grams of carb for snacks (1 exchange choices).   iii. Provided patient with list of non-starchy vegetables and foods that are low in carbohydrate for snacks and to incorporate with meals (Planning Healthy Meals Packet).    iv. Choose fruits, vegetables, whole grains, legumes, low-fat milk, fiber-rich foods, minimal saturated fats, and watch cholesterol and sodium intake.    v. Reviewed carbohydrate-containing foods, standard serving sizes, and measuring foods.   vi. Reviewed the difference between simple and complex carbohydrate. Encouraged patient to choose complex carbohydrates more often.    vii. 45-75 grams per day lean protein r/t desired muscle gain      2. Dexcom reports reviewed. Adjusted sliding scale insulin to 1 unit for every 30 over 140. Pt reports frequent overnight hypoglycemia. Approved pt to change Lantues to 11 units BID (22 units daily).      Provided patient with Diabetes and You book, and Planning Healthy Meals book. Provided handout of sample menu with carbs listed.      Thank you Dr. Robledo for this referral.        ZACK Briggs, RD, LD

## 2022-07-20 ENCOUNTER — TELEPHONE (OUTPATIENT)
Dept: ENDOCRINOLOGY | Facility: CLINIC | Age: 22
End: 2022-07-20

## 2022-08-05 ENCOUNTER — HOSPITAL ENCOUNTER (INPATIENT)
Facility: HOSPITAL | Age: 22
LOS: 1 days | Discharge: HOME | End: 2022-08-06
Attending: EMERGENCY MEDICINE | Admitting: FAMILY MEDICINE

## 2022-08-05 DIAGNOSIS — E10.10 DIABETIC KETOACIDOSIS WITHOUT COMA ASSOCIATED WITH TYPE 1 DIABETES MELLITUS: Primary | ICD-10-CM

## 2022-08-05 LAB
ACETONE BLD QL: ABNORMAL
ALBUMIN SERPL-MCNC: 4.5 G/DL (ref 3.5–5.2)
ALBUMIN/GLOB SERPL: 1.4 G/DL
ALP SERPL-CCNC: 85 U/L (ref 39–117)
ALT SERPL W P-5'-P-CCNC: 18 U/L (ref 1–33)
AMPHET+METHAMPHET UR QL: NEGATIVE
AMPHETAMINES UR QL: NEGATIVE
ANION GAP SERPL CALCULATED.3IONS-SCNC: 19 MMOL/L (ref 5–15)
ANION GAP SERPL CALCULATED.3IONS-SCNC: 29 MMOL/L (ref 5–15)
ANION GAP SERPL CALCULATED.3IONS-SCNC: 9 MMOL/L (ref 5–15)
ARTERIAL PATENCY WRIST A: ABNORMAL
AST SERPL-CCNC: 18 U/L (ref 1–32)
ATMOSPHERIC PRESS: 749 MMHG
B-HCG UR QL: NEGATIVE
BARBITURATES UR QL SCN: NEGATIVE
BASE EXCESS BLDA CALC-SCNC: -14 MMOL/L (ref 0–2)
BASOPHILS # BLD AUTO: 0.11 10*3/MM3 (ref 0–0.2)
BASOPHILS NFR BLD AUTO: 0.7 % (ref 0–1.5)
BDY SITE: ABNORMAL
BENZODIAZ UR QL SCN: NEGATIVE
BILIRUB SERPL-MCNC: 0.6 MG/DL (ref 0–1.2)
BILIRUB UR QL STRIP: NEGATIVE
BUN SERPL-MCNC: 27 MG/DL (ref 6–20)
BUN SERPL-MCNC: 29 MG/DL (ref 6–20)
BUN SERPL-MCNC: 30 MG/DL (ref 6–20)
BUN/CREAT SERPL: 24 (ref 7–25)
BUN/CREAT SERPL: 29 (ref 7–25)
BUN/CREAT SERPL: 29.7 (ref 7–25)
BUPRENORPHINE SERPL-MCNC: NEGATIVE NG/ML
CALCIUM SPEC-SCNC: 10.4 MG/DL (ref 8.6–10.5)
CALCIUM SPEC-SCNC: 8.3 MG/DL (ref 8.6–10.5)
CALCIUM SPEC-SCNC: 8.6 MG/DL (ref 8.6–10.5)
CANNABINOIDS SERPL QL: NEGATIVE
CHLORIDE SERPL-SCNC: 101 MMOL/L (ref 98–107)
CHLORIDE SERPL-SCNC: 106 MMOL/L (ref 98–107)
CHLORIDE SERPL-SCNC: 90 MMOL/L (ref 98–107)
CK SERPL-CCNC: 37 U/L (ref 20–180)
CLARITY UR: CLEAR
CO2 SERPL-SCNC: 12 MMOL/L (ref 22–29)
CO2 SERPL-SCNC: 16 MMOL/L (ref 22–29)
CO2 SERPL-SCNC: 21 MMOL/L (ref 22–29)
COCAINE UR QL: NEGATIVE
COLOR UR: YELLOW
CREAT SERPL-MCNC: 0.91 MG/DL (ref 0.57–1)
CREAT SERPL-MCNC: 1 MG/DL (ref 0.57–1)
CREAT SERPL-MCNC: 1.25 MG/DL (ref 0.57–1)
DEPRECATED RDW RBC AUTO: 37.4 FL (ref 37–54)
EGFRCR SERPLBLD CKD-EPI 2021: 62.2 ML/MIN/1.73
EGFRCR SERPLBLD CKD-EPI 2021: 81.3 ML/MIN/1.73
EGFRCR SERPLBLD CKD-EPI 2021: 91.1 ML/MIN/1.73
EOSINOPHIL # BLD AUTO: 0.21 10*3/MM3 (ref 0–0.4)
EOSINOPHIL NFR BLD AUTO: 1.4 % (ref 0.3–6.2)
ERYTHROCYTE [DISTWIDTH] IN BLOOD BY AUTOMATED COUNT: 11.8 % (ref 12.3–15.4)
FLUAV RNA RESP QL NAA+PROBE: NOT DETECTED
FLUBV RNA RESP QL NAA+PROBE: NOT DETECTED
GLOBULIN UR ELPH-MCNC: 3.3 GM/DL
GLUCOSE BLDC GLUCOMTR-MCNC: 119 MG/DL (ref 70–130)
GLUCOSE BLDC GLUCOMTR-MCNC: 153 MG/DL (ref 70–130)
GLUCOSE BLDC GLUCOMTR-MCNC: 178 MG/DL (ref 70–130)
GLUCOSE BLDC GLUCOMTR-MCNC: 230 MG/DL (ref 70–130)
GLUCOSE BLDC GLUCOMTR-MCNC: 304 MG/DL (ref 70–130)
GLUCOSE BLDC GLUCOMTR-MCNC: 327 MG/DL (ref 70–130)
GLUCOSE BLDC GLUCOMTR-MCNC: 346 MG/DL (ref 70–130)
GLUCOSE BLDC GLUCOMTR-MCNC: 399 MG/DL (ref 70–130)
GLUCOSE BLDC GLUCOMTR-MCNC: 525 MG/DL (ref 70–130)
GLUCOSE BLDC GLUCOMTR-MCNC: 551 MG/DL (ref 70–130)
GLUCOSE BLDC GLUCOMTR-MCNC: 581 MG/DL (ref 70–130)
GLUCOSE SERPL-MCNC: 167 MG/DL (ref 65–99)
GLUCOSE SERPL-MCNC: 365 MG/DL (ref 65–99)
GLUCOSE SERPL-MCNC: 668 MG/DL (ref 65–99)
GLUCOSE UR STRIP-MCNC: ABNORMAL MG/DL
HBA1C MFR BLD: 10.9 % (ref 4.8–5.6)
HCO3 BLDA-SCNC: 11.4 MMOL/L (ref 20–26)
HCT VFR BLD AUTO: 36.8 % (ref 34–46.6)
HGB BLD-MCNC: 12.9 G/DL (ref 12–15.9)
HGB UR QL STRIP.AUTO: NEGATIVE
IMM GRANULOCYTES # BLD AUTO: 0.2 10*3/MM3 (ref 0–0.05)
IMM GRANULOCYTES NFR BLD AUTO: 1.4 % (ref 0–0.5)
INHALED O2 CONCENTRATION: <21 %
KETONES UR QL STRIP: ABNORMAL
LEUKOCYTE ESTERASE UR QL STRIP.AUTO: NEGATIVE
LYMPHOCYTES # BLD AUTO: 2.23 10*3/MM3 (ref 0.7–3.1)
LYMPHOCYTES NFR BLD AUTO: 15.1 % (ref 19.6–45.3)
Lab: ABNORMAL
MAGNESIUM SERPL-MCNC: 1.5 MG/DL (ref 1.6–2.6)
MAGNESIUM SERPL-MCNC: 1.8 MG/DL (ref 1.6–2.6)
MAGNESIUM SERPL-MCNC: 2.2 MG/DL (ref 1.6–2.6)
MCH RBC QN AUTO: 30.4 PG (ref 26.6–33)
MCHC RBC AUTO-ENTMCNC: 35.1 G/DL (ref 31.5–35.7)
MCV RBC AUTO: 86.6 FL (ref 79–97)
METHADONE UR QL SCN: NEGATIVE
MODALITY: ABNORMAL
MONOCYTES # BLD AUTO: 0.43 10*3/MM3 (ref 0.1–0.9)
MONOCYTES NFR BLD AUTO: 2.9 % (ref 5–12)
NEUTROPHILS NFR BLD AUTO: 11.56 10*3/MM3 (ref 1.7–7)
NEUTROPHILS NFR BLD AUTO: 78.5 % (ref 42.7–76)
NITRITE UR QL STRIP: NEGATIVE
NRBC BLD AUTO-RTO: 0 /100 WBC (ref 0–0.2)
OPIATES UR QL: POSITIVE
OXYCODONE UR QL SCN: NEGATIVE
PCO2 BLDA: 25.2 MM HG (ref 35–45)
PCP UR QL SCN: NEGATIVE
PH BLDA: 7.26 PH UNITS (ref 7.35–7.45)
PH UR STRIP.AUTO: 5.5 [PH] (ref 5–9)
PHOSPHATE SERPL-MCNC: 3.5 MG/DL (ref 2.5–4.5)
PHOSPHATE SERPL-MCNC: 3.5 MG/DL (ref 2.5–4.5)
PHOSPHATE SERPL-MCNC: 4.7 MG/DL (ref 2.5–4.5)
PLATELET # BLD AUTO: 379 10*3/MM3 (ref 140–450)
PMV BLD AUTO: 11.2 FL (ref 6–12)
PO2 BLDA: 90.3 MM HG (ref 83–108)
POTASSIUM SERPL-SCNC: 4.1 MMOL/L (ref 3.5–5.2)
POTASSIUM SERPL-SCNC: 4.4 MMOL/L (ref 3.5–5.2)
POTASSIUM SERPL-SCNC: 5.4 MMOL/L (ref 3.5–5.2)
PROPOXYPH UR QL: NEGATIVE
PROT SERPL-MCNC: 7.8 G/DL (ref 6–8.5)
PROT UR QL STRIP: NEGATIVE
RBC # BLD AUTO: 4.25 10*6/MM3 (ref 3.77–5.28)
SAO2 % BLDCOA: 96.9 % (ref 94–99)
SARS-COV-2 RNA RESP QL NAA+PROBE: NOT DETECTED
SODIUM SERPL-SCNC: 131 MMOL/L (ref 136–145)
SODIUM SERPL-SCNC: 136 MMOL/L (ref 136–145)
SODIUM SERPL-SCNC: 136 MMOL/L (ref 136–145)
SP GR UR STRIP: 1.02 (ref 1–1.03)
TRICYCLICS UR QL SCN: NEGATIVE
UROBILINOGEN UR QL STRIP: ABNORMAL
VENTILATOR MODE: ABNORMAL
WBC NRBC COR # BLD: 14.74 10*3/MM3 (ref 3.4–10.8)
WHOLE BLOOD HOLD COAG: NORMAL
WHOLE BLOOD HOLD SPECIMEN: NORMAL

## 2022-08-05 PROCEDURE — 80306 DRUG TEST PRSMV INSTRMNT: CPT | Performed by: EMERGENCY MEDICINE

## 2022-08-05 PROCEDURE — 63710000001 ONDANSETRON PER 8 MG: Performed by: FAMILY MEDICINE

## 2022-08-05 PROCEDURE — 81025 URINE PREGNANCY TEST: CPT | Performed by: EMERGENCY MEDICINE

## 2022-08-05 PROCEDURE — 87636 SARSCOV2 & INF A&B AMP PRB: CPT | Performed by: EMERGENCY MEDICINE

## 2022-08-05 PROCEDURE — 36600 WITHDRAWAL OF ARTERIAL BLOOD: CPT

## 2022-08-05 PROCEDURE — 84100 ASSAY OF PHOSPHORUS: CPT | Performed by: FAMILY MEDICINE

## 2022-08-05 PROCEDURE — 82962 GLUCOSE BLOOD TEST: CPT

## 2022-08-05 PROCEDURE — 25010000002 MORPHINE PER 10 MG: Performed by: FAMILY MEDICINE

## 2022-08-05 PROCEDURE — 85025 COMPLETE CBC W/AUTO DIFF WBC: CPT | Performed by: EMERGENCY MEDICINE

## 2022-08-05 PROCEDURE — 80053 COMPREHEN METABOLIC PANEL: CPT | Performed by: EMERGENCY MEDICINE

## 2022-08-05 PROCEDURE — 0 INSULIN REGULAR HUMAN PER 5 UNITS: Performed by: FAMILY MEDICINE

## 2022-08-05 PROCEDURE — 25010000002 MORPHINE PER 10 MG: Performed by: EMERGENCY MEDICINE

## 2022-08-05 PROCEDURE — 82550 ASSAY OF CK (CPK): CPT | Performed by: EMERGENCY MEDICINE

## 2022-08-05 PROCEDURE — 25010000002 ONDANSETRON PER 1 MG: Performed by: FAMILY MEDICINE

## 2022-08-05 PROCEDURE — 83036 HEMOGLOBIN GLYCOSYLATED A1C: CPT | Performed by: FAMILY MEDICINE

## 2022-08-05 PROCEDURE — 36415 COLL VENOUS BLD VENIPUNCTURE: CPT | Performed by: FAMILY MEDICINE

## 2022-08-05 PROCEDURE — 25010000002 ONDANSETRON PER 1 MG: Performed by: EMERGENCY MEDICINE

## 2022-08-05 PROCEDURE — 81003 URINALYSIS AUTO W/O SCOPE: CPT | Performed by: EMERGENCY MEDICINE

## 2022-08-05 PROCEDURE — 82009 KETONE BODYS QUAL: CPT

## 2022-08-05 PROCEDURE — 25010000002 MAGNESIUM SULFATE 2 GM/50ML SOLUTION: Performed by: FAMILY MEDICINE

## 2022-08-05 PROCEDURE — 99284 EMERGENCY DEPT VISIT MOD MDM: CPT

## 2022-08-05 PROCEDURE — 83735 ASSAY OF MAGNESIUM: CPT | Performed by: FAMILY MEDICINE

## 2022-08-05 PROCEDURE — 82803 BLOOD GASES ANY COMBINATION: CPT

## 2022-08-05 RX ORDER — POTASSIUM CHLORIDE 750 MG/1
40 CAPSULE, EXTENDED RELEASE ORAL AS NEEDED
Status: DISCONTINUED | OUTPATIENT
Start: 2022-08-05 | End: 2022-08-06 | Stop reason: HOSPADM

## 2022-08-05 RX ORDER — SODIUM CHLORIDE 0.9 % (FLUSH) 0.9 %
10 SYRINGE (ML) INJECTION AS NEEDED
Status: DISCONTINUED | OUTPATIENT
Start: 2022-08-05 | End: 2022-08-06 | Stop reason: HOSPADM

## 2022-08-05 RX ORDER — POTASSIUM CHLORIDE 7.45 MG/ML
10 INJECTION INTRAVENOUS
Status: DISCONTINUED | OUTPATIENT
Start: 2022-08-05 | End: 2022-08-06 | Stop reason: HOSPADM

## 2022-08-05 RX ORDER — DEXTROSE, SODIUM CHLORIDE, AND POTASSIUM CHLORIDE 5; .45; .3 G/100ML; G/100ML; G/100ML
150 INJECTION INTRAVENOUS CONTINUOUS PRN
Status: DISCONTINUED | OUTPATIENT
Start: 2022-08-05 | End: 2022-08-05

## 2022-08-05 RX ORDER — NICOTINE POLACRILEX 4 MG
15 LOZENGE BUCCAL
Status: DISCONTINUED | OUTPATIENT
Start: 2022-08-05 | End: 2022-08-05

## 2022-08-05 RX ORDER — DEXTROSE MONOHYDRATE 25 G/50ML
10-50 INJECTION, SOLUTION INTRAVENOUS
Status: DISCONTINUED | OUTPATIENT
Start: 2022-08-05 | End: 2022-08-05

## 2022-08-05 RX ORDER — DEXTROSE MONOHYDRATE 25 G/50ML
25 INJECTION, SOLUTION INTRAVENOUS
Status: DISCONTINUED | OUTPATIENT
Start: 2022-08-05 | End: 2022-08-06 | Stop reason: HOSPADM

## 2022-08-05 RX ORDER — ONDANSETRON 2 MG/ML
4 INJECTION INTRAMUSCULAR; INTRAVENOUS EVERY 6 HOURS PRN
Status: DISCONTINUED | OUTPATIENT
Start: 2022-08-05 | End: 2022-08-05

## 2022-08-05 RX ORDER — METOCLOPRAMIDE HYDROCHLORIDE 5 MG/ML
5 INJECTION INTRAMUSCULAR; INTRAVENOUS EVERY 4 HOURS PRN
Status: DISCONTINUED | OUTPATIENT
Start: 2022-08-05 | End: 2022-08-06 | Stop reason: HOSPADM

## 2022-08-05 RX ORDER — SODIUM CHLORIDE AND POTASSIUM CHLORIDE 150; 450 MG/100ML; MG/100ML
250 INJECTION, SOLUTION INTRAVENOUS CONTINUOUS PRN
Status: DISCONTINUED | OUTPATIENT
Start: 2022-08-05 | End: 2022-08-05

## 2022-08-05 RX ORDER — SODIUM CHLORIDE 9 MG/ML
250 INJECTION, SOLUTION INTRAVENOUS CONTINUOUS PRN
Status: DISCONTINUED | OUTPATIENT
Start: 2022-08-05 | End: 2022-08-05

## 2022-08-05 RX ORDER — DEXTROSE AND SODIUM CHLORIDE 5; .45 G/100ML; G/100ML
150 INJECTION, SOLUTION INTRAVENOUS CONTINUOUS PRN
Status: DISCONTINUED | OUTPATIENT
Start: 2022-08-05 | End: 2022-08-05

## 2022-08-05 RX ORDER — MAGNESIUM SULFATE HEPTAHYDRATE 40 MG/ML
2 INJECTION, SOLUTION INTRAVENOUS AS NEEDED
Status: DISCONTINUED | OUTPATIENT
Start: 2022-08-05 | End: 2022-08-06 | Stop reason: HOSPADM

## 2022-08-05 RX ORDER — ACETAMINOPHEN 650 MG/1
650 SUPPOSITORY RECTAL EVERY 4 HOURS PRN
Status: DISCONTINUED | OUTPATIENT
Start: 2022-08-05 | End: 2022-08-06 | Stop reason: HOSPADM

## 2022-08-05 RX ORDER — DEXTROSE, SODIUM CHLORIDE, AND POTASSIUM CHLORIDE 5; .45; .15 G/100ML; G/100ML; G/100ML
150 INJECTION INTRAVENOUS CONTINUOUS PRN
Status: DISCONTINUED | OUTPATIENT
Start: 2022-08-05 | End: 2022-08-05

## 2022-08-05 RX ORDER — DEXTROSE MONOHYDRATE 25 G/50ML
10-50 INJECTION, SOLUTION INTRAVENOUS
Status: DISCONTINUED | OUTPATIENT
Start: 2022-08-05 | End: 2022-08-05 | Stop reason: SDUPTHER

## 2022-08-05 RX ORDER — SODIUM CHLORIDE 450 MG/100ML
250 INJECTION, SOLUTION INTRAVENOUS CONTINUOUS PRN
Status: DISCONTINUED | OUTPATIENT
Start: 2022-08-05 | End: 2022-08-05

## 2022-08-05 RX ORDER — MORPHINE SULFATE 2 MG/ML
2 INJECTION, SOLUTION INTRAMUSCULAR; INTRAVENOUS
Status: DISCONTINUED | OUTPATIENT
Start: 2022-08-05 | End: 2022-08-06 | Stop reason: HOSPADM

## 2022-08-05 RX ORDER — NICOTINE POLACRILEX 4 MG
15 LOZENGE BUCCAL
Status: DISCONTINUED | OUTPATIENT
Start: 2022-08-05 | End: 2022-08-06 | Stop reason: HOSPADM

## 2022-08-05 RX ORDER — SODIUM CHLORIDE AND POTASSIUM CHLORIDE 150; 900 MG/100ML; MG/100ML
250 INJECTION, SOLUTION INTRAVENOUS CONTINUOUS PRN
Status: DISCONTINUED | OUTPATIENT
Start: 2022-08-05 | End: 2022-08-05

## 2022-08-05 RX ORDER — ACETAMINOPHEN 325 MG/1
650 TABLET ORAL EVERY 4 HOURS PRN
Status: DISCONTINUED | OUTPATIENT
Start: 2022-08-05 | End: 2022-08-06 | Stop reason: HOSPADM

## 2022-08-05 RX ORDER — CLONAZEPAM 0.5 MG/1
0.25 TABLET ORAL 2 TIMES DAILY PRN
Status: DISCONTINUED | OUTPATIENT
Start: 2022-08-05 | End: 2022-08-06 | Stop reason: HOSPADM

## 2022-08-05 RX ORDER — POTASSIUM CHLORIDE 1.5 G/1.77G
40 POWDER, FOR SOLUTION ORAL AS NEEDED
Status: DISCONTINUED | OUTPATIENT
Start: 2022-08-05 | End: 2022-08-06 | Stop reason: HOSPADM

## 2022-08-05 RX ORDER — ALUMINA, MAGNESIA, AND SIMETHICONE 2400; 2400; 240 MG/30ML; MG/30ML; MG/30ML
15 SUSPENSION ORAL EVERY 6 HOURS PRN
Status: DISCONTINUED | OUTPATIENT
Start: 2022-08-05 | End: 2022-08-06 | Stop reason: HOSPADM

## 2022-08-05 RX ORDER — MAGNESIUM SULFATE HEPTAHYDRATE 40 MG/ML
4 INJECTION, SOLUTION INTRAVENOUS AS NEEDED
Status: DISCONTINUED | OUTPATIENT
Start: 2022-08-05 | End: 2022-08-06 | Stop reason: HOSPADM

## 2022-08-05 RX ORDER — NICOTINE POLACRILEX 4 MG
15 LOZENGE BUCCAL
Status: DISCONTINUED | OUTPATIENT
Start: 2022-08-05 | End: 2022-08-05 | Stop reason: SDUPTHER

## 2022-08-05 RX ORDER — ENOXAPARIN SODIUM 100 MG/ML
40 INJECTION SUBCUTANEOUS DAILY
Status: DISCONTINUED | OUTPATIENT
Start: 2022-08-05 | End: 2022-08-06 | Stop reason: HOSPADM

## 2022-08-05 RX ORDER — ONDANSETRON 4 MG/1
4 TABLET, FILM COATED ORAL EVERY 6 HOURS PRN
Status: DISCONTINUED | OUTPATIENT
Start: 2022-08-05 | End: 2022-08-06 | Stop reason: HOSPADM

## 2022-08-05 RX ORDER — SODIUM CHLORIDE AND POTASSIUM CHLORIDE 300; 900 MG/100ML; MG/100ML
250 INJECTION, SOLUTION INTRAVENOUS CONTINUOUS PRN
Status: DISCONTINUED | OUTPATIENT
Start: 2022-08-05 | End: 2022-08-05

## 2022-08-05 RX ORDER — MORPHINE SULFATE 2 MG/ML
2 INJECTION, SOLUTION INTRAMUSCULAR; INTRAVENOUS ONCE
Status: COMPLETED | OUTPATIENT
Start: 2022-08-05 | End: 2022-08-05

## 2022-08-05 RX ORDER — INSULIN ASPART 100 [IU]/ML
0-14 INJECTION, SOLUTION INTRAVENOUS; SUBCUTANEOUS
Status: DISCONTINUED | OUTPATIENT
Start: 2022-08-06 | End: 2022-08-06 | Stop reason: HOSPADM

## 2022-08-05 RX ORDER — POTASSIUM CHLORIDE, DEXTROSE MONOHYDRATE AND SODIUM CHLORIDE 300; 5; 900 MG/100ML; G/100ML; MG/100ML
150 INJECTION, SOLUTION INTRAVENOUS CONTINUOUS PRN
Status: DISCONTINUED | OUTPATIENT
Start: 2022-08-05 | End: 2022-08-05

## 2022-08-05 RX ORDER — SODIUM CHLORIDE 0.9 % (FLUSH) 0.9 %
10 SYRINGE (ML) INJECTION AS NEEDED
Status: DISCONTINUED | OUTPATIENT
Start: 2022-08-05 | End: 2022-08-05

## 2022-08-05 RX ORDER — ONDANSETRON 2 MG/ML
4 INJECTION INTRAMUSCULAR; INTRAVENOUS ONCE
Status: COMPLETED | OUTPATIENT
Start: 2022-08-05 | End: 2022-08-05

## 2022-08-05 RX ORDER — ACETAMINOPHEN 160 MG/5ML
650 SOLUTION ORAL EVERY 4 HOURS PRN
Status: DISCONTINUED | OUTPATIENT
Start: 2022-08-05 | End: 2022-08-06 | Stop reason: HOSPADM

## 2022-08-05 RX ORDER — DEXTROSE AND SODIUM CHLORIDE 5; .9 G/100ML; G/100ML
150 INJECTION, SOLUTION INTRAVENOUS CONTINUOUS PRN
Status: DISCONTINUED | OUTPATIENT
Start: 2022-08-05 | End: 2022-08-05

## 2022-08-05 RX ORDER — DEXTROSE, SODIUM CHLORIDE, AND POTASSIUM CHLORIDE 5; .9; .15 G/100ML; G/100ML; G/100ML
150 INJECTION INTRAVENOUS CONTINUOUS PRN
Status: DISCONTINUED | OUTPATIENT
Start: 2022-08-05 | End: 2022-08-05

## 2022-08-05 RX ORDER — SODIUM CHLORIDE 0.9 % (FLUSH) 0.9 %
10 SYRINGE (ML) INJECTION EVERY 12 HOURS SCHEDULED
Status: DISCONTINUED | OUTPATIENT
Start: 2022-08-05 | End: 2022-08-05

## 2022-08-05 RX ADMIN — MAGNESIUM SULFATE HEPTAHYDRATE 2 G: 40 INJECTION, SOLUTION INTRAVENOUS at 22:29

## 2022-08-05 RX ADMIN — MORPHINE SULFATE 2 MG: 2 INJECTION, SOLUTION INTRAMUSCULAR; INTRAVENOUS at 17:09

## 2022-08-05 RX ADMIN — ONDANSETRON HYDROCHLORIDE 4 MG: 4 TABLET, FILM COATED ORAL at 23:29

## 2022-08-05 RX ADMIN — POTASSIUM CHLORIDE, DEXTROSE MONOHYDRATE AND SODIUM CHLORIDE 150 ML/HR: 150; 5; 450 INJECTION, SOLUTION INTRAVENOUS at 18:01

## 2022-08-05 RX ADMIN — MORPHINE SULFATE 2 MG: 2 INJECTION, SOLUTION INTRAMUSCULAR; INTRAVENOUS at 20:26

## 2022-08-05 RX ADMIN — ONDANSETRON 4 MG: 2 INJECTION INTRAMUSCULAR; INTRAVENOUS at 11:16

## 2022-08-05 RX ADMIN — ONDANSETRON 4 MG: 2 INJECTION INTRAMUSCULAR; INTRAVENOUS at 13:35

## 2022-08-05 RX ADMIN — MORPHINE SULFATE 2 MG: 2 INJECTION, SOLUTION INTRAMUSCULAR; INTRAVENOUS at 13:42

## 2022-08-05 RX ADMIN — SODIUM CHLORIDE 4.9 UNITS/HR: 9 INJECTION, SOLUTION INTRAVENOUS at 13:10

## 2022-08-05 RX ADMIN — MAGNESIUM SULFATE HEPTAHYDRATE 2 G: 40 INJECTION, SOLUTION INTRAVENOUS at 18:18

## 2022-08-05 RX ADMIN — Medication 10 ML: at 16:24

## 2022-08-05 RX ADMIN — MORPHINE SULFATE 2 MG: 2 INJECTION, SOLUTION INTRAMUSCULAR; INTRAVENOUS at 11:16

## 2022-08-05 RX ADMIN — SODIUM CHLORIDE 1000 ML: 9 INJECTION, SOLUTION INTRAVENOUS at 12:16

## 2022-08-05 RX ADMIN — SODIUM CHLORIDE 1000 ML: 9 INJECTION, SOLUTION INTRAVENOUS at 11:17

## 2022-08-05 RX ADMIN — SODIUM CHLORIDE 250 ML/HR: 4.5 INJECTION, SOLUTION INTRAVENOUS at 13:07

## 2022-08-05 RX ADMIN — MORPHINE SULFATE 2 MG: 2 INJECTION, SOLUTION INTRAMUSCULAR; INTRAVENOUS at 23:28

## 2022-08-05 RX ADMIN — CLONAZEPAM 0.25 MG: 0.5 TABLET ORAL at 15:10

## 2022-08-05 RX ADMIN — MAGNESIUM SULFATE HEPTAHYDRATE 2 G: 40 INJECTION, SOLUTION INTRAVENOUS at 20:25

## 2022-08-05 NOTE — H&P
[facility] Medicine Admission      Date of Admission: 2022      Primary Care Physician: Rufus Marshall APRN      Chief Complaint: Nausea and vomiting    HPI: Patient is a 22-year-old female with past medical history notable for type 1 diabetes mellitus, ADHD, PTSD, and seizure disorder who presented to the emergency department due to a 1 day history of feeling poorly.  Patient notes that she was having difficulties with hyperglycemia yesterday and states that she has been under a lot of stress here lately.  She notes she developed nausea, vomiting, and abdominal pain earlier today.  Patient was evaluated in the emergency department and found to have DKA.  Admission was requested for ongoing care and therapy.    Concurrent Medical History:  has a past medical history of ADHD, Bipolar 1 disorder (HCC), Borderline personality disorder (HCC), Cannabinoid hyperemesis syndrome, Diabetes mellitus type 1 (HCC), Diabetic gastroparesis (HCC), Diabetic ketoacidosis (HCC), Diabetic neuropathy (HCC), History of transfusion, Post traumatic stress disorder (PTSD), Recurrent UTI, Seizure disorder (HCC), Severe depressed bipolar II disorder without psychotic features (HCC), and Uncontrolled diabetes mellitus.    Past Surgical History:  has a past surgical history that includes  section (N/A, 2018) and Cystoscopy (N/A, 2020).    Family History: family history includes Dementia in her maternal grandfather; Drug abuse in her father; Hypertension in her paternal grandmother; Suicide Attempts in her brother.  No changes    Social History:  reports that she has quit smoking. She has a 2.00 pack-year smoking history. She has never used smokeless tobacco. She reports current drug use. Frequency: 1.00 time per week. Drug: Marijuana. She reports that she does not drink alcohol.    Allergies:   Allergies   Allergen Reactions   • Pineapple Anaphylaxis   • Benzoyl Peroxide  Swelling       Medications:   Prior to Admission medications    Medication Sig Start Date End Date Taking? Authorizing Provider   Alcohol Swabs (Alcohol Wipes) 70 % pads Use 4 x daily 6/27/22  Yes Tavon Avila MD   B-D ULTRA-FINE 33 LANCETS misc Use 4 x daily , any brand covered by insurance 6/27/22  Yes Tavon Avila MD   Blood Glucose Monitoring Suppl w/Device kit USE AS INDICATED, ANY MONITOR , ICD10 code is E11.9 6/27/22  Yes Tavon Avila MD   clonazePAM (KlonoPIN) 0.5 MG tablet Take 0.5 tablets by mouth 2 (Two) Times a Day As Needed for Anxiety. 6/27/22  Yes Taovn Avila MD   Continuous Blood Gluc Sensor (Dexcom G6 Sensor) As Needed (glucose control). Every 10 days,  Use as directed for continuous glucose monitoring 6/27/22  Yes Tavon Avila MD   Glucose Blood (BLOOD GLUCOSE TEST) strip Use 4 x daily use any brand covered by insurance or same brand as before ICD10 code is E11.9 10/31/19  Yes Tavon Avila MD   insulin aspart (NovoLOG FlexPen) 100 UNIT/ML solution pen-injector sc pen 10 units tid 6/27/22  Yes Tavon Avila MD   insulin detemir (Levemir FlexTouch) 100 UNIT/ML injection 22 units daily 6/27/22  Yes Tavon Avila MD   Insulin Pen Needle (Pen Needles) 32G X 4 MM misc 1 each 4 (Four) Times a Day. Use 4 x daily, Dx code E11.65 6/27/22  Yes Tavon Avila MD   Glucagon (Baqsimi One Pack) 3 MG/DOSE powder 1 each into the nostril(s) as directed by provider As Needed (Hypoglycemia). Apply intranasal if hypoglycemia 6/27/22   Tavon Avila MD   Insulin Aspart (novoLOG) 100 UNIT/ML injection 100 units daily through pump 6/27/22   Tavon Avila MD   Lancet Devices (Lancing Device) misc USE AS INDICATED TO CORRELATE WITH STRIPS AND METER 6/27/22   Tavon Avila MD   metoclopramide (REGLAN) 10 MG tablet Take up to 2 tabs po TID 6/27/22   Tavon Avila MD    promethazine (PHENERGAN) 12.5 MG tablet Take 1 tablet by mouth Every 6 (Six) Hours As Needed for Nausea or Vomiting. 6/27/22   Tavon Avila MD   Urine Glucose-Ketones Test strip Use as indicated 6/27/22   Tavon Avila MD       Review of Systems:  Review of Systems   Constitutional: Positive for activity change, appetite change and fatigue. Negative for chills and fever.   HENT: Negative for congestion.    Respiratory: Negative.    Cardiovascular: Negative.    Gastrointestinal: Positive for abdominal pain, nausea and vomiting.   Genitourinary: Negative.    Skin: Negative.    Neurological: Negative.    Psychiatric/Behavioral: Negative.    All other systems reviewed and are negative.     Otherwise complete ROS is negative except as mentioned above.    Physical Exam:   Temp:  [97.5 °F (36.4 °C)-97.8 °F (36.6 °C)] 97.8 °F (36.6 °C)  Heart Rate:  [115-127] 115  Resp:  [18-20] 18  BP: ()/(50-90) 90/50  Physical Exam  Constitutional:       General: She is not in acute distress.     Appearance: She is not toxic-appearing.   HENT:      Head: Normocephalic and atraumatic.      Right Ear: External ear normal.      Left Ear: External ear normal.      Nose: Nose normal.      Mouth/Throat:      Mouth: Mucous membranes are moist.      Pharynx: Oropharynx is clear.   Eyes:      Conjunctiva/sclera: Conjunctivae normal.   Cardiovascular:      Rate and Rhythm: Normal rate and regular rhythm.      Pulses: Normal pulses.      Heart sounds: Normal heart sounds.   Pulmonary:      Effort: Pulmonary effort is normal. No respiratory distress.      Breath sounds: Normal breath sounds.   Abdominal:      General: Bowel sounds are normal.      Palpations: Abdomen is soft.      Tenderness: There is no abdominal tenderness.   Musculoskeletal:         General: No deformity.   Skin:     General: Skin is warm and dry.      Capillary Refill: Capillary refill takes 2 to 3 seconds.   Neurological:      General: No focal  deficit present.      Mental Status: She is alert and oriented to person, place, and time. Mental status is at baseline.   Psychiatric:         Behavior: Behavior normal.         Thought Content: Thought content normal.           Results Reviewed:  I have personally reviewed current lab, radiology, and data and agree with results.  Lab Results (last 24 hours)     Procedure Component Value Units Date/Time    POC Glucose Once [[id number]]  (Abnormal) Collected: 08/05/22 1338    Specimen: Blood Updated: 08/05/22 1514     Glucose 525 mg/dL      Comment: : [id number] MARCOS PETERSENLEYMeter ID: [id number]       POC Glucose Once [[id number]]  (Abnormal) Collected: 08/05/22 1307    Specimen: Blood Updated: 08/05/22 1514     Glucose 551 mg/dL      Comment: RN NotifiedOperator: [id number] CEZAR BAILEYMeter ID: [id number]       POC Glucose Once [[id number]]  (Abnormal) Collected: 08/05/22 1214    Specimen: Blood Updated: 08/05/22 1514     Glucose 581 mg/dL      Comment: RN NotifiedOperator: [id number] ANTONI CHRISTIANMeter ID: [id number]       Phosphorus [[id number]]  (Abnormal) Collected: 08/05/22 1120    Specimen: Blood Updated: 08/05/22 1331     Phosphorus 4.7 mg/dL     Magnesium [[id number]]  (Normal) Collected: 08/05/22 1120    Specimen: Blood Updated: 08/05/22 1331     Magnesium 1.8 mg/dL     Urine Drug Screen - Urine, Clean Catch [[id number]]  (Abnormal) Collected: 08/05/22 1152    Specimen: Urine, Clean Catch Updated: 08/05/22 1255     THC, Screen, Urine Negative     Phencyclidine (PCP), Urine Negative     Cocaine Screen, Urine Negative     Methamphetamine, Ur Negative     Opiate Screen Positive     Amphetamine Screen, Urine Negative     Benzodiazepine Screen, Urine Negative     Tricyclic Antidepressants Screen Negative     Methadone Screen, Urine Negative     Barbiturates Screen, Urine Negative     Oxycodone Screen, Urine Negative     Propoxyphene Screen Negative     Buprenorphine, Screen, Urine Negative     Narrative:      Cutoff For Drugs Screened:    Amphetamines               500 ng/ml  Barbiturates               200 ng/ml  Benzodiazepines            150 ng/ml  Cocaine                    150 ng/ml  Methadone                  200 ng/ml  Opiates                    100 ng/ml  Phencyclidine               25 ng/ml  THC                            50 ng/ml  Methamphetamine            500 ng/ml  Tricyclic Antidepressants  300 ng/ml  Oxycodone                  100 ng/ml  Propoxyphene               300 ng/ml  Buprenorphine               10 ng/ml    The normal value for all drugs tested is negative. This report includes unconfirmed screening results, with the cutoff values listed, to be used for medical treatment purposes only.  Unconfirmed results must not be used for non-medical purposes such as employment or legal testing.  Clinical consideration should be applied to any drug of abuse test, particularly when unconfirmed results are used.      Hemoglobin A1c [[id number]]  (Abnormal) Collected: 08/05/22 1120    Specimen: Blood Updated: 08/05/22 1252     Hemoglobin A1C 10.90 %     Narrative:      Hemoglobin A1C Ranges:    Increased Risk for Diabetes  5.7% to 6.4%  Diabetes                     >= 6.5%  Diabetic Goal                < 7.0%    Extra Tubes [[id number]] Collected: 08/05/22 1120    Specimen: Blood, Venous Line Updated: 08/05/22 1232    Narrative:      The following orders were created for panel order Extra Tubes.  Procedure                               Abnormality         Status                     ---------                               -----------         ------                     Light Blue Top[[id number]]                                   Final result                 Please view results for these tests on the individual orders.    Light Blue Top [[id number]] Collected: 08/05/22 1120    Specimen: Blood Updated: 08/05/22 1232     Extra Tube Hold for add-ons.     Comment: Auto resulted       Pregnancy, Urine - Urine,  Clean Catch [[id number]]  (Normal) Collected: 08/05/22 1152    Specimen: Urine, Clean Catch Updated: 08/05/22 1206     HCG, Urine QL Negative    Comprehensive Metabolic Panel [[id number]]  (Abnormal) Collected: 08/05/22 1120    Specimen: Blood Updated: 08/05/22 1205     Glucose 668 mg/dL      BUN 30 mg/dL      Creatinine 1.25 mg/dL      Sodium 131 mmol/L      Potassium 5.4 mmol/L      Comment: Slight hemolysis detected by analyzer. Results may be affected.        Chloride 90 mmol/L      CO2 12.0 mmol/L      Calcium 10.4 mg/dL      Total Protein 7.8 g/dL      Albumin 4.50 g/dL      ALT (SGPT) 18 U/L      AST (SGOT) 18 U/L      Comment: Slight hemolysis detected by analyzer. Results may be affected.        Alkaline Phosphatase 85 U/L      Total Bilirubin 0.6 mg/dL      Globulin 3.3 gm/dL      A/G Ratio 1.4 g/dL      BUN/Creatinine Ratio 24.0     Anion Gap 29.0 mmol/L      eGFR 62.2 mL/min/1.73      Comment: National Kidney Foundation and American Society of Nephrology (ASN) Task Force recommended calculation based on the Chronic Kidney Disease Epidemiology Collaboration (CKD-EPI) equation refit without adjustment for race.       Narrative:      GFR Normal >60  Chronic Kidney Disease <60  Kidney Failure <15      Urinalysis With Culture If Indicated - Urine, Clean Catch [[id number]]  (Abnormal) Collected: 08/05/22 1152    Specimen: Urine, Clean Catch Updated: 08/05/22 1201     Color, UA Yellow     Appearance, UA Clear     pH, UA 5.5     Specific Gravity, UA 1.025     Glucose, UA >=1000 mg/dL (3+)     Ketones, UA >=160 mg/dL (4+)     Bilirubin, UA Negative     Blood, UA Negative     Protein, UA Negative     Leuk Esterase, UA Negative     Nitrite, UA Negative     Urobilinogen, UA 0.2 E.U./dL    Narrative:      In absence of clinical symptoms, the presence of pyuria, bacteria, and/or nitrites on the urinalysis result does not correlate with infection.  Urine microscopic not indicated.    CK [[id number]]  (Normal) Collected:  08/05/22 1120    Specimen: Blood Updated: 08/05/22 1153     Creatine Kinase 37 U/L     Acetone [[id number]]  (Abnormal) Collected: 08/05/22 1120    Specimen: Blood Updated: 08/05/22 1141     Acetone Large    Blood Gas, Arterial - [[id number]]  (Abnormal) Collected: 08/05/22 1134    Specimen: Arterial Blood Updated: 08/05/22 1136     Site Right Brachial     Drew's Test N/A     pH, Arterial 7.264 pH units      Comment: 84 Value below reference range        pCO2, Arterial 25.2 mm Hg      Comment: 84 Value below reference range        pO2, Arterial 90.3 mm Hg      HCO3, Arterial 11.4 mmol/L      Comment: 84 Value below reference range        Base Excess, Arterial -14.0 mmol/L      Comment: 84 Value below reference range        O2 Saturation, Arterial 96.9 %      Barometric Pressure for Blood Gas 749 mmHg      Modality Room Air     FIO2 <21 %      Ventilator Mode NA     Collected by [facility]     Comment: Meter: [id number]     :  [id number]       COVID-19 and FLU A/B PCR - Swab, Nasopharynx [[id number]]  (Normal) Collected: 08/05/22 1104    Specimen: Swab from Nasopharynx Updated: 08/05/22 1135     COVID19 Not Detected     Influenza A PCR Not Detected     Influenza B PCR Not Detected    Narrative:      Fact sheet for providers: https://www.fda.gov/media/135433/download    Fact sheet for patients: https://www.fda.gov/media/673210/download    Test performed by PCR.    CBC & Differential [[id number]]  (Abnormal) Collected: 08/05/22 1120    Specimen: Blood Updated: 08/05/22 1133    Narrative:      The following orders were created for panel order CBC & Differential.  Procedure                               Abnormality         Status                     ---------                               -----------         ------                     CBC Auto Differential[[id number]]        Abnormal            Final result                 Please view results for these tests on the individual orders.    CBC Auto Differential [[id number]]   (Abnormal) Collected: 08/05/22 1120    Specimen: Blood Updated: 08/05/22 1133     WBC 14.74 10*3/mm3      RBC 4.25 10*6/mm3      Hemoglobin 12.9 g/dL      Hematocrit 36.8 %      MCV 86.6 fL      MCH 30.4 pg      MCHC 35.1 g/dL      RDW 11.8 %      RDW-SD 37.4 fl      MPV 11.2 fL      Platelets 379 10*3/mm3      Neutrophil % 78.5 %      Lymphocyte % 15.1 %      Monocyte % 2.9 %      Eosinophil % 1.4 %      Basophil % 0.7 %      Immature Grans % 1.4 %      Neutrophils, Absolute 11.56 10*3/mm3      Lymphocytes, Absolute 2.23 10*3/mm3      Monocytes, Absolute 0.43 10*3/mm3      Eosinophils, Absolute 0.21 10*3/mm3      Basophils, Absolute 0.11 10*3/mm3      Immature Grans, Absolute 0.20 10*3/mm3      nRBC 0.0 /100 WBC         Imaging Results (Last 24 Hours)     ** No results found for the last 24 hours. **            Assessment:    Active Hospital Problems    Diagnosis    • Diabetic ketoacidosis without coma associated with type 1 diabetes mellitus (HCC)              Plan:  - Admit for further care  - Continue insulin infusion and Glucomander  - Electrolyte replacement protocol available  - N.p.o. except for sips and chips  - Home medications as appropriate  - Pain meds and antiemetics available  - DVT prophylaxis not indicated given low risk factors and young age  - CODE STATUS: Full    I confirmed that the patient's Advance Care Plan is present, code status is documented, or surrogate decision maker is listed in the patient's medical record.     I have utilized all available immediate resources to obtain, update, or review the patient's current medications.     I discussed the patient's findings and my recommendations with: The patient and significant other bedside as well as nursing.    Fernando Colunga MD

## 2022-08-05 NOTE — PAYOR COMM NOTE
"  [facility]  Case Managment Extender   [place] B[place]  (P) [phone]  (F) [phone]    REF# [id number]  Fernanda Patterson (22 y.o. Female)             Date of Birth   2000    Social Security Number       Address   [address] [place] [address]    Home Phone   [phone]    MRN   [id number]       Congregation   None    Marital Status   Single                            Admission Date   8/5/22    Admission Type   Emergency    Admitting Provider   Fernando Colunga MD    Attending Provider   Shivam Cota DO    Department, Room/Bed   [facility] EMERGENCY DEPARTMENT, 16/16       Discharge Date       Discharge Disposition       Discharge Destination                               Attending Provider: Shivam Cota DO    Allergies: Pineapple, Benzoyl Peroxide    Isolation: None   Infection: COVID (rule out) (08/05/22)   Code Status: Prior   Advance Care Planning Activity    Ht: 170.2 cm (67\")   Wt: 52.5 kg (115 lb 11.2 oz)    Admission Cmt: None   Principal Problem: None                Active Insurance as of 8/5/2022     Primary Coverage     Payor Plan Insurance Group Employer/Plan Group    [place] [id number]     Payor Plan Address Payor Plan Phone Number Payor Plan Fax Number Effective Dates    [employer] [address] [address] [phone]  1/1/2021 - None Entered    [place] [id number]       Subscriber Name Subscriber Birth Date Member ID       FERNANDA PATTERSON 2000 [id number]                 Emergency Contacts          No emergency contacts on file.            History & Physical    No notes of this type exist for this encounter.            Emergency Department Notes      Sofya Perry, APRN at 08/05/22 1157          Subjective   22 year old female patient presents to ER with complaint of body aches, vomiting, and HIGH glucometer readings for past 2 days. She has a known hx of DM1 and has had DKA in the past. " "She denies known fever or COVID contacts. She vapes, denies ETOH use, and uses marijuana weekly. She reports that her LMP ended 2 days ago.           Review of Systems   Constitutional: Negative.  Negative for fever.   HENT: Negative.    Eyes: Negative.    Respiratory: Negative.    Cardiovascular: Negative.    Gastrointestinal: Positive for nausea and vomiting.   Endocrine: Negative.    Genitourinary: Negative.    Musculoskeletal: Positive for myalgias.   Skin: Negative.    Allergic/Immunologic: Negative.    Neurological: Negative.    Hematological: Negative.    Psychiatric/Behavioral: Negative.        Past Medical History:   Diagnosis Date   • ADHD    • Bipolar 1 disorder (HCC)    • Borderline personality disorder (HCC)    • Cannabinoid hyperemesis syndrome    • Diabetes mellitus type 1 (HCC)    • Diabetic gastroparesis (HCC)    • Diabetic ketoacidosis (HCC)    • Diabetic neuropathy (HCC)    • History of transfusion     Pt reports \"no reactions.\"   • Post traumatic stress disorder (PTSD)    • Recurrent UTI    • Seizure disorder (HCC)    • Severe depressed bipolar II disorder without psychotic features (HCC)    • Uncontrolled diabetes mellitus        Allergies   Allergen Reactions   • Pineapple Anaphylaxis   • Benzoyl Peroxide Swelling       Past Surgical History:   Procedure Laterality Date   •  SECTION N/A 2018   • CYSTOSCOPY N/A 2020    Procedure: CYSTOSCOPY FLEXIBLE       (DONE ON STRETCHER);  Surgeon: Chapo, Antonio PRYOR MD;  Location: [facility];  Service: Urology;  Laterality: N/A;       Family History   Problem Relation Age of Onset   • Drug abuse Father    • Suicide Attempts Brother    • Dementia Maternal Grandfather    • Hypertension Paternal Grandmother        Social History     Socioeconomic History   • Marital status: Single   Tobacco Use   • Smoking status: Former Smoker     Packs/day: 1.00     Years: 2.00     Pack years: 2.00   • Smokeless tobacco: Never Used   • Tobacco comment: uses E " "cig   Vaping Use   • Vaping Use: Every day   Substance and Sexual Activity   • Alcohol use: No     Comment: SOCIALLY   • Drug use: Yes     Frequency: 1.0 times per week     Types: Marijuana   • Sexual activity: Yes     Partners: Male     Birth control/protection: None           Objective    /57 (BP Location: Right arm, Patient Position: Sitting)   Pulse (!) 122   Temp 97.5 °F (36.4 °C) (Oral)   Resp 18   Ht 170.2 cm (67\")   Wt 52.5 kg (115 lb 11.2 oz)   LMP 08/01/2022 (Approximate)   SpO2 100%   BMI 18.12 kg/m²     Physical Exam  Vitals and nursing note reviewed.   Constitutional:       General: She is not in acute distress.     Appearance: Normal appearance. She is ill-appearing. She is not toxic-appearing or diaphoretic.   HENT:      Head: Normocephalic.      Nose: Nose normal.      Mouth/Throat:      Mouth: Mucous membranes are moist.   Eyes:      Pupils: Pupils are equal, round, and reactive to light.   Cardiovascular:      Rate and Rhythm: Regular rhythm. Tachycardia present.      Pulses: Normal pulses.      Heart sounds: Normal heart sounds.   Pulmonary:      Effort: Pulmonary effort is normal. No respiratory distress.      Breath sounds: Normal breath sounds. No wheezing.   Abdominal:      General: Bowel sounds are normal. There is no distension.      Palpations: Abdomen is soft.   Musculoskeletal:         General: Normal range of motion.      Cervical back: Normal range of motion.   Skin:     General: Skin is warm and dry.      Capillary Refill: Capillary refill takes less than 2 seconds.      Coloration: Skin is pale.   Neurological:      Mental Status: She is alert and oriented to person, place, and time.   Psychiatric:         Mood and Affect: Mood normal.         Behavior: Behavior normal.         Thought Content: Thought content normal.         Judgment: Judgment normal.         Procedures          ED Course  ED Course as of 08/05/22 1219   Fri Aug 05, 2022   1216 Spoke with Dr. Colunga who " agrees with admission [HS]   1219 Spoke with patient who is aware of diagnosis and admission and is agreeable. [HS]      ED Course User Index  [HS] Sofya Perry, ARMOND      Results for orders placed or performed during the hospital encounter of 08/05/22   COVID-19 and FLU A/B PCR - Swab, Nasopharynx    Specimen: Nasopharynx; Swab   Result Value Ref Range    COVID19 Not Detected Not Detected - Ref. Range    Influenza A PCR Not Detected Not Detected    Influenza B PCR Not Detected Not Detected   Comprehensive Metabolic Panel    Specimen: Blood   Result Value Ref Range    Glucose 668 (C) 65 - 99 mg/dL    BUN 30 (H) 6 - 20 mg/dL    Creatinine 1.25 (H) 0.57 - 1.00 mg/dL    Sodium 131 (L) 136 - 145 mmol/L    Potassium 5.4 (H) 3.5 - 5.2 mmol/L    Chloride 90 (L) 98 - 107 mmol/L    CO2 12.0 (L) 22.0 - 29.0 mmol/L    Calcium 10.4 8.6 - 10.5 mg/dL    Total Protein 7.8 6.0 - 8.5 g/dL    Albumin 4.50 3.50 - 5.20 g/dL    ALT (SGPT) 18 1 - 33 U/L    AST (SGOT) 18 1 - 32 U/L    Alkaline Phosphatase 85 39 - 117 U/L    Total Bilirubin 0.6 0.0 - 1.2 mg/dL    Globulin 3.3 gm/dL    A/G Ratio 1.4 g/dL    BUN/Creatinine Ratio 24.0 7.0 - 25.0    Anion Gap 29.0 (H) 5.0 - 15.0 mmol/L    eGFR 62.2 >60.0 mL/min/1.73   Pregnancy, Urine - Urine, Clean Catch    Specimen: Urine, Clean Catch   Result Value Ref Range    HCG, Urine QL Negative Negative   Urinalysis With Culture If Indicated - Urine, Clean Catch    Specimen: Urine, Clean Catch   Result Value Ref Range    Color, UA Yellow Yellow, Straw, Dark Yellow, [place]    Appearance, UA Clear Clear    pH, UA 5.5 5.0 - 9.0    Specific Gravity, UA 1.025 1.003 - 1.030    Glucose, UA >=1000 mg/dL (3+) (A) Negative    Ketones, UA >=160 mg/dL (4+) (A) Negative    Bilirubin, UA Negative Negative    Blood, UA Negative Negative    Protein, UA Negative Negative    Leuk Esterase, UA Negative Negative    Nitrite, UA Negative Negative    Urobilinogen, UA 0.2 E.U./dL 0.2 - 1.0 E.U./dL   CK    Specimen:  Blood   Result Value Ref Range    Creatine Kinase 37 20 - 180 U/L   CBC Auto Differential    Specimen: Blood   Result Value Ref Range    WBC 14.74 (H) 3.40 - 10.80 10*3/mm3    RBC 4.25 3.77 - 5.28 10*6/mm3    Hemoglobin 12.9 12.0 - 15.9 g/dL    Hematocrit 36.8 34.0 - 46.6 %    MCV 86.6 79.0 - 97.0 fL    MCH 30.4 26.6 - 33.0 pg    MCHC 35.1 31.5 - 35.7 g/dL    RDW 11.8 (L) 12.3 - 15.4 %    RDW-SD 37.4 37.0 - 54.0 fl    MPV 11.2 6.0 - 12.0 fL    Platelets 379 140 - 450 10*3/mm3    Neutrophil % 78.5 (H) 42.7 - 76.0 %    Lymphocyte % 15.1 (L) 19.6 - 45.3 %    Monocyte % 2.9 (L) 5.0 - 12.0 %    Eosinophil % 1.4 0.3 - 6.2 %    Basophil % 0.7 0.0 - 1.5 %    Immature Grans % 1.4 (H) 0.0 - 0.5 %    Neutrophils, Absolute 11.56 (H) 1.70 - 7.00 10*3/mm3    Lymphocytes, Absolute 2.23 0.70 - 3.10 10*3/mm3    Monocytes, Absolute 0.43 0.10 - 0.90 10*3/mm3    Eosinophils, Absolute 0.21 0.00 - 0.40 10*3/mm3    Basophils, Absolute 0.11 0.00 - 0.20 10*3/mm3    Immature Grans, Absolute 0.20 (H) 0.00 - 0.05 10*3/mm3    nRBC 0.0 0.0 - 0.2 /100 WBC   Acetone    Specimen: Blood   Result Value Ref Range    Acetone Large (A) Negative   Blood Gas, Arterial -    Specimen: Arterial Blood   Result Value Ref Range    Site Right Brachial     Drew's Test N/A     pH, Arterial 7.264 (C) 7.350 - 7.450 pH units    pCO2, Arterial 25.2 (L) 35.0 - 45.0 mm Hg    pO2, Arterial 90.3 83.0 - 108.0 mm Hg    HCO3, Arterial 11.4 (L) 20.0 - 26.0 mmol/L    Base Excess, Arterial -14.0 (L) 0.0 - 2.0 mmol/L    O2 Saturation, Arterial 96.9 94.0 - 99.0 %    Barometric Pressure for Blood Gas 749 mmHg    Modality Room Air     FIO2 <21 %    Ventilator Mode NA     Collected by [facility]           No Radiology Exams Resulted Within Past 24 Hours                                  MDM    Final diagnoses:   Diabetic ketoacidosis without coma associated with type 1 diabetes mellitus (HCC)       ED Disposition  ED Disposition     ED Disposition   Decision to Admit    Condition   --     Comment   Level of Care: Critical Care [6]   Diagnosis: Diabetic ketoacidosis without coma associated with type 1 diabetes mellitus (HCC) [3911135]   Certification: I Certify That Inpatient Hospital Services Are Medically Necessary For Greater Than 2 Midnights               No follow-up provider specified.       Medication List      No changes were made to your prescriptions during this visit.          Sofya Perry APRN  08/05/22 1219       Sofya Perry APRN  08/05/22 1219      Electronically signed by Sofya Perry APRN at 08/05/22 1219     Regan Lamar, RN at 08/05/22 1208        Corrected Na 140.088    Electronically signed by Regan Lamar, RN at 08/05/22 1209         Lab Results (last 24 hours)     Procedure Component Value Units Date/Time    Urine Drug Screen - Urine, Clean Catch [[id number]]  (Abnormal) Collected: 08/05/22 1152    Specimen: Urine, Clean Catch Updated: 08/05/22 1255     THC, Screen, Urine Negative     Phencyclidine (PCP), Urine Negative     Cocaine Screen, Urine Negative     Methamphetamine, Ur Negative     Opiate Screen Positive     Amphetamine Screen, Urine Negative     Benzodiazepine Screen, Urine Negative     Tricyclic Antidepressants Screen Negative     Methadone Screen, Urine Negative     Barbiturates Screen, Urine Negative     Oxycodone Screen, Urine Negative     Propoxyphene Screen Negative     Buprenorphine, Screen, Urine Negative    Narrative:      Cutoff For Drugs Screened:    Amphetamines               500 ng/ml  Barbiturates               200 ng/ml  Benzodiazepines            150 ng/ml  Cocaine                    150 ng/ml  Methadone                  200 ng/ml  Opiates                    100 ng/ml  Phencyclidine               25 ng/ml  THC                            50 ng/ml  Methamphetamine            500 ng/ml  Tricyclic Antidepressants  300 ng/ml  Oxycodone                  100 ng/ml  Propoxyphene               300 ng/ml  Buprenorphine               10  ng/ml    The normal value for all drugs tested is negative. This report includes unconfirmed screening results, with the cutoff values listed, to be used for medical treatment purposes only.  Unconfirmed results must not be used for non-medical purposes such as employment or legal testing.  Clinical consideration should be applied to any drug of abuse test, particularly when unconfirmed results are used.      Hemoglobin A1c [[id number]]  (Abnormal) Collected: 08/05/22 1120    Specimen: Blood Updated: 08/05/22 1252     Hemoglobin A1C 10.90 %     Narrative:      Hemoglobin A1C Ranges:    Increased Risk for Diabetes  5.7% to 6.4%  Diabetes                     >= 6.5%  Diabetic Goal                < 7.0%    Extra Tubes [[id number]] Collected: 08/05/22 1120    Specimen: Blood, Venous Line Updated: 08/05/22 1232    Narrative:      The following orders were created for panel order Extra Tubes.  Procedure                               Abnormality         Status                     ---------                               -----------         ------                     Light Blue Top[[id number]]                                   Final result                 Please view results for these tests on the individual orders.    Light Blue Top [[id number]] Collected: 08/05/22 1120    Specimen: Blood Updated: 08/05/22 1232     Extra Tube Hold for add-ons.     Comment: Auto resulted       Pregnancy, Urine - Urine, Clean Catch [[id number]]  (Normal) Collected: 08/05/22 1152    Specimen: Urine, Clean Catch Updated: 08/05/22 1206     HCG, Urine QL Negative    Comprehensive Metabolic Panel [[id number]]  (Abnormal) Collected: 08/05/22 1120    Specimen: Blood Updated: 08/05/22 1205     Glucose 668 mg/dL      BUN 30 mg/dL      Creatinine 1.25 mg/dL      Sodium 131 mmol/L      Potassium 5.4 mmol/L      Comment: Slight hemolysis detected by analyzer. Results may be affected.        Chloride 90 mmol/L      CO2 12.0 mmol/L      Calcium 10.4 mg/dL       Total Protein 7.8 g/dL      Albumin 4.50 g/dL      ALT (SGPT) 18 U/L      AST (SGOT) 18 U/L      Comment: Slight hemolysis detected by analyzer. Results may be affected.        Alkaline Phosphatase 85 U/L      Total Bilirubin 0.6 mg/dL      Globulin 3.3 gm/dL      A/G Ratio 1.4 g/dL      BUN/Creatinine Ratio 24.0     Anion Gap 29.0 mmol/L      eGFR 62.2 mL/min/1.73      Comment: National Kidney Foundation and American Society of Nephrology (ASN) Task Force recommended calculation based on the Chronic Kidney Disease Epidemiology Collaboration (CKD-EPI) equation refit without adjustment for race.       Narrative:      GFR Normal >60  Chronic Kidney Disease <60  Kidney Failure <15      Urinalysis With Culture If Indicated - Urine, Clean Catch [[id number]]  (Abnormal) Collected: 08/05/22 1152    Specimen: Urine, Clean Catch Updated: 08/05/22 1201     Color, UA Yellow     Appearance, UA Clear     pH, UA 5.5     Specific Gravity, UA 1.025     Glucose, UA >=1000 mg/dL (3+)     Ketones, UA >=160 mg/dL (4+)     Bilirubin, UA Negative     Blood, UA Negative     Protein, UA Negative     Leuk Esterase, UA Negative     Nitrite, UA Negative     Urobilinogen, UA 0.2 E.U./dL    Narrative:      In absence of clinical symptoms, the presence of pyuria, bacteria, and/or nitrites on the urinalysis result does not correlate with infection.  Urine microscopic not indicated.    CK [[id number]]  (Normal) Collected: 08/05/22 1120    Specimen: Blood Updated: 08/05/22 1153     Creatine Kinase 37 U/L     Acetone [[id number]]  (Abnormal) Collected: 08/05/22 1120    Specimen: Blood Updated: 08/05/22 1141     Acetone Large    Blood Gas, Arterial - [[id number]]  (Abnormal) Collected: 08/05/22 1134    Specimen: Arterial Blood Updated: 08/05/22 1136     Site Right Brachial     Drew's Test N/A     pH, Arterial 7.264 pH units      Comment: 84 Value below reference range        pCO2, Arterial 25.2 mm Hg      Comment: 84 Value below reference range         pO2, Arterial 90.3 mm Hg      HCO3, Arterial 11.4 mmol/L      Comment: 84 Value below reference range        Base Excess, Arterial -14.0 mmol/L      Comment: 84 Value below reference range        O2 Saturation, Arterial 96.9 %      Barometric Pressure for Blood Gas 749 mmHg      Modality Room Air     FIO2 <21 %      Ventilator Mode NA     Collected by [facility]     Comment: Meter: [id number]     :  [id number]       COVID-19 and FLU A/B PCR - Swab, Nasopharynx [[id number]]  (Normal) Collected: 08/05/22 1104    Specimen: Swab from Nasopharynx Updated: 08/05/22 1135     COVID19 Not Detected     Influenza A PCR Not Detected     Influenza B PCR Not Detected    Narrative:      Fact sheet for providers: https://www.fda.gov/media/952731/download    Fact sheet for patients: https://www.fda.gov/media/616489/download    Test performed by PCR.    CBC & Differential [[id number]]  (Abnormal) Collected: 08/05/22 1120    Specimen: Blood Updated: 08/05/22 1133    Narrative:      The following orders were created for panel order CBC & Differential.  Procedure                               Abnormality         Status                     ---------                               -----------         ------                     CBC Auto Differential[[id number]]        Abnormal            Final result                 Please view results for these tests on the individual orders.    CBC Auto Differential [[id number]]  (Abnormal) Collected: 08/05/22 1120    Specimen: Blood Updated: 08/05/22 1133     WBC 14.74 10*3/mm3      RBC 4.25 10*6/mm3      Hemoglobin 12.9 g/dL      Hematocrit 36.8 %      MCV 86.6 fL      MCH 30.4 pg      MCHC 35.1 g/dL      RDW 11.8 %      RDW-SD 37.4 fl      MPV 11.2 fL      Platelets 379 10*3/mm3      Neutrophil % 78.5 %      Lymphocyte % 15.1 %      Monocyte % 2.9 %      Eosinophil % 1.4 %      Basophil % 0.7 %      Immature Grans % 1.4 %      Neutrophils, Absolute 11.56 10*3/mm3      Lymphocytes, Absolute 2.23  10*3/mm3      Monocytes, Absolute 0.43 10*3/mm3      Eosinophils, Absolute 0.21 10*3/mm3      Basophils, Absolute 0.11 10*3/mm3      Immature Grans, Absolute 0.20 10*3/mm3      nRBC 0.0 /100 WBC         Imaging Results (Last 24 Hours)     ** No results found for the last 24 hours. **        ECG/EMG Results (last 24 hours)     ** No results found for the last 24 hours. **        Physician Progress Notes (last 24 hours)  Notes from 08/04/22 1302 through 08/05/22 1302   No notes of this type exist for this encounter.         Medical Student Notes (last 24 hours)  Notes from 08/04/22 1302 through 08/05/22 1302   No notes of this type exist for this encounter.         Consult Notes (last 24 hours)  Notes from 08/04/22 1302 through 08/05/22 1302   No notes of this type exist for this encounter.

## 2022-08-05 NOTE — ED PROVIDER NOTES
"Subjective   22 year old female patient presents to ER with complaint of body aches, vomiting, and HIGH glucometer readings for past 2 days. She has a known hx of DM1 and has had DKA in the past. She denies known fever or COVID contacts. She vapes, denies ETOH use, and uses marijuana weekly. She reports that her LMP ended 2 days ago.           Review of Systems   Constitutional: Negative.  Negative for fever.   HENT: Negative.    Eyes: Negative.    Respiratory: Negative.    Cardiovascular: Negative.    Gastrointestinal: Positive for nausea and vomiting.   Endocrine: Negative.    Genitourinary: Negative.    Musculoskeletal: Positive for myalgias.   Skin: Negative.    Allergic/Immunologic: Negative.    Neurological: Negative.    Hematological: Negative.    Psychiatric/Behavioral: Negative.        Past Medical History:   Diagnosis Date   • ADHD    • Bipolar 1 disorder (HCC)    • Borderline personality disorder (HCC)    • Cannabinoid hyperemesis syndrome    • Diabetes mellitus type 1 (HCC)    • Diabetic gastroparesis (HCC)    • Diabetic ketoacidosis (HCC)    • Diabetic neuropathy (HCC)    • History of transfusion     Pt reports \"no reactions.\"   • Post traumatic stress disorder (PTSD)    • Recurrent UTI    • Seizure disorder (HCC)    • Severe depressed bipolar II disorder without psychotic features (HCC)    • Uncontrolled diabetes mellitus        Allergies   Allergen Reactions   • Pineapple Anaphylaxis   • Benzoyl Peroxide Swelling       Past Surgical History:   Procedure Laterality Date   •  SECTION N/A 2018   • CYSTOSCOPY N/A 2020    Procedure: CYSTOSCOPY FLEXIBLE       (DONE ON STRETCHER);  Surgeon: [place], Antonio PRYOR MD;  Location: [facility];  Service: Urology;  Laterality: N/A;       Family History   Problem Relation Age of Onset   • Drug abuse Father    • Suicide Attempts Brother    • Dementia Maternal Grandfather    • Hypertension Paternal Grandmother        Social History     Socioeconomic " "History   • Marital status: Single   Tobacco Use   • Smoking status: Former Smoker     Packs/day: 1.00     Years: 2.00     Pack years: 2.00   • Smokeless tobacco: Never Used   • Tobacco comment: uses E cig   Vaping Use   • Vaping Use: Every day   Substance and Sexual Activity   • Alcohol use: No     Comment: SOCIALLY   • Drug use: Yes     Frequency: 1.0 times per week     Types: Marijuana   • Sexual activity: Yes     Partners: Male     Birth control/protection: None           Objective    /57 (BP Location: Right arm, Patient Position: Sitting)   Pulse (!) 122   Temp 97.5 °F (36.4 °C) (Oral)   Resp 18   Ht 170.2 cm (67\")   Wt 52.5 kg (115 lb 11.2 oz)   LMP 08/01/2022 (Approximate)   SpO2 100%   BMI 18.12 kg/m²     Physical Exam  Vitals and nursing note reviewed.   Constitutional:       General: She is not in acute distress.     Appearance: Normal appearance. She is ill-appearing. She is not toxic-appearing or diaphoretic.   HENT:      Head: Normocephalic.      Nose: Nose normal.      Mouth/Throat:      Mouth: Mucous membranes are moist.   Eyes:      Pupils: Pupils are equal, round, and reactive to light.   Cardiovascular:      Rate and Rhythm: Regular rhythm. Tachycardia present.      Pulses: Normal pulses.      Heart sounds: Normal heart sounds.   Pulmonary:      Effort: Pulmonary effort is normal. No respiratory distress.      Breath sounds: Normal breath sounds. No wheezing.   Abdominal:      General: Bowel sounds are normal. There is no distension.      Palpations: Abdomen is soft.   Musculoskeletal:         General: Normal range of motion.      Cervical back: Normal range of motion.   Skin:     General: Skin is warm and dry.      Capillary Refill: Capillary refill takes less than 2 seconds.      Coloration: Skin is pale.   Neurological:      Mental Status: She is alert and oriented to person, place, and time.   Psychiatric:         Mood and Affect: Mood normal.         Behavior: Behavior normal.    "      Thought Content: Thought content normal.         Judgment: Judgment normal.         Procedures           ED Course  ED Course as of 08/05/22 1219   Fri Aug 05, 2022   1216 Spoke with Dr. Colunga who agrees with admission [HS]   1219 Spoke with patient who is aware of diagnosis and admission and is agreeable. [HS]      ED Course User Index  [HS] Sofya Prery, ARMOND      Results for orders placed or performed during the hospital encounter of 08/05/22   COVID-19 and FLU A/B PCR - Swab, Nasopharynx    Specimen: Nasopharynx; Swab   Result Value Ref Range    COVID19 Not Detected Not Detected - Ref. Range    Influenza A PCR Not Detected Not Detected    Influenza B PCR Not Detected Not Detected   Comprehensive Metabolic Panel    Specimen: Blood   Result Value Ref Range    Glucose 668 (C) 65 - 99 mg/dL    BUN 30 (H) 6 - 20 mg/dL    Creatinine 1.25 (H) 0.57 - 1.00 mg/dL    Sodium 131 (L) 136 - 145 mmol/L    Potassium 5.4 (H) 3.5 - 5.2 mmol/L    Chloride 90 (L) 98 - 107 mmol/L    CO2 12.0 (L) 22.0 - 29.0 mmol/L    Calcium 10.4 8.6 - 10.5 mg/dL    Total Protein 7.8 6.0 - 8.5 g/dL    Albumin 4.50 3.50 - 5.20 g/dL    ALT (SGPT) 18 1 - 33 U/L    AST (SGOT) 18 1 - 32 U/L    Alkaline Phosphatase 85 39 - 117 U/L    Total Bilirubin 0.6 0.0 - 1.2 mg/dL    Globulin 3.3 gm/dL    A/G Ratio 1.4 g/dL    BUN/Creatinine Ratio 24.0 7.0 - 25.0    Anion Gap 29.0 (H) 5.0 - 15.0 mmol/L    eGFR 62.2 >60.0 mL/min/1.73   Pregnancy, Urine - Urine, Clean Catch    Specimen: Urine, Clean Catch   Result Value Ref Range    HCG, Urine QL Negative Negative   Urinalysis With Culture If Indicated - Urine, Clean Catch    Specimen: Urine, Clean Catch   Result Value Ref Range    Color, UA Yellow Yellow, Straw, Dark Yellow, [place]    Appearance, UA Clear Clear    pH, UA 5.5 5.0 - 9.0    Specific Gravity, UA 1.025 1.003 - 1.030    Glucose, UA >=1000 mg/dL (3+) (A) Negative    Ketones, UA >=160 mg/dL (4+) (A) Negative    Bilirubin, UA Negative Negative     Blood, UA Negative Negative    Protein, UA Negative Negative    Leuk Esterase, UA Negative Negative    Nitrite, UA Negative Negative    Urobilinogen, UA 0.2 E.U./dL 0.2 - 1.0 E.U./dL   CK    Specimen: Blood   Result Value Ref Range    Creatine Kinase 37 20 - 180 U/L   CBC Auto Differential    Specimen: Blood   Result Value Ref Range    WBC 14.74 (H) 3.40 - 10.80 10*3/mm3    RBC 4.25 3.77 - 5.28 10*6/mm3    Hemoglobin 12.9 12.0 - 15.9 g/dL    Hematocrit 36.8 34.0 - 46.6 %    MCV 86.6 79.0 - 97.0 fL    MCH 30.4 26.6 - 33.0 pg    MCHC 35.1 31.5 - 35.7 g/dL    RDW 11.8 (L) 12.3 - 15.4 %    RDW-SD 37.4 37.0 - 54.0 fl    MPV 11.2 6.0 - 12.0 fL    Platelets 379 140 - 450 10*3/mm3    Neutrophil % 78.5 (H) 42.7 - 76.0 %    Lymphocyte % 15.1 (L) 19.6 - 45.3 %    Monocyte % 2.9 (L) 5.0 - 12.0 %    Eosinophil % 1.4 0.3 - 6.2 %    Basophil % 0.7 0.0 - 1.5 %    Immature Grans % 1.4 (H) 0.0 - 0.5 %    Neutrophils, Absolute 11.56 (H) 1.70 - 7.00 10*3/mm3    Lymphocytes, Absolute 2.23 0.70 - 3.10 10*3/mm3    Monocytes, Absolute 0.43 0.10 - 0.90 10*3/mm3    Eosinophils, Absolute 0.21 0.00 - 0.40 10*3/mm3    Basophils, Absolute 0.11 0.00 - 0.20 10*3/mm3    Immature Grans, Absolute 0.20 (H) 0.00 - 0.05 10*3/mm3    nRBC 0.0 0.0 - 0.2 /100 WBC   Acetone    Specimen: Blood   Result Value Ref Range    Acetone Large (A) Negative   Blood Gas, Arterial -    Specimen: Arterial Blood   Result Value Ref Range    Site Right Brachial     Drew's Test N/A     pH, Arterial 7.264 (C) 7.350 - 7.450 pH units    pCO2, Arterial 25.2 (L) 35.0 - 45.0 mm Hg    pO2, Arterial 90.3 83.0 - 108.0 mm Hg    HCO3, Arterial 11.4 (L) 20.0 - 26.0 mmol/L    Base Excess, Arterial -14.0 (L) 0.0 - 2.0 mmol/L    O2 Saturation, Arterial 96.9 94.0 - 99.0 %    Barometric Pressure for Blood Gas 749 mmHg    Modality Room Air     FIO2 <21 %    Ventilator Mode NA     Collected by [facility]           No Radiology Exams Resulted Within Past 24 Hours                                   MDM    Final diagnoses:   Diabetic ketoacidosis without coma associated with type 1 diabetes mellitus (HCC)       ED Disposition  ED Disposition     ED Disposition   Decision to Admit    Condition   --    Comment   Level of Care: Critical Care [6]   Diagnosis: Diabetic ketoacidosis without coma associated with type 1 diabetes mellitus (HCC) [9343490]   Certification: I Certify That Inpatient Hospital Services Are Medically Necessary For Greater Than 2 Midnights               No follow-up provider specified.       Medication List      No changes were made to your prescriptions during this visit.          Sofya Perry, ARMOND  08/05/22 1219       Sofya Perry, ARMOND  08/05/22 1219

## 2022-08-06 ENCOUNTER — READMISSION MANAGEMENT (OUTPATIENT)
Dept: CALL CENTER | Facility: HOSPITAL | Age: 22
End: 2022-08-06

## 2022-08-06 VITALS
BODY MASS INDEX: 18.16 KG/M2 | RESPIRATION RATE: 19 BRPM | SYSTOLIC BLOOD PRESSURE: 102 MMHG | OXYGEN SATURATION: 100 % | WEIGHT: 115.7 LBS | HEIGHT: 67 IN | HEART RATE: 92 BPM | DIASTOLIC BLOOD PRESSURE: 55 MMHG | TEMPERATURE: 98 F

## 2022-08-06 LAB
GLUCOSE BLDC GLUCOMTR-MCNC: 208 MG/DL (ref 70–130)
GLUCOSE BLDC GLUCOMTR-MCNC: 68 MG/DL (ref 70–130)
GLUCOSE BLDC GLUCOMTR-MCNC: 76 MG/DL (ref 70–130)
GLUCOSE BLDC GLUCOMTR-MCNC: 91 MG/DL (ref 70–130)

## 2022-08-06 PROCEDURE — 25010000002 MORPHINE PER 10 MG: Performed by: FAMILY MEDICINE

## 2022-08-06 PROCEDURE — 82962 GLUCOSE BLOOD TEST: CPT

## 2022-08-06 PROCEDURE — 63710000001 INSULIN DETEMIR PER 5 UNITS

## 2022-08-06 RX ADMIN — ACETAMINOPHEN 650 MG: 325 TABLET, FILM COATED ORAL at 08:40

## 2022-08-06 RX ADMIN — MORPHINE SULFATE 2 MG: 2 INJECTION, SOLUTION INTRAMUSCULAR; INTRAVENOUS at 03:50

## 2022-08-06 RX ADMIN — INSULIN DETEMIR 22 UNITS: 100 INJECTION, SOLUTION SUBCUTANEOUS at 00:02

## 2022-08-06 RX ADMIN — CLONAZEPAM 0.25 MG: 0.5 TABLET ORAL at 04:30

## 2022-08-06 NOTE — DISCHARGE SUMMARY
[facility] Medicine Services  DISCHARGE SUMMARY       Date of Admission: 8/5/2022  Date of Discharge:  8/6/2022  Primary Care Physician: Rufus Marshall APRN    Presenting Problem/History of Present Illness:  Diabetic ketoacidosis without coma associated with type 1 diabetes mellitus (HCC) [E10.10]       Final Discharge Diagnoses:  Active Hospital Problems    Diagnosis    • Diabetic ketoacidosis without coma associated with type 1 diabetes mellitus (HCC)        Consults:   Consults     No orders found for last 30 day(s).          Procedures Performed:                 Pertinent Test Results:   Lab Results (most recent)     Procedure Component Value Units Date/Time    POC Glucose Once [[id number]]  (Normal) Collected: 08/06/22 0642    Specimen: Blood Updated: 08/06/22 0654     Glucose 91 mg/dL      Comment: : [id number] KORI ROBYMeter ID: [id number]       POC Glucose Once [[id number]]  (Normal) Collected: 08/06/22 0411    Specimen: Blood Updated: 08/06/22 0449     Glucose 76 mg/dL      Comment: : [phone] MICHELL WASHINGTONAHMeter ID: [id number]       Extra Tubes [[id number]] Collected: 08/05/22 2013    Specimen: Blood, Venous Line Updated: 08/05/22 2117    Narrative:      The following orders were created for panel order Extra Tubes.  Procedure                               Abnormality         Status                     ---------                               -----------         ------                     Lavender Top[[id number]]                                     Final result                 Please view results for these tests on the individual orders.    Lavender Top [[id number]] Collected: 08/05/22 2013    Specimen: Blood Updated: 08/05/22 2117     Extra Tube hold for add-on     Comment: Auto resulted       Magnesium [[id number]]  (Normal) Collected: 08/05/22 2011    Specimen: Blood Updated: 08/05/22 2039     Magnesium 2.2 mg/dL     Basic Metabolic Panel  [[id number]]  (Abnormal) Collected: 08/05/22 2011    Specimen: Blood Updated: 08/05/22 2036     Glucose 167 mg/dL      BUN 27 mg/dL      Creatinine 0.91 mg/dL      Sodium 136 mmol/L      Potassium 4.1 mmol/L      Chloride 106 mmol/L      CO2 21.0 mmol/L      Calcium 8.3 mg/dL      BUN/Creatinine Ratio 29.7     Anion Gap 9.0 mmol/L      eGFR 91.1 mL/min/1.73      Comment: National Kidney Foundation and American Society of Nephrology (ASN) Task Force recommended calculation based on the Chronic Kidney Disease Epidemiology Collaboration (CKD-EPI) equation refit without adjustment for race.       Narrative:      GFR Normal >60  Chronic Kidney Disease <60  Kidney Failure <15      Phosphorus [[id number]]  (Normal) Collected: 08/05/22 2011    Specimen: Blood Updated: 08/05/22 2036     Phosphorus 3.5 mg/dL     Basic Metabolic Panel [[id number]]  (Abnormal) Collected: 08/05/22 1541    Specimen: Blood Updated: 08/05/22 1630     Glucose 365 mg/dL      BUN 29 mg/dL      Creatinine 1.00 mg/dL      Sodium 136 mmol/L      Potassium 4.4 mmol/L      Chloride 101 mmol/L      CO2 16.0 mmol/L      Calcium 8.6 mg/dL      BUN/Creatinine Ratio 29.0     Anion Gap 19.0 mmol/L      eGFR 81.3 mL/min/1.73      Comment: National Kidney Foundation and American Society of Nephrology (ASN) Task Force recommended calculation based on the Chronic Kidney Disease Epidemiology Collaboration (CKD-EPI) equation refit without adjustment for race.       Narrative:      GFR Normal >60  Chronic Kidney Disease <60  Kidney Failure <15      Phosphorus [[id number]]  (Normal) Collected: 08/05/22 1541    Specimen: Blood Updated: 08/05/22 1630     Phosphorus 3.5 mg/dL     Magnesium [[id number]]  (Abnormal) Collected: 08/05/22 1541    Specimen: Blood Updated: 08/05/22 1629     Magnesium 1.5 mg/dL     Urine Drug Screen - Urine, Clean Catch [[id number]]  (Abnormal) Collected: 08/05/22 1152    Specimen: Urine, Clean Catch Updated: 08/05/22 1255     THC, Screen, Urine  Negative     Phencyclidine (PCP), Urine Negative     Cocaine Screen, Urine Negative     Methamphetamine, Ur Negative     Opiate Screen Positive     Amphetamine Screen, Urine Negative     Benzodiazepine Screen, Urine Negative     Tricyclic Antidepressants Screen Negative     Methadone Screen, Urine Negative     Barbiturates Screen, Urine Negative     Oxycodone Screen, Urine Negative     Propoxyphene Screen Negative     Buprenorphine, Screen, Urine Negative    Narrative:      Cutoff For Drugs Screened:    Amphetamines               500 ng/ml  Barbiturates               200 ng/ml  Benzodiazepines            150 ng/ml  Cocaine                    150 ng/ml  Methadone                  200 ng/ml  Opiates                    100 ng/ml  Phencyclidine               25 ng/ml  THC                            50 ng/ml  Methamphetamine            500 ng/ml  Tricyclic Antidepressants  300 ng/ml  Oxycodone                  100 ng/ml  Propoxyphene               300 ng/ml  Buprenorphine               10 ng/ml    The normal value for all drugs tested is negative. This report includes unconfirmed screening results, with the cutoff values listed, to be used for medical treatment purposes only.  Unconfirmed results must not be used for non-medical purposes such as employment or legal testing.  Clinical consideration should be applied to any drug of abuse test, particularly when unconfirmed results are used.      Hemoglobin A1c [[id number]]  (Abnormal) Collected: 08/05/22 1120    Specimen: Blood Updated: 08/05/22 1252     Hemoglobin A1C 10.90 %     Narrative:      Hemoglobin A1C Ranges:    Increased Risk for Diabetes  5.7% to 6.4%  Diabetes                     >= 6.5%  Diabetic Goal                < 7.0%    Extra Tubes [[id number]] Collected: 08/05/22 1120    Specimen: Blood, Venous Line Updated: 08/05/22 1232    Narrative:      The following orders were created for panel order Extra Tubes.  Procedure                               Abnormality          Status                     ---------                               -----------         ------                     Light Blue Top[[id number]]                                   Final result                 Please view results for these tests on the individual orders.    Light Blue Top [[id number]] Collected: 08/05/22 1120    Specimen: Blood Updated: 08/05/22 1232     Extra Tube Hold for add-ons.     Comment: Auto resulted       Pregnancy, Urine - Urine, Clean Catch [[id number]]  (Normal) Collected: 08/05/22 1152    Specimen: Urine, Clean Catch Updated: 08/05/22 1206     HCG, Urine QL Negative    Comprehensive Metabolic Panel [[id number]]  (Abnormal) Collected: 08/05/22 1120    Specimen: Blood Updated: 08/05/22 1205     Glucose 668 mg/dL      BUN 30 mg/dL      Creatinine 1.25 mg/dL      Sodium 131 mmol/L      Potassium 5.4 mmol/L      Comment: Slight hemolysis detected by analyzer. Results may be affected.        Chloride 90 mmol/L      CO2 12.0 mmol/L      Calcium 10.4 mg/dL      Total Protein 7.8 g/dL      Albumin 4.50 g/dL      ALT (SGPT) 18 U/L      AST (SGOT) 18 U/L      Comment: Slight hemolysis detected by analyzer. Results may be affected.        Alkaline Phosphatase 85 U/L      Total Bilirubin 0.6 mg/dL      Globulin 3.3 gm/dL      A/G Ratio 1.4 g/dL      BUN/Creatinine Ratio 24.0     Anion Gap 29.0 mmol/L      eGFR 62.2 mL/min/1.73      Comment: National Kidney Foundation and American Society of Nephrology (ASN) Task Force recommended calculation based on the Chronic Kidney Disease Epidemiology Collaboration (CKD-EPI) equation refit without adjustment for race.       Narrative:      GFR Normal >60  Chronic Kidney Disease <60  Kidney Failure <15      Urinalysis With Culture If Indicated - Urine, Clean Catch [[id number]]  (Abnormal) Collected: 08/05/22 1152    Specimen: Urine, Clean Catch Updated: 08/05/22 1201     Color, UA Yellow     Appearance, UA Clear     pH, UA 5.5     Specific [place]frey, UA 1.025      Glucose, UA >=1000 mg/dL (3+)     Ketones, UA >=160 mg/dL (4+)     Bilirubin, UA Negative     Blood, UA Negative     Protein, UA Negative     Leuk Esterase, UA Negative     Nitrite, UA Negative     Urobilinogen, UA 0.2 E.U./dL    Narrative:      In absence of clinical symptoms, the presence of pyuria, bacteria, and/or nitrites on the urinalysis result does not correlate with infection.  Urine microscopic not indicated.    CK [[id number]]  (Normal) Collected: 08/05/22 1120    Specimen: Blood Updated: 08/05/22 1153     Creatine Kinase 37 U/L     Acetone [[id number]]  (Abnormal) Collected: 08/05/22 1120    Specimen: Blood Updated: 08/05/22 1141     Acetone Large    Blood Gas, Arterial - [[id number]]  (Abnormal) Collected: 08/05/22 1134    Specimen: Arterial Blood Updated: 08/05/22 1136     Site Right Brachial     Drew's Test N/A     pH, Arterial 7.264 pH units      Comment: 84 Value below reference range        pCO2, Arterial 25.2 mm Hg      Comment: 84 Value below reference range        pO2, Arterial 90.3 mm Hg      HCO3, Arterial 11.4 mmol/L      Comment: 84 Value below reference range        Base Excess, Arterial -14.0 mmol/L      Comment: 84 Value below reference range        O2 Saturation, Arterial 96.9 %      Barometric Pressure for Blood Gas 749 mmHg      Modality Room Air     FIO2 <21 %      Ventilator Mode NA     Collected by [facility]     Comment: Meter: [id number]     :  [id number]       COVID-19 and FLU A/B PCR - Swab, Nasopharynx [[id number]]  (Normal) Collected: 08/05/22 1104    Specimen: Swab from Nasopharynx Updated: 08/05/22 1135     COVID19 Not Detected     Influenza A PCR Not Detected     Influenza B PCR Not Detected    Narrative:      Fact sheet for providers: https://www.fda.gov/media/283147/download    Fact sheet for patients: https://www.fda.gov/media/657384/download    Test performed by PCR.    CBC & Differential [[id number]]  (Abnormal) Collected: 08/05/22 1120    Specimen: Blood Updated:  08/05/22 1133    Narrative:      The following orders were created for panel order CBC & Differential.  Procedure                               Abnormality         Status                     ---------                               -----------         ------                     CBC Auto Differential[[id number]]        Abnormal            Final result                 Please view results for these tests on the individual orders.    CBC Auto Differential [[id number]]  (Abnormal) Collected: 08/05/22 1120    Specimen: Blood Updated: 08/05/22 1133     WBC 14.74 10*3/mm3      RBC 4.25 10*6/mm3      Hemoglobin 12.9 g/dL      Hematocrit 36.8 %      MCV 86.6 fL      MCH 30.4 pg      MCHC 35.1 g/dL      RDW 11.8 %      RDW-SD 37.4 fl      MPV 11.2 fL      Platelets 379 10*3/mm3      Neutrophil % 78.5 %      Lymphocyte % 15.1 %      Monocyte % 2.9 %      Eosinophil % 1.4 %      Basophil % 0.7 %      Immature Grans % 1.4 %      Neutrophils, Absolute 11.56 10*3/mm3      Lymphocytes, Absolute 2.23 10*3/mm3      Monocytes, Absolute 0.43 10*3/mm3      Eosinophils, Absolute 0.21 10*3/mm3      Basophils, Absolute 0.11 10*3/mm3      Immature Grans, Absolute 0.20 10*3/mm3      nRBC 0.0 /100 WBC         Imaging Results (Most Recent)     None          Chief Complaint on Day of Discharge: No new complaints    Hospital Course:  The patient is a 22 y.o. female with medical history notable for type 1 diabetes mellitus who presented to [facility] with nausea, vomiting, and abdominal pain.  Patient was found to have DKA.  Patient noted increased stressors at home but denied missing any of her doses of her home insulin.  She states that the stresses caused her to have DKA in the past.  She was started on DKA protocol and had resolution of her DKA.  She was able to be transitioned to subcutaneous insulin and tolerated diet.  Patient noted overall feeling improved.  Options were discussed with the patient about either another day of  "observation versus discharge home and she elected for discharge home.  She will follow-up as an outpatient with her endocrinologist.  Patient was understanding agreement to the plan.      Condition on Discharge: Stable    Physical Exam on Discharge:  /55 (BP Location: Right arm, Patient Position: Lying)   Pulse 92   Temp 98 °F (36.7 °C) (Oral)   Resp 19   Ht 170.2 cm (67\")   Wt 52.5 kg (115 lb 11.2 oz)   LMP 08/01/2022 (Approximate)   SpO2 100%   BMI 18.12 kg/m²   Physical Exam  Constitutional:       General: She is not in acute distress.     Appearance: She is not toxic-appearing.   HENT:      Head: Normocephalic and atraumatic.      Right Ear: External ear normal.      Left Ear: External ear normal.      Nose: Nose normal.      Mouth/Throat:      Mouth: Mucous membranes are moist.      Pharynx: Oropharynx is clear.   Eyes:      Conjunctiva/sclera: Conjunctivae normal.   Cardiovascular:      Rate and Rhythm: Normal rate and regular rhythm.      Pulses: Normal pulses.      Heart sounds: Normal heart sounds.   Pulmonary:      Effort: Pulmonary effort is normal. No respiratory distress.      Breath sounds: Normal breath sounds.   Abdominal:      General: Bowel sounds are normal.      Palpations: Abdomen is soft.      Tenderness: There is no abdominal tenderness.   Musculoskeletal:         General: No deformity.   Skin:     General: Skin is warm and dry.      Capillary Refill: Capillary refill takes less than 2 seconds.   Neurological:      General: No focal deficit present.      Mental Status: She is alert and oriented to person, place, and time. Mental status is at baseline.   Psychiatric:         Behavior: Behavior normal.         Thought Content: Thought content normal.       Discharge Disposition:  Home or Self Care    Discharge Medications:     Discharge Medications      Continue These Medications      Instructions Start Date   Alcohol Wipes 70 % pads   Use 4 x daily      B-D ULTRA-FINE 33 LANCETS " misc   Use 4 x daily , any brand covered by insurance      Baqsimi One Pack 3 MG/DOSE powder  Generic drug: Glucagon   1 each, Nasal, As Needed, Apply intranasal if hypoglycemia      Blood Glucose Monitoring Suppl w/Device kit   USE AS INDICATED, ANY MONITOR , ICD10 code is E11.9      Blood Glucose Test strip   Use 4 x daily use any brand covered by insurance or same brand as before ICD10 code is E11.9      clonazePAM 0.5 MG tablet  Commonly known as: KlonoPIN   0.25 mg, Oral, 2 Times Daily PRN      Dexcom G6 Sensor   Does not apply, As Needed, Every 10 days,  Use as directed for continuous glucose monitoring      Lancing Device misc   USE AS INDICATED TO CORRELATE WITH STRIPS AND METER      Levemir FlexTouch 100 UNIT/ML injection  Generic drug: insulin detemir   22 units daily      metoclopramide 10 MG tablet  Commonly known as: REGLAN   Take up to 2 tabs po TID      NovoLOG FlexPen 100 UNIT/ML solution pen-injector sc pen  Generic drug: insulin aspart   10 units tid      Insulin Aspart 100 UNIT/ML injection  Commonly known as: novoLOG   100 units daily through pump      Pen Needles 32G X 4 MM misc   1 each, Does not apply, 4 Times Daily, Use 4 x daily, Dx code E11.65      promethazine 12.5 MG tablet  Commonly known as: PHENERGAN   12.5 mg, Oral, Every 6 Hours PRN      Urine Glucose-Ketones Test strip   Use as indicated             Discharge Diet:   Diet Instructions     Diet: Regular, Consistent Carbohydrate      Discharge Diet:  Regular  Consistent Carbohydrate             Activity at Discharge:   Activity Instructions     Activity as Tolerated            Discharge Care Plan/Instructions: Take medications as prescribed, follow-up with endocrinology, and return for worsening symptoms.    Follow-up Appointments:   No future appointments.    Test Results Pending at Discharge:  None    Fernando Colunga MD    Time: 31 minutes

## 2022-08-06 NOTE — PLAN OF CARE
Goal Outcome Evaluation:  Plan of Care Reviewed With: patient           Outcome Evaluation: Insulin gtt discontinued. Patient started on sliding scale and long acting. VSS. Pt had an episode of hypoglycemia. BG was 68. Pt was given franco crackers, apple juice, and ice cream. see results for further BG

## 2022-08-07 NOTE — OUTREACH NOTE
Prep Survey    Flowsheet Row Responses   [facility] facility patient discharged from? [place]   Is LACE score < 7 ? No   Emergency Room discharge w/ pulse ox? No   Eligibility Readm Mgmt   Discharge diagnosis Diabetic ketoacidosis without coma associated with type 1 diabetes mellitus    Does the patient have one of the following disease processes/diagnoses(primary or secondary)? Other   Does the patient have Home health ordered? No   Is there a DME ordered? No   Prep survey completed? Yes          SLAVA MARCIAL - Registered Nurse

## 2022-08-08 NOTE — PAYOR COMM NOTE
"Nubia Stephens  [facility]  Case Management Extender  [phone] phone  [phone] fax      Ref# [id number]    Fernanda Patterson (22 y.o. Female)             Date of Birth   2000    Social Security Number       Address   [address] [id number]    Home Phone   [phone]    MRN   [id number]       Episcopalian   None    Marital Status   Single                            Admission Date   8/5/22    Admission Type   Emergency    Admitting Provider   Fernando Colunga MD    Attending Provider       Department, Room/Bed   [facility] CRITICAL CARE STEPDOWN, 05/A       Discharge Date   8/6/2022    Discharge Disposition   Home or Self Care    Discharge Destination                               Attending Provider: (none)   Allergies: Pineapple, Benzoyl Peroxide    Isolation: None   Infection: None   Code Status: Prior   Advance Care Planning Activity    Ht: 170.2 cm (67\")   Wt: 52.5 kg (115 lb 11.2 oz)    Admission Cmt: None   Principal Problem: None                Active Insurance as of 8/5/2022     Primary Coverage     Payor Plan Insurance Group Employer/Plan Group    [place] [id number]     Payor Plan Address Payor Plan Phone Number Payor Plan Fax Number Effective Dates    [employer] [address] [address] [phone]  1/1/2021 - None Entered    [facility] [id number]       Subscriber Name Subscriber Birth Date Member ID       FERNANDA PATTERSON 2000 [id number]                 Emergency Contacts          No emergency contacts on file.               Discharge Summary      Fernando Colunga MD at 08/06/22 0832              [facility] Medicine Services  DISCHARGE SUMMARY       Date of Admission: 8/5/2022  Date of Discharge:  8/6/2022  Primary Care Physician: Rufus Marshall APRN    Presenting Problem/History of Present Illness:  Diabetic ketoacidosis without coma associated with type 1 " diabetes mellitus (HCC) [E10.10]       Final Discharge Diagnoses:  Active Hospital Problems    Diagnosis    • Diabetic ketoacidosis without coma associated with type 1 diabetes mellitus (HCC)        Consults:   Consults     No orders found for last 30 day(s).          Procedures Performed:                 Pertinent Test Results:   Lab Results (most recent)     Procedure Component Value Units Date/Time    POC Glucose Once [[id number]]  (Normal) Collected: 08/06/22 0642    Specimen: Blood Updated: 08/06/22 0654     Glucose 91 mg/dL      Comment: : [phone] KORI ROBYMeter ID: [id number]       POC Glucose Once [[id number]]  (Normal) Collected: 08/06/22 0411    Specimen: Blood Updated: 08/06/22 0449     Glucose 76 mg/dL      Comment: : [id number] MICHELL PIKEMeter ID: [id number]       Extra Tubes [[id number]] Collected: 08/05/22 2013    Specimen: Blood, Venous Line Updated: 08/05/22 2117    Narrative:      The following orders were created for panel order Extra Tubes.  Procedure                               Abnormality         Status                     ---------                               -----------         ------                     Lavender Top[[id number]]                                     Final result                 Please view results for these tests on the individual orders.    Lavender Top [[id number]] Collected: 08/05/22 2013    Specimen: Blood Updated: 08/05/22 2117     Extra Tube hold for add-on     Comment: Auto resulted       Magnesium [[id number]]  (Normal) Collected: 08/05/22 2011    Specimen: Blood Updated: 08/05/22 2039     Magnesium 2.2 mg/dL     Basic Metabolic Panel [[id number]]  (Abnormal) Collected: 08/05/22 2011    Specimen: Blood Updated: 08/05/22 2036     Glucose 167 mg/dL      BUN 27 mg/dL      Creatinine 0.91 mg/dL      Sodium 136 mmol/L      Potassium 4.1 mmol/L      Chloride 106 mmol/L      CO2 21.0 mmol/L      Calcium 8.3 mg/dL      BUN/Creatinine Ratio 29.7      Anion Gap 9.0 mmol/L      eGFR 91.1 mL/min/1.73      Comment: National Kidney Foundation and American Society of Nephrology (ASN) Task Force recommended calculation based on the Chronic Kidney Disease Epidemiology Collaboration (CKD-EPI) equation refit without adjustment for race.       Narrative:      GFR Normal >60  Chronic Kidney Disease <60  Kidney Failure <15      Phosphorus [[id number]]  (Normal) Collected: 08/05/22 2011    Specimen: Blood Updated: 08/05/22 2036     Phosphorus 3.5 mg/dL     Basic Metabolic Panel [[id number]]  (Abnormal) Collected: 08/05/22 1541    Specimen: Blood Updated: 08/05/22 1630     Glucose 365 mg/dL      BUN 29 mg/dL      Creatinine 1.00 mg/dL      Sodium 136 mmol/L      Potassium 4.4 mmol/L      Chloride 101 mmol/L      CO2 16.0 mmol/L      Calcium 8.6 mg/dL      BUN/Creatinine Ratio 29.0     Anion Gap 19.0 mmol/L      eGFR 81.3 mL/min/1.73      Comment: National Kidney Foundation and American Society of Nephrology (ASN) Task Force recommended calculation based on the Chronic Kidney Disease Epidemiology Collaboration (CKD-EPI) equation refit without adjustment for race.       Narrative:      GFR Normal >60  Chronic Kidney Disease <60  Kidney Failure <15      Phosphorus [[id number]]  (Normal) Collected: 08/05/22 1541    Specimen: Blood Updated: 08/05/22 1630     Phosphorus 3.5 mg/dL     Magnesium [[id number]]  (Abnormal) Collected: 08/05/22 1541    Specimen: Blood Updated: 08/05/22 1629     Magnesium 1.5 mg/dL     Urine Drug Screen - Urine, Clean Catch [[id number]]  (Abnormal) Collected: 08/05/22 1152    Specimen: Urine, Clean Catch Updated: 08/05/22 1255     THC, Screen, Urine Negative     Phencyclidine (PCP), Urine Negative     Cocaine Screen, Urine Negative     Methamphetamine, Ur Negative     Opiate Screen Positive     Amphetamine Screen, Urine Negative     Benzodiazepine Screen, Urine Negative     Tricyclic Antidepressants Screen Negative     Methadone Screen, Urine Negative      Barbiturates Screen, Urine Negative     Oxycodone Screen, Urine Negative     Propoxyphene Screen Negative     Buprenorphine, Screen, Urine Negative    Narrative:      Cutoff For Drugs Screened:    Amphetamines               500 ng/ml  Barbiturates               200 ng/ml  Benzodiazepines            150 ng/ml  Cocaine                    150 ng/ml  Methadone                  200 ng/ml  Opiates                    100 ng/ml  Phencyclidine               25 ng/ml  THC                            50 ng/ml  Methamphetamine            500 ng/ml  Tricyclic Antidepressants  300 ng/ml  Oxycodone                  100 ng/ml  Propoxyphene               300 ng/ml  Buprenorphine               10 ng/ml    The normal value for all drugs tested is negative. This report includes unconfirmed screening results, with the cutoff values listed, to be used for medical treatment purposes only.  Unconfirmed results must not be used for non-medical purposes such as employment or legal testing.  Clinical consideration should be applied to any drug of abuse test, particularly when unconfirmed results are used.      Hemoglobin A1c [[id number]]  (Abnormal) Collected: 08/05/22 1120    Specimen: Blood Updated: 08/05/22 1252     Hemoglobin A1C 10.90 %     Narrative:      Hemoglobin A1C Ranges:    Increased Risk for Diabetes  5.7% to 6.4%  Diabetes                     >= 6.5%  Diabetic Goal                < 7.0%    Extra Tubes [[id number]] Collected: 08/05/22 1120    Specimen: Blood, Venous Line Updated: 08/05/22 1232    Narrative:      The following orders were created for panel order Extra Tubes.  Procedure                               Abnormality         Status                     ---------                               -----------         ------                     Light Blue Top[[id number]]                                   Final result                 Please view results for these tests on the individual orders.    Light Blue Top [[id number]]  Collected: 08/05/22 1120    Specimen: Blood Updated: 08/05/22 1232     Extra Tube Hold for add-ons.     Comment: Auto resulted       Pregnancy, Urine - Urine, Clean Catch [[id number]]  (Normal) Collected: 08/05/22 1152    Specimen: Urine, Clean Catch Updated: 08/05/22 1206     HCG, Urine QL Negative    Comprehensive Metabolic Panel [[id number]]  (Abnormal) Collected: 08/05/22 1120    Specimen: Blood Updated: 08/05/22 1205     Glucose 668 mg/dL      BUN 30 mg/dL      Creatinine 1.25 mg/dL      Sodium 131 mmol/L      Potassium 5.4 mmol/L      Comment: Slight hemolysis detected by analyzer. Results may be affected.        Chloride 90 mmol/L      CO2 12.0 mmol/L      Calcium 10.4 mg/dL      Total Protein 7.8 g/dL      Albumin 4.50 g/dL      ALT (SGPT) 18 U/L      AST (SGOT) 18 U/L      Comment: Slight hemolysis detected by analyzer. Results may be affected.        Alkaline Phosphatase 85 U/L      Total Bilirubin 0.6 mg/dL      Globulin 3.3 gm/dL      A/G Ratio 1.4 g/dL      BUN/Creatinine Ratio 24.0     Anion Gap 29.0 mmol/L      eGFR 62.2 mL/min/1.73      Comment: National Kidney Foundation and American Society of Nephrology (ASN) Task Force recommended calculation based on the Chronic Kidney Disease Epidemiology Collaboration (CKD-EPI) equation refit without adjustment for race.       Narrative:      GFR Normal >60  Chronic Kidney Disease <60  Kidney Failure <15      Urinalysis With Culture If Indicated - Urine, Clean Catch [[id number]]  (Abnormal) Collected: 08/05/22 1152    Specimen: Urine, Clean Catch Updated: 08/05/22 1201     Color, UA Yellow     Appearance, UA Clear     pH, UA 5.5     Specific Gravity, UA 1.025     Glucose, UA >=1000 mg/dL (3+)     Ketones, UA >=160 mg/dL (4+)     Bilirubin, UA Negative     Blood, UA Negative     Protein, UA Negative     Leuk Esterase, UA Negative     Nitrite, UA Negative     Urobilinogen, UA 0.2 E.U./dL    Narrative:      In absence of clinical symptoms, the presence of pyuria,  bacteria, and/or nitrites on the urinalysis result does not correlate with infection.  Urine microscopic not indicated.    CK [[id number]]  (Normal) Collected: 08/05/22 1120    Specimen: Blood Updated: 08/05/22 1153     Creatine Kinase 37 U/L     Acetone [[id number]]  (Abnormal) Collected: 08/05/22 1120    Specimen: Blood Updated: 08/05/22 1141     Acetone Large    Blood Gas, Arterial - [[id number]]  (Abnormal) Collected: 08/05/22 1134    Specimen: Arterial Blood Updated: 08/05/22 1136     Site Right Brachial     Drew's Test N/A     pH, Arterial 7.264 pH units      Comment: 84 Value below reference range        pCO2, Arterial 25.2 mm Hg      Comment: 84 Value below reference range        pO2, Arterial 90.3 mm Hg      HCO3, Arterial 11.4 mmol/L      Comment: 84 Value below reference range        Base Excess, Arterial -14.0 mmol/L      Comment: 84 Value below reference range        O2 Saturation, Arterial 96.9 %      Barometric Pressure for Blood Gas 749 mmHg      Modality Room Air     FIO2 <21 %      Ventilator Mode NA     Collected by [facility]     Comment: Meter: [id number]     :  [id number]       COVID-19 and FLU A/B PCR - Swab, Nasopharynx [[id number]]  (Normal) Collected: 08/05/22 1104    Specimen: Swab from Nasopharynx Updated: 08/05/22 1135     COVID19 Not Detected     Influenza A PCR Not Detected     Influenza B PCR Not Detected    Narrative:      Fact sheet for providers: https://www.fda.gov/media/237617/download    Fact sheet for patients: https://www.fda.gov/media/046121/download    Test performed by PCR.    CBC & Differential [[id number]]  (Abnormal) Collected: 08/05/22 1120    Specimen: Blood Updated: 08/05/22 1133    Narrative:      The following orders were created for panel order CBC & Differential.  Procedure                               Abnormality         Status                     ---------                               -----------         ------                     CBC Auto  Differential[[id number]]        Abnormal            Final result                 Please view results for these tests on the individual orders.    CBC Auto Differential [[id number]]  (Abnormal) Collected: 08/05/22 1120    Specimen: Blood Updated: 08/05/22 1133     WBC 14.74 10*3/mm3      RBC 4.25 10*6/mm3      Hemoglobin 12.9 g/dL      Hematocrit 36.8 %      MCV 86.6 fL      MCH 30.4 pg      MCHC 35.1 g/dL      RDW 11.8 %      RDW-SD 37.4 fl      MPV 11.2 fL      Platelets 379 10*3/mm3      Neutrophil % 78.5 %      Lymphocyte % 15.1 %      Monocyte % 2.9 %      Eosinophil % 1.4 %      Basophil % 0.7 %      Immature Grans % 1.4 %      Neutrophils, Absolute 11.56 10*3/mm3      Lymphocytes, Absolute 2.23 10*3/mm3      Monocytes, Absolute 0.43 10*3/mm3      Eosinophils, Absolute 0.21 10*3/mm3      Basophils, Absolute 0.11 10*3/mm3      Immature Grans, Absolute 0.20 10*3/mm3      nRBC 0.0 /100 WBC         Imaging Results (Most Recent)     None          Chief Complaint on Day of Discharge: No new complaints    Hospital Course:  The patient is a 22 y.o. female with medical history notable for type 1 diabetes mellitus who presented to [facility] with nausea, vomiting, and abdominal pain.  Patient was found to have DKA.  Patient noted increased stressors at home but denied missing any of her doses of her home insulin.  She states that the stresses caused her to have DKA in the past.  She was started on DKA protocol and had resolution of her DKA.  She was able to be transitioned to subcutaneous insulin and tolerated diet.  Patient noted overall feeling improved.  Options were discussed with the patient about either another day of observation versus discharge home and she elected for discharge home.  She will follow-up as an outpatient with her endocrinologist.  Patient was understanding agreement to the plan.      Condition on Discharge: Stable    Physical Exam on Discharge:  /55 (BP Location: Right arm,  "Patient Position: Lying)   Pulse 92   Temp 98 °F (36.7 °C) (Oral)   Resp 19   Ht 170.2 cm (67\")   Wt 52.5 kg (115 lb 11.2 oz)   LMP 08/01/2022 (Approximate)   SpO2 100%   BMI 18.12 kg/m²   Physical Exam  Constitutional:       General: She is not in acute distress.     Appearance: She is not toxic-appearing.   HENT:      Head: Normocephalic and atraumatic.      Right Ear: External ear normal.      Left Ear: External ear normal.      Nose: Nose normal.      Mouth/Throat:      Mouth: Mucous membranes are moist.      Pharynx: Oropharynx is clear.   Eyes:      Conjunctiva/sclera: Conjunctivae normal.   Cardiovascular:      Rate and Rhythm: Normal rate and regular rhythm.      Pulses: Normal pulses.      Heart sounds: Normal heart sounds.   Pulmonary:      Effort: Pulmonary effort is normal. No respiratory distress.      Breath sounds: Normal breath sounds.   Abdominal:      General: Bowel sounds are normal.      Palpations: Abdomen is soft.      Tenderness: There is no abdominal tenderness.   Musculoskeletal:         General: No deformity.   Skin:     General: Skin is warm and dry.      Capillary Refill: Capillary refill takes less than 2 seconds.   Neurological:      General: No focal deficit present.      Mental Status: She is alert and oriented to person, place, and time. Mental status is at baseline.   Psychiatric:         Behavior: Behavior normal.         Thought Content: Thought content normal.       Discharge Disposition:  Home or Self Care    Discharge Medications:     Discharge Medications      Continue These Medications      Instructions Start Date   Alcohol Wipes 70 % pads   Use 4 x daily      B-D ULTRA-FINE 33 LANCETS misc   Use 4 x daily , any brand covered by insurance      Baqsimi One Pack 3 MG/DOSE powder  Generic drug: Glucagon   1 each, Nasal, As Needed, Apply intranasal if hypoglycemia      Blood Glucose Monitoring Suppl w/Device kit   USE AS INDICATED, ANY MONITOR , ICD10 code is E11.9    "   Blood Glucose Test strip   Use 4 x daily use any brand covered by insurance or same brand as before ICD10 code is E11.9      clonazePAM 0.5 MG tablet  Commonly known as: KlonoPIN   0.25 mg, Oral, 2 Times Daily PRN      Dexcom G6 Sensor   Does not apply, As Needed, Every 10 days,  Use as directed for continuous glucose monitoring      Lancing Device misc   USE AS INDICATED TO CORRELATE WITH STRIPS AND METER      Levemir FlexTouch 100 UNIT/ML injection  Generic drug: insulin detemir   22 units daily      metoclopramide 10 MG tablet  Commonly known as: REGLAN   Take up to 2 tabs po TID      NovoLOG FlexPen 100 UNIT/ML solution pen-injector sc pen  Generic drug: insulin aspart   10 units tid      Insulin Aspart 100 UNIT/ML injection  Commonly known as: novoLOG   100 units daily through pump      Pen Needles 32G X 4 MM misc   1 each, Does not apply, 4 Times Daily, Use 4 x daily, Dx code E11.65      promethazine 12.5 MG tablet  Commonly known as: PHENERGAN   12.5 mg, Oral, Every 6 Hours PRN      Urine Glucose-Ketones Test strip   Use as indicated             Discharge Diet:   Diet Instructions     Diet: Regular, Consistent Carbohydrate      Discharge Diet:  Regular  Consistent Carbohydrate             Activity at Discharge:   Activity Instructions     Activity as Tolerated            Discharge Care Plan/Instructions: Take medications as prescribed, follow-up with endocrinology, and return for worsening symptoms.    Follow-up Appointments:   No future appointments.    Test Results Pending at Discharge:  None    Fernando Colunga MD    Time: 31 minutes      Electronically signed by Fernando Colunga MD at 08/06/22 8927

## 2022-08-10 ENCOUNTER — READMISSION MANAGEMENT (OUTPATIENT)
Dept: CALL CENTER | Facility: HOSPITAL | Age: 22
End: 2022-08-10

## 2022-08-10 NOTE — OUTREACH NOTE
Medical Week 1 Survey    Flowsheet Row Responses   [facility] patient discharged from? [place]   Does the patient have one of the following disease processes/diagnoses(primary or secondary)? Other   Week 1 attempt successful? Yes   Call start time 1313   Call end time 1315   Discharge diagnosis Diabetic ketoacidosis without coma associated with type 1 diabetes mellitus    Person spoke with today (if not patient) and relationship patient   Meds reviewed with patient/caregiver? Yes   Is the patient having any side effects they believe may be caused by any medication additions or changes? No   Does the patient have all medications ordered at discharge? N/A   Is the patient taking all medications as directed (includes completed medication regime)? Yes   Does the patient have a primary care provider?  Yes   Does the patient have an appointment with their PCP within 7 days of discharge? No   Nursing Interventions Advised patient to make appointment   Has the patient kept scheduled appointments due by today? N/A   Psychosocial issues? No   Did the patient receive a copy of their discharge instructions? Yes   Nursing interventions Reviewed instructions with patient   What is the patient's perception of their health status since discharge? Improving   Is the patient/caregiver able to teach back signs and symptoms related to disease process for when to call PCP? Yes   Is the patient/caregiver able to teach back signs and symptoms related to disease process for when to call 911? Yes   Is the patient/caregiver able to teach back the hierarchy of who to call/visit for symptoms/problems? PCP, Specialist, Home health nurse, Urgent Care, ED, 911 Yes   Week 1 call completed? Yes   Wrap up additional comments Pt states she is doing ok. Pt has a endocrinologist fu appt on 8/18/22. No questions/concerns.          GRANT PHAN - Registered Nurse

## 2022-08-15 ENCOUNTER — SPECIALTY PHARMACY (OUTPATIENT)
Dept: ENDOCRINOLOGY | Facility: CLINIC | Age: 22
End: 2022-08-15

## 2022-08-15 ENCOUNTER — OFFICE VISIT (OUTPATIENT)
Dept: ENDOCRINOLOGY | Facility: CLINIC | Age: 22
End: 2022-08-15

## 2022-08-15 VITALS
HEART RATE: 110 BPM | WEIGHT: 117 LBS | DIASTOLIC BLOOD PRESSURE: 78 MMHG | HEIGHT: 67 IN | SYSTOLIC BLOOD PRESSURE: 116 MMHG | BODY MASS INDEX: 18.36 KG/M2 | OXYGEN SATURATION: 95 %

## 2022-08-15 DIAGNOSIS — E10.65 TYPE 1 DIABETES MELLITUS WITH HYPERGLYCEMIA: Primary | ICD-10-CM

## 2022-08-15 DIAGNOSIS — F41.9 ANXIETY: ICD-10-CM

## 2022-08-15 DIAGNOSIS — K31.84 GASTROPARESIS: ICD-10-CM

## 2022-08-15 PROBLEM — N39.0 RECURRENT URINARY TRACT INFECTION: Status: RESOLVED | Noted: 2020-06-24 | Resolved: 2022-08-15

## 2022-08-15 PROBLEM — G47.01 INSOMNIA DUE TO MEDICAL CONDITION: Status: RESOLVED | Noted: 2019-08-12 | Resolved: 2022-08-15

## 2022-08-15 PROBLEM — A41.50 SEPSIS DUE TO GRAM NEGATIVE BACTERIA (HCC): Status: RESOLVED | Noted: 2019-09-30 | Resolved: 2022-08-15

## 2022-08-15 PROBLEM — N39.0 URINARY TRACT INFECTION WITHOUT HEMATURIA: Status: RESOLVED | Noted: 2017-06-01 | Resolved: 2022-08-15

## 2022-08-15 PROBLEM — G40.909 SEIZURE DISORDER: Chronic | Status: RESOLVED | Noted: 2019-03-06 | Resolved: 2022-08-15

## 2022-08-15 PROBLEM — N39.0 RECURRENT UTI: Chronic | Status: RESOLVED | Noted: 2017-05-26 | Resolved: 2022-08-15

## 2022-08-15 PROBLEM — F43.21 COMPLICATED BEREAVEMENT: Status: RESOLVED | Noted: 2018-10-18 | Resolved: 2022-08-15

## 2022-08-15 PROBLEM — F31.81 SEVERE DEPRESSED BIPOLAR II DISORDER WITHOUT PSYCHOTIC FEATURES: Chronic | Status: RESOLVED | Noted: 2018-10-18 | Resolved: 2022-08-15

## 2022-08-15 PROBLEM — N12 PYELONEPHRITIS: Status: RESOLVED | Noted: 2020-06-02 | Resolved: 2022-08-15

## 2022-08-15 PROBLEM — B96.20 E COLI BACTEREMIA: Status: RESOLVED | Noted: 2019-09-30 | Resolved: 2022-08-15

## 2022-08-15 PROBLEM — F12.90 CANNABINOID HYPEREMESIS SYNDROME: Status: RESOLVED | Noted: 2019-08-12 | Resolved: 2022-08-15

## 2022-08-15 PROBLEM — E10.10 DIABETIC KETOACIDOSIS WITHOUT COMA ASSOCIATED WITH TYPE 1 DIABETES MELLITUS (HCC): Status: RESOLVED | Noted: 2019-10-29 | Resolved: 2022-08-15

## 2022-08-15 PROBLEM — F60.3 BORDERLINE PERSONALITY DISORDER (HCC): Status: RESOLVED | Noted: 2019-08-12 | Resolved: 2022-08-15

## 2022-08-15 PROBLEM — N30.00 ACUTE CYSTITIS: Status: RESOLVED | Noted: 2019-11-01 | Resolved: 2022-08-15

## 2022-08-15 PROBLEM — R78.81 E COLI BACTEREMIA: Status: RESOLVED | Noted: 2019-09-30 | Resolved: 2022-08-15

## 2022-08-15 PROBLEM — R11.2 CANNABINOID HYPEREMESIS SYNDROME: Status: RESOLVED | Noted: 2019-08-12 | Resolved: 2022-08-15

## 2022-08-15 PROBLEM — F90.9 ADHD: Status: RESOLVED | Noted: 2019-08-12 | Resolved: 2022-08-15

## 2022-08-15 PROBLEM — E11.10 DKA (DIABETIC KETOACIDOSIS): Status: RESOLVED | Noted: 2021-11-06 | Resolved: 2022-08-15

## 2022-08-15 PROBLEM — N17.9 AKI (ACUTE KIDNEY INJURY) (HCC): Status: RESOLVED | Noted: 2019-09-29 | Resolved: 2022-08-15

## 2022-08-15 PROCEDURE — 99214 OFFICE O/P EST MOD 30 MIN: CPT | Performed by: INTERNAL MEDICINE

## 2022-08-15 RX ORDER — CLONAZEPAM 0.5 MG/1
1 TABLET ORAL 3 TIMES DAILY PRN
Qty: 30 TABLET | Refills: 2 | Status: SHIPPED | OUTPATIENT
Start: 2022-08-15 | End: 2023-01-23 | Stop reason: HOSPADM

## 2022-08-15 RX ORDER — PROCHLORPERAZINE 25 MG/1
SUPPOSITORY RECTAL
Qty: 3 EACH | Refills: 11 | Status: SHIPPED | OUTPATIENT
Start: 2022-08-15 | End: 2022-08-16

## 2022-08-15 RX ORDER — INSULIN PMP CART,AUT,G6/7,CNTR
1 EACH SUBCUTANEOUS
Qty: 15 EACH | Refills: 11 | Status: SHIPPED | OUTPATIENT
Start: 2022-08-15 | End: 2022-08-16 | Stop reason: SDUPTHER

## 2022-08-15 RX ORDER — PROCHLORPERAZINE 25 MG/1
1 SUPPOSITORY RECTAL
Qty: 1 EACH | Refills: 4 | Status: SHIPPED | OUTPATIENT
Start: 2022-08-15 | End: 2022-08-16 | Stop reason: SDUPTHER

## 2022-08-15 RX ORDER — INSULIN PMP CART,AUT,G6/7,CNTR
1 EACH SUBCUTANEOUS ONCE
Qty: 1 KIT | Refills: 0 | Status: SHIPPED | OUTPATIENT
Start: 2022-08-15 | End: 2022-08-16

## 2022-08-15 NOTE — PROGRESS NOTES
"  Subjective    Fernanda Patterson is a 22 y.o. female. she is here today for follow-up of type 1 diabetes       History of Present Illness     22 y o type 1 Diabetes more than 10 years complicated by neuropathy and gastroparesis.   Has had frequent DKA    Presently doing levemir and novolog injections w uncontrolled glycemia based on dexcom.     PE    /78   Pulse 110   Ht 170.2 cm (67\")   Wt 53.1 kg (117 lb)   LMP 08/01/2022 (Approximate)   SpO2 95%   BMI 18.32 kg/m²   AOx3  No visible goiter  Normal Respiratory Effort , Lung CTA  RRR  No Edema    Labs    Lab Results   Component Value Date    WBC 14.74 (H) 08/05/2022    HGB 12.9 08/05/2022    HCT 36.8 08/05/2022    MCV 86.6 08/05/2022     08/05/2022     Lab Results   Component Value Date    GLUCOSE 167 (H) 08/05/2022    BUN 27 (H) 08/05/2022    CREATININE 0.91 08/05/2022    EGFRIFNONA 77 01/14/2022    EGFRIFAFRI 109 04/23/2021    BCR 29.7 (H) 08/05/2022     08/05/2022    K 4.1 08/05/2022    CO2 21.0 (L) 08/05/2022    CALCIUM 8.3 (L) 08/05/2022    ALBUMIN 4.50 08/05/2022    AST 18 08/05/2022    ALT 18 08/05/2022         Assessment & Plan      1. Type 1 diabetes mellitus with hyperglycemia (HCC)    .  Type 1 DM     Glycemic Management:       Lab Results   Component Value Date    HGBA1C 10.90 (H) 08/05/2022       dexcom 2 week review    Type 1 DM w hyperglycemia    Report scanned in      Lantus   22 units      novolog 1:10, correction 50     Approve for omnipod  - Dexcom     Basal suggest 0.7 units per hour       Carb ratio    10    Correction     50    Target     120         Microvascular Complication Monitoring:  Diabetic Neuropathy, Neuropathy type painful and sensorial, no meds until psych approves        gastroparesis - reglan back up phenergan   improve glycemia   Refer to GI     Has right retinopathy              Weight Related: underweight      improve glycemia              Anxiety  Has appt w psychiatry  Klonopin refilled until appt w " them     4. Follow-up: No follow-ups on file.

## 2022-08-16 ENCOUNTER — SPECIALTY PHARMACY (OUTPATIENT)
Dept: ENDOCRINOLOGY | Facility: CLINIC | Age: 22
End: 2022-08-16

## 2022-08-16 ENCOUNTER — READMISSION MANAGEMENT (OUTPATIENT)
Dept: CALL CENTER | Facility: HOSPITAL | Age: 22
End: 2022-08-16

## 2022-08-16 ENCOUNTER — DOCUMENTATION (OUTPATIENT)
Dept: ENDOCRINOLOGY | Facility: CLINIC | Age: 22
End: 2022-08-16

## 2022-08-16 RX ORDER — PROCHLORPERAZINE 25 MG/1
SUPPOSITORY RECTAL
Qty: 3 EACH | Refills: 11 | Status: SHIPPED | OUTPATIENT
Start: 2022-08-16

## 2022-08-16 RX ORDER — INSULIN PMP CART,AUT,G6/7,CNTR
1 EACH SUBCUTANEOUS
Qty: 15 EACH | Refills: 11 | Status: SHIPPED | OUTPATIENT
Start: 2022-08-16 | End: 2022-12-08 | Stop reason: SDUPTHER

## 2022-08-16 RX ORDER — PROCHLORPERAZINE 25 MG/1
SUPPOSITORY RECTAL AS NEEDED
Qty: 9 EACH | Refills: 3 | Status: SHIPPED | OUTPATIENT
Start: 2022-08-16

## 2022-08-16 RX ORDER — INSULIN PMP CART,AUT,G6/7,CNTR
1 EACH SUBCUTANEOUS ONCE
Qty: 1 KIT | Refills: 0 | Status: SHIPPED | OUTPATIENT
Start: 2022-08-16 | End: 2022-08-16

## 2022-08-16 RX ORDER — PROCHLORPERAZINE 25 MG/1
1 SUPPOSITORY RECTAL
Qty: 1 EACH | Refills: 4 | Status: SHIPPED | OUTPATIENT
Start: 2022-08-16

## 2022-08-16 NOTE — OUTREACH NOTE
Medical Week 2 Survey    Flowsheet Row Responses   [facility] patient discharged from? [place]   Does the patient have one of the following disease processes/diagnoses(primary or secondary)? Other   Week 2 attempt successful? No   Unsuccessful attempts Attempt 1          FAIZA UNDERWOOD - Registered Nurse

## 2022-08-17 ENCOUNTER — TELEPHONE (OUTPATIENT)
Dept: ENDOCRINOLOGY | Facility: CLINIC | Age: 22
End: 2022-08-17

## 2022-08-17 NOTE — TELEPHONE ENCOUNTER
Pt called and is stating that her insurance is requiring a PA for her Omni pod.    Please advise    Thank you

## 2022-08-18 NOTE — TELEPHONE ENCOUNTER
Spoke with [facility] and on their end it's still not showing approved. I faxed the approval letter we received saying it was approved on 8/16/22. [facility] said they will call the patients insurance and check the status. I requested they call pt once they hear from insurance. I also called pt to inform current status of the situation.

## 2022-08-19 ENCOUNTER — READMISSION MANAGEMENT (OUTPATIENT)
Dept: CALL CENTER | Facility: HOSPITAL | Age: 22
End: 2022-08-19

## 2022-08-19 NOTE — OUTREACH NOTE
Medical Week 2 Survey    Flowsheet Row Responses   [facility] facility patient discharged from? [place]   Does the patient have one of the following disease processes/diagnoses(primary or secondary)? Other   Week 2 attempt successful? No   Unsuccessful attempts Attempt 2          SADIQ ALONSO - Registered Nurse

## 2022-08-22 ENCOUNTER — TELEPHONE (OUTPATIENT)
Dept: ENDOCRINOLOGY | Facility: CLINIC | Age: 22
End: 2022-08-22

## 2022-08-30 ENCOUNTER — READMISSION MANAGEMENT (OUTPATIENT)
Dept: CALL CENTER | Facility: HOSPITAL | Age: 22
End: 2022-08-30

## 2022-08-30 NOTE — OUTREACH NOTE
Assessment and Plan:    1. Abdominal pain, unspecified abdominal location  - pantoprazole (PROTONIX) 40 MG tablet; Take 1 tablet (40 mg total) by mouth 2 (two) times daily. 40 mg p.o. daily as needed for GI upset or heartburn or belching  Dispense: 60 tablet; Refill: 2  - Comprehensive Metabolic Panel; Future  - Lipase; Future  - US Abdomen Limited; Future  - Ambulatory referral/consult to Gastroenterology; Future    2. Nausea  - pantoprazole (PROTONIX) 40 MG tablet; Take 1 tablet (40 mg total) by mouth 2 (two) times daily. 40 mg p.o. daily as needed for GI upset or heartburn or belching  Dispense: 60 tablet; Refill: 2  - Comprehensive Metabolic Panel; Future  - Lipase; Future  - US Abdomen Limited; Future  - Ambulatory referral/consult to Gastroenterology; Future    Increase protonix to BID  Use zofran PRN  Obtain labs today including lipase/amylase  Obtain RUQ US, and if showing cholelithiasis would cancel GI apt and see general surgery  Hold Rybelsus for now, plan to follow up with PCP several weeks from now to discuss diabetes and resuming medication once abdominal pain is diagnosed and ideally resolved  ______________________________________________________________________  Subjective:    Chief Complaint:  Abdominal pain    HPI:  Roberto Carlos is a 27 y.o. year old male here for abdominal pain. He is new to me he is a patient of Manan Arthur MD.    He reports that he has been having abdominal pain for the past 2 weeks. Nausea as well as pain. Not having vomiting. Feels sometimes like he wishes he could vomit. Has been having a lot of burping with acid sensation.     He had spoken with Dr. Arthur about this on 4/11 and had been prescribed pantoprazole and zofran.     With the protonix he had no improvement in pain, so he went to an urgent care several days ago and they had just told him that they thought it might be an ulcer and to see his PCP. He did not have any imgaing, labs, or change of medications at urgent  Medical Week 4 Survey    Flowsheet Row Responses   [facility] patient discharged from? [place]   Does the patient have one of the following disease processes/diagnoses(primary or secondary)? Other   Week 4 attempt successful? No          [facility] - Registered Nurse   care. He has not seen GI.     He reports that the pain is more on the right side than the left. Eating does make the pain a lot worse so he hasn't really been able to eat much.     Denies alcohol use.    Medications:  Current Outpatient Medications on File Prior to Visit   Medication Sig Dispense Refill    atorvastatin (LIPITOR) 10 MG tablet Take 1 tablet (10 mg total) by mouth once daily. 90 tablet 1    blood sugar diagnostic (CONTOUR NEXT STRIPS) Strp 1 each by Misc.(Non-Drug; Combo Route) route once daily. 30 each 11    buPROPion (WELLBUTRIN XL) 300 MG 24 hr tablet Take 1 tablet (300 mg total) by mouth once daily. 90 tablet 3    JARDIANCE 25 mg tablet TAKE 1 TABLET (25 MG TOTAL) BY MOUTH ONCE DAILY. 90 tablet 0    lancets (MICROLET LANCET) Misc 1 each by Misc.(Non-Drug; Combo Route) route 3 (three) times daily. 100 each 11    ondansetron (ZOFRAN) 4 MG tablet Take 1 tablet (4 mg total) by mouth every 6 (six) hours as needed for Nausea (Or vomiting as needed). 15 tablet 1    pantoprazole (PROTONIX) 40 MG tablet 40 mg p.o. daily as needed for GI upset or heartburn or belching 30 tablet 2    semaglutide (RYBELSUS) 7 mg tablet Take 1 tablet (7 mg total) by mouth once daily. 30 tablet 2    sertraline (ZOLOFT) 50 MG tablet TAKE ONE TABLET BY MOUTH DAILY 90 tablet 0    [DISCONTINUED] ferrous sulfate 324 mg (65 mg iron) TbEC Take 324 mg by mouth once daily.       No current facility-administered medications on file prior to visit.       Review of Systems:  Review of Systems   Constitutional: Negative for chills and fever.   Gastrointestinal: Positive for abdominal pain and nausea. Negative for abdominal distention, blood in stool and vomiting.       Past Medical History:  Past Medical History:   Diagnosis Date    Anxiety     Diabetes mellitus, type 2     2015 dx.    Dyslipidemia     Hypertension     lisinopril in past    Obesity        Objective:    Vitals:  Vitals:    04/20/22 0833   BP: 126/76   Pulse: 77  "  Resp: 18   SpO2: 98%   Weight: 111.8 kg (246 lb 9.4 oz)   Height: 6' 1" (1.854 m)   PainSc:   3       Physical Exam  Vitals reviewed.   Constitutional:       General: He is not in acute distress.     Appearance: He is well-developed.   Eyes:      Conjunctiva/sclera: Conjunctivae normal.   Cardiovascular:      Rate and Rhythm: Normal rate and regular rhythm.   Pulmonary:      Effort: Pulmonary effort is normal. No respiratory distress.   Abdominal:      General: Bowel sounds are normal. There is no distension.      Palpations: Abdomen is soft.      Tenderness: There is abdominal tenderness. There is no guarding or rebound.      Comments: RUQ moderately tender to palpation, negative Natarajan's sign   Skin:     General: Skin is warm and dry.   Neurological:      Mental Status: He is alert and oriented to person, place, and time.   Psychiatric:         Behavior: Behavior normal.         Data:  A1c 9.2, TGs elevated on last labs    Cassandra Lemus MD  Internal Medicine    "

## 2022-09-14 ENCOUNTER — OFFICE VISIT (OUTPATIENT)
Dept: GASTROENTEROLOGY | Facility: CLINIC | Age: 22
End: 2022-09-14

## 2022-09-14 VITALS
DIASTOLIC BLOOD PRESSURE: 77 MMHG | WEIGHT: 122.2 LBS | SYSTOLIC BLOOD PRESSURE: 110 MMHG | BODY MASS INDEX: 19.18 KG/M2 | HEIGHT: 67 IN | HEART RATE: 108 BPM

## 2022-09-14 DIAGNOSIS — R19.7 DIARRHEA, UNSPECIFIED TYPE: ICD-10-CM

## 2022-09-14 DIAGNOSIS — R63.4 WEIGHT LOSS: ICD-10-CM

## 2022-09-14 DIAGNOSIS — R11.0 NAUSEA: ICD-10-CM

## 2022-09-14 DIAGNOSIS — R10.33 PERIUMBILICAL ABDOMINAL PAIN: Primary | ICD-10-CM

## 2022-09-14 PROCEDURE — 99204 OFFICE O/P NEW MOD 45 MIN: CPT | Performed by: INTERNAL MEDICINE

## 2022-09-14 RX ORDER — DEXTROSE AND SODIUM CHLORIDE 5; .45 G/100ML; G/100ML
30 INJECTION, SOLUTION INTRAVENOUS CONTINUOUS PRN
Status: CANCELLED | OUTPATIENT
Start: 2022-10-12

## 2022-09-14 RX ORDER — OMEPRAZOLE 40 MG/1
40 CAPSULE, DELAYED RELEASE ORAL DAILY
Qty: 30 CAPSULE | Refills: 11 | Status: SHIPPED | OUTPATIENT
Start: 2022-09-14 | End: 2023-01-23 | Stop reason: HOSPADM

## 2022-09-14 RX ORDER — LORAZEPAM 0.5 MG/1
0.5 TABLET ORAL EVERY 6 HOURS PRN
COMMUNITY
Start: 2022-08-24 | End: 2023-01-23 | Stop reason: HOSPADM

## 2022-09-14 RX ORDER — PROCHLORPERAZINE 25 MG/1
SUPPOSITORY RECTAL
COMMUNITY
Start: 2022-06-27

## 2022-09-14 RX ORDER — IBUPROFEN 600 MG/1
TABLET ORAL
COMMUNITY
Start: 2022-09-02 | End: 2023-01-13

## 2022-09-14 RX ORDER — CHLORPHENIR/PHENYLEPH/ASPIRIN 2-7.8-325
TABLET, EFFERVESCENT ORAL
COMMUNITY
Start: 2022-06-27
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
## 2022-10-01 NOTE — H&P (VIEW-ONLY)
"[facility]      Chief Complaint:   Chief Complaint   Patient presents with   • Gastroparesis       Subjective     HPI:   Ms. Patterson is a 22-year-old  female with past medical history of ADHD, bipolar disorder, borderline personality disorder, cannabinoid hyperemesis syndrome, diabetes mellitus, gastroparesis, ketoacidosis, neuropathy, posttraumatic stress disorder, seizure disorder, depression, recurrent UTI presenting for evaluation for abdominal pain.  She has intermittent bouts of mid abdominal discomfort for past 3 years associated with nausea, vomiting and diarrhea alternating with constipation.  Symptoms are intermittent and are worse with food intake.  She is lost 40 pounds weight in past year.  Denies rectal bleeding or melena.  Currently taking Reglan and Phenergan without much help.  Denied NSAID usage.  She uses marijuana frequently.      Past Medical History:   Past Medical History:   Diagnosis Date   • ADHD    • Bipolar 1 disorder (HCC)    • Borderline personality disorder (HCC)    • Cannabinoid hyperemesis syndrome    • Diabetes mellitus type 1 (HCC)    • Diabetic gastroparesis (HCC)    • Diabetic ketoacidosis (HCC)    • Diabetic neuropathy (HCC)    • History of transfusion     Pt reports \"no reactions.\"   • Post traumatic stress disorder (PTSD)    • Recurrent UTI    • Seizure disorder (HCC)    • Severe depressed bipolar II disorder without psychotic features (HCC)    • Uncontrolled diabetes mellitus        Past Surgical History:  Past Surgical History:   Procedure Laterality Date   •  SECTION N/A 2018   • CYSTOSCOPY N/A 2020    Procedure: CYSTOSCOPY FLEXIBLE       (DONE ON STRETCHER);  Surgeon: Chapo, Antonio PRYOR MD;  Location: [facility];  Service: Urology;  Laterality: N/A;       Family History:  Family History   Problem Relation Age of Onset   • Drug abuse Father    • Suicide Attempts Brother    • Dementia Maternal Grandfather    • Hypertension " Paternal Grandmother        Social History:   reports that she has quit smoking. She has a 2.00 pack-year smoking history. She has never used smokeless tobacco. She reports current drug use. Frequency: 1.00 time per week. Drug: Marijuana. She reports that she does not drink alcohol.    Medications:   Prior to Admission medications    Medication Sig Start Date End Date Taking? Authorizing Provider   Acetone, Urine, Test (Ketone Test) strip USE AS INDICATED 6/27/22  Yes Abigail Castro MD   Alcohol Swabs (Alcohol Wipes) 70 % pads Use 4 x daily 6/27/22  Yes Tavon Avila MD B-GAVIN ULTRA-FINE 33 LANCETS misc Use 4 x daily , any brand covered by insurance 6/27/22  Yes Tavon Avila MD   Blood Glucose Monitoring Suppl w/Device kit USE AS INDICATED, ANY MONITOR , ICD10 code is E11.9 6/27/22  Yes Tavon Avila MD   clonazePAM (KlonoPIN) 0.5 MG tablet Take 2 tablets by mouth 3 (Three) Times a Day As Needed for Anxiety. 8/15/22  Yes Tavon Avila MD   Continuous Blood Gluc  (Dexcom G6 ) device USE AS DIRECTED FOR CONTINUOUS GLUCOSE MONITORING. 6/27/22  Yes Abigail Castro MD   Continuous Blood Gluc Sensor (Dexcom G6 Sensor) Use as directed for continuous glucose monitoring **Change sensor Every 10 (Ten) Days** 8/16/22  Yes Tavon Avila MD   Continuous Blood Gluc Sensor (Dexcom G6 Sensor) As Needed (glucose control). Every 10 days,  Use as directed for continuous glucose monitoring 8/16/22  Yes Tavon Avila MD   Continuous Blood Gluc Transmit (Dexcom G6 Transmitter) misc Use as directed for continuous glucose monitoring **Change transmitter Every 3 (Three) Months** 8/16/22  Yes Tavon Avila MD   Glucagon (Baqsimi One Pack) 3 MG/DOSE powder 1 each into the nostril(s) as directed by provider As Needed (Hypoglycemia). Apply intranasal if hypoglycemia 6/27/22  Yes Tavon Avila MD   Glucose Blood (BLOOD  GLUCOSE TEST) strip Use 4 x daily use any brand covered by insurance or same brand as before ICD10 code is E11.9 10/31/19  Yes Tavon Avila MD   ibuprofen (ADVIL,MOTRIN) 600 MG tablet TAKE 1 TABLET BY MOUTH EVERY 6 HOURS AS NEEDED FOR PAIN FOR UP TO 10 DAYS 9/2/22  Yes Abigail Castro MD   insulin aspart (NovoLOG FlexPen) 100 UNIT/ML solution pen-injector sc pen 10 units tid 6/27/22  Yes Tavon Avila MD   Insulin Aspart (novoLOG) 100 UNIT/ML injection 100 units daily through pump 6/27/22  Yes Tavon Avila MD   insulin detemir (Levemir FlexTouch) 100 UNIT/ML injection 22 units daily 6/27/22  Yes Tavon Avila MD   Insulin Disposable Pump (Omnipod 5 G6 Pod, Gen 5,) misc Change pods Every Other Day. 8/16/22  Yes Tavon Avila MD   Insulin Pen Needle (Pen Needles) 32G X 4 MM misc 1 each 4 (Four) Times a Day. Use 4 x daily, Dx code E11.65 6/27/22  Yes Tavon Avila MD   Lancet Devices (Lancing Device) misc USE AS INDICATED TO CORRELATE WITH STRIPS AND METER 6/27/22  Yes Tavon Avila MD   LORazepam (ATIVAN) 0.5 MG tablet Take 0.5 mg by mouth. 8/24/22  Yes ProviderAbigail MD   metoclopramide (REGLAN) 10 MG tablet Take up to 2 tabs po TID 6/27/22  Yes Tavon Avila MD   promethazine (PHENERGAN) 12.5 MG tablet Take 1 tablet by mouth Every 6 (Six) Hours As Needed for Nausea or Vomiting. 6/27/22  Yes Tavon Avila MD   Urine Glucose-Ketones Test strip Use as indicated 6/27/22  Yes Tavon Avila MD   omeprazole (priLOSEC) 40 MG capsule Take 1 capsule by mouth Daily. 9/14/22   Timur Sun MD   polyethylene glycol (GoLYTELY) 236 g solution Please use the instructions given in office 9/14/22   Timur Sun MD       Allergies:  Pineapple and Benzoyl peroxide    ROS:    Review of Systems   Constitutional: Positive for unexpected weight change. Negative for chills, fatigue and fever.  "  HENT: Negative for congestion, ear discharge, hearing loss, nosebleeds and sore throat.    Eyes: Negative for pain, discharge and redness.   Respiratory: Negative for cough, chest tightness, shortness of breath and wheezing.    Cardiovascular: Negative for chest pain and palpitations.   Gastrointestinal: Positive for abdominal pain, constipation, diarrhea, nausea and vomiting. Negative for abdominal distention and blood in stool.   Endocrine: Negative for cold intolerance, polydipsia, polyphagia and polyuria.   Genitourinary: Negative for dysuria, flank pain, frequency, hematuria and urgency.   Musculoskeletal: Negative for arthralgias, back pain, joint swelling and myalgias.   Skin: Negative for color change, pallor and rash.   Neurological: Negative for tremors, seizures, syncope, weakness and headaches.   Hematological: Negative for adenopathy. Does not bruise/bleed easily.   Psychiatric/Behavioral: Negative for behavioral problems, confusion, dysphoric mood, hallucinations and suicidal ideas. The patient is not nervous/anxious.      Objective     /77 (BP Location: Left arm)   Pulse 108   Ht 170.2 cm (67\")   Wt 55.4 kg (122 lb 3.2 oz)   BMI 19.14 kg/m²     Physical Exam  Constitutional:       Appearance: She is well-developed.   HENT:      Head: Normocephalic and atraumatic.   Eyes:      Conjunctiva/sclera: Conjunctivae normal.      Pupils: Pupils are equal, round, and reactive to light.   Neck:      Thyroid: No thyromegaly.   Cardiovascular:      Rate and Rhythm: Normal rate and regular rhythm.      Heart sounds: Normal heart sounds. No murmur heard.  Pulmonary:      Effort: Pulmonary effort is normal.      Breath sounds: Normal breath sounds. No wheezing.   Abdominal:      General: Bowel sounds are normal. There is no distension.      Palpations: Abdomen is soft. There is no mass.      Tenderness: There is no abdominal tenderness.      Hernia: No hernia is present.   Genitourinary:     Comments: No " lesions noted  Musculoskeletal:         General: No tenderness. Normal range of motion.      Cervical back: Normal range of motion and neck supple.   Lymphadenopathy:      Cervical: No cervical adenopathy.   Skin:     General: Skin is warm and dry.      Findings: No rash.   Neurological:      Mental Status: She is alert and oriented to person, place, and time.      Cranial Nerves: No cranial nerve deficit.   Psychiatric:         Thought Content: Thought content normal.        Extremities: No edema, cyanosis or clubbing.    Assessment & Plan    1.  Mid abdominal pain associated with nausea and vomiting rule out peptic ulcer disease, gastritis and pancreaticobiliary pathology.  Could also be due to marijuana induced functional abdominal pain and hyperemesis.  Add Prilosec 40 mg daily and continue Reglan and Phenergan.  Proceed with EGD for further evaluation.  2.  Abdominal pain associated with diarrhea alternating with constipation and weight loss rule out IBD and colorectal neoplasia.  Add high-fiber diet and MiraLAX as needed.  Proceed with colonoscopy for further evaluation.  3.  Abdominal pain and weight loss rule out celiac sprue and alpha gal allergy.  Obtain alpha gal panel and celiac panel.  Patient to keep a food journal.  4.  Tobacco and marijuana usage, recommend cessation.  Diagnoses and all orders for this visit:    1. Periumbilical abdominal pain (Primary)  -     Case Request; Standing  -     Case Request    2. Diarrhea, unspecified type  -     Case Request; Standing  -     Case Request    3. Nausea  -     Case Request; Standing  -     Case Request    4. Weight loss  -     Case Request; Standing  -     Case Request    Other orders  -     Follow Anesthesia Guidelines / Standing Orders; Future  -     Obtain Informed Consent; Future  -     omeprazole (priLOSEC) 40 MG capsule; Take 1 capsule by mouth Daily.  Dispense: 30 capsule; Refill: 11  -     polyethylene glycol (GoLYTELY) 236 g solution; Please use  the instructions given in office  Dispense: 4000 mL; Refill: 0        ESOPHAGOGASTRODUODENOSCOPY (N/A), COLONOSCOPY (N/A)     Diagnosis Plan   1. Periumbilical abdominal pain  Case Request    Case Request   2. Diarrhea, unspecified type  Case Request    Case Request   3. Nausea  Case Request    Case Request   4. Weight loss  Case Request    Case Request       Anticipated Surgical Procedure:  Orders Placed This Encounter   Procedures   • Follow Anesthesia Guidelines / Standing Orders     Standing Status:   Future   • Obtain Informed Consent     Standing Status:   Future     Order Specific Question:   Informed Consent Given For     Answer:   egd and colonoscopy       The risks, benefits, and alternatives of this procedure have been discussed with the patient or the responsible party- the patient understands and agrees to proceed.            This document has been electronically signed by Timur Sun MD on October 1, 2022 16:24 CDT

## 2022-10-01 NOTE — PROGRESS NOTES
"[facility], [place], operated by [facility]      Chief Complaint:   Chief Complaint   Patient presents with   • Gastroparesis       Subjective     HPI:   Ms. Patterson is a 22-year-old  female with past medical history of ADHD, bipolar disorder, borderline personality disorder, cannabinoid hyperemesis syndrome, diabetes mellitus, gastroparesis, ketoacidosis, neuropathy, posttraumatic stress disorder, seizure disorder, depression, recurrent UTI presenting for evaluation for abdominal pain.  She has intermittent bouts of mid abdominal discomfort for past 3 years associated with nausea, vomiting and diarrhea alternating with constipation.  Symptoms are intermittent and are worse with food intake.  She is lost 40 pounds weight in past year.  Denies rectal bleeding or melena.  Currently taking Reglan and Phenergan without much help.  Denied NSAID usage.  She uses marijuana frequently.      Past Medical History:   Past Medical History:   Diagnosis Date   • ADHD    • Bipolar 1 disorder (HCC)    • Borderline personality disorder (HCC)    • Cannabinoid hyperemesis syndrome    • Diabetes mellitus type 1 (HCC)    • Diabetic gastroparesis (HCC)    • Diabetic ketoacidosis (HCC)    • Diabetic neuropathy (HCC)    • History of transfusion     Pt reports \"no reactions.\"   • Post traumatic stress disorder (PTSD)    • Recurrent UTI    • Seizure disorder (HCC)    • Severe depressed bipolar II disorder without psychotic features (HCC)    • Uncontrolled diabetes mellitus        Past Surgical History:  Past Surgical History:   Procedure Laterality Date   •  SECTION N/A 2018   • CYSTOSCOPY N/A 2020    Procedure: CYSTOSCOPY FLEXIBLE       (DONE ON STRETCHER);  Surgeon: Chapo, Antonio PRYOR MD;  Location: [facility];  Service: Urology;  Laterality: N/A;       Family History:  Family History   Problem Relation Age of Onset   • Drug abuse Father    • Suicide Attempts Brother    • Dementia Maternal Grandfather    • Hypertension " Paternal Grandmother        Social History:   reports that she has quit smoking. She has a 2.00 pack-year smoking history. She has never used smokeless tobacco. She reports current drug use. Frequency: 1.00 time per week. Drug: Marijuana. She reports that she does not drink alcohol.    Medications:   Prior to Admission medications    Medication Sig Start Date End Date Taking? Authorizing Provider   Acetone, Urine, Test (Ketone Test) strip USE AS INDICATED 6/27/22  Yes Abigail Castro MD   Alcohol Swabs (Alcohol Wipes) 70 % pads Use 4 x daily 6/27/22  Yes Tavon Avila MD B-GAVIN ULTRA-FINE 33 LANCETS misc Use 4 x daily , any brand covered by insurance 6/27/22  Yes Tavon Avila MD   Blood Glucose Monitoring Suppl w/Device kit USE AS INDICATED, ANY MONITOR , ICD10 code is E11.9 6/27/22  Yes Tavon Avila MD   clonazePAM (KlonoPIN) 0.5 MG tablet Take 2 tablets by mouth 3 (Three) Times a Day As Needed for Anxiety. 8/15/22  Yes Tavon Avila MD   Continuous Blood Gluc  (Dexcom G6 ) device USE AS DIRECTED FOR CONTINUOUS GLUCOSE MONITORING. 6/27/22  Yes Abigail Castro MD   Continuous Blood Gluc Sensor (Dexcom G6 Sensor) Use as directed for continuous glucose monitoring **Change sensor Every 10 (Ten) Days** 8/16/22  Yes Tavon Avila MD   Continuous Blood Gluc Sensor (Dexcom G6 Sensor) As Needed (glucose control). Every 10 days,  Use as directed for continuous glucose monitoring 8/16/22  Yes Tavon Avila MD   Continuous Blood Gluc Transmit (Dexcom G6 Transmitter) misc Use as directed for continuous glucose monitoring **Change transmitter Every 3 (Three) Months** 8/16/22  Yes Tavon Avila MD   Glucagon (Baqsimi One Pack) 3 MG/DOSE powder 1 each into the nostril(s) as directed by provider As Needed (Hypoglycemia). Apply intranasal if hypoglycemia 6/27/22  Yes Tavon Avila MD   Glucose Blood (BLOOD  GLUCOSE TEST) strip Use 4 x daily use any brand covered by insurance or same brand as before ICD10 code is E11.9 10/31/19  Yes Tavon Avila MD   ibuprofen (ADVIL,MOTRIN) 600 MG tablet TAKE 1 TABLET BY MOUTH EVERY 6 HOURS AS NEEDED FOR PAIN FOR UP TO 10 DAYS 9/2/22  Yes Abigail Castro MD   insulin aspart (NovoLOG FlexPen) 100 UNIT/ML solution pen-injector sc pen 10 units tid 6/27/22  Yes Tavon Avila MD   Insulin Aspart (novoLOG) 100 UNIT/ML injection 100 units daily through pump 6/27/22  Yes Tavon Avila MD   insulin detemir (Levemir FlexTouch) 100 UNIT/ML injection 22 units daily 6/27/22  Yes Tavon Avila MD   Insulin Disposable Pump (Omnipod 5 G6 Pod, Gen 5,) misc Change pods Every Other Day. 8/16/22  Yes Tavon Avila MD   Insulin Pen Needle (Pen Needles) 32G X 4 MM misc 1 each 4 (Four) Times a Day. Use 4 x daily, Dx code E11.65 6/27/22  Yes Tavon Avila MD   Lancet Devices (Lancing Device) misc USE AS INDICATED TO CORRELATE WITH STRIPS AND METER 6/27/22  Yes Tavon Avila MD   LORazepam (ATIVAN) 0.5 MG tablet Take 0.5 mg by mouth. 8/24/22  Yes ProviderAbigail MD   metoclopramide (REGLAN) 10 MG tablet Take up to 2 tabs po TID 6/27/22  Yes Tavon Avila MD   promethazine (PHENERGAN) 12.5 MG tablet Take 1 tablet by mouth Every 6 (Six) Hours As Needed for Nausea or Vomiting. 6/27/22  Yes Tavon Avila MD   Urine Glucose-Ketones Test strip Use as indicated 6/27/22  Yes Tavon Avila MD   omeprazole (priLOSEC) 40 MG capsule Take 1 capsule by mouth Daily. 9/14/22   Timur Sun MD   polyethylene glycol (GoLYTELY) 236 g solution Please use the instructions given in office 9/14/22   Timur Sun MD       Allergies:  Pineapple and Benzoyl peroxide    ROS:    Review of Systems   Constitutional: Positive for unexpected weight change. Negative for chills, fatigue and fever.  "  HENT: Negative for congestion, ear discharge, hearing loss, nosebleeds and sore throat.    Eyes: Negative for pain, discharge and redness.   Respiratory: Negative for cough, chest tightness, shortness of breath and wheezing.    Cardiovascular: Negative for chest pain and palpitations.   Gastrointestinal: Positive for abdominal pain, constipation, diarrhea, nausea and vomiting. Negative for abdominal distention and blood in stool.   Endocrine: Negative for cold intolerance, polydipsia, polyphagia and polyuria.   Genitourinary: Negative for dysuria, flank pain, frequency, hematuria and urgency.   Musculoskeletal: Negative for arthralgias, back pain, joint swelling and myalgias.   Skin: Negative for color change, pallor and rash.   Neurological: Negative for tremors, seizures, syncope, weakness and headaches.   Hematological: Negative for adenopathy. Does not bruise/bleed easily.   Psychiatric/Behavioral: Negative for behavioral problems, confusion, dysphoric mood, hallucinations and suicidal ideas. The patient is not nervous/anxious.      Objective     /77 (BP Location: Left arm)   Pulse 108   Ht 170.2 cm (67\")   Wt 55.4 kg (122 lb 3.2 oz)   BMI 19.14 kg/m²     Physical Exam  Constitutional:       Appearance: She is well-developed.   HENT:      Head: Normocephalic and atraumatic.   Eyes:      Conjunctiva/sclera: Conjunctivae normal.      Pupils: Pupils are equal, round, and reactive to light.   Neck:      Thyroid: No thyromegaly.   Cardiovascular:      Rate and Rhythm: Normal rate and regular rhythm.      Heart sounds: Normal heart sounds. No murmur heard.  Pulmonary:      Effort: Pulmonary effort is normal.      Breath sounds: Normal breath sounds. No wheezing.   Abdominal:      General: Bowel sounds are normal. There is no distension.      Palpations: Abdomen is soft. There is no mass.      Tenderness: There is no abdominal tenderness.      Hernia: No hernia is present.   Genitourinary:     Comments: No " lesions noted  Musculoskeletal:         General: No tenderness. Normal range of motion.      Cervical back: Normal range of motion and neck supple.   Lymphadenopathy:      Cervical: No cervical adenopathy.   Skin:     General: Skin is warm and dry.      Findings: No rash.   Neurological:      Mental Status: She is alert and oriented to person, place, and time.      Cranial Nerves: No cranial nerve deficit.   Psychiatric:         Thought Content: Thought content normal.        Extremities: No edema, cyanosis or clubbing.    Assessment & Plan    1.  Mid abdominal pain associated with nausea and vomiting rule out peptic ulcer disease, gastritis and pancreaticobiliary pathology.  Could also be due to marijuana induced functional abdominal pain and hyperemesis.  Add Prilosec 40 mg daily and continue Reglan and Phenergan.  Proceed with EGD for further evaluation.  2.  Abdominal pain associated with diarrhea alternating with constipation and weight loss rule out IBD and colorectal neoplasia.  Add high-fiber diet and MiraLAX as needed.  Proceed with colonoscopy for further evaluation.  3.  Abdominal pain and weight loss rule out celiac sprue and alpha gal allergy.  Obtain alpha gal panel and celiac panel.  Patient to keep a food journal.  4.  Tobacco and marijuana usage, recommend cessation.  Diagnoses and all orders for this visit:    1. Periumbilical abdominal pain (Primary)  -     Case Request; Standing  -     Case Request    2. Diarrhea, unspecified type  -     Case Request; Standing  -     Case Request    3. Nausea  -     Case Request; Standing  -     Case Request    4. Weight loss  -     Case Request; Standing  -     Case Request    Other orders  -     Follow Anesthesia Guidelines / Standing Orders; Future  -     Obtain Informed Consent; Future  -     omeprazole (priLOSEC) 40 MG capsule; Take 1 capsule by mouth Daily.  Dispense: 30 capsule; Refill: 11  -     polyethylene glycol (GoLYTELY) 236 g solution; Please use  the instructions given in office  Dispense: 4000 mL; Refill: 0        ESOPHAGOGASTRODUODENOSCOPY (N/A), COLONOSCOPY (N/A)     Diagnosis Plan   1. Periumbilical abdominal pain  Case Request    Case Request   2. Diarrhea, unspecified type  Case Request    Case Request   3. Nausea  Case Request    Case Request   4. Weight loss  Case Request    Case Request       Anticipated Surgical Procedure:  Orders Placed This Encounter   Procedures   • Follow Anesthesia Guidelines / Standing Orders     Standing Status:   Future   • Obtain Informed Consent     Standing Status:   Future     Order Specific Question:   Informed Consent Given For     Answer:   egd and colonoscopy       The risks, benefits, and alternatives of this procedure have been discussed with the patient or the responsible party- the patient understands and agrees to proceed.            This document has been electronically signed by Timur Sun MD on October 1, 2022 16:24 CDT

## 2022-10-12 ENCOUNTER — ANESTHESIA (OUTPATIENT)
Dept: GASTROENTEROLOGY | Facility: HOSPITAL | Age: 22
End: 2022-10-12

## 2022-10-12 ENCOUNTER — HOSPITAL ENCOUNTER (OUTPATIENT)
Facility: HOSPITAL | Age: 22
Setting detail: HOSPITAL OUTPATIENT SURGERY
Discharge: HOME | End: 2022-10-12
Attending: INTERNAL MEDICINE | Admitting: INTERNAL MEDICINE

## 2022-10-12 ENCOUNTER — ANESTHESIA EVENT (OUTPATIENT)
Dept: GASTROENTEROLOGY | Facility: HOSPITAL | Age: 22
End: 2022-10-12

## 2022-10-12 VITALS
OXYGEN SATURATION: 97 % | DIASTOLIC BLOOD PRESSURE: 58 MMHG | SYSTOLIC BLOOD PRESSURE: 107 MMHG | HEIGHT: 67 IN | HEART RATE: 89 BPM | BODY MASS INDEX: 20.24 KG/M2 | TEMPERATURE: 97.2 F | RESPIRATION RATE: 17 BRPM | WEIGHT: 128.97 LBS

## 2022-10-12 DIAGNOSIS — R11.0 NAUSEA: ICD-10-CM

## 2022-10-12 DIAGNOSIS — R19.7 DIARRHEA, UNSPECIFIED TYPE: ICD-10-CM

## 2022-10-12 DIAGNOSIS — R10.33 PERIUMBILICAL ABDOMINAL PAIN: ICD-10-CM

## 2022-10-12 DIAGNOSIS — R63.4 WEIGHT LOSS: ICD-10-CM

## 2022-10-12 LAB — B-HCG UR QL: NEGATIVE

## 2022-10-12 PROCEDURE — 81025 URINE PREGNANCY TEST: CPT | Performed by: INTERNAL MEDICINE

## 2022-10-12 PROCEDURE — 43239 EGD BIOPSY SINGLE/MULTIPLE: CPT | Performed by: INTERNAL MEDICINE

## 2022-10-12 PROCEDURE — 45380 COLONOSCOPY AND BIOPSY: CPT | Performed by: INTERNAL MEDICINE

## 2022-10-12 PROCEDURE — 25010000002 MIDAZOLAM PER 1 MG: Performed by: NURSE ANESTHETIST, CERTIFIED REGISTERED

## 2022-10-12 PROCEDURE — 25010000002 PROPOFOL 10 MG/ML EMULSION: Performed by: NURSE ANESTHETIST, CERTIFIED REGISTERED

## 2022-10-12 RX ORDER — MIDAZOLAM HYDROCHLORIDE 1 MG/ML
INJECTION INTRAMUSCULAR; INTRAVENOUS AS NEEDED
Status: DISCONTINUED | OUTPATIENT
Start: 2022-10-12 | End: 2022-10-12 | Stop reason: SURG

## 2022-10-12 RX ORDER — OLANZAPINE 10 MG/1
10 TABLET ORAL
COMMUNITY
End: 2023-01-23 | Stop reason: HOSPADM

## 2022-10-12 RX ORDER — LIDOCAINE HYDROCHLORIDE 20 MG/ML
INJECTION, SOLUTION INFILTRATION; PERINEURAL AS NEEDED
Status: DISCONTINUED | OUTPATIENT
Start: 2022-10-12 | End: 2022-10-12 | Stop reason: SURG

## 2022-10-12 RX ORDER — PROPOFOL 10 MG/ML
VIAL (ML) INTRAVENOUS AS NEEDED
Status: DISCONTINUED | OUTPATIENT
Start: 2022-10-12 | End: 2022-10-12 | Stop reason: SURG

## 2022-10-12 RX ORDER — SODIUM CHLORIDE 9 MG/ML
30 INJECTION, SOLUTION INTRAVENOUS CONTINUOUS
Status: DISCONTINUED | OUTPATIENT
Start: 2022-10-12 | End: 2022-10-12 | Stop reason: HOSPADM

## 2022-10-12 RX ORDER — DEXTROSE AND SODIUM CHLORIDE 5; .45 G/100ML; G/100ML
30 INJECTION, SOLUTION INTRAVENOUS CONTINUOUS PRN
Status: DISCONTINUED | OUTPATIENT
Start: 2022-10-12 | End: 2022-10-12 | Stop reason: HOSPADM

## 2022-10-12 RX ADMIN — PROPOFOL 100 MG: 10 INJECTION, EMULSION INTRAVENOUS at 11:15

## 2022-10-12 RX ADMIN — PROPOFOL 30 MG: 10 INJECTION, EMULSION INTRAVENOUS at 11:25

## 2022-10-12 RX ADMIN — PROPOFOL 20 MG: 10 INJECTION, EMULSION INTRAVENOUS at 11:28

## 2022-10-12 RX ADMIN — LIDOCAINE HYDROCHLORIDE 60 MG: 20 INJECTION, SOLUTION INFILTRATION; PERINEURAL at 11:15

## 2022-10-12 RX ADMIN — PROPOFOL 50 MG: 10 INJECTION, EMULSION INTRAVENOUS at 11:21

## 2022-10-12 RX ADMIN — PROPOFOL 50 MG: 10 INJECTION, EMULSION INTRAVENOUS at 11:17

## 2022-10-12 RX ADMIN — SODIUM CHLORIDE 30 ML/HR: 9 INJECTION, SOLUTION INTRAVENOUS at 10:48

## 2022-10-12 RX ADMIN — PROPOFOL 50 MG: 10 INJECTION, EMULSION INTRAVENOUS at 11:19

## 2022-10-12 RX ADMIN — MIDAZOLAM HYDROCHLORIDE 2 MG: 1 INJECTION, SOLUTION INTRAMUSCULAR; INTRAVENOUS at 11:23

## 2022-10-12 NOTE — ANESTHESIA POSTPROCEDURE EVALUATION
Patient: Fernanda Patterson    Procedure Summary     Date: 10/12/22 Room / Location: [facility] ENDOSCOPY 2 / [facility] ENDOSCOPY    Anesthesia Start: 1111 Anesthesia Stop: 1130    Procedures:       ESOPHAGOGASTRODUODENOSCOPY      COLONOSCOPY Diagnosis:       Periumbilical abdominal pain      Diarrhea, unspecified type      Nausea      Weight loss      (Periumbilical abdominal pain [R10.33])      (Diarrhea, unspecified type [R19.7])      (Nausea [R11.0])      (Weight loss [R63.4])    Surgeons: Timur Sun MD Provider: Kalia Boudreaux CRNA    Anesthesia Type: general ASA Status: 3          Anesthesia Type: general    Vitals  No vitals data found for the desired time range.          Post Anesthesia Care and Evaluation    Patient location during evaluation: bedside  Patient participation: complete - patient participated  Level of consciousness: sleepy but conscious  Pain score: 0  Pain management: adequate    Airway patency: patent  Anesthetic complications: No anesthetic complications  PONV Status: none  Cardiovascular status: acceptable  Respiratory status: acceptable  Hydration status: acceptable    Comments: ---------------------------               10/12/22                      1130        ---------------------------   BP:          92/56         Pulse:         98           Resp:          18           Temp:   98.1 °F (36.7 °C)   SpO2:         100%         ---------------------------

## 2022-10-12 NOTE — ANESTHESIA PREPROCEDURE EVALUATION
Anesthesia Evaluation     Patient summary reviewed and Nursing notes reviewed   no history of anesthetic complications:  NPO Solid Status: > 8 hours  NPO Liquid Status: > 4 hours           Airway   Mallampati: I  TM distance: >3 FB  Neck ROM: full  possible difficult intubation  Dental - normal exam     Pulmonary - normal exam    breath sounds clear to auscultation  (+) a smoker Current Smoked day of surgery, asthma,    ROS comment:   - lines/tubes: None    - cardiac: Size within normal limits.    - mediastinum: Contour within normal limits.     - lungs: No evidence of a focal air space process, pulmonary  interstitial edema, nodule(s)/mass.     - pleura: No evidence of  fluid.      - osseous: Unremarkable for age.     IMPRESSION:  Negative examination.        Electronically signed by:  Cha Cooney MD  6/2/2020 7:48 PM CDT  Cardiovascular - negative cardio ROS and normal exam    ECG reviewed  Rhythm: regular  Rate: normal    (-) murmur    ROS comment: Date/Time: 5/8/2020 5:55 PM  Performed by: Tamara Arredondo APRN  Authorized by: Tamara Arredondo APRN   Interpreted by physician  Rhythm: sinus bradycardia  Rate: bradycardic  BPM: 53  ST Segments: ST segments normal    Interpreted by Tamara Arredondo APRN on 5/10/2020  1:26 PM        Neuro/Psych  (+) seizures, psychiatric history Bipolar, ADHD, Depression and PTSD,    GI/Hepatic/Renal/Endo    (+)   renal disease (Resolved.) ARF, diabetes mellitus type 1 using insulin,     Musculoskeletal (-) negative ROS    Abdominal    Substance History   (+) drug use (Cannabis.)     OB/GYN negative ob/gyn ROS         Other - negative ROS                         Anesthesia Plan    ASA 3     general   total IV anesthesia  intravenous induction     Anesthetic plan, risks, benefits, and alternatives have been provided, discussed and informed consent has been obtained with: patient.

## 2022-10-14 LAB — REF LAB TEST METHOD: NORMAL

## 2022-10-19 ENCOUNTER — OFFICE VISIT (OUTPATIENT)
Dept: GASTROENTEROLOGY | Facility: CLINIC | Age: 22
End: 2022-10-19

## 2022-10-19 VITALS
HEART RATE: 96 BPM | SYSTOLIC BLOOD PRESSURE: 122 MMHG | HEIGHT: 67 IN | DIASTOLIC BLOOD PRESSURE: 90 MMHG | WEIGHT: 131.4 LBS | BODY MASS INDEX: 20.62 KG/M2

## 2022-10-19 DIAGNOSIS — R11.0 NAUSEA: Primary | ICD-10-CM

## 2022-10-19 PROCEDURE — 99214 OFFICE O/P EST MOD 30 MIN: CPT | Performed by: INTERNAL MEDICINE

## 2022-10-19 RX ORDER — SUCRALFATE 1 G/1
1 TABLET ORAL 4 TIMES DAILY
Qty: 120 TABLET | Refills: 4 | Status: SHIPPED | OUTPATIENT
Start: 2022-10-19 | End: 2022-11-18

## 2022-10-20 ENCOUNTER — HOSPITAL ENCOUNTER (EMERGENCY)
Facility: HOSPITAL | Age: 22
Discharge: HOME | End: 2022-10-21
Attending: STUDENT IN AN ORGANIZED HEALTH CARE EDUCATION/TRAINING PROGRAM | Admitting: STUDENT IN AN ORGANIZED HEALTH CARE EDUCATION/TRAINING PROGRAM

## 2022-10-20 ENCOUNTER — TELEPHONE (OUTPATIENT)
Dept: ENDOCRINOLOGY | Facility: CLINIC | Age: 22
End: 2022-10-20

## 2022-10-20 DIAGNOSIS — R30.0 DYSURIA: Primary | ICD-10-CM

## 2022-10-20 DIAGNOSIS — R73.9 HYPERGLYCEMIA: ICD-10-CM

## 2022-10-20 LAB
ACETONE BLD QL: ABNORMAL
ANION GAP SERPL CALCULATED.3IONS-SCNC: 14 MMOL/L (ref 5–15)
BASOPHILS # BLD AUTO: 0.07 10*3/MM3 (ref 0–0.2)
BASOPHILS NFR BLD AUTO: 1 % (ref 0–1.5)
BILIRUB UR QL STRIP: NEGATIVE
BUN SERPL-MCNC: 24 MG/DL (ref 6–20)
BUN/CREAT SERPL: 21.4 (ref 7–25)
CALCIUM SPEC-SCNC: 9.7 MG/DL (ref 8.6–10.5)
CHLORIDE SERPL-SCNC: 88 MMOL/L (ref 98–107)
CLARITY UR: CLEAR
CO2 SERPL-SCNC: 24 MMOL/L (ref 22–29)
COLOR UR: YELLOW
CREAT SERPL-MCNC: 1.12 MG/DL (ref 0.57–1)
DEPRECATED RDW RBC AUTO: 38.4 FL (ref 37–54)
EGFRCR SERPLBLD CKD-EPI 2021: 71 ML/MIN/1.73
EOSINOPHIL # BLD AUTO: 0.22 10*3/MM3 (ref 0–0.4)
EOSINOPHIL NFR BLD AUTO: 3.2 % (ref 0.3–6.2)
ERYTHROCYTE [DISTWIDTH] IN BLOOD BY AUTOMATED COUNT: 12.5 % (ref 12.3–15.4)
GLUCOSE SERPL-MCNC: 619 MG/DL (ref 65–99)
GLUCOSE UR STRIP-MCNC: ABNORMAL MG/DL
HCT VFR BLD AUTO: 36.8 % (ref 34–46.6)
HGB BLD-MCNC: 12 G/DL (ref 12–15.9)
HGB UR QL STRIP.AUTO: NEGATIVE
IMM GRANULOCYTES # BLD AUTO: 0.06 10*3/MM3 (ref 0–0.05)
IMM GRANULOCYTES NFR BLD AUTO: 0.9 % (ref 0–0.5)
KETONES UR QL STRIP: ABNORMAL
LEUKOCYTE ESTERASE UR QL STRIP.AUTO: NEGATIVE
LIPASE SERPL-CCNC: 29 U/L (ref 13–60)
LYMPHOCYTES # BLD AUTO: 2.43 10*3/MM3 (ref 0.7–3.1)
LYMPHOCYTES NFR BLD AUTO: 35 % (ref 19.6–45.3)
MAGNESIUM SERPL-MCNC: 1.6 MG/DL (ref 1.6–2.6)
MCH RBC QN AUTO: 27.6 PG (ref 26.6–33)
MCHC RBC AUTO-ENTMCNC: 32.6 G/DL (ref 31.5–35.7)
MCV RBC AUTO: 84.8 FL (ref 79–97)
MONOCYTES # BLD AUTO: 0.41 10*3/MM3 (ref 0.1–0.9)
MONOCYTES NFR BLD AUTO: 5.9 % (ref 5–12)
NEUTROPHILS NFR BLD AUTO: 3.76 10*3/MM3 (ref 1.7–7)
NEUTROPHILS NFR BLD AUTO: 54 % (ref 42.7–76)
NITRITE UR QL STRIP: NEGATIVE
NRBC BLD AUTO-RTO: 0 /100 WBC (ref 0–0.2)
PH UR STRIP.AUTO: 6.5 [PH] (ref 5–9)
PLATELET # BLD AUTO: 333 10*3/MM3 (ref 140–450)
PMV BLD AUTO: 10.6 FL (ref 6–12)
POTASSIUM SERPL-SCNC: 4.6 MMOL/L (ref 3.5–5.2)
PROT UR QL STRIP: NEGATIVE
RBC # BLD AUTO: 4.34 10*6/MM3 (ref 3.77–5.28)
SODIUM SERPL-SCNC: 126 MMOL/L (ref 136–145)
SP GR UR STRIP: 1.02 (ref 1–1.03)
UROBILINOGEN UR QL STRIP: ABNORMAL
WBC NRBC COR # BLD: 6.95 10*3/MM3 (ref 3.4–10.8)

## 2022-10-20 PROCEDURE — 80048 BASIC METABOLIC PNL TOTAL CA: CPT | Performed by: STUDENT IN AN ORGANIZED HEALTH CARE EDUCATION/TRAINING PROGRAM

## 2022-10-20 PROCEDURE — 99283 EMERGENCY DEPT VISIT LOW MDM: CPT

## 2022-10-20 PROCEDURE — 85025 COMPLETE CBC W/AUTO DIFF WBC: CPT | Performed by: STUDENT IN AN ORGANIZED HEALTH CARE EDUCATION/TRAINING PROGRAM

## 2022-10-20 PROCEDURE — 96375 TX/PRO/DX INJ NEW DRUG ADDON: CPT

## 2022-10-20 PROCEDURE — 82009 KETONE BODYS QUAL: CPT | Performed by: STUDENT IN AN ORGANIZED HEALTH CARE EDUCATION/TRAINING PROGRAM

## 2022-10-20 PROCEDURE — 81003 URINALYSIS AUTO W/O SCOPE: CPT | Performed by: STUDENT IN AN ORGANIZED HEALTH CARE EDUCATION/TRAINING PROGRAM

## 2022-10-20 PROCEDURE — 87661 TRICHOMONAS VAGINALIS AMPLIF: CPT | Performed by: STUDENT IN AN ORGANIZED HEALTH CARE EDUCATION/TRAINING PROGRAM

## 2022-10-20 PROCEDURE — 25010000002 MORPHINE PER 10 MG: Performed by: STUDENT IN AN ORGANIZED HEALTH CARE EDUCATION/TRAINING PROGRAM

## 2022-10-20 PROCEDURE — 83690 ASSAY OF LIPASE: CPT | Performed by: STUDENT IN AN ORGANIZED HEALTH CARE EDUCATION/TRAINING PROGRAM

## 2022-10-20 PROCEDURE — 83735 ASSAY OF MAGNESIUM: CPT | Performed by: STUDENT IN AN ORGANIZED HEALTH CARE EDUCATION/TRAINING PROGRAM

## 2022-10-20 PROCEDURE — 96374 THER/PROPH/DIAG INJ IV PUSH: CPT

## 2022-10-20 PROCEDURE — 25010000002 ONDANSETRON PER 1 MG: Performed by: STUDENT IN AN ORGANIZED HEALTH CARE EDUCATION/TRAINING PROGRAM

## 2022-10-20 PROCEDURE — 87491 CHLMYD TRACH DNA AMP PROBE: CPT | Performed by: STUDENT IN AN ORGANIZED HEALTH CARE EDUCATION/TRAINING PROGRAM

## 2022-10-20 PROCEDURE — 87591 N.GONORRHOEAE DNA AMP PROB: CPT | Performed by: STUDENT IN AN ORGANIZED HEALTH CARE EDUCATION/TRAINING PROGRAM

## 2022-10-20 PROCEDURE — 63710000001 INSULIN REGULAR HUMAN PER 5 UNITS: Performed by: STUDENT IN AN ORGANIZED HEALTH CARE EDUCATION/TRAINING PROGRAM

## 2022-10-20 PROCEDURE — 81025 URINE PREGNANCY TEST: CPT | Performed by: STUDENT IN AN ORGANIZED HEALTH CARE EDUCATION/TRAINING PROGRAM

## 2022-10-20 RX ORDER — ONDANSETRON 2 MG/ML
4 INJECTION INTRAMUSCULAR; INTRAVENOUS ONCE
Status: COMPLETED | OUTPATIENT
Start: 2022-10-20 | End: 2022-10-20

## 2022-10-20 RX ADMIN — HUMAN INSULIN 8 UNITS: 100 INJECTION, SOLUTION SUBCUTANEOUS at 23:36

## 2022-10-20 RX ADMIN — ONDANSETRON 4 MG: 2 INJECTION INTRAMUSCULAR; INTRAVENOUS at 23:05

## 2022-10-20 RX ADMIN — SODIUM CHLORIDE, POTASSIUM CHLORIDE, SODIUM LACTATE AND CALCIUM CHLORIDE 1000 ML: 600; 310; 30; 20 INJECTION, SOLUTION INTRAVENOUS at 22:56

## 2022-10-20 RX ADMIN — MORPHINE SULFATE 4 MG: 4 INJECTION, SOLUTION INTRAMUSCULAR; INTRAVENOUS at 23:05

## 2022-10-20 NOTE — TELEPHONE ENCOUNTER
Pt called requesting to speak with a nurse. She stated her sugar is above 400 and her blood pressure is slightly elevated.     Please advise.    Thanks

## 2022-10-21 ENCOUNTER — TELEPHONE (OUTPATIENT)
Dept: ENDOCRINOLOGY | Facility: CLINIC | Age: 22
End: 2022-10-21

## 2022-10-21 VITALS
TEMPERATURE: 97.9 F | HEART RATE: 100 BPM | SYSTOLIC BLOOD PRESSURE: 113 MMHG | OXYGEN SATURATION: 97 % | DIASTOLIC BLOOD PRESSURE: 79 MMHG | RESPIRATION RATE: 16 BRPM

## 2022-10-21 LAB
B-HCG UR QL: NEGATIVE
C TRACH RRNA CVX QL NAA+PROBE: NEGATIVE
GLUCOSE BLDC GLUCOMTR-MCNC: 392 MG/DL (ref 70–130)
HOLD SPECIMEN: NORMAL
N GONORRHOEA RRNA SPEC QL NAA+PROBE: NEGATIVE
TRICHOMONAS VAGINALIS PCR: NEGATIVE
WHOLE BLOOD HOLD COAG: NORMAL
WHOLE BLOOD HOLD SPECIMEN: NORMAL

## 2022-10-21 PROCEDURE — 82962 GLUCOSE BLOOD TEST: CPT

## 2022-10-21 PROCEDURE — 36415 COLL VENOUS BLD VENIPUNCTURE: CPT

## 2022-10-21 RX ORDER — FLUCONAZOLE 150 MG/1
150 TABLET ORAL ONCE
Status: COMPLETED | OUTPATIENT
Start: 2022-10-21 | End: 2022-10-21

## 2022-10-21 RX ADMIN — FLUCONAZOLE 150 MG: 150 TABLET ORAL at 00:31

## 2022-10-21 NOTE — ED PROVIDER NOTES
"Subjective   History of Present Illness  22-year-old female with history of type 1 diabetes comes to the ER chief complaint of abdominal pain with dysuria for the last day.  She also endorses back pain.  Patient has been seeing a GI physician for nausea and vomiting.  She has been throwing up today, but that is nothing new.  Pain does not radiate anywhere.  She rates it a 7/10.  Nothing makes it better or worse.  Patient's blood sugar has been running high today as well despite her taking her insulin.    History provided by:  Patient   used: No        Review of Systems   Constitutional: Positive for activity change and fever. Negative for chills.   HENT: Negative for drooling.    Eyes: Negative for redness.   Respiratory: Negative for cough, chest tightness, shortness of breath and wheezing.    Cardiovascular: Negative for chest pain and palpitations.   Gastrointestinal: Positive for abdominal pain, nausea and vomiting.   Genitourinary: Positive for dysuria. Negative for flank pain, frequency, hematuria and urgency.   Musculoskeletal: Positive for back pain.   Skin: Negative for color change.   Neurological: Negative for seizures.   Psychiatric/Behavioral: Negative for confusion.       Past Medical History:   Diagnosis Date   • ADHD    • Bipolar 1 disorder (HCC)    • Borderline personality disorder (HCC)    • Cannabinoid hyperemesis syndrome    • Diabetes mellitus type 1 (HCC)    • Diabetic gastroparesis (HCC)    • Diabetic ketoacidosis (HCC)    • Diabetic neuropathy (HCC)    • History of transfusion     Pt reports \"no reactions.\"   • Post traumatic stress disorder (PTSD)    • Recurrent UTI    • Seizure disorder (HCC)    • Severe depressed bipolar II disorder without psychotic features ([facility])    • Uncontrolled diabetes mellitus        Allergies   Allergen Reactions   • Pineapple Anaphylaxis   • Benzoyl Peroxide Swelling       Past Surgical History:   Procedure Laterality Date   •  SECTION " N/A 7/27/2018   • CYSTOSCOPY N/A 7/16/2020    Procedure: CYSTOSCOPY FLEXIBLE       (DONE ON STRETCHER);  Surgeon: [place], Antonio PRYOR MD;  Location: [facility];  Service: Urology;  Laterality: N/A;       Family History   Problem Relation Age of Onset   • Drug abuse Father    • Suicide Attempts Brother    • Dementia Maternal Grandfather    • Hypertension Paternal Grandmother        Social History     Socioeconomic History   • Marital status: Single   Tobacco Use   • Smoking status: Every Day     Packs/day: 1.00     Years: 2.00     Pack years: 2.00     Types: Cigarettes   • Smokeless tobacco: Never   Vaping Use   • Vaping Use: Former   Substance and Sexual Activity   • Alcohol use: Yes     Comment: none in 2 months   • Drug use: Not Currently     Frequency: 1.0 times per week     Types: Marijuana     Comment: none in a month   • Sexual activity: Defer     Partners: Male     Birth control/protection: None           Objective    Vitals:    10/20/22 2240 10/20/22 2335 10/20/22 2339 10/21/22 0005   BP: 135/84 128/81  113/79   BP Location: Right arm      Patient Position: Sitting      Pulse: 87 82  100   Resp: 18  16    Temp: 97.9 °F (36.6 °C)      TempSrc: Oral      SpO2: 100% 99%  97%       Physical Exam  Vitals and nursing note reviewed.   Constitutional:       General: She is not in acute distress.     Appearance: She is well-developed. She is ill-appearing. She is not toxic-appearing or diaphoretic.   HENT:      Head: Normocephalic.      Right Ear: External ear normal.      Left Ear: External ear normal.   Eyes:      Conjunctiva/sclera: Conjunctivae normal.   Cardiovascular:      Rate and Rhythm: Normal rate.   Pulmonary:      Effort: Pulmonary effort is normal. No accessory muscle usage or respiratory distress.      Breath sounds: No wheezing.   Chest:      Chest wall: No tenderness.   Abdominal:      General: Bowel sounds are normal.      Palpations: Abdomen is soft.      Tenderness: There is abdominal tenderness (deep  palpation). There is left CVA tenderness. There is no right CVA tenderness, guarding or rebound.   Skin:     General: Skin is warm and dry.      Capillary Refill: Capillary refill takes less than 2 seconds.   Neurological:      Mental Status: She is alert and oriented to person, place, and time.   Psychiatric:         Behavior: Behavior normal.         Procedures           ED Course      Results for orders placed or performed during the hospital encounter of 10/20/22   Basic Metabolic Panel    Specimen: Blood   Result Value Ref Range    Glucose 619 (C) 65 - 99 mg/dL    BUN 24 (H) 6 - 20 mg/dL    Creatinine 1.12 (H) 0.57 - 1.00 mg/dL    Sodium 126 (L) 136 - 145 mmol/L    Potassium 4.6 3.5 - 5.2 mmol/L    Chloride 88 (L) 98 - 107 mmol/L    CO2 24.0 22.0 - 29.0 mmol/L    Calcium 9.7 8.6 - 10.5 mg/dL    BUN/Creatinine Ratio 21.4 7.0 - 25.0    Anion Gap 14.0 5.0 - 15.0 mmol/L    eGFR 71.0 >60.0 mL/min/1.73   Magnesium    Specimen: Blood   Result Value Ref Range    Magnesium 1.6 1.6 - 2.6 mg/dL   CBC Auto Differential    Specimen: Blood   Result Value Ref Range    WBC 6.95 3.40 - 10.80 10*3/mm3    RBC 4.34 3.77 - 5.28 10*6/mm3    Hemoglobin 12.0 12.0 - 15.9 g/dL    Hematocrit 36.8 34.0 - 46.6 %    MCV 84.8 79.0 - 97.0 fL    MCH 27.6 26.6 - 33.0 pg    MCHC 32.6 31.5 - 35.7 g/dL    RDW 12.5 12.3 - 15.4 %    RDW-SD 38.4 37.0 - 54.0 fl    MPV 10.6 6.0 - 12.0 fL    Platelets 333 140 - 450 10*3/mm3    Neutrophil % 54.0 42.7 - 76.0 %    Lymphocyte % 35.0 19.6 - 45.3 %    Monocyte % 5.9 5.0 - 12.0 %    Eosinophil % 3.2 0.3 - 6.2 %    Basophil % 1.0 0.0 - 1.5 %    Immature Grans % 0.9 (H) 0.0 - 0.5 %    Neutrophils, Absolute 3.76 1.70 - 7.00 10*3/mm3    Lymphocytes, Absolute 2.43 0.70 - 3.10 10*3/mm3    Monocytes, Absolute 0.41 0.10 - 0.90 10*3/mm3    Eosinophils, Absolute 0.22 0.00 - 0.40 10*3/mm3    Basophils, Absolute 0.07 0.00 - 0.20 10*3/mm3    Immature Grans, Absolute 0.06 (H) 0.00 - 0.05 10*3/mm3    nRBC 0.0 0.0 - 0.2 /100  WBC   Urinalysis With Microscopic If Indicated (No Culture) - Urine, Clean Catch    Specimen: Urine, Clean Catch   Result Value Ref Range    Color, UA Yellow Yellow, Straw, Dark Yellow, [place]    Appearance, UA Clear Clear    pH, UA 6.5 5.0 - 9.0    Specific Gravity, UA 1.023 1.003 - 1.030    Glucose, UA >=1000 mg/dL (3+) (A) Negative    Ketones, UA 15 mg/dL (1+) (A) Negative    Bilirubin, UA Negative Negative    Blood, UA Negative Negative    Protein, UA Negative Negative    Leuk Esterase, UA Negative Negative    Nitrite, UA Negative Negative    Urobilinogen, UA 0.2 E.U./dL 0.2 - 1.0 E.U./dL   Acetone    Specimen: Blood   Result Value Ref Range    Acetone Small (A) Negative   Lipase    Specimen: Blood   Result Value Ref Range    Lipase 29 13 - 60 U/L   Pregnancy, Urine - Urine, Clean Catch    Specimen: Urine, Clean Catch   Result Value Ref Range    HCG, Urine QL Negative Negative   Gold Top - SST   Result Value Ref Range    Extra Tube Hold for add-ons.    Light Blue Top   Result Value Ref Range    Extra Tube Hold for add-ons.    Lavender Top   Result Value Ref Range    Extra Tube hold for add-on    Green Top (Gel)   Result Value Ref Range    Extra Tube Hold for add-ons.              MDM  Number of Diagnoses or Management Options  Dysuria: new and requires workup  Hyperglycemia: new and requires workup  Diagnosis management comments: Vital signs are stable, afebrile.  Labs significant for glucose of 620, small acetone, normal anion gap.  UA is leukocyte and nitrite negative.  Patient received insulin, morphine, Zofran, IVF bolus.  Patient states that her burning could be a yeast infection.  It feels similar and she has difficulty distinguishing between the 2.  Will give her fluconazole.  Recommend follow-up with her PCP.  Return precautions given.  Patient states understanding and is agreeable to the plan.      Final diagnoses:   Dysuria   Hyperglycemia       ED Disposition  ED Disposition     ED Disposition    Discharge    Condition   Stable    Comment   --             Rufus Marshall, APRN  [address] [facility] [address]  [phone]    Schedule an appointment as soon as possible for a visit in 2 days  ER follow up         Medication List      No changes were made to your prescriptions during this visit.          Sarthak Hobson MD  10/21/22 0022

## 2022-10-27 ENCOUNTER — TELEPHONE (OUTPATIENT)
Dept: GENERAL RADIOLOGY | Facility: HOSPITAL | Age: 22
End: 2022-10-27

## 2022-10-31 ENCOUNTER — HOSPITAL ENCOUNTER (EMERGENCY)
Facility: HOSPITAL | Age: 22
Discharge: HOME | End: 2022-11-01
Attending: EMERGENCY MEDICINE | Admitting: EMERGENCY MEDICINE

## 2022-10-31 ENCOUNTER — APPOINTMENT (OUTPATIENT)
Dept: GENERAL RADIOLOGY | Facility: HOSPITAL | Age: 22
End: 2022-10-31

## 2022-10-31 DIAGNOSIS — Z32.01 POSITIVE PREGNANCY TEST: ICD-10-CM

## 2022-10-31 DIAGNOSIS — R73.9 HYPERGLYCEMIA: Primary | ICD-10-CM

## 2022-10-31 LAB
ACETONE BLD QL: ABNORMAL
ALBUMIN SERPL-MCNC: 4.6 G/DL (ref 3.5–5.2)
ALBUMIN/GLOB SERPL: 1.4 G/DL
ALP SERPL-CCNC: 74 U/L (ref 39–117)
ALT SERPL W P-5'-P-CCNC: 9 U/L (ref 1–33)
ANION GAP SERPL CALCULATED.3IONS-SCNC: 19 MMOL/L (ref 5–15)
AST SERPL-CCNC: 11 U/L (ref 1–32)
BASOPHILS # BLD AUTO: 0.07 10*3/MM3 (ref 0–0.2)
BASOPHILS NFR BLD AUTO: 0.8 % (ref 0–1.5)
BILIRUB SERPL-MCNC: 0.4 MG/DL (ref 0–1.2)
BILIRUB UR QL STRIP: NEGATIVE
BUN SERPL-MCNC: 15 MG/DL (ref 6–20)
BUN/CREAT SERPL: 11.6 (ref 7–25)
CALCIUM SPEC-SCNC: 9.8 MG/DL (ref 8.6–10.5)
CHLORIDE SERPL-SCNC: 94 MMOL/L (ref 98–107)
CLARITY UR: CLEAR
CO2 SERPL-SCNC: 18 MMOL/L (ref 22–29)
COLOR UR: YELLOW
CREAT SERPL-MCNC: 1.29 MG/DL (ref 0.57–1)
D-LACTATE SERPL-SCNC: 1.1 MMOL/L (ref 0.5–2)
DEPRECATED RDW RBC AUTO: 38.7 FL (ref 37–54)
EGFRCR SERPLBLD CKD-EPI 2021: 59.9 ML/MIN/1.73
EOSINOPHIL # BLD AUTO: 0.19 10*3/MM3 (ref 0–0.4)
EOSINOPHIL NFR BLD AUTO: 2.3 % (ref 0.3–6.2)
ERYTHROCYTE [DISTWIDTH] IN BLOOD BY AUTOMATED COUNT: 13 % (ref 12.3–15.4)
GLOBULIN UR ELPH-MCNC: 3.3 GM/DL
GLUCOSE SERPL-MCNC: 471 MG/DL (ref 65–99)
GLUCOSE UR STRIP-MCNC: ABNORMAL MG/DL
HCT VFR BLD AUTO: 36.9 % (ref 34–46.6)
HGB BLD-MCNC: 12.8 G/DL (ref 12–15.9)
HGB UR QL STRIP.AUTO: NEGATIVE
IMM GRANULOCYTES # BLD AUTO: 0.15 10*3/MM3 (ref 0–0.05)
IMM GRANULOCYTES NFR BLD AUTO: 1.8 % (ref 0–0.5)
KETONES UR QL STRIP: ABNORMAL
LEUKOCYTE ESTERASE UR QL STRIP.AUTO: NEGATIVE
LYMPHOCYTES # BLD AUTO: 2.45 10*3/MM3 (ref 0.7–3.1)
LYMPHOCYTES NFR BLD AUTO: 29.6 % (ref 19.6–45.3)
MAGNESIUM SERPL-MCNC: 1.7 MG/DL (ref 1.6–2.6)
MCH RBC QN AUTO: 28.6 PG (ref 26.6–33)
MCHC RBC AUTO-ENTMCNC: 34.7 G/DL (ref 31.5–35.7)
MCV RBC AUTO: 82.6 FL (ref 79–97)
MONOCYTES # BLD AUTO: 0.37 10*3/MM3 (ref 0.1–0.9)
MONOCYTES NFR BLD AUTO: 4.5 % (ref 5–12)
NEUTROPHILS NFR BLD AUTO: 5.04 10*3/MM3 (ref 1.7–7)
NEUTROPHILS NFR BLD AUTO: 61 % (ref 42.7–76)
NITRITE UR QL STRIP: NEGATIVE
NRBC BLD AUTO-RTO: 0 /100 WBC (ref 0–0.2)
PH BLDV: 7.35 PH UNITS (ref 7.29–7.37)
PH UR STRIP.AUTO: 5.5 [PH] (ref 5–9)
PLATELET # BLD AUTO: 320 10*3/MM3 (ref 140–450)
PMV BLD AUTO: 11.2 FL (ref 6–12)
POTASSIUM SERPL-SCNC: 4.5 MMOL/L (ref 3.5–5.2)
PROT SERPL-MCNC: 7.9 G/DL (ref 6–8.5)
PROT UR QL STRIP: NEGATIVE
RBC # BLD AUTO: 4.47 10*6/MM3 (ref 3.77–5.28)
SODIUM SERPL-SCNC: 131 MMOL/L (ref 136–145)
SP GR UR STRIP: 1.02 (ref 1–1.03)
UROBILINOGEN UR QL STRIP: ABNORMAL
WBC NRBC COR # BLD: 8.27 10*3/MM3 (ref 3.4–10.8)
WHOLE BLOOD HOLD COAG: NORMAL
WHOLE BLOOD HOLD SPECIMEN: NORMAL

## 2022-10-31 PROCEDURE — 93005 ELECTROCARDIOGRAM TRACING: CPT

## 2022-10-31 PROCEDURE — 93005 ELECTROCARDIOGRAM TRACING: CPT | Performed by: EMERGENCY MEDICINE

## 2022-10-31 PROCEDURE — 81003 URINALYSIS AUTO W/O SCOPE: CPT | Performed by: EMERGENCY MEDICINE

## 2022-10-31 PROCEDURE — 71045 X-RAY EXAM CHEST 1 VIEW: CPT

## 2022-10-31 PROCEDURE — 93010 ELECTROCARDIOGRAM REPORT: CPT | Performed by: INTERNAL MEDICINE

## 2022-10-31 PROCEDURE — 85025 COMPLETE CBC W/AUTO DIFF WBC: CPT | Performed by: EMERGENCY MEDICINE

## 2022-10-31 PROCEDURE — 82009 KETONE BODYS QUAL: CPT | Performed by: EMERGENCY MEDICINE

## 2022-10-31 PROCEDURE — 99284 EMERGENCY DEPT VISIT MOD MDM: CPT

## 2022-10-31 PROCEDURE — 83735 ASSAY OF MAGNESIUM: CPT | Performed by: EMERGENCY MEDICINE

## 2022-10-31 PROCEDURE — 25010000002 ONDANSETRON PER 1 MG: Performed by: EMERGENCY MEDICINE

## 2022-10-31 PROCEDURE — 83605 ASSAY OF LACTIC ACID: CPT | Performed by: EMERGENCY MEDICINE

## 2022-10-31 PROCEDURE — 96374 THER/PROPH/DIAG INJ IV PUSH: CPT

## 2022-10-31 PROCEDURE — 80053 COMPREHEN METABOLIC PANEL: CPT | Performed by: EMERGENCY MEDICINE

## 2022-10-31 PROCEDURE — 82962 GLUCOSE BLOOD TEST: CPT

## 2022-10-31 PROCEDURE — 25010000002 MORPHINE PER 10 MG: Performed by: EMERGENCY MEDICINE

## 2022-10-31 PROCEDURE — 96375 TX/PRO/DX INJ NEW DRUG ADDON: CPT

## 2022-10-31 PROCEDURE — 82800 BLOOD PH: CPT | Performed by: EMERGENCY MEDICINE

## 2022-10-31 RX ORDER — ONDANSETRON 2 MG/ML
4 INJECTION INTRAMUSCULAR; INTRAVENOUS ONCE
Status: COMPLETED | OUTPATIENT
Start: 2022-10-31 | End: 2022-10-31

## 2022-10-31 RX ORDER — SODIUM CHLORIDE 0.9 % (FLUSH) 0.9 %
10 SYRINGE (ML) INJECTION AS NEEDED
Status: DISCONTINUED | OUTPATIENT
Start: 2022-10-31 | End: 2022-11-01 | Stop reason: HOSPADM

## 2022-10-31 RX ADMIN — MORPHINE SULFATE 4 MG: 4 INJECTION, SOLUTION INTRAMUSCULAR; INTRAVENOUS at 22:55

## 2022-10-31 RX ADMIN — ONDANSETRON 4 MG: 2 INJECTION INTRAMUSCULAR; INTRAVENOUS at 22:55

## 2022-10-31 RX ADMIN — SODIUM CHLORIDE 1000 ML: 9 INJECTION, SOLUTION INTRAVENOUS at 22:48

## 2022-11-01 VITALS
RESPIRATION RATE: 16 BRPM | BODY MASS INDEX: 20.4 KG/M2 | WEIGHT: 130 LBS | OXYGEN SATURATION: 98 % | SYSTOLIC BLOOD PRESSURE: 119 MMHG | DIASTOLIC BLOOD PRESSURE: 66 MMHG | HEIGHT: 67 IN | TEMPERATURE: 98.7 F | HEART RATE: 87 BPM

## 2022-11-01 LAB
GLUCOSE BLDC GLUCOMTR-MCNC: 366 MG/DL (ref 70–130)
GLUCOSE BLDC GLUCOMTR-MCNC: 431 MG/DL (ref 70–130)
GLUCOSE BLDC GLUCOMTR-MCNC: 485 MG/DL (ref 70–130)
HCG INTACT+B SERPL-ACNC: 21.23 MIU/ML

## 2022-11-01 PROCEDURE — 82962 GLUCOSE BLOOD TEST: CPT

## 2022-11-01 PROCEDURE — 84702 CHORIONIC GONADOTROPIN TEST: CPT | Performed by: EMERGENCY MEDICINE

## 2022-11-01 PROCEDURE — 96375 TX/PRO/DX INJ NEW DRUG ADDON: CPT

## 2022-11-01 PROCEDURE — 25010000002 KETOROLAC TROMETHAMINE PER 15 MG: Performed by: EMERGENCY MEDICINE

## 2022-11-01 PROCEDURE — 36415 COLL VENOUS BLD VENIPUNCTURE: CPT | Performed by: EMERGENCY MEDICINE

## 2022-11-01 PROCEDURE — 25010000002 MORPHINE PER 10 MG: Performed by: EMERGENCY MEDICINE

## 2022-11-01 PROCEDURE — 96376 TX/PRO/DX INJ SAME DRUG ADON: CPT

## 2022-11-01 PROCEDURE — 63710000001 INSULIN REGULAR HUMAN PER 5 UNITS: Performed by: EMERGENCY MEDICINE

## 2022-11-01 RX ORDER — MORPHINE SULFATE 2 MG/ML
2 INJECTION, SOLUTION INTRAMUSCULAR; INTRAVENOUS ONCE
Status: COMPLETED | OUTPATIENT
Start: 2022-11-01 | End: 2022-11-01

## 2022-11-01 RX ORDER — KETOROLAC TROMETHAMINE 30 MG/ML
30 INJECTION, SOLUTION INTRAMUSCULAR; INTRAVENOUS ONCE
Status: COMPLETED | OUTPATIENT
Start: 2022-11-01 | End: 2022-11-01

## 2022-11-01 RX ADMIN — HUMAN INSULIN 6 UNITS: 100 INJECTION, SOLUTION SUBCUTANEOUS at 01:05

## 2022-11-01 RX ADMIN — MORPHINE SULFATE 2 MG: 2 INJECTION, SOLUTION INTRAMUSCULAR; INTRAVENOUS at 00:55

## 2022-11-01 RX ADMIN — SODIUM CHLORIDE 1000 ML: 9 INJECTION, SOLUTION INTRAVENOUS at 01:08

## 2022-11-01 RX ADMIN — KETOROLAC TROMETHAMINE 30 MG: 30 INJECTION, SOLUTION INTRAMUSCULAR; INTRAVENOUS at 00:55

## 2022-11-01 NOTE — ED PROVIDER NOTES
"Subjective   History of Present Illness  22-year-old type I diabetic presents the emergency department with complaint of elevated blood glucose, chest pain, back pain, nausea and vomiting.  She reports has been in DKA before.  Denies any urinary symptoms.  Denies any fevers or chills.    Family history, surgical history, social history, current medications and allergies are reviewed with the patient and triage documentation and vitals are reviewed.    History provided by:  Patient   used: No        Review of Systems   Constitutional: Positive for fatigue. Negative for chills and fever.   HENT: Negative for congestion and sore throat.    Eyes: Negative for photophobia and visual disturbance.   Respiratory: Negative for cough, shortness of breath and wheezing.    Cardiovascular: Positive for chest pain.   Gastrointestinal: Positive for abdominal pain, nausea and vomiting. Negative for diarrhea.   Endocrine: Negative for polydipsia, polyphagia and polyuria.   Genitourinary: Negative for dysuria, frequency and urgency.   Musculoskeletal: Positive for back pain. Negative for neck pain.   Skin: Negative for rash and wound.   Allergic/Immunologic: Negative.    Neurological: Negative for weakness, light-headedness and numbness.   Hematological: Negative.    Psychiatric/Behavioral: Negative.        Past Medical History:   Diagnosis Date   • ADHD    • Bipolar 1 disorder (HCC)    • Borderline personality disorder (HCC)    • Cannabinoid hyperemesis syndrome    • Diabetes mellitus type 1 (HCC)    • Diabetic gastroparesis (HCC)    • Diabetic ketoacidosis (HCC)    • Diabetic neuropathy (HCC)    • History of transfusion     Pt reports \"no reactions.\"   • Post traumatic stress disorder (PTSD)    • Recurrent UTI    • Seizure disorder (HCC)    • Severe depressed bipolar II disorder without psychotic features ([facility])    • Uncontrolled diabetes mellitus        Allergies   Allergen Reactions   • Pineapple Anaphylaxis "   • Benzoyl Peroxide Swelling       Past Surgical History:   Procedure Laterality Date   •  SECTION N/A 2018   • COLONOSCOPY N/A 10/12/2022    Procedure: COLONOSCOPY;  Surgeon: Timur Sun MD;  Location: [facility] ENDOSCOPY;  Service: Gastroenterology;  Laterality: N/A;   • CYSTOSCOPY N/A 2020    Procedure: CYSTOSCOPY FLEXIBLE       (DONE ON STRETCHER);  Surgeon: Antonio Cowan MD;  Location: [facility] OR;  Service: Urology;  Laterality: N/A;   • ENDOSCOPY N/A 10/12/2022    Procedure: ESOPHAGOGASTRODUODENOSCOPY;  Surgeon: Timur Sun MD;  Location: [facility] ENDOSCOPY;  Service: Gastroenterology;  Laterality: N/A;       Family History   Problem Relation Age of Onset   • Drug abuse Father    • Suicide Attempts Brother    • Dementia Maternal Grandfather    • Hypertension Paternal Grandmother        Social History     Socioeconomic History   • Marital status: Single   Tobacco Use   • Smoking status: Every Day     Packs/day: 1.00     Years: 2.00     Pack years: 2.00     Types: Cigarettes   • Smokeless tobacco: Never   Vaping Use   • Vaping Use: Former   Substance and Sexual Activity   • Alcohol use: Yes     Comment: none in 2 months   • Drug use: Not Currently     Frequency: 1.0 times per week     Types: Marijuana     Comment: none in a month   • Sexual activity: Defer     Partners: Male     Birth control/protection: None           Objective   Physical Exam  Vitals and nursing note reviewed.   Constitutional:       General: She is not in acute distress.     Appearance: She is well-developed and normal weight. She is not ill-appearing, toxic-appearing or diaphoretic.   HENT:      Head: Normocephalic.   Eyes:      Pupils: Pupils are equal, round, and reactive to light.   Neck:      Vascular: No JVD.   Cardiovascular:      Rate and Rhythm: Normal rate and regular rhythm.  No extrasystoles are present.     Heart sounds: Normal heart sounds. No murmur heard.  Pulmonary:      Effort: Pulmonary effort  is normal. No tachypnea, accessory muscle usage or respiratory distress.      Breath sounds: No decreased breath sounds, wheezing, rhonchi or rales.   Chest:      Chest wall: No tenderness or crepitus.   Abdominal:      General: Bowel sounds are normal.      Palpations: Abdomen is soft. There is no hepatomegaly or splenomegaly.   Musculoskeletal:         General: Normal range of motion.      Cervical back: Neck supple.      Right lower leg: No edema.      Left lower leg: No edema.   Skin:     General: Skin is warm and dry.      Capillary Refill: Capillary refill takes less than 2 seconds.      Coloration: Skin is pale.   Neurological:      General: No focal deficit present.      Mental Status: She is alert and oriented to person, place, and time.   Psychiatric:         Mood and Affect: Mood normal.         Behavior: Behavior normal.         ECG 12 Lead      Date/Time: 11/1/2022 10:03 PM  Performed by: Francisco Poole DO  Authorized by: Francisco Poole DO   Interpreted by physician  Comments: EKG October 31, 2022 at 2203 reveals normal sinus rhythm rate of 85 bpm.  QTc 449.  T wave flattening in lead I with inversion in aVL.  No ST elevation or depression.  No evidence of acute ischemia.                 ED Course      Labs Reviewed   COMPREHENSIVE METABOLIC PANEL - Abnormal; Notable for the following components:       Result Value    Glucose 471 (*)     Creatinine 1.29 (*)     Sodium 131 (*)     Chloride 94 (*)     CO2 18.0 (*)     Anion Gap 19.0 (*)     eGFR 59.9 (*)     All other components within normal limits    Narrative:     GFR Normal >60  Chronic Kidney Disease <60  Kidney Failure <15     URINALYSIS W/ MICROSCOPIC IF INDICATED (NO CULTURE) - Abnormal; Notable for the following components:    Glucose, UA >=1000 mg/dL (3+) (*)     Ketones, UA 80 mg/dL (3+) (*)     All other components within normal limits    Narrative:     Urine microscopic not indicated.   CBC WITH AUTO DIFFERENTIAL - Abnormal; Notable  for the following components:    Monocyte % 4.5 (*)     Immature Grans % 1.8 (*)     Immature Grans, Absolute 0.15 (*)     All other components within normal limits   ACETONE - Abnormal; Notable for the following components:    Acetone Small (*)     All other components within normal limits   POCT GLUCOSE FINGERSTICK - Abnormal; Notable for the following components:    Glucose 366 (*)     All other components within normal limits   POCT GLUCOSE FINGERSTICK - Abnormal; Notable for the following components:    Glucose 485 (*)     All other components within normal limits   POCT GLUCOSE FINGERSTICK - Abnormal; Notable for the following components:    Glucose 431 (*)     All other components within normal limits   MAGNESIUM - Normal   PH, VENOUS - Normal   LACTIC ACID, PLASMA - Normal   HCG, QUANTITATIVE, PREGNANCY    Narrative:     HCG Ranges by Gestational Age    Females - non-pregnant premenopausal   </= 1mIU/mL HCG  Females - postmenopausal               </= 7mIU/mL HCG    3 Weeks         5.8 -    71.2 mIU/mL  4 Weeks         9.5 -     750 mIU/mL  5 Weeks         217 -   7,138 mIU/mL  6 Weeks         158 -  31,795 mIU/mL  7 Weeks       3,697 - 163,563 mIU/mL  8 Weeks      32,065 - 149,571 mIU/mL  9 Weeks      63,803 - 151,410 mIU/mL  10 Weeks     46,509 - 186,977 mIU/mL  12 Weeks     27,832 - 210,612 mIU/mL  14 Weeks     13,950 -  62,530 mIU/mL  15 Weeks     12,039 -  70,971 mIU/mL  16 Weeks      9,040 -  56,451 mIU/mL  17 Weeks      8,175 -  55,868 mIU/mL  18 Weeks      8,099 -  58,176 mIU/mL  Results may be falsely decreased if patient taking Biotin.     POCT GLUCOSE FINGERSTICK   POCT GLUCOSE FINGERSTICK   POCT GLUCOSE FINGERSTICK   CBC AND DIFFERENTIAL    Narrative:     The following orders were created for panel order CBC & Differential.  Procedure                               Abnormality         Status                     ---------                               -----------         ------                      CBC Auto Differential[[id number]]        Abnormal            Final result                 Please view results for these tests on the individual orders.   KETONE BODIES SERUM    Narrative:     The following orders were created for panel order Ketone Bodies, Serum (Not performed at [facility]).  Procedure                               Abnormality         Status                     ---------                               -----------         ------                     Acetone[[id number]]                      Abnormal            Final result                 Please view results for these tests on the individual orders.   EXTRA TUBES    Narrative:     The following orders were created for panel order Extra Tubes.  Procedure                               Abnormality         Status                     ---------                               -----------         ------                     Light Blue Top[[id number]]                                   Final result                 Please view results for these tests on the individual orders.   LIGHT BLUE TOP   EXTRA TUBES    Narrative:     The following orders were created for panel order Extra Tubes.  Procedure                               Abnormality         Status                     ---------                               -----------         ------                     Lavender Top[[id number]]                                     Final result                 Please view results for these tests on the individual orders.   LAVENDER TOP     Adult Transthoracic Echo Complete W/ Cont if Necessary Per Protocol    Result Date: 11/4/2022  Narrative: •  Left ventricular systolic function is normal. Left ventricular ejection fraction appears to be 61 - 65%. •  Left ventricular diastolic function was normal. •  Estimated right ventricular systolic pressure from tricuspid regurgitation is normal (<35 mmHg).     US Ob Transvaginal    Addendum Date: 11/4/2022 Addendum:    ADDENDUM ADDENDUM #1  Impression 1-3 discussed with Dr. Hu by Dr. Baker at 12:37 AM CDT on 11/4/2022. Electronically signed by:  Ira Veloz MD  11/4/2022 1:03 AM CDT Workstation: [id number]     Result Date: 11/4/2022  Narrative: EXAM:   US Pregnancy, Transvaginal CLINICAL HISTORY:   The patient is 22 years old and is Female; pregnant, vaginal bleeding TECHNIQUE:   Real-time transvaginal obstetrical ultrasound of the maternal pelvis and a first trimester pregnancy with image documentation. Transvaginal imaging was used for better evaluation of the fetus and adnexa. COMPARISON:   Pelvic ultrasound 8/8/2018 FINDINGS:   GESTATION: Intrauterine pregnancy is not visualized.   PLACENTA/AMNIOTIC FLUID:  Cannot be adequately evaluated due to the early gestational age.   UTERUS/CERVIX:  Retroflexed uterus measures 8.7 x 5.1 x 6.2 cm.  No myometrial mass.   OVARIES:  Right ovary measures 3.2 x 1.6 x 2.9 cm. Vascularity within the right ovary is not detected.  Left ovary measures 3.9 x 3.9 x 2.4 cm. 0.9 x 0.8 x 0.8 cm complex cyst. Left ovarian 0.6 x 0.5 x 0.5 cm cyst within a cyst. No peripheral vascularity surrounding cyst within a cyst.   FREE FLUID:  Small fluid in the bilateral adnexa, right greater than left.   VASCULATURE:  Increased left parametrial vasculature.     Impression: 1. In the setting of a positive pregnancy test, findings consistent with pregnancy of unknown location. 2. Right ovarian vascularity is not detected. Findings may secondary to torsion versus technique. Recommend clinical correlation and gynecology consultation. 3. Left ovarian 0.6 cm cyst within a cyst. Electronically signed by:  Ira Veloz MD  11/4/2022 12:36 AM CDT Workstation: [id number]    XR Chest 1 View    Result Date: 10/31/2022  Narrative: EXAM: Single XR view of the chest INDICATION: hyperglycemia COMPARISON: Radiograph from 3/27/2022 FINDINGS: The cardiomediastinal silhouette is within normal limits. No evidence of pleural effusion,  pneumothorax, or focal consolidation.     Impression: No acute cardiopulmonary findings. Electronically signed by:  Edouard Hanley MD  10/31/2022 11:00 PM CDT Workstation: [id number]      MDM  Number of Diagnoses or Management Options     Amount and/or Complexity of Data Reviewed  Clinical lab tests: reviewed  Tests in the radiology section of CPT®: reviewed  Tests in the medicine section of CPT®: reviewed  Decide to obtain previous medical records or to obtain history from someone other than the patient: yes    Patient Progress  Patient progress: stable    No acute infectious process.  Hyperglycemia without DKA.  Patient is feeling better.  She has a positive pregnancy test likely contributing to symptoms.  Advised on prenatal vitamin and monitoring blood glucose closely and agreeable to discharge.    Final diagnoses:   Hyperglycemia   Positive pregnancy test       ED Disposition  ED Disposition     ED Disposition   Discharge    Condition   Stable    Comment   --             Patricia Medina MD  [address] [place] [address]  [address] FLOOR  [facility] [address] [phone]               Medication List      No changes were made to your prescriptions during this visit.          Francisco Poole, DO  11/06/22 0522

## 2022-11-01 NOTE — DISCHARGE INSTRUCTIONS
Please return with new or worsening symptoms.  Continue to monitor blood glucose closely.  Start prenatal vitamin.  Follow-up with primary care regarding medicines to discontinue or change during pregnancy.  Follow-up with OB/GYN.

## 2022-11-02 LAB
QT INTERVAL: 378 MS
QTC INTERVAL: 449 MS

## 2022-11-03 ENCOUNTER — APPOINTMENT (OUTPATIENT)
Dept: ULTRASOUND IMAGING | Facility: HOSPITAL | Age: 22
End: 2022-11-03

## 2022-11-03 ENCOUNTER — HOSPITAL ENCOUNTER (INPATIENT)
Facility: HOSPITAL | Age: 22
LOS: 2 days | Discharge: HOME | End: 2022-11-06
Attending: FAMILY MEDICINE | Admitting: STUDENT IN AN ORGANIZED HEALTH CARE EDUCATION/TRAINING PROGRAM

## 2022-11-03 DIAGNOSIS — O03.9 MISCARRIAGE: ICD-10-CM

## 2022-11-03 DIAGNOSIS — N93.9 VAGINAL BLEEDING: ICD-10-CM

## 2022-11-03 DIAGNOSIS — E10.10 DIABETIC KETOACIDOSIS WITHOUT COMA ASSOCIATED WITH TYPE 1 DIABETES MELLITUS: Primary | ICD-10-CM

## 2022-11-03 DIAGNOSIS — R10.84 GENERALIZED ABDOMINAL PAIN: ICD-10-CM

## 2022-11-03 LAB
ALBUMIN SERPL-MCNC: 4.2 G/DL (ref 3.5–5.2)
ALBUMIN/GLOB SERPL: 1.2 G/DL
ALP SERPL-CCNC: 75 U/L (ref 39–117)
ALT SERPL W P-5'-P-CCNC: 9 U/L (ref 1–33)
ANION GAP SERPL CALCULATED.3IONS-SCNC: 28 MMOL/L (ref 5–15)
ARTERIAL PATENCY WRIST A: ABNORMAL
AST SERPL-CCNC: 14 U/L (ref 1–32)
ATMOSPHERIC PRESS: 750 MMHG
BACTERIA UR QL AUTO: ABNORMAL /HPF
BASE EXCESS BLDA CALC-SCNC: -22.9 MMOL/L (ref 0–2)
BASOPHILS # BLD AUTO: 0.05 10*3/MM3 (ref 0–0.2)
BASOPHILS NFR BLD AUTO: 0.6 % (ref 0–1.5)
BDY SITE: ABNORMAL
BILIRUB SERPL-MCNC: 0.2 MG/DL (ref 0–1.2)
BILIRUB UR QL STRIP: NEGATIVE
BUN SERPL-MCNC: 19 MG/DL (ref 6–20)
BUN/CREAT SERPL: 13.2 (ref 7–25)
CALCIUM SPEC-SCNC: 9.4 MG/DL (ref 8.6–10.5)
CHLORIDE SERPL-SCNC: 97 MMOL/L (ref 98–107)
CK SERPL-CCNC: 29 U/L (ref 20–180)
CLARITY UR: CLEAR
CO2 SERPL-SCNC: 6 MMOL/L (ref 22–29)
COLOR UR: YELLOW
CREAT SERPL-MCNC: 1.44 MG/DL (ref 0.57–1)
DEPRECATED RDW RBC AUTO: 41.5 FL (ref 37–54)
EGFRCR SERPLBLD CKD-EPI 2021: 52.5 ML/MIN/1.73
EOSINOPHIL # BLD AUTO: 0 10*3/MM3 (ref 0–0.4)
EOSINOPHIL NFR BLD AUTO: 0 % (ref 0.3–6.2)
ERYTHROCYTE [DISTWIDTH] IN BLOOD BY AUTOMATED COUNT: 13.4 % (ref 12.3–15.4)
GLOBULIN UR ELPH-MCNC: 3.5 GM/DL
GLUCOSE SERPL-MCNC: 482 MG/DL (ref 65–99)
GLUCOSE UR STRIP-MCNC: ABNORMAL MG/DL
HCG INTACT+B SERPL-ACNC: 13.53 MIU/ML
HCO3 BLDA-SCNC: 5 MMOL/L (ref 20–26)
HCT VFR BLD AUTO: 38.1 % (ref 34–46.6)
HGB BLD-MCNC: 12.5 G/DL (ref 12–15.9)
HGB UR QL STRIP.AUTO: ABNORMAL
HYALINE CASTS UR QL AUTO: ABNORMAL /LPF
IMM GRANULOCYTES # BLD AUTO: 0.1 10*3/MM3 (ref 0–0.05)
IMM GRANULOCYTES NFR BLD AUTO: 1.1 % (ref 0–0.5)
INHALED O2 CONCENTRATION: <21 %
KETONES UR QL STRIP: ABNORMAL
LEUKOCYTE ESTERASE UR QL STRIP.AUTO: NEGATIVE
LIPASE SERPL-CCNC: 13 U/L (ref 13–60)
LYMPHOCYTES # BLD AUTO: 0.88 10*3/MM3 (ref 0.7–3.1)
LYMPHOCYTES NFR BLD AUTO: 9.8 % (ref 19.6–45.3)
Lab: ABNORMAL
MCH RBC QN AUTO: 27.8 PG (ref 26.6–33)
MCHC RBC AUTO-ENTMCNC: 32.8 G/DL (ref 31.5–35.7)
MCV RBC AUTO: 84.9 FL (ref 79–97)
MODALITY: ABNORMAL
MONOCYTES # BLD AUTO: 0.45 10*3/MM3 (ref 0.1–0.9)
MONOCYTES NFR BLD AUTO: 5 % (ref 5–12)
NEUTROPHILS NFR BLD AUTO: 7.53 10*3/MM3 (ref 1.7–7)
NEUTROPHILS NFR BLD AUTO: 83.5 % (ref 42.7–76)
NITRITE UR QL STRIP: NEGATIVE
NRBC BLD AUTO-RTO: 0 /100 WBC (ref 0–0.2)
PCO2 BLDA: 16.4 MM HG (ref 35–45)
PH BLDA: 7.09 PH UNITS (ref 7.35–7.45)
PH UR STRIP.AUTO: <=5 [PH] (ref 5–9)
PLATELET # BLD AUTO: 294 10*3/MM3 (ref 140–450)
PMV BLD AUTO: 10.1 FL (ref 6–12)
PO2 BLDA: 125 MM HG (ref 83–108)
POTASSIUM SERPL-SCNC: 5.2 MMOL/L (ref 3.5–5.2)
PROT SERPL-MCNC: 7.7 G/DL (ref 6–8.5)
PROT UR QL STRIP: ABNORMAL
RBC # BLD AUTO: 4.49 10*6/MM3 (ref 3.77–5.28)
RBC # UR STRIP: ABNORMAL /HPF
REF LAB TEST METHOD: ABNORMAL
SAO2 % BLDCOA: 98.4 % (ref 94–99)
SODIUM SERPL-SCNC: 131 MMOL/L (ref 136–145)
SP GR UR STRIP: 1.02 (ref 1–1.03)
SQUAMOUS #/AREA URNS HPF: ABNORMAL /HPF
UROBILINOGEN UR QL STRIP: ABNORMAL
VENTILATOR MODE: ABNORMAL
WBC # UR STRIP: ABNORMAL /HPF
WBC NRBC COR # BLD: 9.01 10*3/MM3 (ref 3.4–10.8)

## 2022-11-03 PROCEDURE — 85025 COMPLETE CBC W/AUTO DIFF WBC: CPT | Performed by: FAMILY MEDICINE

## 2022-11-03 PROCEDURE — 84702 CHORIONIC GONADOTROPIN TEST: CPT | Performed by: FAMILY MEDICINE

## 2022-11-03 PROCEDURE — 83735 ASSAY OF MAGNESIUM: CPT | Performed by: INTERNAL MEDICINE

## 2022-11-03 PROCEDURE — 84100 ASSAY OF PHOSPHORUS: CPT | Performed by: INTERNAL MEDICINE

## 2022-11-03 PROCEDURE — 81001 URINALYSIS AUTO W/SCOPE: CPT | Performed by: FAMILY MEDICINE

## 2022-11-03 PROCEDURE — 80053 COMPREHEN METABOLIC PANEL: CPT | Performed by: FAMILY MEDICINE

## 2022-11-03 PROCEDURE — 82803 BLOOD GASES ANY COMBINATION: CPT

## 2022-11-03 PROCEDURE — 83690 ASSAY OF LIPASE: CPT | Performed by: FAMILY MEDICINE

## 2022-11-03 PROCEDURE — 76817 TRANSVAGINAL US OBSTETRIC: CPT

## 2022-11-03 PROCEDURE — 36600 WITHDRAWAL OF ARTERIAL BLOOD: CPT

## 2022-11-03 PROCEDURE — 99284 EMERGENCY DEPT VISIT MOD MDM: CPT

## 2022-11-03 PROCEDURE — 82962 GLUCOSE BLOOD TEST: CPT

## 2022-11-03 PROCEDURE — 25010000002 ONDANSETRON PER 1 MG

## 2022-11-03 PROCEDURE — 82550 ASSAY OF CK (CPK): CPT | Performed by: FAMILY MEDICINE

## 2022-11-03 PROCEDURE — 83930 ASSAY OF BLOOD OSMOLALITY: CPT | Performed by: INTERNAL MEDICINE

## 2022-11-03 RX ORDER — SODIUM CHLORIDE 9 MG/ML
125 INJECTION, SOLUTION INTRAVENOUS CONTINUOUS
Status: DISCONTINUED | OUTPATIENT
Start: 2022-11-03 | End: 2022-11-05

## 2022-11-03 RX ORDER — ONDANSETRON 2 MG/ML
INJECTION INTRAMUSCULAR; INTRAVENOUS
Status: COMPLETED
Start: 2022-11-03 | End: 2022-11-03

## 2022-11-03 RX ORDER — ONDANSETRON 2 MG/ML
4 INJECTION INTRAMUSCULAR; INTRAVENOUS ONCE
Status: COMPLETED | OUTPATIENT
Start: 2022-11-03 | End: 2022-11-03

## 2022-11-03 RX ADMIN — SODIUM CHLORIDE 1000 ML: 9 INJECTION, SOLUTION INTRAVENOUS at 21:22

## 2022-11-03 RX ADMIN — SODIUM CHLORIDE 1000 ML: 9 INJECTION, SOLUTION INTRAVENOUS at 23:16

## 2022-11-03 RX ADMIN — SODIUM CHLORIDE 125 ML/HR: 9 INJECTION, SOLUTION INTRAVENOUS at 21:22

## 2022-11-03 RX ADMIN — ONDANSETRON 4 MG: 2 INJECTION INTRAMUSCULAR; INTRAVENOUS at 22:42

## 2022-11-03 NOTE — ED TRIAGE NOTES
Pt c/o vaginal bleeding, approx 3 weeks pregnant, describes as dark brown blood, at first it started as just being there when she wipes, then while she slept she bled through your undergarments. associated sx of fever, back pain, n/v

## 2022-11-04 ENCOUNTER — APPOINTMENT (OUTPATIENT)
Dept: CARDIOLOGY | Facility: HOSPITAL | Age: 22
End: 2022-11-04

## 2022-11-04 PROBLEM — E10.10 DKA, TYPE 1 (HCC): Status: ACTIVE | Noted: 2022-11-04

## 2022-11-04 PROBLEM — E11.10 DKA (DIABETIC KETOACIDOSIS) (HCC): Status: ACTIVE | Noted: 2022-11-04

## 2022-11-04 PROBLEM — E10.10 DIABETIC KETOACIDOSIS WITHOUT COMA ASSOCIATED WITH TYPE 1 DIABETES MELLITUS: Status: ACTIVE | Noted: 2022-11-04

## 2022-11-04 LAB
ANION GAP SERPL CALCULATED.3IONS-SCNC: 12 MMOL/L (ref 5–15)
ANION GAP SERPL CALCULATED.3IONS-SCNC: 15 MMOL/L (ref 5–15)
ANION GAP SERPL CALCULATED.3IONS-SCNC: 17 MMOL/L (ref 5–15)
ANION GAP SERPL CALCULATED.3IONS-SCNC: 27 MMOL/L (ref 5–15)
ARTERIAL PATENCY WRIST A: ABNORMAL
ATMOSPHERIC PRESS: 745 MMHG
BASE EXCESS BLDA CALC-SCNC: -11.8 MMOL/L (ref 0–2)
BDY SITE: ABNORMAL
BH CV ECHO MEAS - ACS: 1.43 CM
BH CV ECHO MEAS - AO MAX PG: 9.7 MMHG
BH CV ECHO MEAS - AO MEAN PG: 6.3 MMHG
BH CV ECHO MEAS - AO ROOT DIAM: 2.04 CM
BH CV ECHO MEAS - AO V2 MAX: 156.1 CM/SEC
BH CV ECHO MEAS - AO V2 VTI: 34.3 CM
BH CV ECHO MEAS - AVA(I,D): 1.54 CM2
BH CV ECHO MEAS - EDV(CUBED): 87.2 ML
BH CV ECHO MEAS - EDV(MOD-SP2): 53.3 ML
BH CV ECHO MEAS - EDV(MOD-SP4): 60.1 ML
BH CV ECHO MEAS - EF(MOD-SP2): 57.8 %
BH CV ECHO MEAS - EF(MOD-SP4): 76.9 %
BH CV ECHO MEAS - ESV(CUBED): 19.8 ML
BH CV ECHO MEAS - ESV(MOD-SP2): 22.5 ML
BH CV ECHO MEAS - ESV(MOD-SP4): 13.9 ML
BH CV ECHO MEAS - FS: 39 %
BH CV ECHO MEAS - IVS/LVPW: 1.13 CM
BH CV ECHO MEAS - IVSD: 0.95 CM
BH CV ECHO MEAS - LA DIMENSION: 3 CM
BH CV ECHO MEAS - LAT PEAK E' VEL: 16.3 CM/SEC
BH CV ECHO MEAS - LV DIASTOLIC VOL/BSA (35-75): 35.7 CM2
BH CV ECHO MEAS - LV MASS(C)D: 128.4 GRAMS
BH CV ECHO MEAS - LV MAX PG: 3.4 MMHG
BH CV ECHO MEAS - LV MEAN PG: 1.67 MMHG
BH CV ECHO MEAS - LV SYSTOLIC VOL/BSA (12-30): 8.3 CM2
BH CV ECHO MEAS - LV V1 MAX: 92.4 CM/SEC
BH CV ECHO MEAS - LV V1 VTI: 17.6 CM
BH CV ECHO MEAS - LVIDD: 4.4 CM
BH CV ECHO MEAS - LVIDS: 2.7 CM
BH CV ECHO MEAS - LVOT AREA: 3 CM2
BH CV ECHO MEAS - LVOT DIAM: 1.95 CM
BH CV ECHO MEAS - LVPWD: 0.84 CM
BH CV ECHO MEAS - MED PEAK E' VEL: 14.1 CM/SEC
BH CV ECHO MEAS - MR MAX PG: 44 MMHG
BH CV ECHO MEAS - MR MAX VEL: 331.8 CM/SEC
BH CV ECHO MEAS - MV A MAX VEL: 64 CM/SEC
BH CV ECHO MEAS - MV DEC SLOPE: 858 CM/SEC2
BH CV ECHO MEAS - MV E MAX VEL: 117 CM/SEC
BH CV ECHO MEAS - MV E/A: 1.83
BH CV ECHO MEAS - MV MAX PG: 6.7 MMHG
BH CV ECHO MEAS - MV MEAN PG: 3.1 MMHG
BH CV ECHO MEAS - MV P1/2T: 44.7 MSEC
BH CV ECHO MEAS - MV V2 VTI: 31.5 CM
BH CV ECHO MEAS - MVA(P1/2T): 4.9 CM2
BH CV ECHO MEAS - MVA(VTI): 1.68 CM2
BH CV ECHO MEAS - PA V2 MAX: 94.1 CM/SEC
BH CV ECHO MEAS - PI END-D VEL: 84.5 CM/SEC
BH CV ECHO MEAS - RAP SYSTOLE: 3 MMHG
BH CV ECHO MEAS - RVDD: 2.08 CM
BH CV ECHO MEAS - RVSP: 18.5 MMHG
BH CV ECHO MEAS - SI(MOD-SP2): 18.3 ML/M2
BH CV ECHO MEAS - SI(MOD-SP4): 27.4 ML/M2
BH CV ECHO MEAS - SV(LVOT): 52.7 ML
BH CV ECHO MEAS - SV(MOD-SP2): 30.8 ML
BH CV ECHO MEAS - SV(MOD-SP4): 46.2 ML
BH CV ECHO MEAS - TAPSE (>1.6): 2.21 CM
BH CV ECHO MEAS - TR MAX PG: 15.5 MMHG
BH CV ECHO MEAS - TR MAX VEL: 196.8 CM/SEC
BH CV ECHO MEASUREMENTS AVERAGE E/E' RATIO: 7.7
BUN SERPL-MCNC: 13 MG/DL (ref 6–20)
BUN SERPL-MCNC: 14 MG/DL (ref 6–20)
BUN SERPL-MCNC: 17 MG/DL (ref 6–20)
BUN SERPL-MCNC: 20 MG/DL (ref 6–20)
BUN/CREAT SERPL: 11.7 (ref 7–25)
BUN/CREAT SERPL: 12.4 (ref 7–25)
BUN/CREAT SERPL: 15.2 (ref 7–25)
BUN/CREAT SERPL: 15.6 (ref 7–25)
CALCIUM SPEC-SCNC: 7.5 MG/DL (ref 8.6–10.5)
CALCIUM SPEC-SCNC: 7.8 MG/DL (ref 8.6–10.5)
CALCIUM SPEC-SCNC: 8.1 MG/DL (ref 8.6–10.5)
CALCIUM SPEC-SCNC: 8.3 MG/DL (ref 8.6–10.5)
CHLORIDE SERPL-SCNC: 107 MMOL/L (ref 98–107)
CHLORIDE SERPL-SCNC: 108 MMOL/L (ref 98–107)
CHLORIDE SERPL-SCNC: 108 MMOL/L (ref 98–107)
CHLORIDE SERPL-SCNC: 99 MMOL/L (ref 98–107)
CO2 SERPL-SCNC: 12 MMOL/L (ref 22–29)
CO2 SERPL-SCNC: 12 MMOL/L (ref 22–29)
CO2 SERPL-SCNC: 14 MMOL/L (ref 22–29)
CO2 SERPL-SCNC: 3 MMOL/L (ref 22–29)
CREAT SERPL-MCNC: 1.11 MG/DL (ref 0.57–1)
CREAT SERPL-MCNC: 1.12 MG/DL (ref 0.57–1)
CREAT SERPL-MCNC: 1.13 MG/DL (ref 0.57–1)
CREAT SERPL-MCNC: 1.28 MG/DL (ref 0.57–1)
DEPRECATED RDW RBC AUTO: 41.9 FL (ref 37–54)
EGFRCR SERPLBLD CKD-EPI 2021: 60.5 ML/MIN/1.73
EGFRCR SERPLBLD CKD-EPI 2021: 70.3 ML/MIN/1.73
EGFRCR SERPLBLD CKD-EPI 2021: 71 ML/MIN/1.73
EGFRCR SERPLBLD CKD-EPI 2021: 71.8 ML/MIN/1.73
ERYTHROCYTE [DISTWIDTH] IN BLOOD BY AUTOMATED COUNT: 13.5 % (ref 12.3–15.4)
GLUCOSE BLDC GLUCOMTR-MCNC: 114 MG/DL (ref 70–130)
GLUCOSE BLDC GLUCOMTR-MCNC: 119 MG/DL (ref 70–130)
GLUCOSE BLDC GLUCOMTR-MCNC: 130 MG/DL (ref 70–130)
GLUCOSE BLDC GLUCOMTR-MCNC: 139 MG/DL (ref 70–130)
GLUCOSE BLDC GLUCOMTR-MCNC: 157 MG/DL (ref 70–130)
GLUCOSE BLDC GLUCOMTR-MCNC: 159 MG/DL (ref 70–130)
GLUCOSE BLDC GLUCOMTR-MCNC: 161 MG/DL (ref 70–130)
GLUCOSE BLDC GLUCOMTR-MCNC: 171 MG/DL (ref 70–130)
GLUCOSE BLDC GLUCOMTR-MCNC: 179 MG/DL (ref 70–130)
GLUCOSE BLDC GLUCOMTR-MCNC: 183 MG/DL (ref 70–130)
GLUCOSE BLDC GLUCOMTR-MCNC: 198 MG/DL (ref 70–130)
GLUCOSE BLDC GLUCOMTR-MCNC: 236 MG/DL (ref 70–130)
GLUCOSE BLDC GLUCOMTR-MCNC: 307 MG/DL (ref 70–130)
GLUCOSE BLDC GLUCOMTR-MCNC: 342 MG/DL (ref 70–130)
GLUCOSE BLDC GLUCOMTR-MCNC: 350 MG/DL (ref 70–130)
GLUCOSE BLDC GLUCOMTR-MCNC: 352 MG/DL (ref 70–130)
GLUCOSE BLDC GLUCOMTR-MCNC: 387 MG/DL (ref 70–130)
GLUCOSE SERPL-MCNC: 149 MG/DL (ref 65–99)
GLUCOSE SERPL-MCNC: 191 MG/DL (ref 65–99)
GLUCOSE SERPL-MCNC: 194 MG/DL (ref 65–99)
GLUCOSE SERPL-MCNC: 414 MG/DL (ref 65–99)
HBA1C MFR BLD: 9.4 % (ref 4.8–5.6)
HCO3 BLDA-SCNC: 13.8 MMOL/L (ref 20–26)
HCT VFR BLD AUTO: 33.9 % (ref 34–46.6)
HGB BLD-MCNC: 10.9 G/DL (ref 12–15.9)
LEFT ATRIUM VOLUME INDEX: 14.2 ML/M2
Lab: ABNORMAL
MAGNESIUM SERPL-MCNC: 1.5 MG/DL (ref 1.6–2.6)
MAGNESIUM SERPL-MCNC: 1.5 MG/DL (ref 1.6–2.6)
MAGNESIUM SERPL-MCNC: 1.6 MG/DL (ref 1.6–2.6)
MAGNESIUM SERPL-MCNC: 1.7 MG/DL (ref 1.6–2.6)
MAGNESIUM SERPL-MCNC: 2.1 MG/DL (ref 1.6–2.6)
MAXIMAL PREDICTED HEART RATE: 197 BPM
MCH RBC QN AUTO: 27.9 PG (ref 26.6–33)
MCHC RBC AUTO-ENTMCNC: 32.2 G/DL (ref 31.5–35.7)
MCV RBC AUTO: 86.7 FL (ref 79–97)
MODALITY: ABNORMAL
NT-PROBNP SERPL-MCNC: 1480 PG/ML (ref 0–450)
OSMOLALITY SERPL: 307 MOSM/KG (ref 275–295)
PCO2 BLDA: 29.9 MM HG (ref 35–45)
PH BLDA: 7.27 PH UNITS (ref 7.35–7.45)
PHOSPHATE SERPL-MCNC: 2.1 MG/DL (ref 2.5–4.5)
PHOSPHATE SERPL-MCNC: 2.6 MG/DL (ref 2.5–4.5)
PHOSPHATE SERPL-MCNC: 2.8 MG/DL (ref 2.5–4.5)
PHOSPHATE SERPL-MCNC: 4.3 MG/DL (ref 2.5–4.5)
PHOSPHATE SERPL-MCNC: 4.5 MG/DL (ref 2.5–4.5)
PLATELET # BLD AUTO: 262 10*3/MM3 (ref 140–450)
PMV BLD AUTO: 10.6 FL (ref 6–12)
PO2 BLDA: 114 MM HG (ref 83–108)
POTASSIUM SERPL-SCNC: 4 MMOL/L (ref 3.5–5.2)
POTASSIUM SERPL-SCNC: 4 MMOL/L (ref 3.5–5.2)
POTASSIUM SERPL-SCNC: 4.2 MMOL/L (ref 3.5–5.2)
POTASSIUM SERPL-SCNC: 5.4 MMOL/L (ref 3.5–5.2)
RBC # BLD AUTO: 3.91 10*6/MM3 (ref 3.77–5.28)
SAO2 % BLDCOA: 99.5 % (ref 94–99)
SODIUM SERPL-SCNC: 129 MMOL/L (ref 136–145)
SODIUM SERPL-SCNC: 134 MMOL/L (ref 136–145)
SODIUM SERPL-SCNC: 135 MMOL/L (ref 136–145)
SODIUM SERPL-SCNC: 136 MMOL/L (ref 136–145)
STJ: 2.17 CM
STRESS TARGET HR: 167 BPM
TSH SERPL DL<=0.05 MIU/L-ACNC: 0.62 UIU/ML (ref 0.27–4.2)
VENTILATOR MODE: ABNORMAL
WBC NRBC COR # BLD: 9.62 10*3/MM3 (ref 3.4–10.8)
WHOLE BLOOD HOLD SPECIMEN: NORMAL

## 2022-11-04 PROCEDURE — 80048 BASIC METABOLIC PNL TOTAL CA: CPT | Performed by: INTERNAL MEDICINE

## 2022-11-04 PROCEDURE — 93306 TTE W/DOPPLER COMPLETE: CPT

## 2022-11-04 PROCEDURE — 25010000002 ONDANSETRON PER 1 MG: Performed by: INTERNAL MEDICINE

## 2022-11-04 PROCEDURE — 25010000002 MORPHINE PER 10 MG: Performed by: INTERNAL MEDICINE

## 2022-11-04 PROCEDURE — 84100 ASSAY OF PHOSPHORUS: CPT | Performed by: INTERNAL MEDICINE

## 2022-11-04 PROCEDURE — 82962 GLUCOSE BLOOD TEST: CPT

## 2022-11-04 PROCEDURE — 82803 BLOOD GASES ANY COMBINATION: CPT

## 2022-11-04 PROCEDURE — 85027 COMPLETE CBC AUTOMATED: CPT | Performed by: INTERNAL MEDICINE

## 2022-11-04 PROCEDURE — 83735 ASSAY OF MAGNESIUM: CPT | Performed by: INTERNAL MEDICINE

## 2022-11-04 PROCEDURE — 0 MAGNESIUM SULFATE 4 GM/100ML SOLUTION: Performed by: STUDENT IN AN ORGANIZED HEALTH CARE EDUCATION/TRAINING PROGRAM

## 2022-11-04 PROCEDURE — 0 INSULIN REGULAR HUMAN PER 5 UNITS: Performed by: INTERNAL MEDICINE

## 2022-11-04 PROCEDURE — 63710000001 INSULIN REGULAR HUMAN PER 5 UNITS: Performed by: FAMILY MEDICINE

## 2022-11-04 PROCEDURE — 36600 WITHDRAWAL OF ARTERIAL BLOOD: CPT

## 2022-11-04 PROCEDURE — 36415 COLL VENOUS BLD VENIPUNCTURE: CPT | Performed by: INTERNAL MEDICINE

## 2022-11-04 PROCEDURE — 25010000002 MORPHINE PER 10 MG: Performed by: STUDENT IN AN ORGANIZED HEALTH CARE EDUCATION/TRAINING PROGRAM

## 2022-11-04 PROCEDURE — 83880 ASSAY OF NATRIURETIC PEPTIDE: CPT | Performed by: STUDENT IN AN ORGANIZED HEALTH CARE EDUCATION/TRAINING PROGRAM

## 2022-11-04 PROCEDURE — 84443 ASSAY THYROID STIM HORMONE: CPT | Performed by: INTERNAL MEDICINE

## 2022-11-04 PROCEDURE — 99222 1ST HOSP IP/OBS MODERATE 55: CPT | Performed by: OBSTETRICS & GYNECOLOGY

## 2022-11-04 PROCEDURE — 93306 TTE W/DOPPLER COMPLETE: CPT | Performed by: INTERNAL MEDICINE

## 2022-11-04 PROCEDURE — 83036 HEMOGLOBIN GLYCOSYLATED A1C: CPT | Performed by: INTERNAL MEDICINE

## 2022-11-04 RX ORDER — DEXTROSE AND SODIUM CHLORIDE 5; .45 G/100ML; G/100ML
150 INJECTION, SOLUTION INTRAVENOUS CONTINUOUS PRN
Status: DISCONTINUED | OUTPATIENT
Start: 2022-11-04 | End: 2022-11-05

## 2022-11-04 RX ORDER — DEXTROSE MONOHYDRATE 25 G/50ML
10-50 INJECTION, SOLUTION INTRAVENOUS
Status: DISCONTINUED | OUTPATIENT
Start: 2022-11-04 | End: 2022-11-05

## 2022-11-04 RX ORDER — MORPHINE SULFATE 2 MG/ML
2 INJECTION, SOLUTION INTRAMUSCULAR; INTRAVENOUS EVERY 4 HOURS PRN
Status: DISCONTINUED | OUTPATIENT
Start: 2022-11-04 | End: 2022-11-04

## 2022-11-04 RX ORDER — MAGNESIUM SULFATE HEPTAHYDRATE 40 MG/ML
4 INJECTION, SOLUTION INTRAVENOUS AS NEEDED
Status: DISCONTINUED | OUTPATIENT
Start: 2022-11-04 | End: 2022-11-06 | Stop reason: HOSPADM

## 2022-11-04 RX ORDER — SODIUM CHLORIDE 9 MG/ML
250 INJECTION, SOLUTION INTRAVENOUS CONTINUOUS PRN
Status: DISCONTINUED | OUTPATIENT
Start: 2022-11-04 | End: 2022-11-05

## 2022-11-04 RX ORDER — NICOTINE POLACRILEX 4 MG
15 LOZENGE BUCCAL
Status: DISCONTINUED | OUTPATIENT
Start: 2022-11-04 | End: 2022-11-05

## 2022-11-04 RX ORDER — SODIUM CHLORIDE 450 MG/100ML
250 INJECTION, SOLUTION INTRAVENOUS CONTINUOUS PRN
Status: DISCONTINUED | OUTPATIENT
Start: 2022-11-04 | End: 2022-11-05

## 2022-11-04 RX ORDER — MORPHINE SULFATE 2 MG/ML
2 INJECTION, SOLUTION INTRAMUSCULAR; INTRAVENOUS ONCE AS NEEDED
Status: COMPLETED | OUTPATIENT
Start: 2022-11-04 | End: 2022-11-04

## 2022-11-04 RX ORDER — SODIUM CHLORIDE 0.9 % (FLUSH) 0.9 %
10 SYRINGE (ML) INJECTION EVERY 12 HOURS SCHEDULED
Status: DISCONTINUED | OUTPATIENT
Start: 2022-11-04 | End: 2022-11-05

## 2022-11-04 RX ORDER — PANTOPRAZOLE SODIUM 40 MG/10ML
40 INJECTION, POWDER, LYOPHILIZED, FOR SOLUTION INTRAVENOUS
Status: DISCONTINUED | OUTPATIENT
Start: 2022-11-04 | End: 2022-11-06 | Stop reason: HOSPADM

## 2022-11-04 RX ORDER — DEXTROSE AND SODIUM CHLORIDE 5; .9 G/100ML; G/100ML
150 INJECTION, SOLUTION INTRAVENOUS CONTINUOUS PRN
Status: DISCONTINUED | OUTPATIENT
Start: 2022-11-04 | End: 2022-11-05

## 2022-11-04 RX ORDER — MAGNESIUM SULFATE HEPTAHYDRATE 40 MG/ML
2 INJECTION, SOLUTION INTRAVENOUS AS NEEDED
Status: DISCONTINUED | OUTPATIENT
Start: 2022-11-04 | End: 2022-11-06 | Stop reason: HOSPADM

## 2022-11-04 RX ORDER — DEXTROSE, SODIUM CHLORIDE, AND POTASSIUM CHLORIDE 5; .9; .15 G/100ML; G/100ML; G/100ML
150 INJECTION INTRAVENOUS CONTINUOUS PRN
Status: DISCONTINUED | OUTPATIENT
Start: 2022-11-04 | End: 2022-11-05

## 2022-11-04 RX ORDER — MORPHINE SULFATE 2 MG/ML
2 INJECTION, SOLUTION INTRAMUSCULAR; INTRAVENOUS EVERY 4 HOURS PRN
Status: DISCONTINUED | OUTPATIENT
Start: 2022-11-04 | End: 2022-11-06 | Stop reason: HOSPADM

## 2022-11-04 RX ORDER — SODIUM CHLORIDE AND POTASSIUM CHLORIDE 150; 450 MG/100ML; MG/100ML
250 INJECTION, SOLUTION INTRAVENOUS CONTINUOUS PRN
Status: DISCONTINUED | OUTPATIENT
Start: 2022-11-04 | End: 2022-11-05

## 2022-11-04 RX ORDER — MAGNESIUM SULFATE HEPTAHYDRATE 40 MG/ML
2 INJECTION, SOLUTION INTRAVENOUS ONCE
Status: DISCONTINUED | OUTPATIENT
Start: 2022-11-04 | End: 2022-11-06 | Stop reason: HOSPADM

## 2022-11-04 RX ORDER — DEXTROSE, SODIUM CHLORIDE, AND POTASSIUM CHLORIDE 5; .45; .3 G/100ML; G/100ML; G/100ML
150 INJECTION INTRAVENOUS CONTINUOUS PRN
Status: DISCONTINUED | OUTPATIENT
Start: 2022-11-04 | End: 2022-11-05

## 2022-11-04 RX ORDER — SODIUM CHLORIDE AND POTASSIUM CHLORIDE 150; 900 MG/100ML; MG/100ML
250 INJECTION, SOLUTION INTRAVENOUS CONTINUOUS PRN
Status: DISCONTINUED | OUTPATIENT
Start: 2022-11-04 | End: 2022-11-05

## 2022-11-04 RX ORDER — SODIUM CHLORIDE 0.9 % (FLUSH) 0.9 %
10 SYRINGE (ML) INJECTION AS NEEDED
Status: DISCONTINUED | OUTPATIENT
Start: 2022-11-04 | End: 2022-11-05

## 2022-11-04 RX ORDER — ONDANSETRON 2 MG/ML
4 INJECTION INTRAMUSCULAR; INTRAVENOUS EVERY 6 HOURS PRN
Status: DISCONTINUED | OUTPATIENT
Start: 2022-11-04 | End: 2022-11-06 | Stop reason: HOSPADM

## 2022-11-04 RX ORDER — SODIUM CHLORIDE AND POTASSIUM CHLORIDE 300; 900 MG/100ML; MG/100ML
250 INJECTION, SOLUTION INTRAVENOUS CONTINUOUS PRN
Status: DISCONTINUED | OUTPATIENT
Start: 2022-11-04 | End: 2022-11-05

## 2022-11-04 RX ORDER — ACETAMINOPHEN 325 MG/1
650 TABLET ORAL EVERY 6 HOURS PRN
Status: DISCONTINUED | OUTPATIENT
Start: 2022-11-04 | End: 2022-11-06 | Stop reason: HOSPADM

## 2022-11-04 RX ORDER — POTASSIUM CHLORIDE, DEXTROSE MONOHYDRATE AND SODIUM CHLORIDE 300; 5; 900 MG/100ML; G/100ML; MG/100ML
150 INJECTION, SOLUTION INTRAVENOUS CONTINUOUS PRN
Status: DISCONTINUED | OUTPATIENT
Start: 2022-11-04 | End: 2022-11-05

## 2022-11-04 RX ORDER — DEXTROSE, SODIUM CHLORIDE, AND POTASSIUM CHLORIDE 5; .45; .15 G/100ML; G/100ML; G/100ML
150 INJECTION INTRAVENOUS CONTINUOUS PRN
Status: DISCONTINUED | OUTPATIENT
Start: 2022-11-04 | End: 2022-11-05

## 2022-11-04 RX ADMIN — DEXTROSE AND SODIUM CHLORIDE 150 ML/HR: 5; 900 INJECTION, SOLUTION INTRAVENOUS at 05:11

## 2022-11-04 RX ADMIN — SODIUM CHLORIDE 250 ML/HR: 9 INJECTION, SOLUTION INTRAVENOUS at 05:46

## 2022-11-04 RX ADMIN — HUMAN INSULIN 6 UNITS: 100 INJECTION, SOLUTION SUBCUTANEOUS at 00:24

## 2022-11-04 RX ADMIN — PANTOPRAZOLE SODIUM 40 MG: 40 INJECTION, POWDER, FOR SOLUTION INTRAVENOUS at 10:37

## 2022-11-04 RX ADMIN — SODIUM CHLORIDE 3.5 UNITS/HR: 9 INJECTION, SOLUTION INTRAVENOUS at 05:01

## 2022-11-04 RX ADMIN — Medication 10 ML: at 21:19

## 2022-11-04 RX ADMIN — MORPHINE SULFATE 2 MG: 2 INJECTION, SOLUTION INTRAMUSCULAR; INTRAVENOUS at 21:18

## 2022-11-04 RX ADMIN — MAGNESIUM SULFATE 4 G: 4 INJECTION INTRAVENOUS at 23:06

## 2022-11-04 RX ADMIN — POTASSIUM CHLORIDE, DEXTROSE MONOHYDRATE AND SODIUM CHLORIDE 150 ML/HR: 150; 5; 900 INJECTION, SOLUTION INTRAVENOUS at 21:18

## 2022-11-04 RX ADMIN — ONDANSETRON 4 MG: 2 INJECTION INTRAMUSCULAR; INTRAVENOUS at 01:41

## 2022-11-04 RX ADMIN — SODIUM CHLORIDE 1000 ML: 9 INJECTION, SOLUTION INTRAVENOUS at 01:42

## 2022-11-04 RX ADMIN — POTASSIUM CHLORIDE, DEXTROSE MONOHYDRATE AND SODIUM CHLORIDE 150 ML/HR: 150; 5; 450 INJECTION, SOLUTION INTRAVENOUS at 13:56

## 2022-11-04 RX ADMIN — MORPHINE SULFATE 2 MG: 2 INJECTION, SOLUTION INTRAMUSCULAR; INTRAVENOUS at 10:36

## 2022-11-04 RX ADMIN — ONDANSETRON 4 MG: 2 INJECTION INTRAMUSCULAR; INTRAVENOUS at 08:33

## 2022-11-04 RX ADMIN — PANTOPRAZOLE SODIUM 40 MG: 40 INJECTION, POWDER, FOR SOLUTION INTRAVENOUS at 01:41

## 2022-11-04 RX ADMIN — MORPHINE SULFATE 2 MG: 2 INJECTION, SOLUTION INTRAMUSCULAR; INTRAVENOUS at 16:40

## 2022-11-04 RX ADMIN — MORPHINE SULFATE 2 MG: 2 INJECTION, SOLUTION INTRAMUSCULAR; INTRAVENOUS at 04:20

## 2022-11-04 NOTE — PAYOR COMM NOTE
"[facility]  Case Managment Extender   [place] Boudreaux  (P) [phone]  (F) [phone]              Fernanda Patterson (22 y.o. Female)     Date of Birth   2000    Social Security Number       Address   [address]  [facility] [address]    Home Phone   [phone]    MRN   [id number]       Quaker   Taoist    Marital Status   Single                            Admission Date   11/3/22    Admission Type   Emergency    Admitting Provider   Behroozi, Saeid, MD    Attending Provider       Department, Room/Bed   [facility] EMERGENCY DEPARTMENT, 20/20       Discharge Date       Discharge Disposition       Discharge Destination                               Attending Provider: (none)   Allergies: Pineapple, Benzoyl Peroxide    Isolation: None   Infection: None   Code Status: CPR    Ht: 170.2 cm (67\")   Wt: 59 kg (130 lb)    Admission Cmt: None   Principal Problem: None                Active Insurance as of 11/3/2022     Primary Coverage     Payor Plan Insurance Group Employer/Plan Group    CIGNA MISC CIGNA [id number]     Coverage Address Coverage Phone Number Coverage Fax Number Effective Dates    [address] [phone]  5/1/2022 - None Entered    [facility] [address]       Subscriber Name Subscriber Birth Date Member ID       FERNANDA PATTERSON 2000 [id number]           Secondary Coverage     Payor Plan Insurance Group Employer/Plan Group    [place] [id number]     Payor Plan Address Payor Plan Phone Number Payor Plan Fax Number Effective Dates    [employer] [address] [address] [phone]  1/1/2021 - None Entered    [facility] [id number]       Subscriber Name Subscriber Birth Date Member ID       FERNANDA PATTERSON 2000 [id number]                 Emergency Contacts      (Rel.) Home Phone Work Phone Mobile Phone    Oneil Palma (Significant Other) [phone] -- [phone]    " COLEEN SALOMON (Mother) [phone] -- [phone]               History & Physical      Behroozi, Saeid, MD at 11/04/22 0103                [facility] Medicine Admission      Date of Admission: 11/3/2022      Primary Care Physician: Rufus Marshall APRN      Chief Complaint: Nausea vomiting body ache vaginal bleeding    HPI:  Patient is a 22-year-old female with diabetes mellitus type 1 since age 6, on insulin outpatient, history of multiple DKA's, reported being pregnant, with last menstrual bleeding was 9/30/2022, G2, P1, who presented to ER with complaint of vaginal bleeding for 1 day and fever for 2 days with back pain and nausea and vomiting.  Patient underwent pelvic ultrasound in ED which was abnormal.  Gynecologist Dr. Medina was consulted.  Patient was diagnosed with DKA.  Hospitalist service was called for admission of the patient.    Patient was seen and examined in ED room 18.  Her boyfriend at bedside.  Patient reports yesterday she started having vaginal bleeding.  She reports generalized body ache minimal abdominal pain recurrent nausea and vomiting nonbilious nonbloody..  She reports she had subjective fever for 1 to 2 days.  She was afebrile in ED.  She reports mild abdominal pain which she reports is related to her DKA and repeated nausea and vomiting.  She had some nonspecific back pain.  She denies any fall injury trauma headache sore throat chest pain shortness of breath UTI URI-like symptoms, constipation diarrhea  in particular.  She denies any vaginal discharge.  She reports whenever she has DKA she gets body ache and abdominal pain.  She reports compliance with intake of her insulin regimen.    Concurrent Medical History:  has a past medical history of ADHD, Bipolar 1 disorder (HCC), Borderline personality disorder (HCC), Cannabinoid hyperemesis syndrome, Diabetes mellitus type 1 (HCC), Diabetic gastroparesis (HCC), Diabetic ketoacidosis (HCC),  Diabetic neuropathy (HCC), History of transfusion, Post traumatic stress disorder (PTSD), Recurrent UTI, Seizure disorder (HCC), Severe depressed bipolar II disorder without psychotic features (HCC), and Uncontrolled diabetes mellitus.    Past Surgical History:  has a past surgical history that includes  section (N/A, 2018); Cystoscopy (N/A, 2020); Esophagogastroduodenoscopy (N/A, 10/12/2022); and Colonoscopy (N/A, 10/12/2022).    Family History: family history includes Dementia in her maternal grandfather; Drug abuse in her father; Hypertension in her paternal grandmother; Suicide Attempts in her brother.     Social History:  reports that she has been smoking cigarettes. She has a 2.00 pack-year smoking history. She has never used smokeless tobacco. She reports current alcohol use. She reports that she does not currently use drugs after having used the following drugs: Marijuana. Frequency: 1.00 time per week.    Allergies:   Allergies   Allergen Reactions   • Pineapple Anaphylaxis   • Benzoyl Peroxide Swelling       Medications:   Prior to Admission medications    Medication Sig Start Date End Date Taking? Authorizing Provider   Acetone, Urine, Test (Ketone Test) strip USE AS INDICATED 22   Abigail Castro MD   Alcohol Swabs (Alcohol Wipes) 70 % pads Use 4 x daily 22   Tavon Avila MD   B-D ULTRA-FINE 33 LANCETS misc Use 4 x daily , any brand covered by insurance 22   Tavon Avila MD   Blood Glucose Monitoring Suppl w/Device kit USE AS INDICATED, ANY MONITOR , ICD10 code is E11.9 22   Tavon Avila MD   clonazePAM (KlonoPIN) 0.5 MG tablet Take 2 tablets by mouth 3 (Three) Times a Day As Needed for Anxiety. 8/15/22   Tavon Avila MD   Continuous Blood Gluc  (Dexcom G6 ) device USE AS DIRECTED FOR CONTINUOUS GLUCOSE MONITORING. 22   Abigail Castro MD   Continuous Blood Gluc Sensor (Dexcom G6  Sensor) Use as directed for continuous glucose monitoring **Change sensor Every 10 (Ten) Days** 8/16/22   Tavon Avila MD   Continuous Blood Gluc Sensor (Dexcom G6 Sensor) As Needed (glucose control). Every 10 days,  Use as directed for continuous glucose monitoring 8/16/22   Tavon Avila MD   Continuous Blood Gluc Transmit (Dexcom G6 Transmitter) misc Use as directed for continuous glucose monitoring **Change transmitter Every 3 (Three) Months** 8/16/22   Tavon Avila MD   Glucagon (Baqsimi One Pack) 3 MG/DOSE powder 1 each into the nostril(s) as directed by provider As Needed (Hypoglycemia). Apply intranasal if hypoglycemia 6/27/22   Tavon Avila MD   Glucose Blood (BLOOD GLUCOSE TEST) strip Use 4 x daily use any brand covered by insurance or same brand as before ICD10 code is E11.9 10/31/19   Tavon Avila MD   ibuprofen (ADVIL,MOTRIN) 600 MG tablet TAKE 1 TABLET BY MOUTH EVERY 6 HOURS AS NEEDED FOR PAIN FOR UP TO 10 DAYS 9/2/22   Provider, MD Abigail   insulin aspart (NovoLOG FlexPen) 100 UNIT/ML solution pen-injector sc pen 10 units tid 6/27/22   Tavon Avila MD   Insulin Aspart (novoLOG) 100 UNIT/ML injection 100 units daily through pump 6/27/22   Tavon Avila MD   insulin detemir (Levemir FlexTouch) 100 UNIT/ML injection 22 units daily 6/27/22   Tavon Avila MD   Insulin Disposable Pump (Omnipod 5 G6 Pod, Gen 5,) misc Change pods Every Other Day. 8/16/22   Tavon Avila MD   Insulin Pen Needle (Pen Needles) 32G X 4 MM misc 1 each 4 (Four) Times a Day. Use 4 x daily, Dx code E11.65 6/27/22   Tavon Avila MD   Lancet Devices (Lancing Device) misc USE AS INDICATED TO CORRELATE WITH STRIPS AND METER 6/27/22   Tavon Avila MD   LORazepam (ATIVAN) 0.5 MG tablet Take 0.5 mg by mouth. 8/24/22   Provider, MD Abigail   metoclopramide (REGLAN) 10 MG tablet Take up to 2 tabs  po TID 6/27/22   Tavon Avila MD   OLANZapine (zyPREXA) 10 MG tablet Take 1 tablet by mouth.    ProviderAbigail MD   omeprazole (priLOSEC) 40 MG capsule Take 1 capsule by mouth Daily. 9/14/22   Timur Sun MD   promethazine (PHENERGAN) 12.5 MG tablet Take 1 tablet by mouth Every 6 (Six) Hours As Needed for Nausea or Vomiting. 6/27/22   Tavon Avila MD   sucralfate (Carafate) 1 g tablet Take 1 tablet by mouth 4 (Four) Times a Day for 30 days. 10/19/22 11/18/22  Timur Sun MD   Urine Glucose-Ketones Test strip Use as indicated 6/27/22   Tavon Avila MD       Review of Systems:  Review of Systems   Constitutional: Positive for fever. Negative for chills and diaphoresis.   HENT: Negative for congestion, dental problem, ear pain, facial swelling, rhinorrhea and sinus pressure.    Eyes: Positive for visual disturbance. Negative for photophobia, discharge, redness and itching.   Respiratory: Negative for apnea, cough, choking, chest tightness, shortness of breath, wheezing and stridor.    Cardiovascular: Negative for chest pain, palpitations and leg swelling.   Gastrointestinal: Positive for abdominal pain, nausea and vomiting. Negative for abdominal distention, anal bleeding, blood in stool, diarrhea and rectal pain.   Endocrine: Negative for cold intolerance, heat intolerance, polydipsia, polyphagia and polyuria.   Genitourinary: Negative for difficulty urinating, flank pain, frequency, hematuria and urgency.   Musculoskeletal: Positive for back pain. Negative for arthralgias, joint swelling and myalgias.   Skin: Negative for pallor, rash and wound.   Allergic/Immunologic: Negative for environmental allergies and immunocompromised state.   Neurological: Negative for dizziness, tremors, seizures, facial asymmetry, speech difficulty, weakness, light-headedness, numbness and headaches.   Hematological: Negative for adenopathy. Does not bruise/bleed easily.    Psychiatric/Behavioral: Negative for agitation, behavioral problems and hallucinations. The patient is not nervous/anxious.       Otherwise complete ROS is negative except as mentioned above.    Physical Exam:   Temp:  [98.5 °F (36.9 °C)] 98.5 °F (36.9 °C)  Heart Rate:  [107-125] 125  Resp:  [18-20] 20  BP: (103-152)/(64-75) 103/64  Physical Exam  Constitutional:       General: She is not in acute distress.     Appearance: She is normal weight. She is not ill-appearing, toxic-appearing or diaphoretic.   HENT:      Head: Normocephalic and atraumatic.      Right Ear: External ear normal.      Left Ear: External ear normal.      Nose: Nose normal.      Mouth/Throat:      Mouth: Mucous membranes are moist.      Pharynx: Oropharynx is clear.   Eyes:      Extraocular Movements: Extraocular movements intact.      Conjunctiva/sclera: Conjunctivae normal.      Pupils: Pupils are equal, round, and reactive to light.   Cardiovascular:      Rate and Rhythm: Regular rhythm. Tachycardia present.      Heart sounds: No murmur heard.    No friction rub. No gallop.   Pulmonary:      Effort: No respiratory distress.      Breath sounds: No stridor. No wheezing or rales.   Chest:      Chest wall: No tenderness.   Abdominal:      General: Abdomen is flat. There is no distension.      Palpations: Abdomen is soft.      Tenderness: There is no abdominal tenderness. There is no guarding or rebound.   Musculoskeletal:         General: No swelling or tenderness.      Cervical back: No rigidity or tenderness.      Right lower leg: No edema.      Left lower leg: No edema.   Lymphadenopathy:      Cervical: No cervical adenopathy.   Skin:     General: Skin is warm and dry.      Coloration: Skin is not jaundiced.      Findings: No erythema.   Neurological:      Mental Status: She is alert and oriented to person, place, and time. Mental status is at baseline.      Sensory: No sensory deficit.      Motor: No weakness.      Coordination:  Coordination normal.   Psychiatric:         Mood and Affect: Mood normal.         Behavior: Behavior normal.         Judgment: Judgment normal.           Results Reviewed:  I have personally reviewed current lab, radiology, and data and agree with results.  Lab Results (last 24 hours)     Procedure Component Value Units Date/Time    Blood Gas, Arterial - [[id number]]  (Abnormal) Collected: 11/03/22 2355    Specimen: Arterial Blood Updated: 11/03/22 2354     Site Left Radial     Drew's Test N/A     pH, Arterial 7.091 pH units      Comment: 85 Value below critical limit        pCO2, Arterial 16.4 mm Hg      Comment: 85 Value below critical limit        pO2, Arterial 125.0 mm Hg      Comment: 83 Value above reference range        HCO3, Arterial 5.0 mmol/L      Comment: 84 Value below reference range        Base Excess, Arterial -22.9 mmol/L      Comment: 84 Value below reference range        O2 Saturation, Arterial 98.4 %      Barometric Pressure for Blood Gas 750 mmHg      Modality Room Air     FIO2 <21 %      Ventilator Mode NA     Collected by rrt     Comment: Meter: [id number]     :  [id number]       Comprehensive Metabolic Panel [[id number]]  (Abnormal) Collected: 11/03/22 2119    Specimen: Blood Updated: 11/03/22 2202     Glucose 482 mg/dL      BUN 19 mg/dL      Creatinine 1.44 mg/dL      Sodium 131 mmol/L      Potassium 5.2 mmol/L      Chloride 97 mmol/L      CO2 6.0 mmol/L      Calcium 9.4 mg/dL      Total Protein 7.7 g/dL      Albumin 4.20 g/dL      ALT (SGPT) 9 U/L      AST (SGOT) 14 U/L      Alkaline Phosphatase 75 U/L      Total Bilirubin 0.2 mg/dL      Globulin 3.5 gm/dL      A/G Ratio 1.2 g/dL      BUN/Creatinine Ratio 13.2     Anion Gap 28.0 mmol/L      eGFR 52.5 mL/min/1.73      Comment: National Kidney Foundation and American Society of Nephrology (ASN) Task Force recommended calculation based on the Chronic Kidney Disease Epidemiology Collaboration (CKD-EPI) equation refit without  adjustment for race.       Narrative:      GFR Normal >60  Chronic Kidney Disease <60  Kidney Failure <15      hCG, Quantitative, Pregnancy [[id number]] Collected: 11/03/22 2119    Specimen: Blood Updated: 11/03/22 2149     HCG Quantitative 13.53 mIU/mL     Narrative:      HCG Ranges by Gestational Age    Females - non-pregnant premenopausal   </= 1mIU/mL HCG  Females - postmenopausal               </= 7mIU/mL HCG    3 Weeks         5.8 -    71.2 mIU/mL  4 Weeks         9.5 -     750 mIU/mL  5 Weeks         217 -   7,138 mIU/mL  6 Weeks         158 -  31,795 mIU/mL  7 Weeks       3,697 - 163,563 mIU/mL  8 Weeks      32,065 - 149,571 mIU/mL  9 Weeks      63,803 - 151,410 mIU/mL  10 Weeks     46,509 - 186,977 mIU/mL  12 Weeks     27,832 - 210,612 mIU/mL  14 Weeks     13,950 -  62,530 mIU/mL  15 Weeks     12,039 -  70,971 mIU/mL  16 Weeks      9,040 -  56,451 mIU/mL  17 Weeks      8,175 -  55,868 mIU/mL  18 Weeks      8,099 -  58,176 mIU/mL  Results may be falsely decreased if patient taking Biotin.      Lipase [[id number]]  (Normal) Collected: 11/03/22 2119    Specimen: Blood Updated: 11/03/22 2143     Lipase 13 U/L     CK [[id number]]  (Normal) Collected: 11/03/22 2119    Specimen: Blood Updated: 11/03/22 2143     Creatine Kinase 29 U/L     Urinalysis, Microscopic Only - Urine, Clean Catch [[id number]]  (Abnormal) Collected: 11/03/22 2126    Specimen: Urine, Clean Catch Updated: 11/03/22 2139     RBC, UA 31-50 /HPF      WBC, UA 0-2 /HPF      Comment: Urine culture not indicated.        Bacteria, UA None Seen /HPF      Squamous Epithelial Cells, UA 0-2 /HPF      Hyaline Casts, UA 7-12 /LPF      Methodology Automated Microscopy    Urinalysis With Culture If Indicated - Urine, Clean Catch [[id number]]  (Abnormal) Collected: 11/03/22 2126    Specimen: Urine, Clean Catch Updated: 11/03/22 2137     Color, UA Yellow     Appearance, UA Clear     pH, UA <=5.0     Specific Gravity, UA 1.023     Glucose, UA >=1000 mg/dL (3+)      Ketones, UA >=160 mg/dL (4+)     Bilirubin, UA Negative     Blood, UA Large (3+)     Protein, UA 30 mg/dL (1+)     Leuk Esterase, UA Negative     Nitrite, UA Negative     Urobilinogen, UA 0.2 E.U./dL    Narrative:      In absence of clinical symptoms, the presence of pyuria, bacteria, and/or nitrites on the urinalysis result does not correlate with infection.    CBC & Differential [[id number]]  (Abnormal) Collected: 11/03/22 2119    Specimen: Blood Updated: 11/03/22 2125    Narrative:      The following orders were created for panel order CBC & Differential.  Procedure                               Abnormality         Status                     ---------                               -----------         ------                     CBC Auto Differential[[id number]]        Abnormal            Final result                 Please view results for these tests on the individual orders.    CBC Auto Differential [[id number]]  (Abnormal) Collected: 11/03/22 2119    Specimen: Blood Updated: 11/03/22 2125     WBC 9.01 10*3/mm3      RBC 4.49 10*6/mm3      Hemoglobin 12.5 g/dL      Hematocrit 38.1 %      MCV 84.9 fL      MCH 27.8 pg      MCHC 32.8 g/dL      RDW 13.4 %      RDW-SD 41.5 fl      MPV 10.1 fL      Platelets 294 10*3/mm3      Neutrophil % 83.5 %      Lymphocyte % 9.8 %      Monocyte % 5.0 %      Eosinophil % 0.0 %      Basophil % 0.6 %      Immature Grans % 1.1 %      Neutrophils, Absolute 7.53 10*3/mm3      Lymphocytes, Absolute 0.88 10*3/mm3      Monocytes, Absolute 0.45 10*3/mm3      Eosinophils, Absolute 0.00 10*3/mm3      Basophils, Absolute 0.05 10*3/mm3      Immature Grans, Absolute 0.10 10*3/mm3      nRBC 0.0 /100 WBC         Imaging Results (Last 24 Hours)     Procedure Component Value Units Date/Time    US Ob Transvaginal [[id number]] Collected: 11/03/22 2253     Updated: 11/04/22 0038    Narrative:      EXAM:    US Pregnancy, Transvaginal    CLINICAL HISTORY:    The patient is 22 years old and is Female;  pregnant, vaginal  bleeding    TECHNIQUE:    Real-time transvaginal obstetrical ultrasound of the maternal  pelvis and a first trimester pregnancy with image documentation.   Transvaginal imaging was used for better evaluation of the fetus  and adnexa.    COMPARISON:    Pelvic ultrasound 8/8/2018    FINDINGS:    GESTATION: Intrauterine pregnancy is not visualized.    PLACENTA/AMNIOTIC FLUID:  Cannot be adequately evaluated due to  the early gestational age.    UTERUS/CERVIX:  Retroflexed uterus measures 8.7 x 5.1 x 6.2 cm.   No myometrial mass.    OVARIES:  Right ovary measures 3.2 x 1.6 x 2.9 cm. Vascularity  within the right ovary is not detected.  Left ovary measures 3.9  x 3.9 x 2.4 cm. 0.9 x 0.8 x 0.8 cm complex cyst. Left ovarian 0.6  x 0.5 x 0.5 cm cyst within a cyst. No peripheral vascularity  surrounding cyst within a cyst.    FREE FLUID:  Small fluid in the bilateral adnexa, right greater  than left.    VASCULATURE:  Increased left parametrial vasculature.      Impression:        1. In the setting of a positive pregnancy test, findings  consistent with pregnancy of unknown location.  2. Right ovarian vascularity is not detected. Findings may  secondary to torsion versus technique. Recommend clinical  correlation and gynecology consultation.  3. Left ovarian 0.6 cm cyst within a cyst.     Electronically signed by:  Ira Veloz MD  11/4/2022 12:36 AM  CDT Workstation: [id number]            Assessment:    There are no active hospital problems to display for this patient.    # DKA  # Metabolic acidosis secondary to above DKA  # Insulin-dependent diabetes mellitus type 1  # Vaginal bleeding with  reported pregnancy first trimester with abnormal pelvic ultrasound concerning pregnancy of unknown location and down going HCG concerning abort  # Question of lack of right ovarian vascularity on ultrasound of pelvic right ovarian torsion cannot be excluded  # Nausea vomiting  # Acute renal failure   # Reported  fever   # Sinus tachycardia, reactive        Plan:  Admit to intensive care unit  Obtain further laboratory work-up  Initiate Glucomander protocol  Obtain on high rate IV fluid for initial care  Insulin drip per protocol  Accu-Chek every hour  Follow electrolytes closely.  Glucomander protocol.  Gynecology service Dr. Medina was contacted in ED.  Await for input.  Place on antiemetic, analgesics, PPI  Comorbidities, chronic medical problem will be treated appropriately.  Continue to monitor clinical status.  If patient has any fever consider obtaining blood culture, chest x-ray, and initiate antibiotic pending further evaluation  DVT and GI prophylaxis will be initiated  Please see orders for comprehensive plan    I confirmed that the patient's Advance Care Plan is present, code status is documented, or surrogate decision maker is listed in the patient's medical record.     I have utilized all available immediate resources to obtain, update, or review the patient's current medications.     I discussed the patient's findings and my recommendations with: Patient and she agreed with above plan of care      Saeid Behroozi, MD   11/04/22   01:03 CDT                Electronically signed by Behroozi, Saeid, MD at 11/04/22 0615          Emergency Department Notes      Dolores Bobby RN at 11/03/22 1850        Pt c/o vaginal bleeding, approx 3 weeks pregnant, describes as dark brown blood, at first it started as just being there when she wipes, then while she slept she bled through your undergarments. associated sx of fever, back pain, n/v    Electronically signed by Dolores Bobby RN at 11/03/22 1851     Enrike Hu MD at 11/03/22 2004          Subjective   History of Present Illness  Patient presents emergency department with vaginal bleeding x1 day.  She states that she is currently pregnant with an LMP of 9/30/2022.  Patient is a G2, P1.  She is an insulin-dependent diabetic.  She denies any vaginal discharge  and has not had any prenatal care for this pregnancy yet.  She said fever and back pain with nausea and vomiting x2 days.      Vaginal Bleeding - Pregnant  Quality:  Dark red and spotting  Severity:  Mild  Associated symptoms: back pain, fatigue, fever and nausea    Associated symptoms: no abdominal pain, no dizziness, no dysuria and no vaginal discharge    Back Pain  Associated symptoms: fever and weakness    Associated symptoms: no abdominal pain, no chest pain, no dysuria and no headaches    Fever  Associated symptoms: myalgias, nausea and vomiting    Associated symptoms: no chest pain, no chills, no confusion, no congestion, no cough, no diarrhea, no dysuria, no ear pain, no headaches, no rash, no rhinorrhea and no sore throat        Review of Systems   Constitutional: Positive for activity change, fatigue and fever. Negative for appetite change, chills and diaphoresis.   HENT: Negative for congestion, ear discharge, ear pain, nosebleeds, rhinorrhea, sinus pressure, sore throat and trouble swallowing.    Eyes: Negative for discharge and redness.   Respiratory: Negative for apnea, cough, chest tightness, shortness of breath and wheezing.    Cardiovascular: Negative for chest pain.   Gastrointestinal: Positive for nausea and vomiting. Negative for abdominal pain and diarrhea.   Endocrine: Negative for polyuria.   Genitourinary: Positive for vaginal bleeding. Negative for dysuria, frequency, urgency and vaginal discharge.   Musculoskeletal: Positive for back pain and myalgias. Negative for neck pain.   Skin: Negative for color change and rash.   Allergic/Immunologic: Negative for immunocompromised state.   Neurological: Positive for weakness. Negative for dizziness, seizures, syncope, light-headedness and headaches.   Hematological: Negative for adenopathy. Does not bruise/bleed easily.   Psychiatric/Behavioral: Negative for behavioral problems and confusion.   All other systems reviewed and are negative.      Past  "Medical History:   Diagnosis Date   • ADHD    • Bipolar 1 disorder (HCC)    • Borderline personality disorder (HCC)    • Cannabinoid hyperemesis syndrome    • Diabetes mellitus type 1 (HCC)    • Diabetic gastroparesis (HCC)    • Diabetic ketoacidosis (HCC)    • Diabetic neuropathy (HCC)    • History of transfusion     Pt reports \"no reactions.\"   • Post traumatic stress disorder (PTSD)    • Recurrent UTI    • Seizure disorder (HCC)    • Severe depressed bipolar II disorder without psychotic features (HCC)    • Uncontrolled diabetes mellitus        Allergies   Allergen Reactions   • Pineapple Anaphylaxis   • Benzoyl Peroxide Swelling       Past Surgical History:   Procedure Laterality Date   •  SECTION N/A 2018   • COLONOSCOPY N/A 10/12/2022    Procedure: COLONOSCOPY;  Surgeon: Timur Sun MD;  Location: [facility] ENDOSCOPY;  Service: Gastroenterology;  Laterality: N/A;   • CYSTOSCOPY N/A 2020    Procedure: CYSTOSCOPY FLEXIBLE       (DONE ON STRETCHER);  Surgeon: Antonio Cowan MD;  Location: [facility] OR;  Service: Urology;  Laterality: N/A;   • ENDOSCOPY N/A 10/12/2022    Procedure: ESOPHAGOGASTRODUODENOSCOPY;  Surgeon: Timur Sun MD;  Location: [facility] ENDOSCOPY;  Service: Gastroenterology;  Laterality: N/A;       Family History   Problem Relation Age of Onset   • Drug abuse Father    • Suicide Attempts Brother    • Dementia Maternal Grandfather    • Hypertension Paternal Grandmother        Social History     Socioeconomic History   • Marital status: Single   Tobacco Use   • Smoking status: Every Day     Packs/day: 1.00     Years: 2.00     Pack years: 2.00     Types: Cigarettes   • Smokeless tobacco: Never   Vaping Use   • Vaping Use: Former   Substance and Sexual Activity   • Alcohol use: Yes     Comment: none in 2 months   • Drug use: Not Currently     Frequency: 1.0 times per week     Types: Marijuana     Comment: none in a month   • Sexual activity: Defer     Partners: Male     " Birth control/protection: None           Objective   Physical Exam  Vitals and nursing note reviewed.   Constitutional:       Appearance: She is well-developed.   HENT:      Head: Normocephalic and atraumatic.      Nose: Nose normal.   Eyes:      General: No scleral icterus.        Right eye: No discharge.         Left eye: No discharge.      Conjunctiva/sclera: Conjunctivae normal.      Pupils: Pupils are equal, round, and reactive to light.   Neck:      Trachea: No tracheal deviation.   Cardiovascular:      Rate and Rhythm: Normal rate and regular rhythm.      Heart sounds: Normal heart sounds. No murmur heard.  Pulmonary:      Effort: Pulmonary effort is normal. No respiratory distress.      Breath sounds: Normal breath sounds. No stridor. No wheezing or rales.   Abdominal:      General: Bowel sounds are normal. There is no distension.      Palpations: Abdomen is soft. There is no mass.      Tenderness: There is abdominal tenderness (mild) in the epigastric area. There is right CVA tenderness and left CVA tenderness. There is no guarding or rebound.   Musculoskeletal:      Cervical back: Normal range of motion and neck supple.   Skin:     General: Skin is warm and dry.      Findings: No erythema or rash.   Neurological:      Mental Status: She is alert and oriented to person, place, and time.      Coordination: Coordination normal.   Psychiatric:         Behavior: Behavior normal.         Thought Content: Thought content normal.         Procedures          ED Course              Labs Reviewed   URINALYSIS W/ CULTURE IF INDICATED - Abnormal; Notable for the following components:       Result Value    Glucose, UA >=1000 mg/dL (3+) (*)     Ketones, UA >=160 mg/dL (4+) (*)     Blood, UA Large (3+) (*)     Protein, UA 30 mg/dL (1+) (*)     All other components within normal limits    Narrative:     In absence of clinical symptoms, the presence of pyuria, bacteria, and/or nitrites on the urinalysis result does not  correlate with infection.   COMPREHENSIVE METABOLIC PANEL - Abnormal; Notable for the following components:    Glucose 482 (*)     Creatinine 1.44 (*)     Sodium 131 (*)     Chloride 97 (*)     CO2 6.0 (*)     Anion Gap 28.0 (*)     eGFR 52.5 (*)     All other components within normal limits    Narrative:     GFR Normal >60  Chronic Kidney Disease <60  Kidney Failure <15     CBC WITH AUTO DIFFERENTIAL - Abnormal; Notable for the following components:    Neutrophil % 83.5 (*)     Lymphocyte % 9.8 (*)     Eosinophil % 0.0 (*)     Immature Grans % 1.1 (*)     Neutrophils, Absolute 7.53 (*)     Immature Grans, Absolute 0.10 (*)     All other components within normal limits   URINALYSIS, MICROSCOPIC ONLY - Abnormal; Notable for the following components:    RBC, UA 31-50 (*)     All other components within normal limits   BLOOD GAS, ARTERIAL - Abnormal; Notable for the following components:    pH, Arterial 7.091 (*)     pCO2, Arterial 16.4 (*)     pO2, Arterial 125.0 (*)     HCO3, Arterial 5.0 (*)     Base Excess, Arterial -22.9 (*)     All other components within normal limits   LIPASE - Normal   CK - Normal   HCG, QUANTITATIVE, PREGNANCY    Narrative:     HCG Ranges by Gestational Age    Females - non-pregnant premenopausal   </= 1mIU/mL HCG  Females - postmenopausal               </= 7mIU/mL HCG    3 Weeks         5.8 -    71.2 mIU/mL  4 Weeks         9.5 -     750 mIU/mL  5 Weeks         217 -   7,138 mIU/mL  6 Weeks         158 -  31,795 mIU/mL  7 Weeks       3,697 - 163,563 mIU/mL  8 Weeks      32,065 - 149,571 mIU/mL  9 Weeks      63,803 - 151,410 mIU/mL  10 Weeks     46,509 - 186,977 mIU/mL  12 Weeks     27,832 - 210,612 mIU/mL  14 Weeks     13,950 -  62,530 mIU/mL  15 Weeks     12,039 -  70,971 mIU/mL  16 Weeks      9,040 -  56,451 mIU/mL  17 Weeks      8,175 -  55,868 mIU/mL  18 Weeks      8,099 -  58,176 mIU/mL  Results may be falsely decreased if patient taking Biotin.     BLOOD GAS, ARTERIAL   CBC AND  DIFFERENTIAL    Narrative:     The following orders were created for panel order CBC & Differential.  Procedure                               Abnormality         Status                     ---------                               -----------         ------                     CBC Auto Differential[[id number]]        Abnormal            Final result                 Please view results for these tests on the individual orders.       US Ob Transvaginal   Final Result   Addendum (preliminary) 1 of 1    ADDENDUM    ADDENDUM #1          Impression 1-3 discussed with Dr. Hu by Dr. Baker at   12:37 AM CDT on 11/4/2022.      Electronically signed by:  Ira Veloz MD  11/4/2022 1:03 AM   CDT Workstation: [id number]         Final      1. In the setting of a positive pregnancy test, findings   consistent with pregnancy of unknown location.   2. Right ovarian vascularity is not detected. Findings may   secondary to torsion versus technique. Recommend clinical   correlation and gynecology consultation.   3. Left ovarian 0.6 cm cyst within a cyst.       Electronically signed by:  Ira Veloz MD  11/4/2022 12:36 AM   CDT Workstation: [id number]                                            [facility]    Final diagnoses:   Diabetic ketoacidosis without coma associated with type 1 diabetes mellitus (HCC)   Vaginal bleeding   Miscarriage       ED Disposition  ED Disposition     ED Disposition   Decision to Admit    Condition   --    Comment   Level of Care: Stepdown [25]   Diagnosis: DKA (diabetic ketoacidosis) ([facility]) [311742]   Admitting Physician: BEHROOZI, SAEID [[id number]]   Attending Physician: BEHROOZI, SAEID [[id number]]   Certification: I Certify That Inpatient Hospital Services Are Medically Necessary For Greater Than 2 Midnights               No follow-up provider specified.       Medication List      No changes were made to your prescriptions during this visit.          Enrike Hu MD  11/04/22  "0137      Electronically signed by Enrike Hu MD at 11/04/22 0137     Dolores Bobby, RN at 11/03/22 2308        Assumed care at this time, report from Patricia AGUIRRE    Electronically signed by Dolores Bobby RN at 11/03/22 2309     Dolores Bobby, RN at 11/04/22 0121          Nursing report ED to floor  Fernanda Patterson  22 y.o.  female    HPI:   Chief Complaint   Patient presents with   • Vaginal Bleeding - Pregnant   • Back Pain   • Fever       Admitting doctor:   Saeid Behroozi, MD    Consulting provider(s):  Consults       No orders found for last 30 day(s).             Admitting diagnosis:   There were no encounter diagnoses.    Code status:   Current Code Status       Date Active Code Status Order ID Comments User Context       Prior            Allergies:   Pineapple and Benzoyl peroxide    Intake and Output    Intake/Output Summary (Last 24 hours) at 11/4/2022 0122  Last data filed at 11/4/2022 0054  Gross per 24 hour   Intake 1235.42 ml   Output 600 ml   Net 635.42 ml       Weight:       11/03/22 1846   Weight: 59 kg (130 lb)       Most recent vitals:   Vitals:    11/03/22 1846 11/03/22 2117 11/03/22 2258 11/04/22 0020   BP: 125/75 129/69 152/70 103/64   BP Location:  Left arm  Right arm   Patient Position:  Lying  Lying   Pulse: 112 107 114 (!) 125   Resp: 18 20 18 20   Temp: 98.5 °F (36.9 °C)      TempSrc: Oral      SpO2: 100% 100% 100% 100%   Weight: 59 kg (130 lb)      Height: 170.2 cm (67\")        Oxygen Therapy: ROOM AIR    Active LDAs/IV Access:   Lines, Drains & Airways       Active LDAs       Name Placement date Placement time Site Days    Peripheral IV 11/03/22 2119 Anterior;Right Hand 11/03/22 2119  Hand  less than 1    Peripheral IV 11/04/22 0039 Right Antecubital 11/04/22 0039  Antecubital  less than 1                    Labs (abnormal labs have a star):   Labs Reviewed   URINALYSIS W/ CULTURE IF INDICATED - Abnormal; Notable for the following components:       Result Value    " Glucose, UA >=1000 mg/dL (3+) (*)     Ketones, UA >=160 mg/dL (4+) (*)     Blood, UA Large (3+) (*)     Protein, UA 30 mg/dL (1+) (*)     All other components within normal limits    Narrative:     In absence of clinical symptoms, the presence of pyuria, bacteria, and/or nitrites on the urinalysis result does not correlate with infection.   COMPREHENSIVE METABOLIC PANEL - Abnormal; Notable for the following components:    Glucose 482 (*)     Creatinine 1.44 (*)     Sodium 131 (*)     Chloride 97 (*)     CO2 6.0 (*)     Anion Gap 28.0 (*)     eGFR 52.5 (*)     All other components within normal limits    Narrative:     GFR Normal >60  Chronic Kidney Disease <60  Kidney Failure <15     CBC WITH AUTO DIFFERENTIAL - Abnormal; Notable for the following components:    Neutrophil % 83.5 (*)     Lymphocyte % 9.8 (*)     Eosinophil % 0.0 (*)     Immature Grans % 1.1 (*)     Neutrophils, Absolute 7.53 (*)     Immature Grans, Absolute 0.10 (*)     All other components within normal limits   URINALYSIS, MICROSCOPIC ONLY - Abnormal; Notable for the following components:    RBC, UA 31-50 (*)     All other components within normal limits   BLOOD GAS, ARTERIAL - Abnormal; Notable for the following components:    pH, Arterial 7.091 (*)     pCO2, Arterial 16.4 (*)     pO2, Arterial 125.0 (*)     HCO3, Arterial 5.0 (*)     Base Excess, Arterial -22.9 (*)     All other components within normal limits   LIPASE - Normal   CK - Normal   HCG, QUANTITATIVE, PREGNANCY    Narrative:     HCG Ranges by Gestational Age    Females - non-pregnant premenopausal   </= 1mIU/mL HCG  Females - postmenopausal               </= 7mIU/mL HCG    3 Weeks         5.8 -    71.2 mIU/mL  4 Weeks         9.5 -     750 mIU/mL  5 Weeks         217 -   7,138 mIU/mL  6 Weeks         158 -  31,795 mIU/mL  7 Weeks       3,697 - 163,563 mIU/mL  8 Weeks      32,065 - 149,571 mIU/mL  9 Weeks      63,803 - 151,410 mIU/mL  10 Weeks     46,509 - 186,977 mIU/mL  12 Weeks      27,832 - 210,612 mIU/mL  14 Weeks     13,950 -  62,530 mIU/mL  15 Weeks     12,039 -  70,971 mIU/mL  16 Weeks      9,040 -  56,451 mIU/mL  17 Weeks      8,175 -  55,868 mIU/mL  18 Weeks      8,099 -  58,176 mIU/mL  Results may be falsely decreased if patient taking Biotin.     BLOOD GAS, ARTERIAL   CBC AND DIFFERENTIAL    Narrative:     The following orders were created for panel order CBC & Differential.  Procedure                               Abnormality         Status                     ---------                               -----------         ------                     CBC Auto Differential[[id number]]        Abnormal            Final result                 Please view results for these tests on the individual orders.       Meds given in ED:   Medications   sodium chloride 0.9 % infusion (0 mL/hr Intravenous Stopped 11/3/22 2315)   sodium chloride 0.9 % bolus 1,000 mL (has no administration in time range)   acetaminophen (TYLENOL) tablet 650 mg (has no administration in time range)   ondansetron (ZOFRAN) injection 4 mg (has no administration in time range)   pantoprazole (PROTONIX) injection 40 mg (has no administration in time range)   sodium chloride 0.9 % bolus 1,000 mL ( Intravenous Currently Infusing 11/4/22 0054)   sodium chloride 0.9 % bolus 1,000 mL (0 mL Intravenous Stopped 11/4/22 0054)   ondansetron (ZOFRAN) injection 4 mg (4 mg Intravenous Given 11/3/22 2242)   insulin regular (humuLIN R,novoLIN R) injection 6 Units (6 Units Intravenous Given 11/4/22 0024)     sodium chloride, 250 mL/hr, Last Rate: Stopped (11/03/22 2315)         NIH Stroke Scale:       Isolation/Infection(s):  No active isolations   No active infections     COVID Testing  Collected N/A  Resulted N/A    Nursing report ED to floor:  Mental status: ALERT AND ORIENTED X4  Ambulatory status: AD CHARLENE  Precautions: NONE    ED nurse phone extentsion [phone]    Electronically signed by Dolores Bobby RN at 11/04/22 0122     Chavo Maradiaga  RN at 11/04/22 0303        Fs glucose 350 results    Electronically signed by Chavo Maradiaga RN at 11/04/22 0304     Chavo Maradiaga RN at 11/04/22 0426        Fs glucose 387 results      Electronically signed by Chavo Maradiaga RN at 11/04/22 0426     Chavo Maradiaga RN at 11/04/22 0605        FS glucose 342 results    Electronically signed by Chavo Maradiaga RN at 11/04/22 0605     Fernanda Mohr at 11/04/22 0935        FSBS 236. RN notified.     Electronically signed by Fernanda Mohr at 11/04/22 0935         Lab Results (last 24 hours)     Procedure Component Value Units Date/Time    POC Glucose Once [[id number]]  (Abnormal) Collected: 11/04/22 0932    Specimen: Blood Updated: 11/04/22 0945     Glucose 236 mg/dL      Comment: RN NotifiedOperator: [id number] RUSS CALLIEMeter ID: [id number]       POC Glucose Once [[id number]]  (Abnormal) Collected: 11/04/22 0825    Specimen: Blood Updated: 11/04/22 0846     Glucose 307 mg/dL      Comment: : [id number] SHAHANA MARYMeter ID: [id number]       POC Glucose Once [[id number]]  (Abnormal) Collected: 11/04/22 0703    Specimen: Blood Updated: 11/04/22 0716     Glucose 352 mg/dL      Comment: : [id number] CYDNEY BERRIOSINMeter ID: [id number]       Hemoglobin A1c [[id number]]  (Abnormal) Collected: 11/04/22 0426    Specimen: Blood Updated: 11/04/22 0710     Hemoglobin A1C 9.40 %     Narrative:      Hemoglobin A1C Ranges:    Increased Risk for Diabetes  5.7% to 6.4%  Diabetes                     >= 6.5%  Diabetic Goal                < 7.0%    TSH [[id number]]  (Normal) Collected: 11/04/22 0426    Specimen: Blood Updated: 11/04/22 0658     TSH 0.625 uIU/mL     POC Glucose Once [[id number]]  (Abnormal) Collected: 11/04/22 0604    Specimen: Blood Updated: 11/04/22 0620     Glucose 342 mg/dL      Comment: : [id number] CYDNEY ROBINMeter ID: [id number]       POC Glucose Once [[id number]]  (Abnormal) Collected: 11/04/22 0425    Specimen: Blood  Updated: 11/04/22 0620     Glucose 387 mg/dL      Comment: RN NotifiedOperator: [id number] CYDNEY ROBINMeter ID: [id number]       CBC (No Diff) [[id number]]  (Abnormal) Collected: 11/04/22 0426    Specimen: Blood Updated: 11/04/22 0511     WBC 9.62 10*3/mm3      RBC 3.91 10*6/mm3      Hemoglobin 10.9 g/dL      Hematocrit 33.9 %      MCV 86.7 fL      MCH 27.9 pg      MCHC 32.2 g/dL      RDW 13.5 %      RDW-SD 41.9 fl      MPV 10.6 fL      Platelets 262 10*3/mm3     Basic Metabolic Panel [[id number]]  (Abnormal) Collected: 11/04/22 0426    Specimen: Blood Updated: 11/04/22 0458     Glucose 414 mg/dL      BUN 20 mg/dL      Creatinine 1.28 mg/dL      Sodium 129 mmol/L      Potassium 5.4 mmol/L      Chloride 99 mmol/L      CO2 3.0 mmol/L      Calcium 8.3 mg/dL      BUN/Creatinine Ratio 15.6     Anion Gap 27.0 mmol/L      eGFR 60.5 mL/min/1.73      Comment: National Kidney Foundation and American Society of Nephrology (ASN) Task Force recommended calculation based on the Chronic Kidney Disease Epidemiology Collaboration (CKD-EPI) equation refit without adjustment for race.       Narrative:      GFR Normal >60  Chronic Kidney Disease <60  Kidney Failure <15      Phosphorus [[id number]]  (Normal) Collected: 11/04/22 0426    Specimen: Blood Updated: 11/04/22 0456     Phosphorus 4.3 mg/dL     Magnesium [[id number]]  (Abnormal) Collected: 11/04/22 0426    Specimen: Blood Updated: 11/04/22 0456     Magnesium 1.5 mg/dL     Osmolality, Serum [[id number]]  (Abnormal) Collected: 11/03/22 2119    Specimen: Blood Updated: 11/04/22 0405     Osmolality 307 mOsm/kg     POC Glucose Once [[id number]]  (Abnormal) Collected: 11/04/22 0302    Specimen: Blood Updated: 11/04/22 0314     Glucose 350 mg/dL      Comment: RN NotifiedOperator: [id number] CYDNEY ROBINMeter ID: [id number]       Phosphorus [[id number]]  (Normal) Collected: 11/03/22 2119    Specimen: Blood Updated: 11/04/22 0257     Phosphorus 4.5 mg/dL     Magnesium [[id number]]   (Abnormal) Collected: 11/03/22 2119    Specimen: Blood Updated: 11/04/22 0257     Magnesium 1.5 mg/dL     Blood Gas, Arterial - [[id number]]  (Abnormal) Collected: 11/03/22 2355    Specimen: Arterial Blood Updated: 11/03/22 2354     Site Left Radial     Drew's Test N/A     pH, Arterial 7.091 pH units      Comment: 85 Value below critical limit        pCO2, Arterial 16.4 mm Hg      Comment: 85 Value below critical limit        pO2, Arterial 125.0 mm Hg      Comment: 83 Value above reference range        HCO3, Arterial 5.0 mmol/L      Comment: 84 Value below reference range        Base Excess, Arterial -22.9 mmol/L      Comment: 84 Value below reference range        O2 Saturation, Arterial 98.4 %      Barometric Pressure for Blood Gas 750 mmHg      Modality Room Air     FIO2 <21 %      Ventilator Mode NA     Collected by rrt     Comment: Meter: [id number]     :  [id number]       Comprehensive Metabolic Panel [[id number]]  (Abnormal) Collected: 11/03/22 2119    Specimen: Blood Updated: 11/03/22 2202     Glucose 482 mg/dL      BUN 19 mg/dL      Creatinine 1.44 mg/dL      Sodium 131 mmol/L      Potassium 5.2 mmol/L      Chloride 97 mmol/L      CO2 6.0 mmol/L      Calcium 9.4 mg/dL      Total Protein 7.7 g/dL      Albumin 4.20 g/dL      ALT (SGPT) 9 U/L      AST (SGOT) 14 U/L      Alkaline Phosphatase 75 U/L      Total Bilirubin 0.2 mg/dL      Globulin 3.5 gm/dL      A/G Ratio 1.2 g/dL      BUN/Creatinine Ratio 13.2     Anion Gap 28.0 mmol/L      eGFR 52.5 mL/min/1.73      Comment: National Kidney Foundation and American Society of Nephrology (ASN) Task Force recommended calculation based on the Chronic Kidney Disease Epidemiology Collaboration (CKD-EPI) equation refit without adjustment for race.       Narrative:      GFR Normal >60  Chronic Kidney Disease <60  Kidney Failure <15      hCG, Quantitative, Pregnancy [[id number]] Collected: 11/03/22 2119    Specimen: Blood Updated: 11/03/22 2149     HCG  Quantitative 13.53 mIU/mL     Narrative:      HCG Ranges by Gestational Age    Females - non-pregnant premenopausal   </= 1mIU/mL HCG  Females - postmenopausal               </= 7mIU/mL HCG    3 Weeks         5.8 -    71.2 mIU/mL  4 Weeks         9.5 -     750 mIU/mL  5 Weeks         217 -   7,138 mIU/mL  6 Weeks         158 -  31,795 mIU/mL  7 Weeks       3,697 - 163,563 mIU/mL  8 Weeks      32,065 - 149,571 mIU/mL  9 Weeks      63,803 - 151,410 mIU/mL  10 Weeks     46,509 - 186,977 mIU/mL  12 Weeks     27,832 - 210,612 mIU/mL  14 Weeks     13,950 -  62,530 mIU/mL  15 Weeks     12,039 -  70,971 mIU/mL  16 Weeks      9,040 -  56,451 mIU/mL  17 Weeks      8,175 -  55,868 mIU/mL  18 Weeks      8,099 -  58,176 mIU/mL  Results may be falsely decreased if patient taking Biotin.      Lipase [[id number]]  (Normal) Collected: 11/03/22 2119    Specimen: Blood Updated: 11/03/22 2143     Lipase 13 U/L     CK [[id number]]  (Normal) Collected: 11/03/22 2119    Specimen: Blood Updated: 11/03/22 2143     Creatine Kinase 29 U/L     Urinalysis, Microscopic Only - Urine, Clean Catch [[id number]]  (Abnormal) Collected: 11/03/22 2126    Specimen: Urine, Clean Catch Updated: 11/03/22 2139     RBC, UA 31-50 /HPF      WBC, UA 0-2 /HPF      Comment: Urine culture not indicated.        Bacteria, UA None Seen /HPF      Squamous Epithelial Cells, UA 0-2 /HPF      Hyaline Casts, UA 7-12 /LPF      Methodology Automated Microscopy    Urinalysis With Culture If Indicated - Urine, Clean Catch [[id number]]  (Abnormal) Collected: 11/03/22 2126    Specimen: Urine, Clean Catch Updated: 11/03/22 2137     Color, UA Yellow     Appearance, UA Clear     pH, UA <=5.0     Specific Gravity, UA 1.023     Glucose, UA >=1000 mg/dL (3+)     Ketones, UA >=160 mg/dL (4+)     Bilirubin, UA Negative     Blood, UA Large (3+)     Protein, UA 30 mg/dL (1+)     Leuk Esterase, UA Negative     Nitrite, UA Negative     Urobilinogen, UA 0.2 E.U./dL    Narrative:      In absence  of clinical symptoms, the presence of pyuria, bacteria, and/or nitrites on the urinalysis result does not correlate with infection.    CBC & Differential [[id number]]  (Abnormal) Collected: 11/03/22 2119    Specimen: Blood Updated: 11/03/22 2125    Narrative:      The following orders were created for panel order CBC & Differential.  Procedure                               Abnormality         Status                     ---------                               -----------         ------                     CBC Auto Differential[[id number]]        Abnormal            Final result                 Please view results for these tests on the individual orders.    CBC Auto Differential [[id number]]  (Abnormal) Collected: 11/03/22 2119    Specimen: Blood Updated: 11/03/22 2125     WBC 9.01 10*3/mm3      RBC 4.49 10*6/mm3      Hemoglobin 12.5 g/dL      Hematocrit 38.1 %      MCV 84.9 fL      MCH 27.8 pg      MCHC 32.8 g/dL      RDW 13.4 %      RDW-SD 41.5 fl      MPV 10.1 fL      Platelets 294 10*3/mm3      Neutrophil % 83.5 %      Lymphocyte % 9.8 %      Monocyte % 5.0 %      Eosinophil % 0.0 %      Basophil % 0.6 %      Immature Grans % 1.1 %      Neutrophils, Absolute 7.53 10*3/mm3      Lymphocytes, Absolute 0.88 10*3/mm3      Monocytes, Absolute 0.45 10*3/mm3      Eosinophils, Absolute 0.00 10*3/mm3      Basophils, Absolute 0.05 10*3/mm3      Immature Grans, Absolute 0.10 10*3/mm3      nRBC 0.0 /100 WBC         Imaging Results (Last 24 Hours)     Procedure Component Value Units Date/Time    US Ob Transvaginal [[id number]] Collected: 11/03/22 2253     Updated: 11/04/22 0105    Addenda:         ADDENDUM   ADDENDUM #1       Impression 1-3 discussed with Dr. Hu by Dr. Baker at  12:37 AM CDT on 11/4/2022.    Electronically signed by:  Ira Veloz MD  11/4/2022 1:03 AM  CDT Workstation: [id number]    Signed: 11/04/22 0103 by Ira Baker MD    Narrative:      EXAM:    US Pregnancy, Transvaginal    CLINICAL  HISTORY:    The patient is 22 years old and is Female; pregnant, vaginal  bleeding    TECHNIQUE:    Real-time transvaginal obstetrical ultrasound of the maternal  pelvis and a first trimester pregnancy with image documentation.   Transvaginal imaging was used for better evaluation of the fetus  and adnexa.    COMPARISON:    Pelvic ultrasound 8/8/2018    FINDINGS:    GESTATION: Intrauterine pregnancy is not visualized.    PLACENTA/AMNIOTIC FLUID:  Cannot be adequately evaluated due to  the early gestational age.    UTERUS/CERVIX:  Retroflexed uterus measures 8.7 x 5.1 x 6.2 cm.   No myometrial mass.    OVARIES:  Right ovary measures 3.2 x 1.6 x 2.9 cm. Vascularity  within the right ovary is not detected.  Left ovary measures 3.9  x 3.9 x 2.4 cm. 0.9 x 0.8 x 0.8 cm complex cyst. Left ovarian 0.6  x 0.5 x 0.5 cm cyst within a cyst. No peripheral vascularity  surrounding cyst within a cyst.    FREE FLUID:  Small fluid in the bilateral adnexa, right greater  than left.    VASCULATURE:  Increased left parametrial vasculature.      Impression:        1. In the setting of a positive pregnancy test, findings  consistent with pregnancy of unknown location.  2. Right ovarian vascularity is not detected. Findings may  secondary to torsion versus technique. Recommend clinical  correlation and gynecology consultation.  3. Left ovarian 0.6 cm cyst within a cyst.     Electronically signed by:  Ira Veloz MD  11/4/2022 12:36 AM  CDT Workstation: [id number]        ECG/EMG Results (last 24 hours)     ** No results found for the last 24 hours. **        Physician Progress Notes (last 24 hours)  Notes from 11/03/22 1048 through 11/04/22 1048   No notes of this type exist for this encounter.         Medical Student Notes (last 24 hours)  Notes from 11/03/22 1048 through 11/04/22 1048   No notes of this type exist for this encounter.         Consult Notes (last 24 hours)  Notes from 11/03/22 1048 through 11/04/22 1048   No notes of  this type exist for this encounter.

## 2022-11-04 NOTE — H&P
[facility] Medicine Admission      Date of Admission: 11/3/2022      Primary Care Physician: Rufus Marshall APRN      Chief Complaint: Nausea vomiting body ache vaginal bleeding    HPI:  Patient is a 22-year-old female with diabetes mellitus type 1 since age 6, on insulin outpatient, history of multiple DKA's, reported being pregnant, with last menstrual bleeding was 9/30/2022, G2, P1, who presented to ER with complaint of vaginal bleeding for 1 day and fever for 2 days with back pain and nausea and vomiting.  Patient underwent pelvic ultrasound in ED which was abnormal.  Gynecologist Dr. Medina was consulted.  Patient was diagnosed with DKA.  Hospitalist service was called for admission of the patient.    Patient was seen and examined in ED room 18.  Her boyfriend at bedside.  Patient reports yesterday she started having vaginal bleeding.  She reports generalized body ache minimal abdominal pain recurrent nausea and vomiting nonbilious nonbloody..  She reports she had subjective fever for 1 to 2 days.  She was afebrile in ED.  She reports mild abdominal pain which she reports is related to her DKA and repeated nausea and vomiting.  She had some nonspecific back pain.  She denies any fall injury trauma headache sore throat chest pain shortness of breath UTI URI-like symptoms, constipation diarrhea  in particular.  She denies any vaginal discharge.  She reports whenever she has DKA she gets body ache and abdominal pain.  She reports compliance with intake of her insulin regimen.    Concurrent Medical History:  has a past medical history of ADHD, Bipolar 1 disorder (HCC), Borderline personality disorder (HCC), Cannabinoid hyperemesis syndrome, Diabetes mellitus type 1 (HCC), Diabetic gastroparesis (HCC), Diabetic ketoacidosis (HCC), Diabetic neuropathy (HCC), History of transfusion, Post traumatic stress disorder (PTSD), Recurrent UTI, Seizure disorder ([facility]), Severe  depressed bipolar II disorder without psychotic features (HCC), and Uncontrolled diabetes mellitus.    Past Surgical History:  has a past surgical history that includes  section (N/A, 2018); Cystoscopy (N/A, 2020); Esophagogastroduodenoscopy (N/A, 10/12/2022); and Colonoscopy (N/A, 10/12/2022).    Family History: family history includes Dementia in her maternal grandfather; Drug abuse in her father; Hypertension in her paternal grandmother; Suicide Attempts in her brother.     Social History:  reports that she has been smoking cigarettes. She has a 2.00 pack-year smoking history. She has never used smokeless tobacco. She reports current alcohol use. She reports that she does not currently use drugs after having used the following drugs: Marijuana. Frequency: 1.00 time per week.    Allergies:   Allergies   Allergen Reactions   • Pineapple Anaphylaxis   • Benzoyl Peroxide Swelling       Medications:   Prior to Admission medications    Medication Sig Start Date End Date Taking? Authorizing Provider   Acetone, Urine, Test (Ketone Test) strip USE AS INDICATED 22   Abigail Castro MD   Alcohol Swabs (Alcohol Wipes) 70 % pads Use 4 x daily 22   Tavon Avila MD   B-D ULTRA-FINE 33 LANCETS misc Use 4 x daily , any brand covered by insurance 22   Tavon Avila MD   Blood Glucose Monitoring Suppl w/Device kit USE AS INDICATED, ANY MONITOR , ICD10 code is E11.9 22   Tavon Avila MD   clonazePAM (KlonoPIN) 0.5 MG tablet Take 2 tablets by mouth 3 (Three) Times a Day As Needed for Anxiety. 8/15/22   Tavon Avila MD   Continuous Blood Gluc  (Dexcom G6 ) device USE AS DIRECTED FOR CONTINUOUS GLUCOSE MONITORING. 22   Abigail Castro MD   Continuous Blood Gluc Sensor (Dexcom G6 Sensor) Use as directed for continuous glucose monitoring **Change sensor Every 10 (Ten) Days** 22   Tavon Avila MD    Continuous Blood Gluc Sensor (Dexcom G6 Sensor) As Needed (glucose control). Every 10 days,  Use as directed for continuous glucose monitoring 8/16/22   Tavon Avila MD   Continuous Blood Gluc Transmit (Dexcom G6 Transmitter) misc Use as directed for continuous glucose monitoring **Change transmitter Every 3 (Three) Months** 8/16/22   Tavon Avila MD   Glucagon (Baqsimi One Pack) 3 MG/DOSE powder 1 each into the nostril(s) as directed by provider As Needed (Hypoglycemia). Apply intranasal if hypoglycemia 6/27/22   Tavon Avila MD   Glucose Blood (BLOOD GLUCOSE TEST) strip Use 4 x daily use any brand covered by insurance or same brand as before ICD10 code is E11.9 10/31/19   Tavon Avila MD   ibuprofen (ADVIL,MOTRIN) 600 MG tablet TAKE 1 TABLET BY MOUTH EVERY 6 HOURS AS NEEDED FOR PAIN FOR UP TO 10 DAYS 9/2/22   ProviderAbigail MD   insulin aspart (NovoLOG FlexPen) 100 UNIT/ML solution pen-injector sc pen 10 units tid 6/27/22   Tavon Avila MD   Insulin Aspart (novoLOG) 100 UNIT/ML injection 100 units daily through pump 6/27/22   Tavon Avila MD   insulin detemir (Levemir FlexTouch) 100 UNIT/ML injection 22 units daily 6/27/22   Tavon Avila MD   Insulin Disposable Pump (Omnipod 5 G6 Pod, Gen 5,) misc Change pods Every Other Day. 8/16/22   Tavon Avila MD   Insulin Pen Needle (Pen Needles) 32G X 4 MM misc 1 each 4 (Four) Times a Day. Use 4 x daily, Dx code E11.65 6/27/22   Tavon Avila MD   Lancet Devices (Lancing Device) misc USE AS INDICATED TO CORRELATE WITH STRIPS AND METER 6/27/22   Tavon Avila MD   LORazepam (ATIVAN) 0.5 MG tablet Take 0.5 mg by mouth. 8/24/22   ProviderAbigail MD   metoclopramide (REGLAN) 10 MG tablet Take up to 2 tabs po TID 6/27/22   Tavon Avila MD   OLANZapine (zyPREXA) 10 MG tablet Take 1 tablet by mouth.    Provider, MD Abigail    omeprazole (priLOSEC) 40 MG capsule Take 1 capsule by mouth Daily. 9/14/22   Timur Sun MD   promethazine (PHENERGAN) 12.5 MG tablet Take 1 tablet by mouth Every 6 (Six) Hours As Needed for Nausea or Vomiting. 6/27/22   Tavon Avila MD   sucralfate (Carafate) 1 g tablet Take 1 tablet by mouth 4 (Four) Times a Day for 30 days. 10/19/22 11/18/22  Timur Sun MD   Urine Glucose-Ketones Test strip Use as indicated 6/27/22   Tavon Avila MD       Review of Systems:  Review of Systems   Constitutional: Positive for fever. Negative for chills and diaphoresis.   HENT: Negative for congestion, dental problem, ear pain, facial swelling, rhinorrhea and sinus pressure.    Eyes: Positive for visual disturbance. Negative for photophobia, discharge, redness and itching.   Respiratory: Negative for apnea, cough, choking, chest tightness, shortness of breath, wheezing and stridor.    Cardiovascular: Negative for chest pain, palpitations and leg swelling.   Gastrointestinal: Positive for abdominal pain, nausea and vomiting. Negative for abdominal distention, anal bleeding, blood in stool, diarrhea and rectal pain.   Endocrine: Negative for cold intolerance, heat intolerance, polydipsia, polyphagia and polyuria.   Genitourinary: Negative for difficulty urinating, flank pain, frequency, hematuria and urgency.   Musculoskeletal: Positive for back pain. Negative for arthralgias, joint swelling and myalgias.   Skin: Negative for pallor, rash and wound.   Allergic/Immunologic: Negative for environmental allergies and immunocompromised state.   Neurological: Negative for dizziness, tremors, seizures, facial asymmetry, speech difficulty, weakness, light-headedness, numbness and headaches.   Hematological: Negative for adenopathy. Does not bruise/bleed easily.   Psychiatric/Behavioral: Negative for agitation, behavioral problems and hallucinations. The patient is not nervous/anxious.       Otherwise  complete ROS is negative except as mentioned above.    Physical Exam:   Temp:  [98.5 °F (36.9 °C)] 98.5 °F (36.9 °C)  Heart Rate:  [107-125] 125  Resp:  [18-20] 20  BP: (103-152)/(64-75) 103/64  Physical Exam  Constitutional:       General: She is not in acute distress.     Appearance: She is normal weight. She is not ill-appearing, toxic-appearing or diaphoretic.   HENT:      Head: Normocephalic and atraumatic.      Right Ear: External ear normal.      Left Ear: External ear normal.      Nose: Nose normal.      Mouth/Throat:      Mouth: Mucous membranes are moist.      Pharynx: Oropharynx is clear.   Eyes:      Extraocular Movements: Extraocular movements intact.      Conjunctiva/sclera: Conjunctivae normal.      Pupils: Pupils are equal, round, and reactive to light.   Cardiovascular:      Rate and Rhythm: Regular rhythm. Tachycardia present.      Heart sounds: No murmur heard.    No friction rub. No gallop.   Pulmonary:      Effort: No respiratory distress.      Breath sounds: No stridor. No wheezing or rales.   Chest:      Chest wall: No tenderness.   Abdominal:      General: Abdomen is flat. There is no distension.      Palpations: Abdomen is soft.      Tenderness: There is no abdominal tenderness. There is no guarding or rebound.   Musculoskeletal:         General: No swelling or tenderness.      Cervical back: No rigidity or tenderness.      Right lower leg: No edema.      Left lower leg: No edema.   Lymphadenopathy:      Cervical: No cervical adenopathy.   Skin:     General: Skin is warm and dry.      Coloration: Skin is not jaundiced.      Findings: No erythema.   Neurological:      Mental Status: She is alert and oriented to person, place, and time. Mental status is at baseline.      Sensory: No sensory deficit.      Motor: No weakness.      Coordination: Coordination normal.   Psychiatric:         Mood and Affect: Mood normal.         Behavior: Behavior normal.         Judgment: Judgment normal.            Results Reviewed:  I have personally reviewed current lab, radiology, and data and agree with results.  Lab Results (last 24 hours)     Procedure Component Value Units Date/Time    Blood Gas, Arterial - [[id number]]  (Abnormal) Collected: 11/03/22 2355    Specimen: Arterial Blood Updated: 11/03/22 2354     Site Left Radial     Drew's Test N/A     pH, Arterial 7.091 pH units      Comment: 85 Value below critical limit        pCO2, Arterial 16.4 mm Hg      Comment: 85 Value below critical limit        pO2, Arterial 125.0 mm Hg      Comment: 83 Value above reference range        HCO3, Arterial 5.0 mmol/L      Comment: 84 Value below reference range        Base Excess, Arterial -22.9 mmol/L      Comment: 84 Value below reference range        O2 Saturation, Arterial 98.4 %      Barometric Pressure for Blood Gas 750 mmHg      Modality Room Air     FIO2 <21 %      Ventilator Mode NA     Collected by rrt     Comment: Meter: [id number]     :  [id number]       Comprehensive Metabolic Panel [[id number]]  (Abnormal) Collected: 11/03/22 2119    Specimen: Blood Updated: 11/03/22 2202     Glucose 482 mg/dL      BUN 19 mg/dL      Creatinine 1.44 mg/dL      Sodium 131 mmol/L      Potassium 5.2 mmol/L      Chloride 97 mmol/L      CO2 6.0 mmol/L      Calcium 9.4 mg/dL      Total Protein 7.7 g/dL      Albumin 4.20 g/dL      ALT (SGPT) 9 U/L      AST (SGOT) 14 U/L      Alkaline Phosphatase 75 U/L      Total Bilirubin 0.2 mg/dL      Globulin 3.5 gm/dL      A/G Ratio 1.2 g/dL      BUN/Creatinine Ratio 13.2     Anion Gap 28.0 mmol/L      eGFR 52.5 mL/min/1.73      Comment: National Kidney Foundation and American Society of Nephrology (ASN) Task Force recommended calculation based on the Chronic Kidney Disease Epidemiology Collaboration (CKD-EPI) equation refit without adjustment for race.       Narrative:      GFR Normal >60  Chronic Kidney Disease <60  Kidney Failure <15      hCG, Quantitative, Pregnancy [[id number]]  Collected: 11/03/22 2119    Specimen: Blood Updated: 11/03/22 2149     HCG Quantitative 13.53 mIU/mL     Narrative:      HCG Ranges by Gestational Age    Females - non-pregnant premenopausal   </= 1mIU/mL HCG  Females - postmenopausal               </= 7mIU/mL HCG    3 Weeks         5.8 -    71.2 mIU/mL  4 Weeks         9.5 -     750 mIU/mL  5 Weeks         217 -   7,138 mIU/mL  6 Weeks         158 -  31,795 mIU/mL  7 Weeks       3,697 - 163,563 mIU/mL  8 Weeks      32,065 - 149,571 mIU/mL  9 Weeks      63,803 - 151,410 mIU/mL  10 Weeks     46,509 - 186,977 mIU/mL  12 Weeks     27,832 - 210,612 mIU/mL  14 Weeks     13,950 -  62,530 mIU/mL  15 Weeks     12,039 -  70,971 mIU/mL  16 Weeks      9,040 -  56,451 mIU/mL  17 Weeks      8,175 -  55,868 mIU/mL  18 Weeks      8,099 -  58,176 mIU/mL  Results may be falsely decreased if patient taking Biotin.      Lipase [[id number]]  (Normal) Collected: 11/03/22 2119    Specimen: Blood Updated: 11/03/22 2143     Lipase 13 U/L     CK [[id number]]  (Normal) Collected: 11/03/22 2119    Specimen: Blood Updated: 11/03/22 2143     Creatine Kinase 29 U/L     Urinalysis, Microscopic Only - Urine, Clean Catch [[id number]]  (Abnormal) Collected: 11/03/22 2126    Specimen: Urine, Clean Catch Updated: 11/03/22 2139     RBC, UA 31-50 /HPF      WBC, UA 0-2 /HPF      Comment: Urine culture not indicated.        Bacteria, UA None Seen /HPF      Squamous Epithelial Cells, UA 0-2 /HPF      Hyaline Casts, UA 7-12 /LPF      Methodology Automated Microscopy    Urinalysis With Culture If Indicated - Urine, Clean Catch [[id number]]  (Abnormal) Collected: 11/03/22 2126    Specimen: Urine, Clean Catch Updated: 11/03/22 2137     Color, UA Yellow     Appearance, UA Clear     pH, UA <=5.0     Specific Gravity, UA 1.023     Glucose, UA >=1000 mg/dL (3+)     Ketones, UA >=160 mg/dL (4+)     Bilirubin, UA Negative     Blood, UA Large (3+)     Protein, UA 30 mg/dL (1+)     Leuk Esterase, UA Negative     Nitrite,  UA Negative     Urobilinogen, UA 0.2 E.U./dL    Narrative:      In absence of clinical symptoms, the presence of pyuria, bacteria, and/or nitrites on the urinalysis result does not correlate with infection.    CBC & Differential [[id number]]  (Abnormal) Collected: 11/03/22 2119    Specimen: Blood Updated: 11/03/22 2125    Narrative:      The following orders were created for panel order CBC & Differential.  Procedure                               Abnormality         Status                     ---------                               -----------         ------                     CBC Auto Differential[[id number]]        Abnormal            Final result                 Please view results for these tests on the individual orders.    CBC Auto Differential [[id number]]  (Abnormal) Collected: 11/03/22 2119    Specimen: Blood Updated: 11/03/22 2125     WBC 9.01 10*3/mm3      RBC 4.49 10*6/mm3      Hemoglobin 12.5 g/dL      Hematocrit 38.1 %      MCV 84.9 fL      MCH 27.8 pg      MCHC 32.8 g/dL      RDW 13.4 %      RDW-SD 41.5 fl      MPV 10.1 fL      Platelets 294 10*3/mm3      Neutrophil % 83.5 %      Lymphocyte % 9.8 %      Monocyte % 5.0 %      Eosinophil % 0.0 %      Basophil % 0.6 %      Immature Grans % 1.1 %      Neutrophils, Absolute 7.53 10*3/mm3      Lymphocytes, Absolute 0.88 10*3/mm3      Monocytes, Absolute 0.45 10*3/mm3      Eosinophils, Absolute 0.00 10*3/mm3      Basophils, Absolute 0.05 10*3/mm3      Immature Grans, Absolute 0.10 10*3/mm3      nRBC 0.0 /100 WBC         Imaging Results (Last 24 Hours)     Procedure Component Value Units Date/Time    US Ob Transvaginal [[id number]] Collected: 11/03/22 2253     Updated: 11/04/22 0038    Narrative:      EXAM:    US Pregnancy, Transvaginal    CLINICAL HISTORY:    The patient is 22 years old and is Female; pregnant, vaginal  bleeding    TECHNIQUE:    Real-time transvaginal obstetrical ultrasound of the maternal  pelvis and a first trimester pregnancy with image  documentation.   Transvaginal imaging was used for better evaluation of the fetus  and adnexa.    COMPARISON:    Pelvic ultrasound 8/8/2018    FINDINGS:    GESTATION: Intrauterine pregnancy is not visualized.    PLACENTA/AMNIOTIC FLUID:  Cannot be adequately evaluated due to  the early gestational age.    UTERUS/CERVIX:  Retroflexed uterus measures 8.7 x 5.1 x 6.2 cm.   No myometrial mass.    OVARIES:  Right ovary measures 3.2 x 1.6 x 2.9 cm. Vascularity  within the right ovary is not detected.  Left ovary measures 3.9  x 3.9 x 2.4 cm. 0.9 x 0.8 x 0.8 cm complex cyst. Left ovarian 0.6  x 0.5 x 0.5 cm cyst within a cyst. No peripheral vascularity  surrounding cyst within a cyst.    FREE FLUID:  Small fluid in the bilateral adnexa, right greater  than left.    VASCULATURE:  Increased left parametrial vasculature.      Impression:        1. In the setting of a positive pregnancy test, findings  consistent with pregnancy of unknown location.  2. Right ovarian vascularity is not detected. Findings may  secondary to torsion versus technique. Recommend clinical  correlation and gynecology consultation.  3. Left ovarian 0.6 cm cyst within a cyst.     Electronically signed by:  Ira Veloz MD  11/4/2022 12:36 AM  CDT Workstation: [id number]            Assessment:    There are no active hospital problems to display for this patient.    # DKA  # Metabolic acidosis secondary to above DKA  # Insulin-dependent diabetes mellitus type 1  # Vaginal bleeding with  reported pregnancy first trimester with abnormal pelvic ultrasound concerning pregnancy of unknown location and down going HCG concerning abort  # Question of lack of right ovarian vascularity on ultrasound of pelvic right ovarian torsion cannot be excluded  # Nausea vomiting  # Acute renal failure   # Reported fever   # Sinus tachycardia, reactive        Plan:  Admit to intensive care unit  Obtain further laboratory work-up  Initiate Glucomander protocol  Obtain on high  rate IV fluid for initial care  Insulin drip per protocol  Accu-Chek every hour  Follow electrolytes closely.  Glucomander protocol.  Gynecology service Dr. Medina was contacted in ED.  Await for input.  Place on antiemetic, analgesics, PPI  Comorbidities, chronic medical problem will be treated appropriately.  Continue to monitor clinical status.  If patient has any fever consider obtaining blood culture, chest x-ray, and initiate antibiotic pending further evaluation  DVT and GI prophylaxis will be initiated  Please see orders for comprehensive plan    I confirmed that the patient's Advance Care Plan is present, code status is documented, or surrogate decision maker is listed in the patient's medical record.     I have utilized all available immediate resources to obtain, update, or review the patient's current medications.     I discussed the patient's findings and my recommendations with: Patient and she agreed with above plan of care      Saeid Behroozi, MD   11/04/22   01:03 CDT

## 2022-11-04 NOTE — ED PROVIDER NOTES
Subjective   History of Present Illness  Patient presents emergency department with vaginal bleeding x1 day.  She states that she is currently pregnant with an LMP of 9/30/2022.  Patient is a G2, P1.  She is an insulin-dependent diabetic.  She denies any vaginal discharge and has not had any prenatal care for this pregnancy yet.  She said fever and back pain with nausea and vomiting x2 days.      Vaginal Bleeding - Pregnant  Quality:  Dark red and spotting  Severity:  Mild  Associated symptoms: back pain, fatigue, fever and nausea    Associated symptoms: no abdominal pain, no dizziness, no dysuria and no vaginal discharge    Back Pain  Associated symptoms: fever and weakness    Associated symptoms: no abdominal pain, no chest pain, no dysuria and no headaches    Fever  Associated symptoms: myalgias, nausea and vomiting    Associated symptoms: no chest pain, no chills, no confusion, no congestion, no cough, no diarrhea, no dysuria, no ear pain, no headaches, no rash, no rhinorrhea and no sore throat        Review of Systems   Constitutional: Positive for activity change, fatigue and fever. Negative for appetite change, chills and diaphoresis.   HENT: Negative for congestion, ear discharge, ear pain, nosebleeds, rhinorrhea, sinus pressure, sore throat and trouble swallowing.    Eyes: Negative for discharge and redness.   Respiratory: Negative for apnea, cough, chest tightness, shortness of breath and wheezing.    Cardiovascular: Negative for chest pain.   Gastrointestinal: Positive for nausea and vomiting. Negative for abdominal pain and diarrhea.   Endocrine: Negative for polyuria.   Genitourinary: Positive for vaginal bleeding. Negative for dysuria, frequency, urgency and vaginal discharge.   Musculoskeletal: Positive for back pain and myalgias. Negative for neck pain.   Skin: Negative for color change and rash.   Allergic/Immunologic: Negative for immunocompromised state.   Neurological: Positive for weakness.  "Negative for dizziness, seizures, syncope, light-headedness and headaches.   Hematological: Negative for adenopathy. Does not bruise/bleed easily.   Psychiatric/Behavioral: Negative for behavioral problems and confusion.   All other systems reviewed and are negative.      Past Medical History:   Diagnosis Date   • ADHD    • Bipolar 1 disorder (HCC)    • Borderline personality disorder (HCC)    • Cannabinoid hyperemesis syndrome    • Diabetes mellitus type 1 (HCC)    • Diabetic gastroparesis (HCC)    • Diabetic ketoacidosis (HCC)    • Diabetic neuropathy (HCC)    • History of transfusion     Pt reports \"no reactions.\"   • Post traumatic stress disorder (PTSD)    • Recurrent UTI    • Seizure disorder (HCC)    • Severe depressed bipolar II disorder without psychotic features (HCC)    • Uncontrolled diabetes mellitus        Allergies   Allergen Reactions   • Pineapple Anaphylaxis   • Benzoyl Peroxide Swelling       Past Surgical History:   Procedure Laterality Date   •  SECTION N/A 2018   • COLONOSCOPY N/A 10/12/2022    Procedure: COLONOSCOPY;  Surgeon: Timur Sun MD;  Location: [facility] ENDOSCOPY;  Service: Gastroenterology;  Laterality: N/A;   • CYSTOSCOPY N/A 2020    Procedure: CYSTOSCOPY FLEXIBLE       (DONE ON STRETCHER);  Surgeon: Antonio Coawn MD;  Location: [facility] OR;  Service: Urology;  Laterality: N/A;   • ENDOSCOPY N/A 10/12/2022    Procedure: ESOPHAGOGASTRODUODENOSCOPY;  Surgeon: Timur Sun MD;  Location: [facility] ENDOSCOPY;  Service: Gastroenterology;  Laterality: N/A;       Family History   Problem Relation Age of Onset   • Drug abuse Father    • Suicide Attempts Brother    • Dementia Maternal Grandfather    • Hypertension Paternal Grandmother        Social History     Socioeconomic History   • Marital status: Single   Tobacco Use   • Smoking status: Every Day     Packs/day: 1.00     Years: 2.00     Pack years: 2.00     Types: Cigarettes   • Smokeless tobacco: Never "   Vaping Use   • Vaping Use: Former   Substance and Sexual Activity   • Alcohol use: Yes     Comment: none in 2 months   • Drug use: Not Currently     Frequency: 1.0 times per week     Types: Marijuana     Comment: none in a month   • Sexual activity: Defer     Partners: Male     Birth control/protection: None           Objective   Physical Exam  Vitals and nursing note reviewed.   Constitutional:       Appearance: She is well-developed.   HENT:      Head: Normocephalic and atraumatic.      Nose: Nose normal.   Eyes:      General: No scleral icterus.        Right eye: No discharge.         Left eye: No discharge.      Conjunctiva/sclera: Conjunctivae normal.      Pupils: Pupils are equal, round, and reactive to light.   Neck:      Trachea: No tracheal deviation.   Cardiovascular:      Rate and Rhythm: Normal rate and regular rhythm.      Heart sounds: Normal heart sounds. No murmur heard.  Pulmonary:      Effort: Pulmonary effort is normal. No respiratory distress.      Breath sounds: Normal breath sounds. No stridor. No wheezing or rales.   Abdominal:      General: Bowel sounds are normal. There is no distension.      Palpations: Abdomen is soft. There is no mass.      Tenderness: There is abdominal tenderness (mild) in the epigastric area. There is right CVA tenderness and left CVA tenderness. There is no guarding or rebound.   Musculoskeletal:      Cervical back: Normal range of motion and neck supple.   Skin:     General: Skin is warm and dry.      Findings: No erythema or rash.   Neurological:      Mental Status: She is alert and oriented to person, place, and time.      Coordination: Coordination normal.   Psychiatric:         Behavior: Behavior normal.         Thought Content: Thought content normal.         Procedures           ED Course              Labs Reviewed   URINALYSIS W/ CULTURE IF INDICATED - Abnormal; Notable for the following components:       Result Value    Glucose, UA >=1000 mg/dL (3+) (*)      Ketones, UA >=160 mg/dL (4+) (*)     Blood, UA Large (3+) (*)     Protein, UA 30 mg/dL (1+) (*)     All other components within normal limits    Narrative:     In absence of clinical symptoms, the presence of pyuria, bacteria, and/or nitrites on the urinalysis result does not correlate with infection.   COMPREHENSIVE METABOLIC PANEL - Abnormal; Notable for the following components:    Glucose 482 (*)     Creatinine 1.44 (*)     Sodium 131 (*)     Chloride 97 (*)     CO2 6.0 (*)     Anion Gap 28.0 (*)     eGFR 52.5 (*)     All other components within normal limits    Narrative:     GFR Normal >60  Chronic Kidney Disease <60  Kidney Failure <15     CBC WITH AUTO DIFFERENTIAL - Abnormal; Notable for the following components:    Neutrophil % 83.5 (*)     Lymphocyte % 9.8 (*)     Eosinophil % 0.0 (*)     Immature Grans % 1.1 (*)     Neutrophils, Absolute 7.53 (*)     Immature Grans, Absolute 0.10 (*)     All other components within normal limits   URINALYSIS, MICROSCOPIC ONLY - Abnormal; Notable for the following components:    RBC, UA 31-50 (*)     All other components within normal limits   BLOOD GAS, ARTERIAL - Abnormal; Notable for the following components:    pH, Arterial 7.091 (*)     pCO2, Arterial 16.4 (*)     pO2, Arterial 125.0 (*)     HCO3, Arterial 5.0 (*)     Base Excess, Arterial -22.9 (*)     All other components within normal limits   LIPASE - Normal   CK - Normal   HCG, QUANTITATIVE, PREGNANCY    Narrative:     HCG Ranges by Gestational Age    Females - non-pregnant premenopausal   </= 1mIU/mL HCG  Females - postmenopausal               </= 7mIU/mL HCG    3 Weeks         5.8 -    71.2 mIU/mL  4 Weeks         9.5 -     750 mIU/mL  5 Weeks         217 -   7,138 mIU/mL  6 Weeks         158 -  31,795 mIU/mL  7 Weeks       3,697 - 163,563 mIU/mL  8 Weeks      32,065 - 149,571 mIU/mL  9 Weeks      63,803 - 151,410 mIU/mL  10 Weeks     46,509 - 186,977 mIU/mL  12 Weeks     27,832 - 210,612 mIU/mL  14 Weeks      13,950 -  62,530 mIU/mL  15 Weeks     12,039 -  70,971 mIU/mL  16 Weeks      9,040 -  56,451 mIU/mL  17 Weeks      8,175 -  55,868 mIU/mL  18 Weeks      8,099 -  58,176 mIU/mL  Results may be falsely decreased if patient taking Biotin.     BLOOD GAS, ARTERIAL   CBC AND DIFFERENTIAL    Narrative:     The following orders were created for panel order CBC & Differential.  Procedure                               Abnormality         Status                     ---------                               -----------         ------                     CBC Auto Differential[[id number]]        Abnormal            Final result                 Please view results for these tests on the individual orders.       US Ob Transvaginal   Final Result   Addendum (preliminary) 1 of 1    ADDENDUM    ADDENDUM #1          Impression 1-3 discussed with Dr. Hu by Dr. Baker at   12:37 AM CDT on 11/4/2022.      Electronically signed by:  Ira Veloz MD  11/4/2022 1:03 AM   CDT Workstation: [id number]         Final      1. In the setting of a positive pregnancy test, findings   consistent with pregnancy of unknown location.   2. Right ovarian vascularity is not detected. Findings may   secondary to torsion versus technique. Recommend clinical   correlation and gynecology consultation.   3. Left ovarian 0.6 cm cyst within a cyst.       Electronically signed by:  Ira Veloz MD  11/4/2022 12:36 AM   CDT Workstation: [id number]                                            [facility]    Final diagnoses:   Diabetic ketoacidosis without coma associated with type 1 diabetes mellitus (HCC)   Vaginal bleeding   Miscarriage       ED Disposition  ED Disposition     ED Disposition   Decision to Admit    Condition   --    Comment   Level of Care: Stepdown [25]   Diagnosis: DKA (diabetic ketoacidosis) (HCC) [819213]   Admitting Physician: BEHROOZI, SAEID [[id number]]   Attending Physician: BEHROOZI, SAEID [[id number]]   Certification: I Certify That Inpatient  Hospital Services Are Medically Necessary For Greater Than 2 Midnights               No follow-up provider specified.       Medication List      No changes were made to your prescriptions during this visit.          Enrike Hu MD  11/04/22 0137

## 2022-11-04 NOTE — PROGRESS NOTES
2nd MD visit, same day     She is doing ok. She was having some pain, RN is giving IV morphine to help. States that she has frequent admits for DKA.  saw this morning regarding her miscarriage.    Will continue DKA protocol.   Pain management.  No changes to the plan.    Darya Jane MD

## 2022-11-04 NOTE — CONSULTS
"[facility]  Consult - Gynecology    Name: Fernanda Patterson  MRN: [id number]  Location:   Date: 2022  CSN: [id number]      REQUESTING SERVICE: Emergency Department/Hospitalist  REASON FOR CONSULT: Rule out ovarian torsion    HPI  Patient seen and examined at 0155 this morning.  Fernanda Patterson is a 22 y.o.  who presents to the ED with vaginal bleeding and abdominal pain, which is her symptom of DKA per patient.  She states that this started last night and has continued.  She found out that she was pregnant a few days ago.  She has known T1DM; HbA1c on admission 9.4 (this is improved from earlier in the year when it was 12.1).      OB HISTORY  OB History    Para Term  AB Living   2 1   1   1   SAB IAB Ectopic Molar Multiple Live Births           0 1      # Outcome Date GA Lbr Aneudy/2nd Weight Sex Delivery Anes PTL Lv   2 Current            1  18 35w1d  2500 g (5 lb 8.2 oz) M CS-LTranv Spinal  MIKHAIL     GYN HISTORY  Denies history of abnormal pap smears  Denies history of STIs    PAST MEDICAL HISTORY  Past Medical History:   Diagnosis Date   • ADHD    • Bipolar 1 disorder (HCC)    • Borderline personality disorder (HCC)    • Cannabinoid hyperemesis syndrome    • Diabetes mellitus type 1 (HCC)    • Diabetic gastroparesis (HCC)    • Diabetic ketoacidosis (HCC)    • Diabetic neuropathy (HCC)    • History of transfusion     Pt reports \"no reactions.\"   • Post traumatic stress disorder (PTSD)    • Recurrent UTI    • Seizure disorder (HCC)    • Severe depressed bipolar II disorder without psychotic features (HCC)    • Uncontrolled diabetes mellitus      PAST SURGICAL HISTORY  Past Surgical History:   Procedure Laterality Date   •  SECTION N/A 2018   • COLONOSCOPY N/A 10/12/2022    Procedure: COLONOSCOPY;  Surgeon: Timur Sun MD;  Location: [facility] ENDOSCOPY;  Service: Gastroenterology;  Laterality: N/A;   • CYSTOSCOPY N/A 2020 "    Procedure: CYSTOSCOPY FLEXIBLE       (DONE ON STRETCHER);  Surgeon: Antonio Cowan MD;  Location: [facility] OR;  Service: Urology;  Laterality: N/A;   • ENDOSCOPY N/A 10/12/2022    Procedure: ESOPHAGOGASTRODUODENOSCOPY;  Surgeon: Timur Sun MD;  Location: [facility] ENDOSCOPY;  Service: Gastroenterology;  Laterality: N/A;     FAMILY HISTORY  Family History   Problem Relation Age of Onset   • Drug abuse Father    • Suicide Attempts Brother    • Dementia Maternal Grandfather    • Hypertension Paternal Grandmother      SOCIAL HISTORY  Social History     Socioeconomic History   • Marital status: Single   Tobacco Use   • Smoking status: Every Day     Packs/day: 1.00     Years: 2.00     Pack years: 2.00     Types: Cigarettes   • Smokeless tobacco: Never   Vaping Use   • Vaping Use: Former   Substance and Sexual Activity   • Alcohol use: Yes     Comment: none in 2 months   • Drug use: Not Currently     Frequency: 1.0 times per week     Types: Marijuana     Comment: none in a month   • Sexual activity: Defer     Partners: Male     Birth control/protection: None     ALLERGIES  Allergies   Allergen Reactions   • Pineapple Anaphylaxis   • Benzoyl Peroxide Swelling     MEDICATIONS  Prior to Admission medications    Medication Sig Start Date End Date Taking? Authorizing Provider   Acetone, Urine, Test (Ketone Test) strip USE AS INDICATED 6/27/22   Provider, MD Abigail   Alcohol Swabs (Alcohol Wipes) 70 % pads Use 4 x daily 6/27/22   Tavon Avila MD   B-D ULTRA-FINE 33 LANCETS misc Use 4 x daily , any brand covered by insurance 6/27/22   Tavon Avila MD   Blood Glucose Monitoring Suppl w/Device kit USE AS INDICATED, ANY MONITOR , ICD10 code is E11.9 6/27/22   Tavon Avila MD   clonazePAM (KlonoPIN) 0.5 MG tablet Take 2 tablets by mouth 3 (Three) Times a Day As Needed for Anxiety. 8/15/22   Tavon Avila MD   Continuous Blood Gluc  (Dexcom G6 ) device  USE AS DIRECTED FOR CONTINUOUS GLUCOSE MONITORING. 6/27/22   ProviderAbigail MD   Continuous Blood Gluc Sensor (Dexcom G6 Sensor) Use as directed for continuous glucose monitoring **Change sensor Every 10 (Ten) Days** 8/16/22   Tavon Avila MD   Continuous Blood Gluc Sensor (Dexcom G6 Sensor) As Needed (glucose control). Every 10 days,  Use as directed for continuous glucose monitoring 8/16/22   Tavon Avila MD   Continuous Blood Gluc Transmit (Dexcom G6 Transmitter) misc Use as directed for continuous glucose monitoring **Change transmitter Every 3 (Three) Months** 8/16/22   Tavon Avila MD   Glucagon (Baqsimi One Pack) 3 MG/DOSE powder 1 each into the nostril(s) as directed by provider As Needed (Hypoglycemia). Apply intranasal if hypoglycemia 6/27/22   Tavon Avila MD   Glucose Blood (BLOOD GLUCOSE TEST) strip Use 4 x daily use any brand covered by insurance or same brand as before ICD10 code is E11.9 10/31/19   Tavon Avila MD   ibuprofen (ADVIL,MOTRIN) 600 MG tablet TAKE 1 TABLET BY MOUTH EVERY 6 HOURS AS NEEDED FOR PAIN FOR UP TO 10 DAYS 9/2/22   ProviderAbigail MD   insulin aspart (NovoLOG FlexPen) 100 UNIT/ML solution pen-injector sc pen 10 units tid 6/27/22   Tavon Avila MD   Insulin Aspart (novoLOG) 100 UNIT/ML injection 100 units daily through pump 6/27/22   Tavon Avila MD   insulin detemir (Levemir FlexTouch) 100 UNIT/ML injection 22 units daily 6/27/22   Tavon Avila MD   Insulin Disposable Pump (Omnipod 5 G6 Pod, Gen 5,) misc Change pods Every Other Day. 8/16/22   Tavon Avila MD   Insulin Pen Needle (Pen Needles) 32G X 4 MM misc 1 each 4 (Four) Times a Day. Use 4 x daily, Dx code E11.65 6/27/22   Tavon Avila MD   Lancet Devices (Lancing Device) misc USE AS INDICATED TO CORRELATE WITH STRIPS AND METER 6/27/22   Tavon Avila MD   LORazepam (ATIVAN)  "0.5 MG tablet Take 0.5 mg by mouth. 8/24/22   ProviderAbigail MD   metoclopramide (REGLAN) 10 MG tablet Take up to 2 tabs po TID 6/27/22   Tavon Avila MD   OLANZapine (zyPREXA) 10 MG tablet Take 1 tablet by mouth.    Abigail Castro MD   omeprazole (priLOSEC) 40 MG capsule Take 1 capsule by mouth Daily. 9/14/22   Timur Sun MD   promethazine (PHENERGAN) 12.5 MG tablet Take 1 tablet by mouth Every 6 (Six) Hours As Needed for Nausea or Vomiting. 6/27/22   Tavon Avila MD   sucralfate (Carafate) 1 g tablet Take 1 tablet by mouth 4 (Four) Times a Day for 30 days. 10/19/22 11/18/22  Timur Sun MD   Urine Glucose-Ketones Test strip Use as indicated 6/27/22   Tavon Avila MD     REVIEW OF SYSTEMS  Review of Systems - History obtained from the patient  General ROS: positive for  - fatigue  Psychological ROS: negative  Ophthalmic ROS: negative  ENT ROS: negative  Allergy and Immunology ROS: negative  Hematological and Lymphatic ROS: negative  Endocrine ROS: negative  Breast ROS: negative for breast lumps  Respiratory ROS: no cough, shortness of breath, or wheezing  Cardiovascular ROS: no chest pain or dyspnea on exertion  Gastrointestinal ROS: positive for - abdominal pain  Genito-Urinary ROS: positive for - vaginal bleeding  Musculoskeletal ROS: positive for - muscle pain  Neurological ROS: no TIA or stroke symptoms  Dermatological ROS: negative    PHYSICAL EXAMINATION  /64 (BP Location: Right arm, Patient Position: Lying)   Pulse (!) 128   Temp 98.5 °F (36.9 °C) (Oral)   Resp 20   Ht 170.2 cm (67\")   Wt 59 kg (130 lb)   LMP 10/03/2022 (Approximate)   SpO2 100%   BMI 20.36 kg/m²     Gen: NAD, AAO x3, comfortable laying in bed  CV: RRR  Lungs: No WOB  Abd: Soft, nontender, no guarding noted, no rebounding noted    LABS  Labs reviewed as indicated below:   11/01/22 01:42 11/03/22 21:19   HCG Quantitative 21.23 13.53     IMAGING  TVUS:  1.  In " the setting of a positive pregnancy test, findings consistent with pregnancy of unknown location.  2.  Right ovarian vascularity is not detected.  Findings may secondary to torsion versus technique.  Recommend clinical correlation and gynecology consultation.  3.  Left ovarian 0.6 cm cyst within a cyst.     IMPRESSION  Callie Mariann Baucum is a 22 y.o.  admitted with DKA in the setting of poorly controlled T1DM.  Also with vaginal bleeding and mild abdominal pain.  No evidence of torsion on physical exam; I suspect that the US findings are due to technique given benign abdominal exam as well as normal size of the ovary which is incredibly unlikely to torse spontaneously.  Discussed with patient and significant other regarding US findings.  Also discussed unfortunately miscarriage/chemical pregnancy.    RECOMMENDATIONS  - Repeat beta-hCG in 1 week  - [facility] non-urgently in the next month following discharge    Thank you for allowing me to participate in the care of this patient.  Please contact me with any questions or concerns.  Plan was discussed with Dr. Hu.    This document has been electronically signed by Patricia Medina MD on 2022 07:24 CDT.

## 2022-11-04 NOTE — ED NOTES
"Nursing report ED to floor  Fernanda Patterson  22 y.o.  female    HPI:   Chief Complaint   Patient presents with    Vaginal Bleeding - Pregnant    Back Pain    Fever       Admitting doctor:   Saeid Behroozi, MD    Consulting provider(s):  Consults       No orders found for last 30 day(s).             Admitting diagnosis:   There were no encounter diagnoses.    Code status:   Current Code Status       Date Active Code Status Order ID Comments User Context       Prior            Allergies:   Pineapple and Benzoyl peroxide    Intake and Output    Intake/Output Summary (Last 24 hours) at 11/4/2022 0122  Last data filed at 11/4/2022 0054  Gross per 24 hour   Intake 1235.42 ml   Output 600 ml   Net 635.42 ml       Weight:       11/03/22 1846   Weight: 59 kg (130 lb)       Most recent vitals:   Vitals:    11/03/22 1846 11/03/22 2117 11/03/22 2258 11/04/22 0020   BP: 125/75 129/69 152/70 103/64   BP Location:  Left arm  Right arm   Patient Position:  Lying  Lying   Pulse: 112 107 114 (!) 125   Resp: 18 20 18 20   Temp: 98.5 °F (36.9 °C)      TempSrc: Oral      SpO2: 100% 100% 100% 100%   Weight: 59 kg (130 lb)      Height: 170.2 cm (67\")        Oxygen Therapy: ROOM AIR    Active LDAs/IV Access:   Lines, Drains & Airways       Active LDAs       Name Placement date Placement time Site Days    Peripheral IV 11/03/22 2119 Anterior;Right Hand 11/03/22 2119  Hand  less than 1    Peripheral IV 11/04/22 0039 Right Antecubital 11/04/22 0039  Antecubital  less than 1                    Labs (abnormal labs have a star):   Labs Reviewed   URINALYSIS W/ CULTURE IF INDICATED - Abnormal; Notable for the following components:       Result Value    Glucose, UA >=1000 mg/dL (3+) (*)     Ketones, UA >=160 mg/dL (4+) (*)     Blood, UA Large (3+) (*)     Protein, UA 30 mg/dL (1+) (*)     All other components within normal limits    Narrative:     In absence of clinical symptoms, the presence of pyuria, bacteria, and/or nitrites on the " urinalysis result does not correlate with infection.   COMPREHENSIVE METABOLIC PANEL - Abnormal; Notable for the following components:    Glucose 482 (*)     Creatinine 1.44 (*)     Sodium 131 (*)     Chloride 97 (*)     CO2 6.0 (*)     Anion Gap 28.0 (*)     eGFR 52.5 (*)     All other components within normal limits    Narrative:     GFR Normal >60  Chronic Kidney Disease <60  Kidney Failure <15     CBC WITH AUTO DIFFERENTIAL - Abnormal; Notable for the following components:    Neutrophil % 83.5 (*)     Lymphocyte % 9.8 (*)     Eosinophil % 0.0 (*)     Immature Grans % 1.1 (*)     Neutrophils, Absolute 7.53 (*)     Immature Grans, Absolute 0.10 (*)     All other components within normal limits   URINALYSIS, MICROSCOPIC ONLY - Abnormal; Notable for the following components:    RBC, UA 31-50 (*)     All other components within normal limits   BLOOD GAS, ARTERIAL - Abnormal; Notable for the following components:    pH, Arterial 7.091 (*)     pCO2, Arterial 16.4 (*)     pO2, Arterial 125.0 (*)     HCO3, Arterial 5.0 (*)     Base Excess, Arterial -22.9 (*)     All other components within normal limits   LIPASE - Normal   CK - Normal   HCG, QUANTITATIVE, PREGNANCY    Narrative:     HCG Ranges by Gestational Age    Females - non-pregnant premenopausal   </= 1mIU/mL HCG  Females - postmenopausal               </= 7mIU/mL HCG    3 Weeks         5.8 -    71.2 mIU/mL  4 Weeks         9.5 -     750 mIU/mL  5 Weeks         217 -   7,138 mIU/mL  6 Weeks         158 -  31,795 mIU/mL  7 Weeks       3,697 - 163,563 mIU/mL  8 Weeks      32,065 - 149,571 mIU/mL  9 Weeks      63,803 - 151,410 mIU/mL  10 Weeks     46,509 - 186,977 mIU/mL  12 Weeks     27,832 - 210,612 mIU/mL  14 Weeks     13,950 -  62,530 mIU/mL  15 Weeks     12,039 -  70,971 mIU/mL  16 Weeks      9,040 -  56,451 mIU/mL  17 Weeks      8,175 -  55,868 mIU/mL  18 Weeks      8,099 -  58,176 mIU/mL  Results may be falsely decreased if patient taking Biotin.     BLOOD GAS,  ARTERIAL   CBC AND DIFFERENTIAL    Narrative:     The following orders were created for panel order CBC & Differential.  Procedure                               Abnormality         Status                     ---------                               -----------         ------                     CBC Auto Differential[[id number]]        Abnormal            Final result                 Please view results for these tests on the individual orders.       Meds given in ED:   Medications   sodium chloride 0.9 % infusion (0 mL/hr Intravenous Stopped 11/3/22 2315)   sodium chloride 0.9 % bolus 1,000 mL (has no administration in time range)   acetaminophen (TYLENOL) tablet 650 mg (has no administration in time range)   ondansetron (ZOFRAN) injection 4 mg (has no administration in time range)   pantoprazole (PROTONIX) injection 40 mg (has no administration in time range)   sodium chloride 0.9 % bolus 1,000 mL ( Intravenous Currently Infusing 11/4/22 0054)   sodium chloride 0.9 % bolus 1,000 mL (0 mL Intravenous Stopped 11/4/22 0054)   ondansetron (ZOFRAN) injection 4 mg (4 mg Intravenous Given 11/3/22 2242)   insulin regular (humuLIN R,novoLIN R) injection 6 Units (6 Units Intravenous Given 11/4/22 0024)     sodium chloride, 250 mL/hr, Last Rate: Stopped (11/03/22 2315)         NIH Stroke Scale:       Isolation/Infection(s):  No active isolations   No active infections     COVID Testing  Collected N/A  Resulted N/A    Nursing report ED to floor:  Mental status: ALERT AND ORIENTED X4  Ambulatory status: AD CHARLENE  Precautions: NONE    ED nurse phone extentsion- [phone]

## 2022-11-05 LAB
ANION GAP SERPL CALCULATED.3IONS-SCNC: 11 MMOL/L (ref 5–15)
ANION GAP SERPL CALCULATED.3IONS-SCNC: 12 MMOL/L (ref 5–15)
BUN SERPL-MCNC: 10 MG/DL (ref 6–20)
BUN SERPL-MCNC: 11 MG/DL (ref 6–20)
BUN/CREAT SERPL: 11.7 (ref 7–25)
BUN/CREAT SERPL: 9.9 (ref 7–25)
CALCIUM SPEC-SCNC: 7.7 MG/DL (ref 8.6–10.5)
CALCIUM SPEC-SCNC: 7.8 MG/DL (ref 8.6–10.5)
CHLORIDE SERPL-SCNC: 109 MMOL/L (ref 98–107)
CHLORIDE SERPL-SCNC: 110 MMOL/L (ref 98–107)
CO2 SERPL-SCNC: 15 MMOL/L (ref 22–29)
CO2 SERPL-SCNC: 19 MMOL/L (ref 22–29)
CREAT SERPL-MCNC: 0.94 MG/DL (ref 0.57–1)
CREAT SERPL-MCNC: 1.01 MG/DL (ref 0.57–1)
EGFRCR SERPLBLD CKD-EPI 2021: 80.4 ML/MIN/1.73
EGFRCR SERPLBLD CKD-EPI 2021: 87.6 ML/MIN/1.73
GLUCOSE BLDC GLUCOMTR-MCNC: 104 MG/DL (ref 70–130)
GLUCOSE BLDC GLUCOMTR-MCNC: 116 MG/DL (ref 70–130)
GLUCOSE BLDC GLUCOMTR-MCNC: 120 MG/DL (ref 70–130)
GLUCOSE BLDC GLUCOMTR-MCNC: 141 MG/DL (ref 70–130)
GLUCOSE BLDC GLUCOMTR-MCNC: 144 MG/DL (ref 70–130)
GLUCOSE BLDC GLUCOMTR-MCNC: 150 MG/DL (ref 70–130)
GLUCOSE BLDC GLUCOMTR-MCNC: 458 MG/DL (ref 70–130)
GLUCOSE BLDC GLUCOMTR-MCNC: 84 MG/DL (ref 70–130)
GLUCOSE SERPL-MCNC: 125 MG/DL (ref 65–99)
GLUCOSE SERPL-MCNC: 142 MG/DL (ref 65–99)
MAGNESIUM SERPL-MCNC: 2.3 MG/DL (ref 1.6–2.6)
MAGNESIUM SERPL-MCNC: 2.3 MG/DL (ref 1.6–2.6)
PHOSPHATE SERPL-MCNC: 1.4 MG/DL (ref 2.5–4.5)
PHOSPHATE SERPL-MCNC: 4 MG/DL (ref 2.5–4.5)
POTASSIUM SERPL-SCNC: 3.7 MMOL/L (ref 3.5–5.2)
POTASSIUM SERPL-SCNC: 4.1 MMOL/L (ref 3.5–5.2)
SODIUM SERPL-SCNC: 136 MMOL/L (ref 136–145)
SODIUM SERPL-SCNC: 140 MMOL/L (ref 136–145)

## 2022-11-05 PROCEDURE — 25010000002 ONDANSETRON PER 1 MG: Performed by: INTERNAL MEDICINE

## 2022-11-05 PROCEDURE — 82962 GLUCOSE BLOOD TEST: CPT

## 2022-11-05 PROCEDURE — 63710000001 INSULIN DETEMIR PER 5 UNITS: Performed by: INTERNAL MEDICINE

## 2022-11-05 PROCEDURE — 25010000002 MORPHINE PER 10 MG: Performed by: STUDENT IN AN ORGANIZED HEALTH CARE EDUCATION/TRAINING PROGRAM

## 2022-11-05 PROCEDURE — 36415 COLL VENOUS BLD VENIPUNCTURE: CPT | Performed by: INTERNAL MEDICINE

## 2022-11-05 PROCEDURE — 84100 ASSAY OF PHOSPHORUS: CPT | Performed by: INTERNAL MEDICINE

## 2022-11-05 PROCEDURE — 83735 ASSAY OF MAGNESIUM: CPT | Performed by: INTERNAL MEDICINE

## 2022-11-05 PROCEDURE — 80048 BASIC METABOLIC PNL TOTAL CA: CPT | Performed by: INTERNAL MEDICINE

## 2022-11-05 RX ORDER — SODIUM CHLORIDE 450 MG/100ML
100 INJECTION, SOLUTION INTRAVENOUS CONTINUOUS
Status: DISCONTINUED | OUTPATIENT
Start: 2022-11-05 | End: 2022-11-06 | Stop reason: HOSPADM

## 2022-11-05 RX ORDER — INSULIN ASPART 100 [IU]/ML
0-14 INJECTION, SOLUTION INTRAVENOUS; SUBCUTANEOUS
Status: DISCONTINUED | OUTPATIENT
Start: 2022-11-05 | End: 2022-11-06 | Stop reason: HOSPADM

## 2022-11-05 RX ORDER — NICOTINE POLACRILEX 4 MG
15 LOZENGE BUCCAL
Status: DISCONTINUED | OUTPATIENT
Start: 2022-11-05 | End: 2022-11-05

## 2022-11-05 RX ORDER — KETOROLAC TROMETHAMINE 30 MG/ML
30 INJECTION, SOLUTION INTRAMUSCULAR; INTRAVENOUS EVERY 6 HOURS PRN
Status: DISCONTINUED | OUTPATIENT
Start: 2022-11-05 | End: 2022-11-06 | Stop reason: HOSPADM

## 2022-11-05 RX ORDER — DEXTROSE MONOHYDRATE 25 G/50ML
25 INJECTION, SOLUTION INTRAVENOUS
Status: DISCONTINUED | OUTPATIENT
Start: 2022-11-05 | End: 2022-11-06 | Stop reason: HOSPADM

## 2022-11-05 RX ADMIN — SODIUM CHLORIDE 100 ML/HR: 4.5 INJECTION, SOLUTION INTRAVENOUS at 19:22

## 2022-11-05 RX ADMIN — MORPHINE SULFATE 2 MG: 2 INJECTION, SOLUTION INTRAMUSCULAR; INTRAVENOUS at 20:39

## 2022-11-05 RX ADMIN — PANTOPRAZOLE SODIUM 40 MG: 40 INJECTION, POWDER, FOR SOLUTION INTRAVENOUS at 10:06

## 2022-11-05 RX ADMIN — MORPHINE SULFATE 2 MG: 2 INJECTION, SOLUTION INTRAMUSCULAR; INTRAVENOUS at 01:32

## 2022-11-05 RX ADMIN — INSULIN DETEMIR 10 UNITS: 100 INJECTION, SOLUTION SUBCUTANEOUS at 06:17

## 2022-11-05 RX ADMIN — POTASSIUM CHLORIDE, DEXTROSE MONOHYDRATE AND SODIUM CHLORIDE 150 ML/HR: 150; 5; 900 INJECTION, SOLUTION INTRAVENOUS at 03:58

## 2022-11-05 RX ADMIN — POTASSIUM PHOSPHATE, MONOBASIC AND POTASSIUM PHOSPHATE, DIBASIC 30 MMOL: 224; 236 INJECTION, SOLUTION, CONCENTRATE INTRAVENOUS at 02:42

## 2022-11-05 RX ADMIN — MORPHINE SULFATE 2 MG: 2 INJECTION, SOLUTION INTRAMUSCULAR; INTRAVENOUS at 16:39

## 2022-11-05 RX ADMIN — INSULIN DETEMIR 10 UNITS: 100 INJECTION, SOLUTION SUBCUTANEOUS at 22:12

## 2022-11-05 RX ADMIN — SODIUM BICARBONATE 50 MEQ: 84 INJECTION INTRAVENOUS at 02:31

## 2022-11-05 RX ADMIN — MORPHINE SULFATE 2 MG: 2 INJECTION, SOLUTION INTRAMUSCULAR; INTRAVENOUS at 09:38

## 2022-11-05 RX ADMIN — ONDANSETRON 4 MG: 2 INJECTION INTRAMUSCULAR; INTRAVENOUS at 12:25

## 2022-11-05 RX ADMIN — MORPHINE SULFATE 2 MG: 2 INJECTION, SOLUTION INTRAMUSCULAR; INTRAVENOUS at 05:32

## 2022-11-05 RX ADMIN — SODIUM CHLORIDE 100 ML/HR: 4.5 INJECTION, SOLUTION INTRAVENOUS at 06:17

## 2022-11-05 NOTE — PROGRESS NOTES
[facility]  INPATIENT PROGRESS NOTE    Length of Stay: 1  Date of Admission: 11/3/2022  Primary Care Physician: Rufus Marshall APRN    Subjective   Chief Complaint: DKA  HPI:  Reports some ongoing abdominal discomfort and vaginal bleeding. No other concerns noted.     Review of Systems   Constitutional: Negative.    HENT: Negative.    Respiratory: Negative for shortness of breath.    Cardiovascular: Negative for chest pain.   Gastrointestinal: Positive for abdominal pain and nausea.   Genitourinary: Positive for vaginal bleeding.   Musculoskeletal: Negative.    Skin: Negative.    Neurological: Negative.    Psychiatric/Behavioral: Negative.    All other systems reviewed and are negative.     All pertinent negatives and positives are as above. All other systems have been reviewed and are negative unless otherwise stated.     Objective    As of today 11/05/22  Temp:  [98.1 °F (36.7 °C)-98.5 °F (36.9 °C)] 98.1 °F (36.7 °C)  Heart Rate:  [76-90] 79  Resp:  [16-18] 16  BP: ()/(60-85) 117/66    Physical Exam  Constitutional:       General: She is not in acute distress.     Appearance: She is not toxic-appearing.   HENT:      Head: Normocephalic and atraumatic.      Right Ear: External ear normal.      Left Ear: External ear normal.      Nose: Nose normal.      Mouth/Throat:      Mouth: Mucous membranes are moist.      Pharynx: Oropharynx is clear.   Eyes:      Conjunctiva/sclera: Conjunctivae normal.   Cardiovascular:      Rate and Rhythm: Normal rate and regular rhythm.      Pulses: Normal pulses.      Heart sounds: Normal heart sounds.   Pulmonary:      Effort: Pulmonary effort is normal. No respiratory distress.      Breath sounds: Normal breath sounds.   Abdominal:      General: Bowel sounds are normal.      Palpations: Abdomen is soft.      Comments: Mild epigastric tenderness noted   Musculoskeletal:         General: No deformity.   Skin:     General:  Skin is warm and dry.      Capillary Refill: Capillary refill takes less than 2 seconds.   Neurological:      General: No focal deficit present.      Mental Status: She is alert and oriented to person, place, and time. Mental status is at baseline.   Psychiatric:         Behavior: Behavior normal.         Thought Content: Thought content normal.         Results Review:  I have reviewed the labs, radiology results, and diagnostic studies.    Laboratory Data:   Results from last 7 days   Lab Units 11/05/22  0421 11/05/22  0110 11/04/22  1944 11/04/22  0426 11/03/22  2119 10/31/22  2230   SODIUM mmol/L 140 136 134*   < > 131* 131*   POTASSIUM mmol/L 4.1 3.7 4.0   < > 5.2 4.5   CHLORIDE mmol/L 110* 109* 108*   < > 97* 94*   CO2 mmol/L 19.0* 15.0* 14.0*   < > 6.0* 18.0*   BUN mg/dL 10 11 13   < > 19 15   CREATININE mg/dL 1.01* 0.94 1.11*   < > 1.44* 1.29*   GLUCOSE mg/dL 125* 142* 191*   < > 482* 471*   CALCIUM mg/dL 7.7* 7.8* 7.8*   < > 9.4 9.8   BILIRUBIN mg/dL  --   --   --   --  0.2 0.4   ALK PHOS U/L  --   --   --   --  75 74   ALT (SGPT) U/L  --   --   --   --  9 9   AST (SGOT) U/L  --   --   --   --  14 11   ANION GAP mmol/L 11.0 12.0 12.0   < > 28.0* 19.0*    < > = values in this interval not displayed.     Estimated Creatinine Clearance: 80.7 mL/min (A) (by C-G formula based on SCr of 1.01 mg/dL (H)).  Results from last 7 days   Lab Units 11/05/22 0421 11/05/22 0110 11/04/22 1944   MAGNESIUM mg/dL 2.3 2.3 1.6   PHOSPHORUS mg/dL 4.0 1.4* 2.1*         Results from last 7 days   Lab Units 11/04/22  0426 11/03/22  2119 10/31/22  2238   WBC 10*3/mm3 9.62 9.01 8.27   HEMOGLOBIN g/dL 10.9* 12.5 12.8   HEMATOCRIT % 33.9* 38.1 36.9   PLATELETS 10*3/mm3 262 294 320           Culture Data:   No results found for: BLOODCX  No results found for: URINECX  No results found for: RESPCX  No results found for: WOUNDCX  No results found for: STOOLCX  No components found for: BODYFLD    Radiology Data:   Imaging Results (Last 24  Hours)     ** No results found for the last 24 hours. **          I have reviewed the patient's current medications.     Assessment/Plan     Active Problems:    DKA, type 1 (HCC)    DKA (diabetic ketoacidosis) (HCC)    Diabetic ketoacidosis without coma associated with type 1 diabetes mellitus (HCC)  Vaginal bleeding status post miscarriage    Plan:  - DKA has resolved, continue for for signs of recurrence  - Continue IV fluids  - Continue monitoring with glucose checks and continue current insulin orders in place  - Continue other home medications as appropriate  - Toradol added to her as needed pain medications that she may be having some uterine cramping given her recent miscarriage  - She will need repeat beta-hCG level in 1 week  - If continues to improve then likely home tomorrow  - CODE STATUS: Full      I confirmed that the patient's Advance Care Plan is present, code status is documented, or surrogate decision maker is listed in the patient's medical record.     Discharge Planning: In process.    Fernando Colunga MD

## 2022-11-05 NOTE — ED NOTES
Nursing report ED to floor  Fernanda Patterson  22 y.o.  female    HPI:   Chief Complaint   Patient presents with    Vaginal Bleeding - Pregnant    Back Pain    Fever       Admitting doctor:   Saeid Behroozi, MD    Consulting provider(s):  Consults       Date and Time Order Name Status Description    11/4/2022  1:33 AM Hospitalist (on-call MD unless specified)      11/4/2022  1:32 AM Obstetrics 1st Trimester Unassigned (on-call MD unless specified) Completed              Admitting diagnosis:   The primary encounter diagnosis was Diabetic ketoacidosis without coma associated with type 1 diabetes mellitus (HCC). Diagnoses of Vaginal bleeding and Miscarriage were also pertinent to this visit.    Code status:   Current Code Status       Date Active Code Status Order ID Comments User Context       11/4/2022 0745 CPR (Attempt to Resuscitate) [id number]  Behroozi, Saeid, MD ED        Question Answer    Code Status (Patient has no pulse and is not breathing) CPR (Attempt to Resuscitate)    Medical Interventions (Patient has pulse or is breathing) Full Support    Release to patient Routine Release                    Allergies:   Pineapple and Benzoyl peroxide    Intake and Output    Intake/Output Summary (Last 24 hours) at 11/5/2022 0626  Last data filed at 11/5/2022 0346  Gross per 24 hour   Intake 2400 ml   Output --   Net 2400 ml       Weight:       11/03/22  1846   Weight: 59 kg (130 lb)       Most recent vitals:   Vitals:    11/04/22 2325 11/05/22 0123 11/05/22 0244 11/05/22 0535   BP: 103/60 99/62 133/85 119/67   BP Location: Left arm   Left arm   Patient Position: Lying   Sitting   Pulse: 86 90 90 82   Resp: 17 16 18 16   Temp:       TempSrc:       SpO2: 98% 100% 98% 100%   Weight:       Height:         Oxygen Therapy: ra    Active LDAs/IV Access:   Lines, Drains & Airways       Active LDAs       Name Placement date Placement time Site Days    Peripheral IV 11/03/22 2119 Anterior;Right Hand 11/03/22 2119  Hand  1     Peripheral IV 11/04/22 0039 Right Antecubital 11/04/22 0039  Antecubital  1                    Labs (abnormal labs have a star):   Labs Reviewed   URINALYSIS W/ CULTURE IF INDICATED - Abnormal; Notable for the following components:       Result Value    Glucose, UA >=1000 mg/dL (3+) (*)     Ketones, UA >=160 mg/dL (4+) (*)     Blood, UA Large (3+) (*)     Protein, UA 30 mg/dL (1+) (*)     All other components within normal limits    Narrative:     In absence of clinical symptoms, the presence of pyuria, bacteria, and/or nitrites on the urinalysis result does not correlate with infection.   COMPREHENSIVE METABOLIC PANEL - Abnormal; Notable for the following components:    Glucose 482 (*)     Creatinine 1.44 (*)     Sodium 131 (*)     Chloride 97 (*)     CO2 6.0 (*)     Anion Gap 28.0 (*)     eGFR 52.5 (*)     All other components within normal limits    Narrative:     GFR Normal >60  Chronic Kidney Disease <60  Kidney Failure <15     CBC WITH AUTO DIFFERENTIAL - Abnormal; Notable for the following components:    Neutrophil % 83.5 (*)     Lymphocyte % 9.8 (*)     Eosinophil % 0.0 (*)     Immature Grans % 1.1 (*)     Neutrophils, Absolute 7.53 (*)     Immature Grans, Absolute 0.10 (*)     All other components within normal limits   URINALYSIS, MICROSCOPIC ONLY - Abnormal; Notable for the following components:    RBC, UA 31-50 (*)     All other components within normal limits   BLOOD GAS, ARTERIAL - Abnormal; Notable for the following components:    pH, Arterial 7.091 (*)     pCO2, Arterial 16.4 (*)     pO2, Arterial 125.0 (*)     HCO3, Arterial 5.0 (*)     Base Excess, Arterial -22.9 (*)     All other components within normal limits   MAGNESIUM - Abnormal; Notable for the following components:    Magnesium 1.5 (*)     All other components within normal limits   OSMOLALITY, SERUM - Abnormal; Notable for the following components:    Osmolality 307 (*)     All other components within normal limits   HEMOGLOBIN A1C -  Abnormal; Notable for the following components:    Hemoglobin A1C 9.40 (*)     All other components within normal limits    Narrative:     Hemoglobin A1C Ranges:    Increased Risk for Diabetes  5.7% to 6.4%  Diabetes                     >= 6.5%  Diabetic Goal                < 7.0%   BASIC METABOLIC PANEL - Abnormal; Notable for the following components:    Glucose 414 (*)     Creatinine 1.28 (*)     Sodium 129 (*)     Potassium 5.4 (*)     CO2 3.0 (*)     Calcium 8.3 (*)     Anion Gap 27.0 (*)     All other components within normal limits    Narrative:     GFR Normal >60  Chronic Kidney Disease <60  Kidney Failure <15     MAGNESIUM - Abnormal; Notable for the following components:    Magnesium 1.5 (*)     All other components within normal limits   BASIC METABOLIC PANEL - Abnormal; Notable for the following components:    Glucose 149 (*)     Creatinine 1.12 (*)     CO2 12.0 (*)     Calcium 8.1 (*)     Anion Gap 17.0 (*)     All other components within normal limits    Narrative:     GFR Normal >60  Chronic Kidney Disease <60  Kidney Failure <15     CBC (NO DIFF) - Abnormal; Notable for the following components:    Hemoglobin 10.9 (*)     Hematocrit 33.9 (*)     All other components within normal limits   BASIC METABOLIC PANEL - Abnormal; Notable for the following components:    Glucose 194 (*)     Creatinine 1.13 (*)     Sodium 135 (*)     Chloride 108 (*)     CO2 12.0 (*)     Calcium 7.5 (*)     All other components within normal limits    Narrative:     GFR Normal >60  Chronic Kidney Disease <60  Kidney Failure <15     BNP (IN-HOUSE) - Abnormal; Notable for the following components:    proBNP 1,480.0 (*)     All other components within normal limits    Narrative:     Among patients with dyspnea, NT-proBNP is highly sensitive for the detection of acute congestive heart failure. In addition NT-proBNP of <300 pg/ml effectively rules out acute congestive heart failure with 99% negative predictive value.    Results may  be falsely decreased if patient taking Biotin.     BASIC METABOLIC PANEL - Abnormal; Notable for the following components:    Glucose 191 (*)     Creatinine 1.11 (*)     Sodium 134 (*)     Chloride 108 (*)     CO2 14.0 (*)     Calcium 7.8 (*)     All other components within normal limits    Narrative:     GFR Normal >60  Chronic Kidney Disease <60  Kidney Failure <15     PHOSPHORUS - Abnormal; Notable for the following components:    Phosphorus 2.1 (*)     All other components within normal limits   BASIC METABOLIC PANEL - Abnormal; Notable for the following components:    Glucose 142 (*)     Chloride 109 (*)     CO2 15.0 (*)     Calcium 7.8 (*)     All other components within normal limits    Narrative:     GFR Normal >60  Chronic Kidney Disease <60  Kidney Failure <15     PHOSPHORUS - Abnormal; Notable for the following components:    Phosphorus 1.4 (*)     All other components within normal limits   BASIC METABOLIC PANEL - Abnormal; Notable for the following components:    Glucose 125 (*)     Creatinine 1.01 (*)     Chloride 110 (*)     CO2 19.0 (*)     Calcium 7.7 (*)     All other components within normal limits    Narrative:     GFR Normal >60  Chronic Kidney Disease <60  Kidney Failure <15     BLOOD GAS, ARTERIAL - Abnormal; Notable for the following components:    pH, Arterial 7.273 (*)     pCO2, Arterial 29.9 (*)     pO2, Arterial 114.0 (*)     HCO3, Arterial 13.8 (*)     Base Excess, Arterial -11.8 (*)     O2 Saturation, Arterial 99.5 (*)     All other components within normal limits   POCT GLUCOSE FINGERSTICK - Abnormal; Notable for the following components:    Glucose 350 (*)     All other components within normal limits   POCT GLUCOSE FINGERSTICK - Abnormal; Notable for the following components:    Glucose 387 (*)     All other components within normal limits   POCT GLUCOSE FINGERSTICK - Abnormal; Notable for the following components:    Glucose 342 (*)     All other components within normal limits    POCT GLUCOSE FINGERSTICK - Abnormal; Notable for the following components:    Glucose 352 (*)     All other components within normal limits   POCT GLUCOSE FINGERSTICK - Abnormal; Notable for the following components:    Glucose 307 (*)     All other components within normal limits   POCT GLUCOSE FINGERSTICK - Abnormal; Notable for the following components:    Glucose 236 (*)     All other components within normal limits   POCT GLUCOSE FINGERSTICK - Abnormal; Notable for the following components:    Glucose 171 (*)     All other components within normal limits   POCT GLUCOSE FINGERSTICK - Abnormal; Notable for the following components:    Glucose 139 (*)     All other components within normal limits   POCT GLUCOSE FINGERSTICK - Abnormal; Notable for the following components:    Glucose 179 (*)     All other components within normal limits   POCT GLUCOSE FINGERSTICK - Abnormal; Notable for the following components:    Glucose 198 (*)     All other components within normal limits   POCT GLUCOSE FINGERSTICK - Abnormal; Notable for the following components:    Glucose 183 (*)     All other components within normal limits   POCT GLUCOSE FINGERSTICK - Abnormal; Notable for the following components:    Glucose 161 (*)     All other components within normal limits   POCT GLUCOSE FINGERSTICK - Abnormal; Notable for the following components:    Glucose 159 (*)     All other components within normal limits   POCT GLUCOSE FINGERSTICK - Abnormal; Notable for the following components:    Glucose 157 (*)     All other components within normal limits   POCT GLUCOSE FINGERSTICK - Abnormal; Notable for the following components:    Glucose 150 (*)     All other components within normal limits   POCT GLUCOSE FINGERSTICK - Abnormal; Notable for the following components:    Glucose 144 (*)     All other components within normal limits   POCT GLUCOSE FINGERSTICK - Abnormal; Notable for the following components:    Glucose 141 (*)     All  other components within normal limits   LIPASE - Normal   CK - Normal   PHOSPHORUS - Normal   PHOSPHORUS - Normal   MAGNESIUM - Normal   PHOSPHORUS - Normal   TSH - Normal   MAGNESIUM - Normal   PHOSPHORUS - Normal   MAGNESIUM - Normal   MAGNESIUM - Normal   MAGNESIUM - Normal   PHOSPHORUS - Normal   POCT GLUCOSE FINGERSTICK - Normal   POCT GLUCOSE FINGERSTICK - Normal   POCT GLUCOSE FINGERSTICK - Normal   POCT GLUCOSE FINGERSTICK - Normal   HCG, QUANTITATIVE, PREGNANCY    Narrative:     HCG Ranges by Gestational Age    Females - non-pregnant premenopausal   </= 1mIU/mL HCG  Females - postmenopausal               </= 7mIU/mL HCG    3 Weeks         5.8 -    71.2 mIU/mL  4 Weeks         9.5 -     750 mIU/mL  5 Weeks         217 -   7,138 mIU/mL  6 Weeks         158 -  31,795 mIU/mL  7 Weeks       3,697 - 163,563 mIU/mL  8 Weeks      32,065 - 149,571 mIU/mL  9 Weeks      63,803 - 151,410 mIU/mL  10 Weeks     46,509 - 186,977 mIU/mL  12 Weeks     27,832 - 210,612 mIU/mL  14 Weeks     13,950 -  62,530 mIU/mL  15 Weeks     12,039 -  70,971 mIU/mL  16 Weeks      9,040 -  56,451 mIU/mL  17 Weeks      8,175 -  55,868 mIU/mL  18 Weeks      8,099 -  58,176 mIU/mL  Results may be falsely decreased if patient taking Biotin.     BLOOD GAS, ARTERIAL   BLOOD GAS, ARTERIAL   POCT GLUCOSE FINGERSTICK   POCT GLUCOSE FINGERSTICK   POCT GLUCOSE FINGERSTICK   POCT GLUCOSE FINGERSTICK   POCT GLUCOSE FINGERSTICK   POCT GLUCOSE FINGERSTICK   POCT GLUCOSE FINGERSTICK   POCT GLUCOSE FINGERSTICK   CBC AND DIFFERENTIAL    Narrative:     The following orders were created for panel order CBC & Differential.  Procedure                               Abnormality         Status                     ---------                               -----------         ------                     CBC Auto Differential[[id number]]        Abnormal            Final result                 Please view results for these tests on the individual orders.   EXTRA TUBES     Narrative:     The following orders were created for panel order Extra Tubes.  Procedure                               Abnormality         Status                     ---------                               -----------         ------                     The Kernel Top[[id number]]                                     Final result                 Please view results for these tests on the individual orders.   LAVENDER TOP       Meds given in ED:   Medications   sodium chloride 0.9 % bolus (1,000 mL/hr Intravenous Not Given 11/4/22 0656)   acetaminophen (TYLENOL) tablet 650 mg (650 mg Oral Not Given 11/4/22 0236)   ondansetron (ZOFRAN) injection 4 mg (4 mg Intravenous Given 11/4/22 0833)   pantoprazole (PROTONIX) injection 40 mg (40 mg Intravenous Given 11/4/22 1037)   magnesium sulfate 2g/50 mL (PREMIX) infusion (0 g Intravenous Stopped 11/4/22 1256)   Magnesium Sulfate 2 gram Bolus, followed by 8 gram infusion (total Mg dose 10 grams)- Mg less than or equal to 1mg/dL ( Intravenous Not Given:  See Alt 11/5/22 0226)     Or   Magnesium Sulfate 2 gram / 50mL Infusion (GIVE X 3 BAGS TO EQUAL 6GM TOTAL DOSE) - Mg 1.1 - 1.5 mg/dl ( Intravenous Not Given:  See Alt 11/5/22 0226)     Or   Magnesium Sulfate 4 gram infusion- Mg 1.6-1.9 mg/dL (0 g Intravenous Stopped 11/5/22 0226)   morphine injection 2 mg (2 mg Intravenous Given 11/5/22 0532)   insulin detemir (LEVEMIR) injection 10 Units (10 Units Subcutaneous Given 11/5/22 0617)   dextrose (D50W) (25 g/50 mL) IV injection 25 g (has no administration in time range)   glucagon (human recombinant) (GLUCAGEN DIAGNOSTIC) injection 1 mg (has no administration in time range)   Insulin Aspart (novoLOG) injection 0-14 Units (has no administration in time range)   sodium chloride 0.45 % infusion (100 mL/hr Intravenous New Bag 11/5/22 0617)   sodium chloride 0.9 % bolus 1,000 mL (0 mL Intravenous Stopped 11/4/22 0140)   sodium chloride 0.9 % bolus 1,000 mL (0 mL Intravenous Stopped 11/4/22  0054)   ondansetron (ZOFRAN) injection 4 mg (4 mg Intravenous Given 11/3/22 2242)   sodium chloride 0.9 % bolus 1,000 mL (0 mL Intravenous Stopped 11/4/22 0237)   insulin regular (humuLIN R,novoLIN R) injection 6 Units (6 Units Intravenous Given 11/4/22 0024)   morphine injection 2 mg (2 mg Intravenous Given 11/4/22 0420)   sodium bicarbonate injection 8.4% 50 mEq (50 mEq Intravenous Given 11/5/22 0231)   Potassium Phosphates 30 mmol in sodium chloride 0.9 % 500 mL infusion (0 mmol Intravenous Stopped 11/5/22 0346)     sodium chloride, 100 mL/hr, Last Rate: 100 mL/hr (11/05/22 0617)         NIH Stroke Scale:       Isolation/Infection(s):  No active isolations   No active infections     COVID Testing  Collected not tested  Resulted na    Nursing report ED to floor:  Mental status: alert and oriented  Ambulatory status: self  Precautions: none    ED nurse phone [phone]- [phone]

## 2022-11-06 VITALS
TEMPERATURE: 97.2 F | RESPIRATION RATE: 18 BRPM | BODY MASS INDEX: 20.2 KG/M2 | WEIGHT: 128.7 LBS | HEART RATE: 70 BPM | HEIGHT: 67 IN | OXYGEN SATURATION: 99 % | SYSTOLIC BLOOD PRESSURE: 124 MMHG | DIASTOLIC BLOOD PRESSURE: 83 MMHG

## 2022-11-06 PROBLEM — O03.9 MISCARRIAGE: Status: ACTIVE | Noted: 2022-11-06

## 2022-11-06 LAB
ALBUMIN SERPL-MCNC: 3 G/DL (ref 3.5–5.2)
ALBUMIN/GLOB SERPL: 1.4 G/DL
ALP SERPL-CCNC: 44 U/L (ref 39–117)
ALT SERPL W P-5'-P-CCNC: 9 U/L (ref 1–33)
ANION GAP SERPL CALCULATED.3IONS-SCNC: 9 MMOL/L (ref 5–15)
AST SERPL-CCNC: 14 U/L (ref 1–32)
BILIRUB SERPL-MCNC: <0.2 MG/DL (ref 0–1.2)
BUN SERPL-MCNC: 6 MG/DL (ref 6–20)
BUN/CREAT SERPL: 7.7 (ref 7–25)
CALCIUM SPEC-SCNC: 7.9 MG/DL (ref 8.6–10.5)
CHLORIDE SERPL-SCNC: 109 MMOL/L (ref 98–107)
CO2 SERPL-SCNC: 20 MMOL/L (ref 22–29)
CREAT SERPL-MCNC: 0.78 MG/DL (ref 0.57–1)
DEPRECATED RDW RBC AUTO: 42.4 FL (ref 37–54)
EGFRCR SERPLBLD CKD-EPI 2021: 109.6 ML/MIN/1.73
ERYTHROCYTE [DISTWIDTH] IN BLOOD BY AUTOMATED COUNT: 13.9 % (ref 12.3–15.4)
GLOBULIN UR ELPH-MCNC: 2.2 GM/DL
GLUCOSE BLDC GLUCOMTR-MCNC: 101 MG/DL (ref 70–130)
GLUCOSE BLDC GLUCOMTR-MCNC: 181 MG/DL (ref 70–130)
GLUCOSE BLDC GLUCOMTR-MCNC: 64 MG/DL (ref 70–130)
GLUCOSE BLDC GLUCOMTR-MCNC: 84 MG/DL (ref 70–130)
GLUCOSE SERPL-MCNC: 89 MG/DL (ref 65–99)
HCT VFR BLD AUTO: 27.3 % (ref 34–46.6)
HGB BLD-MCNC: 9.5 G/DL (ref 12–15.9)
MAGNESIUM SERPL-MCNC: 1.6 MG/DL (ref 1.6–2.6)
MCH RBC QN AUTO: 28.9 PG (ref 26.6–33)
MCHC RBC AUTO-ENTMCNC: 34.8 G/DL (ref 31.5–35.7)
MCV RBC AUTO: 83 FL (ref 79–97)
PHOSPHATE SERPL-MCNC: 2.5 MG/DL (ref 2.5–4.5)
PLATELET # BLD AUTO: 195 10*3/MM3 (ref 140–450)
PMV BLD AUTO: 9.8 FL (ref 6–12)
POTASSIUM SERPL-SCNC: 3.5 MMOL/L (ref 3.5–5.2)
PROT SERPL-MCNC: 5.2 G/DL (ref 6–8.5)
RBC # BLD AUTO: 3.29 10*6/MM3 (ref 3.77–5.28)
SODIUM SERPL-SCNC: 138 MMOL/L (ref 136–145)
WBC NRBC COR # BLD: 3.97 10*3/MM3 (ref 3.4–10.8)

## 2022-11-06 PROCEDURE — 36415 COLL VENOUS BLD VENIPUNCTURE: CPT | Performed by: INTERNAL MEDICINE

## 2022-11-06 PROCEDURE — 82962 GLUCOSE BLOOD TEST: CPT

## 2022-11-06 PROCEDURE — 25010000002 MORPHINE PER 10 MG: Performed by: STUDENT IN AN ORGANIZED HEALTH CARE EDUCATION/TRAINING PROGRAM

## 2022-11-06 PROCEDURE — 84100 ASSAY OF PHOSPHORUS: CPT | Performed by: INTERNAL MEDICINE

## 2022-11-06 PROCEDURE — 80053 COMPREHEN METABOLIC PANEL: CPT | Performed by: INTERNAL MEDICINE

## 2022-11-06 PROCEDURE — 83735 ASSAY OF MAGNESIUM: CPT | Performed by: INTERNAL MEDICINE

## 2022-11-06 PROCEDURE — 85027 COMPLETE CBC AUTOMATED: CPT | Performed by: INTERNAL MEDICINE

## 2022-11-06 PROCEDURE — 99221 1ST HOSP IP/OBS SF/LOW 40: CPT | Performed by: PSYCHIATRY & NEUROLOGY

## 2022-11-06 RX ORDER — TRAMADOL HYDROCHLORIDE 50 MG/1
50 TABLET ORAL EVERY 8 HOURS PRN
Status: DISCONTINUED | OUTPATIENT
Start: 2022-11-06 | End: 2022-11-06 | Stop reason: HOSPADM

## 2022-11-06 RX ORDER — TRAMADOL HYDROCHLORIDE 50 MG/1
50 TABLET ORAL EVERY 8 HOURS PRN
Qty: 6 TABLET | Refills: 0 | Status: SHIPPED | OUTPATIENT
Start: 2022-11-06 | End: 2022-11-08

## 2022-11-06 RX ADMIN — SODIUM CHLORIDE 100 ML/HR: 4.5 INJECTION, SOLUTION INTRAVENOUS at 03:50

## 2022-11-06 RX ADMIN — PANTOPRAZOLE SODIUM 40 MG: 40 INJECTION, POWDER, FOR SOLUTION INTRAVENOUS at 07:30

## 2022-11-06 RX ADMIN — TRAMADOL HYDROCHLORIDE 50 MG: 50 TABLET, COATED ORAL at 11:22

## 2022-11-06 RX ADMIN — MORPHINE SULFATE 2 MG: 2 INJECTION, SOLUTION INTRAMUSCULAR; INTRAVENOUS at 07:07

## 2022-11-06 RX ADMIN — MORPHINE SULFATE 2 MG: 2 INJECTION, SOLUTION INTRAMUSCULAR; INTRAVENOUS at 01:03

## 2022-11-06 NOTE — PLAN OF CARE
Goal Outcome Evaluation:         Pt v/s are stable. C/o pain to lower abd and back. Prn pain medication given-effective per pt. Pt states that her evening Levemir dose is not correct - 10 units ordered plus s/s Novolog. Pts states she takes 28 units of Levemir plus s/s Novolog. On call hospitalist was notified-no new orders at this time.

## 2022-11-06 NOTE — CONSULTS
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   Result Value Ref Range    Magnesium 2.3 1.6 - 2.6 mg/dL   Phosphorus    Collection Time: 11/05/22  4:21 AM    Specimen: Blood   Result Value Ref Range    Phosphorus 4.0 2.5 - 4.5 mg/dL   POC Glucose Once    Collection Time: 11/05/22  5:04 AM    Specimen: Blood   Result Value Ref Range    Glucose 120 70 - 130 mg/dL   POC Glucose Once    Collection Time: 11/05/22  8:58 AM    Specimen: Blood   Result Value Ref Range    Glucose 116 70 - 130 mg/dL   POC Glucose Once    Collection Time: 11/05/22 10:37 AM    Specimen: Blood   Result Value Ref Range    Glucose 104 70 - 130 mg/dL   POC Glucose Once    Collection Time: 11/05/22  4:25 PM    Specimen: Blood   Result Value Ref Range    Glucose 84 70 - 130 mg/dL   POC Glucose Once    Collection Time: 11/05/22  8:06 PM    Specimen: Blood   Result Value Ref Range    Glucose 181 (H) 70 - 130 mg/dL   POC Glucose Once    Collection Time: 11/06/22  4:46 AM    Specimen: Blood   Result Value Ref Range    Glucose 101 70 - 130 mg/dL   CBC (No Diff)    Collection Time: 11/06/22  5:02 AM    Specimen: Blood   Result Value Ref Range    WBC 3.97 3.40 - 10.80 10*3/mm3    RBC 3.29 (L) 3.77 - 5.28 10*6/mm3    Hemoglobin 9.5 (L) 12.0 - 15.9 g/dL    Hematocrit 27.3 (L) 34.0 - 46.6 %    MCV 83.0 79.0 - 97.0 fL    MCH 28.9 26.6 - 33.0 pg    MCHC 34.8 31.5 - 35.7 g/dL    RDW 13.9 12.3 - 15.4 %    RDW-SD  42.4 37.0 - 54.0 fl    MPV 9.8 6.0 - 12.0 fL    Platelets 195 140 - 450 10*3/mm3   Comprehensive Metabolic Panel    Collection Time: 11/06/22  5:02 AM    Specimen: Blood   Result Value Ref Range    Glucose 89 65 - 99 mg/dL    BUN 6 6 - 20 mg/dL    Creatinine 0.78 0.57 - 1.00 mg/dL    Sodium 138 136 - 145 mmol/L    Potassium 3.5 3.5 - 5.2 mmol/L    Chloride 109 (H) 98 - 107 mmol/L    CO2 20.0 (L) 22.0 - 29.0 mmol/L    Calcium 7.9 (L) 8.6 - 10.5 mg/dL    Total Protein 5.2 (L) 6.0 - 8.5 g/dL    Albumin 3.00 (L) 3.50 - 5.20 g/dL    ALT (SGPT) 9 1 - 33 U/L    AST (SGOT) 14 1 - 32 U/L    Alkaline Phosphatase 44 39 - 117 U/L    Total Bilirubin <0.2 0.0 - 1.2 mg/dL    Globulin 2.2 gm/dL    A/G Ratio 1.4 g/dL    BUN/Creatinine Ratio 7.7 7.0 - 25.0    Anion Gap 9.0 5.0 - 15.0 mmol/L    eGFR 109.6 >60.0 mL/min/1.73   Magnesium    Collection Time: 11/06/22  5:02 AM    Specimen: Blood   Result Value Ref Range    Magnesium 1.6 1.6 - 2.6 mg/dL   Phosphorus    Collection Time: 11/06/22  5:02 AM    Specimen: Blood   Result Value Ref Range    Phosphorus 2.5 2.5 - 4.5 mg/dL   POC Glucose Once    Collection Time: 11/06/22  6:43 AM    Specimen: Blood   Result Value Ref Range    Glucose 84 70 - 130 mg/dL   POC Glucose Once    Collection Time: 11/06/22 10:07 AM    Specimen: Blood   Result Value Ref Range    Glucose 64 (L) 70 - 130 mg/dL       Adult Transthoracic Echo Complete W/ Cont if Necessary Per Protocol    Result Date: 11/4/2022  Narrative: •  Left ventricular systolic function is normal. Left ventricular ejection fraction appears to be 61 - 65%. •  Left ventricular diastolic function was normal. •  Estimated right ventricular systolic pressure from tricuspid regurgitation is normal (<35 mmHg).     US Ob Transvaginal    Addendum Date: 11/4/2022 Addendum:    ADDENDUM ADDENDUM #1 Impression 1-3 discussed with Dr. Hu by Dr. Baker at 12:37 AM CDT on 11/4/2022. Electronically signed by:  Ira Veloz MD  11/4/2022 1:03 AM CDT  Workstation: [id number]     Result Date: 11/4/2022  Narrative: EXAM:   US Pregnancy, Transvaginal CLINICAL HISTORY:   The patient is 22 years old and is Female; pregnant, vaginal bleeding TECHNIQUE:   Real-time transvaginal obstetrical ultrasound of the maternal pelvis and a first trimester pregnancy with image documentation. Transvaginal imaging was used for better evaluation of the fetus and adnexa. COMPARISON:   Pelvic ultrasound 8/8/2018 FINDINGS:   GESTATION: Intrauterine pregnancy is not visualized.   PLACENTA/AMNIOTIC FLUID:  Cannot be adequately evaluated due to the early gestational age.   UTERUS/CERVIX:  Retroflexed uterus measures 8.7 x 5.1 x 6.2 cm.  No myometrial mass.   OVARIES:  Right ovary measures 3.2 x 1.6 x 2.9 cm. Vascularity within the right ovary is not detected.  Left ovary measures 3.9 x 3.9 x 2.4 cm. 0.9 x 0.8 x 0.8 cm complex cyst. Left ovarian 0.6 x 0.5 x 0.5 cm cyst within a cyst. No peripheral vascularity surrounding cyst within a cyst.   FREE FLUID:  Small fluid in the bilateral adnexa, right greater than left.   VASCULATURE:  Increased left parametrial vasculature.     Impression: 1. In the setting of a positive pregnancy test, findings consistent with pregnancy of unknown location. 2. Right ovarian vascularity is not detected. Findings may secondary to torsion versus technique. Recommend clinical correlation and gynecology consultation. 3. Left ovarian 0.6 cm cyst within a cyst. Electronically signed by:  Ira Veloz MD  11/4/2022 12:36 AM CDT Workstation: [id number]    XR Chest 1 View    Result Date: 10/31/2022  Narrative: EXAM: Single XR view of the chest INDICATION: hyperglycemia COMPARISON: Radiograph from 3/27/2022 FINDINGS: The cardiomediastinal silhouette is within normal limits. No evidence of pleural effusion, pneumothorax, or focal consolidation.     Impression: No acute cardiopulmonary findings. Electronically signed by:  Edouard Hanley MD  10/31/2022 11:00 PM CDT  Workstation: [id number]      No results found for: GLUF     Lab Results   Component Value Date    HGBA1C 9.40 (H) 11/04/2022       No results found for: CHOL, TRIG, HDL, LDL, VLDL, LDLHDL     TSH   Date Value Ref Range Status   11/04/2022 0.625 0.270 - 4.200 uIU/mL Final       No results found for: QNUI10JF, [id number], FOLATE    Lab Results   Component Value Date    IRON 81 08/12/2019    TIBC 250 (L) 08/12/2019    FERRITIN 23.1 11/12/2014       Estimated Creatinine Clearance: 103.4 mL/min (by C-G formula based on SCr of 0.78 mg/dL).        Assessment & Plan     --> Diagnostic Impression: Ms. Patterson  is a 22 y.o. female consulted for depression in the setting of recent miscarriage.  Patient is not interested in behavioral health services at this time.  Denies suicidality in a convincing, consistent and adamant manner.  Does not meet commitment criteria.  When she is transactional in her approach which is consistent with prior interactions.      Assessment:    DKA, type 1 (HCC)    DKA (diabetic ketoacidosis) (HCC)    Diabetic ketoacidosis without coma associated with type 1 diabetes mellitus (HCC)    --Affective disorder  - Grief reaction      Recommendations:  --DKA: Deferred to concur with the primary team.    - Affective disorder and grief: Continue to encourage consideration for behavioral health intervention.  We will set up with [facility] to provide a further resource if she were to change her mind.  Also have provided contact information on after visit summary/discharge instructions.    --Therapy provided: Effectively none.      All questions answered for the patient.    Impression and recommendations discussed with nursing staff and primary team.    Psychiatry will sign off.      Thank you for this consult.  Please contact me with any further questions or concerns.       Kalia Bowers II, MD  Psychiatry & Behavioral Sciences  11/06/22  12:18 CST  Dictated using Dragon.          Risk Assessment &  Patient's Condition --  Currently, Ms. Fernanda Patterson is not suicidal, feels fairly hopeful about the future, and has made some specific future plans like returning home and moving forward with her life.  Protective factors identified include: family within the home; a sense of responsibility to self; positive support from family; resourcefulness; she is not psychotic nor agitated; sober; future orientated; has access to care; has access to outpatient follow-up.     However, given history of impulsiveness, substance use, health stressors and family stressors with a recent miscarriage, lack of willingness to engage in care.   ethnicity: it is probable that she will act impulsively at some point in life when stressed, ill or intoxicated, including the distinct possibility of suicide.  Unfortunately, this is a function of future acute stressors -- stressors over which I have no current control and not how she feels right now.    Given she is not currently suicidal now, our responsibility is to help decrease suicide risk as best as possible.  The best way to help is to refer for therapy and psychiatric visits  for long-term follow-up so she can have somewhere to go and someone to manage as symptoms and stressors develop.  Either she is declining follow-up we will provide resources because she will change her mind.    Contact information provided to [facility] and crisis line and she has been encouraged to contact with any questions or concerns.  24/7 care available ED also reviewed.  Teach back method utilized.    Currently, patient is not an imminent threat to self or others.

## 2022-11-06 NOTE — DISCHARGE INSTRUCTIONS
--Ensure that all mediations (including over the counter meds) are secured in a lock box and that you use a weekly pill planner.    --Ensure all weapons and sharps if present (including firearms, knives, razors) are secured (ideally in a gun safe or removed from home) and all ammo is secured and stored separately.    --Continue medications as currently prescribed.  --Continue follow-up with outpatient providers.  --Please contact our Behavioral Health Unit with any questions or concerns at [phone].  --Return to the ER with any suicidal or homicidal ideation, or worsening symptoms.    --The number for the National Suicide & Crisis Hotline is 988 or can call 1-920.747.5905.  The National Crisis Text number is 310-541.  These may be contacted at any time, if needed.  --Website of Resources for Therapy Workbooks: https://www.cci.health.[place].gov.[place]/Resources/Overview  ===============

## 2022-11-06 NOTE — PROGRESS NOTES
"Chief Complaint   Patient presents with   • Follow-up     Endo results       Subjective    Fernanda Patterson is a 22 y.o. female.    History of Present Illness  Patient presented to GI clinic for follow-up visit today.  Feels better currently.  Abdominal pain and nausea are improving but persistent.  Denied vomiting.  Bowel movements are once a day.  Weight is stable.  EGD was consistent with esophagitis and gastritis.  Path was consistent with reactive gastropathy.  Colonoscopy was consistent with hemorrhoids.  Path was unremarkable.       The following portions of the patient's history were reviewed and updated as appropriate:   Past Medical History:   Diagnosis Date   • ADHD    • Bipolar 1 disorder (HCC)    • Borderline personality disorder (HCC)    • Cannabinoid hyperemesis syndrome    • Diabetes mellitus type 1 (HCC)    • Diabetic gastroparesis (HCC)    • Diabetic ketoacidosis (HCC)    • Diabetic neuropathy (HCC)    • History of transfusion     Pt reports \"no reactions.\"   • Post traumatic stress disorder (PTSD)    • Recurrent UTI    • Seizure disorder (HCC)    • Severe depressed bipolar II disorder without psychotic features (HCC)    • Uncontrolled diabetes mellitus      Past Surgical History:   Procedure Laterality Date   •  SECTION N/A 2018   • COLONOSCOPY N/A 10/12/2022    Procedure: COLONOSCOPY;  Surgeon: Timur Sun MD;  Location: [facility] ENDOSCOPY;  Service: Gastroenterology;  Laterality: N/A;   • CYSTOSCOPY N/A 2020    Procedure: CYSTOSCOPY FLEXIBLE       (DONE ON STRETCHER);  Surgeon: Antonio Cowan MD;  Location: [facility] OR;  Service: Urology;  Laterality: N/A;   • ENDOSCOPY N/A 10/12/2022    Procedure: ESOPHAGOGASTRODUODENOSCOPY;  Surgeon: Timur Sun MD;  Location: [facility] ENDOSCOPY;  Service: Gastroenterology;  Laterality: N/A;     Family History   Problem Relation Age of Onset   • Drug abuse Father    • Suicide Attempts Brother    • Dementia Maternal Grandfather  "   • Hypertension Paternal Grandmother      OB History        2    Para   1    Term           1    AB        Living   1       SAB        IAB        Ectopic        Molar        Multiple   0    Live Births   1              Prior to Admission medications    Medication Sig Start Date End Date Taking? Authorizing Provider   Acetone, Urine, Test (Ketone Test) strip USE AS INDICATED 22  Yes Abigail Castro MD   Alcohol Swabs (Alcohol Wipes) 70 % pads Use 4 x daily 22  Yes Tavon Avila MD   B-D ULTRA-FINE 33 LANCETS misc Use 4 x daily , any brand covered by insurance 22  Yes Tavon Avila MD   Blood Glucose Monitoring Suppl w/Device kit USE AS INDICATED, ANY MONITOR , ICD10 code is E11.9 22  Yes Tavon Avila MD   clonazePAM (KlonoPIN) 0.5 MG tablet Take 2 tablets by mouth 3 (Three) Times a Day As Needed for Anxiety. 8/15/22  Yes Tavon Avila MD   Continuous Blood Gluc  (Dexcom G6 ) device USE AS DIRECTED FOR CONTINUOUS GLUCOSE MONITORING. 22  Yes Abigail Castro MD   Continuous Blood Gluc Sensor (Dexcom G6 Sensor) Use as directed for continuous glucose monitoring **Change sensor Every 10 (Ten) Days** 22  Yes Tavon Avila MD   Continuous Blood Gluc Sensor (Dexcom G6 Sensor) As Needed (glucose control). Every 10 days,  Use as directed for continuous glucose monitoring 22  Yes Tavon Avila MD   Continuous Blood Gluc Transmit (Dexcom G6 Transmitter) misc Use as directed for continuous glucose monitoring **Change transmitter Every 3 (Three) Months** 22  Yes Tavon Avila MD   Glucagon (Baqsimi One Pack) 3 MG/DOSE powder 1 each into the nostril(s) as directed by provider As Needed (Hypoglycemia). Apply intranasal if hypoglycemia 22  Yes Tavon Avila MD   Glucose Blood (BLOOD GLUCOSE TEST) strip Use 4 x daily use any brand covered by insurance or  same brand as before ICD10 code is E11.9 10/31/19  Yes Tavon Avila MD   ibuprofen (ADVIL,MOTRIN) 600 MG tablet TAKE 1 TABLET BY MOUTH EVERY 6 HOURS AS NEEDED FOR PAIN FOR UP TO 10 DAYS 9/2/22  Yes Abigail Castro MD   insulin aspart (NovoLOG FlexPen) 100 UNIT/ML solution pen-injector sc pen 10 units tid 6/27/22  Yes Tavon Avila MD   Insulin Aspart (novoLOG) 100 UNIT/ML injection 100 units daily through pump 6/27/22  Yes Tavon Avila MD   insulin detemir (Levemir FlexTouch) 100 UNIT/ML injection 22 units daily 6/27/22  Yes Tavon Avila MD   Insulin Disposable Pump (Omnipod 5 G6 Pod, Gen 5,) misc Change pods Every Other Day. 8/16/22  Yes Tavon Avila MD   Insulin Pen Needle (Pen Needles) 32G X 4 MM misc 1 each 4 (Four) Times a Day. Use 4 x daily, Dx code E11.65 6/27/22  Yes Tavon Avila MD   Lancet Devices (Lancing Device) misc USE AS INDICATED TO CORRELATE WITH STRIPS AND METER 6/27/22  Yes Tavon Avila MD   LORazepam (ATIVAN) 0.5 MG tablet Take 1 tablet by mouth Every 6 (Six) Hours As Needed. 8/24/22  Yes Abigail Castro MD   metoclopramide (REGLAN) 10 MG tablet Take up to 2 tabs po TID 6/27/22  Yes Tavon Avila MD   OLANZapine (zyPREXA) 10 MG tablet Take 1 tablet by mouth.   Yes Abigail Castro MD   omeprazole (priLOSEC) 40 MG capsule Take 1 capsule by mouth Daily. 9/14/22  Yes Timur Sun MD   promethazine (PHENERGAN) 12.5 MG tablet Take 1 tablet by mouth Every 6 (Six) Hours As Needed for Nausea or Vomiting. 6/27/22  Yes Tavon Avila MD   Urine Glucose-Ketones Test strip Use as indicated 6/27/22  Yes Tavon Avila MD   sucralfate (Carafate) 1 g tablet Take 1 tablet by mouth 4 (Four) Times a Day for 30 days. 10/19/22 11/18/22  Timur Sun MD     Allergies   Allergen Reactions   • Pineapple Anaphylaxis   • Benzoyl Peroxide Swelling     Social History  "    Socioeconomic History   • Marital status: Single   Tobacco Use   • Smoking status: Every Day     Packs/day: 1.00     Years: 2.00     Pack years: 2.00     Types: Cigarettes   • Smokeless tobacco: Never   Vaping Use   • Vaping Use: Former   Substance and Sexual Activity   • Alcohol use: Yes     Comment: none in 2 months   • Drug use: Not Currently     Frequency: 1.0 times per week     Types: Marijuana     Comment: none in a month   • Sexual activity: Defer     Partners: Male     Birth control/protection: None       Review of Systems  Review of Systems   Constitutional: Negative for chills, fatigue, fever and unexpected weight change.   HENT: Negative for congestion, ear discharge, hearing loss, nosebleeds and sore throat.    Eyes: Negative for pain, discharge and redness.   Respiratory: Negative for cough, chest tightness, shortness of breath and wheezing.    Cardiovascular: Negative for chest pain and palpitations.   Gastrointestinal: Positive for abdominal pain and nausea. Negative for abdominal distention, blood in stool, constipation, diarrhea and vomiting.   Endocrine: Negative for cold intolerance, polydipsia, polyphagia and polyuria.   Genitourinary: Negative for dysuria, flank pain, frequency, hematuria and urgency.   Musculoskeletal: Negative for arthralgias, back pain, joint swelling and myalgias.   Skin: Negative for color change, pallor and rash.   Neurological: Negative for tremors, seizures, syncope, weakness and headaches.   Hematological: Negative for adenopathy. Does not bruise/bleed easily.   Psychiatric/Behavioral: Negative for behavioral problems, confusion, dysphoric mood, hallucinations and suicidal ideas. The patient is not nervous/anxious.         /90 (BP Location: Other (Comment), Patient Position: Sitting) Comment (BP Location): left wrist-auto  Pulse 96   Ht 170.2 cm (67\")   Wt 59.6 kg (131 lb 6.4 oz)   LMP 09/27/2022 (Approximate)   BMI 20.58 kg/m²     Objective    Physical " Exam  Constitutional:       Appearance: She is well-developed.   HENT:      Head: Normocephalic and atraumatic.   Eyes:      Conjunctiva/sclera: Conjunctivae normal.      Pupils: Pupils are equal, round, and reactive to light.   Neck:      Thyroid: No thyromegaly.   Cardiovascular:      Rate and Rhythm: Normal rate and regular rhythm.      Heart sounds: Normal heart sounds. No murmur heard.  Pulmonary:      Effort: Pulmonary effort is normal.      Breath sounds: Normal breath sounds. No wheezing.   Abdominal:      General: Bowel sounds are normal. There is no distension.      Palpations: Abdomen is soft. There is no mass.      Tenderness: There is no abdominal tenderness.      Hernia: No hernia is present.   Genitourinary:     Comments: No lesions noted  Musculoskeletal:         General: No tenderness. Normal range of motion.      Cervical back: Normal range of motion and neck supple.   Lymphadenopathy:      Cervical: No cervical adenopathy.   Skin:     General: Skin is warm and dry.      Findings: No rash.   Neurological:      Mental Status: She is alert and oriented to person, place, and time.      Cranial Nerves: No cranial nerve deficit.   Psychiatric:         Thought Content: Thought content normal.       Admission on 10/12/2022, Discharged on 10/12/2022   Component Date Value Ref Range Status   • HCG, Urine QL 10/12/2022 Negative  Negative Final   • Reference Lab Report 10/12/2022    Final                    Value:[facility]  [address] [place] [address]  Phone: [phone] or [phone]  Fax: [phone]  Rajan Dhaliwal M.D., Medical Director    PATIENT NAME                           LABORATORY NO.  [id number]AYA.                   [id number]  [id number]                         AGE              SEX  SSN           CLIENT REF #  [facility]           22      2000  F    [id number]   [id number]    [facility]                        "REQUESTING M.D.     ATTENDING M.D.     COPY TO.  [address] [place] [address]                 FABIÁ[place] [address]             MAKIA  DATE COLLECTED      DATE RECEIVED      DATE REPORTED  10/12/2022          10/12/2022         10/14/2022    DIAGNOSIS:  A.   DUODENUM, BIOPSY:  No significant histologic abnormality  B.   GASTRIC ANTRUM, BIOPSY:  No significant histologic abnormality  C.   GE JUNCTION:  Reactive gastric and squamous mucosa  Negative for specialized                           Lopes's mucosa  D.   TERMINAL ILEUM BIOPSY:  No significant histologic abnormality  E.   COLON, BIOPSY, MUCOSA:  No significant histologic abnormality    JBS/sm    CLINICAL HISTORY:  Periumbilical abdominal pain, diarrhea, nausea, weight loss    SPECIMENS RECEIVED:  A.  DUODENUM, BIOPSY  B.  GASTRIC ANTRUM, BIOPSY  C.  GE JUNCTION  D.  TERMINAL ILEUM BIOPSY  E.  COLON, BIOPSY, MUCOSA    MICROSCOPIC DESCRIPTION:  Tissue blocks are prepared and slides are examined microscopically on all  specimens. See diagnosis for details.    Professional interpretation rendered by Clayton Blount M.D. at [facility]  [employer], [address] [place] [address].    GROSS DESCRIPTION:  A.  Specimen is received in 1 formalin filled container labeled \"duodenum\"  and consists of 3 portions of tan soft tissue measuring 0.5 x 0.5 x 0.2 cm in  aggregate.  Submitted entirely in 1 cassette.  MTH  B.  Specimen is received in 1 formalin filled container labeled \"gastric,  antrum\" and consists of 2 portions                           of tan soft tissue measuring 0.7 x 0.4 x  0.2 cm in aggregate.  Submitted entirely in 1 cassette.  C.  Specimen is received in 1 formalin filled container labeled \"EGJ\" and  consists of 2 portions of tan soft tissue measuring 0.4 x 0.4 x 0.2 cm in  aggregate.  Submitted entirely in 1 cassette.  D.  Specimen is received in 1 formalin filled container labeled \"TI biopsy\" and  consists of 3 " "portions of tan soft tissue measuring 0.4 x 0.4 x 0.2 cm in  aggregate.  Submitted entirely in 1 cassette.  E.  Specimen is received in 1 formalin filled container labeled \"colonic  mucosa biopsy\" and consists of 2 portions of tan soft tissue measuring 0.5  x 0.5 x 0.2 cm in aggregate.  Submitted entirely in 1 cassette.    REVIEWED, DIAGNOSED AND ELECTRONICALLY  SIGNED BY:    Clayton Blount M.D.  CPT CODES:  88305x5       Assessment & Plan      1. Nausea    1.  Abdominal pain with nausea and vomiting, improving but persistent.  Need to rule out gastroparesis and could also be due to marijuana induced functional GI disorder.  Continue Prilosec.  Add Carafate.  Obtain gastric emptying scan for further evaluation.  2.  Abdominal pain with diarrhea alternating with constipation, improving.  Continue high-fiber diet.  Continue MiraLAX as needed.  3.  Abdominal pain with weight loss, improving.  4.  Tobacco and marijuana usage, recommend cessation.      Orders placed during this encounter include:  Orders Placed This Encounter   Procedures   • NM Gastric Emptying     Standing Status:   Future     Standing Expiration Date:   10/19/2023     Order Specific Question:   Patient Pregnant     Answer:   No     Order Specific Question:   Is the patient breastfeeding?     Answer:   No       * Surgery not found *    Review and/or summary of lab tests, radiology, procedures, medications. Review and summary of old records and obtaining of history. The risks and benefits of my recommendations, as well as other treatment options were discussed with the patient today. Questions were answered.    New Medications Ordered This Visit   Medications   • sucralfate (Carafate) 1 g tablet     Sig: Take 1 tablet by mouth 4 (Four) Times a Day for 30 days.     Dispense:  120 tablet     Refill:  4       Follow-up: Return in about 1 month (around 11/19/2022).               Results for orders placed or performed during the hospital encounter of " 11/03/22   Urinalysis, Microscopic Only - Urine, Clean Catch    Specimen: Urine, Clean Catch   Result Value Ref Range    RBC, UA 31-50 (A) None Seen /HPF    WBC, UA 0-2 None Seen, 0-2, 3-5 /HPF    Bacteria, UA None Seen None Seen /HPF    Squamous Epithelial Cells, UA 0-2 None Seen, 0-2 /HPF    Hyaline Casts, UA 7-12 None Seen /LPF    Methodology Automated Microscopy    Urinalysis With Culture If Indicated - Urine, Clean Catch    Specimen: Urine, Clean Catch   Result Value Ref Range    Color, UA Yellow Yellow, Straw, Dark Yellow, [place]    Appearance, UA Clear Clear    pH, UA <=5.0 5.0 - 9.0    Specific Gravity, UA 1.023 1.003 - 1.030    Glucose, UA >=1000 mg/dL (3+) (A) Negative    Ketones, UA >=160 mg/dL (4+) (A) Negative    Bilirubin, UA Negative Negative    Blood, UA Large (3+) (A) Negative    Protein, UA 30 mg/dL (1+) (A) Negative    Leuk Esterase, UA Negative Negative    Nitrite, UA Negative Negative    Urobilinogen, UA 0.2 E.U./dL 0.2 - 1.0 E.U./dL   CBC Auto Differential    Specimen: Blood   Result Value Ref Range    WBC 9.01 3.40 - 10.80 10*3/mm3    RBC 4.49 3.77 - 5.28 10*6/mm3    Hemoglobin 12.5 12.0 - 15.9 g/dL    Hematocrit 38.1 34.0 - 46.6 %    MCV 84.9 79.0 - 97.0 fL    MCH 27.8 26.6 - 33.0 pg    MCHC 32.8 31.5 - 35.7 g/dL    RDW 13.4 12.3 - 15.4 %    RDW-SD 41.5 37.0 - 54.0 fl    MPV 10.1 6.0 - 12.0 fL    Platelets 294 140 - 450 10*3/mm3    Neutrophil % 83.5 (H) 42.7 - 76.0 %    Lymphocyte % 9.8 (L) 19.6 - 45.3 %    Monocyte % 5.0 5.0 - 12.0 %    Eosinophil % 0.0 (L) 0.3 - 6.2 %    Basophil % 0.6 0.0 - 1.5 %    Immature Grans % 1.1 (H) 0.0 - 0.5 %    Neutrophils, Absolute 7.53 (H) 1.70 - 7.00 10*3/mm3    Lymphocytes, Absolute 0.88 0.70 - 3.10 10*3/mm3    Monocytes, Absolute 0.45 0.10 - 0.90 10*3/mm3    Eosinophils, Absolute 0.00 0.00 - 0.40 10*3/mm3    Basophils, Absolute 0.05 0.00 - 0.20 10*3/mm3    Immature Grans, Absolute 0.10 (H) 0.00 - 0.05 10*3/mm3    nRBC 0.0 0.0 - 0.2 /100 WBC   Banner Top    Result Value Ref Range    Extra Tube hold for add-on    CBC (No Diff)    Specimen: Blood   Result Value Ref Range    WBC 9.62 3.40 - 10.80 10*3/mm3    RBC 3.91 3.77 - 5.28 10*6/mm3    Hemoglobin 10.9 (L) 12.0 - 15.9 g/dL    Hematocrit 33.9 (L) 34.0 - 46.6 %    MCV 86.7 79.0 - 97.0 fL    MCH 27.9 26.6 - 33.0 pg    MCHC 32.2 31.5 - 35.7 g/dL    RDW 13.5 12.3 - 15.4 %    RDW-SD 41.9 37.0 - 54.0 fl    MPV 10.6 6.0 - 12.0 fL    Platelets 262 140 - 450 10*3/mm3   POC Glucose Once    Specimen: Blood   Result Value Ref Range    Glucose 84 70 - 130 mg/dL   POC Glucose Once    Specimen: Blood   Result Value Ref Range    Glucose 104 70 - 130 mg/dL   POC Glucose Once    Specimen: Blood   Result Value Ref Range    Glucose 458 (C) 70 - 130 mg/dL   POC Glucose Once    Specimen: Blood   Result Value Ref Range    Glucose 116 70 - 130 mg/dL   POC Glucose Once    Specimen: Blood   Result Value Ref Range    Glucose 120 70 - 130 mg/dL   POC Glucose Once    Specimen: Blood   Result Value Ref Range    Glucose 141 (H) 70 - 130 mg/dL   POC Glucose Once    Specimen: Blood   Result Value Ref Range    Glucose 144 (H) 70 - 130 mg/dL   POC Glucose Once    Specimen: Blood   Result Value Ref Range    Glucose 150 (H) 70 - 130 mg/dL   POC Glucose Once    Specimen: Blood   Result Value Ref Range    Glucose 157 (H) 70 - 130 mg/dL   POC Glucose Once    Specimen: Blood   Result Value Ref Range    Glucose 159 (H) 70 - 130 mg/dL   POC Glucose Once    Specimen: Blood   Result Value Ref Range    Glucose 161 (H) 70 - 130 mg/dL   POC Glucose Once    Specimen: Blood   Result Value Ref Range    Glucose 183 (H) 70 - 130 mg/dL   POC Glucose Once    Specimen: Blood   Result Value Ref Range    Glucose 198 (H) 70 - 130 mg/dL   POC Glucose Once    Specimen: Blood   Result Value Ref Range    Glucose 179 (H) 70 - 130 mg/dL   POC Glucose Once    Specimen: Blood   Result Value Ref Range    Glucose 139 (H) 70 - 130 mg/dL   POC Glucose Once    Specimen: Blood   Result  Value Ref Range    Glucose 119 70 - 130 mg/dL   POC Glucose Once    Specimen: Blood   Result Value Ref Range    Glucose 114 70 - 130 mg/dL   POC Glucose Once    Specimen: Blood   Result Value Ref Range    Glucose 130 70 - 130 mg/dL   POC Glucose Once    Specimen: Blood   Result Value Ref Range    Glucose 171 (H) 70 - 130 mg/dL   POC Glucose Once    Specimen: Blood   Result Value Ref Range    Glucose 236 (H) 70 - 130 mg/dL   POC Glucose Once    Specimen: Blood   Result Value Ref Range    Glucose 307 (H) 70 - 130 mg/dL   POC Glucose Once    Specimen: Blood   Result Value Ref Range    Glucose 352 (H) 70 - 130 mg/dL   POC Glucose Once    Specimen: Blood   Result Value Ref Range    Glucose 342 (H) 70 - 130 mg/dL   POC Glucose Once    Specimen: Blood   Result Value Ref Range    Glucose 387 (H) 70 - 130 mg/dL   POC Glucose Once    Specimen: Blood   Result Value Ref Range    Glucose 350 (H) 70 - 130 mg/dL   hCG, Quantitative, Pregnancy    Specimen: Blood   Result Value Ref Range    HCG Quantitative 13.53 mIU/mL   TSH    Specimen: Blood   Result Value Ref Range    TSH 0.625 0.270 - 4.200 uIU/mL   Phosphorus    Specimen: Blood   Result Value Ref Range    Phosphorus 4.0 2.5 - 4.5 mg/dL   Phosphorus    Specimen: Blood   Result Value Ref Range    Phosphorus 1.4 (C) 2.5 - 4.5 mg/dL   Phosphorus    Specimen: Blood   Result Value Ref Range    Phosphorus 2.1 (L) 2.5 - 4.5 mg/dL   Phosphorus    Specimen: Blood   Result Value Ref Range    Phosphorus 2.6 2.5 - 4.5 mg/dL   Phosphorus    Specimen: Blood   Result Value Ref Range    Phosphorus 2.8 2.5 - 4.5 mg/dL   Phosphorus    Specimen: Blood   Result Value Ref Range    Phosphorus 4.3 2.5 - 4.5 mg/dL   Phosphorus    Specimen: Blood   Result Value Ref Range    Phosphorus 4.5 2.5 - 4.5 mg/dL   Osmolality, Serum    Specimen: Blood   Result Value Ref Range    Osmolality 307 (H) 275 - 295 mOsm/kg   BNP    Specimen: Blood   Result Value Ref Range    proBNP 1,480.0 (H) 0.0 - 450.0 pg/mL    Magnesium    Specimen: Blood   Result Value Ref Range    Magnesium 2.3 1.6 - 2.6 mg/dL   Magnesium    Specimen: Blood   Result Value Ref Range    Magnesium 2.3 1.6 - 2.6 mg/dL   Magnesium    Specimen: Blood   Result Value Ref Range    Magnesium 1.6 1.6 - 2.6 mg/dL   Magnesium    Specimen: Blood   Result Value Ref Range    Magnesium 1.7 1.6 - 2.6 mg/dL   Magnesium    Specimen: Blood   Result Value Ref Range    Magnesium 2.1 1.6 - 2.6 mg/dL   Magnesium    Specimen: Blood   Result Value Ref Range    Magnesium 1.5 (L) 1.6 - 2.6 mg/dL   Magnesium    Specimen: Blood   Result Value Ref Range    Magnesium 1.5 (L) 1.6 - 2.6 mg/dL   Lipase    Specimen: Blood   Result Value Ref Range    Lipase 13 13 - 60 U/L   Hemoglobin A1c    Specimen: Blood   Result Value Ref Range    Hemoglobin A1C 9.40 (H) 4.80 - 5.60 %   Blood Gas, Arterial -    Specimen: Arterial Blood   Result Value Ref Range    Site Right Radial     Drew's Test N/A     pH, Arterial 7.273 (C) 7.350 - 7.450 pH units    pCO2, Arterial 29.9 (L) 35.0 - 45.0 mm Hg    pO2, Arterial 114.0 (H) 83.0 - 108.0 mm Hg    HCO3, Arterial 13.8 (L) 20.0 - 26.0 mmol/L    Base Excess, Arterial -11.8 (L) 0.0 - 2.0 mmol/L    O2 Saturation, Arterial 99.5 (H) 94.0 - 99.0 %    Barometric Pressure for Blood Gas 745 mmHg    Modality Room Air     Ventilator Mode NA     Collected by CONNIE      *Note: Due to a large number of results and/or encounters for the requested time period, some results have not been displayed. A complete set of results can be found in Results Review.         This document has been electronically signed by Timur Sun MD on November 5, 2022 20:20 CDT

## 2022-11-06 NOTE — DISCHARGE INSTR - OTHER ORDERS
[facility]  Monday-Friday 8:30AM-4:00PM  [address] [place], [place] [id number]  [phone]    [facility] RESPOND  [phone]

## 2022-11-06 NOTE — DISCHARGE SUMMARY
[facility] Medicine Services  DISCHARGE SUMMARY       Date of Admission: 11/3/2022  Date of Discharge:  11/6/2022  Primary Care Physician: Rufus Marshall APRN    Presenting Problem/History of Present Illness:  Miscarriage [O03.9]  Vaginal bleeding [N93.9]  DKA (diabetic ketoacidosis) (HCC) [E11.10]  DKA, type 1 (HCC) [E10.10]  Diabetic ketoacidosis without coma associated with type 1 diabetes mellitus (HCC) [E10.10]     Final Discharge Diagnoses:  Active Hospital Problems    Diagnosis    • Miscarriage    • DKA, type 1 (HCC)    • DKA (diabetic ketoacidosis) (HCC)    • Diabetic ketoacidosis without coma associated with type 1 diabetes mellitus (HCC)        Consults:   Consults     Date and Time Order Name Status Description    11/6/2022 10:44 AM Inpatient Psychiatrist Consult      11/4/2022  1:33 AM Hospitalist (on-call MD unless specified)      11/4/2022  1:32 AM Obstetrics 1st Trimester Unassigned (on-call MD unless specified) Completed           Procedures Performed:                 Pertinent Test Results:   Lab Results (most recent)     Procedure Component Value Units Date/Time    POC Glucose Once [[id number]]  (Abnormal) Collected: 11/06/22 1007    Specimen: Blood Updated: 11/06/22 1028     Glucose 64 mg/dL      Comment: RN NotifiedOperator: [id number] ASHANTI MELISSAMeter ID: [id number]       Comprehensive Metabolic Panel [[id number]]  (Abnormal) Collected: 11/06/22 0502    Specimen: Blood Updated: 11/06/22 0542     Glucose 89 mg/dL      BUN 6 mg/dL      Creatinine 0.78 mg/dL      Sodium 138 mmol/L      Potassium 3.5 mmol/L      Chloride 109 mmol/L      CO2 20.0 mmol/L      Calcium 7.9 mg/dL      Total Protein 5.2 g/dL      Albumin 3.00 g/dL      ALT (SGPT) 9 U/L      AST (SGOT) 14 U/L      Alkaline Phosphatase 44 U/L      Total Bilirubin <0.2 mg/dL      Globulin 2.2 gm/dL      A/G Ratio 1.4 g/dL      BUN/Creatinine Ratio 7.7     Anion Gap 9.0 mmol/L      eGFR 109.6  mL/min/1.73      Comment: National Kidney Foundation and American Society of Nephrology (ASN) Task Force recommended calculation based on the Chronic Kidney Disease Epidemiology Collaboration (CKD-EPI) equation refit without adjustment for race.       Narrative:      GFR Normal >60  Chronic Kidney Disease <60  Kidney Failure <15      Phosphorus [[id number]]  (Normal) Collected: 11/06/22 0502    Specimen: Blood Updated: 11/06/22 0541     Phosphorus 2.5 mg/dL     Magnesium [[id number]]  (Normal) Collected: 11/06/22 0502    Specimen: Blood Updated: 11/06/22 0541     Magnesium 1.6 mg/dL     POC Glucose Once [[id number]]  (Normal) Collected: 11/06/22 0446    Specimen: Blood Updated: 11/06/22 0529     Glucose 101 mg/dL      Comment: RN NotifiedOperator: [id number] GRADY Mane ID: [id number]       CBC (No Diff) [[id number]]  (Abnormal) Collected: 11/06/22 0502    Specimen: Blood Updated: 11/06/22 0517     WBC 3.97 10*3/mm3      RBC 3.29 10*6/mm3      Hemoglobin 9.5 g/dL      Hematocrit 27.3 %      MCV 83.0 fL      MCH 28.9 pg      MCHC 34.8 g/dL      RDW 13.9 %      RDW-SD 42.4 fl      MPV 9.8 fL      Platelets 195 10*3/mm3     Phosphorus [[id number]]  (Normal) Collected: 11/05/22 0421    Specimen: Blood Updated: 11/05/22 0449     Phosphorus 4.0 mg/dL     Basic Metabolic Panel [[id number]]  (Abnormal) Collected: 11/05/22 0421    Specimen: Blood Updated: 11/05/22 0447     Glucose 125 mg/dL      BUN 10 mg/dL      Creatinine 1.01 mg/dL      Sodium 140 mmol/L      Potassium 4.1 mmol/L      Chloride 110 mmol/L      CO2 19.0 mmol/L      Calcium 7.7 mg/dL      BUN/Creatinine Ratio 9.9     Anion Gap 11.0 mmol/L      eGFR 80.4 mL/min/1.73      Comment: National Kidney Foundation and American Society of Nephrology (ASN) Task Force recommended calculation based on the Chronic Kidney Disease Epidemiology Collaboration (CKD-EPI) equation refit without adjustment for race.       Narrative:      GFR Normal >60  Chronic Kidney  Disease <60  Kidney Failure <15      Magnesium [[id number]]  (Normal) Collected: 11/05/22 0421    Specimen: Blood Updated: 11/05/22 0447     Magnesium 2.3 mg/dL     Basic Metabolic Panel [[id number]]  (Abnormal) Collected: 11/05/22 0110    Specimen: Blood Updated: 11/05/22 0143     Glucose 142 mg/dL      BUN 11 mg/dL      Creatinine 0.94 mg/dL      Sodium 136 mmol/L      Potassium 3.7 mmol/L      Chloride 109 mmol/L      CO2 15.0 mmol/L      Calcium 7.8 mg/dL      BUN/Creatinine Ratio 11.7     Anion Gap 12.0 mmol/L      eGFR 87.6 mL/min/1.73      Comment: National Kidney Foundation and American Society of Nephrology (ASN) Task Force recommended calculation based on the Chronic Kidney Disease Epidemiology Collaboration (CKD-EPI) equation refit without adjustment for race.       Narrative:      GFR Normal >60  Chronic Kidney Disease <60  Kidney Failure <15      Blood Gas, Arterial - [[id number]]  (Abnormal) Collected: 11/04/22 2006    Specimen: Arterial Blood Updated: 11/04/22 2005     Site Right Radial     Drew's Test N/A     pH, Arterial 7.273 pH units      Comment: 84 Value below reference range        pCO2, Arterial 29.9 mm Hg      Comment: 84 Value below reference range        pO2, Arterial 114.0 mm Hg      Comment: 83 Value above reference range        HCO3, Arterial 13.8 mmol/L      Comment: 84 Value below reference range        Base Excess, Arterial -11.8 mmol/L      Comment: 84 Value below reference range        O2 Saturation, Arterial 99.5 %      Comment: 83 Value above reference range        Barometric Pressure for Blood Gas 745 mmHg      Modality Room Air     Ventilator Mode NA     Collected by CONNIE     Comment: Meter: [id number]     :  [id number]       Extra Tubes [[id number]] Collected: 11/04/22 1135    Specimen: Blood, Venous Line Updated: 11/04/22 1246    Narrative:      The following orders were created for panel order Extra Tubes.  Procedure                               Abnormality          Status                     ---------                               -----------         ------                     Sapphire Top[[id number]]                                     Final result                 Please view results for these tests on the individual orders.    Lavyvrose Top [[id number]] Collected: 11/04/22 1135    Specimen: Blood Updated: 11/04/22 1246     Extra Tube hold for add-on     Comment: Auto resulted       BNP [[id number]]  (Abnormal) Collected: 11/04/22 1135    Specimen: Blood Updated: 11/04/22 1242     proBNP 1,480.0 pg/mL     Narrative:      Among patients with dyspnea, NT-proBNP is highly sensitive for the detection of acute congestive heart failure. In addition NT-proBNP of <300 pg/ml effectively rules out acute congestive heart failure with 99% negative predictive value.    Results may be falsely decreased if patient taking Biotin.      Hemoglobin A1c [[id number]]  (Abnormal) Collected: 11/04/22 0426    Specimen: Blood Updated: 11/04/22 0710     Hemoglobin A1C 9.40 %     Narrative:      Hemoglobin A1C Ranges:    Increased Risk for Diabetes  5.7% to 6.4%  Diabetes                     >= 6.5%  Diabetic Goal                < 7.0%    TSH [[id number]]  (Normal) Collected: 11/04/22 0426    Specimen: Blood Updated: 11/04/22 0658     TSH 0.625 uIU/mL     CBC (No Diff) [[id number]]  (Abnormal) Collected: 11/04/22 0426    Specimen: Blood Updated: 11/04/22 0511     WBC 9.62 10*3/mm3      RBC 3.91 10*6/mm3      Hemoglobin 10.9 g/dL      Hematocrit 33.9 %      MCV 86.7 fL      MCH 27.9 pg      MCHC 32.2 g/dL      RDW 13.5 %      RDW-SD 41.9 fl      MPV 10.6 fL      Platelets 262 10*3/mm3     Osmolality, Serum [[id number]]  (Abnormal) Collected: 11/03/22 2119    Specimen: Blood Updated: 11/04/22 0405     Osmolality 307 mOsm/kg     Blood Gas, Arterial - [[id number]]  (Abnormal) Collected: 11/03/22 2355    Specimen: Arterial Blood Updated: 11/03/22 2354     Site Left Radial     Drew's Test N/A     pH, Arterial 7.091  pH units      Comment: 85 Value below critical limit        pCO2, Arterial 16.4 mm Hg      Comment: 85 Value below critical limit        pO2, Arterial 125.0 mm Hg      Comment: 83 Value above reference range        HCO3, Arterial 5.0 mmol/L      Comment: 84 Value below reference range        Base Excess, Arterial -22.9 mmol/L      Comment: 84 Value below reference range        O2 Saturation, Arterial 98.4 %      Barometric Pressure for Blood Gas 750 mmHg      Modality Room Air     FIO2 <21 %      Ventilator Mode NA     Collected by rrt     Comment: Meter: [id number]     :  [id number]       Comprehensive Metabolic Panel [[id number]]  (Abnormal) Collected: 11/03/22 2119    Specimen: Blood Updated: 11/03/22 2202     Glucose 482 mg/dL      BUN 19 mg/dL      Creatinine 1.44 mg/dL      Sodium 131 mmol/L      Potassium 5.2 mmol/L      Chloride 97 mmol/L      CO2 6.0 mmol/L      Calcium 9.4 mg/dL      Total Protein 7.7 g/dL      Albumin 4.20 g/dL      ALT (SGPT) 9 U/L      AST (SGOT) 14 U/L      Alkaline Phosphatase 75 U/L      Total Bilirubin 0.2 mg/dL      Globulin 3.5 gm/dL      A/G Ratio 1.2 g/dL      BUN/Creatinine Ratio 13.2     Anion Gap 28.0 mmol/L      eGFR 52.5 mL/min/1.73      Comment: National Kidney Foundation and American Society of Nephrology (ASN) Task Force recommended calculation based on the Chronic Kidney Disease Epidemiology Collaboration (CKD-EPI) equation refit without adjustment for race.       Narrative:      GFR Normal >60  Chronic Kidney Disease <60  Kidney Failure <15      hCG, Quantitative, Pregnancy [[id number]] Collected: 11/03/22 2119    Specimen: Blood Updated: 11/03/22 2149     HCG Quantitative 13.53 mIU/mL     Narrative:      HCG Ranges by Gestational Age    Females - non-pregnant premenopausal   </= 1mIU/mL HCG  Females - postmenopausal               </= 7mIU/mL HCG    3 Weeks         5.8 -    71.2 mIU/mL  4 Weeks         9.5 -     750 mIU/mL  5 Weeks         217 -   7,138  mIU/mL  6 Weeks         158 -  31,795 mIU/mL  7 Weeks       3,697 - 163,563 mIU/mL  8 Weeks      32,065 - 149,571 mIU/mL  9 Weeks      63,803 - 151,410 mIU/mL  10 Weeks     46,509 - 186,977 mIU/mL  12 Weeks     27,832 - 210,612 mIU/mL  14 Weeks     13,950 -  62,530 mIU/mL  15 Weeks     12,039 -  70,971 mIU/mL  16 Weeks      9,040 -  56,451 mIU/mL  17 Weeks      8,175 -  55,868 mIU/mL  18 Weeks      8,099 -  58,176 mIU/mL  Results may be falsely decreased if patient taking Biotin.      Lipase [[id number]]  (Normal) Collected: 11/03/22 2119    Specimen: Blood Updated: 11/03/22 2143     Lipase 13 U/L     CK [[id number]]  (Normal) Collected: 11/03/22 2119    Specimen: Blood Updated: 11/03/22 2143     Creatine Kinase 29 U/L     Urinalysis, Microscopic Only - Urine, Clean Catch [[id number]]  (Abnormal) Collected: 11/03/22 2126    Specimen: Urine, Clean Catch Updated: 11/03/22 2139     RBC, UA 31-50 /HPF      WBC, UA 0-2 /HPF      Comment: Urine culture not indicated.        Bacteria, UA None Seen /HPF      Squamous Epithelial Cells, UA 0-2 /HPF      Hyaline Casts, UA 7-12 /LPF      Methodology Automated Microscopy    Urinalysis With Culture If Indicated - Urine, Clean Catch [[id number]]  (Abnormal) Collected: 11/03/22 2126    Specimen: Urine, Clean Catch Updated: 11/03/22 2137     Color, UA Yellow     Appearance, UA Clear     pH, UA <=5.0     Specific Gravity, UA 1.023     Glucose, UA >=1000 mg/dL (3+)     Ketones, UA >=160 mg/dL (4+)     Bilirubin, UA Negative     Blood, UA Large (3+)     Protein, UA 30 mg/dL (1+)     Leuk Esterase, UA Negative     Nitrite, UA Negative     Urobilinogen, UA 0.2 E.U./dL    Narrative:      In absence of clinical symptoms, the presence of pyuria, bacteria, and/or nitrites on the urinalysis result does not correlate with infection.    CBC & Differential [[id number]]  (Abnormal) Collected: 11/03/22 2119    Specimen: Blood Updated: 11/03/22 2125    Narrative:      The following orders were created  for panel order CBC & Differential.  Procedure                               Abnormality         Status                     ---------                               -----------         ------                     CBC Auto Differential[[id number]]        Abnormal            Final result                 Please view results for these tests on the individual orders.    CBC Auto Differential [[id number]]  (Abnormal) Collected: 11/03/22 2119    Specimen: Blood Updated: 11/03/22 2125     WBC 9.01 10*3/mm3      RBC 4.49 10*6/mm3      Hemoglobin 12.5 g/dL      Hematocrit 38.1 %      MCV 84.9 fL      MCH 27.8 pg      MCHC 32.8 g/dL      RDW 13.4 %      RDW-SD 41.5 fl      MPV 10.1 fL      Platelets 294 10*3/mm3      Neutrophil % 83.5 %      Lymphocyte % 9.8 %      Monocyte % 5.0 %      Eosinophil % 0.0 %      Basophil % 0.6 %      Immature Grans % 1.1 %      Neutrophils, Absolute 7.53 10*3/mm3      Lymphocytes, Absolute 0.88 10*3/mm3      Monocytes, Absolute 0.45 10*3/mm3      Eosinophils, Absolute 0.00 10*3/mm3      Basophils, Absolute 0.05 10*3/mm3      Immature Grans, Absolute 0.10 10*3/mm3      nRBC 0.0 /100 WBC         Imaging Results (Most Recent)     Procedure Component Value Units Date/Time    US Ob Transvaginal [[id number]] Collected: 11/03/22 2253     Updated: 11/04/22 0105    Addenda:         ADDENDUM   ADDENDUM #1       Impression 1-3 discussed with Dr. Hu by Dr. Baker at  12:37 AM CDT on 11/4/2022.    Electronically signed by:  Ira Veloz MD  11/4/2022 1:03 AM  CDT Workstation: [id number]    Signed: 11/04/22 0103 by Ira Baker MD    Narrative:      EXAM:    US Pregnancy, Transvaginal    CLINICAL HISTORY:    The patient is 22 years old and is Female; pregnant, vaginal  bleeding    TECHNIQUE:    Real-time transvaginal obstetrical ultrasound of the maternal  pelvis and a first trimester pregnancy with image documentation.   Transvaginal imaging was used for better evaluation of the fetus  and  adnexa.    COMPARISON:    Pelvic ultrasound 8/8/2018    FINDINGS:    GESTATION: Intrauterine pregnancy is not visualized.    PLACENTA/AMNIOTIC FLUID:  Cannot be adequately evaluated due to  the early gestational age.    UTERUS/CERVIX:  Retroflexed uterus measures 8.7 x 5.1 x 6.2 cm.   No myometrial mass.    OVARIES:  Right ovary measures 3.2 x 1.6 x 2.9 cm. Vascularity  within the right ovary is not detected.  Left ovary measures 3.9  x 3.9 x 2.4 cm. 0.9 x 0.8 x 0.8 cm complex cyst. Left ovarian 0.6  x 0.5 x 0.5 cm cyst within a cyst. No peripheral vascularity  surrounding cyst within a cyst.    FREE FLUID:  Small fluid in the bilateral adnexa, right greater  than left.    VASCULATURE:  Increased left parametrial vasculature.      Impression:        1. In the setting of a positive pregnancy test, findings  consistent with pregnancy of unknown location.  2. Right ovarian vascularity is not detected. Findings may  secondary to torsion versus technique. Recommend clinical  correlation and gynecology consultation.  3. Left ovarian 0.6 cm cyst within a cyst.     Electronically signed by:  Ira Veloz MD  11/4/2022 12:36 AM  CDT Workstation: [id number]          Chief Complaint on Day of Discharge: No new complaints    Hospital Course:  The patient is a 22 y.o. female with past medical history notable for type 1 diabetes, bipolar 1 disorder, PTSD, anxiety and depression, and substance use in the past who presented to [facility] with nausea and vomiting as well as vaginal bleeding.  Patient had recently at home positive pregnancy test and pulm valuation here and appeared that she had sustained a miscarriage.  GYN service was consulted in the emergency department and recommended outpatient follow-up as well as repeat beta-hCG testing in a week.  Patient was also found to be in DKA and was started on protocol for this.  Patient was admitted to the hospitalist service.  Patient's DKA resolved.  Patient was  "noted to be emotionally upset during her stay and it was offered to her to have psychiatry see her to discuss potential options given the unfortunate nature of her recent health problems.  Psychiatry was consulted the patient did not wish to proceed with her help at that time.  Patient was otherwise noted to be stable and will be discharged home to follow-up with PCP in GYN as an outpatient.      Condition on Discharge: Stable    Physical Exam on Discharge:  /83 (BP Location: Left arm, Patient Position: Lying)   Pulse 70   Temp 97.2 °F (36.2 °C) (Oral)   Resp 18   Ht 170.2 cm (67\")   Wt 58.4 kg (128 lb 11.2 oz)   LMP 10/03/2022 (Approximate)   SpO2 99%   Breastfeeding No   BMI 20.16 kg/m²   Physical Exam  Constitutional:       General: She is not in acute distress.     Appearance: She is not toxic-appearing.   HENT:      Head: Normocephalic and atraumatic.      Right Ear: External ear normal.      Left Ear: External ear normal.      Nose: Nose normal.      Mouth/Throat:      Mouth: Mucous membranes are moist.      Pharynx: Oropharynx is clear.   Eyes:      Conjunctiva/sclera: Conjunctivae normal.   Cardiovascular:      Rate and Rhythm: Normal rate and regular rhythm.      Pulses: Normal pulses.      Heart sounds: Normal heart sounds.   Pulmonary:      Effort: Pulmonary effort is normal. No respiratory distress.      Breath sounds: Normal breath sounds.   Abdominal:      General: Bowel sounds are normal.      Palpations: Abdomen is soft.      Tenderness: There is no abdominal tenderness.   Musculoskeletal:         General: No deformity.   Skin:     General: Skin is warm and dry.      Capillary Refill: Capillary refill takes less than 2 seconds.   Neurological:      General: No focal deficit present.      Mental Status: She is alert and oriented to person, place, and time. Mental status is at baseline.   Psychiatric:         Behavior: Behavior normal.         Thought Content: Thought content normal. "         Discharge Disposition:  Home or Self Care    Discharge Medications:     Discharge Medications      New Medications      Instructions Start Date   traMADol 50 MG tablet  Commonly known as: ULTRAM   50 mg, Oral, Every 8 Hours PRN         Continue These Medications      Instructions Start Date   Alcohol Wipes 70 % pads   Use 4 x daily      B-D ULTRA-FINE 33 LANCETS misc   Use 4 x daily , any brand covered by insurance      Baqsimi One Pack 3 MG/DOSE powder  Generic drug: Glucagon   1 each, Nasal, As Needed, Apply intranasal if hypoglycemia      Blood Glucose Monitoring Suppl w/Device kit   USE AS INDICATED, ANY MONITOR , ICD10 code is E11.9      Blood Glucose Test strip   Use 4 x daily use any brand covered by insurance or same brand as before ICD10 code is E11.9      Dexcom G6  device   USE AS DIRECTED FOR CONTINUOUS GLUCOSE MONITORING.      Dexcom G6 Sensor   Use as directed for continuous glucose monitoring **Change sensor Every 10 (Ten) Days**      Dexcom G6 Sensor   Does not apply, As Needed, Every 10 days,  Use as directed for continuous glucose monitoring      Dexcom G6 Transmitter misc   Use as directed for continuous glucose monitoring **Change transmitter Every 3 (Three) Months**      ibuprofen 600 MG tablet  Commonly known as: ADVIL,MOTRIN   TAKE 1 TABLET BY MOUTH EVERY 6 HOURS AS NEEDED FOR PAIN FOR UP TO 10 DAYS      Ketone Test strip   USE AS INDICATED      Lancing Device misc   USE AS INDICATED TO CORRELATE WITH STRIPS AND METER      Levemir FlexTouch 100 UNIT/ML injection  Generic drug: insulin detemir   22 units daily      LORazepam 0.5 MG tablet  Commonly known as: ATIVAN   0.5 mg, Oral, Every 6 Hours PRN      metoclopramide 10 MG tablet  Commonly known as: REGLAN   Take up to 2 tabs po TID      NovoLOG FlexPen 100 UNIT/ML solution pen-injector sc pen  Generic drug: insulin aspart   10 units tid      Insulin Aspart 100 UNIT/ML injection  Commonly known as: novoLOG   100 units daily  through pump      OLANZapine 10 MG tablet  Commonly known as: zyPREXA   10 mg, Oral      omeprazole 40 MG capsule  Commonly known as: priLOSEC   40 mg, Oral, Daily      Omnipod 5 G6 Pod (Gen 5) misc   Change pods Every Other Day.      Pen Needles 32G X 4 MM misc   1 each, Does not apply, 4 Times Daily, Use 4 x daily, Dx code E11.65      promethazine 12.5 MG tablet  Commonly known as: PHENERGAN   12.5 mg, Oral, Every 6 Hours PRN      sucralfate 1 g tablet  Commonly known as: Carafate   1 g, Oral, 4 Times Daily      Urine Glucose-Ketones Test strip   Use as indicated         ASK your doctor about these medications      Instructions Start Date   clonazePAM 0.5 MG tablet  Commonly known as: KlonoPIN   1 mg, Oral, 3 Times Daily PRN             Discharge Diet:   Diet Instructions     Diet: Regular, Consistent Carbohydrate      Discharge Diet:  Regular  Consistent Carbohydrate             Activity at Discharge:   Activity Instructions     Activity as Tolerated            Discharge Care Plan/Instructions: Take medications as prescribed, follow-up with PCP and GYN, and return for worsening symptoms.    Follow-up Appointments:   Future Appointments   Date Time Provider Department Center   11/23/2022  3:45 PM Timur Sun MD [facility]       Test Results Pending at Discharge:  None    Fernando Colunga MD    Time: 32 minutes

## 2022-11-07 ENCOUNTER — READMISSION MANAGEMENT (OUTPATIENT)
Dept: CALL CENTER | Facility: HOSPITAL | Age: 22
End: 2022-11-07

## 2022-11-07 NOTE — OUTREACH NOTE
Prep Survey    Flowsheet Row Responses   [facility] facility patient discharged from? [facility]   Is LACE score < 7 ? No   Emergency Room discharge w/ pulse ox? No   Eligibility Readm Mgmt   Discharge diagnosis DKA   Does the patient have one of the following disease processes/diagnoses(primary or secondary)? Other   Does the patient have Home health ordered? No   Is there a DME ordered? No   Prep survey completed? Yes          AROLDO WEIR - Registered Nurse

## 2022-11-07 NOTE — PAYOR COMM NOTE
"Nubia Templemore    Case Management Extender  [phone] phone  [phone] fax      Auth# [id number]    Fernanda Patterson (22 y.o. Female)     Date of Birth   2000    Social Security Number       Address   [address]  [facility] [address]    Home Phone   [phone]    MRN   [id number]       Restoration   Mu-ism    Marital Status   Single                            Admission Date   11/3/22    Admission Type   Emergency    Admitting Provider   Darya Jane MD    Attending Provider       Department, Room/Bed   [facility] 3 [facility], [facility]/1       Discharge Date   11/6/2022    Discharge Disposition   Home or Self Care    Discharge Destination                               Attending Provider: (none)   Allergies: Pineapple, Benzoyl Peroxide    Isolation: None   Infection: None   Code Status: Prior    Ht: 170.2 cm (67\")   Wt: 58.4 kg (128 lb 11.2 oz)    Admission Cmt: None   Principal Problem: None                Active Insurance as of 11/3/2022     Primary Coverage     Payor Plan Insurance Group Employer/Plan Group    CIGNA MISC CIGNA [id number]     Coverage Address Coverage Phone Number Coverage Fax Number Effective Dates    [address] [phone]  5/1/2022 - None Entered    [facility] [address]       Subscriber Name Subscriber Birth Date Member ID       FERNANDA PATTERSON 2000 [id number]           Secondary Coverage     Payor Plan Insurance Group Employer/Plan Group    [place] [id number]     Payor Plan Address Payor Plan Phone Number Payor Plan Fax Number Effective Dates    [employer] [address] [address] [phone]  1/1/2021 - None Entered    [facility] [id number]       Subscriber Name Subscriber Birth Date Member ID       FERNANDA PATTERSON 2000 [id number]                 Emergency Contacts      (Rel.) Home Phone Work Phone Mobile Phone    Oneil Palma (Significant " Other) [phone] -- [phone]    COLEEN SALOMON (Mother) [phone] -- [phone]               Discharge Summary      Fernando Colunga MD at 11/06/22 1137              [facility] Medicine Services  DISCHARGE SUMMARY       Date of Admission: 11/3/2022  Date of Discharge:  11/6/2022  Primary Care Physician: Rufus Marshall APRN    Presenting Problem/History of Present Illness:  Miscarriage [O03.9]  Vaginal bleeding [N93.9]  DKA (diabetic ketoacidosis) (HCC) [E11.10]  DKA, type 1 (HCC) [E10.10]  Diabetic ketoacidosis without coma associated with type 1 diabetes mellitus (HCC) [E10.10]     Final Discharge Diagnoses:  Active Hospital Problems    Diagnosis    • Miscarriage    • DKA, type 1 (HCC)    • DKA (diabetic ketoacidosis) (HCC)    • Diabetic ketoacidosis without coma associated with type 1 diabetes mellitus (HCC)        Consults:   Consults     Date and Time Order Name Status Description    11/6/2022 10:44 AM Inpatient Psychiatrist Consult      11/4/2022  1:33 AM Hospitalist (on-call MD unless specified)      11/4/2022  1:32 AM Obstetrics 1st Trimester Unassigned (on-call MD unless specified) Completed           Procedures Performed:                 Pertinent Test Results:   Lab Results (most recent)     Procedure Component Value Units Date/Time    POC Glucose Once [[id number]]  (Abnormal) Collected: 11/06/22 1007    Specimen: Blood Updated: 11/06/22 1028     Glucose 64 mg/dL      Comment: RN NotifiedOperator: [id number] [facility] ID: [id number]       Comprehensive Metabolic Panel [[id number]]  (Abnormal) Collected: 11/06/22 0502    Specimen: Blood Updated: 11/06/22 0542     Glucose 89 mg/dL      BUN 6 mg/dL      Creatinine 0.78 mg/dL      Sodium 138 mmol/L      Potassium 3.5 mmol/L      Chloride 109 mmol/L      CO2 20.0 mmol/L      Calcium 7.9 mg/dL      Total Protein 5.2 g/dL      Albumin 3.00 g/dL      ALT (SGPT) 9 U/L      AST (SGOT) 14 U/L       Alkaline Phosphatase 44 U/L      Total Bilirubin <0.2 mg/dL      Globulin 2.2 gm/dL      A/G Ratio 1.4 g/dL      BUN/Creatinine Ratio 7.7     Anion Gap 9.0 mmol/L      eGFR 109.6 mL/min/1.73      Comment: National Kidney Foundation and American Society of Nephrology (ASN) Task Force recommended calculation based on the Chronic Kidney Disease Epidemiology Collaboration (CKD-EPI) equation refit without adjustment for race.       Narrative:      GFR Normal >60  Chronic Kidney Disease <60  Kidney Failure <15      Phosphorus [[id number]]  (Normal) Collected: 11/06/22 0502    Specimen: Blood Updated: 11/06/22 0541     Phosphorus 2.5 mg/dL     Magnesium [[id number]]  (Normal) Collected: 11/06/22 0502    Specimen: Blood Updated: 11/06/22 0541     Magnesium 1.6 mg/dL     POC Glucose Once [[id number]]  (Normal) Collected: 11/06/22 0446    Specimen: Blood Updated: 11/06/22 0529     Glucose 101 mg/dL      Comment: RN NotifiedOperator: [id number] GRADY Mane ID: [id number]       CBC (No Diff) [[id number]]  (Abnormal) Collected: 11/06/22 0502    Specimen: Blood Updated: 11/06/22 0517     WBC 3.97 10*3/mm3      RBC 3.29 10*6/mm3      Hemoglobin 9.5 g/dL      Hematocrit 27.3 %      MCV 83.0 fL      MCH 28.9 pg      MCHC 34.8 g/dL      RDW 13.9 %      RDW-SD 42.4 fl      MPV 9.8 fL      Platelets 195 10*3/mm3     Phosphorus [[id number]]  (Normal) Collected: 11/05/22 0421    Specimen: Blood Updated: 11/05/22 0449     Phosphorus 4.0 mg/dL     Basic Metabolic Panel [[id number]]  (Abnormal) Collected: 11/05/22 0421    Specimen: Blood Updated: 11/05/22 0447     Glucose 125 mg/dL      BUN 10 mg/dL      Creatinine 1.01 mg/dL      Sodium 140 mmol/L      Potassium 4.1 mmol/L      Chloride 110 mmol/L      CO2 19.0 mmol/L      Calcium 7.7 mg/dL      BUN/Creatinine Ratio 9.9     Anion Gap 11.0 mmol/L      eGFR 80.4 mL/min/1.73      Comment: National Kidney Foundation and American Society of Nephrology (ASN) Task Force recommended  calculation based on the Chronic Kidney Disease Epidemiology Collaboration (CKD-EPI) equation refit without adjustment for race.       Narrative:      GFR Normal >60  Chronic Kidney Disease <60  Kidney Failure <15      Magnesium [[id number]]  (Normal) Collected: 11/05/22 0421    Specimen: Blood Updated: 11/05/22 0447     Magnesium 2.3 mg/dL     Basic Metabolic Panel [[id number]]  (Abnormal) Collected: 11/05/22 0110    Specimen: Blood Updated: 11/05/22 0143     Glucose 142 mg/dL      BUN 11 mg/dL      Creatinine 0.94 mg/dL      Sodium 136 mmol/L      Potassium 3.7 mmol/L      Chloride 109 mmol/L      CO2 15.0 mmol/L      Calcium 7.8 mg/dL      BUN/Creatinine Ratio 11.7     Anion Gap 12.0 mmol/L      eGFR 87.6 mL/min/1.73      Comment: National Kidney Foundation and American Society of Nephrology (ASN) Task Force recommended calculation based on the Chronic Kidney Disease Epidemiology Collaboration (CKD-EPI) equation refit without adjustment for race.       Narrative:      GFR Normal >60  Chronic Kidney Disease <60  Kidney Failure <15      Blood Gas, Arterial - [[id number]]  (Abnormal) Collected: 11/04/22 2006    Specimen: Arterial Blood Updated: 11/04/22 2005     Site Right Radial     Drew's Test N/A     pH, Arterial 7.273 pH units      Comment: 84 Value below reference range        pCO2, Arterial 29.9 mm Hg      Comment: 84 Value below reference range        pO2, Arterial 114.0 mm Hg      Comment: 83 Value above reference range        HCO3, Arterial 13.8 mmol/L      Comment: 84 Value below reference range        Base Excess, Arterial -11.8 mmol/L      Comment: 84 Value below reference range        O2 Saturation, Arterial 99.5 %      Comment: 83 Value above reference range        Barometric Pressure for Blood Gas 745 mmHg      Modality Room Air     Ventilator Mode NA     Collected by CONNIE     Comment: Meter: [id number]     :  [id number]       Extra Tubes [[id number]] Collected: 11/04/22 1135    Specimen:  Blood, Venous Line Updated: 11/04/22 1246    Narrative:      The following orders were created for panel order Extra Tubes.  Procedure                               Abnormality         Status                     ---------                               -----------         ------                     Lavender Top[[id number]]                                     Final result                 Please view results for these tests on the individual orders.    Lavender Top [[id number]] Collected: 11/04/22 1135    Specimen: Blood Updated: 11/04/22 1246     Extra Tube hold for add-on     Comment: Auto resulted       BNP [[id number]]  (Abnormal) Collected: 11/04/22 1135    Specimen: Blood Updated: 11/04/22 1242     proBNP 1,480.0 pg/mL     Narrative:      Among patients with dyspnea, NT-proBNP is highly sensitive for the detection of acute congestive heart failure. In addition NT-proBNP of <300 pg/ml effectively rules out acute congestive heart failure with 99% negative predictive value.    Results may be falsely decreased if patient taking Biotin.      Hemoglobin A1c [[id number]]  (Abnormal) Collected: 11/04/22 0426    Specimen: Blood Updated: 11/04/22 0710     Hemoglobin A1C 9.40 %     Narrative:      Hemoglobin A1C Ranges:    Increased Risk for Diabetes  5.7% to 6.4%  Diabetes                     >= 6.5%  Diabetic Goal                < 7.0%    TSH [[id number]]  (Normal) Collected: 11/04/22 0426    Specimen: Blood Updated: 11/04/22 0658     TSH 0.625 uIU/mL     CBC (No Diff) [[id number]]  (Abnormal) Collected: 11/04/22 0426    Specimen: Blood Updated: 11/04/22 0511     WBC 9.62 10*3/mm3      RBC 3.91 10*6/mm3      Hemoglobin 10.9 g/dL      Hematocrit 33.9 %      MCV 86.7 fL      MCH 27.9 pg      MCHC 32.2 g/dL      RDW 13.5 %      RDW-SD 41.9 fl      MPV 10.6 fL      Platelets 262 10*3/mm3     Osmolality, Serum [[id number]]  (Abnormal) Collected: 11/03/22 2119    Specimen: Blood Updated: 11/04/22 0405     Osmolality 307 mOsm/kg      Blood Gas, Arterial - [[id number]]  (Abnormal) Collected: 11/03/22 2355    Specimen: Arterial Blood Updated: 11/03/22 2354     Site Left Radial     Drew's Test N/A     pH, Arterial 7.091 pH units      Comment: 85 Value below critical limit        pCO2, Arterial 16.4 mm Hg      Comment: 85 Value below critical limit        pO2, Arterial 125.0 mm Hg      Comment: 83 Value above reference range        HCO3, Arterial 5.0 mmol/L      Comment: 84 Value below reference range        Base Excess, Arterial -22.9 mmol/L      Comment: 84 Value below reference range        O2 Saturation, Arterial 98.4 %      Barometric Pressure for Blood Gas 750 mmHg      Modality Room Air     FIO2 <21 %      Ventilator Mode NA     Collected by rrt     Comment: Meter: [id number]     :  [id number]       Comprehensive Metabolic Panel [[id number]]  (Abnormal) Collected: 11/03/22 2119    Specimen: Blood Updated: 11/03/22 2202     Glucose 482 mg/dL      BUN 19 mg/dL      Creatinine 1.44 mg/dL      Sodium 131 mmol/L      Potassium 5.2 mmol/L      Chloride 97 mmol/L      CO2 6.0 mmol/L      Calcium 9.4 mg/dL      Total Protein 7.7 g/dL      Albumin 4.20 g/dL      ALT (SGPT) 9 U/L      AST (SGOT) 14 U/L      Alkaline Phosphatase 75 U/L      Total Bilirubin 0.2 mg/dL      Globulin 3.5 gm/dL      A/G Ratio 1.2 g/dL      BUN/Creatinine Ratio 13.2     Anion Gap 28.0 mmol/L      eGFR 52.5 mL/min/1.73      Comment: National Kidney Foundation and American Society of Nephrology (ASN) Task Force recommended calculation based on the Chronic Kidney Disease Epidemiology Collaboration (CKD-EPI) equation refit without adjustment for race.       Narrative:      GFR Normal >60  Chronic Kidney Disease <60  Kidney Failure <15      hCG, Quantitative, Pregnancy [[id number]] Collected: 11/03/22 2119    Specimen: Blood Updated: 11/03/22 2149     HCG Quantitative 13.53 mIU/mL     Narrative:      HCG Ranges by Gestational Age    Females - non-pregnant premenopausal    </= 1mIU/mL HCG  Females - postmenopausal               </= 7mIU/mL HCG    3 Weeks         5.8 -    71.2 mIU/mL  4 Weeks         9.5 -     750 mIU/mL  5 Weeks         217 -   7,138 mIU/mL  6 Weeks         158 -  31,795 mIU/mL  7 Weeks       3,697 - 163,563 mIU/mL  8 Weeks      32,065 - 149,571 mIU/mL  9 Weeks      63,803 - 151,410 mIU/mL  10 Weeks     46,509 - 186,977 mIU/mL  12 Weeks     27,832 - 210,612 mIU/mL  14 Weeks     13,950 -  62,530 mIU/mL  15 Weeks     12,039 -  70,971 mIU/mL  16 Weeks      9,040 -  56,451 mIU/mL  17 Weeks      8,175 -  55,868 mIU/mL  18 Weeks      8,099 -  58,176 mIU/mL  Results may be falsely decreased if patient taking Biotin.      Lipase [[id number]]  (Normal) Collected: 11/03/22 2119    Specimen: Blood Updated: 11/03/22 2143     Lipase 13 U/L     CK [[id number]]  (Normal) Collected: 11/03/22 2119    Specimen: Blood Updated: 11/03/22 2143     Creatine Kinase 29 U/L     Urinalysis, Microscopic Only - Urine, Clean Catch [[id number]]  (Abnormal) Collected: 11/03/22 2126    Specimen: Urine, Clean Catch Updated: 11/03/22 2139     RBC, UA 31-50 /HPF      WBC, UA 0-2 /HPF      Comment: Urine culture not indicated.        Bacteria, UA None Seen /HPF      Squamous Epithelial Cells, UA 0-2 /HPF      Hyaline Casts, UA 7-12 /LPF      Methodology Automated Microscopy    Urinalysis With Culture If Indicated - Urine, Clean Catch [[id number]]  (Abnormal) Collected: 11/03/22 2126    Specimen: Urine, Clean Catch Updated: 11/03/22 2137     Color, UA Yellow     Appearance, UA Clear     pH, UA <=5.0     Specific Gravity, UA 1.023     Glucose, UA >=1000 mg/dL (3+)     Ketones, UA >=160 mg/dL (4+)     Bilirubin, UA Negative     Blood, UA Large (3+)     Protein, UA 30 mg/dL (1+)     Leuk Esterase, UA Negative     Nitrite, UA Negative     Urobilinogen, UA 0.2 E.U./dL    Narrative:      In absence of clinical symptoms, the presence of pyuria, bacteria, and/or nitrites on the urinalysis result does not correlate  with infection.    CBC & Differential [[id number]]  (Abnormal) Collected: 11/03/22 2119    Specimen: Blood Updated: 11/03/22 2125    Narrative:      The following orders were created for panel order CBC & Differential.  Procedure                               Abnormality         Status                     ---------                               -----------         ------                     CBC Auto Differential[[id number]]        Abnormal            Final result                 Please view results for these tests on the individual orders.    CBC Auto Differential [[id number]]  (Abnormal) Collected: 11/03/22 2119    Specimen: Blood Updated: 11/03/22 2125     WBC 9.01 10*3/mm3      RBC 4.49 10*6/mm3      Hemoglobin 12.5 g/dL      Hematocrit 38.1 %      MCV 84.9 fL      MCH 27.8 pg      MCHC 32.8 g/dL      RDW 13.4 %      RDW-SD 41.5 fl      MPV 10.1 fL      Platelets 294 10*3/mm3      Neutrophil % 83.5 %      Lymphocyte % 9.8 %      Monocyte % 5.0 %      Eosinophil % 0.0 %      Basophil % 0.6 %      Immature Grans % 1.1 %      Neutrophils, Absolute 7.53 10*3/mm3      Lymphocytes, Absolute 0.88 10*3/mm3      Monocytes, Absolute 0.45 10*3/mm3      Eosinophils, Absolute 0.00 10*3/mm3      Basophils, Absolute 0.05 10*3/mm3      Immature Grans, Absolute 0.10 10*3/mm3      nRBC 0.0 /100 WBC         Imaging Results (Most Recent)     Procedure Component Value Units Date/Time    US Ob Transvaginal [[id number]] Collected: 11/03/22 2253     Updated: 11/04/22 0105    Addenda:         ADDENDUM   ADDENDUM #1       Impression 1-3 discussed with Dr. Hu by Dr. Baker at  12:37 AM CDT on 11/4/2022.    Electronically signed by:  Ira Veloz MD  11/4/2022 1:03 AM  CDT Workstation: [id number]    Signed: 11/04/22 0103 by Ira Baker MD    Narrative:      EXAM:    US Pregnancy, Transvaginal    CLINICAL HISTORY:    The patient is 22 years old and is Female; pregnant, vaginal  bleeding    TECHNIQUE:    Real-time  transvaginal obstetrical ultrasound of the maternal  pelvis and a first trimester pregnancy with image documentation.   Transvaginal imaging was used for better evaluation of the fetus  and adnexa.    COMPARISON:    Pelvic ultrasound 8/8/2018    FINDINGS:    GESTATION: Intrauterine pregnancy is not visualized.    PLACENTA/AMNIOTIC FLUID:  Cannot be adequately evaluated due to  the early gestational age.    UTERUS/CERVIX:  Retroflexed uterus measures 8.7 x 5.1 x 6.2 cm.   No myometrial mass.    OVARIES:  Right ovary measures 3.2 x 1.6 x 2.9 cm. Vascularity  within the right ovary is not detected.  Left ovary measures 3.9  x 3.9 x 2.4 cm. 0.9 x 0.8 x 0.8 cm complex cyst. Left ovarian 0.6  x 0.5 x 0.5 cm cyst within a cyst. No peripheral vascularity  surrounding cyst within a cyst.    FREE FLUID:  Small fluid in the bilateral adnexa, right greater  than left.    VASCULATURE:  Increased left parametrial vasculature.      Impression:        1. In the setting of a positive pregnancy test, findings  consistent with pregnancy of unknown location.  2. Right ovarian vascularity is not detected. Findings may  secondary to torsion versus technique. Recommend clinical  correlation and gynecology consultation.  3. Left ovarian 0.6 cm cyst within a cyst.     Electronically signed by:  Ira Veloz MD  11/4/2022 12:36 AM  CDT Workstation: [id number]          Chief Complaint on Day of Discharge: No new complaints    Hospital Course:  The patient is a 22 y.o. female with past medical history notable for type 1 diabetes, bipolar 1 disorder, PTSD, anxiety and depression, and substance use in the past who [facility] and vomiting as well as vaginal bleeding.  Patient had recently at home positive pregnancy test and pulm valuation here and appeared that she had sustained a miscarriage.  GYN service was consulted in the emergency department and recommended outpatient follow-up as well as repeat  "beta-hCG testing in a week.  Patient was also found to be in DKA and was started on protocol for this.  Patient was admitted to the hospitalist service.  Patient's DKA resolved.  Patient was noted to be emotionally upset during her stay and it was offered to her to have psychiatry see her to discuss potential options given the unfortunate nature of her recent health problems.  Psychiatry was consulted the patient did not wish to proceed with her help at that time.  Patient was otherwise noted to be stable and will be discharged home to follow-up with PCP in GYN as an outpatient.      Condition on Discharge: Stable    Physical Exam on Discharge:  /83 (BP Location: Left arm, Patient Position: Lying)   Pulse 70   Temp 97.2 °F (36.2 °C) (Oral)   Resp 18   Ht 170.2 cm (67\")   Wt 58.4 kg (128 lb 11.2 oz)   LMP 10/03/2022 (Approximate)   SpO2 99%   Breastfeeding No   BMI 20.16 kg/m²   Physical Exam  Constitutional:       General: She is not in acute distress.     Appearance: She is not toxic-appearing.   HENT:      Head: Normocephalic and atraumatic.      Right Ear: External ear normal.      Left Ear: External ear normal.      Nose: Nose normal.      Mouth/Throat:      Mouth: Mucous membranes are moist.      Pharynx: Oropharynx is clear.   Eyes:      Conjunctiva/sclera: Conjunctivae normal.   Cardiovascular:      Rate and Rhythm: Normal rate and regular rhythm.      Pulses: Normal pulses.      Heart sounds: Normal heart sounds.   Pulmonary:      Effort: Pulmonary effort is normal. No respiratory distress.      Breath sounds: Normal breath sounds.   Abdominal:      General: Bowel sounds are normal.      Palpations: Abdomen is soft.      Tenderness: There is no abdominal tenderness.   Musculoskeletal:         General: No deformity.   Skin:     General: Skin is warm and dry.      Capillary Refill: Capillary refill takes less than 2 seconds.   Neurological:      General: No focal deficit present.      Mental " Status: She is alert and oriented to person, place, and time. Mental status is at baseline.   Psychiatric:         Behavior: Behavior normal.         Thought Content: Thought content normal.         Discharge Disposition:  Home or Self Care    Discharge Medications:     Discharge Medications      New Medications      Instructions Start Date   traMADol 50 MG tablet  Commonly known as: ULTRAM   50 mg, Oral, Every 8 Hours PRN         Continue These Medications      Instructions Start Date   Alcohol Wipes 70 % pads   Use 4 x daily      B-D ULTRA-FINE 33 LANCETS misc   Use 4 x daily , any brand covered by insurance      Baqsimi One Pack 3 MG/DOSE powder  Generic drug: Glucagon   1 each, Nasal, As Needed, Apply intranasal if hypoglycemia      Blood Glucose Monitoring Suppl w/Device kit   USE AS INDICATED, ANY MONITOR , ICD10 code is E11.9      Blood Glucose Test strip   Use 4 x daily use any brand covered by insurance or same brand as before ICD10 code is E11.9      Dexcom G6  device   USE AS DIRECTED FOR CONTINUOUS GLUCOSE MONITORING.      Dexcom G6 Sensor   Use as directed for continuous glucose monitoring **Change sensor Every 10 (Ten) Days**      Dexcom G6 Sensor   Does not apply, As Needed, Every 10 days,  Use as directed for continuous glucose monitoring      Dexcom G6 Transmitter misc   Use as directed for continuous glucose monitoring **Change transmitter Every 3 (Three) Months**      ibuprofen 600 MG tablet  Commonly known as: ADVIL,MOTRIN   TAKE 1 TABLET BY MOUTH EVERY 6 HOURS AS NEEDED FOR PAIN FOR UP TO 10 DAYS      Ketone Test strip   USE AS INDICATED      Lancing Device misc   USE AS INDICATED TO CORRELATE WITH STRIPS AND METER      Levemir FlexTouch 100 UNIT/ML injection  Generic drug: insulin detemir   22 units daily      LORazepam 0.5 MG tablet  Commonly known as: ATIVAN   0.5 mg, Oral, Every 6 Hours PRN      metoclopramide 10 MG tablet  Commonly known as: REGLAN   Take up to 2 tabs po TID       NovoLOG FlexPen 100 UNIT/ML solution pen-injector sc pen  Generic drug: insulin aspart   10 units tid      Insulin Aspart 100 UNIT/ML injection  Commonly known as: novoLOG   100 units daily through pump      OLANZapine 10 MG tablet  Commonly known as: zyPREXA   10 mg, Oral      omeprazole 40 MG capsule  Commonly known as: priLOSEC   40 mg, Oral, Daily      Omnipod 5 G6 Pod (Gen 5) misc   Change pods Every Other Day.      Pen Needles 32G X 4 MM misc   1 each, Does not apply, 4 Times Daily, Use 4 x daily, Dx code E11.65      promethazine 12.5 MG tablet  Commonly known as: PHENERGAN   12.5 mg, Oral, Every 6 Hours PRN      sucralfate 1 g tablet  Commonly known as: Carafate   1 g, Oral, 4 Times Daily      Urine Glucose-Ketones Test strip   Use as indicated         ASK your doctor about these medications      Instructions Start Date   clonazePAM 0.5 MG tablet  Commonly known as: KlonoPIN   1 mg, Oral, 3 Times Daily PRN             Discharge Diet:   Diet Instructions     Diet: Regular, Consistent Carbohydrate      Discharge Diet:  Regular  Consistent Carbohydrate             Activity at Discharge:   Activity Instructions     Activity as Tolerated            Discharge Care Plan/Instructions: Take medications as prescribed, follow-up with PCP and GYN, and return for worsening symptoms.    Follow-up Appointments:   Future Appointments   Date Time Provider Department Center   11/23/2022  3:45 PM Timur Sun MD [facility]       Test Results Pending at Discharge:  None    Fernando Colunga MD    Time: 32 minutes      Electronically signed by Fernando Colunga MD at 11/06/22 1724       Discharge Order (From admission, onward)     Start     Ordered    11/06/22 1137  Discharge patient  Once        Expected Discharge Date: 11/06/22    Discharge Disposition: Home or Self Care    Physician of Record for Attribution - Please select from Treatment Team: FERNANDO COLUNGA [[id number]]    Review needed by CMO to determine Physician of  Record: No       Question Answer Comment   Physician of Record for Attribution - Please select from Treatment Team KASSIDY MARR A    Review needed by CMO to determine Physician of Record No        11/06/22 0312

## 2022-11-09 ENCOUNTER — READMISSION MANAGEMENT (OUTPATIENT)
Dept: CALL CENTER | Facility: HOSPITAL | Age: 22
End: 2022-11-09

## 2022-11-09 NOTE — OUTREACH NOTE
Medical Week 1 Survey    Flowsheet Row Responses   [facility] patient discharged from? [place]   Does the patient have one of the following disease processes/diagnoses(primary or secondary)? Other   Week 1 attempt successful? Yes   Call start time 1719   Call end time 1722   Discharge diagnosis DKA   Person spoke with today (if not patient) and relationship patient   Meds reviewed with patient/caregiver? Yes   Is the patient having any side effects they believe may be caused by any medication additions or changes? No   Does the patient have all medications ordered at discharge? Yes   Is the patient taking all medications as directed (includes completed medication regime)? Yes   Comments regarding appointments Has followup with Gastro on 11/23/2022   Does the patient have a primary care provider?  Yes   Does the patient have an appointment with their PCP within 7 days of discharge? No   What is preventing the patient from scheduling follow up appointments within 7 days of discharge? Haven't had time   Nursing Interventions Educated patient on importance of making appointment, Advised patient to make appointment   Has the patient kept scheduled appointments due by today? N/A   Psychosocial issues? No   Did the patient receive a copy of their discharge instructions? Yes   Nursing interventions Reviewed instructions with patient   What is the patient's perception of their health status since discharge? Improving   Is the patient/caregiver able to teach back signs and symptoms related to disease process for when to call PCP? Yes   Is the patient/caregiver able to teach back signs and symptoms related to disease process for when to call 911? Yes   Is the patient/caregiver able to teach back the hierarchy of who to call/visit for symptoms/problems? PCP, Specialist, Home health nurse, Urgent Care, ED, 911 Yes   Week 1 call completed? Yes   Revoked No further contact(revokes)-requires comment   Graduated/Revoked  "comments \"No further calls necessary.\"          FAIZA UNDERWOOD - Registered Nurse  "

## 2022-12-08 ENCOUNTER — TELEPHONE (OUTPATIENT)
Dept: ENDOCRINOLOGY | Facility: CLINIC | Age: 22
End: 2022-12-08

## 2022-12-08 RX ORDER — INSULIN PMP CART,AUT,G6/7,CNTR
1 EACH SUBCUTANEOUS
Qty: 15 EACH | Refills: 11 | Status: SHIPPED | OUTPATIENT
Start: 2022-12-08

## 2022-12-08 RX ORDER — INSULIN ASPART 100 [IU]/ML
INJECTION, SOLUTION INTRAVENOUS; SUBCUTANEOUS
Qty: 30 ML | Refills: 11 | Status: SHIPPED | OUTPATIENT
Start: 2022-12-08

## 2022-12-08 NOTE — TELEPHONE ENCOUNTER
Incoming Refill Request      Medication requested (name and dose):     Insulin Disposable Pump (Omnipod 5 G6 Pod, Gen 5,) misc    Insulin Aspart (novoLOG) 100 UNIT/ML injection        Pharmacy where request should be sent:     [facility]/pharmacy #[id number] - [place], [place]    Additional details provided by patient: none    Best call back number: [phone]    Does the patient have less than a 3 day supply:  [x] Yes  [] No    Estephania Bentley Rep  12/08/22, 08:58 CST

## 2022-12-08 NOTE — TELEPHONE ENCOUNTER
Pt called stating the pharmacy has faxed over a PA request for her omni pods. She wanted to make sure we had it and also states that she only has one pod left.    Please advise.    Thanks

## 2022-12-09 ENCOUNTER — DOCUMENTATION (OUTPATIENT)
Dept: ENDOCRINOLOGY | Facility: CLINIC | Age: 22
End: 2022-12-09

## 2022-12-09 NOTE — PROGRESS NOTES
OMNIPOD 5 G6 PODS APPROVED 15 FOR 30 DAY SUPPLY FROM 12/9/22 UNTIL NO LONGER ELIGIBLE WITH THE PLAN.     SENT TO [facility] REC

## 2023-01-12 ENCOUNTER — HOSPITAL ENCOUNTER (EMERGENCY)
Facility: HOSPITAL | Age: 23
Discharge: HOME | End: 2023-01-13
Attending: EMERGENCY MEDICINE | Admitting: EMERGENCY MEDICINE
Payer: COMMERCIAL

## 2023-01-12 ENCOUNTER — APPOINTMENT (OUTPATIENT)
Dept: ULTRASOUND IMAGING | Facility: HOSPITAL | Age: 23
End: 2023-01-12
Payer: COMMERCIAL

## 2023-01-12 DIAGNOSIS — Z3A.09 9 WEEKS GESTATION OF PREGNANCY: ICD-10-CM

## 2023-01-12 DIAGNOSIS — N39.0 URINARY TRACT INFECTION WITHOUT HEMATURIA, SITE UNSPECIFIED: Primary | ICD-10-CM

## 2023-01-12 LAB
ACETONE BLD QL: NEGATIVE
ALBUMIN SERPL-MCNC: 4 G/DL (ref 3.5–5.2)
ALBUMIN/GLOB SERPL: 1.3 G/DL
ALP SERPL-CCNC: 57 U/L (ref 39–117)
ALT SERPL W P-5'-P-CCNC: 8 U/L (ref 1–33)
ANION GAP SERPL CALCULATED.3IONS-SCNC: 12 MMOL/L (ref 5–15)
AST SERPL-CCNC: 10 U/L (ref 1–32)
ATMOSPHERIC PRESS: 748 MMHG
BACTERIA UR QL AUTO: ABNORMAL /HPF
BASE EXCESS BLDV CALC-SCNC: -1.1 MMOL/L (ref 0–2)
BASOPHILS # BLD AUTO: 0.06 10*3/MM3 (ref 0–0.2)
BASOPHILS NFR BLD AUTO: 0.8 % (ref 0–1.5)
BDY SITE: ABNORMAL
BILIRUB SERPL-MCNC: 0.2 MG/DL (ref 0–1.2)
BILIRUB UR QL STRIP: NEGATIVE
BUN SERPL-MCNC: 8 MG/DL (ref 6–20)
BUN/CREAT SERPL: 8.1 (ref 7–25)
CALCIUM SPEC-SCNC: 8.9 MG/DL (ref 8.6–10.5)
CHLORIDE SERPL-SCNC: 100 MMOL/L (ref 98–107)
CLARITY UR: CLEAR
CO2 SERPL-SCNC: 24 MMOL/L (ref 22–29)
COHGB MFR BLD: 5.4 % (ref 0–5)
COLOR UR: YELLOW
CREAT SERPL-MCNC: 0.99 MG/DL (ref 0.57–1)
DEPRECATED RDW RBC AUTO: 42.2 FL (ref 37–54)
EGFRCR SERPLBLD CKD-EPI 2021: 82.3 ML/MIN/1.73
EOSINOPHIL # BLD AUTO: 0.1 10*3/MM3 (ref 0–0.4)
EOSINOPHIL NFR BLD AUTO: 1.3 % (ref 0.3–6.2)
ERYTHROCYTE [DISTWIDTH] IN BLOOD BY AUTOMATED COUNT: 13.6 % (ref 12.3–15.4)
GLOBULIN UR ELPH-MCNC: 3 GM/DL
GLUCOSE SERPL-MCNC: 296 MG/DL (ref 65–99)
GLUCOSE UR STRIP-MCNC: ABNORMAL MG/DL
HCG INTACT+B SERPL-ACNC: NORMAL MIU/ML
HCG SERPL QL: POSITIVE
HCO3 BLDV-SCNC: 25.1 MMOL/L (ref 18–23)
HCT VFR BLD AUTO: 35.5 % (ref 34–46.6)
HGB BLD-MCNC: 12 G/DL (ref 12–15.9)
HGB BLDA-MCNC: 12.3 G/DL (ref 13.5–17.5)
HGB UR QL STRIP.AUTO: NEGATIVE
HOLD SPECIMEN: NORMAL
HYALINE CASTS UR QL AUTO: ABNORMAL /LPF
IMM GRANULOCYTES # BLD AUTO: 0.04 10*3/MM3 (ref 0–0.05)
IMM GRANULOCYTES NFR BLD AUTO: 0.5 % (ref 0–0.5)
KETONES UR QL STRIP: ABNORMAL
LEUKOCYTE ESTERASE UR QL STRIP.AUTO: NEGATIVE
LIPASE SERPL-CCNC: 16 U/L (ref 13–60)
LYMPHOCYTES # BLD AUTO: 1.49 10*3/MM3 (ref 0.7–3.1)
LYMPHOCYTES NFR BLD AUTO: 19.1 % (ref 19.6–45.3)
Lab: ABNORMAL
MCH RBC QN AUTO: 28.8 PG (ref 26.6–33)
MCHC RBC AUTO-ENTMCNC: 33.8 G/DL (ref 31.5–35.7)
MCV RBC AUTO: 85.1 FL (ref 79–97)
METHGB BLD QL: 0.3 % (ref 0–3)
MODALITY: ABNORMAL
MONOCYTES # BLD AUTO: 0.42 10*3/MM3 (ref 0.1–0.9)
MONOCYTES NFR BLD AUTO: 5.4 % (ref 5–12)
NEUTROPHILS NFR BLD AUTO: 5.69 10*3/MM3 (ref 1.7–7)
NEUTROPHILS NFR BLD AUTO: 72.9 % (ref 42.7–76)
NITRITE UR QL STRIP: POSITIVE
NOTE: ABNORMAL
NRBC BLD AUTO-RTO: 0 /100 WBC (ref 0–0.2)
OXYHGB MFR BLDV: 54.2 % (ref 45–75)
PCO2 BLDV: 47.1 MM HG (ref 41–51)
PH BLDV: 7.33 PH UNITS (ref 7.29–7.37)
PH UR STRIP.AUTO: 6.5 [PH] (ref 5–9)
PLATELET # BLD AUTO: 302 10*3/MM3 (ref 140–450)
PMV BLD AUTO: 10.9 FL (ref 6–12)
PO2 BLDV: 34.8 MM HG (ref 27–53)
POTASSIUM BLDV-SCNC: 3.8 MMOL/L (ref 3.5–5)
POTASSIUM SERPL-SCNC: 3.7 MMOL/L (ref 3.5–5.2)
PROT SERPL-MCNC: 7 G/DL (ref 6–8.5)
PROT UR QL STRIP: NEGATIVE
RBC # BLD AUTO: 4.17 10*6/MM3 (ref 3.77–5.28)
RBC # UR STRIP: ABNORMAL /HPF
REF LAB TEST METHOD: ABNORMAL
SAO2 % BLDCOV: 57.6 % (ref 45–75)
SODIUM BLDV-SCNC: 136 MMOL/L (ref 136–146)
SODIUM SERPL-SCNC: 136 MMOL/L (ref 136–145)
SP GR UR STRIP: 1.03 (ref 1–1.03)
SQUAMOUS #/AREA URNS HPF: ABNORMAL /HPF
TROPONIN T SERPL-MCNC: <0.01 NG/ML (ref 0–0.03)
UROBILINOGEN UR QL STRIP: ABNORMAL
VENTILATOR MODE: ABNORMAL
WBC # UR STRIP: ABNORMAL /HPF
WBC NRBC COR # BLD: 7.8 10*3/MM3 (ref 3.4–10.8)
WHOLE BLOOD HOLD COAG: NORMAL

## 2023-01-12 PROCEDURE — 99284 EMERGENCY DEPT VISIT MOD MDM: CPT

## 2023-01-12 PROCEDURE — 93975 VASCULAR STUDY: CPT

## 2023-01-12 PROCEDURE — 82805 BLOOD GASES W/O2 SATURATION: CPT

## 2023-01-12 PROCEDURE — 93010 ELECTROCARDIOGRAM REPORT: CPT | Performed by: INTERNAL MEDICINE

## 2023-01-12 PROCEDURE — 81001 URINALYSIS AUTO W/SCOPE: CPT | Performed by: EMERGENCY MEDICINE

## 2023-01-12 PROCEDURE — 25010000002 ONDANSETRON PER 1 MG: Performed by: EMERGENCY MEDICINE

## 2023-01-12 PROCEDURE — 87086 URINE CULTURE/COLONY COUNT: CPT | Performed by: EMERGENCY MEDICINE

## 2023-01-12 PROCEDURE — 76817 TRANSVAGINAL US OBSTETRIC: CPT

## 2023-01-12 PROCEDURE — 96374 THER/PROPH/DIAG INJ IV PUSH: CPT

## 2023-01-12 PROCEDURE — 87147 CULTURE TYPE IMMUNOLOGIC: CPT | Performed by: EMERGENCY MEDICINE

## 2023-01-12 PROCEDURE — 85025 COMPLETE CBC W/AUTO DIFF WBC: CPT | Performed by: EMERGENCY MEDICINE

## 2023-01-12 PROCEDURE — 83690 ASSAY OF LIPASE: CPT | Performed by: EMERGENCY MEDICINE

## 2023-01-12 PROCEDURE — 84484 ASSAY OF TROPONIN QUANT: CPT | Performed by: EMERGENCY MEDICINE

## 2023-01-12 PROCEDURE — 93005 ELECTROCARDIOGRAM TRACING: CPT | Performed by: EMERGENCY MEDICINE

## 2023-01-12 PROCEDURE — 80053 COMPREHEN METABOLIC PANEL: CPT | Performed by: EMERGENCY MEDICINE

## 2023-01-12 PROCEDURE — 82009 KETONE BODYS QUAL: CPT | Performed by: EMERGENCY MEDICINE

## 2023-01-12 PROCEDURE — 82820 HEMOGLOBIN-OXYGEN AFFINITY: CPT

## 2023-01-12 PROCEDURE — 84702 CHORIONIC GONADOTROPIN TEST: CPT | Performed by: EMERGENCY MEDICINE

## 2023-01-12 PROCEDURE — 84703 CHORIONIC GONADOTROPIN ASSAY: CPT | Performed by: EMERGENCY MEDICINE

## 2023-01-12 PROCEDURE — 36415 COLL VENOUS BLD VENIPUNCTURE: CPT

## 2023-01-12 RX ORDER — CEPHALEXIN 500 MG/1
500 CAPSULE ORAL ONCE
Status: COMPLETED | OUTPATIENT
Start: 2023-01-12 | End: 2023-01-13

## 2023-01-12 RX ORDER — ONDANSETRON 2 MG/ML
4 INJECTION INTRAMUSCULAR; INTRAVENOUS ONCE
Status: COMPLETED | OUTPATIENT
Start: 2023-01-12 | End: 2023-01-12

## 2023-01-12 RX ADMIN — SODIUM CHLORIDE 1000 ML: 9 INJECTION, SOLUTION INTRAVENOUS at 21:26

## 2023-01-12 RX ADMIN — ONDANSETRON 4 MG: 2 INJECTION INTRAMUSCULAR; INTRAVENOUS at 21:26

## 2023-01-12 RX ADMIN — SODIUM CHLORIDE 1000 ML: 9 INJECTION, SOLUTION INTRAVENOUS at 23:12

## 2023-01-12 NOTE — Clinical Note
[facility] EMERGENCY DEPARTMENT  [address] [place] [id number]  Phone: [phone]    Fernanda Patterson was seen and treated in our emergency department on 1/12/2023.  She may return to work on 01/16/2023.         Thank you for choosing [facility].    Shivam Cota, DO

## 2023-01-12 NOTE — Clinical Note
[facility] EMERGENCY DEPARTMENT  [address] [place] [id number]  Phone: [phone]    Fernanda Patterson was seen and treated in our emergency department on 1/12/2023.  She may return to work on 01/16/2023.         Thank you for choosing [facility].    Shivam Cota, DO

## 2023-01-13 VITALS
WEIGHT: 130 LBS | DIASTOLIC BLOOD PRESSURE: 77 MMHG | RESPIRATION RATE: 18 BRPM | TEMPERATURE: 98.7 F | SYSTOLIC BLOOD PRESSURE: 118 MMHG | BODY MASS INDEX: 20.4 KG/M2 | HEART RATE: 66 BPM | OXYGEN SATURATION: 99 % | HEIGHT: 67 IN

## 2023-01-13 RX ORDER — CEPHALEXIN 500 MG/1
500 CAPSULE ORAL 4 TIMES DAILY
Qty: 28 CAPSULE | Refills: 0 | Status: SHIPPED | OUTPATIENT
Start: 2023-01-13 | End: 2023-02-02

## 2023-01-13 RX ORDER — PNV NO.118/IRON FUMARATE/FA 29 MG-1 MG
1 TABLET,CHEWABLE ORAL DAILY
Qty: 30 TABLET | Refills: 11 | Status: SHIPPED | OUTPATIENT
Start: 2023-01-13 | End: 2024-01-13

## 2023-01-13 RX ORDER — ONDANSETRON 4 MG/1
4 TABLET, ORALLY DISINTEGRATING ORAL EVERY 8 HOURS PRN
Qty: 9 TABLET | Refills: 0 | Status: SHIPPED | OUTPATIENT
Start: 2023-01-13 | End: 2023-02-02

## 2023-01-13 RX ADMIN — CEPHALEXIN 500 MG: 500 CAPSULE ORAL at 00:04

## 2023-01-13 NOTE — ED PROVIDER NOTES
"Subjective   History of Present Illness  21yo Female with PMHx of T1DM, DKA, cannibinoid hyperemesis syndrome presents to ED with 10 day history of nausea/vomiting. She endorses multiple episodes of vomiting daily on relieved by laying down. Patient has been admitted multiple times for DKA but states abdominal and back pain feels different this time. Denies any spotting or vaginal discharge. She reports having a miscarriage in November and has not had a menstrual period since that time. Dexcom at time of examination ready BG of 276. Patient states she ate right before coming to ED and she generally spikes to this amount. Denies any other complaints at this time.        Review of Systems   Constitutional: Negative for chills and fever.   HENT: Negative for congestion, rhinorrhea and sore throat.    Respiratory: Negative for chest tightness and shortness of breath.    Cardiovascular: Negative for chest pain and palpitations.   Gastrointestinal: Positive for abdominal pain, diarrhea, nausea and vomiting. Negative for abdominal distention and blood in stool.   Genitourinary: Negative.    Musculoskeletal: Positive for back pain.   Neurological: Negative for dizziness, light-headedness and headaches.   Psychiatric/Behavioral: Negative for agitation, behavioral problems and confusion.       Past Medical History:   Diagnosis Date   • ADHD    • Bipolar 1 disorder (HCC)    • Borderline personality disorder (HCC)    • Cannabinoid hyperemesis syndrome    • Diabetes mellitus type 1 (HCC)    • Diabetic gastroparesis (HCC)    • Diabetic ketoacidosis (HCC)    • Diabetic neuropathy (HCC)    • History of transfusion     Pt reports \"no reactions.\"   • Post traumatic stress disorder (PTSD)    • Recurrent UTI    • Seizure disorder (HCC)    • Severe depressed bipolar II disorder without psychotic features (HCC)    • Uncontrolled diabetes mellitus        Allergies   Allergen Reactions   • Pineapple Anaphylaxis   • Benzoyl Peroxide " "Swelling       Past Surgical History:   Procedure Laterality Date   •  SECTION N/A 2018   • COLONOSCOPY N/A 10/12/2022    Procedure: COLONOSCOPY;  Surgeon: Timur Sun MD;  Location: [facility] ENDOSCOPY;  Service: Gastroenterology;  Laterality: N/A;   • CYSTOSCOPY N/A 2020    Procedure: CYSTOSCOPY FLEXIBLE       (DONE ON STRETCHER);  Surgeon: Antonio Cowan MD;  Location: [facility] OR;  Service: Urology;  Laterality: N/A;   • ENDOSCOPY N/A 10/12/2022    Procedure: ESOPHAGOGASTRODUODENOSCOPY;  Surgeon: Timur Sun MD;  Location: [facility] ENDOSCOPY;  Service: Gastroenterology;  Laterality: N/A;       Family History   Problem Relation Age of Onset   • Drug abuse Father    • Suicide Attempts Brother    • Dementia Maternal Grandfather    • Hypertension Paternal Grandmother        Social History     Socioeconomic History   • Marital status: Single   Tobacco Use   • Smoking status: Every Day     Packs/day: 1.00     Years: 2.00     Pack years: 2.00     Types: Cigarettes   • Smokeless tobacco: Never   Vaping Use   • Vaping Use: Former   Substance and Sexual Activity   • Alcohol use: Yes     Comment: none in 2 months   • Drug use: Not Currently     Frequency: 1.0 times per week     Types: Marijuana     Comment: none in a month   • Sexual activity: Defer     Partners: Male     Birth control/protection: None           Objective    /77   Pulse 66   Temp 98.7 °F (37.1 °C) (Oral)   Resp 18   Ht 170.2 cm (67\")   Wt 59 kg (130 lb)   LMP 10/03/2022 (Approximate)   SpO2 99%   Breastfeeding No   BMI 20.36 kg/m²     Physical Exam  Vitals reviewed.   Constitutional:       General: She is not in acute distress.     Appearance: Normal appearance. She is not toxic-appearing.   HENT:      Head: Normocephalic and atraumatic.      Mouth/Throat:      Mouth: Mucous membranes are moist.      Pharynx: Oropharynx is clear.   Cardiovascular:      Rate and Rhythm: Normal rate and regular rhythm.      Pulses: " Normal pulses.      Heart sounds: Normal heart sounds.   Pulmonary:      Effort: Pulmonary effort is normal.      Breath sounds: Normal breath sounds.   Abdominal:      General: Abdomen is flat. Bowel sounds are normal. There is no distension.      Palpations: Abdomen is soft.      Tenderness: There is abdominal tenderness in the epigastric area and left upper quadrant.   Musculoskeletal:      Cervical back: Neck supple.   Skin:     General: Skin is warm.   Neurological:      General: No focal deficit present.      Mental Status: She is alert and oriented to person, place, and time.   Psychiatric:         Mood and Affect: Mood normal.         Behavior: Behavior normal.         Procedures           ED Course      Results for orders placed or performed during the hospital encounter of 01/12/23   Comprehensive Metabolic Panel    Specimen: Blood   Result Value Ref Range    Glucose 296 (H) 65 - 99 mg/dL    BUN 8 6 - 20 mg/dL    Creatinine 0.99 0.57 - 1.00 mg/dL    Sodium 136 136 - 145 mmol/L    Potassium 3.7 3.5 - 5.2 mmol/L    Chloride 100 98 - 107 mmol/L    CO2 24.0 22.0 - 29.0 mmol/L    Calcium 8.9 8.6 - 10.5 mg/dL    Total Protein 7.0 6.0 - 8.5 g/dL    Albumin 4.0 3.5 - 5.2 g/dL    ALT (SGPT) 8 1 - 33 U/L    AST (SGOT) 10 1 - 32 U/L    Alkaline Phosphatase 57 39 - 117 U/L    Total Bilirubin 0.2 0.0 - 1.2 mg/dL    Globulin 3.0 gm/dL    A/G Ratio 1.3 g/dL    BUN/Creatinine Ratio 8.1 7.0 - 25.0    Anion Gap 12.0 5.0 - 15.0 mmol/L    eGFR 82.3 >60.0 mL/min/1.73   Lipase    Specimen: Blood   Result Value Ref Range    Lipase 16 13 - 60 U/L   hCG, Serum, Qualitative    Specimen: Blood   Result Value Ref Range    HCG Qualitative Positive (A) Negative   Urinalysis With Microscopic If Indicated (No Culture) - Urine, Clean Catch    Specimen: Urine, Clean Catch   Result Value Ref Range    Color, UA Yellow Yellow, Straw, Dark Yellow, [place]    Appearance, UA Clear Clear    pH, UA 6.5 5.0 - 9.0    Specific Gravity, UA 1.028 1.003 -  1.030    Glucose, UA >=1000 mg/dL (3+) (A) Negative    Ketones, UA Trace (A) Negative    Bilirubin, UA Negative Negative    Blood, UA Negative Negative    Protein, UA Negative Negative    Leuk Esterase, UA Negative Negative    Nitrite, UA Positive (A) Negative    Urobilinogen, UA 0.2 E.U./dL 0.2 - 1.0 E.U./dL   Troponin    Specimen: Blood   Result Value Ref Range    Troponin T <0.010 0.000 - 0.030 ng/mL   CBC Auto Differential    Specimen: Blood   Result Value Ref Range    WBC 7.80 3.40 - 10.80 10*3/mm3    RBC 4.17 3.77 - 5.28 10*6/mm3    Hemoglobin 12.0 12.0 - 15.9 g/dL    Hematocrit 35.5 34.0 - 46.6 %    MCV 85.1 79.0 - 97.0 fL    MCH 28.8 26.6 - 33.0 pg    MCHC 33.8 31.5 - 35.7 g/dL    RDW 13.6 12.3 - 15.4 %    RDW-SD 42.2 37.0 - 54.0 fl    MPV 10.9 6.0 - 12.0 fL    Platelets 302 140 - 450 10*3/mm3    Neutrophil % 72.9 42.7 - 76.0 %    Lymphocyte % 19.1 (L) 19.6 - 45.3 %    Monocyte % 5.4 5.0 - 12.0 %    Eosinophil % 1.3 0.3 - 6.2 %    Basophil % 0.8 0.0 - 1.5 %    Immature Grans % 0.5 0.0 - 0.5 %    Neutrophils, Absolute 5.69 1.70 - 7.00 10*3/mm3    Lymphocytes, Absolute 1.49 0.70 - 3.10 10*3/mm3    Monocytes, Absolute 0.42 0.10 - 0.90 10*3/mm3    Eosinophils, Absolute 0.10 0.00 - 0.40 10*3/mm3    Basophils, Absolute 0.06 0.00 - 0.20 10*3/mm3    Immature Grans, Absolute 0.04 0.00 - 0.05 10*3/mm3    nRBC 0.0 0.0 - 0.2 /100 WBC   Acetone    Specimen: Blood   Result Value Ref Range    Acetone Negative Negative   Blood Gas, Venous With Co-Ox    Specimen: Venous Blood   Result Value Ref Range    Site OTHER     pH, Venous 7.335 7.290 - 7.370 pH Units    pCO2, Venous 47.1 41.0 - 51.0 mm Hg    pO2, Venous 34.8 27.0 - 53.0 mm Hg    HCO3, Venous 25.1 (H) 18.0 - 23.0 mmol/L    Base Excess, Venous -1.1 (L) 0.0 - 2.0 mmol/L    O2 Saturation, Venous 57.6 45.0 - 75.0 %    Hemoglobin, Blood Gas 12.3 (L) 13.5 - 17.5 g/dL    Oxyhemoglobin Venous 54.2 45.0 - 75.0 %    Methemoglobin Venous 0.3 0.0 - 3.0 %    Carboxyhemoglobin  "Venous 5.4 (H) 0.0 - 5.0 %    Sodium, Venous 136 136 - 146 mmol/L    Potassium, Venous 3.8 3.5 - 5.0 mmol/L    Barometric Pressure for Blood Gas 748 mmHg    Modality Room Air     Ventilator Mode NA     Note      Collected by RN    hCG, Quantitative, Pregnancy    Specimen: Blood   Result Value Ref Range    HCG Quantitative 150,624.00 mIU/mL   Urinalysis, Microscopic Only - Urine, Clean Catch    Specimen: Urine, Clean Catch   Result Value Ref Range    RBC, UA 0-2 (A) None Seen /HPF    WBC, UA 6-12 (A) None Seen, 0-2, 3-5 /HPF    Bacteria, UA Trace (A) None Seen /HPF    Squamous Epithelial Cells, UA 0-2 None Seen, 0-2 /HPF    Hyaline Casts, UA None Seen None Seen /LPF    Methodology Automated Microscopy    ECG 12 Lead Chest Pain   Result Value Ref Range    QT Interval 388 ms    QTC Interval 415 ms   Gold Top - SST   Result Value Ref Range    Extra Tube Hold for add-ons.    Light Blue Top   Result Value Ref Range    Extra Tube Hold for add-ons.                                             Medical Decision Making  Patient has had multiple admissions for DKA over past year. She does state abdominal pain/back pain \"feels different\" and does not overall feel like she usually does during DKA episodes. Dexcom at time of examination read 276. She also has history of cannibinoid hyperemesis syndrome. Labs pending at this time to rule out DKA vs. Cannibinoid hypereemsis. Gastroparesis less likely as patient also endorses diarrhea over past few days.     9 weeks gestation of pregnancy: acute illness or injury  Urinary tract infection without hematuria, site unspecified: acute illness or injury  Amount and/or Complexity of Data Reviewed  Independent Historian: friend     Details: boyfriend Oneil  External Data Reviewed: ECG.  Labs: ordered.     Details: Pregnancy test is positive.  No evidence of acidosis or DKA.  Radiology: ordered.  ECG/medicine tests: ordered.      Risk  OTC drugs.  Prescription drug management.          Final " diagnoses:   Urinary tract infection without hematuria, site unspecified   9 weeks gestation of pregnancy       ED Disposition  ED Disposition     ED Disposition   Discharge    Condition   Stable    Comment   --             Rufus Marshall APRN  [address] [id number]  [phone]    Call in 1 day  To be reevaluated for UTI and to have reevaluation after emergency department visit    Patricia Medina MD  [address]  2ND FLOOR  [facility] [id number] [phone]    Call in 1 day  To make an appointment to be evaluated for your high risk pregnancy.    [facility] EMERGENCY DEPARTMENT  [address]  [facility] [address] [phone]    As needed, If symptoms worsen at any time         Medication List      New Prescriptions    cephalexin 500 MG capsule  Commonly known as: KEFLEX  Take 1 capsule by mouth 4 (Four) Times a Day.     ondansetron ODT 4 MG disintegrating tablet  Commonly known as: ZOFRAN-ODT  Place 1 tablet on the tongue Every 8 (Eight) Hours As Needed for Nausea or Vomiting.     Prenatal 19 chewable tablet  Chew 1 tablet Daily.        Stop    ibuprofen 600 MG tablet  Commonly known as: ADVIL,MOTRIN           Where to Get Your Medications      These medications were sent to [facility]/pharmacy #[id number] - [place], [place] - [address] [place] [address] - [phone]  - [phone] [address] [address]    Phone: [phone]   · cephalexin 500 MG capsule  · ondansetron ODT 4 MG disintegrating tablet  · Prenatal 19 chewable tablet          Shivam Cota DO  01/13/23 0545

## 2023-01-13 NOTE — DISCHARGE INSTRUCTIONS
Please talk to your family doctor or OB/GYN about discontinuing some of your psychiatric medications as these may be harmful to your unborn child.

## 2023-01-14 LAB — BACTERIA SPEC AEROBE CULT: ABNORMAL

## 2023-01-15 LAB
QT INTERVAL: 388 MS
QTC INTERVAL: 415 MS

## 2023-01-18 RX ORDER — SUCRALFATE 1 G/1
1 TABLET ORAL 4 TIMES DAILY
Qty: 360 TABLET | Refills: 1 | OUTPATIENT
Start: 2023-01-18 | End: 2023-02-17

## 2023-01-23 ENCOUNTER — LAB (OUTPATIENT)
Dept: LAB | Facility: HOSPITAL | Age: 23
End: 2023-01-23
Payer: COMMERCIAL

## 2023-01-23 ENCOUNTER — INITIAL PRENATAL (OUTPATIENT)
Dept: OBSTETRICS AND GYNECOLOGY | Facility: CLINIC | Age: 23
End: 2023-01-23
Payer: COMMERCIAL

## 2023-01-23 VITALS — BODY MASS INDEX: 19.42 KG/M2 | DIASTOLIC BLOOD PRESSURE: 72 MMHG | SYSTOLIC BLOOD PRESSURE: 118 MMHG | WEIGHT: 124 LBS

## 2023-01-23 DIAGNOSIS — Z87.59 HISTORY OF GESTATIONAL HYPERTENSION: ICD-10-CM

## 2023-01-23 DIAGNOSIS — E10.69 TYPE 1 DIABETES MELLITUS WITH OTHER SPECIFIED COMPLICATION: ICD-10-CM

## 2023-01-23 DIAGNOSIS — Z34.81 PRENATAL CARE, SUBSEQUENT PREGNANCY IN FIRST TRIMESTER: Primary | ICD-10-CM

## 2023-01-23 DIAGNOSIS — F12.91 HISTORY OF MARIJUANA USE: ICD-10-CM

## 2023-01-23 DIAGNOSIS — O09.291 HISTORY OF MISCARRIAGE, CURRENTLY PREGNANT, FIRST TRIMESTER: ICD-10-CM

## 2023-01-23 PROBLEM — E11.40 DIABETIC NEUROPATHY (HCC): Chronic | Status: ACTIVE | Noted: 2019-12-30

## 2023-01-23 LAB
ABO GROUP BLD: NORMAL
ALBUMIN SERPL-MCNC: 4 G/DL (ref 3.5–5.2)
ALBUMIN/GLOB SERPL: 1.1 G/DL
ALP SERPL-CCNC: 54 U/L (ref 39–117)
ALT SERPL W P-5'-P-CCNC: 10 U/L (ref 1–33)
AMPHET+METHAMPHET UR QL: NEGATIVE
AMPHETAMINES UR QL: NEGATIVE
ANION GAP SERPL CALCULATED.3IONS-SCNC: 10.3 MMOL/L (ref 5–15)
AST SERPL-CCNC: 12 U/L (ref 1–32)
BARBITURATES UR QL SCN: NEGATIVE
BASOPHILS # BLD AUTO: 0.05 10*3/MM3 (ref 0–0.2)
BASOPHILS NFR BLD AUTO: 0.6 % (ref 0–1.5)
BENZODIAZ UR QL SCN: NEGATIVE
BILIRUB SERPL-MCNC: 0.4 MG/DL (ref 0–1.2)
BLD GP AB SCN SERPL QL: NEGATIVE
BUN SERPL-MCNC: 7 MG/DL (ref 6–20)
BUN/CREAT SERPL: 8.1 (ref 7–25)
BUPRENORPHINE SERPL-MCNC: NEGATIVE NG/ML
CALCIUM SPEC-SCNC: 9.4 MG/DL (ref 8.6–10.5)
CANNABINOIDS SERPL QL: POSITIVE
CHLORIDE SERPL-SCNC: 100 MMOL/L (ref 98–107)
CO2 SERPL-SCNC: 22.7 MMOL/L (ref 22–29)
COCAINE UR QL: NEGATIVE
CREAT SERPL-MCNC: 0.86 MG/DL (ref 0.57–1)
DEPRECATED RDW RBC AUTO: 40.8 FL (ref 37–54)
EGFRCR SERPLBLD CKD-EPI 2021: 97.5 ML/MIN/1.73
EOSINOPHIL # BLD AUTO: 0.07 10*3/MM3 (ref 0–0.4)
EOSINOPHIL NFR BLD AUTO: 0.9 % (ref 0.3–6.2)
ERYTHROCYTE [DISTWIDTH] IN BLOOD BY AUTOMATED COUNT: 13.2 % (ref 12.3–15.4)
GLOBULIN UR ELPH-MCNC: 3.5 GM/DL
GLUCOSE SERPL-MCNC: 153 MG/DL (ref 65–99)
HBA1C MFR BLD: 7.4 % (ref 4.8–5.6)
HBV SURFACE AG SERPL QL IA: NORMAL
HCT VFR BLD AUTO: 32.3 % (ref 34–46.6)
HCV AB SER DONR QL: NORMAL
HGB BLD-MCNC: 11.2 G/DL (ref 12–15.9)
HIV1+2 AB SER QL: NORMAL
IMM GRANULOCYTES # BLD AUTO: 0.04 10*3/MM3 (ref 0–0.05)
IMM GRANULOCYTES NFR BLD AUTO: 0.5 % (ref 0–0.5)
LYMPHOCYTES # BLD AUTO: 1.99 10*3/MM3 (ref 0.7–3.1)
LYMPHOCYTES NFR BLD AUTO: 24.5 % (ref 19.6–45.3)
Lab: NORMAL
MCH RBC QN AUTO: 30 PG (ref 26.6–33)
MCHC RBC AUTO-ENTMCNC: 34.7 G/DL (ref 31.5–35.7)
MCV RBC AUTO: 86.6 FL (ref 79–97)
METHADONE UR QL SCN: NEGATIVE
MONOCYTES # BLD AUTO: 0.37 10*3/MM3 (ref 0.1–0.9)
MONOCYTES NFR BLD AUTO: 4.6 % (ref 5–12)
NEUTROPHILS NFR BLD AUTO: 5.61 10*3/MM3 (ref 1.7–7)
NEUTROPHILS NFR BLD AUTO: 68.9 % (ref 42.7–76)
NRBC BLD AUTO-RTO: 0 /100 WBC (ref 0–0.2)
OPIATES UR QL: NEGATIVE
OXYCODONE UR QL SCN: NEGATIVE
PCP UR QL SCN: NEGATIVE
PLATELET # BLD AUTO: 310 10*3/MM3 (ref 140–450)
PMV BLD AUTO: 11.2 FL (ref 6–12)
POTASSIUM SERPL-SCNC: 4.3 MMOL/L (ref 3.5–5.2)
PROPOXYPH UR QL: NEGATIVE
PROT SERPL-MCNC: 7.5 G/DL (ref 6–8.5)
RBC # BLD AUTO: 3.73 10*6/MM3 (ref 3.77–5.28)
RH BLD: POSITIVE
RPR SER QL: NORMAL
SODIUM SERPL-SCNC: 133 MMOL/L (ref 136–145)
TRICYCLICS UR QL SCN: NEGATIVE
WBC NRBC COR # BLD: 8.13 10*3/MM3 (ref 3.4–10.8)

## 2023-01-23 PROCEDURE — 87661 TRICHOMONAS VAGINALIS AMPLIF: CPT | Performed by: STUDENT IN AN ORGANIZED HEALTH CARE EDUCATION/TRAINING PROGRAM

## 2023-01-23 PROCEDURE — 80081 OBSTETRIC PANEL INC HIV TSTG: CPT | Performed by: STUDENT IN AN ORGANIZED HEALTH CARE EDUCATION/TRAINING PROGRAM

## 2023-01-23 PROCEDURE — 36415 COLL VENOUS BLD VENIPUNCTURE: CPT

## 2023-01-23 PROCEDURE — 80306 DRUG TEST PRSMV INSTRMNT: CPT | Performed by: STUDENT IN AN ORGANIZED HEALTH CARE EDUCATION/TRAINING PROGRAM

## 2023-01-23 PROCEDURE — 80053 COMPREHEN METABOLIC PANEL: CPT | Performed by: STUDENT IN AN ORGANIZED HEALTH CARE EDUCATION/TRAINING PROGRAM

## 2023-01-23 PROCEDURE — 83036 HEMOGLOBIN GLYCOSYLATED A1C: CPT | Performed by: STUDENT IN AN ORGANIZED HEALTH CARE EDUCATION/TRAINING PROGRAM

## 2023-01-23 PROCEDURE — 87591 N.GONORRHOEAE DNA AMP PROB: CPT | Performed by: STUDENT IN AN ORGANIZED HEALTH CARE EDUCATION/TRAINING PROGRAM

## 2023-01-23 PROCEDURE — G0480 DRUG TEST DEF 1-7 CLASSES: HCPCS | Performed by: STUDENT IN AN ORGANIZED HEALTH CARE EDUCATION/TRAINING PROGRAM

## 2023-01-23 PROCEDURE — 87491 CHLMYD TRACH DNA AMP PROBE: CPT | Performed by: STUDENT IN AN ORGANIZED HEALTH CARE EDUCATION/TRAINING PROGRAM

## 2023-01-23 PROCEDURE — 86803 HEPATITIS C AB TEST: CPT | Performed by: STUDENT IN AN ORGANIZED HEALTH CARE EDUCATION/TRAINING PROGRAM

## 2023-01-23 NOTE — PROGRESS NOTES
I spent approximately 60 minutes with the patient acquiring the health and history intake, reviewing prior records, discussing topics related to healthy pregnancy, entering orders, and documentation. She is accompanied by her significant other. She says her LMP was in October, and she had a miscarriage in November. She tells me that she did not have a period after the miscarriage. She went to the ER on 1/12/23 for vomiting. She had an ultrasound. She was 9 weeks and 2 days gestation by ultrasound. The FHR was 168 bpm. Her EDC is 8/15/23.  This is her 3rd pregnancy. She had the miscarriage in November 2022. She had a csection with her son on 7/27/18 at 35 weeks and 1 day gestation. Pregnancy was complicated by GHTN, type 1 DM, tobacco use, marijuana use. She was induced at 35 weeks gestation. She would like to have a repeat csection.   The patient sees Dr. Elizabeth. She is going to call his office to get an appointment to see him. She has a history of anxiety, depression, ADHD, borderline personality disorder, PTSD. She filled out the depression screening questionnaire and scored 17.   A newob bag is given. The 1st trimester teaching was done with the patient. We discussed a healthy diet and exercise and what is recommended. I also discussed Listeriosis and Toxoplasmosis. She does not eat fish. I encouraged her to make an appointment with the dentist if she has not had a dental exam and cleaning in the last 6 months.  I instructed the patient that alcohol, illicit drug use, and tobacco smoking are not recommended in pregnancy.  She plans to formula feed. I encouraged the patient to get the TDAP vaccine in the 3rd trimester.  I discussed with the patient that a pediatrician needs to be chosen prior to delivery for the infant to have an appointment scheduled before leaving the hospital.  I discussed lab tests will be done today. A 24 hr urine protein and CMP are ordered due to history of GHTN. A hemoglobin A1C is also  ordered. Because she is currently on Keflex for a UTI, a urine culture will be done at her next appointment. All questions were answered at this time. She is scheduled to see Aylin LAEHY on 1/31/23. She is interested in genetic testing.

## 2023-01-24 LAB
C TRACH RRNA CVX QL NAA+PROBE: NEGATIVE
N GONORRHOEA RRNA SPEC QL NAA+PROBE: NEGATIVE
TRICHOMONAS VAGINALIS PCR: NEGATIVE

## 2023-01-25 LAB — RUBV IGG SERPL IA-ACNC: 1.13 INDEX

## 2023-02-01 LAB
CANNABINOIDS UR QL CFM: NORMAL
CARBOXYTHC/CREAT UR: >412 NG/MG CREAT
LEVEL OF DETECTION:: NORMAL

## 2023-02-02 ENCOUNTER — LAB (OUTPATIENT)
Dept: LAB | Facility: HOSPITAL | Age: 23
End: 2023-02-02
Payer: COMMERCIAL

## 2023-02-02 ENCOUNTER — INITIAL PRENATAL (OUTPATIENT)
Dept: OBSTETRICS AND GYNECOLOGY | Facility: CLINIC | Age: 23
End: 2023-02-02
Payer: COMMERCIAL

## 2023-02-02 VITALS — SYSTOLIC BLOOD PRESSURE: 102 MMHG | WEIGHT: 129 LBS | BODY MASS INDEX: 20.2 KG/M2 | DIASTOLIC BLOOD PRESSURE: 70 MMHG

## 2023-02-02 DIAGNOSIS — O99.320 MARIJUANA USE DURING PREGNANCY: ICD-10-CM

## 2023-02-02 DIAGNOSIS — Z3A.12 12 WEEKS GESTATION OF PREGNANCY: Primary | ICD-10-CM

## 2023-02-02 DIAGNOSIS — O21.9 NAUSEA AND VOMITING IN PREGNANCY PRIOR TO 22 WEEKS GESTATION: ICD-10-CM

## 2023-02-02 DIAGNOSIS — Z34.81 PRENATAL CARE, SUBSEQUENT PREGNANCY IN FIRST TRIMESTER: ICD-10-CM

## 2023-02-02 DIAGNOSIS — Z13.79 GENETIC SCREENING: ICD-10-CM

## 2023-02-02 DIAGNOSIS — R30.0 DYSURIA: ICD-10-CM

## 2023-02-02 DIAGNOSIS — N76.0 ACUTE VAGINITIS: ICD-10-CM

## 2023-02-02 DIAGNOSIS — F12.90 MARIJUANA USE DURING PREGNANCY: ICD-10-CM

## 2023-02-02 DIAGNOSIS — O24.011 PRE-EXISTING TYPE 1 DIABETES MELLITUS DURING PREGNANCY IN FIRST TRIMESTER: ICD-10-CM

## 2023-02-02 DIAGNOSIS — O09.91 HIGH-RISK PREGNANCY IN FIRST TRIMESTER: ICD-10-CM

## 2023-02-02 LAB
CANDIDA ALBICANS: NEGATIVE
GARDNERELLA VAGINALIS: NEGATIVE
T VAGINALIS DNA VAG QL PROBE+SIG AMP: NEGATIVE

## 2023-02-02 PROCEDURE — 87480 CANDIDA DNA DIR PROBE: CPT

## 2023-02-02 PROCEDURE — 87510 GARDNER VAG DNA DIR PROBE: CPT

## 2023-02-02 PROCEDURE — 87660 TRICHOMONAS VAGIN DIR PROBE: CPT

## 2023-02-02 PROCEDURE — 36415 COLL VENOUS BLD VENIPUNCTURE: CPT

## 2023-02-02 PROCEDURE — 99214 OFFICE O/P EST MOD 30 MIN: CPT

## 2023-02-02 NOTE — PROGRESS NOTES
[facility]  Obstetrics  Date of Service: 2023    CHIEF COMPLAINT:  New prenatal visit    HISTORY OF PRESENT ILLNESS:  Fernanda Patterson is a 22 y.o. y/o  at 12w2d by LMP (No LMP recorded (lmp unknown). Patient is pregnant.).  This was a unplanned pregnancy and the patient is supported by family and boyfriend.  Reports  nausea with vomiting.  Reports breast tenderness.  She denies any vaginal bleeding.  She has  started taking a prenatal vitamin.    Patient is a type 1 diabetic. She does have the Dexcom with sensor. Her glucose is managed by Dr. Elizabeth.   Reports Fasting glucoses run from 112-150  She is unsure of what her glucoses are after meals because her pump gives her insulin based on her reading. So she does not look at the readings.     Reports that she is having some abdominal cramping over the last few days.     REVIEW OF SYSTEMS  Review of Systems   Constitutional: Negative for appetite change, chills, fatigue and fever.   Respiratory: Negative for apnea, cough, choking, chest tightness, shortness of breath, wheezing and stridor.    Gastrointestinal: Positive for nausea and vomiting. Negative for abdominal pain, constipation and diarrhea.   Genitourinary: Negative for amenorrhea, breast discharge, breast lump, breast pain, decreased libido, decreased urine volume, difficulty urinating, dyspareunia, dysuria, flank pain, frequency, genital sores, hematuria, menstrual problem, pelvic pain, pelvic pressure, urgency, urinary incontinence, vaginal bleeding, vaginal discharge and vaginal pain.       PRENATAL RISK FACTORS   Problems (from 22 to present)     No problems associated with this episode.          DATING CRITERIA:  LMP unknown  1TUS (22 at 9w2d) -- JULIO 8/15/23    OBSTETRIC HISTORY:  OB History    Para Term  AB Living   3 1   1 1 0   SAB IAB Ectopic Molar Multiple Live Births   1       0 1      # Outcome Date GA Lbr Aneudy/2nd Weight Sex  "Delivery Anes PTL Lv   3 Current            2 SAB 2022           1  18 35w1d  2500 g (5 lb 8.2 oz) M CS-LTranv Spinal  DEC     GYN HISTORY:  Denies h/o sexually transmitted infections/pelvic inflammatory disease  Denies h/o abnormal pap smears  Last pap smear:   Last Completed Pap Smear          PAP SMEAR (Every 3 Years) Next due on 2021  Liquid-based Pap Smear, Screening              Denies h/o gynecologic surgeries, including biopsies of the cervix    PAST MEDICAL HISTORY:  Past Medical History:   Diagnosis Date   • ADHD    • Bipolar 1 disorder (HCC)    • Borderline personality disorder (HCC)    • Cannabinoid hyperemesis syndrome    • Depression    • Diabetes mellitus type 1 (HCC)    • Diabetic gastroparesis (HCC)    • Diabetic ketoacidosis (HCC)    • Diabetic neuropathy (HCC)    • Gestational hypertension    • History of transfusion     Pt reports \"no reactions.\"   • Ovarian cyst    • Post traumatic stress disorder (PTSD)    • Recurrent UTI    • Seizure disorder (HCC)    • Severe depressed bipolar II disorder without psychotic features (HCC)    • Uncontrolled diabetes mellitus      PAST SURGICAL HISTORY:  Past Surgical History:   Procedure Laterality Date   •  SECTION N/A 2018   • COLONOSCOPY N/A 10/12/2022    Procedure: COLONOSCOPY;  Surgeon: Timur Sun MD;  Location: [facility] ENDOSCOPY;  Service: Gastroenterology;  Laterality: N/A;   • CYSTOSCOPY N/A 2020    Procedure: CYSTOSCOPY FLEXIBLE       (DONE ON STRETCHER);  Surgeon: Antonio Cowan MD;  Location: [facility] OR;  Service: Urology;  Laterality: N/A;   • EAR TUBES     • ENDOSCOPY N/A 10/12/2022    Procedure: ESOPHAGOGASTRODUODENOSCOPY;  Surgeon: Timur Sun MD;  Location: [facility] ENDOSCOPY;  Service: Gastroenterology;  Laterality: N/A;     FAMILY HISTORY:  Family History   Problem Relation Age of Onset   • Drug abuse Father    • No Known Problems Brother    • Suicidality Brother    • " Asperger's syndrome Sister    • SIDS Son    • Dementia Maternal Grandfather      SOCIAL HISTORY:  Social History     Socioeconomic History   • Marital status: Single   Tobacco Use   • Smoking status: Former     Packs/day: 1.00     Years: 2.00     Pack years: 2.00     Types: Cigarettes   • Smokeless tobacco: Never   Vaping Use   • Vaping Use: Former   Substance and Sexual Activity   • Alcohol use: Not Currently   • Drug use: Not Currently     Frequency: 1.0 times per week     Types: Marijuana     Comment: none in a month   • Sexual activity: Yes     Partners: Male     Birth control/protection: None     Comment: LAST PAP SMEAR NEGATIVE 7/13/21     GENETIC SCREENING:  Age >34 yo as of JULIO: no  Thalassemia: no  NTD: no  CHD: no  Down Syndrome/MR/Fragile X/Autism: no  Ashkenazi Gnosticism with Drake-Sachs, [place], familial dysautonomia: no  Sickle cell disease or trait: no  Hemophilia: no  Muscular dystrophy: no  Cystic fibrosis: no  Devendra's chorea: no  Birth defects: no  Genetic/chromosomal disorders: no    INFECTION HISTORY:  TB exposure: no  HSV: no  Illness since LMP: no  Prior GBS infected child: no  STIs: no    ALLERGIES:  Allergies   Allergen Reactions   • Pineapple Anaphylaxis   • Benzoyl Peroxide Swelling       MEDICATIONS:  Prior to Admission medications    Medication Sig Start Date End Date Taking? Authorizing Provider   Acetone, Urine, Test (Ketone Test) strip USE AS INDICATED 6/27/22  Yes Provider, MD Abigail   Alcohol Swabs (Alcohol Wipes) 70 % pads Use 4 x daily 6/27/22  Yes Tavon Avila MD   B-D ULTRA-FINE 33 LANCETS misc Use 4 x daily , any brand covered by insurance 6/27/22  Yes Tavon Avila MD   Blood Glucose Monitoring Suppl w/Device kit USE AS INDICATED, ANY MONITOR , ICD10 code is E11.9 6/27/22  Yes Tavon Avila MD   Continuous Blood Gluc  (Dexcom G6 ) device USE AS DIRECTED FOR CONTINUOUS GLUCOSE MONITORING. 6/27/22  Yes Provider  MD Abigail   Continuous Blood Gluc Sensor (Dexcom G6 Sensor) Use as directed for continuous glucose monitoring **Change sensor Every 10 (Ten) Days** 8/16/22  Yes Tavon Avila MD   Continuous Blood Gluc Sensor (Dexcom G6 Sensor) As Needed (glucose control). Every 10 days,  Use as directed for continuous glucose monitoring 8/16/22  Yes Tavon Avila MD   Continuous Blood Gluc Transmit (Dexcom G6 Transmitter) misc Use as directed for continuous glucose monitoring **Change transmitter Every 3 (Three) Months** 8/16/22  Yes Tavon Avila MD   Glucagon (Baqsimi One Pack) 3 MG/DOSE powder 1 each into the nostril(s) as directed by provider As Needed (Hypoglycemia). Apply intranasal if hypoglycemia 6/27/22  Yes Tavon Avila MD   Glucose Blood (BLOOD GLUCOSE TEST) strip Use 4 x daily use any brand covered by insurance or same brand as before ICD10 code is E11.9 10/31/19  Yes Tavon Avila MD   insulin aspart (NovoLOG FlexPen) 100 UNIT/ML solution pen-injector sc pen 10 units tid 6/27/22  Yes Tavon Avila MD   Insulin Aspart (novoLOG) 100 UNIT/ML injection 100 units daily through pump 12/8/22  Yes Tavon Avila MD   insulin detemir (Levemir FlexTouch) 100 UNIT/ML injection 22 units daily 6/27/22  Yes Tavon Avila MD   Insulin Disposable Pump (Omnipod 5 G6 Pod, Gen 5,) misc Change pods Every Other Day. 12/8/22  Yes Tavon Avila MD   Insulin Pen Needle (Pen Needles) 32G X 4 MM misc 1 each 4 (Four) Times a Day. Use 4 x daily, Dx code E11.65 6/27/22  Yes Tavon Avila MD   Lancet Devices (Lancing Device) misc USE AS INDICATED TO CORRELATE WITH STRIPS AND METER 6/27/22  Yes Tavon Avila MD   Prenatal Vit-Fe Fumarate-FA (Prenatal 19) chewable tablet Chew 1 tablet Daily. 1/13/23 1/13/24 Yes Shivam Cota, DO   Urine Glucose-Ketones Test strip Use as indicated 6/27/22  Yes Tavon Avila  MD VALENTIN   cephalexin (KEFLEX) 500 MG capsule Take 1 capsule by mouth 4 (Four) Times a Day. 23  Shivam Cota, DO   ondansetron ODT (ZOFRAN-ODT) 4 MG disintegrating tablet Place 1 tablet on the tongue Every 8 (Eight) Hours As Needed for Nausea or Vomiting. 23  Shivam Cota, DO       PHYSICAL EXAM:   /70   Wt 58.5 kg (129 lb)   LMP  (LMP Unknown)   BMI 20.20 kg/m²   General: Alert, healthy, no distress, well nourished and well developed.  Neurologic: Alert, oriented to person, place, and time.  Gait normal.  Cranial nerves II-XII grossly intact.  HEENT: Normocephalic, atraumatic.  Extraocular muscles intact, pupils equal and reactive x2.    Teeth: Normal hygiene.  Neck: Supple, no adenopathy, thyroid normal size, non-tender, without nodularity, trachea midline.  Lungs: Normal respiratory effort.  Clear to auscultation bilaterally.  No wheezes, rhonci, or rales.  Heart: Regular rate and rhythm.  No murmer, rub or gallop.  Abdomen: Soft, non-tender, non-distended,no masses, no hepatosplenomegaly, no hernia.  Skin: No rash, no lesions.  Extremities: No cyanosis, clubbing or edema.    Positive heart tones heard via doppler    IMPRESSION:  Fernanda Patterson is a 22 y.o.  at 12w2d for a new prenatal visit.    PLAN:  1.  IUP at 12w2d   - Prenatal labs reviewed  - Genetic testing, including cystic fibrosis, was discussed and patient desires NIPS, carrier and gender  - Continue prenatal vitamins  - Weight gain counseling performed.   - [place] BMI 18.5-24.9: Recommend 25-35 lb  - Return to clinic in 4 weeks for return prenatal visit  - Reviewed COVID-19 visitation policy  - Reviewed COVID-19 precautions     Diagnosis Plan   1. 12 weeks gestation of pregnancy        2. Pre-existing type 1 diabetes mellitus during pregnancy in first trimester        3. Marijuana use during pregnancy        4. Nausea and vomiting in pregnancy prior to 22 weeks gestation        5. Dysuria  Urine Culture  - Urine, Urine, Clean Catch      6. Prenatal care, subsequent pregnancy in first trimester  [facility] Prenatal Test: Chromosomes 13, 18, 21, X & Y: Triploidy 22Q.11.2 Deletion - Blood,    [employer] 14 (Pan-Ethnic Standard) - Blood,      7. Genetic screening  [facility] Prenatal Test: Chromosomes 13, 18, 21, X & Y: Triploidy 22Q.11.2 Deletion - Blood,    [employer] Horizon 14 (Pan-Ethnic Standard) - Blood,      8. Acute vaginitis  Gardnerella vaginalis, Trichomonas vaginalis, Candida albicans, DNA - Swab, Vagina    Gardnerella vaginalis, Trichomonas vaginalis, Candida albicans, DNA - Swab, Vagina      9. High-risk pregnancy in first trimester          Aylin Thompson, APRN  2/2/2023  13:46 CST

## 2023-02-06 ENCOUNTER — OFFICE VISIT (OUTPATIENT)
Dept: ENDOCRINOLOGY | Facility: CLINIC | Age: 23
End: 2023-02-06
Payer: COMMERCIAL

## 2023-02-06 VITALS
WEIGHT: 127 LBS | DIASTOLIC BLOOD PRESSURE: 70 MMHG | HEIGHT: 67 IN | SYSTOLIC BLOOD PRESSURE: 110 MMHG | OXYGEN SATURATION: 100 % | HEART RATE: 93 BPM | BODY MASS INDEX: 19.93 KG/M2

## 2023-02-06 DIAGNOSIS — K31.84 GASTROPARESIS: ICD-10-CM

## 2023-02-06 DIAGNOSIS — E10.65 TYPE 1 DIABETES MELLITUS WITH HYPERGLYCEMIA: Primary | ICD-10-CM

## 2023-02-06 DIAGNOSIS — E10.42 DIABETIC POLYNEUROPATHY ASSOCIATED WITH TYPE 1 DIABETES MELLITUS: ICD-10-CM

## 2023-02-06 PROCEDURE — 3051F HG A1C>EQUAL 7.0%<8.0%: CPT | Performed by: INTERNAL MEDICINE

## 2023-02-06 PROCEDURE — 99214 OFFICE O/P EST MOD 30 MIN: CPT | Performed by: INTERNAL MEDICINE

## 2023-02-06 PROCEDURE — 95251 CONT GLUC MNTR ANALYSIS I&R: CPT | Performed by: INTERNAL MEDICINE

## 2023-02-06 NOTE — PROGRESS NOTES
"  Subjective    Fernanda Patterson is a 22 y.o. female. she is here today for follow-up of type 1 diabetes       History of Present Illness     22 y o type 1 Diabetes more than 10 years complicated by neuropathy and gastroparesis.   Has had frequent DKA but now doing better on omnipod 5 .     She is 13 weeks pregnant     PE    /70   Pulse 93   Ht 170.2 cm (67\")   Wt 57.6 kg (127 lb)   LMP  (LMP Unknown)   SpO2 100%   BMI 19.89 kg/m²   AOx3  No visible goiter  Normal Respiratory Effort , Lung CTA  RRR  No Edema    Labs    Lab Results   Component Value Date    WBC 8.13 01/23/2023    HGB 11.2 (L) 01/23/2023    HCT 32.3 (L) 01/23/2023    MCV 86.6 01/23/2023     01/23/2023     Lab Results   Component Value Date    GLUCOSE 153 (H) 01/23/2023    BUN 7 01/23/2023    CREATININE 0.86 01/23/2023    EGFRIFNONA 77 01/14/2022    EGFRIFAFRI 109 04/23/2021    BCR 8.1 01/23/2023     (L) 01/23/2023    K 4.3 01/23/2023    CO2 22.7 01/23/2023    CALCIUM 9.4 01/23/2023    ALBUMIN 4.0 01/23/2023    AST 12 01/23/2023    ALT 10 01/23/2023         Assessment & Plan      1. Type 1 diabetes mellitus with hyperglycemia (HCC)    2. Gastroparesis    3. Diabetic polyneuropathy associated with type 1 diabetes mellitus (HCC)    .  Type 1 DM     Glycemic Management:       Lab Results   Component Value Date    HGBA1C 7.40 (H) 01/23/2023       Ambulatory Glucose Profile Report    Days Analyzed : 2 week period ending on 01/31/2023    Continuous Glucose Monitory Device:  Dexcom G6     - 15% very high target range  - 26% high target range  - 56% in target range  - 3% below target range  - GMI 7.5 %  - Average glucose 175 mg/dl    Interpretation : Diabetes Type 1 with hyperglycemia        She is using auto mode 82% of the time, 53% of her insulin is basal    She is using manual bolusing and never a correction as she would rather use the OmniPod basal in automode   to restore back into target.      Basal suggest 0.7 units per hour "       Carb ratio    10 - not using carb ratio but using pattern recognition approach and most of the time she is correct  Please bolus 5 min ahead of the meal     Correction     48 , not using     Target     120   I changed MN to 7 am to 110   Thereafter 120     I prefer she remains w omnipod during pregnancy rather than going back to injections.    This is the best she has done       Microvascular Complication Monitoring:  Diabetic Neuropathy, Neuropathy type painful and sensorial, no meds       gastroparesis -   improve glycemia        Has right retinopathy                  4. Follow-up: No follow-ups on file.            This document has been electronically signed by Tavon Elizabeth MD on February 6, 2023 14:18 CST

## 2023-02-09 ENCOUNTER — APPOINTMENT (OUTPATIENT)
Dept: ULTRASOUND IMAGING | Facility: HOSPITAL | Age: 23
End: 2023-02-09
Payer: COMMERCIAL

## 2023-02-09 ENCOUNTER — HOSPITAL ENCOUNTER (EMERGENCY)
Facility: HOSPITAL | Age: 23
Discharge: HOME | End: 2023-02-09
Attending: STUDENT IN AN ORGANIZED HEALTH CARE EDUCATION/TRAINING PROGRAM | Admitting: STUDENT IN AN ORGANIZED HEALTH CARE EDUCATION/TRAINING PROGRAM
Payer: COMMERCIAL

## 2023-02-09 VITALS
WEIGHT: 130 LBS | BODY MASS INDEX: 20.4 KG/M2 | OXYGEN SATURATION: 100 % | TEMPERATURE: 98.3 F | HEART RATE: 65 BPM | DIASTOLIC BLOOD PRESSURE: 62 MMHG | SYSTOLIC BLOOD PRESSURE: 106 MMHG | RESPIRATION RATE: 18 BRPM | HEIGHT: 67 IN

## 2023-02-09 DIAGNOSIS — O20.9 VAGINAL BLEEDING BEFORE 22 WEEKS GESTATION: Primary | ICD-10-CM

## 2023-02-09 LAB
ALBUMIN SERPL-MCNC: 3.4 G/DL (ref 3.5–5.2)
ALBUMIN/GLOB SERPL: 1.1 G/DL
ALP SERPL-CCNC: 51 U/L (ref 39–117)
ALT SERPL W P-5'-P-CCNC: 8 U/L (ref 1–33)
ANION GAP SERPL CALCULATED.3IONS-SCNC: 9 MMOL/L (ref 5–15)
AST SERPL-CCNC: 12 U/L (ref 1–32)
BASOPHILS # BLD AUTO: 0.04 10*3/MM3 (ref 0–0.2)
BASOPHILS NFR BLD AUTO: 0.5 % (ref 0–1.5)
BILIRUB SERPL-MCNC: 0.2 MG/DL (ref 0–1.2)
BILIRUB UR QL STRIP: NEGATIVE
BUN SERPL-MCNC: 13 MG/DL (ref 6–20)
BUN/CREAT SERPL: 17.8 (ref 7–25)
CALCIUM SPEC-SCNC: 9.3 MG/DL (ref 8.6–10.5)
CHLORIDE SERPL-SCNC: 102 MMOL/L (ref 98–107)
CLARITY UR: CLEAR
CO2 SERPL-SCNC: 23 MMOL/L (ref 22–29)
COLOR UR: YELLOW
CREAT SERPL-MCNC: 0.73 MG/DL (ref 0.57–1)
DEPRECATED RDW RBC AUTO: 41.5 FL (ref 37–54)
EGFRCR SERPLBLD CKD-EPI 2021: 118.7 ML/MIN/1.73
EOSINOPHIL # BLD AUTO: 0.15 10*3/MM3 (ref 0–0.4)
EOSINOPHIL NFR BLD AUTO: 2 % (ref 0.3–6.2)
ERYTHROCYTE [DISTWIDTH] IN BLOOD BY AUTOMATED COUNT: 13.6 % (ref 12.3–15.4)
GLOBULIN UR ELPH-MCNC: 3.2 GM/DL
GLUCOSE SERPL-MCNC: 171 MG/DL (ref 65–99)
GLUCOSE UR STRIP-MCNC: ABNORMAL MG/DL
HCG INTACT+B SERPL-ACNC: NORMAL MIU/ML
HCT VFR BLD AUTO: 30.3 % (ref 34–46.6)
HGB BLD-MCNC: 10.6 G/DL (ref 12–15.9)
HGB UR QL STRIP.AUTO: NEGATIVE
IMM GRANULOCYTES # BLD AUTO: 0.07 10*3/MM3 (ref 0–0.05)
IMM GRANULOCYTES NFR BLD AUTO: 0.9 % (ref 0–0.5)
KETONES UR QL STRIP: NEGATIVE
LEUKOCYTE ESTERASE UR QL STRIP.AUTO: NEGATIVE
LYMPHOCYTES # BLD AUTO: 1.67 10*3/MM3 (ref 0.7–3.1)
LYMPHOCYTES NFR BLD AUTO: 22.3 % (ref 19.6–45.3)
MCH RBC QN AUTO: 29.3 PG (ref 26.6–33)
MCHC RBC AUTO-ENTMCNC: 35 G/DL (ref 31.5–35.7)
MCV RBC AUTO: 83.7 FL (ref 79–97)
MONOCYTES # BLD AUTO: 0.35 10*3/MM3 (ref 0.1–0.9)
MONOCYTES NFR BLD AUTO: 4.7 % (ref 5–12)
NEUTROPHILS NFR BLD AUTO: 5.2 10*3/MM3 (ref 1.7–7)
NEUTROPHILS NFR BLD AUTO: 69.6 % (ref 42.7–76)
NITRITE UR QL STRIP: NEGATIVE
NRBC BLD AUTO-RTO: 0 /100 WBC (ref 0–0.2)
PH UR STRIP.AUTO: 6 [PH] (ref 5–9)
PLATELET # BLD AUTO: 277 10*3/MM3 (ref 140–450)
PMV BLD AUTO: 10.5 FL (ref 6–12)
POTASSIUM SERPL-SCNC: 4.2 MMOL/L (ref 3.5–5.2)
PROT SERPL-MCNC: 6.6 G/DL (ref 6–8.5)
PROT UR QL STRIP: NEGATIVE
RBC # BLD AUTO: 3.62 10*6/MM3 (ref 3.77–5.28)
SODIUM SERPL-SCNC: 134 MMOL/L (ref 136–145)
SP GR UR STRIP: 1.01 (ref 1–1.03)
UROBILINOGEN UR QL STRIP: ABNORMAL
WBC NRBC COR # BLD: 7.48 10*3/MM3 (ref 3.4–10.8)

## 2023-02-09 PROCEDURE — 76801 OB US < 14 WKS SINGLE FETUS: CPT

## 2023-02-09 PROCEDURE — 80053 COMPREHEN METABOLIC PANEL: CPT | Performed by: NURSE PRACTITIONER

## 2023-02-09 PROCEDURE — 84702 CHORIONIC GONADOTROPIN TEST: CPT | Performed by: NURSE PRACTITIONER

## 2023-02-09 PROCEDURE — 85025 COMPLETE CBC W/AUTO DIFF WBC: CPT | Performed by: NURSE PRACTITIONER

## 2023-02-09 PROCEDURE — 99283 EMERGENCY DEPT VISIT LOW MDM: CPT

## 2023-02-09 PROCEDURE — 81003 URINALYSIS AUTO W/O SCOPE: CPT | Performed by: NURSE PRACTITIONER

## 2023-02-09 RX ORDER — SODIUM CHLORIDE 0.9 % (FLUSH) 0.9 %
10 SYRINGE (ML) INJECTION AS NEEDED
Status: DISCONTINUED | OUTPATIENT
Start: 2023-02-09 | End: 2023-02-09 | Stop reason: HOSPADM
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
## 2023-02-09 NOTE — DISCHARGE INSTRUCTIONS
Call Dr. Medina's office today to schedule and appointment for follow up on vaginal bleeding. Return to the ER if your symptoms worsen or change in presentation.

## 2023-02-11 LAB
Lab: NORMAL
NTRA FETAL FRACTION: NORMAL
NTRA GENDER OF FETUS: NORMAL
NTRA MONOSOMY X AGE-BASED RISK TEXT: NORMAL
NTRA MONOSOMY X RESULT TEXT: NORMAL
NTRA MONOSOMY X RISK SCORE TEXT: NORMAL
NTRA TRIPLOIDY RESULT TEXT: NORMAL
NTRA TRISOMY 13 AGE-BASED RISK TEXT: NORMAL
NTRA TRISOMY 13 RESULT TEXT: NORMAL
NTRA TRISOMY 13 RISK SCORE TEXT: NORMAL
NTRA TRISOMY 18 AGE-BASED RISK TEXT: NORMAL
NTRA TRISOMY 18 RESULT TEXT: NORMAL
NTRA TRISOMY 18 RISK SCORE TEXT: NORMAL
NTRA TRISOMY 21 AGE-BASED RISK TEXT: NORMAL
NTRA TRISOMY 21 RESULT TEXT: NORMAL
NTRA TRISOMY 21 RISK SCORE TEXT: NORMAL

## 2023-02-14 RX ORDER — ONDANSETRON 4 MG/1
4 TABLET, ORALLY DISINTEGRATING ORAL EVERY 8 HOURS PRN
Qty: 30 TABLET | Refills: 1 | Status: SHIPPED | OUTPATIENT
Start: 2023-02-14

## 2023-02-17 LAB
Lab: NEGATIVE
Lab: NORMAL
NTRA ALPHA-THALASSEMIA: NEGATIVE
NTRA BETA-HEMOGLOBINOPATHIES: NEGATIVE
NTRA CANAVAN DISEASE: NEGATIVE
NTRA CYSTIC FIBROSIS: NEGATIVE
NTRA DUCHENNE/BECKER MUSCULAR DYSTROPHY: NEGATIVE
NTRA FAMILIAL DYSAUTONOMIA: NEGATIVE
NTRA FRAGILE X SYNDROME: NEGATIVE
NTRA GALACTOSEMIA: NEGATIVE
NTRA GAUCHER DISEASE: NEGATIVE
NTRA MEDIUM CHAIN ACYL-COA DEHYDROGENASE DEFICIENCY: NEGATIVE
NTRA POLYCYSTIC KIDNEY DISEASE, AUTOSOMAL RECESSIVE: NEGATIVE
NTRA SMITH-LEMLI-OPITZ SYNDROME: NEGATIVE
NTRA SPINAL MUSCULAR ATROPHY: NEGATIVE
NTRA TAY-SACHS DISEASE: NEGATIVE

## 2023-02-19 ENCOUNTER — APPOINTMENT (OUTPATIENT)
Dept: ULTRASOUND IMAGING | Facility: HOSPITAL | Age: 23
End: 2023-02-19
Payer: COMMERCIAL

## 2023-02-19 ENCOUNTER — HOSPITAL ENCOUNTER (EMERGENCY)
Facility: HOSPITAL | Age: 23
Discharge: HOME | End: 2023-02-20
Attending: STUDENT IN AN ORGANIZED HEALTH CARE EDUCATION/TRAINING PROGRAM | Admitting: STUDENT IN AN ORGANIZED HEALTH CARE EDUCATION/TRAINING PROGRAM
Payer: COMMERCIAL

## 2023-02-19 DIAGNOSIS — R11.2 NAUSEA AND VOMITING, UNSPECIFIED VOMITING TYPE: ICD-10-CM

## 2023-02-19 DIAGNOSIS — R73.9 HYPERGLYCEMIA: Primary | ICD-10-CM

## 2023-02-19 LAB
BACTERIA UR QL AUTO: ABNORMAL /HPF
BILIRUB UR QL STRIP: NEGATIVE
CLARITY UR: CLEAR
COLOR UR: YELLOW
GLUCOSE UR STRIP-MCNC: ABNORMAL MG/DL
HGB UR QL STRIP.AUTO: NEGATIVE
HYALINE CASTS UR QL AUTO: ABNORMAL /LPF
KETONES UR QL STRIP: ABNORMAL
LEUKOCYTE ESTERASE UR QL STRIP.AUTO: NEGATIVE
NITRITE UR QL STRIP: NEGATIVE
PH UR STRIP.AUTO: 6.5 [PH] (ref 5–9)
PROT UR QL STRIP: ABNORMAL
RBC # UR STRIP: ABNORMAL /HPF
REF LAB TEST METHOD: ABNORMAL
SP GR UR STRIP: 1.02 (ref 1–1.03)
SQUAMOUS #/AREA URNS HPF: ABNORMAL /HPF
UROBILINOGEN UR QL STRIP: ABNORMAL
WBC # UR STRIP: ABNORMAL /HPF

## 2023-02-19 PROCEDURE — 93005 ELECTROCARDIOGRAM TRACING: CPT | Performed by: STUDENT IN AN ORGANIZED HEALTH CARE EDUCATION/TRAINING PROGRAM

## 2023-02-19 PROCEDURE — 93010 ELECTROCARDIOGRAM REPORT: CPT | Performed by: INTERNAL MEDICINE

## 2023-02-19 PROCEDURE — 81001 URINALYSIS AUTO W/SCOPE: CPT | Performed by: STUDENT IN AN ORGANIZED HEALTH CARE EDUCATION/TRAINING PROGRAM

## 2023-02-19 PROCEDURE — 93005 ELECTROCARDIOGRAM TRACING: CPT

## 2023-02-19 PROCEDURE — 76815 OB US LIMITED FETUS(S): CPT

## 2023-02-19 PROCEDURE — 99283 EMERGENCY DEPT VISIT LOW MDM: CPT

## 2023-02-19 RX ADMIN — SODIUM CHLORIDE, POTASSIUM CHLORIDE, SODIUM LACTATE AND CALCIUM CHLORIDE 1000 ML: 600; 310; 30; 20 INJECTION, SOLUTION INTRAVENOUS at 23:33

## 2023-02-20 VITALS
SYSTOLIC BLOOD PRESSURE: 119 MMHG | RESPIRATION RATE: 17 BRPM | DIASTOLIC BLOOD PRESSURE: 75 MMHG | HEIGHT: 67 IN | TEMPERATURE: 97.7 F | OXYGEN SATURATION: 100 % | HEART RATE: 64 BPM | BODY MASS INDEX: 20.09 KG/M2 | WEIGHT: 128 LBS

## 2023-02-20 LAB
ACETONE BLD QL: NEGATIVE
ALBUMIN SERPL-MCNC: 3.5 G/DL (ref 3.5–5.2)
ALBUMIN/GLOB SERPL: 1.1 G/DL
ALP SERPL-CCNC: 57 U/L (ref 39–117)
ALT SERPL W P-5'-P-CCNC: 6 U/L (ref 1–33)
ANION GAP SERPL CALCULATED.3IONS-SCNC: 11 MMOL/L (ref 5–15)
AST SERPL-CCNC: 9 U/L (ref 1–32)
BASOPHILS # BLD AUTO: 0.09 10*3/MM3 (ref 0–0.2)
BASOPHILS NFR BLD AUTO: 1 % (ref 0–1.5)
BILIRUB SERPL-MCNC: 0.4 MG/DL (ref 0–1.2)
BUN SERPL-MCNC: 10 MG/DL (ref 6–20)
BUN/CREAT SERPL: 13.7 (ref 7–25)
CALCIUM SPEC-SCNC: 8.7 MG/DL (ref 8.6–10.5)
CHLORIDE SERPL-SCNC: 99 MMOL/L (ref 98–107)
CK SERPL-CCNC: 33 U/L (ref 20–180)
CO2 SERPL-SCNC: 22 MMOL/L (ref 22–29)
CREAT SERPL-MCNC: 0.73 MG/DL (ref 0.57–1)
DEPRECATED RDW RBC AUTO: 41.1 FL (ref 37–54)
EGFRCR SERPLBLD CKD-EPI 2021: 118.7 ML/MIN/1.73
EOSINOPHIL # BLD AUTO: 0.13 10*3/MM3 (ref 0–0.4)
EOSINOPHIL NFR BLD AUTO: 1.4 % (ref 0.3–6.2)
ERYTHROCYTE [DISTWIDTH] IN BLOOD BY AUTOMATED COUNT: 13.5 % (ref 12.3–15.4)
GLOBULIN UR ELPH-MCNC: 3.2 GM/DL
GLUCOSE SERPL-MCNC: 196 MG/DL (ref 65–99)
HCG INTACT+B SERPL-ACNC: NORMAL MIU/ML
HCT VFR BLD AUTO: 32.7 % (ref 34–46.6)
HGB BLD-MCNC: 11.3 G/DL (ref 12–15.9)
HOLD SPECIMEN: NORMAL
IMM GRANULOCYTES # BLD AUTO: 0.11 10*3/MM3 (ref 0–0.05)
IMM GRANULOCYTES NFR BLD AUTO: 1.2 % (ref 0–0.5)
LYMPHOCYTES # BLD AUTO: 2.39 10*3/MM3 (ref 0.7–3.1)
LYMPHOCYTES NFR BLD AUTO: 26.6 % (ref 19.6–45.3)
MAGNESIUM SERPL-MCNC: 1.6 MG/DL (ref 1.6–2.6)
MCH RBC QN AUTO: 28.9 PG (ref 26.6–33)
MCHC RBC AUTO-ENTMCNC: 34.6 G/DL (ref 31.5–35.7)
MCV RBC AUTO: 83.6 FL (ref 79–97)
MONOCYTES # BLD AUTO: 0.41 10*3/MM3 (ref 0.1–0.9)
MONOCYTES NFR BLD AUTO: 4.6 % (ref 5–12)
NEUTROPHILS NFR BLD AUTO: 5.86 10*3/MM3 (ref 1.7–7)
NEUTROPHILS NFR BLD AUTO: 65.2 % (ref 42.7–76)
NRBC BLD AUTO-RTO: 0 /100 WBC (ref 0–0.2)
NT-PROBNP SERPL-MCNC: <36 PG/ML (ref 0–450)
PLATELET # BLD AUTO: 293 10*3/MM3 (ref 140–450)
PMV BLD AUTO: 9.8 FL (ref 6–12)
POTASSIUM SERPL-SCNC: 4.1 MMOL/L (ref 3.5–5.2)
PROT SERPL-MCNC: 6.7 G/DL (ref 6–8.5)
RBC # BLD AUTO: 3.91 10*6/MM3 (ref 3.77–5.28)
SODIUM SERPL-SCNC: 132 MMOL/L (ref 136–145)
WBC NRBC COR # BLD: 8.99 10*3/MM3 (ref 3.4–10.8)
WHOLE BLOOD HOLD COAG: NORMAL
WHOLE BLOOD HOLD SPECIMEN: NORMAL

## 2023-02-20 PROCEDURE — 96374 THER/PROPH/DIAG INJ IV PUSH: CPT

## 2023-02-20 PROCEDURE — 25010000002 ONDANSETRON PER 1 MG: Performed by: STUDENT IN AN ORGANIZED HEALTH CARE EDUCATION/TRAINING PROGRAM

## 2023-02-20 PROCEDURE — 36415 COLL VENOUS BLD VENIPUNCTURE: CPT

## 2023-02-20 PROCEDURE — 84702 CHORIONIC GONADOTROPIN TEST: CPT | Performed by: STUDENT IN AN ORGANIZED HEALTH CARE EDUCATION/TRAINING PROGRAM

## 2023-02-20 PROCEDURE — 83735 ASSAY OF MAGNESIUM: CPT | Performed by: STUDENT IN AN ORGANIZED HEALTH CARE EDUCATION/TRAINING PROGRAM

## 2023-02-20 PROCEDURE — 83880 ASSAY OF NATRIURETIC PEPTIDE: CPT | Performed by: STUDENT IN AN ORGANIZED HEALTH CARE EDUCATION/TRAINING PROGRAM

## 2023-02-20 PROCEDURE — 82550 ASSAY OF CK (CPK): CPT | Performed by: STUDENT IN AN ORGANIZED HEALTH CARE EDUCATION/TRAINING PROGRAM

## 2023-02-20 PROCEDURE — 80053 COMPREHEN METABOLIC PANEL: CPT | Performed by: STUDENT IN AN ORGANIZED HEALTH CARE EDUCATION/TRAINING PROGRAM

## 2023-02-20 PROCEDURE — 82009 KETONE BODYS QUAL: CPT | Performed by: STUDENT IN AN ORGANIZED HEALTH CARE EDUCATION/TRAINING PROGRAM

## 2023-02-20 PROCEDURE — 85025 COMPLETE CBC W/AUTO DIFF WBC: CPT | Performed by: STUDENT IN AN ORGANIZED HEALTH CARE EDUCATION/TRAINING PROGRAM

## 2023-02-20 RX ORDER — ONDANSETRON 4 MG/1
4 TABLET, ORALLY DISINTEGRATING ORAL 4 TIMES DAILY PRN
Qty: 12 TABLET | Refills: 0 | Status: SHIPPED | OUTPATIENT
Start: 2023-02-20

## 2023-02-20 RX ORDER — ACETAMINOPHEN 500 MG
1000 TABLET ORAL ONCE
Status: COMPLETED | OUTPATIENT
Start: 2023-02-20 | End: 2023-02-20

## 2023-02-20 RX ORDER — ONDANSETRON 2 MG/ML
4 INJECTION INTRAMUSCULAR; INTRAVENOUS ONCE
Status: COMPLETED | OUTPATIENT
Start: 2023-02-20 | End: 2023-02-20

## 2023-02-20 RX ADMIN — ACETAMINOPHEN 1000 MG: 500 TABLET ORAL at 00:36

## 2023-02-20 RX ADMIN — ONDANSETRON 4 MG: 2 INJECTION INTRAMUSCULAR; INTRAVENOUS at 00:35

## 2023-02-20 NOTE — ED PROVIDER NOTES
"Subjective   History of Present Illness  22-year-old female approximately 15 weeks pregnant comes to the ER chief complaint of pain ketones and having a blood glucose that has not gone above 230 for the last several days.  Patient has been nausea and vomiting during her entire pregnancy.  She is having intermittent days of being hydrated and being dehydrated.  She is a diabetic.  She has had body aches. she denies other symptoms other than lower abdominal cramping for the last 2 days.  No vaginal bleeding or discharge.    History provided by:  Patient   used: No        Review of Systems   Constitutional: Positive for activity change and fatigue. Negative for chills and fever.   HENT: Negative for drooling.    Eyes: Negative for redness.   Respiratory: Negative for cough, chest tightness, shortness of breath and wheezing.    Cardiovascular: Negative for chest pain.   Gastrointestinal: Positive for abdominal pain, nausea and vomiting. Negative for constipation and diarrhea.   Genitourinary: Negative for flank pain and vaginal bleeding.   Musculoskeletal: Positive for myalgias.   Skin: Negative for color change.   Neurological: Negative for dizziness, seizures, weakness, light-headedness, numbness and headaches.   Psychiatric/Behavioral: Negative for confusion.       Past Medical History:   Diagnosis Date   • ADHD    • Bipolar 1 disorder (HCC)    • Borderline personality disorder (HCC)    • Cannabinoid hyperemesis syndrome    • Depression    • Diabetes mellitus type 1 (HCC)    • Diabetic gastroparesis (HCC)    • Diabetic ketoacidosis (HCC)    • Diabetic neuropathy (HCC)    • Gestational hypertension    • History of transfusion     Pt reports \"no reactions.\"   • Ovarian cyst    • Post traumatic stress disorder (PTSD)    • Recurrent UTI    • Seizure disorder (HCC)    • Severe depressed bipolar II disorder without psychotic features ([facility])    • Uncontrolled diabetes mellitus        Allergies   Allergen " Reactions   • Pineapple Anaphylaxis   • Benzoyl Peroxide Swelling       Past Surgical History:   Procedure Laterality Date   •  SECTION N/A 2018   • COLONOSCOPY N/A 10/12/2022    Procedure: COLONOSCOPY;  Surgeon: Timur Sun MD;  Location: [facility] ENDOSCOPY;  Service: Gastroenterology;  Laterality: N/A;   • CYSTOSCOPY N/A 2020    Procedure: CYSTOSCOPY FLEXIBLE       (DONE ON STRETCHER);  Surgeon: Antonio Cowan MD;  Location: [facility] OR;  Service: Urology;  Laterality: N/A;   • EAR TUBES     • ENDOSCOPY N/A 10/12/2022    Procedure: ESOPHAGOGASTRODUODENOSCOPY;  Surgeon: Timur Sun MD;  Location: [facility] ENDOSCOPY;  Service: Gastroenterology;  Laterality: N/A;       Family History   Problem Relation Age of Onset   • Drug abuse Father    • No Known Problems Brother    • Suicidality Brother    • Asperger's syndrome Sister    • SIDS Son    • Dementia Maternal Grandfather        Social History     Socioeconomic History   • Marital status: Single   Tobacco Use   • Smoking status: Former     Packs/day: 1.00     Years: 2.00     Pack years: 2.00     Types: Cigarettes   • Smokeless tobacco: Never   Vaping Use   • Vaping Use: Former   Substance and Sexual Activity   • Alcohol use: Not Currently   • Drug use: Not Currently     Frequency: 1.0 times per week     Types: Marijuana     Comment: none in a month   • Sexual activity: Yes     Partners: Male     Birth control/protection: None     Comment: LAST PAP SMEAR NEGATIVE 20           Objective    Vitals:    23 2315 23 2330 23 0031 23 0101   BP: 120/82 114/79 135/81 118/79   BP Location:       Patient Position:       Pulse: 74 78 68 70   Resp:    18   Temp:       TempSrc:       SpO2: 99% 100% 100% 100%   Weight:       Height:           Physical Exam  Vitals and nursing note reviewed.   Constitutional:       General: She is not in acute distress.     Appearance: She is well-developed. She is not ill-appearing,  toxic-appearing or diaphoretic.   Eyes:      Conjunctiva/sclera: Conjunctivae normal.   Pulmonary:      Effort: Pulmonary effort is normal. No accessory muscle usage or respiratory distress.   Chest:      Chest wall: No tenderness.   Abdominal:      Palpations: Abdomen is soft.      Tenderness: There is no abdominal tenderness (deep palpation). There is no guarding or rebound.   Skin:     General: Skin is warm and dry.      Capillary Refill: Capillary refill takes less than 2 seconds.   Neurological:      Mental Status: She is alert and oriented to person, place, and time.         Procedures           ED Course      Results for orders placed or performed during the hospital encounter of 02/19/23   Urinalysis With Microscopic If Indicated (No Culture) - Urine, Clean Catch    Specimen: Urine, Clean Catch   Result Value Ref Range    Color, UA Yellow Yellow, Straw, Dark Yellow, [place]    Appearance, UA Clear Clear    pH, UA 6.5 5.0 - 9.0    Specific Gravity, UA 1.017 1.003 - 1.030    Glucose, UA >=1000 mg/dL (3+) (A) Negative    Ketones, UA 40 mg/dL (2+) (A) Negative    Bilirubin, UA Negative Negative    Blood, UA Negative Negative    Protein, UA 30 mg/dL (1+) (A) Negative    Leuk Esterase, UA Negative Negative    Nitrite, UA Negative Negative    Urobilinogen, UA 1.0 E.U./dL 0.2 - 1.0 E.U./dL   Comprehensive Metabolic Panel    Specimen: Blood   Result Value Ref Range    Glucose 196 (H) 65 - 99 mg/dL    BUN 10 6 - 20 mg/dL    Creatinine 0.73 0.57 - 1.00 mg/dL    Sodium 132 (L) 136 - 145 mmol/L    Potassium 4.1 3.5 - 5.2 mmol/L    Chloride 99 98 - 107 mmol/L    CO2 22.0 22.0 - 29.0 mmol/L    Calcium 8.7 8.6 - 10.5 mg/dL    Total Protein 6.7 6.0 - 8.5 g/dL    Albumin 3.5 3.5 - 5.2 g/dL    ALT (SGPT) 6 1 - 33 U/L    AST (SGOT) 9 1 - 32 U/L    Alkaline Phosphatase 57 39 - 117 U/L    Total Bilirubin 0.4 0.0 - 1.2 mg/dL    Globulin 3.2 gm/dL    A/G Ratio 1.1 g/dL    BUN/Creatinine Ratio 13.7 7.0 - 25.0    Anion Gap 11.0 5.0 -  15.0 mmol/L    eGFR 118.7 >60.0 mL/min/1.73   hCG, Quantitative, Pregnancy    Specimen: Blood   Result Value Ref Range    HCG Quantitative 53,140.00 mIU/mL   BNP    Specimen: Blood   Result Value Ref Range    proBNP <36.0 0.0 - 450.0 pg/mL   CK    Specimen: Blood   Result Value Ref Range    Creatine Kinase 33 20 - 180 U/L   Magnesium    Specimen: Blood   Result Value Ref Range    Magnesium 1.6 1.6 - 2.6 mg/dL   CBC Auto Differential    Specimen: Blood   Result Value Ref Range    WBC 8.99 3.40 - 10.80 10*3/mm3    RBC 3.91 3.77 - 5.28 10*6/mm3    Hemoglobin 11.3 (L) 12.0 - 15.9 g/dL    Hematocrit 32.7 (L) 34.0 - 46.6 %    MCV 83.6 79.0 - 97.0 fL    MCH 28.9 26.6 - 33.0 pg    MCHC 34.6 31.5 - 35.7 g/dL    RDW 13.5 12.3 - 15.4 %    RDW-SD 41.1 37.0 - 54.0 fl    MPV 9.8 6.0 - 12.0 fL    Platelets 293 140 - 450 10*3/mm3    Neutrophil % 65.2 42.7 - 76.0 %    Lymphocyte % 26.6 19.6 - 45.3 %    Monocyte % 4.6 (L) 5.0 - 12.0 %    Eosinophil % 1.4 0.3 - 6.2 %    Basophil % 1.0 0.0 - 1.5 %    Immature Grans % 1.2 (H) 0.0 - 0.5 %    Neutrophils, Absolute 5.86 1.70 - 7.00 10*3/mm3    Lymphocytes, Absolute 2.39 0.70 - 3.10 10*3/mm3    Monocytes, Absolute 0.41 0.10 - 0.90 10*3/mm3    Eosinophils, Absolute 0.13 0.00 - 0.40 10*3/mm3    Basophils, Absolute 0.09 0.00 - 0.20 10*3/mm3    Immature Grans, Absolute 0.11 (H) 0.00 - 0.05 10*3/mm3    nRBC 0.0 0.0 - 0.2 /100 WBC   Acetone    Specimen: Blood   Result Value Ref Range    Acetone Negative Negative   Urinalysis, Microscopic Only - Urine, Clean Catch    Specimen: Urine, Clean Catch   Result Value Ref Range    RBC, UA 0-2 (A) None Seen /HPF    WBC, UA 0-2 None Seen, 0-2, 3-5 /HPF    Bacteria, UA None Seen None Seen /HPF    Squamous Epithelial Cells, UA 0-2 None Seen, 0-2 /HPF    Hyaline Casts, UA 0-2 None Seen /LPF    Methodology Automated Microscopy    ECG 12 Lead Chest Pain   Result Value Ref Range    QT Interval 364 ms    QTC Interval 435 ms     US Ob Limited 1 + Fetuses   Final  Result     Single live intrauterine gestation as above.      Electronically signed by:  Liliana Guerra MD  2/20/2023 12:51   AM CST Workstation: [id number]                                             Medical Decision Making  Vital signs are stable, afebrile.  Labs remarkable for glucose of 196.  Ketones negative.  Ultrasound shows a live intrauterine pregnancy.  Patient received Tylenol, Zofran, IVF bolus.  Recommend follow-up with her PCP and OB.  Return precautions given.  Patient states understanding and is agreeable to the plan.    Hyperglycemia: acute illness or injury  Nausea and vomiting, unspecified vomiting type: chronic illness or injury  Amount and/or Complexity of Data Reviewed  Labs: ordered.  Radiology: ordered.  ECG/medicine tests: ordered.      Risk  OTC drugs.  Prescription drug management.          Final diagnoses:   Hyperglycemia   Nausea and vomiting, unspecified vomiting type       ED Disposition  ED Disposition     ED Disposition   Discharge    Condition   Stable    Comment   --             Rufus Marshall, ARMOND  [address] [facility] [address]  [phone]    Schedule an appointment as soon as possible for a visit in 2 days  ER follow up    [address]  [facility] [address] [place] [id number] [phone]  Schedule an appointment as soon as possible for a visit in 2 days  ER follow up         Medication List      Changed    * ondansetron ODT 4 MG disintegrating tablet  Commonly known as: ZOFRAN-ODT  Place 1 tablet on the tongue Every 8 (Eight) Hours As Needed for Nausea or Vomiting.  What changed: Another medication with the same name was added. Make sure you understand how and when to take each.     * ondansetron ODT 4 MG disintegrating tablet  Commonly known as: ZOFRAN-ODT  Place 1 tablet on the tongue 4 (Four) Times a Day As Needed for Nausea or Vomiting.  What changed: You were already taking a medication with  the same name, and this prescription was added. Make sure you understand how and when to take each.         * This list has 2 medication(s) that are the same as other medications prescribed for you. Read the directions carefully, and ask your doctor or other care provider to review them with you.               Where to Get Your Medications      These medications were sent to [facility]/pharmacy #[id number] - [place], [place] - [address] [place] [address] - [phone]  - [phone] [address] [address]    Phone: [phone]   · ondansetron ODT 4 MG disintegrating tablet          Sarthak Hobson MD  02/20/23 0153

## 2023-02-26 LAB
QT INTERVAL: 364 MS
QTC INTERVAL: 435 MS

## 2023-03-30 ENCOUNTER — PATIENT OUTREACH (OUTPATIENT)
Dept: LABOR AND DELIVERY | Facility: HOSPITAL | Age: 23
End: 2023-03-30
Payer: COMMERCIAL

## 2023-03-30 ENCOUNTER — REFERRAL TRIAGE (OUTPATIENT)
Dept: LABOR AND DELIVERY | Facility: HOSPITAL | Age: 23
End: 2023-03-30
Payer: COMMERCIAL

## 2023-03-30 NOTE — OUTREACH NOTE
[facility]  Enrollment    Current Estimated Gestational Age: 20w2d    Questions/Answers    Flowsheet Row Responses   Would like to participate? No          Fernanda let me know that she had transferred care outside [facility].    Nubia Chanel RN  Maternity Nurse Navigator    3/30/2023, 12:41 CDT

## 2023-08-08 RX ORDER — OMEPRAZOLE 40 MG/1
CAPSULE, DELAYED RELEASE ORAL
Qty: 30 CAPSULE | Refills: 11 | OUTPATIENT
Start: 2023-08-08

## 2023-09-13 RX ORDER — PROCHLORPERAZINE 25 MG/1
SUPPOSITORY RECTAL
Qty: 3 EACH | Refills: 11 | OUTPATIENT
Start: 2023-09-13
